# Patient Record
Sex: MALE | Race: WHITE | NOT HISPANIC OR LATINO | Employment: OTHER | URBAN - NONMETROPOLITAN AREA
[De-identification: names, ages, dates, MRNs, and addresses within clinical notes are randomized per-mention and may not be internally consistent; named-entity substitution may affect disease eponyms.]

---

## 2017-01-09 ENCOUNTER — OFFICE VISIT (OUTPATIENT)
Dept: CARDIOLOGY | Facility: CLINIC | Age: 72
End: 2017-01-09

## 2017-01-09 VITALS
RESPIRATION RATE: 16 BRPM | HEIGHT: 71 IN | SYSTOLIC BLOOD PRESSURE: 134 MMHG | HEART RATE: 60 BPM | BODY MASS INDEX: 24.92 KG/M2 | DIASTOLIC BLOOD PRESSURE: 58 MMHG | WEIGHT: 178 LBS

## 2017-01-09 DIAGNOSIS — I25.10 ASCVD (ARTERIOSCLEROTIC CARDIOVASCULAR DISEASE): ICD-10-CM

## 2017-01-09 DIAGNOSIS — IMO0001 NORMAL CARDIAC STRESS TEST: Primary | ICD-10-CM

## 2017-01-09 DIAGNOSIS — I20.0 UNSTABLE ANGINA (HCC): ICD-10-CM

## 2017-01-09 DIAGNOSIS — E78.5 DYSLIPIDEMIA: ICD-10-CM

## 2017-01-09 DIAGNOSIS — Z79.899 DRUG THERAPY: ICD-10-CM

## 2017-01-09 PROCEDURE — 99213 OFFICE O/P EST LOW 20 MIN: CPT | Performed by: INTERNAL MEDICINE

## 2017-01-09 RX ORDER — ATORVASTATIN CALCIUM 80 MG/1
80 TABLET, FILM COATED ORAL NIGHTLY
Qty: 30 TABLET | Refills: 2 | Status: SHIPPED | OUTPATIENT
Start: 2017-01-09 | End: 2017-03-15 | Stop reason: SDUPTHER

## 2017-01-09 RX ORDER — ISOSORBIDE MONONITRATE 60 MG/1
60 TABLET, EXTENDED RELEASE ORAL EVERY MORNING
Qty: 30 TABLET | Refills: 2 | Status: SHIPPED | OUTPATIENT
Start: 2017-01-09 | End: 2017-03-30

## 2017-01-09 NOTE — MR AVS SNAPSHOT
Fidencio Urbanbs   1/9/2017 1:20 PM   Office Visit    Dept Phone:  [phone]   Encounter #:  [id number]    Provider:  Giovanni Buenrostro MD   Department:  [facility] CARDIOLOGY                Your Full Care Plan              Today's Medication Changes          These changes are accurate as of: 1/9/17  2:00 PM.  If you have any questions, ask your nurse or doctor.               New Medication(s)Ordered:     isosorbide mononitrate 60 MG 24 hr tablet   Commonly known as:  IMDUR   Take 1 tablet by mouth Every Morning.         Medication(s)that have changed:     atorvastatin 80 MG tablet   Commonly known as:  LIPITOR   Take 1 tablet by mouth Every Night.   What changed:    - medication strength  - how much to take  - when to take this         Stop taking medication(s)listed here:     Mirabegron ER 50 MG tablet sustained-release 24 hour                Where to Get Your Medications      These medications were sent to [facility] [address] [place] [address] [phone] [place] [phone] [address] [address]     Phone:  [phone]     atorvastatin 80 MG tablet    isosorbide mononitrate 60 MG 24 hr tablet                  Your Updated Medication List          This list is accurate as of: 1/9/17  2:00 PM.  Always use your most recent med list.                amLODIPine 5 MG tablet   Commonly known as:  NORVASC       aspirin 325 MG EC tablet   Take 1 tablet by mouth Daily.       atorvastatin 80 MG tablet   Commonly known as:  LIPITOR   Take 1 tablet by mouth Every Night.       clopidogrel 75 MG tablet   Commonly known as:  PLAVIX       finasteride 5 MG tablet   Commonly known as:  PROSCAR       isosorbide mononitrate 60 MG 24 hr tablet   Commonly known as:  IMDUR   Take 1 tablet by mouth Every Morning.       lisinopril 5 MG tablet   Commonly known as:  PRINIVIL,ZESTRIL       NITROSTAT 0.4 MG SL tablet   Generic drug:  nitroglycerin   PLACE 1 TABLET UNDER  "THE TONGUE EVERY FIVE MINUTES AS NEEDED FOR CHEST PAIN FOR UP TO 3 DOSES               You Were Diagnosed With        Codes Comments    Normal cardiac stress test    -  Primary ICD-10-CM: Z13.6  ICD-9-CM: V81.2     ASCVD (arteriosclerotic cardiovascular disease)     ICD-10-CM: I25.10  ICD-9-CM: 429.2, 440.9     Unstable angina     ICD-10-CM: I20.0  ICD-9-CM: 411.1     Dyslipidemia     ICD-10-CM: E78.5  ICD-9-CM: 272.4     Drug therapy     ICD-10-CM: Z79.899  ICD-9-CM: V58.69       Instructions     None    Patient Instructions History      Upcoming Appointments     Visit Type Date Time Department    FOLLOW UP 1/9/2017  1:20 PM [facility] [place]    FOLLOW UP 3/30/2017 12:20 PM [facility] [place] Signup     Our records indicate that you have declined [facility]. If you would like to sign up for [place], please email [email or web address] or call [phone] to obtain an activation code.             Other Info from Your Visit           Your Appointments     Mar 30, 2017 12:20 PM EDT   Follow Up with Giovanni Buenrostro MD   [facility] (--)    [address]  [place] [address] [phone]           Arrive 15 minutes prior to appointment.              Allergies     No Known Allergies      Reason for Visit     Follow-up hosp stay, stress findings    Chest Pain no change      Vital Signs     Blood Pressure Pulse Respirations Height Weight Body Mass Index    134/58 60 16 71\" (180.3 cm) 178 lb (80.7 kg) 24.83 kg/m2    Smoking Status                   Former Smoker           Problems and Diagnoses Noted     ASCVD (arteriosclerotic cardiovascular disease)    Dyslipidemia    Normal cardiac stress test    Unstable angina    Pharmacologic therapy            "

## 2017-01-09 NOTE — LETTER
January 9, 2017     Gideon Charles MD  [address]  [place] [address]    Patient: Fidencio Luciano   YOB: 1945   Date of Visit: 1/9/2017       Dear Dr. Araceli MD:    Thank you for referring Fidencio Luciano to me for evaluation. Below are the relevant portions of my assessment and plan of care.    If you have questions, please do not hesitate to call me. I look forward to following Fidencio along with you.         Sincerely,        Giovanni Buenrostro MD        CC: No Recipients  Giovnani Buenrostro MD  1/9/2017 11:01 PM  Sign at close encounter  Gideon Charles MD  Fidencio Luciano  1945  12/15/2016    Patient Active Problem List   Diagnosis   • Near syncope   • Symptomatic bradycardia   • Dizziness   • Palpitations   • Hypertension   • Diabetes mellitus   • Benign prostatic hyperplasia   • Angina, class III   • Abnormal stress test   • Abnormal findings on cardiac catheterization   • ASCVD (arteriosclerotic cardiovascular disease), s/p stenting of 80 % stenosis in left circumflex on 10/05/16and 60% stenosis in the LAD that was left alone.   • Unstable angina   • Normal cardiac stress test 12/2016   • Dyslipidemia       Dear Gideon Charles MD:    Subjective     Fidencio Luciano is a 71 y.o. male with the problems as listed above, presents for follow up of chest pain.      History of Present Illness:  Mr. Luciano is a 71-year-old  male with history of known ASCVD, status post stenting of the left circumflex coronary artery in October 2016 with nonobstructive disease noted in the LAD in the right coronary artery.  His had some recent chest pains for which she was admitted to [facility] and had a Lexiscan sestamibi study that revealed no evidence of myocardial ischemia.  He is here for a regular cardiology follow-up.  His had some mild chest pain since discharge from the hospital.  Overall he feels much better.  He says this is not like prior to stenting.    No Known Allergies:      Current  "Outpatient Prescriptions:   •  amLODIPine (NORVASC) 5 MG tablet, Take 5 mg by mouth Daily., Disp: , Rfl:   •  aspirin  MG EC tablet, Take 1 tablet by mouth Daily., Disp: 30 tablet, Rfl: 5  •  atorvastatin (LIPITOR) 80 MG tablet, Take 1 tablet by mouth Every Night., Disp: 30 tablet, Rfl: 2  •  clopidogrel (PLAVIX) 75 MG tablet, Take 75 mg by mouth Daily., Disp: , Rfl:   •  finasteride (PROSCAR) 5 MG tablet, Take 5 mg by mouth Every Evening., Disp: , Rfl:   •  lisinopril (PRINIVIL,ZESTRIL) 5 MG tablet, Take 5 mg by mouth Daily., Disp: , Rfl:   •  NITROSTAT 0.4 MG SL tablet, PLACE 1 TABLET UNDER THE TONGUE EVERY FIVE MINUTES AS NEEDED FOR CHEST PAIN FOR UP TO 3 DOSES, Disp: 25 tablet, Rfl: 1  •  isosorbide mononitrate (IMDUR) 60 MG 24 hr tablet, Take 1 tablet by mouth Every Morning., Disp: 30 tablet, Rfl: 2      The following portions of the patient's history were reviewed and updated as appropriate: allergies, current medications, past family history, past medical history, past social history, past surgical history and problem list.    Social History   Substance Use Topics   • Smoking status: Former Smoker     Packs/day: 3.00     Types: Cigarettes     Quit date: 1982   • Smokeless tobacco: Former User     Quit date: 1/16/1986   • Alcohol use No       Review of Systems   Constitution: Negative for chills and fever.   HENT: Negative for headaches, nosebleeds and sore throat.    Cardiovascular: Positive for chest pain.   Respiratory: Negative for cough, hemoptysis and wheezing.    Gastrointestinal: Negative for abdominal pain, hematemesis, hematochezia, melena, nausea and vomiting.   Genitourinary: Negative for dysuria and hematuria.       Objective   Vitals:    01/09/17 1335   BP: 134/58   Pulse: 60   Resp: 16   Weight: 178 lb (80.7 kg)   Height: 71\" (180.3 cm)     Body mass index is 24.83 kg/(m^2).        Physical Exam   Constitutional: He is oriented to person, place, and time. He appears well-developed and " well-nourished.   HENT:   Head: Normocephalic.   Eyes: Conjunctivae and EOM are normal.   Neck: Normal range of motion. Neck supple. No JVD present. No tracheal deviation present. No thyromegaly present.   Cardiovascular: Normal rate and regular rhythm.  Exam reveals no gallop and no friction rub.    No murmur heard.  Pulmonary/Chest: Breath sounds normal. No respiratory distress. He has no wheezes. He has no rales.   Abdominal: Soft. Bowel sounds are normal. He exhibits no mass. There is no tenderness.   Musculoskeletal: He exhibits no edema.   Neurological: He is alert and oriented to person, place, and time. No cranial nerve deficit.   Skin: Skin is warm and dry.   Psychiatric: He has a normal mood and affect.       Lab Results   Component Value Date     12/16/2016    K 4.1 12/16/2016     12/16/2016    CO2 24.7 12/16/2016    BUN 15 12/16/2016    CREATININE 0.80 12/16/2016    GLUCOSE 115 (H) 12/16/2016    CALCIUM 8.8 12/16/2016    AST 33 12/16/2016    ALT 34 12/16/2016    ALKPHOS 81 12/16/2016    LABIL2 1.4 (L) 12/16/2016     Lab Results   Component Value Date    CKTOTAL 79 10/05/2016     Lab Results   Component Value Date    WBC 6.71 12/16/2016    HGB 13.8 (L) 12/16/2016    HCT 41.4 (L) 12/16/2016     12/16/2016     Lab Results   Component Value Date    INR 1.06 10/03/2016    INR 1.01 07/16/2016     Lab Results   Component Value Date    MG 2.3 07/19/2016     Lab Results   Component Value Date    TSH 1.134 07/17/2016    PSA 1.160 11/04/2016    CHLPL 109 08/12/2015    TRIG 111 12/16/2016    HDL 36 (L) 12/16/2016    LDL 57 08/12/2015      Lab Results   Component Value Date    BNP 55.0 07/16/2016     Echo   Lab Results   Component Value Date    ECHOEFEST 60 07/17/2016     Cardiac cath.PCI by  in oct 2016:  Final impression and plan:  Overall it is my impression that Mr. Luciano is undergone successful percutaneous revascularization of the circumflex artery. He does have moderate disease  involving the LAD and the right coronary arteries however these are both very focal lesions wouldn't easily be treated by percutaneous revascularization if necessary. For now, I would continue medical therapy as is and if he continues to have symptoms and would consider obtaining a stress Cardiolite test and if there are perfusion abnormalities in either the LAD or right coronary artery territories would proceed with percutaneous revascularization of these vessels as well.    Lexiscan sestamibi study on 12/16/16 revealed:  · Findings consistent with a normal ECG stress test.  · Myocardial perfusion imaging indicates a normal myocardial perfusion study with no evidence of ischemia.  · Normal LV cavity size. Normal LV wall motion noted.  · (Calculated EF = 67%).  · Impressions are consistent with a low risk study.  · There is no prior study available for comparison.    Procedures    Assessment/Plan :    1. ASCVD (arteriosclerotic cardiovascular disease), s/p stenting of left circumflex on 10/05/16     2. Unstable angina  Start Imdur 60 mg po daily.    Essential Hypertension    3. Dyslipidemia Increase Lipitor to 80 mg po qhs.  Check FLP and CMP.          Recommendations:     I have reviewed the lexiscan stress test results with the patient.  Increase Lipitor to 80 mg po daily.  Start Imdur 60 mg po daily.  Check FLP and CMP.    Return in about 2 months (around 3/9/2017).    As always, I appreciate very much the opportunity to participate in the cardiovascular care of your patients.      With Best Regards,    Giovanni Buenrostro MD, [facility]    Dragon disclaimer:  Much of this encounter note is an electronic transcription/translation of spoken language to printed text. The electronic translation of spoken language may permit erroneous, or at times, nonsensical words or phrases to be inadvertently transcribed; Although I have reviewed the note for such errors, some may still exist.

## 2017-01-10 ENCOUNTER — TELEPHONE (OUTPATIENT)
Dept: CARDIOLOGY | Facility: CLINIC | Age: 72
End: 2017-01-10

## 2017-01-10 NOTE — TELEPHONE ENCOUNTER
I have reviewed the H & P, progress notes, and discharge summary for the admission on 12/15/16. I do not see any mention of the patient having any issue with the imdur before or during the admission. In the progress notes it was actually instructed to continue it and increase the dose. Review of prior office notes show that he was previously taking imdur 60mg daily in September 2016 and the dose was advanced to 90mg in October 2016. The only mention I can see of it being discontinued is on the 12/15/16 admission summary under discharge medications. However, as listed below, the medication in question remains on the discharge medication list in the discharge summary and again there was no mention of discontinuing it in the notes. I currently do not see any reason the patient shouldn't be able to restart the imdur at 60mg QD as instructed in the office by Dr. Buenrostro yesterday.

## 2017-01-10 NOTE — TELEPHONE ENCOUNTER
----- Message from Valerie Bauer sent at 1/10/2017  9:18 AM EST -----  Question about the medications Dr. Buenrostro called in yesterday.

## 2017-01-10 NOTE — TELEPHONE ENCOUNTER
Called pt at 9:39 am.   Pt wife was the one that had called this morning for Fidencio because he is at work and wanted her to call.   She stated that Fidencio had went to his pharmacy yesterday to  his medication that  had called in for him. She stated that he only picked up the Atorvastatin and not the Imdur b/c when he was in the hospital on 12.15.16 in the AVS it states that he was suppose to D/C the Imdur.   I spoke with Jennifer and she looked through his chart and his hospital and didn't see anywhere that Imdur was suppose to D/C. She stated to advised pt to take the 60 mg QD and to call if they start to have any problems.   I advised pt's wife and she stated that she would try to explain this Fidencio I advised her that if she needs us to explain it to him to have him call us. She expressed understanding.

## 2017-02-24 DIAGNOSIS — N40.0 BENIGN NON-NODULAR PROSTATIC HYPERPLASIA, PRESENCE OF LOWER URINARY TRACT SYMPTOMS UNSPECIFIED: Primary | ICD-10-CM

## 2017-02-28 RX ORDER — FINASTERIDE 5 MG/1
TABLET, FILM COATED ORAL
Qty: 90 TABLET | Refills: 3 | Status: SHIPPED | OUTPATIENT
Start: 2017-02-28 | End: 2018-05-01 | Stop reason: SDUPTHER

## 2017-03-14 ENCOUNTER — DOCUMENTATION (OUTPATIENT)
Dept: GASTROENTEROLOGY | Facility: CLINIC | Age: 72
End: 2017-03-14

## 2017-03-15 RX ORDER — ATORVASTATIN CALCIUM 80 MG/1
80 TABLET, FILM COATED ORAL NIGHTLY
Qty: 30 TABLET | Refills: 2 | Status: SHIPPED | OUTPATIENT
Start: 2017-03-15 | End: 2017-08-03 | Stop reason: SDUPTHER

## 2017-03-24 RX ORDER — CLOPIDOGREL BISULFATE 75 MG/1
75 TABLET ORAL DAILY
Qty: 30 TABLET | Refills: 5 | Status: SHIPPED | OUTPATIENT
Start: 2017-03-24 | End: 2017-08-03 | Stop reason: SDUPTHER

## 2017-03-24 RX ORDER — LISINOPRIL 5 MG/1
5 TABLET ORAL DAILY
Qty: 30 TABLET | Refills: 5 | Status: SHIPPED | OUTPATIENT
Start: 2017-03-24 | End: 2017-08-03 | Stop reason: SDUPTHER

## 2017-03-30 ENCOUNTER — OFFICE VISIT (OUTPATIENT)
Dept: CARDIOLOGY | Facility: CLINIC | Age: 72
End: 2017-03-30

## 2017-03-30 VITALS
RESPIRATION RATE: 16 BRPM | DIASTOLIC BLOOD PRESSURE: 53 MMHG | WEIGHT: 176.4 LBS | HEIGHT: 71 IN | BODY MASS INDEX: 24.69 KG/M2 | SYSTOLIC BLOOD PRESSURE: 124 MMHG | HEART RATE: 62 BPM

## 2017-03-30 DIAGNOSIS — I25.10 ASCVD (ARTERIOSCLEROTIC CARDIOVASCULAR DISEASE): Primary | ICD-10-CM

## 2017-03-30 DIAGNOSIS — E78.5 DYSLIPIDEMIA: ICD-10-CM

## 2017-03-30 DIAGNOSIS — R07.2 PRECORDIAL PAIN: ICD-10-CM

## 2017-03-30 PROCEDURE — 93000 ELECTROCARDIOGRAM COMPLETE: CPT | Performed by: INTERNAL MEDICINE

## 2017-03-30 PROCEDURE — 99214 OFFICE O/P EST MOD 30 MIN: CPT | Performed by: INTERNAL MEDICINE

## 2017-03-30 RX ORDER — ISOSORBIDE MONONITRATE 60 MG/1
90 TABLET, EXTENDED RELEASE ORAL EVERY MORNING
Qty: 45 TABLET | Refills: 2 | Status: SHIPPED | OUTPATIENT
Start: 2017-03-30 | End: 2017-08-03 | Stop reason: SDUPTHER

## 2017-03-30 RX ORDER — RANOLAZINE 500 MG/1
500 TABLET, EXTENDED RELEASE ORAL 2 TIMES DAILY
Qty: 60 TABLET | Refills: 3 | Status: SHIPPED | OUTPATIENT
Start: 2017-03-30 | End: 2018-02-01 | Stop reason: SDUPTHER

## 2017-04-05 ENCOUNTER — TELEPHONE (OUTPATIENT)
Dept: CARDIOLOGY | Facility: CLINIC | Age: 72
End: 2017-04-05

## 2017-04-05 NOTE — TELEPHONE ENCOUNTER
I called [employer] to see if we could do a tier reduction or any discount available for his RX Ranexa.  Per Kat at  [facility] [employer] no discounts available .   There is none available per his insurance.

## 2017-04-07 ENCOUNTER — TELEPHONE (OUTPATIENT)
Dept: CARDIOLOGY | Facility: CLINIC | Age: 72
End: 2017-04-07

## 2017-04-07 NOTE — TELEPHONE ENCOUNTER
Will hold off on the Ranexa 2/2 expense for now. At last visit, his Imdur was also increased to 90mg QD. Will see how his does with this and if still having chest pains, will increase to 120mg. Continue 90mg QD for now.

## 2017-04-07 NOTE — TELEPHONE ENCOUNTER
Called and spoke with wife.  She stated she is about the same and still having CP's.  I advised her to have Fidencio call us on Monday and let us know if he wants to increase the Imdur again.

## 2017-04-12 ENCOUNTER — TELEPHONE (OUTPATIENT)
Dept: CARDIOLOGY | Facility: CLINIC | Age: 72
End: 2017-04-12

## 2017-04-12 NOTE — TELEPHONE ENCOUNTER
Patients wife called wanting to know when his follow up appt was.  I told her it was May 5th. I then  asked her how Fidencio was doing and that  I was waiting to hear back from him since last week after increasing the Imdur to 90mg.    She states he didn't want to increase the imdur to 120mg  at this time but states hes been feeling bad on & off.  Stated he had mowed the grass and she had gotten on to him over this.  I advised her to call us if he wanted to increase his meds or to seek medical attention if CP's persisted.  She voiced understanding.

## 2017-04-18 ENCOUNTER — TELEPHONE (OUTPATIENT)
Dept: CARDIOLOGY | Facility: CLINIC | Age: 72
End: 2017-04-18

## 2017-04-18 NOTE — TELEPHONE ENCOUNTER
----- Message from IGOR Guaman sent at 4/18/2017 11:36 AM EDT -----  I offered to increase his imdur again if he was still having chest pains and he wanted to wait. So keep his regular follow up.     ----- Message -----     From: Kaya Leonard MA     Sent: 4/17/2017   1:06 PM       To: IGOR Guaman Dr. wanted us to check the cost and get PA on Renexa for him. Patient has no prescription coverage.

## 2017-04-21 ENCOUNTER — OFFICE VISIT (OUTPATIENT)
Dept: GASTROENTEROLOGY | Facility: CLINIC | Age: 72
End: 2017-04-21

## 2017-04-21 VITALS
HEIGHT: 71 IN | HEART RATE: 49 BPM | OXYGEN SATURATION: 96 % | BODY MASS INDEX: 25.09 KG/M2 | SYSTOLIC BLOOD PRESSURE: 122 MMHG | WEIGHT: 179.2 LBS | DIASTOLIC BLOOD PRESSURE: 54 MMHG

## 2017-04-21 DIAGNOSIS — K62.5 RECTAL BLEEDING: ICD-10-CM

## 2017-04-21 DIAGNOSIS — R10.30 LOWER ABDOMINAL PAIN: ICD-10-CM

## 2017-04-21 DIAGNOSIS — K64.9 HEMORRHOIDS, UNSPECIFIED HEMORRHOID TYPE: ICD-10-CM

## 2017-04-21 DIAGNOSIS — K58.1 IRRITABLE BOWEL SYNDROME WITH CONSTIPATION: Primary | ICD-10-CM

## 2017-04-21 PROCEDURE — 99214 OFFICE O/P EST MOD 30 MIN: CPT | Performed by: PHYSICIAN ASSISTANT

## 2017-04-21 NOTE — PATIENT INSTRUCTIONS
Fiber Foods  It is recommended that you consume 25-45 grams daily.    Fresh & Dried Fruit  Serving Size Fiber (g)    Apples with skin  1 medium 5.0    Apricot  3 medium 1.0    Apricots, dried  4 pieces 2.9    Banana  1 medium 3.9    Blueberries  1 cup 4.2    Cantaloupe, cubes  1 cup 1.3    Figs, dried  2 medium 3.7    Grapefruit  1/2 medium 3.1    Orange, navel  1 medium 3.4    Peach  1 medium 2.0    Peaches, dried  3 pieces 3.2    Pear  1 medium 5.1    Plum  1 medium 1.1    Raisins  1.5 oz box 1.6    Raspberries  1 cup 6.4    Strawberries  1 cup 4.4      Grains, Beans, Nuts & Seeds  Serving Size Fiber (g)    Almonds  1 oz 4.2    Black beans, cooked  1 cup 13.9    Bran cereal  1 cup 19.9    Bread, whole wheat  1 slice 2.0    Brown rice, dry  1 cup 7.9    Cashews  1 oz 1.0    Flax seeds  3 Tbsp. 6.9    Garbanzo beans, cooked  1 cup 5.8    Kidney beans, cooked  1 cup 11.6    Lentils, red cooked  1 cup 13.6    Lima beans, cooked  1 cup 8.6    Oats, rolled dry  1 cup 12.0    Quinoa (seeds) dry  1/4 cup 6.2    Quinoa, cooked  1 cup 8.4    Pasta, whole wheat  1 cup 6.3    Peanuts  1 oz 2.3    Pistachio nuts  1 oz 3.1    Pumpkin seeds  1/4 cup 4.1    Soybeans, cooked  1 cup 8.6    Sunflower seeds  1/4 cup 3.0    Walnuts  1 oz 3.1            Vegetables  Serving Size Fiber (g)    Avocado (fruit)  1 medium 11.8    Beets, cooked  1 cup 2.8    Beet greens  1 cup 4.2    Bok yash, cooked  1 cup 2.8    Broccoli, cooked  1 cup 4.5    [place]field sprouts, cooked  1 cup 3.6    Cabbage, cooked  1 cup 4.2    Carrot  1 medium 2.6    Carrot, cooked  1 cup 5.2    Cauliflower, cooked  1 cup 3.4    Baron slaw  1 cup 4.0    Quintin greens, cooked  1 cup 2.6    Corn, sweet  1 cup 4.6    Green beans  1 cup 4.0    Celery  1 stalk 1.1    Kale, cooked  1 cup 7.2    Onions, raw  1 cup 2.9    Peas, cooked  1 cup 8.8    Peppers, sweet  1 cup 2.6    Pop corn, air-popped  3 cups 3.6    Potato, baked w/ skin  1 medium 4.8    Spinach, cooked  1 cup 4.3     Summer squash, cooked  1 cup 2.5    Sweet potato, cooked  1 medium 4.9    Swiss chard, cooked  1 cup 3.7    Tomato  1 medium 1.0    Winter squash, cooked  1 cup 6.2    Zucchini, cooked  1 cup 2.6

## 2017-04-21 NOTE — PROGRESS NOTES
"Chief Complaint   Patient presents with   • Diverticulitis   • Rectal Bleeding       Fidencio Luciano is a 71 y.o. male who presents to the office today for evaluation of Diverticulitis and Rectal Bleeding      HPI  His bowel movements usually only occur 1 time per week and are sometimes hard. He takes a fiber pill every day and this has seemed to soften his stools some. He has seen bright red rectal bleeding intermittently. Blood is on the stool, in the toilet and on the tissue. He describes it as in small amounts. He has had rectal pain during bowel movements. Abdominal pain will occur in the lower abdominal quadrants and is described as a \"nagging ache\" and can be very severe at 10/10. He does not take any pain medications. There have been past diagnoses of diverticulitis per his report.     Last colonoscopy was 2015 by Dr. Martinez and diverticulosis of the sigmoid colon and internal hemorrhoids were noted.    He has been complaining of chest pressure and sees Dr. Buenrostro. He has history of coronary artery stent and was told he would likely need 2 more. He was told to stop walking the dog due to chest pressure on exertion. He takes Plavix and aspirin daily.       Review of Systems   Constitutional: Positive for chills, fatigue and fever.   HENT: Positive for congestion, ear pain, sore throat, trouble swallowing and voice change.    Eyes: Positive for visual disturbance. Negative for pain.   Respiratory: Positive for cough, shortness of breath and wheezing.    Cardiovascular: Positive for chest pain and palpitations. Negative for leg swelling.   Gastrointestinal: Positive for abdominal distention, abdominal pain, anal bleeding, blood in stool, constipation, diarrhea and rectal pain. Negative for nausea and vomiting.   Endocrine: Negative.    Genitourinary: Positive for difficulty urinating and frequency.   Musculoskeletal: Positive for arthralgias, back pain and myalgias.   Skin: Positive for rash.   Allergic/Immunologic: " Negative.    Neurological: Positive for dizziness, tremors, weakness and headaches.   Hematological: Bruises/bleeds easily.   Psychiatric/Behavioral: Positive for sleep disturbance. The patient is nervous/anxious.          Past Medical History:   Diagnosis Date   • BPH (benign prostatic hyperplasia)    • Bradycardia     Positive tilt table testing 07/2016   • Diabetes mellitus    • Diverticulosis    • Gastric ulcer with hemorrhage    • Hypertension        Past Surgical History:   Procedure Laterality Date   • BACK SURGERY     • CARDIAC CATHETERIZATION N/A 9/20/2016    Procedure: Left Heart Cath;  Surgeon: Giovanni Buenrostro MD;  Location:  [facility];  Service:    • CARDIAC CATHETERIZATION N/A 10/4/2016    Procedure: Left Heart Cath;  Surgeon: Bharathi Andrew MD;  Location:  [facility];  Service:    • CHOLECYSTECTOMY     • COLONOSCOPY  11/13/2015    Dr. Martinez- internal hemorrhoids, sigmoid diverticulosis   • CORONARY ANGIOPLASTY WITH STENT PLACEMENT     • STOMACH SURGERY      FUSION OF BLEEDING ULCER   • UPPER GASTROINTESTINAL ENDOSCOPY  11/13/2015    Dr. Martinez- mild gastritis       Family History   Problem Relation Age of Onset   • Sick sinus syndrome Mother    • Heart failure Mother        Social History   Substance Use Topics   • Smoking status: Former Smoker     Packs/day: 3.00     Types: Cigarettes     Quit date: 1982   • Smokeless tobacco: Former User     Quit date: 1/16/1986   • Alcohol use No       CURRENT MEDICATION:  •  aspirin  MG EC tablet, Take 1 tablet by mouth Daily., Disp: 30 tablet, Rfl: 5  •  atorvastatin (LIPITOR) 80 MG tablet, Take 1 tablet by mouth Every Night., Disp: 30 tablet, Rfl: 2  •  clopidogrel (PLAVIX) 75 MG tablet, Take 1 tablet by mouth Daily., Disp: 30 tablet, Rfl: 5  •  finasteride (PROSCAR) 5 MG tablet, TAKE ONE TABLET BY MOUTH ONCE DAILY FOR PROSTATE, Disp: 90 tablet, Rfl: 3  •  isosorbide mononitrate (IMDUR) 60 MG 24 hr tablet, Take 1.5  "tablets by mouth Every Morning., Disp: 45 tablet, Rfl: 2  •  lisinopril (PRINIVIL,ZESTRIL) 5 MG tablet, Take 1 tablet by mouth Daily., Disp: 30 tablet, Rfl: 5  •  NITROSTAT 0.4 MG SL tablet, PLACE 1 TABLET UNDER THE TONGUE EVERY FIVE MINUTES AS NEEDED FOR CHEST PAIN FOR UP TO 3 DOSES, Disp: 25 tablet, Rfl: 1  •  ranolazine (RANEXA) 500 MG 12 hr tablet, Take 1 tablet by mouth 2 (Two) Times a Day., Disp: 60 tablet, Rfl: 3  •  amLODIPine (NORVASC) 5 MG tablet, Take 5 mg by mouth Daily., Disp: , Rfl:     ALLERGIES:  Review of patient's allergies indicates no known allergies.    VISIT VITALS:  /54  Pulse (!) 49  Ht 71\" (180.3 cm)  Wt 179 lb 3.2 oz (81.3 kg)  SpO2 96%  BMI 24.99 kg/m2     Physical Exam   Constitutional: He is oriented to person, place, and time. He appears well-developed and well-nourished. No distress.   HENT:   Head: Normocephalic and atraumatic.   Right Ear: External ear normal.   Left Ear: External ear normal.   Nose: Nose normal.   Mouth/Throat: Oropharynx is clear and moist.   Eyes: Conjunctivae and EOM are normal. Right eye exhibits no discharge. Left eye exhibits no discharge. No scleral icterus.   Neck: Normal range of motion. Neck supple.   Cardiovascular: Normal rate, regular rhythm and normal heart sounds.  Exam reveals no gallop and no friction rub.    No murmur heard.  Pulmonary/Chest: Effort normal and breath sounds normal. No respiratory distress. He has no wheezes. He has no rales. He exhibits no tenderness.   Abdominal: Soft. Normal appearance and bowel sounds are normal. He exhibits no distension, no ascites and no mass. There is no tenderness. There is no rigidity and no guarding. No hernia.   Musculoskeletal: Normal range of motion. He exhibits edema (1+ pitting edema LLEs >left). He exhibits no deformity.   Neurological: He is alert and oriented to person, place, and time. He exhibits normal muscle tone. Coordination normal.   Skin: Skin is warm and dry. No rash noted. No " erythema. No pallor.   Psychiatric: He has a normal mood and affect. His behavior is normal. Judgment and thought content normal.   Nursing note and vitals reviewed.      Assessment/Plan      Diagnosis Plan   1. Irritable bowel syndrome with constipation  linaclotide (LINZESS) 290 MCG capsule capsule   2. Lower abdominal pain     3. Hemorrhoids, unspecified hemorrhoid type  hydrocortisone (ANUSOL-HC) 2.5 % rectal cream   4. Rectal bleeding       Start taking Linzess 290 mcg for relief of constipation. This should be taken as directed; 1 tab PO once daily, 30 minutes before meals on an empty stomach. Samples were given at today's visit. He was instructed to increase dietary fiber intake to 25-45g daily. A list of fiber foods was given.    Anusol HC was sent to his pharmacy for relief of rectal discomfort. He will monitor for resolution of rectal bleeding. If no resolution with this treatment, he will be considered for colonoscopy for further evaluation and will likely need cardiac clearance and instructions on holding Plavix.       Return in about 1 month (around 5/21/2017) for recheck constipation and rectal bleeding.       Hiral Fierro PA-C       Electronically signed 4/26/2017 at 4:03 PM.

## 2017-05-05 ENCOUNTER — OFFICE VISIT (OUTPATIENT)
Dept: CARDIOLOGY | Facility: CLINIC | Age: 72
End: 2017-05-05

## 2017-05-05 VITALS
WEIGHT: 180.6 LBS | HEART RATE: 62 BPM | SYSTOLIC BLOOD PRESSURE: 149 MMHG | BODY MASS INDEX: 25.28 KG/M2 | DIASTOLIC BLOOD PRESSURE: 60 MMHG | HEIGHT: 71 IN

## 2017-05-05 DIAGNOSIS — IMO0001 NORMAL CARDIAC STRESS TEST: ICD-10-CM

## 2017-05-05 DIAGNOSIS — R00.2 PALPITATIONS: ICD-10-CM

## 2017-05-05 DIAGNOSIS — R07.9 CHEST PAIN, UNSPECIFIED TYPE: Primary | ICD-10-CM

## 2017-05-05 DIAGNOSIS — R55 NEAR SYNCOPE: ICD-10-CM

## 2017-05-05 DIAGNOSIS — I20.9 ANGINA, CLASS III (HCC): ICD-10-CM

## 2017-05-05 DIAGNOSIS — E11.9 TYPE 2 DIABETES MELLITUS WITHOUT COMPLICATION, WITHOUT LONG-TERM CURRENT USE OF INSULIN (HCC): ICD-10-CM

## 2017-05-05 DIAGNOSIS — E78.5 DYSLIPIDEMIA: ICD-10-CM

## 2017-05-05 DIAGNOSIS — I25.10 ASCVD (ARTERIOSCLEROTIC CARDIOVASCULAR DISEASE): ICD-10-CM

## 2017-05-05 DIAGNOSIS — R94.39 ABNORMAL STRESS TEST: ICD-10-CM

## 2017-05-05 DIAGNOSIS — R07.2 PRECORDIAL PAIN: ICD-10-CM

## 2017-05-05 DIAGNOSIS — R00.1 SYMPTOMATIC BRADYCARDIA: ICD-10-CM

## 2017-05-05 DIAGNOSIS — R42 DIZZINESS: ICD-10-CM

## 2017-05-05 DIAGNOSIS — I20.0 UNSTABLE ANGINA (HCC): ICD-10-CM

## 2017-05-05 DIAGNOSIS — I10 ESSENTIAL HYPERTENSION: ICD-10-CM

## 2017-05-05 PROCEDURE — 99214 OFFICE O/P EST MOD 30 MIN: CPT | Performed by: INTERNAL MEDICINE

## 2017-05-05 RX ORDER — DIAZEPAM 5 MG/1
5 TABLET ORAL ONCE
Status: CANCELLED | OUTPATIENT
Start: 2017-05-09

## 2017-05-08 ENCOUNTER — LAB (OUTPATIENT)
Dept: LAB | Facility: HOSPITAL | Age: 72
End: 2017-05-08
Attending: INTERNAL MEDICINE

## 2017-05-08 ENCOUNTER — HOSPITAL ENCOUNTER (OUTPATIENT)
Dept: RESPIRATORY THERAPY | Facility: HOSPITAL | Age: 72
Discharge: HOME | End: 2017-05-08
Attending: INTERNAL MEDICINE | Admitting: INTERNAL MEDICINE

## 2017-05-08 DIAGNOSIS — I20.9 ANGINA, CLASS III (HCC): ICD-10-CM

## 2017-05-08 LAB
ALBUMIN SERPL-MCNC: 4 G/DL (ref 3.4–4.8)
ALBUMIN/GLOB SERPL: 1.3 G/DL (ref 1.5–2.5)
ALP SERPL-CCNC: 85 U/L (ref 40–129)
ALT SERPL W P-5'-P-CCNC: 34 U/L (ref 10–44)
ANION GAP SERPL CALCULATED.3IONS-SCNC: 9 MMOL/L (ref 3.6–11.2)
AST SERPL-CCNC: 29 U/L (ref 10–34)
BASOPHILS # BLD AUTO: 0.07 10*3/MM3 (ref 0–0.3)
BASOPHILS NFR BLD AUTO: 0.9 % (ref 0–2)
BILIRUB SERPL-MCNC: 0.5 MG/DL (ref 0.2–1.8)
BUN BLD-MCNC: 15 MG/DL (ref 7–21)
BUN/CREAT SERPL: 18.3 (ref 7–25)
CALCIUM SPEC-SCNC: 9.3 MG/DL (ref 7.7–10)
CHLORIDE SERPL-SCNC: 109 MMOL/L (ref 99–112)
CO2 SERPL-SCNC: 23 MMOL/L (ref 24.3–31.9)
CREAT BLD-MCNC: 0.82 MG/DL (ref 0.43–1.29)
DEPRECATED RDW RBC AUTO: 46.5 FL (ref 37–54)
EOSINOPHIL # BLD AUTO: 0.62 10*3/MM3 (ref 0–0.7)
EOSINOPHIL NFR BLD AUTO: 8.4 % (ref 0–7)
ERYTHROCYTE [DISTWIDTH] IN BLOOD BY AUTOMATED COUNT: 14.1 % (ref 11.5–14.5)
GFR SERPL CREATININE-BSD FRML MDRD: 93 ML/MIN/1.73
GLOBULIN UR ELPH-MCNC: 3.2 GM/DL
GLUCOSE BLD-MCNC: 121 MG/DL (ref 70–110)
HCT VFR BLD AUTO: 37.8 % (ref 42–52)
HGB BLD-MCNC: 12.1 G/DL (ref 14–18)
IMM GRANULOCYTES # BLD: 0.01 10*3/MM3 (ref 0–0.03)
IMM GRANULOCYTES NFR BLD: 0.1 % (ref 0–0.5)
LYMPHOCYTES # BLD AUTO: 1.94 10*3/MM3 (ref 1–3)
LYMPHOCYTES NFR BLD AUTO: 26.2 % (ref 16–46)
MCH RBC QN AUTO: 30.3 PG (ref 27–33)
MCHC RBC AUTO-ENTMCNC: 32 G/DL (ref 33–37)
MCV RBC AUTO: 94.5 FL (ref 80–94)
MONOCYTES # BLD AUTO: 0.88 10*3/MM3 (ref 0.1–0.9)
MONOCYTES NFR BLD AUTO: 11.9 % (ref 0–12)
NEUTROPHILS # BLD AUTO: 3.89 10*3/MM3 (ref 1.4–6.5)
NEUTROPHILS NFR BLD AUTO: 52.5 % (ref 40–75)
OSMOLALITY SERPL CALC.SUM OF ELEC: 283.3 MOSM/KG (ref 273–305)
PLATELET # BLD AUTO: 211 10*3/MM3 (ref 130–400)
PMV BLD AUTO: 10.7 FL (ref 6–10)
POTASSIUM BLD-SCNC: 4 MMOL/L (ref 3.5–5.3)
PROT SERPL-MCNC: 7.2 G/DL (ref 6–8)
RBC # BLD AUTO: 4 10*6/MM3 (ref 4.7–6.1)
SODIUM BLD-SCNC: 141 MMOL/L (ref 135–153)
WBC NRBC COR # BLD: 7.41 10*3/MM3 (ref 4.5–12.5)

## 2017-05-08 PROCEDURE — 36415 COLL VENOUS BLD VENIPUNCTURE: CPT

## 2017-05-08 PROCEDURE — 85025 COMPLETE CBC W/AUTO DIFF WBC: CPT | Performed by: INTERNAL MEDICINE

## 2017-05-08 PROCEDURE — 93005 ELECTROCARDIOGRAM TRACING: CPT | Performed by: INTERNAL MEDICINE

## 2017-05-08 PROCEDURE — 93010 ELECTROCARDIOGRAM REPORT: CPT | Performed by: INTERNAL MEDICINE

## 2017-05-08 PROCEDURE — 80053 COMPREHEN METABOLIC PANEL: CPT | Performed by: INTERNAL MEDICINE

## 2017-05-09 ENCOUNTER — HOSPITAL ENCOUNTER (OUTPATIENT)
Facility: HOSPITAL | Age: 72
Setting detail: OBSERVATION
Discharge: HOME | End: 2017-05-10
Attending: INTERNAL MEDICINE | Admitting: INTERNAL MEDICINE

## 2017-05-09 DIAGNOSIS — I20.9 ANGINA, CLASS III (HCC): ICD-10-CM

## 2017-05-09 LAB
GLUCOSE BLDC GLUCOMTR-MCNC: 125 MG/DL (ref 70–130)
GLUCOSE BLDC GLUCOMTR-MCNC: 77 MG/DL (ref 70–130)

## 2017-05-09 PROCEDURE — C1894 INTRO/SHEATH, NON-LASER: HCPCS | Performed by: INTERNAL MEDICINE

## 2017-05-09 PROCEDURE — 93005 ELECTROCARDIOGRAM TRACING: CPT | Performed by: INTERNAL MEDICINE

## 2017-05-09 PROCEDURE — 0 IOPAMIDOL PER 1 ML: Performed by: INTERNAL MEDICINE

## 2017-05-09 PROCEDURE — C1769 GUIDE WIRE: HCPCS | Performed by: INTERNAL MEDICINE

## 2017-05-09 PROCEDURE — 94799 UNLISTED PULMONARY SVC/PX: CPT

## 2017-05-09 PROCEDURE — 25010000002 BIVALIRUDIN PER 1 MG: Performed by: INTERNAL MEDICINE

## 2017-05-09 PROCEDURE — 93010 ELECTROCARDIOGRAM REPORT: CPT | Performed by: INTERNAL MEDICINE

## 2017-05-09 PROCEDURE — C9600 PERC DRUG-EL COR STENT SING: HCPCS | Performed by: INTERNAL MEDICINE

## 2017-05-09 PROCEDURE — C1887 CATHETER, GUIDING: HCPCS | Performed by: INTERNAL MEDICINE

## 2017-05-09 PROCEDURE — G0378 HOSPITAL OBSERVATION PER HR: HCPCS

## 2017-05-09 PROCEDURE — 93458 L HRT ARTERY/VENTRICLE ANGIO: CPT | Performed by: INTERNAL MEDICINE

## 2017-05-09 PROCEDURE — 25010000002 DIPHENHYDRAMINE PER 50 MG: Performed by: INTERNAL MEDICINE

## 2017-05-09 PROCEDURE — C1874 STENT, COATED/COV W/DEL SYS: HCPCS | Performed by: INTERNAL MEDICINE

## 2017-05-09 PROCEDURE — C1725 CATH, TRANSLUMIN NON-LASER: HCPCS | Performed by: INTERNAL MEDICINE

## 2017-05-09 PROCEDURE — 82962 GLUCOSE BLOOD TEST: CPT

## 2017-05-09 PROCEDURE — 92928 PRQ TCAT PLMT NTRAC ST 1 LES: CPT | Performed by: INTERNAL MEDICINE

## 2017-05-09 PROCEDURE — C1760 CLOSURE DEV, VASC: HCPCS | Performed by: INTERNAL MEDICINE

## 2017-05-09 PROCEDURE — 25010000002 HEPARIN (PORCINE) PER 1000 UNITS: Performed by: INTERNAL MEDICINE

## 2017-05-09 DEVICE — XIENCE ALPINE EVEROLIMUS ELUTING CORONARY STENT SYSTEM 3.00 MM X 18 MM / RAPID-EXCHANGE
Type: IMPLANTABLE DEVICE | Status: FUNCTIONAL
Brand: XIENCE ALPINE

## 2017-05-09 RX ORDER — HYDROCODONE BITARTRATE AND ACETAMINOPHEN 5; 325 MG/1; MG/1
1 TABLET ORAL EVERY 4 HOURS PRN
Status: DISCONTINUED | OUTPATIENT
Start: 2017-05-09 | End: 2017-05-10 | Stop reason: HOSPADM

## 2017-05-09 RX ORDER — CLOPIDOGREL BISULFATE 75 MG/1
75 TABLET ORAL DAILY
Status: DISCONTINUED | OUTPATIENT
Start: 2017-05-09 | End: 2017-05-10 | Stop reason: HOSPADM

## 2017-05-09 RX ORDER — LIDOCAINE HYDROCHLORIDE 20 MG/ML
INJECTION, SOLUTION INFILTRATION; PERINEURAL AS NEEDED
Status: DISCONTINUED | OUTPATIENT
Start: 2017-05-09 | End: 2017-05-09 | Stop reason: HOSPADM

## 2017-05-09 RX ORDER — FINASTERIDE 5 MG/1
5 TABLET, FILM COATED ORAL DAILY
Status: DISCONTINUED | OUTPATIENT
Start: 2017-05-09 | End: 2017-05-10 | Stop reason: HOSPADM

## 2017-05-09 RX ORDER — LISINOPRIL 2.5 MG/1
5 TABLET ORAL DAILY
Status: DISCONTINUED | OUTPATIENT
Start: 2017-05-09 | End: 2017-05-10 | Stop reason: HOSPADM

## 2017-05-09 RX ORDER — DIAZEPAM 5 MG/1
5 TABLET ORAL ONCE
Status: COMPLETED | OUTPATIENT
Start: 2017-05-09 | End: 2017-05-09

## 2017-05-09 RX ORDER — NITROGLYCERIN 0.4 MG/1
0.4 TABLET SUBLINGUAL
Status: DISCONTINUED | OUTPATIENT
Start: 2017-05-09 | End: 2017-05-10 | Stop reason: HOSPADM

## 2017-05-09 RX ORDER — SODIUM CHLORIDE 9 MG/ML
50 INJECTION, SOLUTION INTRAVENOUS CONTINUOUS
Status: ACTIVE | OUTPATIENT
Start: 2017-05-09 | End: 2017-05-09

## 2017-05-09 RX ORDER — CALCIUM CARBONATE 200(500)MG
2 TABLET,CHEWABLE ORAL 2 TIMES DAILY PRN
Status: DISCONTINUED | OUTPATIENT
Start: 2017-05-09 | End: 2017-05-10 | Stop reason: HOSPADM

## 2017-05-09 RX ORDER — RANOLAZINE 500 MG/1
500 TABLET, EXTENDED RELEASE ORAL 2 TIMES DAILY
Status: DISCONTINUED | OUTPATIENT
Start: 2017-05-09 | End: 2017-05-10 | Stop reason: HOSPADM

## 2017-05-09 RX ORDER — CLOPIDOGREL 300 MG/1
TABLET, FILM COATED ORAL AS NEEDED
Status: DISCONTINUED | OUTPATIENT
Start: 2017-05-09 | End: 2017-05-09 | Stop reason: HOSPADM

## 2017-05-09 RX ORDER — ATORVASTATIN CALCIUM 40 MG/1
80 TABLET, FILM COATED ORAL NIGHTLY
Status: DISCONTINUED | OUTPATIENT
Start: 2017-05-09 | End: 2017-05-10 | Stop reason: HOSPADM

## 2017-05-09 RX ORDER — ASPIRIN 325 MG
325 TABLET, DELAYED RELEASE (ENTERIC COATED) ORAL DAILY
Status: DISCONTINUED | OUTPATIENT
Start: 2017-05-09 | End: 2017-05-10 | Stop reason: HOSPADM

## 2017-05-09 RX ORDER — SODIUM CHLORIDE 9 MG/ML
INJECTION, SOLUTION INTRAVENOUS CONTINUOUS PRN
Status: DISCONTINUED | OUTPATIENT
Start: 2017-05-09 | End: 2017-05-09 | Stop reason: HOSPADM

## 2017-05-09 RX ORDER — DIPHENHYDRAMINE HYDROCHLORIDE 50 MG/ML
INJECTION INTRAMUSCULAR; INTRAVENOUS AS NEEDED
Status: DISCONTINUED | OUTPATIENT
Start: 2017-05-09 | End: 2017-05-09 | Stop reason: HOSPADM

## 2017-05-09 RX ORDER — HEPARIN SODIUM 1000 [USP'U]/ML
INJECTION, SOLUTION INTRAVENOUS; SUBCUTANEOUS AS NEEDED
Status: DISCONTINUED | OUTPATIENT
Start: 2017-05-09 | End: 2017-05-09 | Stop reason: HOSPADM

## 2017-05-09 RX ADMIN — DIAZEPAM 5 MG: 5 TABLET ORAL at 08:21

## 2017-05-09 RX ADMIN — ISOSORBIDE MONONITRATE 90 MG: 30 TABLET, EXTENDED RELEASE ORAL at 14:03

## 2017-05-09 RX ADMIN — RANOLAZINE 500 MG: 500 TABLET, FILM COATED, EXTENDED RELEASE ORAL at 14:04

## 2017-05-09 RX ADMIN — RANOLAZINE 500 MG: 500 TABLET, FILM COATED, EXTENDED RELEASE ORAL at 18:19

## 2017-05-09 RX ADMIN — ATORVASTATIN CALCIUM 80 MG: 40 TABLET, FILM COATED ORAL at 20:42

## 2017-05-09 RX ADMIN — LISINOPRIL 5 MG: 2.5 TABLET ORAL at 14:03

## 2017-05-09 RX ADMIN — FINASTERIDE 5 MG: 5 TABLET, FILM COATED ORAL at 14:03

## 2017-05-09 RX ADMIN — ASPIRIN 325 MG: 325 TABLET, COATED ORAL at 14:02

## 2017-05-10 ENCOUNTER — TELEPHONE (OUTPATIENT)
Dept: CARDIOLOGY | Facility: CLINIC | Age: 72
End: 2017-05-10

## 2017-05-10 VITALS
HEIGHT: 71 IN | WEIGHT: 170.4 LBS | RESPIRATION RATE: 12 BRPM | DIASTOLIC BLOOD PRESSURE: 55 MMHG | TEMPERATURE: 97.9 F | BODY MASS INDEX: 23.85 KG/M2 | SYSTOLIC BLOOD PRESSURE: 110 MMHG | HEART RATE: 53 BPM | OXYGEN SATURATION: 100 %

## 2017-05-10 LAB
ANION GAP SERPL CALCULATED.3IONS-SCNC: 3.7 MMOL/L (ref 3.6–11.2)
BUN BLD-MCNC: 16 MG/DL (ref 7–21)
BUN/CREAT SERPL: 18.6 (ref 7–25)
CALCIUM SPEC-SCNC: 9 MG/DL (ref 7.7–10)
CHLORIDE SERPL-SCNC: 111 MMOL/L (ref 99–112)
CHOLEST SERPL-MCNC: 83 MG/DL (ref 0–200)
CO2 SERPL-SCNC: 27.3 MMOL/L (ref 24.3–31.9)
CREAT BLD-MCNC: 0.86 MG/DL (ref 0.43–1.29)
DEPRECATED RDW RBC AUTO: 48.6 FL (ref 37–54)
ERYTHROCYTE [DISTWIDTH] IN BLOOD BY AUTOMATED COUNT: 14 % (ref 11.5–14.5)
GFR SERPL CREATININE-BSD FRML MDRD: 88 ML/MIN/1.73
GLUCOSE BLD-MCNC: 102 MG/DL (ref 70–110)
HBA1C MFR BLD: 6.7 % (ref 4.5–5.7)
HCT VFR BLD AUTO: 37.3 % (ref 42–52)
HDLC SERPL-MCNC: 26 MG/DL (ref 60–100)
HGB BLD-MCNC: 12.2 G/DL (ref 14–18)
LDLC SERPL CALC-MCNC: 40 MG/DL (ref 0–100)
LDLC/HDLC SERPL: 1.52 {RATIO}
MCH RBC QN AUTO: 30.7 PG (ref 27–33)
MCHC RBC AUTO-ENTMCNC: 32.7 G/DL (ref 33–37)
MCV RBC AUTO: 94 FL (ref 80–94)
OSMOLALITY SERPL CALC.SUM OF ELEC: 284.5 MOSM/KG (ref 273–305)
PLATELET # BLD AUTO: 183 10*3/MM3 (ref 130–400)
PMV BLD AUTO: 10.8 FL (ref 6–10)
POTASSIUM BLD-SCNC: 3.9 MMOL/L (ref 3.5–5.3)
RBC # BLD AUTO: 3.97 10*6/MM3 (ref 4.7–6.1)
SODIUM BLD-SCNC: 142 MMOL/L (ref 135–153)
TRIGL SERPL-MCNC: 87 MG/DL (ref 0–150)
TROPONIN I SERPL-MCNC: 0.03 NG/ML
VLDLC SERPL-MCNC: 17.4 MG/DL
WBC NRBC COR # BLD: 9.06 10*3/MM3 (ref 4.5–12.5)

## 2017-05-10 PROCEDURE — 94799 UNLISTED PULMONARY SVC/PX: CPT

## 2017-05-10 PROCEDURE — 93005 ELECTROCARDIOGRAM TRACING: CPT | Performed by: INTERNAL MEDICINE

## 2017-05-10 PROCEDURE — 83036 HEMOGLOBIN GLYCOSYLATED A1C: CPT | Performed by: INTERNAL MEDICINE

## 2017-05-10 PROCEDURE — 80061 LIPID PANEL: CPT | Performed by: INTERNAL MEDICINE

## 2017-05-10 PROCEDURE — 84484 ASSAY OF TROPONIN QUANT: CPT | Performed by: INTERNAL MEDICINE

## 2017-05-10 PROCEDURE — 80048 BASIC METABOLIC PNL TOTAL CA: CPT | Performed by: INTERNAL MEDICINE

## 2017-05-10 PROCEDURE — 85027 COMPLETE CBC AUTOMATED: CPT | Performed by: INTERNAL MEDICINE

## 2017-05-10 PROCEDURE — G0378 HOSPITAL OBSERVATION PER HR: HCPCS

## 2017-05-10 PROCEDURE — 99217 PR OBSERVATION CARE DISCHARGE MANAGEMENT: CPT | Performed by: INTERNAL MEDICINE

## 2017-05-10 RX ADMIN — CLOPIDOGREL BISULFATE 75 MG: 75 TABLET, FILM COATED ORAL at 08:25

## 2017-05-10 RX ADMIN — RANOLAZINE 500 MG: 500 TABLET, FILM COATED, EXTENDED RELEASE ORAL at 08:25

## 2017-05-10 RX ADMIN — FINASTERIDE 5 MG: 5 TABLET, FILM COATED ORAL at 08:25

## 2017-05-10 RX ADMIN — LISINOPRIL 5 MG: 2.5 TABLET ORAL at 08:25

## 2017-05-10 RX ADMIN — ASPIRIN 325 MG: 325 TABLET, COATED ORAL at 08:25

## 2017-05-10 RX ADMIN — ISOSORBIDE MONONITRATE 90 MG: 30 TABLET, EXTENDED RELEASE ORAL at 06:26

## 2017-05-18 ENCOUNTER — OFFICE VISIT (OUTPATIENT)
Dept: CARDIOLOGY | Facility: CLINIC | Age: 72
End: 2017-05-18

## 2017-05-18 VITALS
BODY MASS INDEX: 24.5 KG/M2 | SYSTOLIC BLOOD PRESSURE: 113 MMHG | HEART RATE: 65 BPM | DIASTOLIC BLOOD PRESSURE: 53 MMHG | WEIGHT: 175 LBS | HEIGHT: 71 IN

## 2017-05-18 DIAGNOSIS — R07.2 PRECORDIAL PAIN: ICD-10-CM

## 2017-05-18 DIAGNOSIS — R53.82 CHRONIC FATIGUE: ICD-10-CM

## 2017-05-18 DIAGNOSIS — R53.1 WEAKNESS GENERALIZED: ICD-10-CM

## 2017-05-18 DIAGNOSIS — I25.10 ASCVD (ARTERIOSCLEROTIC CARDIOVASCULAR DISEASE): Primary | ICD-10-CM

## 2017-05-18 PROCEDURE — 99214 OFFICE O/P EST MOD 30 MIN: CPT | Performed by: INTERNAL MEDICINE

## 2017-06-02 ENCOUNTER — OFFICE VISIT (OUTPATIENT)
Dept: UROLOGY | Facility: CLINIC | Age: 72
End: 2017-06-02

## 2017-06-02 VITALS — HEART RATE: 67 BPM | SYSTOLIC BLOOD PRESSURE: 139 MMHG | DIASTOLIC BLOOD PRESSURE: 65 MMHG

## 2017-06-02 DIAGNOSIS — R39.89 HARD, FIRM PROSTATE: ICD-10-CM

## 2017-06-02 DIAGNOSIS — R32 INCONTINENCE: Primary | ICD-10-CM

## 2017-06-02 DIAGNOSIS — N40.0 BENIGN NON-NODULAR PROSTATIC HYPERPLASIA, PRESENCE OF LOWER URINARY TRACT SYMPTOMS UNSPECIFIED: ICD-10-CM

## 2017-06-02 LAB
BILIRUB BLD-MCNC: NEGATIVE MG/DL
CLARITY, POC: CLEAR
COLOR UR: YELLOW
GLUCOSE UR STRIP-MCNC: NEGATIVE MG/DL
KETONES UR QL: NEGATIVE
LEUKOCYTE EST, POC: NEGATIVE
NITRITE UR-MCNC: NEGATIVE MG/ML
PH UR: 5 [PH] (ref 5–8)
PROT UR STRIP-MCNC: NEGATIVE MG/DL
RBC # UR STRIP: NEGATIVE /UL
SP GR UR: 1.03 (ref 1–1.03)
UROBILINOGEN UR QL: NORMAL

## 2017-06-02 PROCEDURE — 99213 OFFICE O/P EST LOW 20 MIN: CPT | Performed by: NURSE PRACTITIONER

## 2017-06-02 PROCEDURE — 81003 URINALYSIS AUTO W/O SCOPE: CPT | Performed by: NURSE PRACTITIONER

## 2017-06-02 NOTE — PROGRESS NOTES
Chief Complaint:          Chief Complaint   Patient presents with   • Urinary Incontinence       HPI:   71 y.o. male Inc. seen in the office today for BPH symptoms. He is currently on Proscar and has been on the medication for several years. He denies any difficulty with urination. His last PSA drawn on 1.16 on November 2016.    HPI        Past Medical History:        Past Medical History:   Diagnosis Date   • BPH (benign prostatic hyperplasia)    • Bradycardia     Positive tilt table testing 07/2016   • Coronary artery disease    • Diabetes mellitus    • Diverticulosis    • Gastric ulcer with hemorrhage    • Hyperlipidemia    • Hypertension          Current Meds:     Current Outpatient Prescriptions   Medication Sig Dispense Refill   • aspirin  MG EC tablet Take 1 tablet by mouth Daily. 30 tablet 5   • atorvastatin (LIPITOR) 80 MG tablet Take 1 tablet by mouth Every Night. 30 tablet 2   • clopidogrel (PLAVIX) 75 MG tablet Take 1 tablet by mouth Daily. 30 tablet 5   • finasteride (PROSCAR) 5 MG tablet TAKE ONE TABLET BY MOUTH ONCE DAILY FOR PROSTATE 90 tablet 3   • isosorbide mononitrate (IMDUR) 60 MG 24 hr tablet Take 1.5 tablets by mouth Every Morning. 45 tablet 2   • linaclotide (LINZESS) 290 MCG capsule capsule Take 290 mcg by mouth Daily As Needed (constipation).     • lisinopril (PRINIVIL,ZESTRIL) 5 MG tablet Take 1 tablet by mouth Daily. 30 tablet 5   • metFORMIN (GLUCOPHAGE) 500 MG tablet TAKE 1/2 TABLET BY MOUTH EVERY DAY  0   • NITROSTAT 0.4 MG SL tablet PLACE 1 TABLET UNDER THE TONGUE EVERY FIVE MINUTES AS NEEDED FOR CHEST PAIN FOR UP TO 3 DOSES 25 tablet 1   • ranolazine (RANEXA) 500 MG 12 hr tablet Take 1 tablet by mouth 2 (Two) Times a Day. 60 tablet 3     No current facility-administered medications for this visit.         Allergies:      No Known Allergies     Past Surgical History:     Past Surgical History:   Procedure Laterality Date   • BACK SURGERY     • CARDIAC CATHETERIZATION N/A  9/20/2016    Procedure: Left Heart Cath;  Surgeon: Giovanni Buenrostro MD;  Location:  [facility];  Service:    • CARDIAC CATHETERIZATION N/A 10/4/2016    Procedure: Left Heart Cath;  Surgeon: Bharathi Andrew MD;  Location:  [facility];  Service:    • CARDIAC CATHETERIZATION N/A 5/9/2017    Procedure: Left Heart Cath;  Surgeon: Giovanni Buenrostro MD;  Location:  [facility];  Service:    • CARDIAC CATHETERIZATION N/A 5/9/2017    Procedure: ERR;  Surgeon: Giovanni Buenrostro MD;  Location:  [facility];  Service:    • CHOLECYSTECTOMY     • COLONOSCOPY  11/13/2015    Dr. Martinez- internal hemorrhoids, sigmoid diverticulosis   • CORONARY ANGIOPLASTY WITH STENT PLACEMENT     • VT RT/LT HEART CATHETERS N/A 5/9/2017    Procedure: Percutaneous Coronary Intervention;  Surgeon: Lincoln De Los Santos MD;  Location:  [facility];  Service: Cardiology   • STOMACH SURGERY      FUSION OF BLEEDING ULCER   • UPPER GASTROINTESTINAL ENDOSCOPY  11/13/2015    Dr. Martinez- mild gastritis         Social History:     Social History     Social History   • Marital status:      Spouse name: N/A   • Number of children: N/A   • Years of education: N/A     Occupational History   • Not on file.     Social History Main Topics   • Smoking status: Former Smoker     Packs/day: 3.00     Types: Cigarettes     Quit date: 1982   • Smokeless tobacco: Former User     Quit date: 1/16/1986   • Alcohol use No   • Drug use: No   • Sexual activity: Defer     Other Topics Concern   • Not on file     Social History Narrative       Family History:     Family History   Problem Relation Age of Onset   • Sick sinus syndrome Mother    • Heart failure Mother        Review of Systems:     Review of Systems   Constitutional: Positive for fatigue. Negative for chills and fever.   Respiratory: Negative for cough, shortness of breath and wheezing.    Cardiovascular: Negative for leg swelling.    Gastrointestinal: Positive for abdominal pain. Negative for nausea and vomiting.   Musculoskeletal: Negative for back pain and joint swelling.   Neurological: Positive for headaches. Negative for dizziness.   Psychiatric/Behavioral: Negative for confusion.       Physical Exam:     Physical Exam   Constitutional: He is oriented to person, place, and time. He appears well-developed and well-nourished. No distress.   Abdominal: Soft. Bowel sounds are normal. He exhibits no distension and no mass. There is no tenderness. There is no rebound and no guarding. No hernia.   Genitourinary: Rectum normal and penis normal.   Genitourinary Comments: Prostate is hard to palpation   Musculoskeletal: Normal range of motion.   Neurological: He is alert and oriented to person, place, and time.   Skin: Skin is warm and dry. No rash noted. He is not diaphoretic. No pallor.   Psychiatric: He has a normal mood and affect. His behavior is normal. Judgment and thought content normal.   Nursing note and vitals reviewed.      Procedure:     No notes on file      Assessment:     Encounter Diagnoses   Name Primary?   • Incontinence Yes   • Benign non-nodular prostatic hyperplasia, presence of lower urinary tract symptoms unspecified    • Hard, firm prostate      Orders Placed This Encounter   Procedures   • POC Urinalysis Dipstick, Automated       Plan:   After the prostate exam was completed and since it does feel hard and firm in the patient has been on Proscar for several years I will recommend that he have a TRUS prostate biopsy to rule out possible prostate cancer. Discussed the procedure in detail with the patient and his agreeable. He will be scheduled for the prostate biopsy If patient is able to come off of the Plavix for history of heart stents.    Counseling was given to patient for the following topics diagnostic results, patient and family education, impressions and risks and benefits of treatment options. and the interim  medical history and current results were reviewed.  A treatment plan with follow-up was made. Total time of the encounter was 20 minutes and 20 minutes were spent discussing Incontinence [R32] hard firm prostate face-to-face.       This document has been electronically signed by ANDREW Rodriguez June 2, 2017 5:12 PM

## 2017-06-06 ENCOUNTER — TELEPHONE (OUTPATIENT)
Dept: CARDIOLOGY | Facility: CLINIC | Age: 72
End: 2017-06-06

## 2017-06-06 ENCOUNTER — DOCUMENTATION (OUTPATIENT)
Dept: UROLOGY | Facility: CLINIC | Age: 72
End: 2017-06-06

## 2017-06-06 NOTE — PROGRESS NOTES
Notified Dr. Buenrostro of the patient's need for prostate biopsy secondary to a hard firm prostate on ROLANDO with a PSA of 1.16 on 11/04/2016. Patient is on finasteride and has been for several years. Dr. Buenrostro does not want to stop the patient's Plavix due to recent cardiac stents and states the patient needs to be on the medication for approximately 6 months prior to stopping the medication. We will continue to monitor his PSA and prostate until he is cleared to have the prostate exam.

## 2017-06-06 NOTE — TELEPHONE ENCOUNTER
----- Message from Roxanne Barraza sent at 6/2/2017  9:58 AM EDT -----  Contact: [phone]  Dr. Bryant is going to be performing a prostate surgery on this pt and his PA called and asked if we could take rachid off of the plavix and if so for how long to complete this?    Can someone get with dr. mott about this and let me know.     Thank you.       Called and spoke with Gary to let him know Mr Luciano is not cleared for surgery at least up to 6 months s/p stent. Cannot hold plavix or aspirin.   See clearance letter scanned in chart under media.

## 2017-06-06 NOTE — PROGRESS NOTES
Spoke with Dr. Buenrostro in regards to the recent request for cardiac clearance for the patient to have a TRUS biopsy performed. The patient is a recent cardiac stent placement patient and requires being on anticoagulants for at least 6  Months before holding for the biopsy. Dr. Bryant is aware of this and he wants to see the patient in office to assess the severity and urgency of this issue.

## 2017-06-14 ENCOUNTER — OFFICE VISIT (OUTPATIENT)
Dept: CARDIAC SURGERY | Facility: CLINIC | Age: 72
End: 2017-06-14

## 2017-06-14 VITALS
BODY MASS INDEX: 23.24 KG/M2 | SYSTOLIC BLOOD PRESSURE: 145 MMHG | HEART RATE: 54 BPM | DIASTOLIC BLOOD PRESSURE: 64 MMHG | WEIGHT: 166 LBS | HEIGHT: 71 IN

## 2017-06-14 DIAGNOSIS — I20.0 UNSTABLE ANGINA (HCC): Primary | ICD-10-CM

## 2017-06-14 DIAGNOSIS — I25.41 CORONARY ARTERY FISTULA: ICD-10-CM

## 2017-06-14 PROCEDURE — 99203 OFFICE O/P NEW LOW 30 MIN: CPT | Performed by: THORACIC SURGERY (CARDIOTHORACIC VASCULAR SURGERY)

## 2017-06-19 ENCOUNTER — HOSPITAL ENCOUNTER (OUTPATIENT)
Dept: NUCLEAR MEDICINE | Facility: HOSPITAL | Age: 72
Discharge: HOME | End: 2017-06-19

## 2017-06-19 ENCOUNTER — HOSPITAL ENCOUNTER (OUTPATIENT)
Dept: CARDIOLOGY | Facility: HOSPITAL | Age: 72
Discharge: HOME | End: 2017-06-19

## 2017-06-19 PROCEDURE — A9500 TC99M SESTAMIBI: HCPCS | Performed by: THORACIC SURGERY (CARDIOTHORACIC VASCULAR SURGERY)

## 2017-06-19 PROCEDURE — 78452 HT MUSCLE IMAGE SPECT MULT: CPT | Performed by: INTERNAL MEDICINE

## 2017-06-19 PROCEDURE — 93018 CV STRESS TEST I&R ONLY: CPT | Performed by: INTERNAL MEDICINE

## 2017-06-19 PROCEDURE — 25010000002 REGADENOSON 0.4 MG/5ML SOLUTION: Performed by: THORACIC SURGERY (CARDIOTHORACIC VASCULAR SURGERY)

## 2017-06-19 PROCEDURE — 0 TECHNETIUM SESTAMIBI: Performed by: THORACIC SURGERY (CARDIOTHORACIC VASCULAR SURGERY)

## 2017-06-19 PROCEDURE — 93017 CV STRESS TEST TRACING ONLY: CPT

## 2017-06-19 PROCEDURE — 78451 HT MUSCLE IMAGE SPECT SING: CPT

## 2017-06-19 RX ADMIN — Medication 1 DOSE: at 11:32

## 2017-06-19 RX ADMIN — REGADENOSON 0.4 MG: 0.08 INJECTION, SOLUTION INTRAVENOUS at 11:32

## 2017-06-19 RX ADMIN — Medication 1 DOSE: at 09:10

## 2017-06-20 LAB
BH CV NUCLEAR PRIOR STUDY: 3
BH CV STRESS BP STAGE 1: NORMAL
BH CV STRESS BP STAGE 2: NORMAL
BH CV STRESS COMMENTS STAGE 1: NORMAL
BH CV STRESS COMMENTS STAGE 2: NORMAL
BH CV STRESS DOSE REGADENOSON STAGE 1: 0.4
BH CV STRESS DURATION MIN STAGE 1: 0
BH CV STRESS DURATION MIN STAGE 2: 4
BH CV STRESS DURATION SEC STAGE 1: 15
BH CV STRESS DURATION SEC STAGE 2: 0
BH CV STRESS HR STAGE 1: 60
BH CV STRESS HR STAGE 2: 60
BH CV STRESS PROTOCOL 1: NORMAL
BH CV STRESS RECOVERY BP: NORMAL MMHG
BH CV STRESS RECOVERY HR: 55 BPM
BH CV STRESS STAGE 1: 1
BH CV STRESS STAGE 2: 2
LV EF NUC BP: 69 %
MAXIMAL PREDICTED HEART RATE: 150 BPM
PERCENT MAX PREDICTED HR: 42 %
STRESS BASELINE BP: NORMAL MMHG
STRESS BASELINE HR: 41 BPM
STRESS PERCENT HR: 49 %
STRESS POST PEAK BP: NORMAL MMHG
STRESS POST PEAK HR: 63 BPM
STRESS TARGET HR: 128 BPM

## 2017-07-07 ENCOUNTER — TELEPHONE (OUTPATIENT)
Dept: CARDIAC SURGERY | Facility: CLINIC | Age: 72
End: 2017-07-07

## 2017-07-07 NOTE — TELEPHONE ENCOUNTER
Patients wife Angela Luciano called back today at 9:46am today, after Tiffany s/w Angela at 9:19am on 7/7/17 and explained Dr. Ambriz respone to Angela.   The patients wife was mad because her  wasn't going to have surgery. I explained to Mrs. Luciano that at a surgical standpoint there is nothing Dr. Ambriz would be able to do. As explained per nurses note 7/7/17 per Dr. Ambriz response, Fidencio's stress test is normal and will not have to be referred to Dr. Baldwin for Fistula Surgery. That Fidencio just needs to follow up with Dr. Kauffman for his care.

## 2017-07-30 PROBLEM — I25.41 CORONARY ARTERY FISTULA: Status: ACTIVE | Noted: 2017-07-30

## 2017-08-03 ENCOUNTER — OFFICE VISIT (OUTPATIENT)
Dept: CARDIOLOGY | Facility: CLINIC | Age: 72
End: 2017-08-03

## 2017-08-03 VITALS
RESPIRATION RATE: 16 BRPM | BODY MASS INDEX: 24.08 KG/M2 | WEIGHT: 172 LBS | SYSTOLIC BLOOD PRESSURE: 114 MMHG | HEART RATE: 82 BPM | HEIGHT: 71 IN | DIASTOLIC BLOOD PRESSURE: 58 MMHG

## 2017-08-03 DIAGNOSIS — I10 ESSENTIAL HYPERTENSION: ICD-10-CM

## 2017-08-03 DIAGNOSIS — R07.2 PRECORDIAL PAIN: ICD-10-CM

## 2017-08-03 DIAGNOSIS — I25.41 CORONARY ARTERY FISTULA: ICD-10-CM

## 2017-08-03 DIAGNOSIS — I25.10 ASCVD (ARTERIOSCLEROTIC CARDIOVASCULAR DISEASE): Primary | ICD-10-CM

## 2017-08-03 DIAGNOSIS — E11.9 TYPE 2 DIABETES MELLITUS WITHOUT COMPLICATION, WITHOUT LONG-TERM CURRENT USE OF INSULIN (HCC): ICD-10-CM

## 2017-08-03 PROCEDURE — 99213 OFFICE O/P EST LOW 20 MIN: CPT | Performed by: INTERNAL MEDICINE

## 2017-08-03 RX ORDER — CLOPIDOGREL BISULFATE 75 MG/1
75 TABLET ORAL DAILY
Qty: 30 TABLET | Refills: 5 | Status: SHIPPED | OUTPATIENT
Start: 2017-08-03 | End: 2018-02-01 | Stop reason: SDUPTHER

## 2017-08-03 RX ORDER — ISOSORBIDE MONONITRATE 60 MG/1
90 TABLET, EXTENDED RELEASE ORAL EVERY MORNING
Qty: 45 TABLET | Refills: 5 | Status: SHIPPED | OUTPATIENT
Start: 2017-08-03 | End: 2018-02-01 | Stop reason: SINTOL

## 2017-08-03 RX ORDER — ATORVASTATIN CALCIUM 80 MG/1
80 TABLET, FILM COATED ORAL NIGHTLY
Qty: 30 TABLET | Refills: 5 | Status: SHIPPED | OUTPATIENT
Start: 2017-08-03 | End: 2017-08-15 | Stop reason: SDUPTHER

## 2017-08-03 RX ORDER — LISINOPRIL 5 MG/1
5 TABLET ORAL DAILY
Qty: 30 TABLET | Refills: 5 | Status: SHIPPED | OUTPATIENT
Start: 2017-08-03 | End: 2017-08-15 | Stop reason: SDUPTHER

## 2017-08-03 NOTE — PROGRESS NOTES
Gideon Charles MD  Fidencio Luciano  1945  08/03/2017    Patient Active Problem List   Diagnosis   • Near syncope   • Symptomatic bradycardia   • Dizziness   • Palpitations   • Hypertension   • Diabetes mellitus   • Benign prostatic hyperplasia   • Precordial pain   • Angina, class III   • Abnormal stress test   • Abnormal findings on cardiac catheterization   • ASCVD (arteriosclerotic cardiovascular disease), s/p stenting of 80 % stenosis in left circumflex on 10/05/16and 60% stenosis in the LAD that was left alone.   • Unstable angina   • Normal cardiac stress test 12/2016   • Dyslipidemia   • Weakness generalized   • Chronic fatigue   • Coronary artery fistula       Dear Gideon Charles MD:    Subjective     Fidencio Luciano is a 71 y.o. male with the problems as listed above, presents      History of Present Illness:Ms. 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  He was sent to Dr. Ambriz at [facility] to address this.  He was seen by Dr. Garrido on 6/14/2017 and after reviewing his coronary angiograms, Dr. Ambriz thought that this would be better left alone on medical therapy only.  He did recommend a nuclear stress test to look for any evidence of myocardial ischemia.  However Mr.. Luciano did have a nuclear stress test in June 2017 which did not reveal any evidence of myocardial ischemia.  He has been doing better on Ranexa with no significant chest pains or shortness of breath in the last few months.        No Known Allergies:      Current Outpatient Prescriptions:   •  aspirin  MG EC tablet, Take 1 tablet by mouth Daily., Disp: 30 tablet, Rfl: 5  •  atorvastatin (LIPITOR) 80 MG tablet, Take 1 tablet by mouth Every Night., Disp: 30 tablet, Rfl: 5  •  clopidogrel (PLAVIX) 75 MG tablet,  "Take 1 tablet by mouth Daily., Disp: 30 tablet, Rfl: 5  •  finasteride (PROSCAR) 5 MG tablet, TAKE ONE TABLET BY MOUTH ONCE DAILY FOR PROSTATE, Disp: 90 tablet, Rfl: 3  •  isosorbide mononitrate (IMDUR) 60 MG 24 hr tablet, Take 1.5 tablets by mouth Every Morning., Disp: 45 tablet, Rfl: 5  •  linaclotide (LINZESS) 290 MCG capsule capsule, Take 290 mcg by mouth Daily As Needed (constipation)., Disp: , Rfl:   •  lisinopril (PRINIVIL,ZESTRIL) 5 MG tablet, Take 1 tablet by mouth Daily., Disp: 30 tablet, Rfl: 5  •  metFORMIN (GLUCOPHAGE) 500 MG tablet, TAKE 1/2 TABLET BY MOUTH EVERY DAY, Disp: , Rfl: 0  •  NITROSTAT 0.4 MG SL tablet, PLACE 1 TABLET UNDER THE TONGUE EVERY FIVE MINUTES AS NEEDED FOR CHEST PAIN FOR UP TO 3 DOSES, Disp: 25 tablet, Rfl: 1  •  ranolazine (RANEXA) 500 MG 12 hr tablet, Take 1 tablet by mouth 2 (Two) Times a Day., Disp: 60 tablet, Rfl: 3      The following portions of the patient's history were reviewed and updated as appropriate: allergies, current medications, past family history, past medical history, past social history, past surgical history and problem list.    Social History   Substance Use Topics   • Smoking status: Former Smoker     Packs/day: 3.00     Years: 40.00     Types: Cigarettes     Quit date: 1982   • Smokeless tobacco: Former User     Quit date: 1/16/1986   • Alcohol use No       Review of Systems   Constitution: Negative for chills and fever.   HENT: Negative for headaches, nosebleeds and sore throat.    Cardiovascular: Positive for chest pain and palpitations.   Respiratory: Negative for cough, hemoptysis and wheezing.    Gastrointestinal: Negative for abdominal pain, hematemesis, hematochezia, melena, nausea and vomiting.   Genitourinary: Negative for dysuria and hematuria.       Objective   Vitals:    08/03/17 1439   BP: 114/58   Pulse: 82   Resp: 16   Weight: 172 lb (78 kg)   Height: 71\" (180.3 cm)     Body mass index is 23.99 kg/(m^2).        Physical Exam "   Constitutional: He is oriented to person, place, and time. He appears well-developed and well-nourished.   HENT:   Head: Normocephalic.   Eyes: Conjunctivae and EOM are normal.   Neck: Normal range of motion. Neck supple. No JVD present. No tracheal deviation present. No thyromegaly present.   Cardiovascular: Normal rate and regular rhythm.  Exam reveals no gallop and no friction rub.    No murmur heard.  Pulmonary/Chest: Breath sounds normal. No respiratory distress. He has no wheezes. He has no rales.   Abdominal: Soft. Bowel sounds are normal. He exhibits no mass. There is no tenderness.   Musculoskeletal: He exhibits no edema.   Neurological: He is alert and oriented to person, place, and time. No cranial nerve deficit.   Skin: Skin is warm and dry.   Psychiatric: He has a normal mood and affect.       Lab Results   Component Value Date     05/10/2017    K 3.9 05/10/2017     05/10/2017    CO2 27.3 05/10/2017    BUN 16 05/10/2017    CREATININE 0.86 05/10/2017    GLUCOSE 102 05/10/2017    CALCIUM 9.0 05/10/2017    AST 29 05/08/2017    ALT 34 05/08/2017    ALKPHOS 85 05/08/2017    LABIL2 1.3 (L) 05/08/2017     Lab Results   Component Value Date    CKTOTAL 79 10/05/2016     Lab Results   Component Value Date    WBC 9.06 05/10/2017    HGB 12.2 (L) 05/10/2017    HCT 37.3 (L) 05/10/2017     05/10/2017     Lab Results   Component Value Date    INR 1.06 10/03/2016    INR 1.01 07/16/2016     Lab Results   Component Value Date    MG 2.3 07/19/2016     Lab Results   Component Value Date    TSH 1.134 07/17/2016    PSA 1.160 11/04/2016    CHLPL 109 08/12/2015    TRIG 87 05/10/2017    HDL 26 (L) 05/10/2017    LDL 57 08/12/2015      Lab Results   Component Value Date    BNP 55.0 07/16/2016     Echo   Lab Results   Component Value Date    ECHOEFEST 60 07/17/2016     Procedures    Assessment/Plan    Diagnosis Plan   1. 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      3. Essential hypertension , Controlled     4. Type 2 diabetes mellitus without complication, without long-term current use of insulin          Recommendations:    Patient was recently evaluated by Dr. Ambriz (CT surgeon at [facility]) and was recommended to continue with medical therapy for now.  Since he is doing better with the medical therapy and with no evidence of myocardial ischemia on the recent nuclear stress test, would continue with current medical therapy including Ranexa and see him back in about 5-6 months or sooner if needed.     Return in about 6 months (around 2/3/2018).    As always, I appreciate very much the opportunity to participate in the cardiovascular care of your patients.      With Best Regards,    Giovanni Buenrostro MD, [facility]    Dragon disclaimer:  Much of this encounter note is an electronic transcription/translation of spoken language to printed text. The electronic translation of spoken language may permit erroneous, or at times, nonsensical words or phrases to be inadvertently transcribed; Although I have reviewed the note for such errors, some may still exist.

## 2017-08-11 ENCOUNTER — OFFICE VISIT (OUTPATIENT)
Dept: UROLOGY | Facility: CLINIC | Age: 72
End: 2017-08-11

## 2017-08-11 ENCOUNTER — TELEPHONE (OUTPATIENT)
Dept: CARDIOLOGY | Facility: CLINIC | Age: 72
End: 2017-08-11

## 2017-08-11 DIAGNOSIS — R32 INCONTINENCE: Primary | ICD-10-CM

## 2017-08-11 DIAGNOSIS — N40.0 BENIGN NON-NODULAR PROSTATIC HYPERPLASIA, PRESENCE OF LOWER URINARY TRACT SYMPTOMS UNSPECIFIED: ICD-10-CM

## 2017-08-11 DIAGNOSIS — R39.89 HARD, FIRM PROSTATE: ICD-10-CM

## 2017-08-11 LAB
BILIRUB BLD-MCNC: NEGATIVE MG/DL
CLARITY, POC: CLEAR
COLOR UR: YELLOW
GLUCOSE UR STRIP-MCNC: NEGATIVE MG/DL
KETONES UR QL: NEGATIVE
LEUKOCYTE EST, POC: NEGATIVE
NITRITE UR-MCNC: NEGATIVE MG/ML
PH UR: 5 [PH] (ref 5–8)
PROT UR STRIP-MCNC: NEGATIVE MG/DL
RBC # UR STRIP: NEGATIVE /UL
SP GR UR: 1.02 (ref 1–1.03)
UROBILINOGEN UR QL: NORMAL

## 2017-08-11 PROCEDURE — 81003 URINALYSIS AUTO W/O SCOPE: CPT | Performed by: NURSE PRACTITIONER

## 2017-08-11 PROCEDURE — 99213 OFFICE O/P EST LOW 20 MIN: CPT | Performed by: NURSE PRACTITIONER

## 2017-08-11 PROCEDURE — 51798 US URINE CAPACITY MEASURE: CPT | Performed by: NURSE PRACTITIONER

## 2017-08-11 NOTE — PROGRESS NOTES
Chief Complaint:          Chief Complaint   Patient presents with   • Difficulty Urinating       HPI:   71 y.o. male quique seen for difficulty urinating.  Patient does have a firm prostate on exam and has been instructed in a TRUS prostate biopsy which cannot be completed at this time due to Plavix use due to recent cardiac stent placement.  Patient states he does have difficulty with his bowels and is currently on Linzess.  UA today reveals negative results.  Bladder scan reveals negative results.    HPI        Past Medical History:        Past Medical History:   Diagnosis Date   • BPH (benign prostatic hyperplasia)    • Bradycardia     Positive tilt table testing 07/2016   • Coronary artery disease    • Diabetes mellitus    • Diverticulosis    • Gastric ulcer with hemorrhage    • Hyperlipidemia    • Hypertension    • Psoriasis          Current Meds:     Current Outpatient Prescriptions   Medication Sig Dispense Refill   • aspirin  MG EC tablet Take 1 tablet by mouth Daily. 30 tablet 5   • atorvastatin (LIPITOR) 80 MG tablet Take 1 tablet by mouth Every Night. 30 tablet 5   • clopidogrel (PLAVIX) 75 MG tablet Take 1 tablet by mouth Daily. 30 tablet 5   • finasteride (PROSCAR) 5 MG tablet TAKE ONE TABLET BY MOUTH ONCE DAILY FOR PROSTATE 90 tablet 3   • isosorbide mononitrate (IMDUR) 60 MG 24 hr tablet Take 1.5 tablets by mouth Every Morning. 45 tablet 5   • linaclotide (LINZESS) 290 MCG capsule capsule Take 290 mcg by mouth Daily As Needed (constipation).     • lisinopril (PRINIVIL,ZESTRIL) 5 MG tablet Take 1 tablet by mouth Daily. 30 tablet 5   • metFORMIN (GLUCOPHAGE) 500 MG tablet TAKE 1/2 TABLET BY MOUTH EVERY DAY  0   • NITROSTAT 0.4 MG SL tablet PLACE 1 TABLET UNDER THE TONGUE EVERY FIVE MINUTES AS NEEDED FOR CHEST PAIN FOR UP TO 3 DOSES 25 tablet 1   • ranolazine (RANEXA) 500 MG 12 hr tablet Take 1 tablet by mouth 2 (Two) Times a Day. 60 tablet 3     No current facility-administered medications for this  visit.         Allergies:      No Known Allergies     Past Surgical History:     Past Surgical History:   Procedure Laterality Date   • BACK SURGERY     • CARDIAC CATHETERIZATION N/A 9/20/2016    Procedure: Left Heart Cath;  Surgeon: Giovanni Buenrostro MD;  Location:  [facility] CATH INVASIVE LOC[facility];  Service:    • CARDIAC CATHETERIZATION N/A 10/4/2016    Procedure: Left Heart Cath;  Surgeon: Bharathi Andrew MD;  Location:  [facility]ATH INV[facility];  Service:    • CARDIAC CATHETERIZATION N/A 5/9/2017    Procedure: Left Heart Cath;  Surgeon: Giovanni Buenrostro MD;  Location:  [facility] IN[facility];  Service:    • CARDIAC CATHETERIZATION N/A 5/9/2017    Procedure: ERR;  Surgeon: Giovanni Buenrostro MD;  Location:  [facility]ATH INV[facility];  Service:    • CHOLECYSTECTOMY     • COLONOSCOPY  11/13/2015    Dr. Martinez- internal hemorrhoids, sigmoid diverticulosis   • CORONARY ANGIOPLASTY WITH STENT PLACEMENT     • [place] RT/LT HEART CATHETERS N/A 5/9/2017    Procedure: Percutaneous Coronary Intervention;  Surgeon: Lincoln De Los Santos MD;  Location: [facility] CATH INVASIVE LOCATION;  Service: Cardiology   • STOMACH SURGERY      FUSION OF BLEEDING ULCER   • UPPER GASTROINTESTINAL ENDOSCOPY  11/13/2015    Dr. Martinez- mild gastritis         Social History:     Social History     Social History   • Marital status:      Spouse name: N/A   • Number of children: N/A   • Years of education: N/A     Occupational History   • Not on file.     Social History Main Topics   • Smoking status: Former Smoker     Packs/day: 3.00     Years: 40.00     Types: Cigarettes     Quit date: 1982   • Smokeless tobacco: Former User     Quit date: 1/16/1986   • Alcohol use No   • Drug use: No   • Sexual activity: Defer     Other Topics Concern   • Not on file     Social History Narrative       Family History:     Family History   Problem Relation Age of Onset   • Sick sinus syndrome Mother    • Heart failure Mother    • Diabetes Mother    • Liver cancer  Father        Review of Systems:     Review of Systems   Constitutional: Negative for chills, fatigue and fever.   Respiratory: Negative for cough, shortness of breath and wheezing.    Cardiovascular: Negative for leg swelling.   Gastrointestinal: Positive for abdominal pain. Negative for nausea and vomiting.   Musculoskeletal: Negative for back pain and joint swelling.   Neurological: Negative for dizziness and headaches.   Psychiatric/Behavioral: Negative for confusion.       Physical Exam:     Physical Exam   Constitutional: He is oriented to person, place, and time. He appears well-developed and well-nourished. No distress.   Abdominal: Soft. Bowel sounds are normal. He exhibits no distension and no mass. There is no tenderness. There is no rebound and no guarding. No hernia.   Musculoskeletal: Normal range of motion. He exhibits no edema, tenderness or deformity.   Neurological: He is alert and oriented to person, place, and time.   Skin: Skin is warm and dry. No rash noted. He is not diaphoretic. No erythema. No pallor.   Psychiatric: He has a normal mood and affect. His behavior is normal. Judgment and thought content normal.   Nursing note and vitals reviewed.      Procedure:     No notes on file      Assessment:     Encounter Diagnoses   Name Primary?   • Incontinence Yes   • Benign non-nodular prostatic hyperplasia, presence of lower urinary tract symptoms unspecified    • Hard, firm prostate      Orders Placed This Encounter   Procedures   • Bladder Scan   • POC Urinalysis Dipstick, Automated       Plan:   Recommend the patient continue the finasteride as currently prescribed. Reassurance given that his bladder was emptying. We will attempt to schedule the patient to his truss biopsy in January at which time we will be 6+ months from the time he started the Plavix if Dr. Buenrostro is will agreeable. Patient is to return to the office in 6 months.    Counseling was given to patient for the following topics  diagnostic results, patient and family education, impressions and risks and benefits of treatment options. and the interim medical history and current results were reviewed.  A treatment plan with follow-up was made. Total time of the encounter was 20 minutes and 20 minutes were spent discussing Incontinence [R32] face-to-face.       This document has been electronically signed by ANDREW Rodriguez August 11, 2017 12:32 PM

## 2017-08-11 NOTE — TELEPHONE ENCOUNTER
Patient called requesting that medications, Lisinopril 5mg daily and Lipitor 80mg daily be written for a #90 day supply and submitted to [facility] pharmacy

## 2017-08-15 RX ORDER — LISINOPRIL 5 MG/1
5 TABLET ORAL DAILY
Qty: 90 TABLET | Refills: 1 | Status: SHIPPED | OUTPATIENT
Start: 2017-08-15 | End: 2018-02-01 | Stop reason: SDUPTHER

## 2017-08-15 RX ORDER — ATORVASTATIN CALCIUM 80 MG/1
80 TABLET, FILM COATED ORAL NIGHTLY
Qty: 90 TABLET | Refills: 1 | Status: SHIPPED | OUTPATIENT
Start: 2017-08-15 | End: 2018-02-01 | Stop reason: SDUPTHER

## 2017-08-18 ENCOUNTER — OFFICE VISIT (OUTPATIENT)
Dept: GASTROENTEROLOGY | Facility: CLINIC | Age: 72
End: 2017-08-18

## 2017-08-18 VITALS
BODY MASS INDEX: 24.47 KG/M2 | HEIGHT: 71 IN | HEART RATE: 73 BPM | WEIGHT: 174.8 LBS | OXYGEN SATURATION: 98 % | SYSTOLIC BLOOD PRESSURE: 144 MMHG | DIASTOLIC BLOOD PRESSURE: 55 MMHG

## 2017-08-18 DIAGNOSIS — R13.10 DYSPHAGIA, UNSPECIFIED TYPE: ICD-10-CM

## 2017-08-18 DIAGNOSIS — K59.04 CHRONIC IDIOPATHIC CONSTIPATION: Primary | ICD-10-CM

## 2017-08-18 PROCEDURE — 99214 OFFICE O/P EST MOD 30 MIN: CPT | Performed by: PHYSICIAN ASSISTANT

## 2017-08-18 RX ORDER — LUBIPROSTONE 24 UG/1
24 CAPSULE ORAL 2 TIMES DAILY WITH MEALS
Qty: 60 CAPSULE | Refills: 5 | Status: SHIPPED | OUTPATIENT
Start: 2017-08-18 | End: 2018-03-15 | Stop reason: SDUPTHER

## 2017-08-18 NOTE — PROGRESS NOTES
"Chief Complaint   Patient presents with   • Abdominal Pain   • Irritable Bowel Syndrome   • Constipation   • Difficulty Swallowing   • Weight Loss       Fidencio Luciano is a 71 y.o. male who presents to the office today for follow up appointment for Abdominal Pain; Irritable Bowel Syndrome; Constipation; Difficulty Swallowing; and Weight Loss      HPI  He had chest pain which was severe previously for several months but states that he prayed for healing and has not had this pain again for the past 1 month. Bowel movements are still only occurring one time per week. He only takes Linzess 290 mcg only \"as needed.\" Lower abdominal cramping is intermittent and mild in nature.  He state that he has been told recently that he needs a prostate biopsy but is waiting to hold plavix because he had 2 stents placed within the past 1 year.     Dysphagia has been present and severe recently. He has trouble with solids and liquids. Family member reports \"choking\" with all PO intake.     Last colonoscopy was 2015 by Dr. Martinez and diverticulosis of the sigmoid colon and internal hemorrhoids were noted. EGD in 2012 showed esophagitis.     Review of Systems   Constitutional: Positive for chills, fatigue, fever and unexpected weight change.   HENT: Positive for congestion, sore throat, trouble swallowing and voice change.    Eyes: Positive for visual disturbance.   Respiratory: Positive for cough.    Cardiovascular: Positive for palpitations.   Gastrointestinal: Positive for abdominal distention, abdominal pain, constipation, diarrhea and rectal pain. Negative for anal bleeding, blood in stool, nausea and vomiting.   Endocrine: Positive for heat intolerance.   Genitourinary: Positive for difficulty urinating and hematuria.   Musculoskeletal: Positive for arthralgias, back pain and myalgias.   Skin: Negative.    Allergic/Immunologic: Positive for environmental allergies.   Neurological: Positive for dizziness, light-headedness and headaches. "   Hematological: Bruises/bleeds easily.   Psychiatric/Behavioral: Positive for sleep disturbance. The patient is nervous/anxious.        Past Medical History:   Diagnosis Date   • BPH (benign prostatic hyperplasia)    • Bradycardia     Positive tilt table testing 07/2016   • Coronary artery disease    • Diabetes mellitus    • Diverticulosis    • Gastric ulcer with hemorrhage    • Hyperlipidemia    • Hypertension    • Psoriasis        Past Surgical History:   Procedure Laterality Date   • BACK SURGERY     • CARDIAC CATHETERIZATION N/A 9/20/2016    Procedure: Left Heart Cath;  Surgeon: Giovanni Buenrostro MD;  Location: [facility] CATH INVASIVE LOCATION;  Service:    • CARDIAC CATHETERIZATION N/A 10/4/2016    Procedure: Left Heart Cath;  Surgeon: Bharathi Andrew MD;  Location:  [facility]ATH [facility];  Service:    • CARDIAC CATHETERIZATION N/A 5/9/2017    Procedure: Left Heart Cath;  Surgeon: Giovanni Buenrostro MD;  Location:  [facility]ATH INV[facility];  Service:    • CARDIAC CATHETERIZATION N/A 5/9/2017    Procedure: ERR;  Surgeon: Giovanni Buenrostro MD;  Location: [facility] CATH INVASIVE LOCATION;  Service:    • CHOLECYSTECTOMY     • COLONOSCOPY  11/13/2015    Dr. Martinez- internal hemorrhoids, sigmoid diverticulosis   • CORONARY ANGIOPLASTY WITH STENT PLACEMENT     • [place] RT/LT HEART CATHETERS N/A 5/9/2017    Procedure: Percutaneous Coronary Intervention;  Surgeon: Lincoln De Los Santos MD;  Location: [facility] CATH INVASIVE LOCATION;  Service: Cardiology   • STOMACH SURGERY      FUSION OF BLEEDING ULCER   • UPPER GASTROINTESTINAL ENDOSCOPY  11/13/2015    Dr. Martinez- mild gastritis       Family History   Problem Relation Age of Onset   • Sick sinus syndrome Mother    • Heart failure Mother    • Diabetes Mother    • Liver cancer Father        Social History   Substance Use Topics   • Smoking status: Former Smoker     Packs/day: 3.00     Years: 40.00     Types: Cigarettes     Quit date: 1982   • Smokeless tobacco: Former User     Quit  "date: 1/16/1986   • Alcohol use No       CURRENT MEDICATION:  •  aspirin  MG EC tablet, Take 1 tablet by mouth Daily., Disp: 30 tablet, Rfl: 5  •  atorvastatin (LIPITOR) 80 MG tablet, Take 1 tablet by mouth Every Night., Disp: 90 tablet, Rfl: 1  •  clopidogrel (PLAVIX) 75 MG tablet, Take 1 tablet by mouth Daily., Disp: 30 tablet, Rfl: 5  •  finasteride (PROSCAR) 5 MG tablet, TAKE ONE TABLET BY MOUTH ONCE DAILY FOR PROSTATE, Disp: 90 tablet, Rfl: 3  •  isosorbide mononitrate (IMDUR) 60 MG 24 hr tablet, Take 1.5 tablets by mouth Every Morning., Disp: 45 tablet, Rfl: 5  •  linaclotide (LINZESS) 290 MCG capsule capsule, Take 290 mcg by mouth Daily As Needed (constipation)., Disp: , Rfl:   •  lisinopril (PRINIVIL,ZESTRIL) 5 MG tablet, Take 1 tablet by mouth Daily., Disp: 90 tablet, Rfl: 1  •  metFORMIN (GLUCOPHAGE) 500 MG tablet, TAKE 1/2 TABLET BY MOUTH EVERY DAY, Disp: , Rfl: 0  •  NITROSTAT 0.4 MG SL tablet, PLACE 1 TABLET UNDER THE TONGUE EVERY FIVE MINUTES AS NEEDED FOR CHEST PAIN FOR UP TO 3 DOSES, Disp: 25 tablet, Rfl: 1  •  ranolazine (RANEXA) 500 MG 12 hr tablet, Take 1 tablet by mouth 2 (Two) Times a Day., Disp: 60 tablet, Rfl: 3    ALLERGIES:  Review of patient's allergies indicates no known allergies.    VISIT VITALS:  /55  Pulse 73  Ht 71\" (180.3 cm)  Wt 174 lb 12.8 oz (79.3 kg)  SpO2 98%  BMI 24.38 kg/m2    Physical Exam   Constitutional: He is oriented to person, place, and time. He appears well-developed and well-nourished. No distress.   HENT:   Head: Normocephalic and atraumatic.   Right Ear: External ear normal.   Left Ear: External ear normal.   Nose: Nose normal.   Mouth/Throat: Oropharynx is clear and moist.   Eyes: Conjunctivae and EOM are normal. Right eye exhibits no discharge. Left eye exhibits no discharge. No scleral icterus.   Neck: Normal range of motion. Neck supple.   Cardiovascular: Normal rate, regular rhythm and normal heart sounds.  Exam reveals no gallop and no " friction rub.    No murmur heard.  Pulmonary/Chest: Effort normal and breath sounds normal. No respiratory distress. He has no wheezes. He has no rales. He exhibits no tenderness.   Abdominal: Soft. Normal appearance and bowel sounds are normal. He exhibits no distension, no ascites and no mass. There is no tenderness. There is no rigidity and no guarding. No hernia.   Musculoskeletal: Normal range of motion. He exhibits no edema or deformity.   Neurological: He is alert and oriented to person, place, and time. He exhibits normal muscle tone. Coordination normal.   Skin: Skin is warm and dry. No rash noted. No erythema. No pallor.   Psychiatric: He has a normal mood and affect. His behavior is normal. Judgment and thought content normal.   Nursing note and vitals reviewed.      Assessment/Plan      Diagnosis Plan   1. Chronic idiopathic constipation  lubiprostone (AMITIZA) 24 MCG capsule   2. Dysphagia, unspecified type  FL Esophagram Complete     Discontinue Linzess. Start taking Amitiza 24 mcg for relief of chronic idiopathic constipation. This should be taken as directed; 1 tab PO twice daily with meals. Samples were given at today's visit.    FL esophagram will be performed due to dysphagia. He is poor candidate for EGD with dilatation now because he is not able to stop Plavix.         Return in about 1 month (around 9/18/2017) for recheck constipation and discuss results.       Hiral Fierro PA-C       Electronically signed 8/22/2017 at 11:36 AM.

## 2017-08-25 ENCOUNTER — TELEPHONE (OUTPATIENT)
Dept: GASTROENTEROLOGY | Facility: CLINIC | Age: 72
End: 2017-08-25

## 2017-08-25 ENCOUNTER — HOSPITAL ENCOUNTER (OUTPATIENT)
Dept: GENERAL RADIOLOGY | Facility: HOSPITAL | Age: 72
Discharge: HOME | End: 2017-08-25
Admitting: PHYSICIAN ASSISTANT

## 2017-08-25 DIAGNOSIS — R13.10 DYSPHAGIA, UNSPECIFIED TYPE: ICD-10-CM

## 2017-08-25 PROCEDURE — 74220 X-RAY XM ESOPHAGUS 1CNTRST: CPT

## 2017-08-25 PROCEDURE — 74220 X-RAY XM ESOPHAGUS 1CNTRST: CPT | Performed by: RADIOLOGY

## 2017-08-25 NOTE — TELEPHONE ENCOUNTER
I spoke with patients wife, Angela and gave her all information. She said they would discuss with Dr. Buenrostro about getting cardiac clearance and call us back to let us know when patient would be cleared, from a cardiac standpoint, to have EGD.

## 2017-08-25 NOTE — TELEPHONE ENCOUNTER
Patient had esophageal dysmotility, GERD, hiatal hernia and possible narrowing of esophagus on esophogram. He will need to schedule EGD for further evaluation with possible dilatation for more information but may have to wait a few more months. He is on blood thinner and this will need to be approved by cardiologist to hold so he will have to check with them. (recent cardiac events)

## 2017-09-06 ENCOUNTER — TELEPHONE (OUTPATIENT)
Dept: GASTROENTEROLOGY | Facility: CLINIC | Age: 72
End: 2017-09-06

## 2017-09-06 DIAGNOSIS — R13.10 DYSPHAGIA, UNSPECIFIED TYPE: Primary | ICD-10-CM

## 2017-09-06 NOTE — TELEPHONE ENCOUNTER
Patient wife came by and states he is having abd pain after eating, sounds like he's congested after he eats.?   She wants to know if there is any med you can call him in for this?

## 2017-09-08 RX ORDER — PANTOPRAZOLE SODIUM 20 MG/1
20 TABLET, DELAYED RELEASE ORAL
Qty: 60 TABLET | Refills: 5 | Status: SHIPPED | OUTPATIENT
Start: 2017-09-08 | End: 2018-03-14 | Stop reason: SDUPTHER

## 2017-10-17 ENCOUNTER — TRANSCRIBE ORDERS (OUTPATIENT)
Dept: ADMINISTRATIVE | Facility: HOSPITAL | Age: 72
End: 2017-10-17

## 2017-10-17 DIAGNOSIS — R41.3 SHORT-TERM MEMORY LOSS: Primary | ICD-10-CM

## 2017-10-19 ENCOUNTER — TRANSCRIBE ORDERS (OUTPATIENT)
Dept: INFUSION THERAPY | Facility: HOSPITAL | Age: 72
End: 2017-10-19

## 2017-10-19 DIAGNOSIS — R56.9 SEIZURES (HCC): Primary | ICD-10-CM

## 2017-10-28 ENCOUNTER — HOSPITAL ENCOUNTER (OUTPATIENT)
Dept: MRI IMAGING | Facility: HOSPITAL | Age: 72
Discharge: HOME | End: 2017-10-28
Attending: PSYCHIATRY & NEUROLOGY | Admitting: PSYCHIATRY & NEUROLOGY

## 2017-10-28 DIAGNOSIS — R41.3 SHORT-TERM MEMORY LOSS: ICD-10-CM

## 2017-10-28 PROCEDURE — 70551 MRI BRAIN STEM W/O DYE: CPT

## 2017-10-28 PROCEDURE — 70551 MRI BRAIN STEM W/O DYE: CPT | Performed by: RADIOLOGY

## 2017-10-31 ENCOUNTER — HOSPITAL ENCOUNTER (OUTPATIENT)
Dept: INFUSION THERAPY | Facility: HOSPITAL | Age: 72
Discharge: HOME | End: 2017-10-31
Attending: PSYCHIATRY & NEUROLOGY | Admitting: PSYCHIATRY & NEUROLOGY

## 2017-10-31 DIAGNOSIS — R56.9 SEIZURES (HCC): ICD-10-CM

## 2017-10-31 PROCEDURE — 95816 EEG AWAKE AND DROWSY: CPT

## 2017-11-21 ENCOUNTER — TELEPHONE (OUTPATIENT)
Dept: CARDIOLOGY | Facility: CLINIC | Age: 72
End: 2017-11-21

## 2017-11-21 NOTE — TELEPHONE ENCOUNTER
Pt's wife called requesting samples of Ranexa 500 mg tabs.  I gave 2 boxes (two weeks worth) to pt.

## 2018-01-22 ENCOUNTER — OFFICE VISIT (OUTPATIENT)
Dept: UROLOGY | Facility: CLINIC | Age: 73
End: 2018-01-22

## 2018-01-22 DIAGNOSIS — R31.0 GROSS HEMATURIA: ICD-10-CM

## 2018-01-22 DIAGNOSIS — R39.89 HARD, FIRM PROSTATE: ICD-10-CM

## 2018-01-22 DIAGNOSIS — N40.1 BENIGN PROSTATIC HYPERPLASIA WITH LOWER URINARY TRACT SYMPTOMS, SYMPTOM DETAILS UNSPECIFIED: Primary | ICD-10-CM

## 2018-01-22 LAB — PSA SERPL-MCNC: 1.11 NG/ML (ref 0–4)

## 2018-01-22 PROCEDURE — 99213 OFFICE O/P EST LOW 20 MIN: CPT | Performed by: NURSE PRACTITIONER

## 2018-01-22 PROCEDURE — 84153 ASSAY OF PSA TOTAL: CPT | Performed by: NURSE PRACTITIONER

## 2018-01-22 PROCEDURE — 36415 COLL VENOUS BLD VENIPUNCTURE: CPT | Performed by: NURSE PRACTITIONER

## 2018-01-22 NOTE — PROGRESS NOTES
Chief Complaint:          Chief Complaint   Patient presents with   • Prostatitis       HPI:   72 y.o. male for prostate issues.  He is currently on Proscar and doing well with the medication.  He does state he has occasional episodes of bleeding especially after having a prostate exam with his last episode being 1 month ago. Patient denies any difficulty with urination. He is a former smoker of up to 3 packs per day ×5 years and quit several years ago. Patient's last PSA in November 2016 was 1.6 and is not had a PSA drawn since that date.  HPI        Past Medical History:        Past Medical History:   Diagnosis Date   • BPH (benign prostatic hyperplasia)    • Bradycardia     Positive tilt table testing 07/2016   • Coronary artery disease    • Diabetes mellitus    • Diverticulosis    • Gastric ulcer with hemorrhage    • Hyperlipidemia    • Hypertension    • Psoriasis          Current Meds:     Current Outpatient Prescriptions   Medication Sig Dispense Refill   • aspirin  MG EC tablet Take 1 tablet by mouth Daily. 30 tablet 5   • atorvastatin (LIPITOR) 80 MG tablet Take 1 tablet by mouth Every Night. 90 tablet 1   • clopidogrel (PLAVIX) 75 MG tablet Take 1 tablet by mouth Daily. 30 tablet 5   • finasteride (PROSCAR) 5 MG tablet TAKE ONE TABLET BY MOUTH ONCE DAILY FOR PROSTATE 90 tablet 3   • isosorbide mononitrate (IMDUR) 60 MG 24 hr tablet Take 1.5 tablets by mouth Every Morning. 45 tablet 5   • lisinopril (PRINIVIL,ZESTRIL) 5 MG tablet Take 1 tablet by mouth Daily. 90 tablet 1   • lubiprostone (AMITIZA) 24 MCG capsule Take 1 capsule by mouth 2 (Two) Times a Day With Meals. 60 capsule 5   • metFORMIN (GLUCOPHAGE) 500 MG tablet TAKE 1/2 TABLET BY MOUTH EVERY DAY  0   • NITROSTAT 0.4 MG SL tablet PLACE 1 TABLET UNDER THE TONGUE EVERY FIVE MINUTES AS NEEDED FOR CHEST PAIN FOR UP TO 3 DOSES 25 tablet 1   • pantoprazole (PROTONIX) 20 MG EC tablet Take 1 tablet by mouth 2 (Two) Times a Day Before Meals. 60 tablet 5    • ranolazine (RANEXA) 500 MG 12 hr tablet Take 1 tablet by mouth 2 (Two) Times a Day. 60 tablet 3     No current facility-administered medications for this visit.         Allergies:      No Known Allergies     Past Surgical History:     Past Surgical History:   Procedure Laterality Date   • BACK SURGERY     • CARDIAC CATHETERIZATION N/A 9/20/2016    Procedure: Left Heart Cath;  Surgeon: Giovanni Buenrostro MD;  Location:  [facility] CATH INVASIVE LOCATION;  Service:    • CARDIAC CATHETERIZATION N/A 10/4/2016    Procedure: Left Heart Cath;  Surgeon: Bharathi Andrew MD;  Location:  [facility] CATH INVASIVE LOCATION;  Service:    • CARDIAC CATHETERIZATION N/A 5/9/2017    Procedure: Left Heart Cath;  Surgeon: Giovanni Buenrostro MD;  Location:  [facility] CATH INVASIVE LOCATION;  Service:    • CARDIAC CATHETERIZATION N/A 5/9/2017    Procedure: ERR;  Surgeon: Giovanni Buenrostro MD;  Location:  [facility] CATH INVASIVE LOCATION;  Service:    • CHOLECYSTECTOMY     • COLONOSCOPY  11/13/2015    Dr. Martinez- internal hemorrhoids, sigmoid diverticulosis   • CORONARY ANGIOPLASTY WITH STENT PLACEMENT     • [place] RT/LT HEART CATHETERS N/A 5/9/2017    Procedure: Percutaneous Coronary Intervention;  Surgeon: Lincoln De Los Santos MD;  Location:  [facility] CATH INVASIVE LOCATION;  Service: Cardiology   • STOMACH SURGERY      FUSION OF BLEEDING ULCER   • UPPER GASTROINTESTINAL ENDOSCOPY  11/13/2015    Dr. Martinez- mild gastritis         Social History:     Social History     Social History   • Marital status:      Spouse name: N/A   • Number of children: N/A   • Years of education: N/A     Occupational History   • Not on file.     Social History Main Topics   • Smoking status: Former Smoker     Packs/day: 3.00     Years: 40.00     Types: Cigarettes     Quit date: 1982   • Smokeless tobacco: Former User     Quit date: 1/16/1986   • Alcohol use No   • Drug use: No   • Sexual activity: Defer     Other Topics Concern   • Not on file     Social History Narrative       Family  History:     Family History   Problem Relation Age of Onset   • Sick sinus syndrome Mother    • Heart failure Mother    • Diabetes Mother    • Liver cancer Father        Review of Systems:     Review of Systems   Constitutional: Negative for chills, fatigue and fever.   Respiratory: Negative for cough, shortness of breath and wheezing.    Cardiovascular: Negative for leg swelling.   Gastrointestinal: Negative for abdominal pain, nausea and vomiting.   Musculoskeletal: Negative for back pain and joint swelling.   Neurological: Negative for dizziness and headaches.   Psychiatric/Behavioral: Negative for confusion.       I have reviewed the following portions of the patient's history: allergies, current medications, past family history, past medical history, past social history, past surgical history, problem list and ROS and confirm it's accurate.    Physical Exam:     Physical Exam   Constitutional: He is oriented to person, place, and time. He appears well-developed and well-nourished. No distress.   Abdominal: Soft. Bowel sounds are normal. He exhibits no distension and no mass. There is no tenderness. There is no rebound and no guarding. No hernia.   Genitourinary: Rectum normal and penis normal.   Genitourinary Comments: Prostate mildly enlarged, smooth, docusate leave firm without any nodules palpated   Musculoskeletal: Normal range of motion.   Neurological: He is alert and oriented to person, place, and time.   Skin: Skin is warm and dry. No rash noted. He is not diaphoretic. No pallor.   Psychiatric: He has a normal mood and affect. His behavior is normal. Judgment and thought content normal.   Nursing note and vitals reviewed.      Procedure:     No notes on file      Assessment:     Encounter Diagnoses   Name Primary?   • Benign prostatic hyperplasia with lower urinary tract symptoms, symptom details unspecified Yes   • Gross hematuria    • Hard, firm prostate      Orders Placed This Encounter   Procedures    • PSA DIAGNOSTIC       Plan:   PSA will be drawn today and patient may call tomorrow for results. With the patient having episodes of gross hematuria we will schedule him for a CT scan of the abdomen and pelvis with and without contrast and a cystoscopy by Dr. Bryant was Dr. Tate for further evaluation as to the cause of the episodes of gross hematuria. Patient may require a prostate biopsy secondary to the firm hard prostate that is palpated 10 ROLANDO. He is to continue the Proscar as currently prescribed for BPH symptoms. He will be given a return appointment tomorrow after the PSA results of being called to the patient.    Counseling was given to patient for the following topics impressions as follows: Enlarged firm prostate that may require a prostate biopsy along with the need to have a CT scan of the abdomen and pelvis and a cystoscopy for further evaluation as to the cause of the episodic gross hematuria. The interim medical history and current results were reviewed.  A treatment plan with follow-up was made for Benign prostatic hyperplasia with lower urinary tract symptoms, symptom details unspecified [N40.1]. I spent 20 minutes face to face with Fidencio Luciano and 15 was spent in counseling.     This document has been electronically signed by ANDREW Rodriguez January 22, 2018 4:49 PM

## 2018-01-24 ENCOUNTER — TELEPHONE (OUTPATIENT)
Dept: GASTROENTEROLOGY | Facility: CLINIC | Age: 73
End: 2018-01-24

## 2018-01-26 ENCOUNTER — TELEPHONE (OUTPATIENT)
Dept: UROLOGY | Facility: CLINIC | Age: 73
End: 2018-01-26

## 2018-01-26 NOTE — TELEPHONE ENCOUNTER
I received a voicemail from the patients wife requesting lab results. I attempted to call patient back and got an answering machine. I left a message introducing myself and where I was calling from and that the patient could call back anytime with any questions regarding labs or treatment plan.

## 2018-02-01 ENCOUNTER — OFFICE VISIT (OUTPATIENT)
Dept: CARDIOLOGY | Facility: CLINIC | Age: 73
End: 2018-02-01

## 2018-02-01 VITALS
HEIGHT: 71 IN | HEART RATE: 55 BPM | SYSTOLIC BLOOD PRESSURE: 137 MMHG | WEIGHT: 167 LBS | DIASTOLIC BLOOD PRESSURE: 55 MMHG | RESPIRATION RATE: 16 BRPM | BODY MASS INDEX: 23.38 KG/M2

## 2018-02-01 DIAGNOSIS — I25.10 ASCVD (ARTERIOSCLEROTIC CARDIOVASCULAR DISEASE): Primary | ICD-10-CM

## 2018-02-01 DIAGNOSIS — I10 ESSENTIAL HYPERTENSION: ICD-10-CM

## 2018-02-01 DIAGNOSIS — I25.41 CORONARY ARTERY FISTULA: ICD-10-CM

## 2018-02-01 DIAGNOSIS — R07.2 PRECORDIAL PAIN: ICD-10-CM

## 2018-02-01 DIAGNOSIS — R63.4 WEIGHT LOSS, UNINTENTIONAL: ICD-10-CM

## 2018-02-01 PROCEDURE — 93000 ELECTROCARDIOGRAM COMPLETE: CPT | Performed by: INTERNAL MEDICINE

## 2018-02-01 PROCEDURE — 99214 OFFICE O/P EST MOD 30 MIN: CPT | Performed by: INTERNAL MEDICINE

## 2018-02-01 RX ORDER — RANOLAZINE 1000 MG/1
1000 TABLET, EXTENDED RELEASE ORAL EVERY 12 HOURS SCHEDULED
Qty: 60 TABLET | Refills: 5 | Status: SHIPPED | OUTPATIENT
Start: 2018-02-01 | End: 2019-01-03 | Stop reason: SDUPTHER

## 2018-02-01 RX ORDER — CLOPIDOGREL BISULFATE 75 MG/1
75 TABLET ORAL DAILY
Qty: 30 TABLET | Refills: 5 | Status: SHIPPED | OUTPATIENT
Start: 2018-02-01 | End: 2018-03-29 | Stop reason: SDUPTHER

## 2018-02-01 RX ORDER — LISINOPRIL 5 MG/1
5 TABLET ORAL DAILY
Qty: 90 TABLET | Refills: 5 | Status: SHIPPED | OUTPATIENT
Start: 2018-02-01 | End: 2018-08-23 | Stop reason: SDUPTHER

## 2018-02-01 RX ORDER — DONEPEZIL HYDROCHLORIDE 10 MG/1
10 TABLET, FILM COATED ORAL NIGHTLY
COMMUNITY
End: 2018-11-05 | Stop reason: ALTCHOICE

## 2018-02-01 RX ORDER — ATORVASTATIN CALCIUM 80 MG/1
80 TABLET, FILM COATED ORAL NIGHTLY
Qty: 90 TABLET | Refills: 5 | Status: SHIPPED | OUTPATIENT
Start: 2018-02-01 | End: 2019-03-05 | Stop reason: SDUPTHER

## 2018-02-01 NOTE — PROGRESS NOTES
Gideon Charles MD  Fidencio Luciano  1945  02/01/2018    Patient Active Problem List   Diagnosis   • Near syncope   • Symptomatic bradycardia   • Dizziness   • Palpitations   • Hypertension   • Diabetes mellitus   • Benign prostatic hyperplasia   • Precordial pain   • Angina, class III   • Abnormal stress test   • Abnormal findings on cardiac catheterization   • ASCVD (arteriosclerotic cardiovascular disease), s/p stenting of 80 % stenosis in left circumflex on 10/05/16and 60% stenosis in the LAD that was left alone.   • Unstable angina   • Normal cardiac stress test 12/2016   • Dyslipidemia   • Weakness generalized   • Chronic fatigue   • Coronary artery fistula   • Weight loss, unintentional       Dear Gideon Charles MD:    Subjective     Fidencio Luciano is a 72 y.o. male with the problems as listed above, presents      History of Present Illness:Mr. 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 Ambriz at [facility] who thought that patient would be better served with clinical observation and medical management and not subjected to any surgical intervention at this time as long as his symptoms are controlled with medications.  Patient was placed on Ranexa which seemed to have improved this anginal symptoms.  His recent nuclear stress test in June 2017 revealed no evidence of myocardial ischemia.  He has been having a lot of headaches and apparently with oral nitrates.      No Known Allergies:      Current Outpatient Prescriptions:   •  aspirin  MG EC tablet, Take 1 tablet by mouth Daily., Disp: 30 tablet, Rfl: 5  •  atorvastatin (LIPITOR) 80 MG tablet, Take 1 tablet by mouth Every Night., Disp: 90 tablet, Rfl: 5  •  cholecalciferol (VITAMIN D3)  25794 units capsule, Take 50,000 Units by mouth 1 (One) Time Per Week., Disp: , Rfl:   •  clopidogrel (PLAVIX) 75 MG tablet, Take 1 tablet by mouth Daily., Disp: 30 tablet, Rfl: 5  •  donepezil (ARICEPT) 10 MG tablet, Take 10 mg by mouth Every Night., Disp: , Rfl:   •  finasteride (PROSCAR) 5 MG tablet, TAKE ONE TABLET BY MOUTH ONCE DAILY FOR PROSTATE, Disp: 90 tablet, Rfl: 3  •  lisinopril (PRINIVIL,ZESTRIL) 5 MG tablet, Take 1 tablet by mouth Daily., Disp: 90 tablet, Rfl: 5  •  metFORMIN (GLUCOPHAGE) 500 MG tablet, takes 1 pill daily, Disp: , Rfl: 0  •  NITROSTAT 0.4 MG SL tablet, PLACE 1 TABLET UNDER THE TONGUE EVERY FIVE MINUTES AS NEEDED FOR CHEST PAIN FOR UP TO 3 DOSES, Disp: 25 tablet, Rfl: 1  •  pantoprazole (PROTONIX) 20 MG EC tablet, Take 1 tablet by mouth 2 (Two) Times a Day Before Meals., Disp: 60 tablet, Rfl: 5  •  ranolazine (RANEXA) 1000 MG 12 hr tablet, Take 1 tablet by mouth Every 12 (Twelve) Hours., Disp: 60 tablet, Rfl: 5  •  lubiprostone (AMITIZA) 24 MCG capsule, Take 1 capsule by mouth 2 (Two) Times a Day With Meals., Disp: 60 capsule, Rfl: 5      The following portions of the patient's history were reviewed and updated as appropriate: allergies, current medications, past family history, past medical history, past social history, past surgical history and problem list.    Social History   Substance Use Topics   • Smoking status: Former Smoker     Packs/day: 3.00     Years: 40.00     Types: Cigarettes     Quit date: 1982   • Smokeless tobacco: Former User     Quit date: 1/16/1986   • Alcohol use No       Review of Systems   Constitution: Negative for chills and fever.   HENT: Negative for nosebleeds and sore throat.    Cardiovascular: Positive for palpitations.   Respiratory: Positive for shortness of breath. Negative for cough, hemoptysis and wheezing.    Gastrointestinal: Negative for abdominal pain, hematemesis, hematochezia, melena, nausea and vomiting.   Genitourinary: Negative for dysuria  "and hematuria.   Neurological: Positive for headaches.       Objective   Vitals:    02/01/18 1501   BP: 137/55   Pulse: 55   Resp: 16   Weight: 75.8 kg (167 lb)   Height: 180.3 cm (71\")     Body mass index is 23.29 kg/(m^2).        Physical Exam   Constitutional: He is oriented to person, place, and time. He appears well-developed and well-nourished.   HENT:   Head: Normocephalic.   Eyes: Conjunctivae and EOM are normal.   Neck: Normal range of motion. Neck supple. No JVD present. No tracheal deviation present. No thyromegaly present.   Cardiovascular: Normal rate, regular rhythm, S1 normal and S2 normal.  Exam reveals no gallop, no S3, no S4 and no friction rub.    No murmur heard.  Pulmonary/Chest: Breath sounds normal. No respiratory distress. He has no wheezes. He has no rales.   Abdominal: Soft. Bowel sounds are normal. He exhibits no mass. There is no tenderness.   Musculoskeletal: He exhibits no edema.   Neurological: He is alert and oriented to person, place, and time. No cranial nerve deficit.   Skin: Skin is warm and dry.   Psychiatric: He has a normal mood and affect.       Lab Results   Component Value Date     05/10/2017    K 3.9 05/10/2017     05/10/2017    CO2 27.3 05/10/2017    BUN 16 05/10/2017    CREATININE 0.86 05/10/2017    GLUCOSE 102 05/10/2017    CALCIUM 9.0 05/10/2017    AST 29 05/08/2017    ALT 34 05/08/2017    ALKPHOS 85 05/08/2017    LABIL2 1.3 (L) 05/08/2017     Lab Results   Component Value Date    CKTOTAL 79 10/05/2016     Lab Results   Component Value Date    WBC 9.06 05/10/2017    HGB 12.2 (L) 05/10/2017    HCT 37.3 (L) 05/10/2017     05/10/2017     Lab Results   Component Value Date    INR 1.06 10/03/2016    INR 1.01 07/16/2016     Lab Results   Component Value Date    MG 2.3 07/19/2016     Lab Results   Component Value Date    TSH 1.134 07/17/2016    PSA 1.110 01/22/2018    CHLPL 109 08/12/2015    TRIG 87 05/10/2017    HDL 26 (L) 05/10/2017    LDL 57 08/12/2015    "   Lab Results   Component Value Date    BNP 55.0 07/16/2016     Echo   Lab Results   Component Value Date    ECHOEFEST 60 07/17/2016       ECG 12 Lead  Date/Time: 2/1/2018 3:03 PM  Performed by: GIOVANNI GUIDO  Authorized by: GIOVANNI GUIDO   Rhythm: sinus rhythm  Comments: Occasional PVCs.  Nonspecific ST-T wave changes noted.            Assessment/Plan :     Diagnosis Plan   1. ASCVD (arteriosclerotic cardiovascular disease), s/p stenting of 80 % stenosis in left circumflex on 10/05/16and 60% stenosis in the LAD that was left alone, Clinically stable.     2. Coronary artery fistula, Clinically stable     3. Essential hypertension, Controlled.          Recommendations:    1.  We'll discontinue the isosorbide due to headache and increase the Ranexa to 1000 mg by mouth twice a day.  2. Continue with aspirin, atorvastatin and Plavix.  3. Obtain a TSH level to rule out an occult hyperthyroidism and CRP to rule out occult infectious process as patient has been losing weight although he apparently has been eating well.  4. Follow-up in 3-4 months.    Return in about 4 months (around 6/1/2018) for or sooner if needed.    As always, I appreciate very much the opportunity to participate in the cardiovascular care of your patients.      With Best Regards,    Giovanni Guido MD, [facility]    Dragon disclaimer:  Much of this encounter note is an electronic transcription/translation of spoken language to printed text. The electronic translation of spoken language may permit erroneous, or at times, nonsensical words or phrases to be inadvertently transcribed; Although I have reviewed the note for such errors, some may still exist.

## 2018-03-14 ENCOUNTER — HOSPITAL ENCOUNTER (OUTPATIENT)
Dept: GENERAL RADIOLOGY | Facility: HOSPITAL | Age: 73
Discharge: HOME | End: 2018-03-14
Admitting: PHYSICIAN ASSISTANT

## 2018-03-14 ENCOUNTER — LAB (OUTPATIENT)
Dept: LAB | Facility: HOSPITAL | Age: 73
End: 2018-03-14

## 2018-03-14 ENCOUNTER — OFFICE VISIT (OUTPATIENT)
Dept: GASTROENTEROLOGY | Facility: CLINIC | Age: 73
End: 2018-03-14

## 2018-03-14 VITALS
HEIGHT: 71 IN | HEART RATE: 53 BPM | DIASTOLIC BLOOD PRESSURE: 51 MMHG | SYSTOLIC BLOOD PRESSURE: 157 MMHG | WEIGHT: 164.4 LBS | BODY MASS INDEX: 23.02 KG/M2

## 2018-03-14 DIAGNOSIS — R63.0 ANOREXIA: ICD-10-CM

## 2018-03-14 DIAGNOSIS — Z87.11 HISTORY OF GASTRIC ULCER: ICD-10-CM

## 2018-03-14 DIAGNOSIS — R63.4 WEIGHT LOSS, ABNORMAL: ICD-10-CM

## 2018-03-14 DIAGNOSIS — R13.10 DYSPHAGIA, UNSPECIFIED TYPE: ICD-10-CM

## 2018-03-14 DIAGNOSIS — K59.04 CHRONIC IDIOPATHIC CONSTIPATION: ICD-10-CM

## 2018-03-14 DIAGNOSIS — R23.1 PALLOR: ICD-10-CM

## 2018-03-14 DIAGNOSIS — K29.60 OTHER GASTRITIS WITHOUT BLEEDING: ICD-10-CM

## 2018-03-14 DIAGNOSIS — K21.9 GASTROESOPHAGEAL REFLUX DISEASE, ESOPHAGITIS PRESENCE NOT SPECIFIED: Primary | ICD-10-CM

## 2018-03-14 LAB
ALBUMIN SERPL-MCNC: 3.9 G/DL (ref 3.4–4.8)
ALBUMIN/GLOB SERPL: 1.3 G/DL (ref 1.5–2.5)
ALP SERPL-CCNC: 97 U/L (ref 40–129)
ALT SERPL W P-5'-P-CCNC: 35 U/L (ref 10–44)
ANION GAP SERPL CALCULATED.3IONS-SCNC: 6.3 MMOL/L (ref 3.6–11.2)
AST SERPL-CCNC: 29 U/L (ref 10–34)
BASOPHILS # BLD AUTO: 0.05 10*3/MM3 (ref 0–0.3)
BASOPHILS NFR BLD AUTO: 0.7 % (ref 0–2)
BILIRUB SERPL-MCNC: 0.4 MG/DL (ref 0.2–1.8)
BUN BLD-MCNC: 20 MG/DL (ref 7–21)
BUN/CREAT SERPL: 26 (ref 7–25)
CALCIUM SPEC-SCNC: 8.9 MG/DL (ref 7.7–10)
CHLORIDE SERPL-SCNC: 109 MMOL/L (ref 99–112)
CO2 SERPL-SCNC: 21.7 MMOL/L (ref 24.3–31.9)
CREAT BLD-MCNC: 0.77 MG/DL (ref 0.43–1.29)
DEPRECATED RDW RBC AUTO: 44.8 FL (ref 37–54)
EOSINOPHIL # BLD AUTO: 0.5 10*3/MM3 (ref 0–0.7)
EOSINOPHIL NFR BLD AUTO: 6.9 % (ref 0–7)
ERYTHROCYTE [DISTWIDTH] IN BLOOD BY AUTOMATED COUNT: 14 % (ref 11.5–14.5)
GFR SERPL CREATININE-BSD FRML MDRD: 100 ML/MIN/1.73
GLOBULIN UR ELPH-MCNC: 3 GM/DL
GLUCOSE BLD-MCNC: 194 MG/DL (ref 70–110)
HCT VFR BLD AUTO: 35.9 % (ref 42–52)
HGB BLD-MCNC: 12.1 G/DL (ref 14–18)
IMM GRANULOCYTES # BLD: 0.01 10*3/MM3 (ref 0–0.03)
IMM GRANULOCYTES NFR BLD: 0.1 % (ref 0–0.5)
IRON 24H UR-MRATE: 74 MCG/DL (ref 53–167)
IRON SATN MFR SERPL: 19 % (ref 20–50)
LYMPHOCYTES # BLD AUTO: 1.47 10*3/MM3 (ref 1–3)
LYMPHOCYTES NFR BLD AUTO: 20.4 % (ref 16–46)
MCH RBC QN AUTO: 30.1 PG (ref 27–33)
MCHC RBC AUTO-ENTMCNC: 33.7 G/DL (ref 33–37)
MCV RBC AUTO: 89.3 FL (ref 80–94)
MONOCYTES # BLD AUTO: 0.68 10*3/MM3 (ref 0.1–0.9)
MONOCYTES NFR BLD AUTO: 9.4 % (ref 0–12)
NEUTROPHILS # BLD AUTO: 4.5 10*3/MM3 (ref 1.4–6.5)
NEUTROPHILS NFR BLD AUTO: 62.5 % (ref 40–75)
OSMOLALITY SERPL CALC.SUM OF ELEC: 281.7 MOSM/KG (ref 273–305)
PLATELET # BLD AUTO: 263 10*3/MM3 (ref 130–400)
PMV BLD AUTO: 10.6 FL (ref 6–10)
POTASSIUM BLD-SCNC: 3.9 MMOL/L (ref 3.5–5.3)
PROT SERPL-MCNC: 6.9 G/DL (ref 6–8)
RBC # BLD AUTO: 4.02 10*6/MM3 (ref 4.7–6.1)
SODIUM BLD-SCNC: 137 MMOL/L (ref 135–153)
TIBC SERPL-MCNC: 388 MCG/DL (ref 241–421)
WBC NRBC COR # BLD: 7.21 10*3/MM3 (ref 4.5–12.5)

## 2018-03-14 PROCEDURE — 99214 OFFICE O/P EST MOD 30 MIN: CPT | Performed by: PHYSICIAN ASSISTANT

## 2018-03-14 PROCEDURE — 83550 IRON BINDING TEST: CPT

## 2018-03-14 PROCEDURE — 80053 COMPREHEN METABOLIC PANEL: CPT

## 2018-03-14 PROCEDURE — 74019 RADEX ABDOMEN 2 VIEWS: CPT | Performed by: RADIOLOGY

## 2018-03-14 PROCEDURE — 85025 COMPLETE CBC W/AUTO DIFF WBC: CPT

## 2018-03-14 PROCEDURE — 83540 ASSAY OF IRON: CPT

## 2018-03-14 PROCEDURE — 74019 RADEX ABDOMEN 2 VIEWS: CPT

## 2018-03-14 PROCEDURE — 36415 COLL VENOUS BLD VENIPUNCTURE: CPT

## 2018-03-14 RX ORDER — PANTOPRAZOLE SODIUM 40 MG/1
40 TABLET, DELAYED RELEASE ORAL
Qty: 60 TABLET | Refills: 3 | Status: SHIPPED | OUTPATIENT
Start: 2018-03-14 | End: 2018-08-27 | Stop reason: SDUPTHER

## 2018-03-14 NOTE — PROGRESS NOTES
": 1945    Chief Complaint   Patient presents with   • Anorexia   • Abdominal Pain   • Cerebrovascular Accident       Fidencio Luciano is a 72 y.o. male who presents to the office today as a follow up appointment regarding weight loss, poor appetite and abdominal pain.    History of Present Illness:  Since 2017, he has lost 10 lbs. He admits that he has a very poor appetite. Also has nausea after eating most things but worse if he eats at a restaurant. He coughs at times while he is eating and feels that he is getting \"strangled.\" Taking Protonix 20 mg BID. He has fluctuating stools between constipation and diarrhea. He eats very little amounts of stools most days. He is not taking Amitiza and is not sure if he has in the past (he states he forgot about it). At times, he has slurred speech and mumbles. This is a change from his normal. He has extensive cardiac history and his last stent was placed approx 6 months ago. He needs prostate biopsy and was told he could not stop his Plavix and ASA for 1 whole year after his last stent, so he has not been able to have EGD with dilatation or prostate biopsy for this reason.     Abdominal pain is present per his report and radiates from the epigastric straight down to below the umbilicus. This is intermittent. He has been very \"cold\" recently and has been noticing his pallor. History of bleeding ulcer in . Currently denies rectal bleeding or melena.    Last colonoscopy was  by Dr. Martinez and diverticulosis of the sigmoid colon and internal hemorrhoids were noted. EGD in  showed esophagitis.     Review of Systems   Constitutional: Positive for appetite change, chills, fatigue and unexpected weight change. Negative for fever.   HENT: Positive for trouble swallowing.    Eyes: Positive for visual disturbance.   Respiratory: Positive for shortness of breath.    Cardiovascular: Positive for chest pain.   Gastrointestinal: Positive for abdominal distention, " abdominal pain, constipation, diarrhea and nausea. Negative for anal bleeding, blood in stool, rectal pain and vomiting.   Genitourinary: Positive for difficulty urinating.   Musculoskeletal: Negative for arthralgias, back pain and myalgias.   Neurological: Positive for dizziness, weakness, light-headedness and headaches.   Hematological: Bruises/bleeds easily.   Psychiatric/Behavioral: Positive for sleep disturbance. The patient is not nervous/anxious.        Past Medical History:   Diagnosis Date   • BPH (benign prostatic hyperplasia)    • Bradycardia     Positive tilt table testing 07/2016   • Coronary artery disease    • Diabetes mellitus    • Diverticulosis    • Gastric ulcer with hemorrhage    • Hyperlipidemia    • Hypertension    • Psoriasis        Past Surgical History:   Procedure Laterality Date   • BACK SURGERY     • CARDIAC CATHETERIZATION N/A 9/20/2016    Procedure: Left Heart Cath;  Surgeon: Giovanni Buenrostro MD;  Location: [facility] CATH INVASIVE LOCATION;  Service:    • CARDIAC CATHETERIZATION N/A 10/4/2016    Procedure: Left Heart Cath;  Surgeon: Bharathi Andrew MD;  Location: [facility] CATH INVASIVE LOCATION;  Service:    • CARDIAC CATHETERIZATION N/A 5/9/2017    Procedure: Left Heart Cath;  Surgeon: Giovanni Buenrostro MD;  Location: [facility] CATH INVASIVE LOCATION;  Service:    • CARDIAC CATHETERIZATION N/A 5/9/2017    Procedure: ERR;  Surgeon: Giovanni Buenrostro MD;  Location: [facility] CATH INVASIVE LOCATION;  Service:    • CHOLECYSTECTOMY     • COLONOSCOPY  11/13/2015    Dr. Martinez- internal hemorrhoids, sigmoid diverticulosis   • CORONARY ANGIOPLASTY WITH STENT PLACEMENT     • MA RT/LT HEART CATHETERS N/A 5/9/2017    Procedure: Percutaneous Coronary Intervention;  Surgeon: Lincoln De Los Santos MD;  Location: [facility] CATH INVASIVE LOCATION;  Service: Cardiology   • STOMACH SURGERY      FUSION OF BLEEDING ULCER   • UPPER GASTROINTESTINAL ENDOSCOPY  11/13/2015    Dr. Martinez- mild gastritis       Family History   Problem  Relation Age of Onset   • Sick sinus syndrome Mother    • Heart failure Mother    • Diabetes Mother    • Liver cancer Father        Social History     Social History   • Marital status:      Social History Main Topics   • Smoking status: Former Smoker     Packs/day: 3.00     Years: 40.00     Types: Cigarettes     Quit date: 1982   • Smokeless tobacco: Former User     Quit date: 1/16/1986   • Alcohol use No   • Drug use: No   • Sexual activity: Defer     Other Topics Concern   • Not on file       Current Outpatient Prescriptions:   •  aspirin  MG EC tablet, Take 1 tablet by mouth Daily., Disp: 30 tablet, Rfl: 5  •  atorvastatin (LIPITOR) 80 MG tablet, Take 1 tablet by mouth Every Night., Disp: 90 tablet, Rfl: 5  •  cholecalciferol (VITAMIN D3) 06151 units capsule, Take 50,000 Units by mouth 1 (One) Time Per Week., Disp: , Rfl:   •  clopidogrel (PLAVIX) 75 MG tablet, Take 1 tablet by mouth Daily., Disp: 30 tablet, Rfl: 5  •  donepezil (ARICEPT) 10 MG tablet, Take 10 mg by mouth Every Night., Disp: , Rfl:   •  finasteride (PROSCAR) 5 MG tablet, TAKE ONE TABLET BY MOUTH ONCE DAILY FOR PROSTATE, Disp: 90 tablet, Rfl: 3  •  lisinopril (PRINIVIL,ZESTRIL) 5 MG tablet, Take 1 tablet by mouth Daily., Disp: 90 tablet, Rfl: 5  •  lubiprostone (AMITIZA) 24 MCG capsule, Take 1 capsule by mouth 2 (Two) Times a Day With Meals., Disp: 60 capsule, Rfl: 5  •  metFORMIN (GLUCOPHAGE) 500 MG tablet, takes 1 pill daily, Disp: , Rfl: 0  •  NITROSTAT 0.4 MG SL tablet, PLACE 1 TABLET UNDER THE TONGUE EVERY FIVE MINUTES AS NEEDED FOR CHEST PAIN FOR UP TO 3 DOSES, Disp: 25 tablet, Rfl: 1  •  pantoprazole (PROTONIX) 20 MG EC tablet, Take 1 tablet by mouth 2 (Two) Times a Day Before Meals., Disp: 60 tablet, Rfl: 5  •  ranolazine (RANEXA) 1000 MG 12 hr tablet, Take 1 tablet by mouth Every 12 (Twelve) Hours., Disp: 60 tablet, Rfl: 5    Allergies:   Review of patient's allergies indicates no known allergies.    Vitals:  /51    "Pulse 53   Ht 180.3 cm (71\")   Wt 74.6 kg (164 lb 6.4 oz)   BMI 22.93 kg/m²     Physical Exam   Constitutional: He is oriented to person, place, and time. He appears well-developed and well-nourished. No distress.   HENT:   Head: Normocephalic and atraumatic.   Nose: Nose normal.   Mouth/Throat: Oropharynx is clear and moist.   Eyes: Conjunctivae are normal. Right eye exhibits no discharge. Left eye exhibits no discharge. No scleral icterus.   Neck: Normal range of motion. No JVD present.   Cardiovascular: Normal rate, regular rhythm and normal heart sounds.  Exam reveals no gallop and no friction rub.    No murmur heard.  Pulmonary/Chest: Effort normal and breath sounds normal. No respiratory distress. He has no wheezes. He has no rales. He exhibits no tenderness.   Abdominal: Soft. Bowel sounds are normal. He exhibits no mass. There is tenderness (epigastric and periumbilical).   Musculoskeletal: Normal range of motion. He exhibits no edema or deformity.   Neurological: He is alert and oriented to person, place, and time. Coordination normal.   Skin: Skin is warm and dry. No rash noted. He is not diaphoretic. No erythema.   Psychiatric: He has a normal mood and affect. His behavior is normal. Judgment and thought content normal.   Vitals reviewed.      Assessment/Plan:  1. Gastroesophageal reflux disease, esophagitis presence not specified    2. Dysphagia, unspecified type    3. Chronic idiopathic constipation    4. Anorexia    5. Pallor    6. Weight loss, abnormal    7. History of gastric ulcer    8. Other gastritis without bleeding       Orders Placed This Encounter   Procedures   • XR Abdomen Flat & Upright   • Comprehensive Metabolic Panel   • Iron Profile   • CBC & Differential     New Medications Ordered This Visit   Medications   • pantoprazole (PROTONIX) 40 MG EC tablet     Sig: Take 1 tablet by mouth 2 (Two) Times a Day Before Meals.     Dispense:  60 tablet     Refill:  3     It is likely that he has " constipation due to his complaints and history. I have ordered abdominal film to check and will call him with those results. Also, we will check labs, he may be anemic with pallor and cold extremities. He does not want EGD unless he can have dilatation but currently, he is not cleared to stop Plavix by cardiology in order to have the dilatation and/or biopsies completed. I have increased Protonix from 20 mg BID to 40 BID and asked him to take 30 minutes before a meal.         Return in about 3 weeks (around 4/4/2018) for discussion of results.      Electronically signed 3/15/2018 11:16 AM  Hiral Fierro PA-C, [facility]

## 2018-03-15 ENCOUNTER — TELEPHONE (OUTPATIENT)
Dept: GASTROENTEROLOGY | Facility: CLINIC | Age: 73
End: 2018-03-15

## 2018-03-15 DIAGNOSIS — K59.04 CHRONIC IDIOPATHIC CONSTIPATION: ICD-10-CM

## 2018-03-15 RX ORDER — LUBIPROSTONE 24 UG/1
24 CAPSULE ORAL 2 TIMES DAILY WITH MEALS
Qty: 60 CAPSULE | Refills: 5 | Status: SHIPPED | OUTPATIENT
Start: 2018-03-15 | End: 2018-07-26

## 2018-03-15 NOTE — TELEPHONE ENCOUNTER
I'm not sure why this was routed back to me? Below message states results and recommendations for patient

## 2018-03-15 NOTE — TELEPHONE ENCOUNTER
Please let patient know that labs were ok. His abdominal film showed constipation. I would like him to drink 1 bottle magnesium citrate then start taking daily medication for constipation until his f/u appt. Can you check with pharmacy to see if the Amitiza that I sent is covered?

## 2018-03-29 RX ORDER — CLOPIDOGREL BISULFATE 75 MG/1
75 TABLET ORAL DAILY
Qty: 30 TABLET | Refills: 4 | Status: SHIPPED | OUTPATIENT
Start: 2018-03-29 | End: 2018-11-05 | Stop reason: SDUPTHER

## 2018-03-29 RX ORDER — LISINOPRIL 5 MG/1
TABLET ORAL
Qty: 30 TABLET | Refills: 4 | Status: SHIPPED | OUTPATIENT
Start: 2018-03-29 | End: 2018-07-26 | Stop reason: SDUPTHER

## 2018-04-30 ENCOUNTER — TELEPHONE (OUTPATIENT)
Dept: UROLOGY | Facility: CLINIC | Age: 73
End: 2018-04-30

## 2018-05-01 DIAGNOSIS — N40.0 BPH WITHOUT URINARY OBSTRUCTION: Primary | ICD-10-CM

## 2018-05-01 RX ORDER — FINASTERIDE 5 MG/1
5 TABLET, FILM COATED ORAL DAILY
Qty: 90 TABLET | Refills: 3 | Status: SHIPPED | OUTPATIENT
Start: 2018-05-01 | End: 2018-05-04 | Stop reason: SDUPTHER

## 2018-05-01 NOTE — TELEPHONE ENCOUNTER
Pt called needing a prescription called in for Proscar 5mg I called in the prescription Proscar 5mg 1 tablet daily

## 2018-05-04 ENCOUNTER — OFFICE VISIT (OUTPATIENT)
Dept: UROLOGY | Facility: CLINIC | Age: 73
End: 2018-05-04

## 2018-05-04 VITALS — HEIGHT: 71 IN | BODY MASS INDEX: 22.96 KG/M2 | WEIGHT: 164 LBS

## 2018-05-04 DIAGNOSIS — N40.0 BPH WITHOUT URINARY OBSTRUCTION: ICD-10-CM

## 2018-05-04 DIAGNOSIS — N40.1 BENIGN PROSTATIC HYPERPLASIA WITH WEAK URINARY STREAM: Primary | ICD-10-CM

## 2018-05-04 DIAGNOSIS — R39.12 BENIGN PROSTATIC HYPERPLASIA WITH WEAK URINARY STREAM: Primary | ICD-10-CM

## 2018-05-04 PROCEDURE — 99214 OFFICE O/P EST MOD 30 MIN: CPT | Performed by: UROLOGY

## 2018-05-04 RX ORDER — FINASTERIDE 5 MG/1
5 TABLET, FILM COATED ORAL DAILY
Qty: 90 TABLET | Refills: 3 | Status: SHIPPED | OUTPATIENT
Start: 2018-05-04 | End: 2019-05-10 | Stop reason: SDUPTHER

## 2018-05-04 NOTE — PROGRESS NOTES
Chief Complaint:          BPH    HPI:   72 y.o. male.  Pt is voiding ok.  He has occasional urgency.  His psa is 1.110.  Pt has some suprapubic discomfort..  HPI      Past Medical History:        Past Medical History:   Diagnosis Date   • BPH (benign prostatic hyperplasia)    • Bradycardia     Positive tilt table testing 07/2016   • Coronary artery disease    • Diabetes mellitus    • Diverticulosis    • Gastric ulcer with hemorrhage    • Hyperlipidemia    • Hypertension    • Psoriasis          Current Meds:     Current Outpatient Prescriptions   Medication Sig Dispense Refill   • aspirin  MG EC tablet Take 1 tablet by mouth Daily. 30 tablet 5   • atorvastatin (LIPITOR) 80 MG tablet Take 1 tablet by mouth Every Night. 90 tablet 5   • cholecalciferol (VITAMIN D3) 34266 units capsule Take 50,000 Units by mouth 1 (One) Time Per Week.     • clopidogrel (PLAVIX) 75 MG tablet Take 1 tablet by mouth Daily. 30 tablet 4   • donepezil (ARICEPT) 10 MG tablet Take 10 mg by mouth Every Night.     • finasteride (PROSCAR) 5 MG tablet Take 1 tablet by mouth Daily. 90 tablet 3   • lisinopril (PRINIVIL,ZESTRIL) 5 MG tablet Take 1 tablet by mouth Daily. 90 tablet 5   • NITROSTAT 0.4 MG SL tablet PLACE 1 TABLET UNDER THE TONGUE EVERY FIVE MINUTES AS NEEDED FOR CHEST PAIN FOR UP TO 3 DOSES 25 tablet 1   • pantoprazole (PROTONIX) 40 MG EC tablet Take 1 tablet by mouth 2 (Two) Times a Day Before Meals. 60 tablet 3   • ranolazine (RANEXA) 1000 MG 12 hr tablet Take 1 tablet by mouth Every 12 (Twelve) Hours. 60 tablet 5   • lisinopril (PRINIVIL,ZESTRIL) 5 MG tablet TAKE ONE TABLET BY MOUTH EVERY DAY FOR BLOOD PRESSURE 30 tablet 4   • lubiprostone (AMITIZA) 24 MCG capsule Take 1 capsule by mouth 2 (Two) Times a Day With Meals. 60 capsule 5   • metFORMIN (GLUCOPHAGE) 500 MG tablet takes 1 pill daily  0     No current facility-administered medications for this visit.         Allergies:      No Known Allergies     Past Surgical History:      Past Surgical History:   Procedure Laterality Date   • BACK SURGERY     • CARDIAC CATHETERIZATION N/A 9/20/2016    Procedure: Left Heart Cath;  Surgeon: Giovanni Buenrostro MD;  Location:  [facility];  Service:    • CARDIAC CATHETERIZATION N/A 10/4/2016    Procedure: Left Heart Cath;  Surgeon: Bharathi Andrew MD;  Location:  [facility];  Service:    • CARDIAC CATHETERIZATION N/A 5/9/2017    Procedure: Left Heart Cath;  Surgeon: Giovanni Buenrostro MD;  Location:  [facility];  Service:    • CARDIAC CATHETERIZATION N/A 5/9/2017    Procedure: ERR;  Surgeon: Giovanni Buenrostro MD;  Location:  [facility];  Service:    • CHOLECYSTECTOMY     • COLONOSCOPY  11/13/2015    Dr. Martinez- internal hemorrhoids, sigmoid diverticulosis   • CORONARY ANGIOPLASTY WITH STENT PLACEMENT     • VT RT/LT HEART CATHETERS N/A 5/9/2017    Procedure: Percutaneous Coronary Intervention;  Surgeon: Lincoln De Los Santos MD;  Location:  [facility];  Service: Cardiology   • STOMACH SURGERY      FUSION OF BLEEDING ULCER   • UPPER GASTROINTESTINAL ENDOSCOPY  11/13/2015    Dr. Martinez- mild gastritis         Social History:     Social History     Social History   • Marital status:      Spouse name: N/A   • Number of children: N/A   • Years of education: N/A     Occupational History   • Not on file.     Social History Main Topics   • Smoking status: Former Smoker     Packs/day: 3.00     Years: 40.00     Types: Cigarettes     Quit date: 1982   • Smokeless tobacco: Former User     Quit date: 1/16/1986   • Alcohol use No   • Drug use: No   • Sexual activity: Defer     Other Topics Concern   • Not on file     Social History Narrative   • No narrative on file       Family History:     Family History   Problem Relation Age of Onset   • Sick sinus syndrome Mother    • Heart failure Mother    • Diabetes Mother    • Liver cancer Father        Review of Systems:     Review of  Systems   Constitutional: Positive for fatigue. Negative for chills and fever.   HENT: Positive for congestion, ear pain and sinus pressure.    Respiratory: Positive for chest tightness. Negative for shortness of breath.    Cardiovascular: Negative for chest pain.   Gastrointestinal: Positive for abdominal pain, constipation, diarrhea and nausea. Negative for vomiting.   Genitourinary: Positive for frequency and urgency.   Musculoskeletal: Positive for back pain and neck pain.   Neurological: Positive for light-headedness and headaches. Negative for dizziness.   Hematological: Bruises/bleeds easily.   Psychiatric/Behavioral: The patient is nervous/anxious.        IPSS Questionnaire (AUA-7):  Over the past month…    1)  How often have you had a sensation of not emptying your bladder completely after you finish urinating?  2 - Less than half the time   2)  How often have you had to urinate again less than two hours after you finished urinating? 0 - Not at all   3)  How often have you found you stopped and started again several times when you urinated?  4 - More than half the time   4) How difficult have you found it to postpone urination?  4 - More than half the time   5) How often have you had a weak urinary stream?  4 - More than half the time   6) How often have you had to push or strain to begin urination?  5 - Almost always   7) How many times did you most typically get up to urinate from the time you went to bed until the time you got up in the morning?  2 - 2 times   Total Score:  23     I have reviewed the follow portions of the patient's history and confirmed they are accurate today:  allergies, current medications, past family history, past medical history, past social history, past surgical history, problem list and ROS  Physical Exam:     Physical Exam   Constitutional: He is oriented to person, place, and time.   HENT:   Head: Normocephalic and atraumatic.   Right Ear: External ear normal.   Left Ear:  "External ear normal.   Nose: Nose normal.   Mouth/Throat: Oropharynx is clear and moist.   Eyes: Conjunctivae and EOM are normal. Pupils are equal, round, and reactive to light.   Neck: Normal range of motion. Neck supple. No thyromegaly present.   Cardiovascular: Normal rate, regular rhythm, normal heart sounds and intact distal pulses.    No murmur heard.  Pulmonary/Chest: Effort normal and breath sounds normal. No respiratory distress. He has no wheezes. He has no rales. He exhibits no tenderness.   Abdominal: Soft. Bowel sounds are normal. He exhibits no distension and no mass. There is no tenderness. No hernia.   Genitourinary: Rectum normal, prostate normal and penis normal.   Genitourinary Comments: Prostate enlarged but without nodules   Musculoskeletal: Normal range of motion. He exhibits no edema or tenderness.   Lymphadenopathy:     He has no cervical adenopathy.   Neurological: He is alert and oriented to person, place, and time. No cranial nerve deficit. He exhibits normal muscle tone. Coordination normal.   Skin: Skin is warm. No rash noted.   Psychiatric: He has a normal mood and affect. His behavior is normal. Judgment and thought content normal.   Nursing note and vitals reviewed.      Ht 180.3 cm (70.98\")   Wt 74.4 kg (164 lb)   BMI 22.88 kg/m²    Procedure:         Assessment:     Encounter Diagnoses   Name Primary?   • BPH without urinary obstruction    • Benign prostatic hyperplasia with weak urinary stream Yes     No orders of the defined types were placed in this encounter.      Plan:   Will refill his finasteride and see him in a year.    Patient's Body mass index is 22.88 kg/m². BMI is within normal parameters. No follow-up required.      Counseling was given to patient for the following topics diagnostic results including: psa. The interim medical history and current results were reviewed.  A treatment plan with follow-up was made for Benign prostatic hyperplasia with weak urinary stream " [N40.1, R39.12]. I spent 26 minutes face to face with Fidencio Luciano and 80 percentage was spent in counseling.    This document has been electronically signed by Chris Bryant MD May 4, 2018 3:26 PM

## 2018-05-29 ENCOUNTER — APPOINTMENT (OUTPATIENT)
Dept: CT IMAGING | Facility: HOSPITAL | Age: 73
End: 2018-05-29

## 2018-05-29 ENCOUNTER — HOSPITAL ENCOUNTER (EMERGENCY)
Facility: HOSPITAL | Age: 73
Discharge: HOME | End: 2018-05-29
Attending: FAMILY MEDICINE | Admitting: EMERGENCY MEDICINE

## 2018-05-29 ENCOUNTER — TELEPHONE (OUTPATIENT)
Dept: CARDIOLOGY | Facility: CLINIC | Age: 73
End: 2018-05-29

## 2018-05-29 VITALS
HEART RATE: 66 BPM | WEIGHT: 162 LBS | TEMPERATURE: 98.5 F | BODY MASS INDEX: 22.68 KG/M2 | SYSTOLIC BLOOD PRESSURE: 142 MMHG | RESPIRATION RATE: 18 BRPM | DIASTOLIC BLOOD PRESSURE: 64 MMHG | HEIGHT: 71 IN | OXYGEN SATURATION: 98 %
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
## 2018-05-29 DIAGNOSIS — S39.012A STRAIN OF LUMBAR REGION, INITIAL ENCOUNTER: Primary | ICD-10-CM

## 2018-05-29 LAB
BACTERIA UR QL AUTO: ABNORMAL /HPF
BILIRUB UR QL STRIP: NEGATIVE
CLARITY UR: CLEAR
COLOR UR: ABNORMAL
GLUCOSE UR STRIP-MCNC: NEGATIVE MG/DL
HGB UR QL STRIP.AUTO: NEGATIVE
HYALINE CASTS UR QL AUTO: ABNORMAL /LPF
KETONES UR QL STRIP: ABNORMAL
LEUKOCYTE ESTERASE UR QL STRIP.AUTO: ABNORMAL
NITRITE UR QL STRIP: NEGATIVE
PH UR STRIP.AUTO: <=5 [PH] (ref 5–8)
PROT UR QL STRIP: ABNORMAL
RBC # UR: ABNORMAL /HPF
REF LAB TEST METHOD: ABNORMAL
SP GR UR STRIP: >=1.03 (ref 1–1.03)
SQUAMOUS #/AREA URNS HPF: ABNORMAL /HPF
UROBILINOGEN UR QL STRIP: ABNORMAL
WBC UR QL AUTO: ABNORMAL /HPF

## 2018-05-29 PROCEDURE — 72131 CT LUMBAR SPINE W/O DYE: CPT | Performed by: RADIOLOGY

## 2018-05-29 PROCEDURE — 81001 URINALYSIS AUTO W/SCOPE: CPT | Performed by: EMERGENCY MEDICINE

## 2018-05-29 PROCEDURE — 99284 EMERGENCY DEPT VISIT MOD MDM: CPT

## 2018-05-29 PROCEDURE — 72131 CT LUMBAR SPINE W/O DYE: CPT

## 2018-05-29 RX ORDER — IBUPROFEN 800 MG/1
800 TABLET ORAL EVERY 8 HOURS PRN
Qty: 30 TABLET | Refills: 0 | Status: SHIPPED | OUTPATIENT
Start: 2018-05-29 | End: 2018-07-26

## 2018-05-29 RX ORDER — CYCLOBENZAPRINE HCL 10 MG
5 TABLET ORAL ONCE
Status: COMPLETED | OUTPATIENT
Start: 2018-05-29 | End: 2018-05-29

## 2018-05-29 RX ORDER — IBUPROFEN 400 MG/1
800 TABLET ORAL ONCE
Status: COMPLETED | OUTPATIENT
Start: 2018-05-29 | End: 2018-05-29

## 2018-05-29 RX ORDER — CYCLOBENZAPRINE HCL 5 MG
5 TABLET ORAL 3 TIMES DAILY PRN
Qty: 30 TABLET | Refills: 0 | Status: ON HOLD | OUTPATIENT
Start: 2018-05-29 | End: 2019-10-30

## 2018-05-29 RX ADMIN — IBUPROFEN 800 MG: 400 TABLET, FILM COATED ORAL at 20:39

## 2018-05-29 RX ADMIN — CYCLOBENZAPRINE HYDROCHLORIDE 5 MG: 10 TABLET, FILM COATED ORAL at 20:39

## 2018-05-29 NOTE — TELEPHONE ENCOUNTER
Per reviewing pt's medications and orders, lisinopril 5 mg daily, #90, 5 RF was sent to [facility] by Dr Buenrostro on 2/1/18.  I advised pt's wife Angela of this and she v/u.

## 2018-06-21 ENCOUNTER — OFFICE VISIT (OUTPATIENT)
Dept: CARDIOLOGY | Facility: CLINIC | Age: 73
End: 2018-06-21

## 2018-06-21 VITALS
BODY MASS INDEX: 23.38 KG/M2 | HEART RATE: 45 BPM | SYSTOLIC BLOOD PRESSURE: 138 MMHG | WEIGHT: 167 LBS | RESPIRATION RATE: 16 BRPM | DIASTOLIC BLOOD PRESSURE: 54 MMHG | HEIGHT: 71 IN

## 2018-06-21 DIAGNOSIS — I20.0 UNSTABLE ANGINA (HCC): ICD-10-CM

## 2018-06-21 DIAGNOSIS — R00.2 PALPITATIONS: ICD-10-CM

## 2018-06-21 DIAGNOSIS — R00.1 SYMPTOMATIC BRADYCARDIA: ICD-10-CM

## 2018-06-21 DIAGNOSIS — I10 ESSENTIAL HYPERTENSION: ICD-10-CM

## 2018-06-21 DIAGNOSIS — I20.9 ANGINA, CLASS III (HCC): ICD-10-CM

## 2018-06-21 DIAGNOSIS — I25.10 ASCVD (ARTERIOSCLEROTIC CARDIOVASCULAR DISEASE): Primary | ICD-10-CM

## 2018-06-21 DIAGNOSIS — I25.41 CORONARY ARTERY FISTULA: ICD-10-CM

## 2018-06-21 DIAGNOSIS — I25.10 ATHEROSCLEROTIC HEART DISEASE OF NATIVE CORONARY ARTERY WITHOUT ANGINA PECTORIS: ICD-10-CM

## 2018-06-21 DIAGNOSIS — R93.1 ABNORMAL FINDINGS ON CARDIAC CATHETERIZATION: ICD-10-CM

## 2018-06-21 DIAGNOSIS — R61 NIGHT SWEATS: ICD-10-CM

## 2018-06-21 PROCEDURE — 99213 OFFICE O/P EST LOW 20 MIN: CPT | Performed by: INTERNAL MEDICINE

## 2018-06-21 RX ORDER — ISOSORBIDE MONONITRATE 60 MG/1
60 TABLET, EXTENDED RELEASE ORAL EVERY MORNING
Qty: 30 TABLET | Refills: 3 | Status: SHIPPED | OUTPATIENT
Start: 2018-06-21 | End: 2018-11-02 | Stop reason: SDUPTHER

## 2018-06-21 NOTE — PROGRESS NOTES
Gideon Charles MD  Fidencio Luciano  1945  06/21/2018    Patient Active Problem List   Diagnosis   • Near syncope   • Symptomatic bradycardia   • Dizziness   • Palpitations   • Hypertension   • Diabetes mellitus   • Benign prostatic hyperplasia   • Precordial pain   • Angina, class III   • Abnormal stress test   • Abnormal findings on cardiac catheterization   • ASCVD (arteriosclerotic cardiovascular disease), s/p stenting of 80 % stenosis in left circumflex on 10/05/16and 60% stenosis in the LAD that was left alone.   • Unstable angina   • Normal cardiac stress test 12/2016   • Dyslipidemia   • Weakness generalized   • Chronic fatigue   • Coronary artery fistula   • Weight loss, unintentional       Dear Gideon Charles MD:    Jackie Luciano is a 72 y.o. male with the problems as listed above, presents for cardiology follow up. Patient states that he has been very weak fatigued sometimes going to bed by 18:00. Occasionally he states that he has chest tightness that last for about 3-4 minutes. Its been about a week since the last time that he had an attack. Patient states that these attacks seem to be random with no correlation to exertion.  However whenever these attacks to come on he does get short of breath and diaphoretic.  He has not taken any nitroglycerin during his attacks.  These chest pain symptoms have improved since the last visit in February 2018.  Patient had a stress test in June 2017 that was normal. Outside of the chest pains he is not short of breath or diaphoretic. Patient also reports night sweats 3-4 times a week.  History of Present Illness:        No Known Allergies:      Current Outpatient Prescriptions:   •  aspirin  MG EC tablet, Take 1 tablet by mouth Daily., Disp: 30 tablet, Rfl: 5  •  atorvastatin (LIPITOR) 80 MG tablet, Take 1 tablet by mouth Every Night., Disp: 90 tablet, Rfl: 5  •  cholecalciferol (VITAMIN D3) 20684 units capsule, Take 50,000 Units by mouth 1 (One)  Time Per Week., Disp: , Rfl:   •  clopidogrel (PLAVIX) 75 MG tablet, Take 1 tablet by mouth Daily., Disp: 30 tablet, Rfl: 4  •  cyclobenzaprine (FLEXERIL) 5 MG tablet, Take 1 tablet by mouth 3 (Three) Times a Day As Needed for Muscle Spasms., Disp: 30 tablet, Rfl: 0  •  donepezil (ARICEPT) 10 MG tablet, Take 10 mg by mouth Every Night., Disp: , Rfl:   •  finasteride (PROSCAR) 5 MG tablet, Take 1 tablet by mouth Daily., Disp: 90 tablet, Rfl: 3  •  ibuprofen (ADVIL,MOTRIN) 800 MG tablet, Take 1 tablet by mouth Every 8 (Eight) Hours As Needed for Moderate Pain ., Disp: 30 tablet, Rfl: 0  •  lisinopril (PRINIVIL,ZESTRIL) 5 MG tablet, TAKE ONE TABLET BY MOUTH EVERY DAY FOR BLOOD PRESSURE, Disp: 30 tablet, Rfl: 4  •  NITROSTAT 0.4 MG SL tablet, PLACE 1 TABLET UNDER THE TONGUE EVERY FIVE MINUTES AS NEEDED FOR CHEST PAIN FOR UP TO 3 DOSES, Disp: 25 tablet, Rfl: 1  •  pantoprazole (PROTONIX) 40 MG EC tablet, Take 1 tablet by mouth 2 (Two) Times a Day Before Meals., Disp: 60 tablet, Rfl: 3  •  ranolazine (RANEXA) 1000 MG 12 hr tablet, Take 1 tablet by mouth Every 12 (Twelve) Hours., Disp: 60 tablet, Rfl: 5  •  isosorbide mononitrate (IMDUR) 60 MG 24 hr tablet, Take 1 tablet by mouth Every Morning., Disp: 30 tablet, Rfl: 3  •  lisinopril (PRINIVIL,ZESTRIL) 5 MG tablet, Take 1 tablet by mouth Daily., Disp: 90 tablet, Rfl: 5  •  lubiprostone (AMITIZA) 24 MCG capsule, Take 1 capsule by mouth 2 (Two) Times a Day With Meals., Disp: 60 capsule, Rfl: 5  •  metFORMIN (GLUCOPHAGE) 500 MG tablet, takes 1 pill daily, Disp: , Rfl: 0      The following portions of the patient's history were reviewed and updated as appropriate: allergies, current medications, past family history, past medical history, past social history, past surgical history and problem list.    Social History   Substance Use Topics   • Smoking status: Former Smoker     Packs/day: 3.00     Years: 40.00     Types: Cigarettes     Quit date: 1982   • Smokeless tobacco:  "Former User     Quit date: 1/16/1986   • Alcohol use No       Review of Systems   Cardiovascular: Positive for chest pain and palpitations. Negative for leg swelling.   Respiratory: Negative for shortness of breath.        Objective   Vitals:    06/21/18 1534   BP: 138/54   Pulse: (!) 45   Resp: 16   Weight: 75.8 kg (167 lb)   Height: 180.3 cm (71\")     Body mass index is 23.29 kg/m².        Physical Exam   Constitutional: He appears well-developed and well-nourished. No distress.   Cardiovascular: Normal rate and normal heart sounds.    No murmur heard.  Pulmonary/Chest: Effort normal. No respiratory distress. He has no wheezes. He has no rales.   decreased breath sounds bilaterally   Musculoskeletal: He exhibits edema (1+ edema in both legs).   Skin: He is not diaphoretic.       Lab Results   Component Value Date     03/14/2018    K 3.9 03/14/2018     03/14/2018    CO2 21.7 (L) 03/14/2018    BUN 20 03/14/2018    CREATININE 0.77 03/14/2018    GLUCOSE 194 (H) 03/14/2018    CALCIUM 8.9 03/14/2018    AST 29 03/14/2018    ALT 35 03/14/2018    ALKPHOS 97 03/14/2018    LABIL2 1.3 (L) 03/14/2018     Lab Results   Component Value Date    CKTOTAL 79 10/05/2016     Lab Results   Component Value Date    WBC 7.21 03/14/2018    HGB 12.1 (L) 03/14/2018    HCT 35.9 (L) 03/14/2018     03/14/2018     Lab Results   Component Value Date    INR 1.06 10/03/2016    INR 1.01 07/16/2016     Lab Results   Component Value Date    MG 2.3 07/19/2016     Lab Results   Component Value Date    TSH 1.134 07/17/2016    PSA 1.110 01/22/2018    CHLPL 109 08/12/2015    TRIG 87 05/10/2017    HDL 26 (L) 05/10/2017    LDL 40 05/10/2017      Lab Results   Component Value Date    BNP 55.0 07/16/2016       Stress test June 2018  Interpretation Summary   · Myocardial perfusion imaging indicates a normal myocardial perfusion study with no evidence of ischemia.  · Normal LV cavity size. Normal LV wall motion noted.  · Left ventricular " ejection fraction is normal (Calculated EF = 69%).  · Impressions are consistent with a low risk study.  · There is no prior study available for comparison       During this visit the following were done:  Labs Reviewed [x]    Labs Ordered []    Radiology Reports Reviewed [x]    ER Records Reviewed [x]    Hospital Records Reviewed [x]    Radiology Images Reviewed [x]    Other Reviewed [x]      Procedures      Assessment/Plan    Diagnosis Plan   1. ASCVD (arteriosclerotic cardiovascular disease), s/p stenting of 80 % stenosis in left circumflex on 10/05/16and 60% stenosis in the LAD that was left alone.     2. Abnormal findings on cardiac catheterization     3. Angina, class III     4. Essential hypertension     5. Palpitations  TSH   6. Symptomatic bradycardia     7. Unstable angina     8. Coronary artery fistula     9. Night sweats  High Sensitivity CRP   10. Atherosclerotic heart disease of native coronary artery without angina pectoris   High Sensitivity CRP                Recommendations:  1.  Since the patient has chronic chest pains but have improved, we will prescribe Imdur 60 mg  2. We will check a CRP and TSH for the patient's night sweats and fluctuations in weight    Return in about 3 months (around 9/21/2018).    As always, I appreciate very much the opportunity to participate in the cardiovascular care of your patients.      With Best Regards,    Giovanni Buenrostro MD, [facility]    Dragon disclaimer:  Much of this encounter note is an electronic transcription/translation of spoken language to printed text. The electronic translation of spoken language may permit erroneous, or at times, nonsensical words or phrases to be inadvertently transcribed; Although I have reviewed the note for such errors, some may still exist.

## 2018-07-17 ENCOUNTER — CONSULT (OUTPATIENT)
Dept: ONCOLOGY | Facility: CLINIC | Age: 73
End: 2018-07-17

## 2018-07-17 ENCOUNTER — TELEPHONE (OUTPATIENT)
Dept: CARDIOLOGY | Facility: CLINIC | Age: 73
End: 2018-07-17

## 2018-07-17 VITALS
DIASTOLIC BLOOD PRESSURE: 57 MMHG | OXYGEN SATURATION: 99 % | HEART RATE: 52 BPM | WEIGHT: 162 LBS | RESPIRATION RATE: 18 BRPM | SYSTOLIC BLOOD PRESSURE: 125 MMHG | TEMPERATURE: 97.6 F | BODY MASS INDEX: 23.19 KG/M2 | HEIGHT: 70 IN

## 2018-07-17 DIAGNOSIS — D50.9 IRON DEFICIENCY ANEMIA, UNSPECIFIED IRON DEFICIENCY ANEMIA TYPE: ICD-10-CM

## 2018-07-17 DIAGNOSIS — D64.9 ANEMIA, UNSPECIFIED TYPE: Primary | ICD-10-CM

## 2018-07-17 LAB
ALBUMIN SERPL-MCNC: 4.1 G/DL (ref 3.4–4.8)
ALBUMIN/GLOB SERPL: 1.5 G/DL (ref 1.5–2.5)
ALP SERPL-CCNC: 93 U/L (ref 40–129)
ALT SERPL W P-5'-P-CCNC: 27 U/L (ref 10–44)
ANION GAP SERPL CALCULATED.3IONS-SCNC: 6.4 MMOL/L (ref 3.6–11.2)
AST SERPL-CCNC: 25 U/L (ref 10–34)
BASOPHILS # BLD AUTO: 0.06 10*3/MM3 (ref 0–0.3)
BASOPHILS NFR BLD AUTO: 0.8 % (ref 0–2)
BILIRUB SERPL-MCNC: 0.7 MG/DL (ref 0.2–1.8)
BUN BLD-MCNC: 16 MG/DL (ref 7–21)
BUN/CREAT SERPL: 19.8 (ref 7–25)
CALCIUM SPEC-SCNC: 8.7 MG/DL (ref 7.7–10)
CHLORIDE SERPL-SCNC: 107 MMOL/L (ref 99–112)
CO2 SERPL-SCNC: 24.6 MMOL/L (ref 24.3–31.9)
CREAT BLD-MCNC: 0.81 MG/DL (ref 0.43–1.29)
CRP SERPL-MCNC: <0.5 MG/DL (ref 0–0.99)
DEPRECATED RDW RBC AUTO: 43.4 FL (ref 37–54)
EOSINOPHIL # BLD AUTO: 0.72 10*3/MM3 (ref 0–0.7)
EOSINOPHIL NFR BLD AUTO: 9.4 % (ref 0–7)
ERYTHROCYTE [DISTWIDTH] IN BLOOD BY AUTOMATED COUNT: 13.8 % (ref 11.5–14.5)
ERYTHROCYTE [SEDIMENTATION RATE] IN BLOOD: 10 MM/HR (ref 0–20)
FERRITIN SERPL-MCNC: 11 NG/ML (ref 21.9–321.7)
FOLATE SERPL-MCNC: 18.87 NG/ML (ref 5.4–20)
GFR SERPL CREATININE-BSD FRML MDRD: 94 ML/MIN/1.73
GLOBULIN UR ELPH-MCNC: 2.8 GM/DL
GLUCOSE BLD-MCNC: 98 MG/DL (ref 70–110)
HCT VFR BLD AUTO: 31.5 % (ref 42–52)
HGB BLD-MCNC: 10.2 G/DL (ref 14–18)
IMM GRANULOCYTES # BLD: 0.01 10*3/MM3 (ref 0–0.03)
IMM GRANULOCYTES NFR BLD: 0.1 % (ref 0–0.5)
IRON 24H UR-MRATE: 41 MCG/DL (ref 53–167)
IRON SATN MFR SERPL: 9 % (ref 20–50)
LDH SERPL-CCNC: 163 U/L (ref 135–225)
LYMPHOCYTES # BLD AUTO: 1.47 10*3/MM3 (ref 1–3)
LYMPHOCYTES NFR BLD AUTO: 19.1 % (ref 16–46)
MCH RBC QN AUTO: 27.8 PG (ref 27–33)
MCHC RBC AUTO-ENTMCNC: 32.4 G/DL (ref 33–37)
MCV RBC AUTO: 85.8 FL (ref 80–94)
MONOCYTES # BLD AUTO: 0.85 10*3/MM3 (ref 0.1–0.9)
MONOCYTES NFR BLD AUTO: 11.1 % (ref 0–12)
NEUTROPHILS # BLD AUTO: 4.57 10*3/MM3 (ref 1.4–6.5)
NEUTROPHILS NFR BLD AUTO: 59.5 % (ref 40–75)
OSMOLALITY SERPL CALC.SUM OF ELEC: 276.8 MOSM/KG (ref 273–305)
PLATELET # BLD AUTO: 215 10*3/MM3 (ref 130–400)
PMV BLD AUTO: 10.2 FL (ref 6–10)
POTASSIUM BLD-SCNC: 3.7 MMOL/L (ref 3.5–5.3)
PROT SERPL-MCNC: 6.9 G/DL (ref 6–8)
RBC # BLD AUTO: 3.67 10*6/MM3 (ref 4.7–6.1)
RETICS #: 0.02 10*6/MM3 (ref 0.02–0.13)
RETICS/RBC NFR AUTO: 0.66 % (ref 0.5–2)
SODIUM BLD-SCNC: 138 MMOL/L (ref 135–153)
TIBC SERPL-MCNC: 445 MCG/DL (ref 241–421)
VIT B12 BLD-MCNC: 932 PG/ML (ref 211–911)
WBC NRBC COR # BLD: 7.68 10*3/MM3 (ref 4.5–12.5)

## 2018-07-17 PROCEDURE — 36415 COLL VENOUS BLD VENIPUNCTURE: CPT | Performed by: NURSE PRACTITIONER

## 2018-07-17 PROCEDURE — 85060 BLOOD SMEAR INTERPRETATION: CPT | Performed by: NURSE PRACTITIONER

## 2018-07-17 PROCEDURE — 83550 IRON BINDING TEST: CPT | Performed by: NURSE PRACTITIONER

## 2018-07-17 PROCEDURE — 85652 RBC SED RATE AUTOMATED: CPT | Performed by: NURSE PRACTITIONER

## 2018-07-17 PROCEDURE — 82746 ASSAY OF FOLIC ACID SERUM: CPT | Performed by: NURSE PRACTITIONER

## 2018-07-17 PROCEDURE — 85045 AUTOMATED RETICULOCYTE COUNT: CPT | Performed by: NURSE PRACTITIONER

## 2018-07-17 PROCEDURE — 85025 COMPLETE CBC W/AUTO DIFF WBC: CPT | Performed by: NURSE PRACTITIONER

## 2018-07-17 PROCEDURE — 80053 COMPREHEN METABOLIC PANEL: CPT | Performed by: NURSE PRACTITIONER

## 2018-07-17 PROCEDURE — 82728 ASSAY OF FERRITIN: CPT | Performed by: NURSE PRACTITIONER

## 2018-07-17 PROCEDURE — 83540 ASSAY OF IRON: CPT | Performed by: NURSE PRACTITIONER

## 2018-07-17 PROCEDURE — 99214 OFFICE O/P EST MOD 30 MIN: CPT | Performed by: NURSE PRACTITIONER

## 2018-07-17 PROCEDURE — 83010 ASSAY OF HAPTOGLOBIN QUANT: CPT | Performed by: NURSE PRACTITIONER

## 2018-07-17 PROCEDURE — 82607 VITAMIN B-12: CPT | Performed by: NURSE PRACTITIONER

## 2018-07-17 PROCEDURE — 83615 LACTATE (LD) (LDH) ENZYME: CPT | Performed by: NURSE PRACTITIONER

## 2018-07-17 PROCEDURE — 86140 C-REACTIVE PROTEIN: CPT | Performed by: NURSE PRACTITIONER

## 2018-07-17 RX ORDER — FERROUS SULFATE 325(65) MG
325 TABLET ORAL
Qty: 90 TABLET | Refills: 5 | Status: SHIPPED | OUTPATIENT
Start: 2018-07-17 | End: 2018-12-07 | Stop reason: SDUPTHER

## 2018-07-17 NOTE — PROGRESS NOTES
DATE OF CONSULTATION:  7/17/2018    REASON FOR REFERRAL: Anemia    REFERRING PHYSICIAN:  Gideon Charles MD    CHIEF COMPLAINT:  Fatigue, weight loss, dark stool, rectal bleeding    HISTORY OF PRESENT ILLNESS:   Fidencio Luciano is a very pleasant 72 y.o. male who is being seen today at the request of Gideon Charles MD for evaluation and treatment of Anemia. Mr. Luciano reports following with his PCP routinely with repeat labs every 4-6 months. He says he has had chronic anemia which was recently worsening in June. Previous available CBCs were reviewed and patient has had normocytic anemia with Hg ranging 11-13 g/dL since July 2015. CBC from June 2018 showed drop in Hg to 10.0. Patient's WBC and platelets have been in the normal range. Patient reports intermittent dark stool and bleeding per rectum for the past few months. He reports having upper/lower endoscopy approximately 3 years ago with Dr. Martinez which was negative for active bleeding. He says his PCP has referred him to Dr. Unger and he will see him next week. Patient denies any recent blood transfusions. His main complaint today is ongoing fatigue which has been recently worsening. He also reports decreased appetite and weight loss but he is unsure how much weight he has lost. Otherwise, he denies any other specific complaints.     PAST MEDICAL HISTORY:  Past Medical History:   Diagnosis Date   • BPH (benign prostatic hyperplasia)    • Bradycardia     Positive tilt table testing 07/2016   • Coronary artery disease    • Diabetes mellitus (CMS/HCC)    • Diverticulosis    • Gastric ulcer with hemorrhage    • Hyperlipidemia    • Hypertension    • Psoriasis        PAST SURGICAL HISTORY:  Past Surgical History:   Procedure Laterality Date   • BACK SURGERY     • CARDIAC CATHETERIZATION N/A 9/20/2016    Procedure: Left Heart Cath;  Surgeon: Giovanni Buenrostro MD;  Location: [facility] CATH INVASIVE LOCATION;  Service:    • CARDIAC CATHETERIZATION N/A 10/4/2016    Procedure: Left  Heart Cath;  Surgeon: Bharathi Andrew MD;  Location:  [facility] CATH INVASIVE LOCATION;  Service:    • CARDIAC CATHETERIZATION N/A 5/9/2017    Procedure: Left Heart Cath;  Surgeon: Giovanni Buenrostro MD;  Location:  [facility] CATH INVASIVE LOCATION;  Service:    • CARDIAC CATHETERIZATION N/A 5/9/2017    Procedure: ERR;  Surgeon: Giovanni Buenrostro MD;  Location:  [facility] CATH INVASIVE LOCATION;  Service:    • CHOLECYSTECTOMY     • COLONOSCOPY  11/13/2015    Dr. Martinez- internal hemorrhoids, sigmoid diverticulosis   • CORONARY ANGIOPLASTY WITH STENT PLACEMENT     • MN RT/LT HEART CATHETERS N/A 5/9/2017    Procedure: Percutaneous Coronary Intervention;  Surgeon: Lincoln De Los Santos MD;  Location:  [facility] CATH INVASIVE LOCATION;  Service: Cardiology   • STOMACH SURGERY      FUSION OF BLEEDING ULCER   • UPPER GASTROINTESTINAL ENDOSCOPY  11/13/2015    Dr. Martinez- mild gastritis       FAMILY HISTORY:  Family History   Problem Relation Age of Onset   • Sick sinus syndrome Mother    • Heart failure Mother    • Diabetes Mother    • Liver cancer Father        SOCIAL HISTORY:  Social History     Social History   • Marital status:      Spouse name: N/A   • Number of children: N/A   • Years of education: N/A     Occupational History   • Not on file.     Social History Main Topics   • Smoking status: Former Smoker     Packs/day: 3.00     Years: 40.00     Types: Cigarettes     Quit date: 1982   • Smokeless tobacco: Former User     Quit date: 1/16/1986   • Alcohol use No   • Drug use: No   • Sexual activity: Defer     Other Topics Concern   • Not on file     Social History Narrative   • No narrative on file       MEDICATIONS:  The current medication list was reviewed in the EMR    Current Outpatient Prescriptions:   •  aspirin  MG EC tablet, Take 1 tablet by mouth Daily., Disp: 30 tablet, Rfl: 5  •  atorvastatin (LIPITOR) 80 MG tablet, Take 1 tablet by mouth Every Night., Disp: 90 tablet, Rfl: 5  •  cholecalciferol (VITAMIN D3) 69512 units  "capsule, Take 50,000 Units by mouth 1 (One) Time Per Week., Disp: , Rfl:   •  clopidogrel (PLAVIX) 75 MG tablet, Take 1 tablet by mouth Daily., Disp: 30 tablet, Rfl: 4  •  cyclobenzaprine (FLEXERIL) 5 MG tablet, Take 1 tablet by mouth 3 (Three) Times a Day As Needed for Muscle Spasms., Disp: 30 tablet, Rfl: 0  •  donepezil (ARICEPT) 10 MG tablet, Take 10 mg by mouth Every Night., Disp: , Rfl:   •  finasteride (PROSCAR) 5 MG tablet, Take 1 tablet by mouth Daily., Disp: 90 tablet, Rfl: 3  •  ibuprofen (ADVIL,MOTRIN) 800 MG tablet, Take 1 tablet by mouth Every 8 (Eight) Hours As Needed for Moderate Pain ., Disp: 30 tablet, Rfl: 0  •  isosorbide mononitrate (IMDUR) 60 MG 24 hr tablet, Take 1 tablet by mouth Every Morning., Disp: 30 tablet, Rfl: 3  •  lisinopril (PRINIVIL,ZESTRIL) 5 MG tablet, Take 1 tablet by mouth Daily., Disp: 90 tablet, Rfl: 5  •  lisinopril (PRINIVIL,ZESTRIL) 5 MG tablet, TAKE ONE TABLET BY MOUTH EVERY DAY FOR BLOOD PRESSURE, Disp: 30 tablet, Rfl: 4  •  lubiprostone (AMITIZA) 24 MCG capsule, Take 1 capsule by mouth 2 (Two) Times a Day With Meals., Disp: 60 capsule, Rfl: 5  •  metFORMIN (GLUCOPHAGE) 500 MG tablet, takes 1 pill daily, Disp: , Rfl: 0  •  NITROSTAT 0.4 MG SL tablet, PLACE 1 TABLET UNDER THE TONGUE EVERY FIVE MINUTES AS NEEDED FOR CHEST PAIN FOR UP TO 3 DOSES, Disp: 25 tablet, Rfl: 1  •  pantoprazole (PROTONIX) 40 MG EC tablet, Take 1 tablet by mouth 2 (Two) Times a Day Before Meals., Disp: 60 tablet, Rfl: 3  •  ranolazine (RANEXA) 1000 MG 12 hr tablet, Take 1 tablet by mouth Every 12 (Twelve) Hours., Disp: 60 tablet, Rfl: 5    ALLERGIES:  No Known Allergies      REVIEW OF SYSTEMS:    A comprehensive 14 point review of systems was performed.  Significant findings as mentioned above.  All other systems reviewed and are negative.      Physical Exam   Vital Signs: /57   Pulse 52   Temp 97.6 °F (36.4 °C) (Oral)   Resp 18   Ht 177.8 cm (70\")   Wt 73.5 kg (162 lb)   SpO2 99%   BMI " 23.24 kg/m²  Patient's Body mass index is 23.24 kg/m².   General: Well developed, well nourished, alert and oriented x 3, in no acute distress. Appears fatigued.   Head: ATNC   Eyes: PERRL, No evidence of conjunctivitis.   Nose: No nasal discharge.   Mouth: Oral mucosal membranes moist. No oral ulceration or hemorrhages.   Neck: Neck supple. No thyromegaly. No JVD.   Lungs: Clear in all fields to A&P without rales, rhonchi or wheezing.   Heart: S1, S2. No murmurs, rubs, or gallops.   Abdomen: Soft. Bowel sounds are normoactive. Nontender with palpation. No Hepatosplenomegaly can be appreciated.   Extremities: No cyanosis or edema. Peripheral pulses palpable and equal bilaterally.   Lymph Nodes: No palpable cervical, submandibular, supraclavicular, axillary lymphadenopathy noted.  Neurologic: Grossly non-focal exam.    RECENT LABS:  Lab Results   Component Value Date    WBC 7.68 07/17/2018    HGB 10.2 (L) 07/17/2018    HCT 31.5 (L) 07/17/2018    MCV 85.8 07/17/2018    RDW 13.8 07/17/2018     07/17/2018    NEUTRORELPCT 59.5 07/17/2018    LYMPHORELPCT 19.1 07/17/2018    MONORELPCT 11.1 07/17/2018    EOSRELPCT 9.4 (H) 07/17/2018    BASORELPCT 0.8 07/17/2018    NEUTROABS 4.57 07/17/2018    LYMPHSABS 1.47 07/17/2018       Lab Results   Component Value Date     07/17/2018    K 3.7 07/17/2018    CO2 24.6 07/17/2018     07/17/2018    BUN 16 07/17/2018    CREATININE 0.81 07/17/2018    EGFRIFNONA 94 07/17/2018    EGFRIFAFRI  09/19/2016      Comment:      <15 Indicative of kidney failure.    GLUCOSE 98 07/17/2018    CALCIUM 8.7 07/17/2018    ALKPHOS 93 07/17/2018    AST 25 07/17/2018    ALT 27 07/17/2018    BILITOT 0.7 07/17/2018    ALBUMIN 4.10 07/17/2018    PROTEINTOT 6.9 07/17/2018    MG 2.3 07/19/2016       Lab Results   Component Value Date/Time     07/17/2018 03:03 PM     Lab Results   Component Value Date    FERRITIN 11.00 (L) 07/17/2018    IRON 41 (L) 07/17/2018    TIBC 445 (H) 07/17/2018     LABIRON 9 (L) 07/17/2018    MYIPQYMZ43 932 (H) 07/17/2018    FOLATE 18.87 07/17/2018    RETICCTPCT 0.66 07/17/2018    RETIC 0.0242 07/17/2018       ASSESSMENT & PLAN:  Fidencio Luciano is a very pleasant 72 y.o. male with    1.  Normocytic anemia:  -Noted on routine blood testing with PCP since July 2015 with Hg ranging 11-13 g/dL, recently worsening in June (Hg 10).  WBC and platelets have been in the normal range.  -Reports intermittent dark stool and bleeding per rectum for the past few months. He reports having upper/lower endoscopy approximately 3 years ago with Dr. Martinez which was negative for active bleeding. He says his PCP has referred him to Dr. Unger and he will see him next week. Patient denies any recent blood transfusions.  -Obtained additional workup today including repeat CBC which showed stable H/H (Hg 10.2), WBC and platelets were again in the normal range. CMP unremarkable as above. PBS sent and pending. B12 and Folate are replete. CRP and ESR are normal. Retic and LDH were normal, not suggestive of hemolysis. Haptoglobin pending. Iron studies were consistent with Iron deficiency.   -Therefore, will start patient on oral iron therapy. Called and discussed results with patient and potential side effects of oral iron therapy. He will start with ferrous sulfate once daily and titrate up to three times daily if able to tolerate. Also advised him to take stool softeners to help prevent constipation.   -Patient is scheduled to be seen by GI next week as above. Will have patient return to clinic in 6 weeks with repeat labs.   -He will call us the in the meantime if he were to develop any new or worsening symptoms and we will be happy to see him sooner.     ACO Quality measures  - Fidencio Luciano does not use tobacco products.  - Patient's Body mass index is 23.24 kg/m². BMI is within normal parameters. No follow-up required.  - Current outpatient and discharge medications have been reconciled for the  patient.  Reviewed by: ANDREW Dawson    The patient was in agreement with the plan and all questions were answered to his satisfaction.     Thank you so much for allowing us to participate in the care of Fidencio Luciano . Please do not hesitate to contact us with any questions or concerns.     I spent 45 minutes with Fidencio Luciano today. More than 50% of the time was spent in counseling / coordination of care for the above problems.      Electronically Signed by: ANDREW Dawson , July 17, 2018 3:41 PM       CC:   MD Gideon Hill MD

## 2018-07-18 LAB — HAPTOGLOB SERPL-MCNC: 112 MG/DL (ref 34–200)

## 2018-07-18 NOTE — TELEPHONE ENCOUNTER
Called and spoke with his wife.  She stated his PCP has referred him to GI and Hemotolgy for this.

## 2018-07-20 LAB
CYTOLOGIST CVX/VAG CYTO: NORMAL
PATH INTERP BLD-IMP: NORMAL

## 2018-07-25 ENCOUNTER — APPOINTMENT (OUTPATIENT)
Dept: CT IMAGING | Facility: HOSPITAL | Age: 73
End: 2018-07-25

## 2018-07-25 ENCOUNTER — APPOINTMENT (OUTPATIENT)
Dept: GENERAL RADIOLOGY | Facility: HOSPITAL | Age: 73
End: 2018-07-25

## 2018-07-25 ENCOUNTER — HOSPITAL ENCOUNTER (EMERGENCY)
Facility: HOSPITAL | Age: 73
Discharge: HOME | End: 2018-07-25
Attending: EMERGENCY MEDICINE | Admitting: EMERGENCY MEDICINE

## 2018-07-25 VITALS
TEMPERATURE: 98 F | SYSTOLIC BLOOD PRESSURE: 118 MMHG | HEART RATE: 50 BPM | OXYGEN SATURATION: 100 % | HEIGHT: 71 IN | WEIGHT: 162 LBS | DIASTOLIC BLOOD PRESSURE: 65 MMHG | RESPIRATION RATE: 18 BRPM | BODY MASS INDEX: 22.68 KG/M2

## 2018-07-25 DIAGNOSIS — R42 DIZZINESS: Primary | ICD-10-CM

## 2018-07-25 DIAGNOSIS — R00.1 SINUS BRADYCARDIA: ICD-10-CM

## 2018-07-25 LAB
ALBUMIN SERPL-MCNC: 4.1 G/DL (ref 3.4–4.8)
ALBUMIN/GLOB SERPL: 1.5 G/DL (ref 1.5–2.5)
ALP SERPL-CCNC: 88 U/L (ref 40–129)
ALT SERPL W P-5'-P-CCNC: 25 U/L (ref 10–44)
ANION GAP SERPL CALCULATED.3IONS-SCNC: 7.1 MMOL/L (ref 3.6–11.2)
AST SERPL-CCNC: 27 U/L (ref 10–34)
BASOPHILS # BLD AUTO: 0.06 10*3/MM3 (ref 0–0.3)
BASOPHILS NFR BLD AUTO: 0.7 % (ref 0–2)
BILIRUB SERPL-MCNC: 0.5 MG/DL (ref 0.2–1.8)
BUN BLD-MCNC: 23 MG/DL (ref 7–21)
BUN/CREAT SERPL: 30.3 (ref 7–25)
CALCIUM SPEC-SCNC: 8.6 MG/DL (ref 7.7–10)
CHLORIDE SERPL-SCNC: 109 MMOL/L (ref 99–112)
CO2 SERPL-SCNC: 24.9 MMOL/L (ref 24.3–31.9)
CREAT BLD-MCNC: 0.76 MG/DL (ref 0.43–1.29)
DEPRECATED RDW RBC AUTO: 44.8 FL (ref 37–54)
EOSINOPHIL # BLD AUTO: 0.58 10*3/MM3 (ref 0–0.7)
EOSINOPHIL NFR BLD AUTO: 6.6 % (ref 0–7)
ERYTHROCYTE [DISTWIDTH] IN BLOOD BY AUTOMATED COUNT: 14.5 % (ref 11.5–14.5)
GFR SERPL CREATININE-BSD FRML MDRD: 101 ML/MIN/1.73
GLOBULIN UR ELPH-MCNC: 2.7 GM/DL
GLUCOSE BLD-MCNC: 90 MG/DL (ref 70–110)
HCT VFR BLD AUTO: 33.4 % (ref 42–52)
HGB BLD-MCNC: 10.8 G/DL (ref 14–18)
HOLD SPECIMEN: NORMAL
HOLD SPECIMEN: NORMAL
IMM GRANULOCYTES # BLD: 0.01 10*3/MM3 (ref 0–0.03)
IMM GRANULOCYTES NFR BLD: 0.1 % (ref 0–0.5)
LIPASE SERPL-CCNC: 25 U/L (ref 13–60)
LYMPHOCYTES # BLD AUTO: 1.58 10*3/MM3 (ref 1–3)
LYMPHOCYTES NFR BLD AUTO: 17.9 % (ref 16–46)
MAGNESIUM SERPL-MCNC: 2.3 MG/DL (ref 1.7–2.6)
MCH RBC QN AUTO: 27.8 PG (ref 27–33)
MCHC RBC AUTO-ENTMCNC: 32.3 G/DL (ref 33–37)
MCV RBC AUTO: 85.9 FL (ref 80–94)
MONOCYTES # BLD AUTO: 0.78 10*3/MM3 (ref 0.1–0.9)
MONOCYTES NFR BLD AUTO: 8.8 % (ref 0–12)
NEUTROPHILS # BLD AUTO: 5.81 10*3/MM3 (ref 1.4–6.5)
NEUTROPHILS NFR BLD AUTO: 65.9 % (ref 40–75)
OSMOLALITY SERPL CALC.SUM OF ELEC: 284.5 MOSM/KG (ref 273–305)
PLATELET # BLD AUTO: 221 10*3/MM3 (ref 130–400)
PMV BLD AUTO: 10.6 FL (ref 6–10)
POTASSIUM BLD-SCNC: 3.7 MMOL/L (ref 3.5–5.3)
PROT SERPL-MCNC: 6.8 G/DL (ref 6–8)
RBC # BLD AUTO: 3.89 10*6/MM3 (ref 4.7–6.1)
SODIUM BLD-SCNC: 141 MMOL/L (ref 135–153)
T4 FREE SERPL-MCNC: 1.03 NG/DL (ref 0.89–1.76)
TROPONIN I SERPL-MCNC: <0.006 NG/ML
TROPONIN I SERPL-MCNC: <0.006 NG/ML
TSH SERPL DL<=0.05 MIU/L-ACNC: 2.7 MIU/ML (ref 0.55–4.78)
WBC NRBC COR # BLD: 8.82 10*3/MM3 (ref 4.5–12.5)
WHOLE BLOOD HOLD SPECIMEN: NORMAL
WHOLE BLOOD HOLD SPECIMEN: NORMAL

## 2018-07-25 PROCEDURE — 25010000002 ONDANSETRON PER 1 MG: Performed by: EMERGENCY MEDICINE

## 2018-07-25 PROCEDURE — 93005 ELECTROCARDIOGRAM TRACING: CPT | Performed by: EMERGENCY MEDICINE

## 2018-07-25 PROCEDURE — 85025 COMPLETE CBC W/AUTO DIFF WBC: CPT | Performed by: EMERGENCY MEDICINE

## 2018-07-25 PROCEDURE — 70450 CT HEAD/BRAIN W/O DYE: CPT | Performed by: RADIOLOGY

## 2018-07-25 PROCEDURE — 84439 ASSAY OF FREE THYROXINE: CPT | Performed by: EMERGENCY MEDICINE

## 2018-07-25 PROCEDURE — 83735 ASSAY OF MAGNESIUM: CPT | Performed by: EMERGENCY MEDICINE

## 2018-07-25 PROCEDURE — 84484 ASSAY OF TROPONIN QUANT: CPT | Performed by: EMERGENCY MEDICINE

## 2018-07-25 PROCEDURE — 93010 ELECTROCARDIOGRAM REPORT: CPT | Performed by: INTERNAL MEDICINE

## 2018-07-25 PROCEDURE — 99285 EMERGENCY DEPT VISIT HI MDM: CPT

## 2018-07-25 PROCEDURE — 96361 HYDRATE IV INFUSION ADD-ON: CPT

## 2018-07-25 PROCEDURE — 71045 X-RAY EXAM CHEST 1 VIEW: CPT

## 2018-07-25 PROCEDURE — 84443 ASSAY THYROID STIM HORMONE: CPT | Performed by: EMERGENCY MEDICINE

## 2018-07-25 PROCEDURE — 71045 X-RAY EXAM CHEST 1 VIEW: CPT | Performed by: RADIOLOGY

## 2018-07-25 PROCEDURE — 36415 COLL VENOUS BLD VENIPUNCTURE: CPT

## 2018-07-25 PROCEDURE — 83690 ASSAY OF LIPASE: CPT | Performed by: EMERGENCY MEDICINE

## 2018-07-25 PROCEDURE — 70450 CT HEAD/BRAIN W/O DYE: CPT

## 2018-07-25 PROCEDURE — 80053 COMPREHEN METABOLIC PANEL: CPT | Performed by: EMERGENCY MEDICINE

## 2018-07-25 PROCEDURE — 96374 THER/PROPH/DIAG INJ IV PUSH: CPT

## 2018-07-25 RX ORDER — ONDANSETRON 2 MG/ML
4 INJECTION INTRAMUSCULAR; INTRAVENOUS ONCE
Status: COMPLETED | OUTPATIENT
Start: 2018-07-25 | End: 2018-07-25

## 2018-07-25 RX ORDER — SODIUM CHLORIDE 9 MG/ML
125 INJECTION, SOLUTION INTRAVENOUS CONTINUOUS
Status: DISCONTINUED | OUTPATIENT
Start: 2018-07-25 | End: 2018-07-26 | Stop reason: HOSPADM

## 2018-07-25 RX ORDER — SODIUM CHLORIDE 0.9 % (FLUSH) 0.9 %
10 SYRINGE (ML) INJECTION AS NEEDED
Status: DISCONTINUED | OUTPATIENT
Start: 2018-07-25 | End: 2018-07-26 | Stop reason: HOSPADM

## 2018-07-25 RX ADMIN — SODIUM CHLORIDE 125 ML/HR: 9 INJECTION, SOLUTION INTRAVENOUS at 21:38

## 2018-07-25 RX ADMIN — SODIUM CHLORIDE 500 ML: 9 INJECTION, SOLUTION INTRAVENOUS at 21:28

## 2018-07-25 RX ADMIN — ONDANSETRON 4 MG: 2 INJECTION, SOLUTION INTRAMUSCULAR; INTRAVENOUS at 20:51

## 2018-07-26 ENCOUNTER — OFFICE VISIT (OUTPATIENT)
Dept: GASTROENTEROLOGY | Facility: CLINIC | Age: 73
End: 2018-07-26

## 2018-07-26 ENCOUNTER — EPISODE CHANGES (OUTPATIENT)
Dept: CASE MANAGEMENT | Facility: OTHER | Age: 73
End: 2018-07-26

## 2018-07-26 VITALS
DIASTOLIC BLOOD PRESSURE: 53 MMHG | OXYGEN SATURATION: 96 % | SYSTOLIC BLOOD PRESSURE: 123 MMHG | HEIGHT: 71 IN | WEIGHT: 163.6 LBS | HEART RATE: 61 BPM | BODY MASS INDEX: 22.9 KG/M2

## 2018-07-26 DIAGNOSIS — D64.9 NORMOCYTIC ANEMIA: ICD-10-CM

## 2018-07-26 DIAGNOSIS — R11.2 INTRACTABLE VOMITING WITH NAUSEA, UNSPECIFIED VOMITING TYPE: ICD-10-CM

## 2018-07-26 DIAGNOSIS — R63.4 WEIGHT LOSS, ABNORMAL: ICD-10-CM

## 2018-07-26 DIAGNOSIS — Z87.11 HISTORY OF GASTRIC ULCER: ICD-10-CM

## 2018-07-26 DIAGNOSIS — E61.1 IRON DEFICIENCY: Primary | ICD-10-CM

## 2018-07-26 PROCEDURE — 99214 OFFICE O/P EST MOD 30 MIN: CPT | Performed by: PHYSICIAN ASSISTANT

## 2018-07-31 ENCOUNTER — TELEPHONE (OUTPATIENT)
Dept: GASTROENTEROLOGY | Facility: CLINIC | Age: 73
End: 2018-07-31

## 2018-07-31 NOTE — TELEPHONE ENCOUNTER
Ok patient is awaiting clearance for EGD in order for it to be scheduled. We need clearance from cardio before proceeding but ok to wait until Dr. Buenrostro is back.

## 2018-07-31 NOTE — TELEPHONE ENCOUNTER
Emely from Dr Buenrostro's office called. She said patients wife called them to find out if patient is ok with holding plavix and asa for procedure, but they told her that Dr Buenrostro wont be back in the office until Monday, aug 6th. Is it ok to wait til he gets back to tell us or do we need a different plan? I'm asking because Emely said patients wife is worried.

## 2018-07-31 NOTE — TELEPHONE ENCOUNTER
I have also called pt's wife to make her aware that our offices are working together and one of us will be contacting her or Fidencio if/when anything changes.  She thanked us for keeping her informed and seemed to be a little more at ease.

## 2018-08-07 ENCOUNTER — DOCUMENTATION (OUTPATIENT)
Dept: GASTROENTEROLOGY | Facility: CLINIC | Age: 73
End: 2018-08-07

## 2018-08-07 NOTE — PROGRESS NOTES
Pts. Wife Angela has called office 3 times and wanted to know the status of Fidencio's Cardiac clearance from Dr. Buenrostro's office that was sent a couple of weeks ago. I told her I had sent through system a note to his MA and had not heard or seen the cardiac clearance in his chart. However his MA send me a message stating that she had laid the cardiac clearance form on his desk but has not gotten it back from him. I sent another message to her today just following up on it as his wife called on 8-6-18. She did reply that Dr. Buenrostro is doing rounds at hospital and will be back in office on 8-8-18

## 2018-08-08 ENCOUNTER — TELEPHONE (OUTPATIENT)
Dept: CARDIOLOGY | Facility: CLINIC | Age: 73
End: 2018-08-08

## 2018-08-09 PROBLEM — R63.4 WEIGHT LOSS, ABNORMAL: Status: ACTIVE | Noted: 2018-08-09

## 2018-08-09 PROBLEM — D64.9 NORMOCYTIC ANEMIA: Status: ACTIVE | Noted: 2018-08-09

## 2018-08-09 PROBLEM — Z87.11 HISTORY OF GASTRIC ULCER: Status: ACTIVE | Noted: 2018-08-09

## 2018-08-09 PROBLEM — E61.1 IRON DEFICIENCY: Status: ACTIVE | Noted: 2018-08-09

## 2018-08-09 PROBLEM — R11.2 INTRACTABLE VOMITING WITH NAUSEA: Status: ACTIVE | Noted: 2018-08-09

## 2018-08-17 ENCOUNTER — ANESTHESIA EVENT (OUTPATIENT)
Dept: PERIOP | Facility: HOSPITAL | Age: 73
End: 2018-08-17

## 2018-08-17 ENCOUNTER — ANESTHESIA (OUTPATIENT)
Dept: PERIOP | Facility: HOSPITAL | Age: 73
End: 2018-08-17

## 2018-08-17 ENCOUNTER — HOSPITAL ENCOUNTER (OUTPATIENT)
Facility: HOSPITAL | Age: 73
Setting detail: HOSPITAL OUTPATIENT SURGERY
Discharge: HOME | End: 2018-08-17
Attending: SURGERY | Admitting: SURGERY

## 2018-08-17 ENCOUNTER — EPISODE CHANGES (OUTPATIENT)
Dept: CASE MANAGEMENT | Facility: OTHER | Age: 73
End: 2018-08-17

## 2018-08-17 VITALS
HEART RATE: 49 BPM | RESPIRATION RATE: 20 BRPM | OXYGEN SATURATION: 99 % | TEMPERATURE: 97.5 F | SYSTOLIC BLOOD PRESSURE: 175 MMHG | DIASTOLIC BLOOD PRESSURE: 71 MMHG

## 2018-08-17 LAB
GLUCOSE BLDC GLUCOMTR-MCNC: 128 MG/DL (ref 70–130)
GLUCOSE BLDC GLUCOMTR-MCNC: 63 MG/DL (ref 70–130)

## 2018-08-17 PROCEDURE — 45378 DIAGNOSTIC COLONOSCOPY: CPT | Performed by: SURGERY

## 2018-08-17 PROCEDURE — 82962 GLUCOSE BLOOD TEST: CPT

## 2018-08-17 PROCEDURE — 43239 EGD BIOPSY SINGLE/MULTIPLE: CPT | Performed by: SURGERY

## 2018-08-17 PROCEDURE — 25010000002 PROPOFOL 10 MG/ML EMULSION: Performed by: NURSE ANESTHETIST, CERTIFIED REGISTERED

## 2018-08-17 PROCEDURE — 25010000002 MIDAZOLAM PER 1 MG: Performed by: NURSE ANESTHETIST, CERTIFIED REGISTERED

## 2018-08-17 RX ORDER — ONDANSETRON 2 MG/ML
4 INJECTION INTRAMUSCULAR; INTRAVENOUS ONCE AS NEEDED
Status: DISCONTINUED | OUTPATIENT
Start: 2018-08-17 | End: 2018-08-17 | Stop reason: HOSPADM

## 2018-08-17 RX ORDER — LIDOCAINE HYDROCHLORIDE 20 MG/ML
INJECTION, SOLUTION EPIDURAL; INFILTRATION; INTRACAUDAL; PERINEURAL AS NEEDED
Status: DISCONTINUED | OUTPATIENT
Start: 2018-08-17 | End: 2018-08-17 | Stop reason: SURG

## 2018-08-17 RX ORDER — SODIUM CHLORIDE 0.9 % (FLUSH) 0.9 %
1-10 SYRINGE (ML) INJECTION AS NEEDED
Status: DISCONTINUED | OUTPATIENT
Start: 2018-08-17 | End: 2018-08-17 | Stop reason: HOSPADM

## 2018-08-17 RX ORDER — DEXTROSE, SODIUM CHLORIDE, SODIUM LACTATE, POTASSIUM CHLORIDE, AND CALCIUM CHLORIDE 5; .6; .31; .03; .02 G/100ML; G/100ML; G/100ML; G/100ML; G/100ML
20 INJECTION, SOLUTION INTRAVENOUS CONTINUOUS
Status: DISCONTINUED | OUTPATIENT
Start: 2018-08-17 | End: 2018-08-17 | Stop reason: HOSPADM

## 2018-08-17 RX ORDER — IPRATROPIUM BROMIDE AND ALBUTEROL SULFATE 2.5; .5 MG/3ML; MG/3ML
3 SOLUTION RESPIRATORY (INHALATION) ONCE AS NEEDED
Status: DISCONTINUED | OUTPATIENT
Start: 2018-08-17 | End: 2018-08-17 | Stop reason: HOSPADM

## 2018-08-17 RX ORDER — OXYCODONE HYDROCHLORIDE AND ACETAMINOPHEN 5; 325 MG/1; MG/1
1 TABLET ORAL ONCE AS NEEDED
Status: DISCONTINUED | OUTPATIENT
Start: 2018-08-17 | End: 2018-08-17 | Stop reason: HOSPADM

## 2018-08-17 RX ORDER — MIDAZOLAM HYDROCHLORIDE 1 MG/ML
INJECTION INTRAMUSCULAR; INTRAVENOUS AS NEEDED
Status: DISCONTINUED | OUTPATIENT
Start: 2018-08-17 | End: 2018-08-17 | Stop reason: SURG

## 2018-08-17 RX ORDER — MEPERIDINE HYDROCHLORIDE 50 MG/ML
12.5 INJECTION INTRAMUSCULAR; INTRAVENOUS; SUBCUTANEOUS
Status: DISCONTINUED | OUTPATIENT
Start: 2018-08-17 | End: 2018-08-17 | Stop reason: HOSPADM

## 2018-08-17 RX ORDER — FENTANYL CITRATE 50 UG/ML
50 INJECTION, SOLUTION INTRAMUSCULAR; INTRAVENOUS
Status: DISCONTINUED | OUTPATIENT
Start: 2018-08-17 | End: 2018-08-17 | Stop reason: HOSPADM

## 2018-08-17 RX ORDER — SODIUM CHLORIDE, SODIUM LACTATE, POTASSIUM CHLORIDE, CALCIUM CHLORIDE 600; 310; 30; 20 MG/100ML; MG/100ML; MG/100ML; MG/100ML
125 INJECTION, SOLUTION INTRAVENOUS CONTINUOUS
Status: DISCONTINUED | OUTPATIENT
Start: 2018-08-17 | End: 2018-08-17 | Stop reason: HOSPADM

## 2018-08-17 RX ORDER — PROPOFOL 10 MG/ML
VIAL (ML) INTRAVENOUS AS NEEDED
Status: DISCONTINUED | OUTPATIENT
Start: 2018-08-17 | End: 2018-08-17 | Stop reason: SURG

## 2018-08-17 RX ADMIN — PROPOFOL 20 MG: 10 INJECTION, EMULSION INTRAVENOUS at 10:30

## 2018-08-17 RX ADMIN — LIDOCAINE HYDROCHLORIDE 40 MG: 20 INJECTION, SOLUTION EPIDURAL; INFILTRATION; INTRACAUDAL; PERINEURAL at 10:26

## 2018-08-17 RX ADMIN — PROPOFOL 50 MG: 10 INJECTION, EMULSION INTRAVENOUS at 10:26

## 2018-08-17 RX ADMIN — MIDAZOLAM HYDROCHLORIDE 2 MG: 1 INJECTION, SOLUTION INTRAMUSCULAR; INTRAVENOUS at 10:22

## 2018-08-17 RX ADMIN — PROPOFOL 20 MG: 10 INJECTION, EMULSION INTRAVENOUS at 10:35

## 2018-08-17 RX ADMIN — SODIUM CHLORIDE, POTASSIUM CHLORIDE, SODIUM LACTATE AND CALCIUM CHLORIDE: 600; 310; 30; 20 INJECTION, SOLUTION INTRAVENOUS at 10:22

## 2018-08-17 RX ADMIN — SODIUM CHLORIDE, SODIUM LACTATE, POTASSIUM CHLORIDE, CALCIUM CHLORIDE AND DEXTROSE MONOHYDRATE 20 ML/HR: 5; 600; 310; 30; 20 INJECTION, SOLUTION INTRAVENOUS at 09:11

## 2018-08-17 NOTE — H&P (VIEW-ONLY)
Chief Complaint   Patient presents with   • Abdominal Pain   • Nausea   • Vomiting   • Weight Loss       Fidencio Luciano is a 72 y.o. male who presents to the office today for follow up appointment for Abdominal Pain; Nausea; Vomiting; and Weight Loss    HPI  He states that he is worried about his continued weight loss. One year ago reports weight was 185 lbs, lost 10 lbs since Aug 2017. He has poor appetite, has bloating and nausea after eating. Started vomiting last night. He was previously seeing hematologist which has recommended EGD and colonoscopy for evaluation of anemia. He was taking Protonix 40 mg BID previously but has been out of this medication for the past 2 days.     7/25/2018 labs:  Hemoglobin 14.0 - 18.0 g/dL 10.8     Hematocrit 42.0 - 52.0 % 33.4     MCV 80.0 - 94.0 fL 85.9      7/17/2018:  Iron 53 - 167 mcg/dL 41     TIBC 241 - 421 mcg/dL 445     Iron Saturation 20 - 50 % 9       3/14/2018 Abdominal film  IMPRESSION:  1. Moderate stool in the colon.  2. Lung bases are clear.  3. No evidence of bowel obstruction.  4. Arthritic change in the spine.     Last colonoscopy was 2015 by Dr. Martinez and diverticulosis of the sigmoid colon and internal hemorrhoids were noted. EGD in 2012 showed esophagitis.     Review of Systems   Constitutional: Positive for fatigue.   HENT: Positive for trouble swallowing.    Eyes: Positive for visual disturbance.   Respiratory: Negative.    Cardiovascular: Negative for chest pain.   Gastrointestinal: Positive for abdominal distention, abdominal pain, anal bleeding, blood in stool, constipation, diarrhea, nausea, rectal pain and vomiting.   Endocrine: Positive for cold intolerance and heat intolerance.   Genitourinary: Positive for difficulty urinating.   Musculoskeletal: Positive for arthralgias, back pain and myalgias.   Skin: Positive for wound.   Allergic/Immunologic: Negative for food allergies.   Neurological: Positive for dizziness and light-headedness.   Hematological:  Bruises/bleeds easily.   Psychiatric/Behavioral: Positive for sleep disturbance. The patient is nervous/anxious.      Past Medical History:   Diagnosis Date   • BPH (benign prostatic hyperplasia)    • Bradycardia     Positive tilt table testing 07/2016   • Coronary artery disease    • Diabetes mellitus (CMS/HCC)    • Diverticulosis    • Gastric ulcer with hemorrhage    • Hyperlipidemia    • Hypertension    • Psoriasis      Past Surgical History:   Procedure Laterality Date   • BACK SURGERY     • CARDIAC CATHETERIZATION N/A 9/20/2016    Procedure: Left Heart Cath;  Surgeon: Giovanni Buenrostro MD;  Location:  [facility] CATH INVASIVE LOCATION;  Service:    • CARDIAC CATHETERIZATION N/A 10/4/2016    Procedure: Left Heart Cath;  Surgeon: Bharathi Andrew MD;  Location:  [facility] CATH INVASIVE LOCATION;  Service:    • CARDIAC CATHETERIZATION N/A 5/9/2017    Procedure: Left Heart Cath;  Surgeon: Giovanni Buenrostro MD;  Location:  [facility] CATH INVASIVE LOCATION;  Service:    • CARDIAC CATHETERIZATION N/A 5/9/2017    Procedure: ERR;  Surgeon: Giovanni Buenrostro MD;  Location: [facility] CATH INVASIVE LOCATION;  Service:    • CHOLECYSTECTOMY     • COLONOSCOPY  11/13/2015    Dr. Martinez- internal hemorrhoids, sigmoid diverticulosis   • CORONARY ANGIOPLASTY WITH STENT PLACEMENT     • MD RT/LT HEART CATHETERS N/A 5/9/2017    Procedure: Percutaneous Coronary Intervention;  Surgeon: Lincoln De Los Santos MD;  Location:  [facility] CATH INVASIVE LOCATION;  Service: Cardiology   • STOMACH SURGERY      FUSION OF BLEEDING ULCER   • UPPER GASTROINTESTINAL ENDOSCOPY  11/13/2015    Dr. Martinez- mild gastritis     Family History   Problem Relation Age of Onset   • Sick sinus syndrome Mother    • Heart failure Mother    • Diabetes Mother    • Liver cancer Father      Social History   Substance Use Topics   • Smoking status: Former Smoker     Packs/day: 3.00     Years: 40.00     Types: Cigarettes     Quit date: 1982   • Smokeless tobacco: Former User     Quit date: 1/16/1986   "  • Alcohol use No       CURRENT MEDICATION:  •  aspirin  MG EC tablet, Take 1 tablet by mouth Daily. (Patient taking differently: Take 81 mg by mouth Daily.), Disp: 30 tablet, Rfl: 5  •  atorvastatin (LIPITOR) 80 MG tablet, Take 1 tablet by mouth Every Night., Disp: 90 tablet, Rfl: 5  •  clopidogrel (PLAVIX) 75 MG tablet, Take 1 tablet by mouth Daily., Disp: 30 tablet, Rfl: 4  •  cyclobenzaprine (FLEXERIL) 5 MG tablet, Take 1 tablet by mouth 3 (Three) Times a Day As Needed for Muscle Spasms., Disp: 30 tablet, Rfl: 0  •  donepezil (ARICEPT) 10 MG tablet, Take 10 mg by mouth Every Night., Disp: , Rfl:   •  ferrous sulfate 325 (65 FE) MG tablet, Take 1 tablet by mouth 3 (Three) Times a Day With Meals., Disp: 90 tablet, Rfl: 5  •  finasteride (PROSCAR) 5 MG tablet, Take 1 tablet by mouth Daily., Disp: 90 tablet, Rfl: 3  •  isosorbide mononitrate (IMDUR) 60 MG 24 hr tablet, Take 1 tablet by mouth Every Morning., Disp: 30 tablet, Rfl: 3  •  NITROSTAT 0.4 MG SL tablet, PLACE 1 TABLET UNDER THE TONGUE EVERY FIVE MINUTES AS NEEDED FOR CHEST PAIN FOR UP TO 3 DOSES, Disp: 25 tablet, Rfl: 1  •  pantoprazole (PROTONIX) 40 MG EC tablet, Take 1 tablet by mouth 2 (Two) Times a Day Before Meals., Disp: 60 tablet, Rfl: 3  •  ranolazine (RANEXA) 1000 MG 12 hr tablet, Take 1 tablet by mouth Every 12 (Twelve) Hours., Disp: 60 tablet, Rfl: 5  •  lisinopril (PRINIVIL,ZESTRIL) 5 MG tablet, Take 1 tablet by mouth Daily., Disp: 90 tablet, Rfl: 5  No current facility-administered medications for this visit.     ALLERGIES:  Patient has no known allergies.    VISIT VITALS:  /53   Pulse 61   Ht 180.3 cm (71\")   Wt 74.2 kg (163 lb 9.6 oz)   SpO2 96%   BMI 22.82 kg/m²     Physical Exam   Constitutional: He is oriented to person, place, and time. He appears well-developed and well-nourished. No distress.   HENT:   Head: Normocephalic and atraumatic.   Nose: Nose normal.   Mouth/Throat: Oropharynx is clear and moist.   Eyes: " Conjunctivae are normal. Right eye exhibits no discharge. Left eye exhibits no discharge. No scleral icterus.   Neck: Normal range of motion. No JVD present.   Cardiovascular: Normal rate, regular rhythm and normal heart sounds.  Exam reveals no gallop and no friction rub.    No murmur heard.  Pulmonary/Chest: Effort normal and breath sounds normal. No respiratory distress. He has no wheezes. He has no rales. He exhibits no tenderness.   Abdominal: Soft. Bowel sounds are normal. He exhibits no mass. There is tenderness (epigastric and periumbilical).   Musculoskeletal: Normal range of motion. He exhibits no edema or deformity.   Neurological: He is alert and oriented to person, place, and time. Coordination normal.   Skin: Skin is warm and dry. No rash noted. He is not diaphoretic. No erythema.   Psychiatric: He has a normal mood and affect. His behavior is normal. Judgment and thought content normal.   Vitals reviewed.      Assessment/Plan     1. Iron deficiency    2. Normocytic anemia    3. History of gastric ulcer    4. Intractable vomiting with nausea, unspecified vomiting type    5. Weight loss, abnormal      Orders Placed This Encounter   Procedures   • Follow Anesthesia Guidelines / Standing Orders     ESOPHAGOGASTRODUODENOSCOPY WITH BIOPSY CPT CODE: 72855 (N/A), COLONOSCOPY CPT CODE: 57256 (N/A)  He will need an esophagogastroduodenoscopy and colonoscopy performed with IV general sedation. All of the risks, benefits and alternatives of these procedures have been discussed with him, all of his questions have been answered and he has elected to proceed. He should follow up in the office after these procedures to discuss the results and further recommendations can be made at that time.    New Medications Ordered This Visit   Medications   • polyethylene glycol (GoLYTELY) 236 g solution     Sig: Starting at 6pm the day before procedure, drink 8 ounces every 30 minutes until all gone or stools are clear. May add  flavor packet.     Dispense:  4000 mL     Refill:  0     He will need cardiac clearance prior to scheduling these procedures due to extensive cardiac history.         Return for follow up after procedure to discuss results.      Electronically signed 8/13/2018 11:15 AM  Hiral Fierro PA-C, [facility]

## 2018-08-17 NOTE — ANESTHESIA POSTPROCEDURE EVALUATION
Patient: Fidencio Luciano    Procedure Summary     Date:  08/17/18 Room / Location:  [facility] OR [address] [place] [address] COR OR    Anesthesia Start:  1022 Anesthesia Stop:  1056    Procedures:       ESOPHAGOGASTRODUODENOSCOPY WITH BIOPSY CPT CODE: 34004 (N/A )      COLONOSCOPY CPT CODE: 89855 (N/A ) Diagnosis:       Iron deficiency      Normocytic anemia      Weight loss, abnormal      History of gastric ulcer      Intractable vomiting with nausea, unspecified vomiting type      (Iron deficiency [E61.1])      (Normocytic anemia [D64.9])      (Weight loss, abnormal [R63.4])      (History of gastric ulcer [Z87.19])      (Intractable vomiting with nausea, unspecified vomiting type [R11.2])    Surgeon:  Gigi Geronimo MD Provider:  Chris Connelly DO    Anesthesia Type:  general ASA Status:  3          Anesthesia Type: general  Last vitals  BP   164/58 (08/17/18 0854)   Temp   97.8 °F (36.6 °C) (08/17/18 0854)   Pulse   (!) 48 (08/17/18 0854)   Resp   20 (08/17/18 0854)     SpO2   96 % (08/17/18 0854)     Post Anesthesia Care and Evaluation    Patient location during evaluation: PHASE II  Patient participation: complete - patient participated  Level of consciousness: awake and alert  Pain score: 1  Pain management: adequate  Airway patency: patent  Anesthetic complications: No anesthetic complications  PONV Status: controlled  Cardiovascular status: acceptable  Respiratory status: acceptable and room air  Hydration status: euvolemic  No anesthesia care post op

## 2018-08-17 NOTE — OP NOTE
ESOPHAGOGASTRODUODENOSCOPY WITH BIOPSY, COLONOSCOPY  Procedure Note    Fidencio Luciano  8/17/2018    Pre-op Diagnosis:   Iron deficiency [E61.1]  Normocytic anemia [D64.9]  Weight loss, abnormal [R63.4]  History of gastric ulcer [Z87.19]  Intractable vomiting with nausea, unspecified vomiting type [R11.2]    Post-op Diagnosis:   See above, sliding hiatal hernia, a meticulous is without diverticulitis    Indications: See above    Procedure(s):  ESOPHAGOGASTRODUODENOSCOPY  COLONOSCOPY     Surgeon(s):  Gigi Geronimo MD    Anesthesia: General    Staff:   Circulator: Tenisha Ye RN  Endo Technician: Shilo Parr    Findings: Sliding hiatal hernia, diverticulosis without diverticulitis    Operative Procedure: The patient was taken operating suite and placed in left lateral decubitus position.  Bilateral sequential compression devices were in place and IV anesthesia was administered.  Timeout procedure was performed.  The endoscope was inserted into the oropharynx and the esophagus was intubated.  The scope was advanced to the third portion of the duodenum.  The scope was slowly withdrawn evaluating all mucosal areas.  The duodenum and stomach were within normal limits.  Retroflexion showed a small hiatal hernia.  The GE junction was within normal limits.  The remainder of the esophageal mucosa was within normal limits.  Digital rectal examination was then performed.  The patient had an enlarged prostate consistent with his diagnosis of BPH.  The colonoscope was then inserted and advanced to the cecum as evidenced by the ileocecal valve and the appendiceal orifice.  The patient had minimal liquid stool but quite a bit of gas that required simethicone addition to the irrigation.  The scope was slowly withdrawn and there were no abnormalities noted apart from diverticulosis without diverticulitis of the sigmoid colon.  The scope was removed and the patient was awakened from anesthesia and taken recovery.  No  definitive source of the patient's anemia and weight loss was identified.    Estimated Blood Loss: minimal    Specimens:   None                 Drains: none    Grafts or Implants: none    Complications: none    Recommendations: Screening colonoscopy in 10 years    Gigi Geronimo MD     Date: 8/17/2018  Time: 11:03 AM

## 2018-08-17 NOTE — ANESTHESIA PREPROCEDURE EVALUATION
Anesthesia Evaluation     Patient summary reviewed and Nursing notes reviewed   no history of anesthetic complications:  NPO Solid Status: > 8 hours  NPO Liquid Status: > 8 hours           Airway   Mallampati: II  TM distance: >3 FB  Neck ROM: full  No difficulty expected  Dental - normal exam     Pulmonary - negative pulmonary ROS and normal exam    breath sounds clear to auscultation  Cardiovascular - normal exam    ECG reviewed  PT is on anticoagulation therapy  Rhythm: regular  Rate: normal    (+) hypertension, CAD, angina, hyperlipidemia,       Neuro/Psych  (+) dizziness/light headedness, psychiatric history,     GI/Hepatic/Renal/Endo    (+)  GI bleeding, diabetes mellitus,     Musculoskeletal (-) negative ROS    Abdominal  - normal exam    Bowel sounds: normal.   Substance History - negative use     OB/GYN negative ob/gyn ROS         Other   (+) blood dyscrasia (anemia)                     Anesthesia Plan    ASA 3     general   total IV anesthesia  intravenous induction   Anesthetic plan and risks discussed with patient.    Plan discussed with CRNA.

## 2018-08-23 RX ORDER — LISINOPRIL 5 MG/1
5 TABLET ORAL DAILY
Qty: 90 TABLET | Refills: 5 | Status: SHIPPED | OUTPATIENT
Start: 2018-08-23 | End: 2018-10-03 | Stop reason: SDUPTHER

## 2018-08-24 DIAGNOSIS — K21.9 GASTROESOPHAGEAL REFLUX DISEASE, ESOPHAGITIS PRESENCE NOT SPECIFIED: ICD-10-CM

## 2018-08-24 DIAGNOSIS — R13.10 DYSPHAGIA, UNSPECIFIED TYPE: ICD-10-CM

## 2018-08-24 RX ORDER — PANTOPRAZOLE SODIUM 40 MG/1
TABLET, DELAYED RELEASE ORAL
Qty: 60 TABLET | Refills: 3 | OUTPATIENT
Start: 2018-08-24

## 2018-08-27 RX ORDER — PANTOPRAZOLE SODIUM 40 MG/1
40 TABLET, DELAYED RELEASE ORAL
Qty: 60 TABLET | Refills: 3 | Status: SHIPPED | OUTPATIENT
Start: 2018-08-27 | End: 2018-10-25 | Stop reason: SDUPTHER

## 2018-08-28 ENCOUNTER — OFFICE VISIT (OUTPATIENT)
Dept: ONCOLOGY | Facility: CLINIC | Age: 73
End: 2018-08-28

## 2018-08-28 ENCOUNTER — LAB (OUTPATIENT)
Dept: ONCOLOGY | Facility: CLINIC | Age: 73
End: 2018-08-28

## 2018-08-28 VITALS
OXYGEN SATURATION: 98 % | WEIGHT: 163.5 LBS | HEART RATE: 54 BPM | SYSTOLIC BLOOD PRESSURE: 132 MMHG | DIASTOLIC BLOOD PRESSURE: 51 MMHG | TEMPERATURE: 96.8 F | RESPIRATION RATE: 20 BRPM | BODY MASS INDEX: 22.8 KG/M2

## 2018-08-28 DIAGNOSIS — D50.9 IRON DEFICIENCY ANEMIA, UNSPECIFIED IRON DEFICIENCY ANEMIA TYPE: ICD-10-CM

## 2018-08-28 DIAGNOSIS — D50.9 IRON DEFICIENCY ANEMIA, UNSPECIFIED IRON DEFICIENCY ANEMIA TYPE: Primary | ICD-10-CM

## 2018-08-28 PROCEDURE — 80053 COMPREHEN METABOLIC PANEL: CPT | Performed by: NURSE PRACTITIONER

## 2018-08-28 PROCEDURE — 83540 ASSAY OF IRON: CPT | Performed by: NURSE PRACTITIONER

## 2018-08-28 PROCEDURE — 99214 OFFICE O/P EST MOD 30 MIN: CPT | Performed by: NURSE PRACTITIONER

## 2018-08-28 PROCEDURE — 82728 ASSAY OF FERRITIN: CPT | Performed by: NURSE PRACTITIONER

## 2018-08-28 PROCEDURE — 85025 COMPLETE CBC W/AUTO DIFF WBC: CPT | Performed by: NURSE PRACTITIONER

## 2018-08-28 PROCEDURE — 83550 IRON BINDING TEST: CPT | Performed by: NURSE PRACTITIONER

## 2018-08-28 NOTE — PROGRESS NOTES
DATE:  8/28/2018    REASON FOR FOLLOW UP: ERROL    REFERRING PHYSICIAN:  Gideon Charles MD    CHIEF COMPLAINT:  Follow up of ERROL  chronic fatigue    HISTORY OF PRESENT ILLNESS:   Fidencio Luciano is a very pleasant 72 y.o. male who is being seen today at the request of No ref. provider found for evaluation and treatment of Anemia. Mr. Luciano reports following with his PCP routinely with repeat labs every 4-6 months. He says he has had chronic anemia which was recently worsening in June. Previous available CBCs were reviewed and patient has had normocytic anemia with Hg ranging 11-13 g/dL since July 2015. CBC from June 2018 showed drop in Hg to 10.0. Patient's WBC and platelets have been in the normal range. Patient reports intermittent dark stool and bleeding per rectum for the past few months. He reports having upper/lower endoscopy approximately 3 years ago with Dr. Martinez which was negative for active bleeding. He says his PCP has referred him to Dr. Unger and he will see him next week. Patient denies any recent blood transfusions. His main complaint today is ongoing fatigue which has been recently worsening. He also reports decreased appetite and weight loss but he is unsure how much weight he has lost. Otherwise, he denies any other specific complaints.     Interval History:  Mr. Luciano presents today for follow up of ERROL. Since his last visit, he reports he has been doing well. He has been taking oral iron therapy, ferrous sulfate TID which he has been tolerating well without any side effects. He followed up with GI and he underwent upper/lower endoscopy on 8/17/18 which showed no evidence of bleeding. He complains of ongoing fatigue but denies any worsening. He has not had any obvious blood loss from any source. He reports he has been eating and drinking better lately. He says he was worried about having a repeat endoscopy and was happy to know everything turned out good. He denies any other complaints today.     PAST  MEDICAL HISTORY:  Past Medical History:   Diagnosis Date   • BPH (benign prostatic hyperplasia)    • Bradycardia     Positive tilt table testing 07/2016   • Coronary artery disease    • Diabetes mellitus (CMS/HCC)    • Diverticulosis    • Gastric ulcer with hemorrhage    • History of transfusion    • Hyperlipidemia    • Hypertension    • Psoriasis        PAST SURGICAL HISTORY:  Past Surgical History:   Procedure Laterality Date   • BACK SURGERY     • CARDIAC CATHETERIZATION N/A 9/20/2016    Procedure: Left Heart Cath;  Surgeon: Giovanni Buenrostro MD;  Location:  [facility];  Service:    • CARDIAC CATHETERIZATION N/A 10/4/2016    Procedure: Left Heart Cath;  Surgeon: Bharathi Andrew MD;  Location:  [facility];  Service:    • CARDIAC CATHETERIZATION N/A 5/9/2017    Procedure: Left Heart Cath;  Surgeon: Giovanni Buenrostro MD;  Location:  [facility];  Service:    • CARDIAC CATHETERIZATION N/A 5/9/2017    Procedure: ERR;  Surgeon: Giovanni Buenrostro MD;  Location:  [facility];  Service:    • CHOLECYSTECTOMY     • COLONOSCOPY  11/13/2015    Dr. Martinez- internal hemorrhoids, sigmoid diverticulosis   • COLONOSCOPY N/A 8/17/2018    Procedure: COLONOSCOPY CPT CODE: 97305;  Surgeon: Gigi Geronimo MD;  Location: [facility] OR;  Service: Gastroenterology   • CORONARY ANGIOPLASTY WITH STENT PLACEMENT     • ENDOSCOPY N/A 8/17/2018    Procedure: ESOPHAGOGASTRODUODENOSCOPY WITH BIOPSY CPT CODE: 14270;  Surgeon: Gigi Geronimo MD;  Location: [facility] OR;  Service: Gastroenterology   • NJ RT/LT HEART CATHETERS N/A 5/9/2017    Procedure: Percutaneous Coronary Intervention;  Surgeon: Lincoln De Los Santos MD;  Location: [facility] CATH INVASIVE LOCATION;  Service: Cardiology   • STOMACH SURGERY      FUSION OF BLEEDING ULCER   • UPPER GASTROINTESTINAL ENDOSCOPY  11/13/2015    Dr. Martinez- mild gastritis       FAMILY HISTORY:  Family History   Problem Relation Age of Onset   •  Sick sinus syndrome Mother    • Heart failure Mother    • Diabetes Mother    • Liver cancer Father        SOCIAL HISTORY:  Social History     Social History   • Marital status:      Spouse name: N/A   • Number of children: N/A   • Years of education: N/A     Occupational History   • Not on file.     Social History Main Topics   • Smoking status: Former Smoker     Packs/day: 3.00     Years: 40.00     Types: Cigarettes     Quit date: 1982   • Smokeless tobacco: Former User     Quit date: 1/16/1986   • Alcohol use No   • Drug use: No   • Sexual activity: Defer     Other Topics Concern   • Not on file     Social History Narrative   • No narrative on file       MEDICATIONS:  The current medication list was reviewed in the EMR    Current Outpatient Prescriptions:   •  aspirin 81 MG tablet, Take 81 mg by mouth Daily., Disp: , Rfl:   •  atorvastatin (LIPITOR) 80 MG tablet, Take 1 tablet by mouth Every Night., Disp: 90 tablet, Rfl: 5  •  clopidogrel (PLAVIX) 75 MG tablet, Take 1 tablet by mouth Daily., Disp: 30 tablet, Rfl: 4  •  cyclobenzaprine (FLEXERIL) 5 MG tablet, Take 1 tablet by mouth 3 (Three) Times a Day As Needed for Muscle Spasms., Disp: 30 tablet, Rfl: 0  •  donepezil (ARICEPT) 10 MG tablet, Take 10 mg by mouth Every Night., Disp: , Rfl:   •  ferrous sulfate 325 (65 FE) MG tablet, Take 1 tablet by mouth 3 (Three) Times a Day With Meals., Disp: 90 tablet, Rfl: 5  •  finasteride (PROSCAR) 5 MG tablet, Take 1 tablet by mouth Daily., Disp: 90 tablet, Rfl: 3  •  isosorbide mononitrate (IMDUR) 60 MG 24 hr tablet, Take 1 tablet by mouth Every Morning., Disp: 30 tablet, Rfl: 3  •  lisinopril (PRINIVIL,ZESTRIL) 5 MG tablet, Take 1 tablet by mouth Daily., Disp: 90 tablet, Rfl: 5  •  NITROSTAT 0.4 MG SL tablet, PLACE 1 TABLET UNDER THE TONGUE EVERY FIVE MINUTES AS NEEDED FOR CHEST PAIN FOR UP TO 3 DOSES, Disp: 25 tablet, Rfl: 1  •  pantoprazole (PROTONIX) 40 MG EC tablet, Take 1 tablet by mouth 2 (Two) Times a Day  Before Meals., Disp: 60 tablet, Rfl: 3  •  polyethylene glycol (GoLYTELY) 236 g solution, Starting at 6pm the day before procedure, drink 8 ounces every 30 minutes until all gone or stools are clear. May add flavor packet., Disp: 4000 mL, Rfl: 0  •  ranolazine (RANEXA) 1000 MG 12 hr tablet, Take 1 tablet by mouth Every 12 (Twelve) Hours., Disp: 60 tablet, Rfl: 5    ALLERGIES:  No Known Allergies    REVIEW OF SYSTEMS:    A comprehensive 14 point review of systems was performed.  Significant findings as mentioned above.  All other systems reviewed and are negative.      Physical Exam   Vital Signs: /51   Pulse 54   Temp 96.8 °F (36 °C) (Oral)   Resp 20   Wt 74.2 kg (163 lb 8 oz)   SpO2 98%   BMI 22.80 kg/m²    General: Well developed, well nourished, alert and oriented x 3, in no acute distress.   Head: ATNC   Eyes: PERRL, No evidence of conjunctivitis.   Nose: No nasal discharge.   Mouth: Oral mucosal membranes moist. No oral ulceration or hemorrhages.   Neck: Neck supple. No thyromegaly. No JVD.   Lungs: Clear in all fields to A&P without rales, rhonchi or wheezing.   Heart: S1, S2. No murmurs, rubs, or gallops.   Abdomen: Soft. Bowel sounds are normoactive. Nontender with palpation. No Hepatosplenomegaly can be appreciated.   Extremities: No cyanosis or edema. Peripheral pulses palpable and equal bilaterally.   Lymph Nodes: No palpable cervical, submandibular, supraclavicular, axillary lymphadenopathy noted.  Neurologic: Grossly non-focal exam.    ENDOSCOPY:  EGD/Colonoscopy 08/17/18  Findings: Sliding hiatal hernia, diverticulosis without diverticulitis  Recommendations: Screening colonoscopy in 10 years    RECENT LABS:  Lab Results   Component Value Date    WBC 6.57 08/28/2018    HGB 10.4 (L) 08/28/2018    HCT 31.4 (L) 08/28/2018    MCV 86.0 08/28/2018    RDW 17.3 (H) 08/28/2018     08/28/2018    NEUTRORELPCT 62.4 08/28/2018    LYMPHORELPCT 20.4 08/28/2018    MONORELPCT 9.6 08/28/2018     EOSRELPCT 6.8 08/28/2018    BASORELPCT 0.8 08/28/2018    NEUTROABS 4.10 08/28/2018    LYMPHSABS 1.34 08/28/2018       Lab Results   Component Value Date     08/28/2018    K 3.5 08/28/2018    CO2 24.5 08/28/2018     08/28/2018    BUN 16 08/28/2018    CREATININE 0.78 08/28/2018    EGFRIFNONA 98 08/28/2018    EGFRIFAFRI  09/19/2016      Comment:      <15 Indicative of kidney failure.    GLUCOSE 149 (H) 08/28/2018    CALCIUM 8.5 08/28/2018    ALKPHOS 82 08/28/2018    AST 25 08/28/2018    ALT 22 08/28/2018    BILITOT 0.5 08/28/2018    ALBUMIN 3.80 08/28/2018    PROTEINTOT 6.3 08/28/2018    MG 2.3 07/25/2018       Lab Results   Component Value Date/Time     07/17/2018 03:03 PM     Lab Results   Component Value Date    FERRITIN 23.00 08/28/2018    IRON 115 08/28/2018    TIBC 357 08/28/2018    LABIRON 32 08/28/2018    ASYUBIJX45 932 (H) 07/17/2018    FOLATE 18.87 07/17/2018    HAPTOGLOBIN 112 07/17/2018    RETICCTPCT 0.66 07/17/2018    RETIC 0.0242 07/17/2018     PBS 07/17/18  Hypochromic normocytic anemia with eosinophilia    Labs 07/17/17  C-Reactive Protein 0.00 - 0.99 mg/dL <0.50      Sed Rate 0 - 20 mm/hr 10       - 225 U/L 163      Iron 53 - 167 mcg/dL 41     TIBC 241 - 421 mcg/dL 445     Iron Saturation 20 - 50 % 9       Ferritin 21.90 - 321.70 ng/mL 11.00       Vitamin B-12 211 - 911 pg/mL 932       Folate 5.40 - 20.00 ng/mL 18.87      ASSESSMENT & PLAN:  Fidencio Luciano is a very pleasant 72 y.o. male with    1.  Normocytic anemia  2. ERROL    -Noted on routine blood testing with PCP since July 2015 with Hg ranging 11-13 g/dL, recently worsening in June (Hg 10).  WBC and platelets have been in the normal range.  -Reported intermittent dark stool and bleeding per rectum for the past few months. Reported upper/lower endoscopy approximately 3 years ago with Dr. Martinez which was negative for active bleeding.    -Obtained additional workup including repeat CBC which showed stable H/H (Hg 10.2), WBC and  platelets were again in the normal range. CMP unremarkable. PBS showed hypochromic normocytic anemia with eosinophilia as above. B12 and Folate are replete. CRP and ESR were normal. Retic, LDH and Haptoglobin were normal, not suggestive of hemolysis. Iron studies were consistent with Iron deficiency. Therefore, started patient on oral iron therapy, ferrous sulfate TID which he has been tolerating well.   -PCP referred him back to GI and underwent upper/lower endoscopy on 08/17/18 which showed no evidence of bleeding.   -Repeat CBC from today again showed normocytic anemia with stable Hg (10.4). Eosinophila previously noted on PBS above is now resolved on repeat CBC today. Iron studies and ferritin improved and now replete. Advised patient to continue oral iron as he is on. We will follow up again in 3 months with repeat labs.     ACO Quality measures  - Fidencio Luciano does not use tobacco products.  - Patient's Body mass index is 22.8 kg/m². BMI is within normal parameters. No follow-up required.  - Current outpatient and discharge medications have been reconciled for the patient.  Reviewed by: ANDREW Dawson    The patient was in agreement with the plan and all questions were answered to his satisfaction.     Thank you so much for allowing us to participate in the care of Fidencio Luciano . Please do not hesitate to contact us with any questions or concerns.     I spent 25 minutes with Fidencio Luciano today. More than 50% of the time was spent in counseling / coordination of care for the above problems.      Electronically Signed by: ANDREW Dawson , August 28, 2018 3:12 PM       CC:   No ref. provider found  Gideon Charles MD

## 2018-09-04 ENCOUNTER — OFFICE VISIT (OUTPATIENT)
Dept: SURGERY | Facility: CLINIC | Age: 73
End: 2018-09-04

## 2018-09-04 VITALS — HEIGHT: 71 IN | BODY MASS INDEX: 22.82 KG/M2 | WEIGHT: 163 LBS

## 2018-09-04 DIAGNOSIS — D50.9 IRON DEFICIENCY ANEMIA, UNSPECIFIED IRON DEFICIENCY ANEMIA TYPE: ICD-10-CM

## 2018-09-04 DIAGNOSIS — Z87.11 HISTORY OF GASTRIC ULCER: Primary | ICD-10-CM

## 2018-09-04 PROCEDURE — 99212 OFFICE O/P EST SF 10 MIN: CPT | Performed by: SURGERY

## 2018-09-04 NOTE — PROGRESS NOTES
Subjective   Fidencio Luciano is a 72 y.o. male  is here today for follow-up.         Fidencio Luciano is a 72 y.o. male here for follow up after EGD and colonoscopy.  Findings normal.  His bloating persists.  No further weight loss.  Hiatal hernia present but no signs or symptoms of reflux related symptoms.  Patient without complaints at this time apart from bloating.  On examination his abdomen is soft and nontender.      Fidencio was seen today for s/p colonoscopy & egd.    Diagnoses and all orders for this visit:    History of gastric ulcer    Iron deficiency anemia, unspecified iron deficiency anemia type        Assessment     Fidencio Luciano is a 72 y.o. male s/p normal colonoscopy and EGD with asymptomatic hiatal hernia.  No source for weight loss or anemia identified.  Follow up PRN.

## 2018-09-27 ENCOUNTER — OFFICE VISIT (OUTPATIENT)
Dept: CARDIOLOGY | Facility: CLINIC | Age: 73
End: 2018-09-27

## 2018-09-27 VITALS
BODY MASS INDEX: 22.26 KG/M2 | RESPIRATION RATE: 16 BRPM | HEIGHT: 71 IN | WEIGHT: 159 LBS | SYSTOLIC BLOOD PRESSURE: 137 MMHG | DIASTOLIC BLOOD PRESSURE: 55 MMHG | HEART RATE: 55 BPM

## 2018-09-27 DIAGNOSIS — I25.41 CORONARY ARTERY FISTULA: ICD-10-CM

## 2018-09-27 DIAGNOSIS — I10 ESSENTIAL HYPERTENSION: ICD-10-CM

## 2018-09-27 DIAGNOSIS — I25.10 ASCVD (ARTERIOSCLEROTIC CARDIOVASCULAR DISEASE): Primary | ICD-10-CM

## 2018-09-27 DIAGNOSIS — E78.5 DYSLIPIDEMIA: ICD-10-CM

## 2018-09-27 DIAGNOSIS — I20.9 ANGINA, CLASS III (HCC): ICD-10-CM

## 2018-09-27 PROCEDURE — 99214 OFFICE O/P EST MOD 30 MIN: CPT | Performed by: PHYSICIAN ASSISTANT

## 2018-09-27 NOTE — PROGRESS NOTES
"Gideon Charles MD  Fidencio Luciano  1945  09/27/2018    Patient Active Problem List   Diagnosis   • Near syncope   • Symptomatic bradycardia   • Dizziness   • Palpitations   • Hypertension   • Diabetes mellitus (CMS/HCC)   • Benign prostatic hyperplasia   • Precordial pain   • Angina, class III (CMS/HCC)   • Abnormal stress test   • Abnormal findings on cardiac catheterization   • ASCVD (arteriosclerotic cardiovascular disease), s/p stenting of 80 % stenosis in left circumflex on 10/05/16and 60% stenosis in the LAD that was left alone.   • Unstable angina (CMS/HCC)   • Normal cardiac stress test 12/2016   • Dyslipidemia   • Weakness generalized   • Chronic fatigue   • Coronary artery fistula   • Weight loss, unintentional   • Iron deficiency anemia   • Iron deficiency   • Normocytic anemia   • Weight loss, abnormal   • History of gastric ulcer   • Intractable vomiting with nausea       Dear Gideon Charles MD:    Subjective       History of Present Illness:    Chief Complaint   Patient presents with   • Follow-up     3 mos   • Chest Pain     \"some\"   • Palpitations     improvement   • Med Management     list provided       Fidencio Luciano is a pleasant 72 y.o. male with a past medical history significant for ASCVD status post stenting on 5/9/2017 of the RCA, coronary artery fistula, dyslipidemia, diabetes mellitus type 2, essential hypertension, chronic precordial pain.  Patient comes in today for cardiology follow-up.    Patient comes in today saying that he's been doing well.  However he has been complaining of some chest pains although he has had chronic chest pains and these are not necessarily worsened recently.  He states when they come to have him every other day but then he will go sometimes a month and a half before getting another chest pain.  He states that the pains are like a pressure in the center of his chest that did not radiating work.  He rates them as 6 out of 10 on the pain scale.  He does not " believe they're related to exertion and states that typically only come on at night when he lays down for bed.  Patient reportedly was on 60 mg of Imdur however he was unable to tolerate due to headache and has been cutting it half to 30 mg he's been doing the same for his Ranexa during his thousand milligrams dose down to 500 mg due to cost.  He denies associated symptoms of shortness of breath or diaphoresis.    No Known Allergies:      Current Outpatient Prescriptions:   •  aspirin 81 MG tablet, Take 81 mg by mouth Daily., Disp: , Rfl:   •  atorvastatin (LIPITOR) 80 MG tablet, Take 1 tablet by mouth Every Night., Disp: 90 tablet, Rfl: 5  •  clopidogrel (PLAVIX) 75 MG tablet, Take 1 tablet by mouth Daily., Disp: 30 tablet, Rfl: 4  •  cyclobenzaprine (FLEXERIL) 5 MG tablet, Take 1 tablet by mouth 3 (Three) Times a Day As Needed for Muscle Spasms., Disp: 30 tablet, Rfl: 0  •  donepezil (ARICEPT) 10 MG tablet, Take 10 mg by mouth Every Night., Disp: , Rfl:   •  ferrous sulfate 325 (65 FE) MG tablet, Take 1 tablet by mouth 3 (Three) Times a Day With Meals., Disp: 90 tablet, Rfl: 5  •  finasteride (PROSCAR) 5 MG tablet, Take 1 tablet by mouth Daily., Disp: 90 tablet, Rfl: 3  •  isosorbide mononitrate (IMDUR) 60 MG 24 hr tablet, Take 1 tablet by mouth Every Morning., Disp: 30 tablet, Rfl: 3  •  lisinopril (PRINIVIL,ZESTRIL) 5 MG tablet, Take 1 tablet by mouth Daily., Disp: 90 tablet, Rfl: 5  •  NITROSTAT 0.4 MG SL tablet, PLACE 1 TABLET UNDER THE TONGUE EVERY FIVE MINUTES AS NEEDED FOR CHEST PAIN FOR UP TO 3 DOSES, Disp: 25 tablet, Rfl: 1  •  pantoprazole (PROTONIX) 40 MG EC tablet, Take 1 tablet by mouth 2 (Two) Times a Day Before Meals., Disp: 60 tablet, Rfl: 3  •  ranolazine (RANEXA) 1000 MG 12 hr tablet, Take 1 tablet by mouth Every 12 (Twelve) Hours., Disp: 60 tablet, Rfl: 5  •  polyethylene glycol (GoLYTELY) 236 g solution, Starting at 6pm the day before procedure, drink 8 ounces every 30 minutes until all gone or  "stools are clear. May add flavor packet., Disp: 4000 mL, Rfl: 0      The following portions of the patient's history were reviewed and updated as appropriate: allergies, current medications, past family history, past medical history, past social history, past surgical history and problem list.    Social History   Substance Use Topics   • Smoking status: Former Smoker     Packs/day: 3.00     Years: 40.00     Types: Cigarettes     Quit date: 1982   • Smokeless tobacco: Former User     Quit date: 1/16/1986   • Alcohol use No       Review of Systems   Constitution: Negative for weakness and malaise/fatigue.   Cardiovascular: Positive for chest pain. Negative for dyspnea on exertion and irregular heartbeat.   Respiratory: Negative for cough and shortness of breath.    Hematologic/Lymphatic: Negative for bleeding problem. Does not bruise/bleed easily.   Musculoskeletal: Negative for arthritis and back pain.       Objective   Vitals:    09/27/18 1404   BP: 137/55   Pulse: 55   Resp: 16   Weight: 72.1 kg (159 lb)   Height: 180.3 cm (71\")     Body mass index is 22.18 kg/m².  Heart catheterization on 5/9/2017  PROCEDURE PERFORMED:   1.  Percutaneous coronary intervention of mid RCA with drug-eluting stent  2.  Limited right femoral angiography  3.  Mynx closure of right femoral arteriotomy.  PROCEDURE COMMENTS:  Fidencio Luciano was brought to the cath lab and placed on the cardiac catherization table. Patient already had a 5 Tajik sheath placed in the right femoral artery by Dr. Buenrostro.  For details of coronary angiography procedure please see Dr. Buenrostro's note.       5 Tajik sheath was up sized to 6 Tajik sheath.  Angiomax was used for anticoagulation.  Right coronary artery was engaged using 6 Tajik JR4 guide catheter.  Angiography demonstrated 70% mid RCA lesion with NIKOLAY 3 flow at baseline.  A BMW guidewire was advanced and was used to cross the lesion without any difficulty.  The lesion was predilated with 2.5×15 mm trek " balloon. Subsequently a 3.0×18 Xience Alpine stent was deployed in the mid RCA.  Excellent angiographic result was obtained with 0% residual stenosis.  There was NIKOLAY 3 flow at the end of the procedure.  The guidewire was removed and final angiographic images were obtained which were satisfactory.  The guide catheter was also removed.     After completion of the procedure the femoral sheath was removed in the cath lab and hemostasis was achieved by placing a Mynx closure device. Patient tolerated the procedure with no complications.     Please see below for contrast volume, radiation dose and fluoroscopy time.     Final impression:  1.  Critical mid RCA stenosis status post successful PCI with drug-eluting stent.      RECOMMENDATIONS:  The patient had a good result from RCA intervention.  Recommend aspirin 81 mg daily indefinitely and clopidogrel 75 mg daily at least for 1 year.  The patient also has an LAD fistula.  If the symptoms persist despite of RCA intervention, consider closure of the fistula as that may be another cause of patient's symptoms.  The patient will be admitted overnight for observation to progressive care unit.    Physical Exam   Constitutional: He is oriented to person, place, and time. He appears well-developed and well-nourished. No distress.   Cardiovascular: Normal rate, regular rhythm and normal heart sounds.    No murmur heard.  Pulmonary/Chest: Effort normal and breath sounds normal.   Decreased breath sounds bilaterally   Musculoskeletal: He exhibits edema (1+ pedal edema bilaterally).   Neurological: He is alert and oriented to person, place, and time.   Skin: He is not diaphoretic.       Lab Results   Component Value Date     08/28/2018    K 3.5 08/28/2018     08/28/2018    CO2 24.5 08/28/2018    BUN 16 08/28/2018    CREATININE 0.78 08/28/2018    GLUCOSE 149 (H) 08/28/2018    CALCIUM 8.5 08/28/2018    AST 25 08/28/2018    ALT 22 08/28/2018    ALKPHOS 82 08/28/2018     LABIL2 1.4 (L) 01/08/2016     Lab Results   Component Value Date    CKTOTAL 79 10/05/2016     Lab Results   Component Value Date    WBC 6.57 08/28/2018    HGB 10.4 (L) 08/28/2018    HCT 31.4 (L) 08/28/2018     08/28/2018     Lab Results   Component Value Date    INR 1.06 10/03/2016    INR 1.01 07/16/2016     Lab Results   Component Value Date    MG 2.3 07/25/2018     Lab Results   Component Value Date    TSH 2.698 07/25/2018    PSA 1.110 01/22/2018    CHLPL 109 08/12/2015    TRIG 87 05/10/2017    HDL 26 (L) 05/10/2017    LDL 40 05/10/2017      Lab Results   Component Value Date    BNP 55.0 07/16/2016       During this visit the following were done:  Labs Reviewed [x]    Labs Ordered []    Radiology Reports Reviewed [x]    Radiology Ordered []    PCP Records Reviewed []    Referring Provider Records Reviewed []    ER Records Reviewed []    Hospital Records Reviewed []    History Obtained From Family []    Radiology Images Reviewed []    Other Reviewed []    Records Requested []      Procedures      Assessment/Plan    Diagnosis Plan   1. ASCVD (arteriosclerotic cardiovascular disease), s/p stenting of 80 % stenosis in left circumflex on 10/05/16and 60% stenosis in the LAD that was left alone.  Stress Test With Myocardial Perfusion   2. Essential hypertension     3. Angina, class III (CMS/HCC)     4. Coronary artery fistula     5. Dyslipidemia                  Recommendations:  1. Since patient has developed chest pains that are occurring almost daily at times with a known coronary artery fistula in ASCVD Dylan has been evaluated with a stress test.  2. Continue with aspirin, Lipitor, Plavix, Imdur, lisinopril, and Ranexa.  patient on a beta blocker due to baseline low heart rate  3. Follow-up in 4 weeks    Return in about 4 weeks (around 10/25/2018).    As always, I appreciate very much the opportunity to participate in the cardiovascular care of your patients.      With Best Regards,    Delbert Gupta,  PA-C    Dragon disclaimer:  Much of this encounter note is an electronic transcription/translation of spoken language to printed text. The electronic translation of spoken language may permit erroneous, or at times, nonsensical words or phrases to be inadvertently transcribed; Although I have reviewed the note for such errors, some may still exist.

## 2018-10-01 ENCOUNTER — EPISODE CHANGES (OUTPATIENT)
Dept: CASE MANAGEMENT | Facility: OTHER | Age: 73
End: 2018-10-01

## 2018-10-03 RX ORDER — LISINOPRIL 5 MG/1
TABLET ORAL
Qty: 30 TABLET | Refills: 2 | Status: SHIPPED | OUTPATIENT
Start: 2018-10-03 | End: 2019-01-03 | Stop reason: SDUPTHER

## 2018-10-25 ENCOUNTER — TELEPHONE (OUTPATIENT)
Dept: GASTROENTEROLOGY | Facility: CLINIC | Age: 73
End: 2018-10-25

## 2018-10-25 DIAGNOSIS — K21.9 GASTROESOPHAGEAL REFLUX DISEASE, ESOPHAGITIS PRESENCE NOT SPECIFIED: ICD-10-CM

## 2018-10-25 DIAGNOSIS — R13.10 DYSPHAGIA, UNSPECIFIED TYPE: ICD-10-CM

## 2018-10-25 RX ORDER — PANTOPRAZOLE SODIUM 40 MG/1
40 TABLET, DELAYED RELEASE ORAL
Qty: 60 TABLET | Refills: 5 | Status: SHIPPED | OUTPATIENT
Start: 2018-10-25 | End: 2019-09-03 | Stop reason: SDUPTHER

## 2018-10-26 ENCOUNTER — HOSPITAL ENCOUNTER (OUTPATIENT)
Dept: NUCLEAR MEDICINE | Facility: HOSPITAL | Age: 73
Discharge: HOME | End: 2018-10-26

## 2018-10-26 ENCOUNTER — HOSPITAL ENCOUNTER (OUTPATIENT)
Dept: CARDIOLOGY | Facility: HOSPITAL | Age: 73
Discharge: HOME | End: 2018-10-26

## 2018-10-26 DIAGNOSIS — I25.10 ASCVD (ARTERIOSCLEROTIC CARDIOVASCULAR DISEASE): ICD-10-CM

## 2018-10-26 PROCEDURE — 93018 CV STRESS TEST I&R ONLY: CPT | Performed by: INTERNAL MEDICINE

## 2018-10-26 PROCEDURE — 93017 CV STRESS TEST TRACING ONLY: CPT

## 2018-10-26 PROCEDURE — 78452 HT MUSCLE IMAGE SPECT MULT: CPT

## 2018-10-26 PROCEDURE — 0 TECHNETIUM SESTAMIBI: Performed by: INTERNAL MEDICINE

## 2018-10-26 PROCEDURE — 25010000002 REGADENOSON 0.4 MG/5ML SOLUTION: Performed by: PHYSICIAN ASSISTANT

## 2018-10-26 PROCEDURE — A9500 TC99M SESTAMIBI: HCPCS | Performed by: INTERNAL MEDICINE

## 2018-10-26 PROCEDURE — 78452 HT MUSCLE IMAGE SPECT MULT: CPT | Performed by: INTERNAL MEDICINE

## 2018-10-26 RX ADMIN — TECHNETIUM TC 99M SESTAMIBI 1 DOSE: 1 INJECTION INTRAVENOUS at 07:53

## 2018-10-26 RX ADMIN — TECHNETIUM TC 99M SESTAMIBI 1 DOSE: 1 INJECTION INTRAVENOUS at 08:58

## 2018-10-26 RX ADMIN — REGADENOSON 0.4 MG: 0.08 INJECTION, SOLUTION INTRAVENOUS at 08:58

## 2018-10-28 LAB
BH CV NUCLEAR PRIOR STUDY: 3
BH CV STRESS BP STAGE 1: NORMAL
BH CV STRESS BP STAGE 2: NORMAL
BH CV STRESS COMMENTS STAGE 1: NORMAL
BH CV STRESS COMMENTS STAGE 2: NORMAL
BH CV STRESS DOSE REGADENOSON STAGE 1: 0.4
BH CV STRESS DURATION MIN STAGE 1: 0
BH CV STRESS DURATION MIN STAGE 2: 4
BH CV STRESS DURATION SEC STAGE 1: 10
BH CV STRESS DURATION SEC STAGE 2: 0
BH CV STRESS HR STAGE 1: 73
BH CV STRESS HR STAGE 2: 71
BH CV STRESS PROTOCOL 1: NORMAL
BH CV STRESS RECOVERY BP: NORMAL MMHG
BH CV STRESS RECOVERY HR: 54 BPM
BH CV STRESS STAGE 1: 1
BH CV STRESS STAGE 2: 2
MAXIMAL PREDICTED HEART RATE: 148 BPM
PERCENT MAX PREDICTED HR: 49.32 %
STRESS BASELINE BP: NORMAL MMHG
STRESS BASELINE HR: 52 BPM
STRESS PERCENT HR: 58 %
STRESS POST PEAK BP: NORMAL MMHG
STRESS POST PEAK HR: 73 BPM
STRESS TARGET HR: 126 BPM

## 2018-11-05 ENCOUNTER — OFFICE VISIT (OUTPATIENT)
Dept: CARDIOLOGY | Facility: CLINIC | Age: 73
End: 2018-11-05

## 2018-11-05 VITALS
SYSTOLIC BLOOD PRESSURE: 140 MMHG | HEART RATE: 58 BPM | HEIGHT: 71 IN | BODY MASS INDEX: 23.97 KG/M2 | DIASTOLIC BLOOD PRESSURE: 62 MMHG | OXYGEN SATURATION: 100 % | WEIGHT: 171.2 LBS

## 2018-11-05 DIAGNOSIS — I25.10 ASCVD (ARTERIOSCLEROTIC CARDIOVASCULAR DISEASE): Primary | ICD-10-CM

## 2018-11-05 DIAGNOSIS — I25.41 CORONARY ARTERY FISTULA: ICD-10-CM

## 2018-11-05 DIAGNOSIS — I10 ESSENTIAL HYPERTENSION: ICD-10-CM

## 2018-11-05 DIAGNOSIS — E78.5 DYSLIPIDEMIA: ICD-10-CM

## 2018-11-05 PROCEDURE — 99213 OFFICE O/P EST LOW 20 MIN: CPT | Performed by: PHYSICIAN ASSISTANT

## 2018-11-05 RX ORDER — CLOPIDOGREL BISULFATE 75 MG/1
75 TABLET ORAL DAILY
Qty: 30 TABLET | Refills: 5 | Status: SHIPPED | OUTPATIENT
Start: 2018-11-05 | End: 2019-07-05 | Stop reason: SDUPTHER

## 2018-11-05 RX ORDER — ISOSORBIDE MONONITRATE 60 MG/1
TABLET, EXTENDED RELEASE ORAL
Qty: 30 TABLET | Refills: 3 | Status: SHIPPED | OUTPATIENT
Start: 2018-11-05 | End: 2018-11-29 | Stop reason: SDUPTHER

## 2018-11-05 NOTE — PROGRESS NOTES
Gideon Charles MD  Fidencio Luciano  1945  11/05/2018    Patient Active Problem List   Diagnosis   • Near syncope   • Symptomatic bradycardia   • Dizziness   • Palpitations   • Hypertension   • Diabetes mellitus (CMS/HCC)   • Benign prostatic hyperplasia   • Precordial pain   • Angina, class III (CMS/HCC)   • Abnormal stress test   • Abnormal findings on cardiac catheterization   • ASCVD (arteriosclerotic cardiovascular disease), s/p stenting of 80 % stenosis in left circumflex on 10/05/16and 60% stenosis in the LAD that was left alone.   • Unstable angina (CMS/HCC)   • Normal cardiac stress test 12/2016   • Dyslipidemia   • Weakness generalized   • Chronic fatigue   • Coronary artery fistula   • Weight loss, unintentional   • Iron deficiency anemia   • Iron deficiency   • Normocytic anemia   • Weight loss, abnormal   • History of gastric ulcer   • Intractable vomiting with nausea     Dear Gideon Charles MD:    Chief Complaint   Patient presents with   • Follow-up   • Med Management     calling pharmacy.    • Results     stress   • Edema   • Palpitations     Subjective     Fidencio Luciano is a 73 y.o. male with a past medical history significant for ASCVD status post stenting of the LCX on 10/5/16. At that time, he was noted to have a 60% LAD stenosis which was left alone. He was also noted to have a coronary artery fistula. About 1 month ago, he was having some chest pains and underwent further evaluation with a nuclear stress test which was negative for myocardial ischemia. He presents back to the office today and reports that his chest pain is doing better. His breathing is stable. He is taking his medications regularly. He does not monitor his BP regularly at home because it generally does fairly well.       Current Outpatient Prescriptions:   •  aspirin 81 MG tablet, Take 81 mg by mouth Daily., Disp: , Rfl:   •  atorvastatin (LIPITOR) 80 MG tablet, Take 1 tablet by mouth Every Night., Disp: 90 tablet, Rfl: 5  •   clopidogrel (PLAVIX) 75 MG tablet, Take 1 tablet by mouth Daily., Disp: 30 tablet, Rfl: 5  •  ferrous sulfate 325 (65 FE) MG tablet, Take 1 tablet by mouth 3 (Three) Times a Day With Meals., Disp: 90 tablet, Rfl: 5  •  finasteride (PROSCAR) 5 MG tablet, Take 1 tablet by mouth Daily., Disp: 90 tablet, Rfl: 3  •  isosorbide mononitrate (IMDUR) 60 MG 24 hr tablet, TAKE ONE TABLET BY MOUTH EVERY MORNING FOR HEART, Disp: 30 tablet, Rfl: 3  •  lisinopril (PRINIVIL,ZESTRIL) 5 MG tablet, TAKE ONE TABLET BY MOUTH EVERY DAY FOR BLOOD PRESSURE, Disp: 30 tablet, Rfl: 2  •  Memantine HCl-Donepezil HCl (NAMZARIC PO), Take  by mouth., Disp: , Rfl:   •  metFORMIN (GLUCOPHAGE) 500 MG tablet, Take 500 mg by mouth Daily With Breakfast. 1/2 tab QD., Disp: , Rfl:   •  NITROSTAT 0.4 MG SL tablet, PLACE 1 TABLET UNDER THE TONGUE EVERY FIVE MINUTES AS NEEDED FOR CHEST PAIN FOR UP TO 3 DOSES, Disp: 25 tablet, Rfl: 1  •  pantoprazole (PROTONIX) 40 MG EC tablet, Take 1 tablet by mouth 2 (Two) Times a Day Before Meals., Disp: 60 tablet, Rfl: 5  •  polyethylene glycol (GoLYTELY) 236 g solution, Starting at 6pm the day before procedure, drink 8 ounces every 30 minutes until all gone or stools are clear. May add flavor packet., Disp: 4000 mL, Rfl: 0  •  ranolazine (RANEXA) 1000 MG 12 hr tablet, Take 1 tablet by mouth Every 12 (Twelve) Hours., Disp: 60 tablet, Rfl: 5  •  Suvorexant (BELSOMRA PO), Take 20 mg by mouth Every Night., Disp: , Rfl:   •  cyclobenzaprine (FLEXERIL) 5 MG tablet, Take 1 tablet by mouth 3 (Three) Times a Day As Needed for Muscle Spasms., Disp: 30 tablet, Rfl: 0    The following portions of the patient's history were reviewed and updated as appropriate: allergies, current medications, past family history, past medical history, past social history, past surgical history and problem list.    Social History     Social History   • Marital status:      Spouse name: N/A   • Number of children: N/A   • Years of education:  "N/A     Occupational History   • Not on file.     Social History Main Topics   • Smoking status: Former Smoker     Packs/day: 3.00     Years: 40.00     Types: Cigarettes     Quit date: 1982   • Smokeless tobacco: Former User     Quit date: 1/16/1986   • Alcohol use No   • Drug use: No   • Sexual activity: Defer     Other Topics Concern   • Not on file     Social History Narrative   • No narrative on file     Review of Systems   Cardiovascular: Positive for palpitations. Negative for chest pain, near-syncope and syncope.   Respiratory: Positive for shortness of breath.    Neurological: Negative for dizziness.     Objective   Blood pressure 140/62, pulse 58, height 180.3 cm (71\"), weight 77.7 kg (171 lb 3.2 oz), SpO2 100 %.  Body mass index is 23.88 kg/m².    Physical Exam   Constitutional: He is oriented to person, place, and time. He appears well-developed and well-nourished. No distress.   HENT:   Head: Normocephalic and atraumatic.   Eyes: Conjunctivae are normal. Right eye exhibits no discharge. Left eye exhibits no discharge.   Neck: Normal range of motion. Neck supple. Carotid bruit is not present.   Cardiovascular: Normal rate, regular rhythm and normal heart sounds.  Exam reveals no gallop and no friction rub.    No murmur heard.  Pulmonary/Chest: Effort normal and breath sounds normal. No respiratory distress. He has no wheezes. He has no rales. He exhibits no tenderness.   Musculoskeletal: Normal range of motion. He exhibits no edema.   Neurological: He is alert and oriented to person, place, and time.   Skin: Skin is warm and dry. No rash noted. He is not diaphoretic. No erythema. No pallor.   Psychiatric: He has a normal mood and affect. His behavior is normal.   Nursing note and vitals reviewed.    Procedures  Nuclear Stress Test 10/26/18      Left Heart Catheterization 05/09/2017    Assessment:          Diagnosis Plan   1. ASCVD (arteriosclerotic cardiovascular disease), s/p stenting to the LCX, LAD, " and RCA.      2. Coronary artery fistula     3. Essential hypertension     4. Dyslipidemia          Plan:       1. Continue aspirin, plavix, atorvastatin, lisinopril, and ranexa.   2. No beta blocker secondary to baseline bradycardia.   3. I have reviewed the results of his stress test with him. Since his symptoms have improved, will continue to monitor for now.   4. Follow up in 2 months with Dr. Buenrostro.     No Follow-up on file.    I appreciate the opportunity to participate in this patient's cardiovascular care.    Best Regards,    Jennifer Luque PA-C

## 2018-11-06 ENCOUNTER — TELEPHONE (OUTPATIENT)
Dept: CARDIOLOGY | Facility: CLINIC | Age: 73
End: 2018-11-06

## 2018-11-23 ENCOUNTER — LAB (OUTPATIENT)
Dept: LAB | Facility: HOSPITAL | Age: 73
End: 2018-11-23

## 2018-11-23 DIAGNOSIS — D50.9 IRON DEFICIENCY ANEMIA, UNSPECIFIED IRON DEFICIENCY ANEMIA TYPE: ICD-10-CM

## 2018-11-23 LAB
ALBUMIN SERPL-MCNC: 4.6 G/DL (ref 3.4–4.8)
ALBUMIN/GLOB SERPL: 1.8 G/DL (ref 1.5–2.5)
ALP SERPL-CCNC: 94 U/L (ref 40–129)
ALT SERPL W P-5'-P-CCNC: 40 U/L (ref 10–44)
ANION GAP SERPL CALCULATED.3IONS-SCNC: 7.4 MMOL/L (ref 3.6–11.2)
AST SERPL-CCNC: 33 U/L (ref 10–34)
BASOPHILS # BLD AUTO: 0.07 10*3/MM3 (ref 0–0.3)
BASOPHILS NFR BLD AUTO: 0.9 % (ref 0–2)
BILIRUB SERPL-MCNC: 1.1 MG/DL (ref 0.2–1.8)
BUN BLD-MCNC: 20 MG/DL (ref 7–21)
BUN/CREAT SERPL: 27.8 (ref 7–25)
CALCIUM SPEC-SCNC: 9.3 MG/DL (ref 7.7–10)
CHLORIDE SERPL-SCNC: 105 MMOL/L (ref 99–112)
CO2 SERPL-SCNC: 27.6 MMOL/L (ref 24.3–31.9)
CREAT BLD-MCNC: 0.72 MG/DL (ref 0.43–1.29)
DEPRECATED RDW RBC AUTO: 49.8 FL (ref 37–54)
EOSINOPHIL # BLD AUTO: 0.42 10*3/MM3 (ref 0–0.7)
EOSINOPHIL NFR BLD AUTO: 5.4 % (ref 0–7)
ERYTHROCYTE [DISTWIDTH] IN BLOOD BY AUTOMATED COUNT: 14.9 % (ref 11.5–14.5)
FERRITIN SERPL-MCNC: 50 NG/ML (ref 21.9–321.7)
GFR SERPL CREATININE-BSD FRML MDRD: 107 ML/MIN/1.73
GLOBULIN UR ELPH-MCNC: 2.6 GM/DL
GLUCOSE BLD-MCNC: 102 MG/DL (ref 70–110)
HCT VFR BLD AUTO: 40 % (ref 42–52)
HGB BLD-MCNC: 13.3 G/DL (ref 14–18)
IMM GRANULOCYTES # BLD: 0.01 10*3/MM3 (ref 0–0.03)
IMM GRANULOCYTES NFR BLD: 0.1 % (ref 0–0.5)
IRON 24H UR-MRATE: 144 MCG/DL (ref 53–167)
IRON SATN MFR SERPL: 39 % (ref 20–50)
LYMPHOCYTES # BLD AUTO: 1.73 10*3/MM3 (ref 1–3)
LYMPHOCYTES NFR BLD AUTO: 22.1 % (ref 16–46)
MCH RBC QN AUTO: 31.8 PG (ref 27–33)
MCHC RBC AUTO-ENTMCNC: 33.3 G/DL (ref 33–37)
MCV RBC AUTO: 95.7 FL (ref 80–94)
MONOCYTES # BLD AUTO: 0.55 10*3/MM3 (ref 0.1–0.9)
MONOCYTES NFR BLD AUTO: 7 % (ref 0–12)
NEUTROPHILS # BLD AUTO: 5.04 10*3/MM3 (ref 1.4–6.5)
NEUTROPHILS NFR BLD AUTO: 64.5 % (ref 40–75)
OSMOLALITY SERPL CALC.SUM OF ELEC: 282.2 MOSM/KG (ref 273–305)
PLATELET # BLD AUTO: 209 10*3/MM3 (ref 130–400)
PMV BLD AUTO: 10.9 FL (ref 6–10)
POTASSIUM BLD-SCNC: 4 MMOL/L (ref 3.5–5.3)
PROT SERPL-MCNC: 7.2 G/DL (ref 6–8)
RBC # BLD AUTO: 4.18 10*6/MM3 (ref 4.7–6.1)
SODIUM BLD-SCNC: 140 MMOL/L (ref 135–153)
TIBC SERPL-MCNC: 371 MCG/DL (ref 241–421)
WBC NRBC COR # BLD: 7.82 10*3/MM3 (ref 4.5–12.5)

## 2018-11-23 PROCEDURE — 83540 ASSAY OF IRON: CPT

## 2018-11-23 PROCEDURE — 85025 COMPLETE CBC W/AUTO DIFF WBC: CPT

## 2018-11-23 PROCEDURE — 80053 COMPREHEN METABOLIC PANEL: CPT

## 2018-11-23 PROCEDURE — 82728 ASSAY OF FERRITIN: CPT

## 2018-11-23 PROCEDURE — 83550 IRON BINDING TEST: CPT

## 2018-11-29 ENCOUNTER — TELEPHONE (OUTPATIENT)
Dept: CARDIOLOGY | Facility: CLINIC | Age: 73
End: 2018-11-29

## 2018-11-29 RX ORDER — ISOSORBIDE MONONITRATE 60 MG/1
60 TABLET, EXTENDED RELEASE ORAL DAILY
Qty: 30 TABLET | Refills: 3 | Status: SHIPPED | OUTPATIENT
Start: 2018-11-29 | End: 2019-03-14 | Stop reason: SDUPTHER

## 2018-12-07 ENCOUNTER — OFFICE VISIT (OUTPATIENT)
Dept: ONCOLOGY | Facility: CLINIC | Age: 73
End: 2018-12-07

## 2018-12-07 ENCOUNTER — TELEPHONE (OUTPATIENT)
Dept: GASTROENTEROLOGY | Facility: CLINIC | Age: 73
End: 2018-12-07

## 2018-12-07 ENCOUNTER — LAB (OUTPATIENT)
Dept: ONCOLOGY | Facility: CLINIC | Age: 73
End: 2018-12-07

## 2018-12-07 VITALS
HEART RATE: 51 BPM | OXYGEN SATURATION: 98 % | TEMPERATURE: 97.1 F | BODY MASS INDEX: 23.15 KG/M2 | RESPIRATION RATE: 18 BRPM | DIASTOLIC BLOOD PRESSURE: 63 MMHG | SYSTOLIC BLOOD PRESSURE: 145 MMHG | WEIGHT: 166 LBS

## 2018-12-07 DIAGNOSIS — B02.9 HERPES ZOSTER WITHOUT COMPLICATION: ICD-10-CM

## 2018-12-07 DIAGNOSIS — D64.9 ANEMIA, UNSPECIFIED TYPE: ICD-10-CM

## 2018-12-07 DIAGNOSIS — E61.1 IRON DEFICIENCY: Primary | ICD-10-CM

## 2018-12-07 DIAGNOSIS — D50.9 IRON DEFICIENCY ANEMIA, UNSPECIFIED IRON DEFICIENCY ANEMIA TYPE: Primary | ICD-10-CM

## 2018-12-07 LAB
ALBUMIN SERPL-MCNC: 4.3 G/DL (ref 3.4–4.8)
ALBUMIN/GLOB SERPL: 1.6 G/DL (ref 1.5–2.5)
ALP SERPL-CCNC: 103 U/L (ref 40–129)
ALT SERPL W P-5'-P-CCNC: 33 U/L (ref 10–44)
ANION GAP SERPL CALCULATED.3IONS-SCNC: 5.7 MMOL/L (ref 3.6–11.2)
AST SERPL-CCNC: 29 U/L (ref 10–34)
BASOPHILS # BLD AUTO: 0.04 10*3/MM3 (ref 0–0.3)
BASOPHILS NFR BLD AUTO: 0.7 % (ref 0–2)
BILIRUB SERPL-MCNC: 0.7 MG/DL (ref 0.2–1.8)
BUN BLD-MCNC: 19 MG/DL (ref 7–21)
BUN/CREAT SERPL: 24.4 (ref 7–25)
CALCIUM SPEC-SCNC: 9 MG/DL (ref 7.7–10)
CHLORIDE SERPL-SCNC: 108 MMOL/L (ref 99–112)
CO2 SERPL-SCNC: 27.3 MMOL/L (ref 24.3–31.9)
CREAT BLD-MCNC: 0.78 MG/DL (ref 0.43–1.29)
DEPRECATED RDW RBC AUTO: 49.4 FL (ref 37–54)
EOSINOPHIL # BLD AUTO: 0.06 10*3/MM3 (ref 0–0.7)
EOSINOPHIL NFR BLD AUTO: 1 % (ref 0–7)
ERYTHROCYTE [DISTWIDTH] IN BLOOD BY AUTOMATED COUNT: 14.3 % (ref 11.5–14.5)
FERRITIN SERPL-MCNC: 72 NG/ML (ref 21.9–321.7)
GFR SERPL CREATININE-BSD FRML MDRD: 98 ML/MIN/1.73
GLOBULIN UR ELPH-MCNC: 2.7 GM/DL
GLUCOSE BLD-MCNC: 91 MG/DL (ref 70–110)
HCT VFR BLD AUTO: 37.5 % (ref 42–52)
HGB BLD-MCNC: 12.6 G/DL (ref 14–18)
IMM GRANULOCYTES # BLD: 0.01 10*3/MM3 (ref 0–0.03)
IMM GRANULOCYTES NFR BLD: 0.2 % (ref 0–0.5)
IRON 24H UR-MRATE: 104 MCG/DL (ref 53–167)
IRON SATN MFR SERPL: 28 % (ref 20–50)
LYMPHOCYTES # BLD AUTO: 1.51 10*3/MM3 (ref 1–3)
LYMPHOCYTES NFR BLD AUTO: 26.3 % (ref 16–46)
MCH RBC QN AUTO: 31.7 PG (ref 27–33)
MCHC RBC AUTO-ENTMCNC: 33.6 G/DL (ref 33–37)
MCV RBC AUTO: 94.2 FL (ref 80–94)
MONOCYTES # BLD AUTO: 0.76 10*3/MM3 (ref 0.1–0.9)
MONOCYTES NFR BLD AUTO: 13.2 % (ref 0–12)
NEUTROPHILS # BLD AUTO: 3.36 10*3/MM3 (ref 1.4–6.5)
NEUTROPHILS NFR BLD AUTO: 58.6 % (ref 40–75)
OSMOLALITY SERPL CALC.SUM OF ELEC: 283.1 MOSM/KG (ref 273–305)
PLATELET # BLD AUTO: 192 10*3/MM3 (ref 130–400)
PMV BLD AUTO: 10.3 FL (ref 6–10)
POTASSIUM BLD-SCNC: 3.6 MMOL/L (ref 3.5–5.3)
PROT SERPL-MCNC: 7 G/DL (ref 6–8)
RBC # BLD AUTO: 3.98 10*6/MM3 (ref 4.7–6.1)
SODIUM BLD-SCNC: 141 MMOL/L (ref 135–153)
TIBC SERPL-MCNC: 369 MCG/DL (ref 241–421)
WBC NRBC COR # BLD: 5.74 10*3/MM3 (ref 4.5–12.5)

## 2018-12-07 PROCEDURE — 82728 ASSAY OF FERRITIN: CPT | Performed by: NURSE PRACTITIONER

## 2018-12-07 PROCEDURE — 80053 COMPREHEN METABOLIC PANEL: CPT | Performed by: NURSE PRACTITIONER

## 2018-12-07 PROCEDURE — 83540 ASSAY OF IRON: CPT | Performed by: NURSE PRACTITIONER

## 2018-12-07 PROCEDURE — 36415 COLL VENOUS BLD VENIPUNCTURE: CPT

## 2018-12-07 PROCEDURE — 99213 OFFICE O/P EST LOW 20 MIN: CPT | Performed by: NURSE PRACTITIONER

## 2018-12-07 PROCEDURE — 83550 IRON BINDING TEST: CPT | Performed by: NURSE PRACTITIONER

## 2018-12-07 PROCEDURE — 85025 COMPLETE CBC W/AUTO DIFF WBC: CPT | Performed by: NURSE PRACTITIONER

## 2018-12-07 RX ORDER — FERROUS SULFATE 325(65) MG
325 TABLET ORAL
Qty: 90 TABLET | Refills: 5 | Status: SHIPPED | OUTPATIENT
Start: 2018-12-07 | End: 2019-03-08 | Stop reason: SDUPTHER

## 2018-12-07 NOTE — PROGRESS NOTES
DATE:  12/7/2018    REASON FOR FOLLOW UP: ERROL    REFERRING PHYSICIAN:  Gideon Charles MD    CHIEF COMPLAINT:  Follow up of ERROL    HISTORY OF PRESENT ILLNESS:   Fidencio Luciano is a very pleasant 73 y.o. male who is being seen today at the request of Gideon Charles MD   for evaluation and treatment of Anemia. Mr. Luciano reports following with his PCP routinely with repeat labs every 4-6 months. He says he has had chronic anemia which was recently worsening in June. Previous available CBCs were reviewed and patient has had normocytic anemia with Hg ranging 11-13 g/dL since July 2015. CBC from June 2018 showed drop in Hg to 10.0. Patient's WBC and platelets have been in the normal range. Patient reports intermittent dark stool and bleeding per rectum for the past few months. He reports having upper/lower endoscopy approximately 3 years ago with Dr. Martinez which was negative for active bleeding. He says his PCP has referred him to Dr. Unger and he will see him next week. Patient denies any recent blood transfusions. His main complaint today is ongoing fatigue which has been recently worsening. He also reports decreased appetite and weight loss but he is unsure how much weight he has lost. Otherwise, he denies any other specific complaints.     Interval History:  Mr. Luciano presents today for follow up of ERROL. Since his last visit, he reports he has been doing well. He continues to take oral iron therapy, ferrous sulfate TID which he is tolerating well without any side effects. He again denies any blood loss from any source. He continues to have chronic fatigue but denies any worsening. He reports being diagnosed with shingles in his left groin area earlier this week and is taking acyclovir, doxycycline and prednisone per PCP. He denies any other complaints today.     PAST MEDICAL HISTORY:  Past Medical History:   Diagnosis Date   • BPH (benign prostatic hyperplasia)    • Bradycardia     Positive tilt table testing 07/2016    • Coronary artery disease    • Diabetes mellitus (CMS/HCC)    • Diverticulosis    • Gastric ulcer with hemorrhage    • History of transfusion    • Hyperlipidemia    • Hypertension    • Psoriasis        PAST SURGICAL HISTORY:  Past Surgical History:   Procedure Laterality Date   • BACK SURGERY     • CHOLECYSTECTOMY     • COLONOSCOPY  2015    Dr. Martinez- internal hemorrhoids, sigmoid diverticulosis   • CORONARY ANGIOPLASTY WITH STENT PLACEMENT     • STOMACH SURGERY      FUSION OF BLEEDING ULCER   • UPPER GASTROINTESTINAL ENDOSCOPY  2015    Dr. Martinez- mild gastritis       FAMILY HISTORY:  Family History   Problem Relation Age of Onset   • Sick sinus syndrome Mother    • Heart failure Mother    • Diabetes Mother    • Liver cancer Father        SOCIAL HISTORY:  Social History     Socioeconomic History   • Marital status:      Spouse name: Not on file   • Number of children: Not on file   • Years of education: Not on file   • Highest education level: Not on file   Social Needs   • Financial resource strain: Not on file   • Food insecurity - worry: Not on file   • Food insecurity - inability: Not on file   • Transportation needs - medical: Not on file   • Transportation needs - non-medical: Not on file   Occupational History   • Not on file   Tobacco Use   • Smoking status: Former Smoker     Packs/day: 3.00     Years: 40.00     Pack years: 120.00     Types: Cigarettes     Last attempt to quit:      Years since quittin.9   • Smokeless tobacco: Former User     Quit date: 1986   Substance and Sexual Activity   • Alcohol use: No   • Drug use: No   • Sexual activity: Defer   Other Topics Concern   • Not on file   Social History Narrative   • Not on file       MEDICATIONS:  The current medication list was reviewed in the EMR    Current Outpatient Medications:   •  aspirin 81 MG tablet, Take 81 mg by mouth Daily., Disp: , Rfl:   •  atorvastatin (LIPITOR) 80 MG tablet, Take 1 tablet by mouth Every  Night., Disp: 90 tablet, Rfl: 5  •  clopidogrel (PLAVIX) 75 MG tablet, Take 1 tablet by mouth Daily., Disp: 30 tablet, Rfl: 5  •  cyclobenzaprine (FLEXERIL) 5 MG tablet, Take 1 tablet by mouth 3 (Three) Times a Day As Needed for Muscle Spasms., Disp: 30 tablet, Rfl: 0  •  ferrous sulfate 325 (65 FE) MG tablet, Take 1 tablet by mouth 3 (Three) Times a Day With Meals., Disp: 90 tablet, Rfl: 5  •  finasteride (PROSCAR) 5 MG tablet, Take 1 tablet by mouth Daily., Disp: 90 tablet, Rfl: 3  •  isosorbide mononitrate (IMDUR) 60 MG 24 hr tablet, Take 1 tablet by mouth Daily., Disp: 30 tablet, Rfl: 3  •  lisinopril (PRINIVIL,ZESTRIL) 5 MG tablet, TAKE ONE TABLET BY MOUTH EVERY DAY FOR BLOOD PRESSURE, Disp: 30 tablet, Rfl: 2  •  Memantine HCl-Donepezil HCl (NAMZARIC PO), Take  by mouth., Disp: , Rfl:   •  metFORMIN (GLUCOPHAGE) 500 MG tablet, Take 500 mg by mouth Daily With Breakfast. 1/2 tab QD., Disp: , Rfl:   •  NITROSTAT 0.4 MG SL tablet, PLACE 1 TABLET UNDER THE TONGUE EVERY FIVE MINUTES AS NEEDED FOR CHEST PAIN FOR UP TO 3 DOSES, Disp: 25 tablet, Rfl: 1  •  pantoprazole (PROTONIX) 40 MG EC tablet, Take 1 tablet by mouth 2 (Two) Times a Day Before Meals., Disp: 60 tablet, Rfl: 5  •  polyethylene glycol (GoLYTELY) 236 g solution, Starting at 6pm the day before procedure, drink 8 ounces every 30 minutes until all gone or stools are clear. May add flavor packet., Disp: 4000 mL, Rfl: 0  •  ranolazine (RANEXA) 1000 MG 12 hr tablet, Take 1 tablet by mouth Every 12 (Twelve) Hours., Disp: 60 tablet, Rfl: 5  •  Suvorexant (BELSOMRA PO), Take 20 mg by mouth Every Night., Disp: , Rfl:     ALLERGIES:  No Known Allergies    REVIEW OF SYSTEMS:    A comprehensive 14 point review of systems was performed.  Significant findings as mentioned above.  All other systems reviewed and are negative.      Physical Exam   Vital Signs: /63   Pulse 51   Temp 97.1 °F (36.2 °C) (Oral)   Resp 18   Wt 75.3 kg (166 lb)   SpO2 98%   BMI 23.15  kg/m²    General: Well developed, well nourished, alert and oriented x 3, in no acute distress.   Head: ATNC   Eyes: PERRL, No evidence of conjunctivitis.   Nose: No nasal discharge.   Mouth: Oral mucosal membranes moist. No oral ulceration or hemorrhages.   Neck: Neck supple. No thyromegaly. No JVD.   Lungs: Clear in all fields to A&P without rales, rhonchi or wheezing.   Heart: S1, S2. No murmurs, rubs, or gallops.   Abdomen: Soft. Bowel sounds are normoactive. Nontender with palpation. No Hepatosplenomegaly can be appreciated.   Extremities: No cyanosis or edema. Peripheral pulses palpable and equal bilaterally.   Lymph Nodes: No palpable cervical, submandibular, supraclavicular, axillary lymphadenopathy noted.  Neurologic: Grossly non-focal exam.    ENDOSCOPY:  EGD/Colonoscopy 08/17/18  Findings: Sliding hiatal hernia, diverticulosis without diverticulitis  Recommendations: Screening colonoscopy in 10 years    RECENT LABS:  Lab Results   Component Value Date    WBC 5.74 12/07/2018    HGB 12.6 (L) 12/07/2018    HCT 37.5 (L) 12/07/2018    MCV 94.2 (H) 12/07/2018    RDW 14.3 12/07/2018     12/07/2018    NEUTRORELPCT 58.6 12/07/2018    LYMPHORELPCT 26.3 12/07/2018    MONORELPCT 13.2 (H) 12/07/2018    EOSRELPCT 1.0 12/07/2018    BASORELPCT 0.7 12/07/2018    NEUTROABS 3.36 12/07/2018    LYMPHSABS 1.51 12/07/2018       Lab Results   Component Value Date     12/07/2018    K 3.6 12/07/2018    CO2 27.3 12/07/2018     12/07/2018    BUN 19 12/07/2018    CREATININE 0.78 12/07/2018    EGFRIFNONA 98 12/07/2018    EGFRIFAFRI  09/19/2016      Comment:      <15 Indicative of kidney failure.    GLUCOSE 91 12/07/2018    CALCIUM 9.0 12/07/2018    ALKPHOS 103 12/07/2018    AST 29 12/07/2018    ALT 33 12/07/2018    BILITOT 0.7 12/07/2018    ALBUMIN 4.30 12/07/2018    PROTEINTOT 7.0 12/07/2018    MG 2.3 07/25/2018       Lab Results   Component Value Date/Time     07/17/2018 03:03 PM     Lab Results   Component  Value Date    FERRITIN 72.00 12/07/2018    IRON 104 12/07/2018    TIBC 369 12/07/2018    LABIRON 28 12/07/2018    QCFWHHOI06 932 (H) 07/17/2018    FOLATE 18.87 07/17/2018    HAPTOGLOBIN 112 07/17/2018    RETICCTPCT 0.66 07/17/2018    RETIC 0.0242 07/17/2018     PBS 07/17/18  Hypochromic normocytic anemia with eosinophilia    Labs 07/17/17  C-Reactive Protein 0.00 - 0.99 mg/dL <0.50      Sed Rate 0 - 20 mm/hr 10       - 225 U/L 163      Iron 53 - 167 mcg/dL 41     TIBC 241 - 421 mcg/dL 445     Iron Saturation 20 - 50 % 9       Ferritin 21.90 - 321.70 ng/mL 11.00       Vitamin B-12 211 - 911 pg/mL 932       Folate 5.40 - 20.00 ng/mL 18.87      ASSESSMENT & PLAN:  Fidencio Luciano is a very pleasant 73 y.o. male with    1.  Normocytic anemia  2. ERROL    -Noted on routine blood testing with PCP since July 2015 with Hg ranging 11-13 g/dL, recently worsening in June (Hg 10).  WBC and platelets have been in the normal range.  -Reported intermittent dark stool and bleeding per rectum for the past few months. Reported upper/lower endoscopy approximately 3 years ago with Dr. Martinez which was negative for active bleeding.    -Obtained additional workup including repeat CBC which showed stable H/H (Hg 10.2), WBC and platelets were again in the normal range. CMP unremarkable. PBS showed hypochromic normocytic anemia with eosinophilia as above. B12 and Folate replete. CRP and ESR were normal. No evidence of hemolysis with normal Retic, LDH and Haptoglobin. Iron studies were consistent with Iron deficiency. Therefore, started patient on oral iron therapy, ferrous sulfate TID which he has been tolerating well.   -PCP referred him back to GI and underwent upper/lower endoscopy on 08/17/18 which showed no evidence of bleeding.   -Repeat labs from today show improvement in Hg and iron continues to improve/remains replete.   -Advised patient to continue oral iron as he is on for now. Will refill today. Will follow up again in 3 months  with repeat labs.     3. Shingles: Being managed by PCP, currently taking acyclovir, doxycycline and prednisone.     ACO Quality measures  - Fidencio Luciano does not use tobacco products.  - Patient's Body mass index is 23.15 kg/m². BMI is within normal parameters. No follow-up required.  - Current outpatient and discharge medications have been reconciled for the patient.  Reviewed by: ANDREW Dawson    The patient was in agreement with the plan and all questions were answered to his satisfaction.     Thank you so much for allowing us to participate in the care of Fidencio Luciano . Please do not hesitate to contact us with any questions or concerns.     I spent 15 minutes with Fidencio Luciano today. More than 50% of the time was spent in counseling / coordination of care for the above problems.      Electronically Signed by: ANDREW Dawson , December 7, 2018 12:20 PM       CC:   Gideon Charles MD

## 2018-12-07 NOTE — TELEPHONE ENCOUNTER
Patient called and wanted to know if he could have some medication called in for his irratable bowel? He stated he has diarrhea very bad.      Please and Thank you

## 2018-12-18 ENCOUNTER — OFFICE VISIT (OUTPATIENT)
Dept: GASTROENTEROLOGY | Facility: CLINIC | Age: 73
End: 2018-12-18

## 2018-12-18 ENCOUNTER — HOSPITAL ENCOUNTER (OUTPATIENT)
Dept: GENERAL RADIOLOGY | Facility: HOSPITAL | Age: 73
Discharge: HOME | End: 2018-12-18
Admitting: PHYSICIAN ASSISTANT

## 2018-12-18 VITALS
DIASTOLIC BLOOD PRESSURE: 54 MMHG | HEART RATE: 62 BPM | OXYGEN SATURATION: 81 % | WEIGHT: 171.4 LBS | SYSTOLIC BLOOD PRESSURE: 152 MMHG | HEIGHT: 71 IN | BODY MASS INDEX: 24 KG/M2

## 2018-12-18 DIAGNOSIS — R10.32 LLQ ABDOMINAL PAIN: ICD-10-CM

## 2018-12-18 DIAGNOSIS — R19.7 DIARRHEA, UNSPECIFIED TYPE: ICD-10-CM

## 2018-12-18 DIAGNOSIS — R19.7 DIARRHEA, UNSPECIFIED TYPE: Primary | ICD-10-CM

## 2018-12-18 PROCEDURE — 74019 RADEX ABDOMEN 2 VIEWS: CPT | Performed by: RADIOLOGY

## 2018-12-18 PROCEDURE — 74019 RADEX ABDOMEN 2 VIEWS: CPT

## 2018-12-18 PROCEDURE — 99213 OFFICE O/P EST LOW 20 MIN: CPT | Performed by: PHYSICIAN ASSISTANT

## 2018-12-18 RX ORDER — DOXYCYCLINE HYCLATE 100 MG/1
100 CAPSULE ORAL 2 TIMES DAILY
Refills: 0 | COMMUNITY
Start: 2018-12-14 | End: 2019-03-08

## 2018-12-18 RX ORDER — FAMCICLOVIR 500 MG/1
500 TABLET ORAL 2 TIMES DAILY
Refills: 0 | Status: ON HOLD | COMMUNITY
Start: 2018-12-14 | End: 2019-10-30

## 2018-12-18 NOTE — PROGRESS NOTES
": 1945    Chief Complaint   Patient presents with   • Diarrhea       Fidencio Luciano is a 73 y.o. male who presents to the office today as a follow up appointment regarding Diarrhea and Abdominal Pain.    History of Present Illness:  Since his last visit approx 6 months ago, he has gained 8 lbs and had previously complained of unexplained weight loss. He was concerned that he could have a gastric ulcer due to his severe abdominal pain and loss of appetite at that time. He underwent cardiac clearance then completed EGD and colonoscopy by Dr. Geronimo 2018. He would like to discuss those results. Findings were small sliding hiatal hernia and diverticulosis. Operative note states, \"The patient had minimal liquid stool but quite a bit of gas that required simethicone addition to the irrigation.\" Today, he is no longer having poor appetite and abdominal pain has changed. He reports that he felt good for several months but gradually had LLQ abdominal pain which is sharp and sporadic, duration is usually brief but it is always in the LLQ. He is also having several episodes of diarrhea which is usually watery in consistency. Denies any rectal bleeding. He is still taking Protonix 40 mg BID with good control of GERD.     3/14/2018 Abdominal film  1. Moderate stool in the colon.  2. Lung bases are clear.  3. No evidence of bowel obstruction.  4. Arthritic change in the spine.     Previous colonoscopy was 2015 by Dr. Martinez and diverticulosis of the sigmoid colon and internal hemorrhoids were noted. EGD in  showed esophagitis.     Review of Systems   Constitutional: Positive for chills and fatigue. Negative for fever.   HENT: Positive for trouble swallowing.    Eyes: Positive for visual disturbance.   Respiratory: Positive for apnea and shortness of breath.    Cardiovascular: Negative for chest pain.   Gastrointestinal: Positive for abdominal distention, abdominal pain, constipation, diarrhea, nausea and rectal " pain. Negative for anal bleeding, blood in stool and vomiting.   Endocrine: Positive for cold intolerance and heat intolerance.   Genitourinary: Positive for difficulty urinating.   Musculoskeletal: Positive for arthralgias, back pain and myalgias.   Skin: Positive for wound. Negative for color change, pallor and rash.   Allergic/Immunologic: Negative for environmental allergies and food allergies.   Neurological: Positive for dizziness and light-headedness.   Hematological: Bruises/bleeds easily.   Psychiatric/Behavioral: Positive for sleep disturbance. The patient is nervous/anxious.        Past Medical History:   Diagnosis Date   • BPH (benign prostatic hyperplasia)    • Bradycardia     Positive tilt table testing 07/2016   • Coronary artery disease    • Diabetes mellitus (CMS/HCC)    • Diverticulosis    • Gastric ulcer with hemorrhage    • History of transfusion    • Hyperlipidemia    • Hypertension    • Psoriasis        Past Surgical History:   Procedure Laterality Date   • BACK SURGERY     • CARDIAC CATHETERIZATION N/A 9/20/2016    Procedure: Left Heart Cath;  Surgeon: Giovanni Buenrostro MD;  Location: [facility] INVASIVE LOCATION;  Service:    • CARDIAC CATHETERIZATION N/A 10/4/2016    Procedure: Left Heart Cath;  Surgeon: Bharathi Andrew MD;  Location: [facility] INVASIVE LOCATION;  Service:    • CARDIAC CATHETERIZATION N/A 5/9/2017    Procedure: Left Heart Cath;  Surgeon: Giovanni Buenrostro MD;  Location: [facility] INVASIVE LOCATION;  Service:    • CARDIAC CATHETERIZATION N/A 5/9/2017    Procedure: ERR;  Surgeon: Giovanni Buenrostro MD;  Location: [facility] INVASIVE LOCATION;  Service:    • CHOLECYSTECTOMY     • COLONOSCOPY  11/13/2015    Dr. Martinez- internal hemorrhoids, sigmoid diverticulosis   • COLONOSCOPY N/A 8/17/2018    Procedure: COLONOSCOPY CPT CODE: 02400;  Surgeon: Gigi Geronimo MD;  Location: [facility];  Service: Gastroenterology   • CORONARY ANGIOPLASTY WITH STENT PLACEMENT     • ENDOSCOPY  N/A 2018    Procedure: ESOPHAGOGASTRODUODENOSCOPY WITH BIOPSY CPT CODE: 85370;  Surgeon: Gigi Geronimo MD;  Location: [facility] OR;  Service: Gastroenterology   • WI RT/LT HEART CATHETERS N/A 2017    Procedure: Percutaneous Coronary Intervention;  Surgeon: Lincoln De Los Santos MD;  Location: [facility] CATH INVASIVE LOCATION;  Service: Cardiology   • STOMACH SURGERY      FUSION OF BLEEDING ULCER   • UPPER GASTROINTESTINAL ENDOSCOPY  2015    Dr. Martinez- mild gastritis       Family History   Problem Relation Age of Onset   • Sick sinus syndrome Mother    • Heart failure Mother    • Diabetes Mother    • Liver cancer Father        Social History     Socioeconomic History   • Marital status:      Spouse name: Not on file   • Number of children: Not on file   • Years of education: Not on file   • Highest education level: Not on file   Tobacco Use   • Smoking status: Former Smoker     Packs/day: 3.00     Years: 40.00     Pack years: 120.00     Types: Cigarettes     Last attempt to quit:      Years since quittin.0   • Smokeless tobacco: Former User     Quit date: 1986   Substance and Sexual Activity   • Alcohol use: No   • Drug use: No   • Sexual activity: Defer       Current Outpatient Medications:   •  aspirin 81 MG tablet, Take 81 mg by mouth Daily., Disp: , Rfl:   •  atorvastatin (LIPITOR) 80 MG tablet, Take 1 tablet by mouth Every Night., Disp: 90 tablet, Rfl: 5  •  clopidogrel (PLAVIX) 75 MG tablet, Take 1 tablet by mouth Daily., Disp: 30 tablet, Rfl: 5  •  cyclobenzaprine (FLEXERIL) 5 MG tablet, Take 1 tablet by mouth 3 (Three) Times a Day As Needed for Muscle Spasms., Disp: 30 tablet, Rfl: 0  •  doxycycline (VIBRAMYCIN) 100 MG capsule, Take 100 mg by mouth 2 (Two) Times a Day., Disp: , Rfl: 0  •  famciclovir (FAMVIR) 500 MG tablet, Take 500 mg by mouth 2 (Two) Times a Day., Disp: , Rfl: 0  •  ferrous sulfate 325 (65 FE) MG tablet, Take 1 tablet by mouth 3 (Three) Times a Day With  "Meals., Disp: 90 tablet, Rfl: 5  •  finasteride (PROSCAR) 5 MG tablet, Take 1 tablet by mouth Daily., Disp: 90 tablet, Rfl: 3  •  isosorbide mononitrate (IMDUR) 60 MG 24 hr tablet, Take 1 tablet by mouth Daily., Disp: 30 tablet, Rfl: 3  •  lisinopril (PRINIVIL,ZESTRIL) 5 MG tablet, TAKE ONE TABLET BY MOUTH EVERY DAY FOR BLOOD PRESSURE, Disp: 30 tablet, Rfl: 2  •  Memantine HCl-Donepezil HCl (NAMZARIC PO), Take  by mouth., Disp: , Rfl:   •  metFORMIN (GLUCOPHAGE) 500 MG tablet, Take 500 mg by mouth Daily With Breakfast. 1/2 tab QD., Disp: , Rfl:   •  NITROSTAT 0.4 MG SL tablet, PLACE 1 TABLET UNDER THE TONGUE EVERY FIVE MINUTES AS NEEDED FOR CHEST PAIN FOR UP TO 3 DOSES, Disp: 25 tablet, Rfl: 1  •  pantoprazole (PROTONIX) 40 MG EC tablet, Take 1 tablet by mouth 2 (Two) Times a Day Before Meals., Disp: 60 tablet, Rfl: 5  •  polyethylene glycol (GoLYTELY) 236 g solution, Starting at 6pm the day before procedure, drink 8 ounces every 30 minutes until all gone or stools are clear. May add flavor packet., Disp: 4000 mL, Rfl: 0  •  ranolazine (RANEXA) 1000 MG 12 hr tablet, Take 1 tablet by mouth Every 12 (Twelve) Hours., Disp: 60 tablet, Rfl: 5  •  Suvorexant (BELSOMRA PO), Take 20 mg by mouth Every Night., Disp: , Rfl:     Allergies:   Patient has no known allergies.    Vitals:  /54 (BP Location: Left arm, Patient Position: Sitting, Cuff Size: Adult)   Pulse 62   Ht 180.3 cm (71\")   Wt 77.7 kg (171 lb 6.4 oz)   SpO2 (!) 81%   BMI 23.91 kg/m²     Physical Exam   Constitutional: He is oriented to person, place, and time. He appears well-developed and well-nourished. No distress.   HENT:   Head: Normocephalic and atraumatic.   Nose: Nose normal.   Mouth/Throat: Oropharynx is clear and moist.   Eyes: Conjunctivae are normal. Right eye exhibits no discharge. Left eye exhibits no discharge. No scleral icterus.   Neck: Normal range of motion. No JVD present.   Cardiovascular: Normal rate, regular rhythm and normal " heart sounds. Exam reveals no gallop and no friction rub.   No murmur heard.  Pulmonary/Chest: Effort normal and breath sounds normal. No respiratory distress. He has no wheezes. He has no rales. He exhibits no tenderness.   Abdominal: Soft. Bowel sounds are normal. He exhibits no mass. There is tenderness (LUQ and LLQ).   Musculoskeletal: Normal range of motion. He exhibits no edema or deformity.   Neurological: He is alert and oriented to person, place, and time. Coordination normal.   Skin: Skin is warm and dry. No rash noted. He is not diaphoretic. No erythema.   Psychiatric: He has a normal mood and affect. His behavior is normal. Judgment and thought content normal.   Vitals reviewed.      Assessment/Plan:  1. Diarrhea, unspecified type    2. LLQ abdominal pain      Orders Placed This Encounter   Procedures   • XR Abdomen Flat & Upright     I discussed with him that it is likely that constipation is causing his left sided abdominal discomfort and watery diarrhea. He will complete abdominal film today and will be called with those results and more recommendations will be made at that time.         Return if symptoms worsen or fail to improve.      Electronically signed 12/28/2018 9:00 AM  Hiral Fierro PA-C, [facility]

## 2018-12-19 ENCOUNTER — TELEPHONE (OUTPATIENT)
Dept: GASTROENTEROLOGY | Facility: CLINIC | Age: 73
End: 2018-12-19

## 2018-12-19 DIAGNOSIS — K59.04 CHRONIC IDIOPATHIC CONSTIPATION: Primary | ICD-10-CM

## 2019-01-02 NOTE — TELEPHONE ENCOUNTER
We received a statement back on 12-31-18 stating that patient was not found on file with the drug coverage we submitted referral to. I will call patient's pharamacy and ask information again.

## 2019-01-03 ENCOUNTER — OFFICE VISIT (OUTPATIENT)
Dept: CARDIOLOGY | Facility: CLINIC | Age: 74
End: 2019-01-03

## 2019-01-03 VITALS
HEART RATE: 69 BPM | OXYGEN SATURATION: 96 % | BODY MASS INDEX: 24.02 KG/M2 | SYSTOLIC BLOOD PRESSURE: 152 MMHG | WEIGHT: 171.6 LBS | HEIGHT: 71 IN | DIASTOLIC BLOOD PRESSURE: 61 MMHG | RESPIRATION RATE: 18 BRPM

## 2019-01-03 DIAGNOSIS — I25.10 ASCVD (ARTERIOSCLEROTIC CARDIOVASCULAR DISEASE): ICD-10-CM

## 2019-01-03 DIAGNOSIS — E11.9 TYPE 2 DIABETES MELLITUS WITHOUT COMPLICATION, WITHOUT LONG-TERM CURRENT USE OF INSULIN (HCC): ICD-10-CM

## 2019-01-03 DIAGNOSIS — I10 ESSENTIAL HYPERTENSION: ICD-10-CM

## 2019-01-03 DIAGNOSIS — I25.10 ASCVD (ARTERIOSCLEROTIC CARDIOVASCULAR DISEASE): Primary | ICD-10-CM

## 2019-01-03 DIAGNOSIS — I25.41 CORONARY ARTERY FISTULA: ICD-10-CM

## 2019-01-03 DIAGNOSIS — R07.2 PRECORDIAL PAIN: ICD-10-CM

## 2019-01-03 PROBLEM — IMO0001 NORMAL CARDIAC STRESS TEST: Status: RESOLVED | Noted: 2017-01-09 | Resolved: 2019-01-03

## 2019-01-03 PROCEDURE — 99214 OFFICE O/P EST MOD 30 MIN: CPT | Performed by: INTERNAL MEDICINE

## 2019-01-03 RX ORDER — LISINOPRIL 5 MG/1
10 TABLET ORAL DAILY
Qty: 60 TABLET | Refills: 5 | Status: SHIPPED | OUTPATIENT
Start: 2019-01-03 | End: 2019-04-01 | Stop reason: SDUPTHER

## 2019-01-03 RX ORDER — RANOLAZINE 1000 MG/1
1000 TABLET, EXTENDED RELEASE ORAL EVERY 12 HOURS SCHEDULED
Qty: 6028 TABLET | Refills: 0 | COMMUNITY
Start: 2019-01-03 | End: 2019-02-08 | Stop reason: SDUPTHER

## 2019-01-03 NOTE — PROGRESS NOTES
Gideon Charles MD  Fidencio Luciano  1945  01/03/2019    Patient Active Problem List   Diagnosis   • Near syncope   • Dizziness   • Palpitations   • Essential hypertension   • Diabetes mellitus (CMS/HCC)   • Benign prostatic hyperplasia   • Precordial pain   • ASCVD (arteriosclerotic cardiovascular disease), s/p stenting of 80 % stenosis in left circumflex on 10/05/16and 60% stenosis in the LAD that was left alone.   • Dyslipidemia   • Weakness generalized   • Chronic fatigue   • Coronary artery fistula   • Weight loss, unintentional   • Iron deficiency anemia   • Iron deficiency   • Normocytic anemia   • Weight loss, abnormal   • History of gastric ulcer   • Intractable vomiting with nausea       Dear Gideon Charles MD:    Subjective     Fidencio Luciano is a 73 y.o. male with the problems as listed above, presents    Chief complaint: Follow-up of chest pains and history of coronary artery disease.    History of Present Illness: Ms. Mariscal is a pleasant 71-year-old  male with a history of coronary artery disease, status post previous PCI and also a coronary fistula that is being monitored clinically.He iIs here for regular cardiology follow-up.  He denies any significant anginal symptoms since the last visit.  He has had shingles recently from which he is recovering.  He has some intermittent NYHA class III dyspnea with no PND, orthopnea or pedal edema.    No Known Allergies:      Current Outpatient Medications:   •  aspirin 81 MG tablet, Take 81 mg by mouth Daily., Disp: , Rfl:   •  atorvastatin (LIPITOR) 80 MG tablet, Take 1 tablet by mouth Every Night., Disp: 90 tablet, Rfl: 5  •  clopidogrel (PLAVIX) 75 MG tablet, Take 1 tablet by mouth Daily., Disp: 30 tablet, Rfl: 5  •  cyclobenzaprine (FLEXERIL) 5 MG tablet, Take 1 tablet by mouth 3 (Three) Times a Day As Needed for Muscle Spasms., Disp: 30 tablet, Rfl: 0  •  doxycycline (VIBRAMYCIN) 100 MG capsule, Take 100 mg by mouth 2 (Two) Times a Day., Disp:  , Rfl: 0  •  famciclovir (FAMVIR) 500 MG tablet, Take 500 mg by mouth 2 (Two) Times a Day., Disp: , Rfl: 0  •  ferrous sulfate 325 (65 FE) MG tablet, Take 1 tablet by mouth 3 (Three) Times a Day With Meals., Disp: 90 tablet, Rfl: 5  •  finasteride (PROSCAR) 5 MG tablet, Take 1 tablet by mouth Daily., Disp: 90 tablet, Rfl: 3  •  isosorbide mononitrate (IMDUR) 60 MG 24 hr tablet, Take 1 tablet by mouth Daily., Disp: 30 tablet, Rfl: 3  •  lisinopril (PRINIVIL,ZESTRIL) 5 MG tablet, Take 2 tablets by mouth Daily. for blood pressure, Disp: 60 tablet, Rfl: 5  •  Memantine HCl-Donepezil HCl (NAMZARIC PO), Take  by mouth., Disp: , Rfl:   •  metFORMIN (GLUCOPHAGE) 500 MG tablet, Take 500 mg by mouth Daily With Breakfast. 1/2 tab QD., Disp: , Rfl:   •  NITROSTAT 0.4 MG SL tablet, PLACE 1 TABLET UNDER THE TONGUE EVERY FIVE MINUTES AS NEEDED FOR CHEST PAIN FOR UP TO 3 DOSES, Disp: 25 tablet, Rfl: 1  •  pantoprazole (PROTONIX) 40 MG EC tablet, Take 1 tablet by mouth 2 (Two) Times a Day Before Meals., Disp: 60 tablet, Rfl: 5  •  Plecanatide 3 MG tablet, Take 3 mg by mouth Daily., Disp: 30 tablet, Rfl: 5  •  polyethylene glycol (GoLYTELY) 236 g solution, Starting at 6pm the day before procedure, drink 8 ounces every 30 minutes until all gone or stools are clear. May add flavor packet., Disp: 4000 mL, Rfl: 0  •  ranolazine (RANEXA) 1000 MG 12 hr tablet, Take 1 tablet by mouth Every 12 (Twelve) Hours., Disp: 60 tablet, Rfl: 5  •  Suvorexant (BELSOMRA PO), Take 20 mg by mouth Every Night., Disp: , Rfl:       The following portions of the patient's history were reviewed and updated as appropriate: allergies, current medications, past family history, past medical history, past social history, past surgical history and problem list.    Social History     Tobacco Use   • Smoking status: Former Smoker     Packs/day: 3.00     Years: 40.00     Pack years: 120.00     Types: Cigarettes     Last attempt to quit: 1982     Years since quitting:  "37.0   • Smokeless tobacco: Former User     Quit date: 1/16/1986   Substance Use Topics   • Alcohol use: No   • Drug use: No       Review of Systems   Constitution: Positive for weakness and malaise/fatigue.   Cardiovascular: Positive for palpitations. Negative for chest pain, claudication, cyanosis, dyspnea on exertion, irregular heartbeat, leg swelling, near-syncope, orthopnea, paroxysmal nocturnal dyspnea and syncope.   Respiratory: Negative for shortness of breath.    Musculoskeletal: Negative for falls.   Neurological: Positive for focal weakness, headaches and loss of balance. Negative for dizziness and light-headedness.   Psychiatric/Behavioral: The patient has insomnia.        Objective   Vitals:    01/03/19 1543   BP: 152/61   BP Location: Right arm   Patient Position: Sitting   Cuff Size: Adult   Pulse: 69   Resp: 18   SpO2: 96%   Weight: 77.8 kg (171 lb 9.6 oz)   Height: 180.3 cm (71\")     Body mass index is 23.93 kg/m².        Physical Exam   Constitutional: He is oriented to person, place, and time. He appears well-developed and well-nourished.   HENT:   Mouth/Throat: Oropharynx is clear and moist.   Eyes: EOM are normal. Pupils are equal, round, and reactive to light.   Neck: Neck supple. No JVD present. No tracheal deviation present. No thyromegaly present.   Cardiovascular: Normal rate, regular rhythm, S1 normal and S2 normal. Exam reveals no gallop and no friction rub.   No murmur heard.  Pulmonary/Chest: Effort normal and breath sounds normal.   Abdominal: Soft. Bowel sounds are normal. He exhibits no mass. There is no tenderness.   Musculoskeletal: Normal range of motion. He exhibits no edema.   Lymphadenopathy:     He has no cervical adenopathy.   Neurological: He is alert and oriented to person, place, and time.   Skin: Skin is warm and dry. No rash noted.   Psychiatric: He has a normal mood and affect.       Lab Results   Component Value Date     12/07/2018    K 3.6 12/07/2018     " 2018    CO2 27.3 2018    BUN 19 2018    CREATININE 0.78 2018    GLUCOSE 91 2018    CALCIUM 9.0 2018    AST 29 2018    ALT 33 2018    ALKPHOS 103 2018    LABIL2 1.4 (L) 2016     Lab Results   Component Value Date    CKTOTAL 79 10/05/2016     Lab Results   Component Value Date    WBC 5.74 2018    HGB 12.6 (L) 2018    HCT 37.5 (L) 2018     2018     Lab Results   Component Value Date    INR 1.06 10/03/2016    INR 1.01 2016     Lab Results   Component Value Date    MG 2.3 2018     Lab Results   Component Value Date    TSH 2.698 2018    PSA 1.110 2018    CHLPL 109 2015    TRIG 87 05/10/2017    HDL 26 (L) 05/10/2017    LDL 40 05/10/2017      Lab Results   Component Value Date    BNP 55.0 2016       Fidencio Luciano   Regadenoson Stress Test With Myocardial Perfusion SPECT (Multi Study)   Order# [id number]   Reading physician: Giovanni Buenrostro MD Ordering physician: Delbert Gupta PA-C Study date: 10/26/18   Patient Information     Patient Name  Fidencio Luciano MRN  [id number] Sex  Male  (Age)  1945 (73 y.o.)   Interpretation Summary   · Findings consistent with a normal ECG stress test.  · Myocardial perfusion imaging indicates a normal myocardial perfusion study with no evidence of ischemia.  · Normal LV cavity size. Normal LV wall motion noted.  · Left ventricular ejection fraction is hyperdynamic (Calculated EF > 70%).  · Impressions are consistent with a low risk study.          Procedures    Assessment/Plan    Diagnosis Plan   1. ASCVD (arteriosclerotic cardiovascular disease), s/p stenting of 80 % stenosis in left circumflex on 10/05/16and 60% stenosis in the LAD that was left alone.     2. Coronary artery fistula     3. Type 2 diabetes mellitus without complication, without long-term current use of insulin (CMS/[facility])  Comprehensive Metabolic Panel   4. Essential hypertension             Recommendations:  1. Increase lisinopril to 10 mg a day since his systolic blood pressures are running a bit high.  2. Check CMP in 1 week.  3. Continue aspirin, atorvastatin, Plavix, Imdur at current doses  4. Follow up in 3-4 months.    Return in about 3 months (around 4/3/2019).    As always, Gideon Charles MD  I appreciate very much the opportunity to participate in the cardiovascular care of your patients. Please do not hesitate to call me with any questions with regards to Fidencio Luciano's evaluation and management.           With Best Regards,        Giovanni Buenrostro MD, FACC    Dragon disclaimer:  Much of this encounter note is an electronic transcription/translation of spoken language to printed text. The electronic translation of spoken language may permit erroneous, or at times, nonsensical words or phrases to be inadvertently transcribed; Although I have reviewed the note for such errors, some may still exist.

## 2019-01-21 RX ORDER — LISINOPRIL 5 MG/1
TABLET ORAL
Qty: 30 TABLET | Refills: 2 | OUTPATIENT
Start: 2019-01-21

## 2019-02-05 ENCOUNTER — HOSPITAL ENCOUNTER (EMERGENCY)
Facility: HOSPITAL | Age: 74
Discharge: HOME | End: 2019-02-06
Attending: FAMILY MEDICINE | Admitting: FAMILY MEDICINE

## 2019-02-05 DIAGNOSIS — K59.00 CONSTIPATION, UNSPECIFIED CONSTIPATION TYPE: Primary | ICD-10-CM

## 2019-02-05 LAB
BASOPHILS # BLD AUTO: 0.07 10*3/MM3 (ref 0–0.3)
BASOPHILS NFR BLD AUTO: 0.8 % (ref 0–2)
DEPRECATED RDW RBC AUTO: 46.7 FL (ref 37–54)
EOSINOPHIL # BLD AUTO: 1.23 10*3/MM3 (ref 0–0.7)
EOSINOPHIL NFR BLD AUTO: 14.3 % (ref 0–7)
ERYTHROCYTE [DISTWIDTH] IN BLOOD BY AUTOMATED COUNT: 13.4 % (ref 11.5–14.5)
HCT VFR BLD AUTO: 36.9 % (ref 42–52)
HGB BLD-MCNC: 12.6 G/DL (ref 14–18)
IMM GRANULOCYTES # BLD AUTO: 0.04 10*3/MM3 (ref 0–0.03)
IMM GRANULOCYTES NFR BLD AUTO: 0.5 % (ref 0–0.5)
LYMPHOCYTES # BLD AUTO: 2.27 10*3/MM3 (ref 1–3)
LYMPHOCYTES NFR BLD AUTO: 26.3 % (ref 16–46)
MCH RBC QN AUTO: 33.1 PG (ref 27–33)
MCHC RBC AUTO-ENTMCNC: 34.1 G/DL (ref 33–37)
MCV RBC AUTO: 96.9 FL (ref 80–94)
MONOCYTES # BLD AUTO: 0.76 10*3/MM3 (ref 0.1–0.9)
MONOCYTES NFR BLD AUTO: 8.8 % (ref 0–12)
NEUTROPHILS # BLD AUTO: 4.25 10*3/MM3 (ref 1.4–6.5)
NEUTROPHILS NFR BLD AUTO: 49.3 % (ref 40–75)
PLATELET # BLD AUTO: 213 10*3/MM3 (ref 130–400)
PMV BLD AUTO: 10.2 FL (ref 6–10)
RBC # BLD AUTO: 3.81 10*6/MM3 (ref 4.7–6.1)
WBC NRBC COR # BLD: 8.62 10*3/MM3 (ref 4.5–12.5)

## 2019-02-05 PROCEDURE — 36415 COLL VENOUS BLD VENIPUNCTURE: CPT

## 2019-02-05 PROCEDURE — 83690 ASSAY OF LIPASE: CPT | Performed by: PHYSICIAN ASSISTANT

## 2019-02-05 PROCEDURE — 80053 COMPREHEN METABOLIC PANEL: CPT | Performed by: PHYSICIAN ASSISTANT

## 2019-02-05 PROCEDURE — 85025 COMPLETE CBC W/AUTO DIFF WBC: CPT | Performed by: PHYSICIAN ASSISTANT

## 2019-02-05 PROCEDURE — 99284 EMERGENCY DEPT VISIT MOD MDM: CPT

## 2019-02-05 RX ORDER — SODIUM CHLORIDE 0.9 % (FLUSH) 0.9 %
10 SYRINGE (ML) INJECTION AS NEEDED
Status: DISCONTINUED | OUTPATIENT
Start: 2019-02-05 | End: 2019-02-06 | Stop reason: HOSPADM

## 2019-02-06 ENCOUNTER — APPOINTMENT (OUTPATIENT)
Dept: CT IMAGING | Facility: HOSPITAL | Age: 74
End: 2019-02-06

## 2019-02-06 VITALS
DIASTOLIC BLOOD PRESSURE: 78 MMHG | HEIGHT: 71 IN | OXYGEN SATURATION: 100 % | SYSTOLIC BLOOD PRESSURE: 154 MMHG | HEART RATE: 68 BPM | WEIGHT: 165 LBS | BODY MASS INDEX: 23.1 KG/M2 | TEMPERATURE: 98.2 F | RESPIRATION RATE: 18 BRPM

## 2019-02-06 LAB
ALBUMIN SERPL-MCNC: 4 G/DL (ref 3.4–4.8)
ALBUMIN/GLOB SERPL: 1.4 G/DL (ref 1.5–2.5)
ALP SERPL-CCNC: 100 U/L (ref 40–129)
ALT SERPL W P-5'-P-CCNC: 28 U/L (ref 10–44)
ANION GAP SERPL CALCULATED.3IONS-SCNC: 4.9 MMOL/L (ref 3.6–11.2)
AST SERPL-CCNC: 25 U/L (ref 10–34)
BILIRUB SERPL-MCNC: 0.5 MG/DL (ref 0.2–1.8)
BILIRUB UR QL STRIP: NEGATIVE
BUN BLD-MCNC: 16 MG/DL (ref 7–21)
BUN/CREAT SERPL: 19.5 (ref 7–25)
CALCIUM SPEC-SCNC: 8.9 MG/DL (ref 7.7–10)
CHLORIDE SERPL-SCNC: 110 MMOL/L (ref 99–112)
CLARITY UR: CLEAR
CO2 SERPL-SCNC: 27.1 MMOL/L (ref 24.3–31.9)
COLOR UR: YELLOW
CREAT BLD-MCNC: 0.82 MG/DL (ref 0.43–1.29)
GFR SERPL CREATININE-BSD FRML MDRD: 92 ML/MIN/1.73
GLOBULIN UR ELPH-MCNC: 2.8 GM/DL
GLUCOSE BLD-MCNC: 100 MG/DL (ref 70–110)
GLUCOSE UR STRIP-MCNC: NEGATIVE MG/DL
HGB UR QL STRIP.AUTO: NEGATIVE
KETONES UR QL STRIP: NEGATIVE
LEUKOCYTE ESTERASE UR QL STRIP.AUTO: NEGATIVE
LIPASE SERPL-CCNC: 27 U/L (ref 13–60)
NITRITE UR QL STRIP: NEGATIVE
OSMOLALITY SERPL CALC.SUM OF ELEC: 284.4 MOSM/KG (ref 273–305)
PH UR STRIP.AUTO: 7.5 [PH] (ref 5–8)
POTASSIUM BLD-SCNC: 4 MMOL/L (ref 3.5–5.3)
PROT SERPL-MCNC: 6.8 G/DL (ref 6–8)
PROT UR QL STRIP: NEGATIVE
SODIUM BLD-SCNC: 142 MMOL/L (ref 135–153)
SP GR UR STRIP: <=1.005 (ref 1–1.03)
UROBILINOGEN UR QL STRIP: NORMAL

## 2019-02-06 PROCEDURE — 25010000002 IOPAMIDOL 61 % SOLUTION: Performed by: FAMILY MEDICINE

## 2019-02-06 PROCEDURE — 81003 URINALYSIS AUTO W/O SCOPE: CPT | Performed by: PHYSICIAN ASSISTANT

## 2019-02-06 PROCEDURE — 74177 CT ABD & PELVIS W/CONTRAST: CPT

## 2019-02-06 PROCEDURE — 74177 CT ABD & PELVIS W/CONTRAST: CPT | Performed by: RADIOLOGY

## 2019-02-06 RX ORDER — MAGNESIUM CARB/ALUMINUM HYDROX 105-160MG
150 TABLET,CHEWABLE ORAL ONCE
Status: COMPLETED | OUTPATIENT
Start: 2019-02-06 | End: 2019-02-06

## 2019-02-06 RX ADMIN — CITROMA MAGNESIUM CITRATE 150 ML: 1.75 LIQUID ORAL at 02:56

## 2019-02-06 RX ADMIN — IOPAMIDOL 95 ML: 612 INJECTION, SOLUTION INTRAVENOUS at 00:59

## 2019-02-06 NOTE — ED PROVIDER NOTES
Subjective     History provided by:  Patient   used: No    Constipation   Severity:  Mild  Time since last bowel movement:  1 week  Timing:  Constant  Progression:  Worsening  Chronicity:  New  Stool description:  None produced  Relieved by:  Nothing  Worsened by:  Nothing  Ineffective treatments:  Miralax  Associated symptoms: abdominal pain and nausea        Review of Systems   Constitutional: Negative.    HENT: Negative.    Eyes: Negative.    Respiratory: Negative.    Cardiovascular: Negative.    Gastrointestinal: Positive for abdominal distention, abdominal pain, constipation and nausea.   Endocrine: Negative.    Genitourinary: Negative.    Musculoskeletal: Negative.    Skin: Negative.    Allergic/Immunologic: Negative.    Neurological: Negative.    Hematological: Negative.    Psychiatric/Behavioral: Negative.    All other systems reviewed and are negative.      Past Medical History:   Diagnosis Date   • BPH (benign prostatic hyperplasia)    • Bradycardia     Positive tilt table testing 07/2016   • Coronary artery disease    • Diabetes mellitus (CMS/HCC)    • Diverticulosis    • Gastric ulcer with hemorrhage    • History of transfusion    • Hyperlipidemia    • Hypertension    • Psoriasis        No Known Allergies    Past Surgical History:   Procedure Laterality Date   • BACK SURGERY     • CARDIAC CATHETERIZATION N/A 9/20/2016    Procedure: Left Heart Cath;  Surgeon: Giovanni Buenrostro MD;  Location: [facility] INVASIVE LOCATION;  Service:    • CARDIAC CATHETERIZATION N/A 10/4/2016    Procedure: Left Heart Cath;  Surgeon: Bharathi Andrew MD;  Location: [facility] INVASIVE LOCATION;  Service:    • CARDIAC CATHETERIZATION N/A 5/9/2017    Procedure: Left Heart Cath;  Surgeon: Giovanni Buenrostro MD;  Location: [facility] CATH INVASIVE LOCATION;  Service:    • CARDIAC CATHETERIZATION N/A 5/9/2017    Procedure: ERR;  Surgeon: Giovanni Buenrostro MD;  Location: [facility] INVASIVE LOCATION;  Service:    •  CHOLECYSTECTOMY     • COLONOSCOPY  2015    Dr. Martinez- internal hemorrhoids, sigmoid diverticulosis   • COLONOSCOPY N/A 2018    Procedure: COLONOSCOPY CPT CODE: 58104;  Surgeon: Gigi Geronimo MD;  Location: [facility] OR;  Service: Gastroenterology   • CORONARY ANGIOPLASTY WITH STENT PLACEMENT     • ENDOSCOPY N/A 2018    Procedure: ESOPHAGOGASTRODUODENOSCOPY WITH BIOPSY CPT CODE: 14232;  Surgeon: Gigi Geronimo MD;  Location: [facility] OR;  Service: Gastroenterology   • DC RT/LT HEART CATHETERS N/A 2017    Procedure: Percutaneous Coronary Intervention;  Surgeon: Lincoln De Los Santos MD;  Location: [facility] CATH INVASIVE LOCATION;  Service: Cardiology   • STOMACH SURGERY      FUSION OF BLEEDING ULCER   • UPPER GASTROINTESTINAL ENDOSCOPY  2015    Dr. Martinez- mild gastritis       Family History   Problem Relation Age of Onset   • Sick sinus syndrome Mother    • Heart failure Mother    • Diabetes Mother    • Liver cancer Father        Social History     Socioeconomic History   • Marital status:      Spouse name: Not on file   • Number of children: Not on file   • Years of education: Not on file   • Highest education level: Not on file   Tobacco Use   • Smoking status: Former Smoker     Packs/day: 3.00     Years: 40.00     Pack years: 120.00     Types: Cigarettes     Last attempt to quit:      Years since quittin.1   • Smokeless tobacco: Former User     Quit date: 1986   Substance and Sexual Activity   • Alcohol use: No   • Drug use: No   • Sexual activity: Defer           Objective   Physical Exam   Constitutional: He is oriented to person, place, and time. He appears well-developed and well-nourished.   HENT:   Head: Normocephalic and atraumatic.   Right Ear: External ear normal.   Left Ear: External ear normal.   Nose: Nose normal.   Mouth/Throat: Oropharynx is clear and moist.   Eyes: Conjunctivae and EOM are normal. Pupils are equal, round, and reactive to light.   Neck:  Normal range of motion. Neck supple.   Cardiovascular: Normal rate, regular rhythm, normal heart sounds and intact distal pulses.   Pulmonary/Chest: Effort normal and breath sounds normal.   Abdominal: Soft. Bowel sounds are normal. He exhibits no distension and no mass. There is no tenderness. There is no rebound and no guarding. No hernia.   Genitourinary: Rectum normal.   Musculoskeletal: Normal range of motion.   Neurological: He is alert and oriented to person, place, and time.   Skin: Skin is warm and dry.   Nursing note and vitals reviewed.      Procedures           ED Course  ED Course as of Feb 06 0228 Wed Feb 06, 2019 0222 Urinary bladder is moderately distended but otherwise unremarkable. If the patient is unable to void a terrell catheter may be of benefit per [facility]  CT Abdomen Pelvis With Contrast [ML]   0223 Rectal exam normal. No fecal impaction.  DIOR Sheriff at bedside.   [ML]   0225 Pt was able to urinate without any difficulty.    [ML]      ED Course User Index  [ML] Marissa Tello PA                  [facility]      Final diagnoses:   Constipation, unspecified constipation type            Marissa Tello PA  02/06/19 0228

## 2019-02-08 DIAGNOSIS — I25.10 ASCVD (ARTERIOSCLEROTIC CARDIOVASCULAR DISEASE): ICD-10-CM

## 2019-02-08 DIAGNOSIS — R07.2 PRECORDIAL PAIN: ICD-10-CM

## 2019-02-08 RX ORDER — RANOLAZINE 1000 MG/1
1000 TABLET, EXTENDED RELEASE ORAL EVERY 12 HOURS SCHEDULED
Qty: 6028 TABLET | Refills: 0 | Status: ON HOLD | COMMUNITY
Start: 2019-02-08 | End: 2019-10-30

## 2019-02-13 ENCOUNTER — HOSPITAL ENCOUNTER (EMERGENCY)
Facility: HOSPITAL | Age: 74
Discharge: HOME | End: 2019-02-14
Admitting: NURSE PRACTITIONER

## 2019-02-13 DIAGNOSIS — J06.9 UPPER RESPIRATORY TRACT INFECTION, UNSPECIFIED TYPE: Primary | ICD-10-CM

## 2019-02-13 LAB — S PYO AG THROAT QL: NEGATIVE

## 2019-02-13 PROCEDURE — 87804 INFLUENZA ASSAY W/OPTIC: CPT | Performed by: FAMILY MEDICINE

## 2019-02-13 PROCEDURE — 99283 EMERGENCY DEPT VISIT LOW MDM: CPT

## 2019-02-13 PROCEDURE — 87081 CULTURE SCREEN ONLY: CPT | Performed by: FAMILY MEDICINE

## 2019-02-13 PROCEDURE — 87880 STREP A ASSAY W/OPTIC: CPT | Performed by: FAMILY MEDICINE

## 2019-02-14 ENCOUNTER — PATIENT OUTREACH (OUTPATIENT)
Dept: CASE MANAGEMENT | Facility: OTHER | Age: 74
End: 2019-02-14

## 2019-02-14 VITALS
SYSTOLIC BLOOD PRESSURE: 164 MMHG | BODY MASS INDEX: 24.08 KG/M2 | TEMPERATURE: 98 F | OXYGEN SATURATION: 97 % | DIASTOLIC BLOOD PRESSURE: 73 MMHG | HEIGHT: 71 IN | HEART RATE: 60 BPM | RESPIRATION RATE: 20 BRPM | WEIGHT: 172 LBS

## 2019-02-14 LAB
FLUAV AG NPH QL: NEGATIVE
FLUBV AG NPH QL IA: NEGATIVE

## 2019-02-14 PROCEDURE — 63710000001 PREDNISONE PER 1 MG: Performed by: NURSE PRACTITIONER

## 2019-02-14 PROCEDURE — 63710000001 PREDNISONE PER 5 MG: Performed by: NURSE PRACTITIONER

## 2019-02-14 RX ORDER — AMOXICILLIN AND CLAVULANATE POTASSIUM 875; 125 MG/1; MG/1
1 TABLET, FILM COATED ORAL ONCE
Status: COMPLETED | OUTPATIENT
Start: 2019-02-14 | End: 2019-02-14

## 2019-02-14 RX ORDER — PREDNISONE 50 MG/1
50 TABLET ORAL DAILY
Qty: 4 TABLET | Refills: 0 | Status: ON HOLD | OUTPATIENT
Start: 2019-02-14 | End: 2019-10-30

## 2019-02-14 RX ORDER — AMOXICILLIN AND CLAVULANATE POTASSIUM 875; 125 MG/1; MG/1
1 TABLET, FILM COATED ORAL 2 TIMES DAILY
Qty: 20 TABLET | Refills: 0 | Status: SHIPPED | OUTPATIENT
Start: 2019-02-14 | End: 2019-02-24

## 2019-02-14 RX ADMIN — AMOXICILLIN AND CLAVULANATE POTASSIUM 1 TABLET: 875; 125 TABLET, FILM COATED ORAL at 00:55

## 2019-02-14 RX ADMIN — PREDNISONE 50 MG: 10 TABLET ORAL at 00:54

## 2019-02-15 LAB — BACTERIA SPEC AEROBE CULT: NORMAL

## 2019-02-15 NOTE — ED PROVIDER NOTES
Subjective     Flu Symptoms   Presenting symptoms: cough, fever, headache, myalgias and sore throat    Severity:  Moderate  Onset quality:  Sudden  Progression:  Worsening  Chronicity:  New  Relieved by:  None tried  Worsened by:  Nothing  Ineffective treatments:  None tried      Review of Systems   Constitutional: Positive for fever.   HENT: Positive for sore throat.    Respiratory: Positive for cough.    Cardiovascular: Negative.  Negative for chest pain.   Gastrointestinal: Negative.  Negative for abdominal pain.   Endocrine: Negative.    Genitourinary: Negative.  Negative for dysuria.   Musculoskeletal: Positive for myalgias.   Skin: Negative.    Neurological: Positive for headaches.   Psychiatric/Behavioral: Negative.    All other systems reviewed and are negative.      Past Medical History:   Diagnosis Date   • BPH (benign prostatic hyperplasia)    • Bradycardia     Positive tilt table testing 07/2016   • Coronary artery disease    • Diabetes mellitus (CMS/HCC)    • Diverticulosis    • Gastric ulcer with hemorrhage    • History of transfusion    • Hyperlipidemia    • Hypertension    • Psoriasis        No Known Allergies    Past Surgical History:   Procedure Laterality Date   • BACK SURGERY     • CARDIAC CATHETERIZATION N/A 9/20/2016    Procedure: Left Heart Cath;  Surgeon: Giovanni Buenrostro MD;  Location: [facility] INVASIVE LOCATION;  Service:    • CARDIAC CATHETERIZATION N/A 10/4/2016    Procedure: Left Heart Cath;  Surgeon: Bharathi Andrew MD;  Location: [facility] INVASIVE LOCATION;  Service:    • CARDIAC CATHETERIZATION N/A 5/9/2017    Procedure: Left Heart Cath;  Surgeon: Giovanni Buenrostro MD;  Location: [facility] INVASIVE LOCATION;  Service:    • CARDIAC CATHETERIZATION N/A 5/9/2017    Procedure: ERR;  Surgeon: Giovanni Buenrostro MD;  Location: [facility] INVASIVE LOCATION;  Service:    • CHOLECYSTECTOMY     • COLONOSCOPY  11/13/2015    Dr. Martinez- internal hemorrhoids, sigmoid diverticulosis   •  COLONOSCOPY N/A 2018    Procedure: COLONOSCOPY CPT CODE: 59875;  Surgeon: Gigi Geronimo MD;  Location: [facility] OR;  Service: Gastroenterology   • CORONARY ANGIOPLASTY WITH STENT PLACEMENT     • ENDOSCOPY N/A 2018    Procedure: ESOPHAGOGASTRODUODENOSCOPY WITH BIOPSY CPT CODE: 28064;  Surgeon: Gigi Geronimo MD;  Location: [facility] OR;  Service: Gastroenterology   • SC RT/LT HEART CATHETERS N/A 2017    Procedure: Percutaneous Coronary Intervention;  Surgeon: Lincoln De Los Santos MD;  Location: [facility] CATH INVASIVE LOCATION;  Service: Cardiology   • STOMACH SURGERY      FUSION OF BLEEDING ULCER   • UPPER GASTROINTESTINAL ENDOSCOPY  2015    Dr. Martinez- mild gastritis       Family History   Problem Relation Age of Onset   • Sick sinus syndrome Mother    • Heart failure Mother    • Diabetes Mother    • Liver cancer Father        Social History     Socioeconomic History   • Marital status:      Spouse name: Not on file   • Number of children: Not on file   • Years of education: Not on file   • Highest education level: Not on file   Tobacco Use   • Smoking status: Former Smoker     Packs/day: 3.00     Years: 40.00     Pack years: 120.00     Types: Cigarettes     Last attempt to quit:      Years since quittin.1   • Smokeless tobacco: Former User     Quit date: 1986   Substance and Sexual Activity   • Alcohol use: No   • Drug use: No   • Sexual activity: Defer           Objective   Physical Exam   Constitutional: He is oriented to person, place, and time. He appears well-developed and well-nourished. No distress.   HENT:   Head: Normocephalic and atraumatic.   Right Ear: External ear normal.   Left Ear: External ear normal.   Nose: Rhinorrhea present. Right sinus exhibits frontal sinus tenderness. Left sinus exhibits frontal sinus tenderness.   Mouth/Throat: Posterior oropharyngeal erythema present.   Eyes: Conjunctivae and EOM are normal. Pupils are equal, round, and reactive to  light.   Neck: Normal range of motion. Neck supple. No JVD present. No tracheal deviation present.   Cardiovascular: Normal rate, regular rhythm and normal heart sounds.   No murmur heard.  Pulmonary/Chest: Effort normal and breath sounds normal. No respiratory distress. He has no wheezes.   Abdominal: Soft. Bowel sounds are normal. There is no tenderness.   Musculoskeletal: Normal range of motion. He exhibits no edema or deformity.   Neurological: He is alert and oriented to person, place, and time. No cranial nerve deficit.   Skin: Skin is warm and dry. No rash noted. He is not diaphoretic. No erythema. No pallor.   Psychiatric: He has a normal mood and affect. His behavior is normal. Thought content normal.   Nursing note and vitals reviewed.      Procedures           ED Course                  MDM  Number of Diagnoses or Management Options  Upper respiratory tract infection, unspecified type: new and does not require workup     Amount and/or Complexity of Data Reviewed  Clinical lab tests: reviewed          Final diagnoses:   Upper respiratory tract infection, unspecified type            Madalyn Cuevas, APRN  02/15/19 0004

## 2019-03-05 RX ORDER — ATORVASTATIN CALCIUM 80 MG/1
80 TABLET, FILM COATED ORAL NIGHTLY
Qty: 90 TABLET | Refills: 5 | Status: SHIPPED | OUTPATIENT
Start: 2019-03-05 | End: 2020-04-29 | Stop reason: SDUPTHER

## 2019-03-08 ENCOUNTER — OFFICE VISIT (OUTPATIENT)
Dept: ONCOLOGY | Facility: CLINIC | Age: 74
End: 2019-03-08

## 2019-03-08 ENCOUNTER — LAB (OUTPATIENT)
Dept: ONCOLOGY | Facility: CLINIC | Age: 74
End: 2019-03-08

## 2019-03-08 VITALS
OXYGEN SATURATION: 98 % | SYSTOLIC BLOOD PRESSURE: 126 MMHG | DIASTOLIC BLOOD PRESSURE: 64 MMHG | HEART RATE: 78 BPM | WEIGHT: 173.2 LBS | TEMPERATURE: 96.9 F | RESPIRATION RATE: 18 BRPM | BODY MASS INDEX: 24.16 KG/M2

## 2019-03-08 DIAGNOSIS — B02.9 HERPES ZOSTER WITHOUT COMPLICATION: ICD-10-CM

## 2019-03-08 DIAGNOSIS — D50.9 IRON DEFICIENCY ANEMIA, UNSPECIFIED IRON DEFICIENCY ANEMIA TYPE: ICD-10-CM

## 2019-03-08 DIAGNOSIS — J06.9 UPPER RESPIRATORY TRACT INFECTION, UNSPECIFIED TYPE: ICD-10-CM

## 2019-03-08 DIAGNOSIS — D64.9 ANEMIA, UNSPECIFIED TYPE: Primary | ICD-10-CM

## 2019-03-08 LAB
ALBUMIN SERPL-MCNC: 4.2 G/DL (ref 3.4–4.8)
ALBUMIN/GLOB SERPL: 1.6 G/DL (ref 1.5–2.5)
ALP SERPL-CCNC: 111 U/L (ref 40–129)
ALT SERPL W P-5'-P-CCNC: 34 U/L (ref 10–44)
ANION GAP SERPL CALCULATED.3IONS-SCNC: 5.9 MMOL/L (ref 3.6–11.2)
AST SERPL-CCNC: 27 U/L (ref 10–34)
BASOPHILS # BLD AUTO: 0.07 10*3/MM3 (ref 0–0.3)
BASOPHILS NFR BLD AUTO: 0.8 % (ref 0–2)
BILIRUB SERPL-MCNC: 0.6 MG/DL (ref 0.2–1.8)
BUN BLD-MCNC: 21 MG/DL (ref 7–21)
BUN/CREAT SERPL: 23.1 (ref 7–25)
CALCIUM SPEC-SCNC: 9.1 MG/DL (ref 7.7–10)
CHLORIDE SERPL-SCNC: 111 MMOL/L (ref 99–112)
CO2 SERPL-SCNC: 25.1 MMOL/L (ref 24.3–31.9)
CREAT BLD-MCNC: 0.91 MG/DL (ref 0.43–1.29)
DEPRECATED RDW RBC AUTO: 46.6 FL (ref 37–54)
EOSINOPHIL # BLD AUTO: 0.78 10*3/MM3 (ref 0–0.7)
EOSINOPHIL NFR BLD AUTO: 9 % (ref 0–7)
ERYTHROCYTE [DISTWIDTH] IN BLOOD BY AUTOMATED COUNT: 13.6 % (ref 11.5–14.5)
FERRITIN SERPL-MCNC: 79 NG/ML (ref 21.9–321.7)
GFR SERPL CREATININE-BSD FRML MDRD: 82 ML/MIN/1.73
GLOBULIN UR ELPH-MCNC: 2.7 GM/DL
GLUCOSE BLD-MCNC: 130 MG/DL (ref 70–110)
HCT VFR BLD AUTO: 41.6 % (ref 42–52)
HGB BLD-MCNC: 13.7 G/DL (ref 14–18)
IMM GRANULOCYTES # BLD AUTO: 0.02 10*3/MM3 (ref 0–0.03)
IMM GRANULOCYTES NFR BLD AUTO: 0.2 % (ref 0–0.5)
IRON 24H UR-MRATE: 121 MCG/DL (ref 53–167)
IRON SATN MFR SERPL: 35 % (ref 20–50)
LYMPHOCYTES # BLD AUTO: 1.5 10*3/MM3 (ref 1–3)
LYMPHOCYTES NFR BLD AUTO: 17.3 % (ref 16–46)
MCH RBC QN AUTO: 32.5 PG (ref 27–33)
MCHC RBC AUTO-ENTMCNC: 32.9 G/DL (ref 33–37)
MCV RBC AUTO: 98.6 FL (ref 80–94)
MONOCYTES # BLD AUTO: 0.65 10*3/MM3 (ref 0.1–0.9)
MONOCYTES NFR BLD AUTO: 7.5 % (ref 0–12)
NEUTROPHILS # BLD AUTO: 5.64 10*3/MM3 (ref 1.4–6.5)
NEUTROPHILS NFR BLD AUTO: 65.2 % (ref 40–75)
OSMOLALITY SERPL CALC.SUM OF ELEC: 287.8 MOSM/KG (ref 273–305)
PLATELET # BLD AUTO: 208 10*3/MM3 (ref 130–400)
PMV BLD AUTO: 10.7 FL (ref 6–10)
POTASSIUM BLD-SCNC: 4.1 MMOL/L (ref 3.5–5.3)
PROT SERPL-MCNC: 6.9 G/DL (ref 6–8)
RBC # BLD AUTO: 4.22 10*6/MM3 (ref 4.7–6.1)
SODIUM BLD-SCNC: 142 MMOL/L (ref 135–153)
TIBC SERPL-MCNC: 346 MCG/DL (ref 241–421)
WBC NRBC COR # BLD: 8.66 10*3/MM3 (ref 4.5–12.5)

## 2019-03-08 PROCEDURE — 83550 IRON BINDING TEST: CPT | Performed by: NURSE PRACTITIONER

## 2019-03-08 PROCEDURE — 82728 ASSAY OF FERRITIN: CPT | Performed by: NURSE PRACTITIONER

## 2019-03-08 PROCEDURE — 80053 COMPREHEN METABOLIC PANEL: CPT | Performed by: NURSE PRACTITIONER

## 2019-03-08 PROCEDURE — 85025 COMPLETE CBC W/AUTO DIFF WBC: CPT | Performed by: NURSE PRACTITIONER

## 2019-03-08 PROCEDURE — 83540 ASSAY OF IRON: CPT | Performed by: NURSE PRACTITIONER

## 2019-03-08 PROCEDURE — 99213 OFFICE O/P EST LOW 20 MIN: CPT | Performed by: NURSE PRACTITIONER

## 2019-03-08 RX ORDER — FERROUS SULFATE 325(65) MG
325 TABLET ORAL
Qty: 90 TABLET | Refills: 4 | Status: ON HOLD | OUTPATIENT
Start: 2019-03-08 | End: 2021-05-24

## 2019-03-08 NOTE — PROGRESS NOTES
DATE:  3/8/2019    REASON FOR FOLLOW UP: ERROL    REFERRING PHYSICIAN:  Gideon Charles MD    CHIEF COMPLAINT:  Follow up of ERROL    HISTORY OF PRESENT ILLNESS:   Fidencio Luciano is a very pleasant 73 y.o. male who is being seen today at the request of Gideon Charles MD   for evaluation and treatment of Anemia. Mr. Luciano reports following with his PCP routinely with repeat labs every 4-6 months. He says he has had chronic anemia which was recently worsening in June. Previous available CBCs were reviewed and patient has had normocytic anemia with Hg ranging 11-13 g/dL since July 2015. CBC from June 2018 showed drop in Hg to 10.0. Patient's WBC and platelets have been in the normal range. Patient reports intermittent dark stool and bleeding per rectum for the past few months. He reports having upper/lower endoscopy approximately 3 years ago with Dr. Martinez which was negative for active bleeding. He says his PCP has referred him to Dr. Unger and he will see him next week. Patient denies any recent blood transfusions. His main complaint today is ongoing fatigue which has been recently worsening. He also reports decreased appetite and weight loss but he is unsure how much weight he has lost. Otherwise, he denies any other specific complaints.     Interval History:  Mr. Luciano presents today for follow up of ERROL. Since his last visit, overall, he has been doing well. He continues to take oral iron therapy, ferrous sulfate TID which he is tolerating well without any side effects. He again denies any blood loss from any source. He continues to have chronic fatigue but denies any worsening. For the past few weeks, he has been struggling with upper respiratory symptoms including RN, nasal congestion, cough and sore throat. He was seen in the ER a couple of weeks ago and was treated for URI with Augmentin (now completed). Overall, he says his symptoms have improved, although he continues to have RN and nasal congestion. He says he  takes an allergy pill daily. Denies any fevers/chills. He denies any other complaints today.     PAST MEDICAL HISTORY:  Past Medical History:   Diagnosis Date   • BPH (benign prostatic hyperplasia)    • Bradycardia     Positive tilt table testing 07/2016   • Coronary artery disease    • Diabetes mellitus (CMS/HCC)    • Diverticulosis    • Gastric ulcer with hemorrhage    • History of transfusion    • Hyperlipidemia    • Hypertension    • Psoriasis        PAST SURGICAL HISTORY:  Past Surgical History:   Procedure Laterality Date   • BACK SURGERY     • CARDIAC CATHETERIZATION N/A 9/20/2016    Procedure: Left Heart Cath;  Surgeon: Giovanni Buenrostro MD;  Location: [facility] CATH INVASIVE LOCATION;  Service:    • CARDIAC CATHETERIZATION N/A 10/4/2016    Procedure: Left Heart Cath;  Surgeon: Bharathi Andrew MD;  Location: [facility] CATH INVASIVE LOCATION;  Service:    • CARDIAC CATHETERIZATION N/A 5/9/2017    Procedure: Left Heart Cath;  Surgeon: Giovanni Buenrostro MD;  Location:  [facility] CATH INVASIVE LOCATION;  Service:    • CARDIAC CATHETERIZATION N/A 5/9/2017    Procedure: ERR;  Surgeon: Giovanni Buenrostro MD;  Location: [facility] CATH INVASIVE LOCATION;  Service:    • CHOLECYSTECTOMY     • COLONOSCOPY  11/13/2015    Dr. Martinez- internal hemorrhoids, sigmoid diverticulosis   • COLONOSCOPY N/A 8/17/2018    Procedure: COLONOSCOPY CPT CODE: 39838;  Surgeon: Gigi Geronimo MD;  Location: [facility];  Service: Gastroenterology   • CORONARY ANGIOPLASTY WITH STENT PLACEMENT     • ENDOSCOPY N/A 8/17/2018    Procedure: ESOPHAGOGASTRODUODENOSCOPY WITH BIOPSY CPT CODE: 57860;  Surgeon: Gigi Geronimo MD;  Location: [facility];  Service: Gastroenterology   • GA RT/LT HEART CATHETERS N/A 5/9/2017    Procedure: Percutaneous Coronary Intervention;  Surgeon: Lincoln De Los Santos MD;  Location: [facility] CATH INVASIVE LOCATION;  Service: Cardiology   • STOMACH SURGERY      FUSION OF BLEEDING ULCER   • UPPER GASTROINTESTINAL ENDOSCOPY  11/13/2015     Dr. Martinez- mild gastritis       FAMILY HISTORY:  Family History   Problem Relation Age of Onset   • Sick sinus syndrome Mother    • Heart failure Mother    • Diabetes Mother    • Liver cancer Father        SOCIAL HISTORY:  Social History     Socioeconomic History   • Marital status:      Spouse name: Not on file   • Number of children: Not on file   • Years of education: Not on file   • Highest education level: Not on file   Social Needs   • Financial resource strain: Not on file   • Food insecurity - worry: Not on file   • Food insecurity - inability: Not on file   • Transportation needs - medical: Not on file   • Transportation needs - non-medical: Not on file   Occupational History   • Not on file   Tobacco Use   • Smoking status: Former Smoker     Packs/day: 3.00     Years: 40.00     Pack years: 120.00     Types: Cigarettes     Last attempt to quit:      Years since quittin.2   • Smokeless tobacco: Former User     Quit date: 1986   Substance and Sexual Activity   • Alcohol use: No   • Drug use: No   • Sexual activity: Defer   Other Topics Concern   • Not on file   Social History Narrative   • Not on file     MEDICATIONS:  The current medication list was reviewed in the EMR    Current Outpatient Medications:   •  aspirin 81 MG tablet, Take 81 mg by mouth Daily., Disp: , Rfl:   •  atorvastatin (LIPITOR) 80 MG tablet, Take 1 tablet by mouth Every Night., Disp: 90 tablet, Rfl: 5  •  clopidogrel (PLAVIX) 75 MG tablet, Take 1 tablet by mouth Daily., Disp: 30 tablet, Rfl: 5  •  cyclobenzaprine (FLEXERIL) 5 MG tablet, Take 1 tablet by mouth 3 (Three) Times a Day As Needed for Muscle Spasms., Disp: 30 tablet, Rfl: 0  •  doxycycline (VIBRAMYCIN) 100 MG capsule, Take 100 mg by mouth 2 (Two) Times a Day., Disp: , Rfl: 0  •  famciclovir (FAMVIR) 500 MG tablet, Take 500 mg by mouth 2 (Two) Times a Day., Disp: , Rfl: 0  •  ferrous sulfate 325 (65 FE) MG tablet, Take 1 tablet by mouth 3 (Three) Times a Day  With Meals., Disp: 90 tablet, Rfl: 5  •  finasteride (PROSCAR) 5 MG tablet, Take 1 tablet by mouth Daily., Disp: 90 tablet, Rfl: 3  •  isosorbide mononitrate (IMDUR) 60 MG 24 hr tablet, Take 1 tablet by mouth Daily., Disp: 30 tablet, Rfl: 3  •  lisinopril (PRINIVIL,ZESTRIL) 5 MG tablet, Take 2 tablets by mouth Daily. for blood pressure, Disp: 60 tablet, Rfl: 5  •  Memantine HCl-Donepezil HCl (NAMZARIC PO), Take  by mouth., Disp: , Rfl:   •  metFORMIN (GLUCOPHAGE) 500 MG tablet, Take 500 mg by mouth Daily With Breakfast. 1/2 tab QD., Disp: , Rfl:   •  NITROSTAT 0.4 MG SL tablet, PLACE 1 TABLET UNDER THE TONGUE EVERY FIVE MINUTES AS NEEDED FOR CHEST PAIN FOR UP TO 3 DOSES, Disp: 25 tablet, Rfl: 1  •  pantoprazole (PROTONIX) 40 MG EC tablet, Take 1 tablet by mouth 2 (Two) Times a Day Before Meals., Disp: 60 tablet, Rfl: 5  •  Plecanatide 3 MG tablet, Take 3 mg by mouth Daily., Disp: 30 tablet, Rfl: 5  •  polyethylene glycol (GoLYTELY) 236 g solution, Starting at 6pm the day before procedure, drink 8 ounces every 30 minutes until all gone or stools are clear. May add flavor packet., Disp: 4000 mL, Rfl: 0  •  predniSONE (DELTASONE) 50 MG tablet, Take 1 tablet by mouth Daily., Disp: 4 tablet, Rfl: 0  •  ranolazine (RANEXA) 1000 MG 12 hr tablet, Take 1 tablet by mouth Every 12 (Twelve) Hours., Disp: 6028 tablet, Rfl: 0  •  Suvorexant (BELSOMRA PO), Take 20 mg by mouth Every Night., Disp: , Rfl:     ALLERGIES:  No Known Allergies    REVIEW OF SYSTEMS:    A comprehensive 14 point review of systems was performed.  Significant findings as mentioned above.  All other systems reviewed and are negative.      Physical Exam   Vital Signs: /64   Pulse 78   Temp 96.9 °F (36.1 °C) (Oral)   Resp 18   Wt 78.6 kg (173 lb 3.2 oz)   SpO2 98%   BMI 24.16 kg/m²    General: Well developed, well nourished, alert and oriented x 3, in no acute distress.   Head: ATNC   Eyes: PERRL, No evidence of conjunctivitis.   Nose: No nasal  discharge.   Mouth: Oral mucosal membranes moist. No oral ulceration or hemorrhages.   Neck: Neck supple. No thyromegaly. No JVD.   Lungs: Clear in all fields to A&P without rales, rhonchi or wheezing.   Heart: S1, S2. No murmurs, rubs, or gallops.   Abdomen: Soft. Bowel sounds are normoactive. Nontender with palpation. No Hepatosplenomegaly can be appreciated.   Extremities: No cyanosis or edema. Peripheral pulses palpable and equal bilaterally.   Neurologic: Grossly non-focal exam.    ENDOSCOPY:  EGD/Colonoscopy 08/17/18  Findings: Sliding hiatal hernia, diverticulosis without diverticulitis  Recommendations: Screening colonoscopy in 10 years    RECENT LABS:  Lab Results   Component Value Date    WBC 8.66 03/08/2019    HGB 13.7 (L) 03/08/2019    HCT 41.6 (L) 03/08/2019    MCV 98.6 (H) 03/08/2019    RDW 13.6 03/08/2019     03/08/2019    NEUTRORELPCT 65.2 03/08/2019    LYMPHORELPCT 17.3 03/08/2019    MONORELPCT 7.5 03/08/2019    EOSRELPCT 9.0 (H) 03/08/2019    BASORELPCT 0.8 03/08/2019    NEUTROABS 5.64 03/08/2019    LYMPHSABS 1.50 03/08/2019       Lab Results   Component Value Date     03/08/2019    K 4.1 03/08/2019    CO2 25.1 03/08/2019     03/08/2019    BUN 21 03/08/2019    CREATININE 0.91 03/08/2019    EGFRIFNONA 82 03/08/2019    EGFRIFAFRI  09/19/2016      Comment:      <15 Indicative of kidney failure.    GLUCOSE 130 (H) 03/08/2019    CALCIUM 9.1 03/08/2019    ALKPHOS 111 03/08/2019    AST 27 03/08/2019    ALT 34 03/08/2019    BILITOT 0.6 03/08/2019    ALBUMIN 4.20 03/08/2019    PROTEINTOT 6.9 03/08/2019    MG 2.3 07/25/2018       Lab Results   Component Value Date/Time     07/17/2018 03:03 PM     Lab Results   Component Value Date    FERRITIN 79.00 03/08/2019    IRON 121 03/08/2019    TIBC 346 03/08/2019    LABIRON 35 03/08/2019    IHTOFPWK86 932 (H) 07/17/2018    FOLATE 18.87 07/17/2018    HAPTOGLOBIN 112 07/17/2018    RETICCTPCT 0.66 07/17/2018    RETIC 0.0242 07/17/2018     PBS  07/17/18  Hypochromic normocytic anemia with eosinophilia    Labs 07/17/17  C-Reactive Protein 0.00 - 0.99 mg/dL <0.50      Sed Rate 0 - 20 mm/hr 10       - 225 U/L 163      Iron 53 - 167 mcg/dL 41     TIBC 241 - 421 mcg/dL 445     Iron Saturation 20 - 50 % 9       Ferritin 21.90 - 321.70 ng/mL 11.00       Vitamin B-12 211 - 911 pg/mL 932       Folate 5.40 - 20.00 ng/mL 18.87      ASSESSMENT & PLAN:  Fidencio Luciano is a very pleasant 73 y.o. male with    1.  Normocytic anemia (now macrocytic)  2. ERROL    -Noted on routine blood testing with PCP since July 2015 with Hg ranging 11-13 g/dL, recently worsening in June (Hg 10).  WBC and platelets have been in the normal range.  -Initially reported intermittent dark stool and bleeding per rectum (now resolved). Reported upper/lower endoscopy approximately 3 years ago with Dr. Martinez which was negative for active bleeding.    -Obtained additional workup including repeat CBC which showed stable H/H (Hg 10.2), WBC and platelets were again in the normal range. CMP unremarkable. PBS showed hypochromic normocytic anemia with eosinophilia as above. B12 and Folate replete. CRP and ESR were normal. No evidence of hemolysis with normal Retic, LDH and Haptoglobin. Iron studies were consistent with Iron deficiency. Therefore, started patient on oral iron therapy, ferrous sulfate TID which he has been tolerating well.   -He has not had any obvious blood loss from any source.   -PCP referred him back to GI and underwent upper/lower endoscopy on 08/17/18 which showed no evidence of bleeding.   -Repeat labs from today show continued improvement in Hg but remains low and iron continues to improve/remains replete.   -Advised patient to continue oral iron as he is on for now. Will refill today. Will follow up again in 4 months with repeat labs.     3. Shingles: Treated with acyclovir, doxycycline and prednisone. Now resolved.     4. URI: Recently completed course of Augmentin and  symptoms have improved. Afebrile. Continue daily antihistamine. Also encouraged him to try OTC nasal steroid such as flonase. Ongoing management per PCP.      [facility] Quality measures  - Fidencio Luciano does not use tobacco products.  - Patient's Body mass index is 24.16 kg/m². BMI is within normal parameters. No follow-up required.  - Current outpatient and discharge medications have been reconciled for the patient.  Reviewed by: ANDREW Dawson    The patient was in agreement with the plan and all questions were answered to his satisfaction.     Thank you so much for allowing us to participate in the care of Fidencio Luciano . Please do not hesitate to contact us with any questions or concerns.     I spent 15 minutes with Fidencio Luciano today. More than 50% of the time was spent in counseling / coordination of care for the above problems.      Electronically Signed by: ANDREW Dawson , March 8, 2019 11:58 AM       CC:   Gideon Charles MD

## 2019-03-11 ENCOUNTER — TELEPHONE (OUTPATIENT)
Dept: CARDIOLOGY | Facility: CLINIC | Age: 74
End: 2019-03-11

## 2019-03-14 RX ORDER — ISOSORBIDE MONONITRATE 60 MG/1
TABLET, EXTENDED RELEASE ORAL
Qty: 30 TABLET | Refills: 3 | Status: ON HOLD | OUTPATIENT
Start: 2019-03-14 | End: 2019-10-30

## 2019-04-01 RX ORDER — LISINOPRIL 10 MG/1
10 TABLET ORAL DAILY
Qty: 90 TABLET | Refills: 0 | Status: SHIPPED | OUTPATIENT
Start: 2019-04-01 | End: 2019-05-29

## 2019-04-10 ENCOUNTER — PATIENT OUTREACH (OUTPATIENT)
Dept: CASE MANAGEMENT | Facility: OTHER | Age: 74
End: 2019-04-10

## 2019-04-20 ENCOUNTER — OFFICE VISIT (OUTPATIENT)
Dept: RETAIL CLINIC | Facility: CLINIC | Age: 74
End: 2019-04-20

## 2019-04-20 VITALS
DIASTOLIC BLOOD PRESSURE: 71 MMHG | RESPIRATION RATE: 18 BRPM | WEIGHT: 177.6 LBS | TEMPERATURE: 97.6 F | HEART RATE: 54 BPM | OXYGEN SATURATION: 98 % | BODY MASS INDEX: 24.77 KG/M2 | SYSTOLIC BLOOD PRESSURE: 161 MMHG

## 2019-04-20 DIAGNOSIS — J01.00 SUBACUTE MAXILLARY SINUSITIS: Primary | ICD-10-CM

## 2019-04-20 PROCEDURE — 99213 OFFICE O/P EST LOW 20 MIN: CPT | Performed by: NURSE PRACTITIONER

## 2019-04-20 RX ORDER — FLUTICASONE PROPIONATE 50 MCG
2 SPRAY, SUSPENSION (ML) NASAL DAILY
Qty: 1 BOTTLE | Refills: 0 | Status: ON HOLD | OUTPATIENT
Start: 2019-04-20 | End: 2019-10-30

## 2019-04-20 RX ORDER — AMOXICILLIN AND CLAVULANATE POTASSIUM 875; 125 MG/1; MG/1
1 TABLET, FILM COATED ORAL 2 TIMES DAILY
Qty: 20 TABLET | Refills: 0 | Status: SHIPPED | OUTPATIENT
Start: 2019-04-20 | End: 2019-04-30

## 2019-04-20 NOTE — PATIENT INSTRUCTIONS

## 2019-04-20 NOTE — PROGRESS NOTES
YONATAN@  Fidencio Luciano is a 73 y.o. male.   Chief Complaint   Patient presents with   • Sinusitis      Sinusitis   This is a new problem. The current episode started more than 1 month ago. The problem has been rapidly worsening since onset. There has been no fever. Associated symptoms include congestion, coughing, headaches, shortness of breath (mild at baseline) and sinus pressure. Pertinent negatives include no chills, ear pain, sore throat or swollen glands. Treatments tried: OTC cold medications. The treatment provided mild relief.      The following portions of the patient's history were reviewed and updated as appropriate: allergies, current medications, past family history, past medical history, past social history, past surgical history and problem list.    Current Outpatient Medications:   •  aspirin 81 MG tablet, Take 81 mg by mouth Daily., Disp: , Rfl:   •  atorvastatin (LIPITOR) 80 MG tablet, Take 1 tablet by mouth Every Night., Disp: 90 tablet, Rfl: 5  •  clopidogrel (PLAVIX) 75 MG tablet, Take 1 tablet by mouth Daily., Disp: 30 tablet, Rfl: 5  •  ferrous sulfate 325 (65 FE) MG tablet, Take 1 tablet by mouth 3 (Three) Times a Day With Meals., Disp: 90 tablet, Rfl: 4  •  finasteride (PROSCAR) 5 MG tablet, Take 1 tablet by mouth Daily., Disp: 90 tablet, Rfl: 3  •  lisinopril (PRINIVIL,ZESTRIL) 10 MG tablet, Take 1 tablet by mouth Daily. for blood pressure, Disp: 90 tablet, Rfl: 0  •  pantoprazole (PROTONIX) 40 MG EC tablet, Take 1 tablet by mouth 2 (Two) Times a Day Before Meals., Disp: 60 tablet, Rfl: 5  •  amoxicillin-clavulanate (AUGMENTIN) 875-125 MG per tablet, Take 1 tablet by mouth 2 (Two) Times a Day for 10 days., Disp: 20 tablet, Rfl: 0  •  cyclobenzaprine (FLEXERIL) 5 MG tablet, Take 1 tablet by mouth 3 (Three) Times a Day As Needed for Muscle Spasms., Disp: 30 tablet, Rfl: 0  •  famciclovir (FAMVIR) 500 MG tablet, Take 500 mg by mouth 2 (Two) Times a Day., Disp: , Rfl: 0  •  fluticasone  (FLONASE) 50 MCG/ACT nasal spray, 2 sprays into the nostril(s) as directed by provider Daily., Disp: 1 bottle, Rfl: 0  •  isosorbide mononitrate (IMDUR) 60 MG 24 hr tablet, TAKE ONE TABLET BY MOUTH EVERY DAY FOR HEART, Disp: 30 tablet, Rfl: 3  •  Memantine HCl-Donepezil HCl (NAMZARIC PO), Take  by mouth., Disp: , Rfl:   •  metFORMIN (GLUCOPHAGE) 500 MG tablet, Take 500 mg by mouth Daily With Breakfast. 1/2 tab QD., Disp: , Rfl:   •  NITROSTAT 0.4 MG SL tablet, PLACE 1 TABLET UNDER THE TONGUE EVERY FIVE MINUTES AS NEEDED FOR CHEST PAIN FOR UP TO 3 DOSES, Disp: 25 tablet, Rfl: 1  •  Plecanatide 3 MG tablet, Take 3 mg by mouth Daily., Disp: 30 tablet, Rfl: 5  •  polyethylene glycol (GoLYTELY) 236 g solution, Starting at 6pm the day before procedure, drink 8 ounces every 30 minutes until all gone or stools are clear. May add flavor packet., Disp: 4000 mL, Rfl: 0  •  predniSONE (DELTASONE) 50 MG tablet, Take 1 tablet by mouth Daily., Disp: 4 tablet, Rfl: 0  •  ranolazine (RANEXA) 1000 MG 12 hr tablet, Take 1 tablet by mouth Every 12 (Twelve) Hours., Disp: 6028 tablet, Rfl: 0  •  Suvorexant (BELSOMRA PO), Take 20 mg by mouth Every Night., Disp: , Rfl:     No Known Allergies    Review of Systems   Constitutional: Negative for activity change, appetite change and chills.   HENT: Positive for congestion, postnasal drip, rhinorrhea and sinus pressure. Negative for ear discharge, ear pain, facial swelling and sore throat.    Eyes: Negative for itching.   Respiratory: Positive for cough, shortness of breath (mild at baseline) and wheezing.    Gastrointestinal: Negative for diarrhea, nausea and vomiting.   Skin: Negative for color change.   Neurological: Positive for headaches. Negative for dizziness.     Objective     Visit Vitals  /71   Pulse 54   Temp 97.6 °F (36.4 °C)   Resp 18   Wt 80.6 kg (177 lb 9.6 oz)   SpO2 98%   BMI 24.77 kg/m²     Physical Exam   Constitutional: He is oriented to person, place, and time. He  appears well-developed and well-nourished.   HENT:   Head: Normocephalic.   Right Ear: Tympanic membrane is bulging. Tympanic membrane is not injected and not erythematous. Tympanic membrane mobility is normal.   Left Ear: Tympanic membrane is bulging. Tympanic membrane is not injected and not erythematous. Tympanic membrane mobility is normal.   Nose: Mucosal edema and rhinorrhea present. Right sinus exhibits maxillary sinus tenderness. Left sinus exhibits maxillary sinus tenderness.   Mouth/Throat: Uvula is midline. No oropharyngeal exudate or tonsillar abscesses.   Eyes: Conjunctivae are normal. Pupils are equal, round, and reactive to light.   Cardiovascular: Normal rate and regular rhythm.   Pulmonary/Chest: Effort normal. He has decreased breath sounds. He has no wheezes. He has no rhonchi. He has no rales.   Lymphadenopathy:     He has no cervical adenopathy.   Neurological: He is alert and oriented to person, place, and time.   Skin: Skin is warm and dry.     Lab Results (last 24 hours)     ** No results found for the last 24 hours. **        Assessment/Plan   Fidencio was seen today for sinusitis.    Diagnoses and all orders for this visit:    Subacute maxillary sinusitis  -     amoxicillin-clavulanate (AUGMENTIN) 875-125 MG per tablet; Take 1 tablet by mouth 2 (Two) Times a Day for 10 days.  -     fluticasone (FLONASE) 50 MCG/ACT nasal spray; 2 sprays into the nostril(s) as directed by provider Daily.               Discussed impression and treatment. AVS reviewed, understanding verbalized and agrees with treatment plan.  Follow up with primary care provider if no improvement within next 7-10 days. Go to [facility] or ER if condition worsens.

## 2019-05-10 ENCOUNTER — OFFICE VISIT (OUTPATIENT)
Dept: UROLOGY | Facility: CLINIC | Age: 74
End: 2019-05-10

## 2019-05-10 VITALS — WEIGHT: 177 LBS | HEIGHT: 71 IN | BODY MASS INDEX: 24.78 KG/M2

## 2019-05-10 DIAGNOSIS — N40.0 BPH WITHOUT URINARY OBSTRUCTION: ICD-10-CM

## 2019-05-10 LAB — PSA SERPL-MCNC: 1.85 NG/ML (ref 0–4)

## 2019-05-10 PROCEDURE — 36415 COLL VENOUS BLD VENIPUNCTURE: CPT | Performed by: UROLOGY

## 2019-05-10 PROCEDURE — 99212 OFFICE O/P EST SF 10 MIN: CPT | Performed by: UROLOGY

## 2019-05-10 PROCEDURE — 84153 ASSAY OF PSA TOTAL: CPT | Performed by: UROLOGY

## 2019-05-10 RX ORDER — FINASTERIDE 5 MG/1
5 TABLET, FILM COATED ORAL DAILY
Qty: 90 TABLET | Refills: 3 | Status: ON HOLD | OUTPATIENT
Start: 2019-05-10 | End: 2019-10-30

## 2019-05-10 NOTE — PROGRESS NOTES
Chief Complaint:          BPH    HPI:   73 y.o. male.  Pt is voiding well on proscar.    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      Past Medical History:        Past Medical History:   Diagnosis Date   • BPH (benign prostatic hyperplasia)    • Bradycardia     Positive tilt table testing 07/2016   • Coronary artery disease    • Diabetes mellitus (CMS/HCC)    • Diverticulosis    • Gastric ulcer with hemorrhage    • History of transfusion    • Hyperlipidemia    • Hypertension    • Psoriasis          Current Meds:     Current Outpatient Medications   Medication Sig Dispense Refill   • aspirin 81 MG tablet Take 81 mg by mouth Daily.     • atorvastatin (LIPITOR) 80 MG tablet Take 1 tablet by mouth Every Night. 90 tablet 5   • clopidogrel (PLAVIX) 75 MG tablet Take 1 tablet by mouth Daily. 30 tablet 5   • cyclobenzaprine (FLEXERIL) 5 MG tablet Take 1 tablet by mouth 3 (Three) Times a Day As Needed for Muscle Spasms. 30 tablet 0   • famciclovir (FAMVIR) 500 MG tablet Take 500 mg by mouth 2 (Two) Times a Day.  0   • ferrous sulfate 325 (65 FE) MG tablet Take 1 tablet by mouth 3 (Three) Times a Day With Meals. 90 tablet 4   • finasteride (PROSCAR) 5 MG tablet Take 1 tablet by mouth Daily. 90 tablet 3   • fluticasone (FLONASE) 50 MCG/ACT nasal spray 2 sprays into the nostril(s) as directed by provider Daily. 1 bottle 0   • isosorbide mononitrate (IMDUR) 60 MG 24 hr tablet TAKE ONE TABLET BY MOUTH EVERY DAY FOR HEART 30 tablet 3   • lisinopril (PRINIVIL,ZESTRIL) 10 MG tablet Take 1 tablet by mouth Daily. for blood pressure 90 tablet 0   • Memantine HCl-Donepezil HCl (NAMZARIC PO) Take  by mouth.     • metFORMIN (GLUCOPHAGE) 500 MG tablet Take 500 mg by mouth Daily With Breakfast. 1/2 tab QD.     • NITROSTAT 0.4 MG SL tablet PLACE 1 TABLET UNDER THE TONGUE EVERY FIVE MINUTES AS NEEDED FOR CHEST PAIN FOR UP TO 3 DOSES 25 tablet 1   • pantoprazole (PROTONIX) 40 MG EC  tablet Take 1 tablet by mouth 2 (Two) Times a Day Before Meals. 60 tablet 5   • Plecanatide 3 MG tablet Take 3 mg by mouth Daily. 30 tablet 5   • polyethylene glycol (GoLYTELY) 236 g solution Starting at 6pm the day before procedure, drink 8 ounces every 30 minutes until all gone or stools are clear. May add flavor packet. 4000 mL 0   • predniSONE (DELTASONE) 50 MG tablet Take 1 tablet by mouth Daily. 4 tablet 0   • ranolazine (RANEXA) 1000 MG 12 hr tablet Take 1 tablet by mouth Every 12 (Twelve) Hours. 6028 tablet 0   • Suvorexant (BELSOMRA PO) Take 20 mg by mouth Every Night.       No current facility-administered medications for this visit.         Allergies:      No Known Allergies     Past Surgical History:     Past Surgical History:   Procedure Laterality Date   • BACK SURGERY     • CARDIAC CATHETERIZATION N/A 9/20/2016    Procedure: Left Heart Cath;  Surgeon: Giovanni Buenrostro MD;  Location: [facility] CATH INVASIVE LOCATION;  Service:    • CARDIAC CATHETERIZATION N/A 10/4/2016    Procedure: Left Heart Cath;  Surgeon: Bharathi Andrew MD;  Location: [facility] INVASIVE LOCATION;  Service:    • CARDIAC CATHETERIZATION N/A 5/9/2017    Procedure: Left Heart Cath;  Surgeon: Giovanni Buenrostro MD;  Location: [facility] CATH INVASIVE LOCATION;  Service:    • CARDIAC CATHETERIZATION N/A 5/9/2017    Procedure: ERR;  Surgeon: Giovanni Buenrostro MD;  Location: [facility] CATH INVASIVE LOCATION;  Service:    • CHOLECYSTECTOMY     • COLONOSCOPY  11/13/2015    Dr. Martinez- internal hemorrhoids, sigmoid diverticulosis   • COLONOSCOPY N/A 8/17/2018    Procedure: COLONOSCOPY CPT CODE: 42316;  Surgeon: Gigi Geronimo MD;  Location: [facility];  Service: Gastroenterology   • CORONARY ANGIOPLASTY WITH STENT PLACEMENT     • ENDOSCOPY N/A 8/17/2018    Procedure: ESOPHAGOGASTRODUODENOSCOPY WITH BIOPSY CPT CODE: 27216;  Surgeon: Gigi Geronimo MD;  Location: [facility] OR;  Service: Gastroenterology   • WI RT/LT HEART CATHETERS N/A 5/9/2017     Procedure: Percutaneous Coronary Intervention;  Surgeon: Lincoln De Los Santos MD;  Location: [facility] INVASIVE LOCATION;  Service: Cardiology   • STOMACH SURGERY      FUSION OF BLEEDING ULCER   • UPPER GASTROINTESTINAL ENDOSCOPY  2015    Dr. Martinez- mild gastritis         Social History:     Social History     Socioeconomic History   • Marital status:      Spouse name: Not on file   • Number of children: Not on file   • Years of education: Not on file   • Highest education level: Not on file   Tobacco Use   • Smoking status: Former Smoker     Packs/day: 3.00     Years: 40.00     Pack years: 120.00     Types: Cigarettes     Last attempt to quit:      Years since quittin.3   • Smokeless tobacco: Former User     Quit date: 1986   Substance and Sexual Activity   • Alcohol use: No   • Drug use: No   • Sexual activity: Defer       Family History:     Family History   Problem Relation Age of Onset   • Sick sinus syndrome Mother    • Heart failure Mother    • Diabetes Mother    • Liver cancer Father        Review of Systems:     Review of Systems   Constitutional: Negative for activity change and appetite change.   HENT: Negative for sinus pressure, sneezing and sore throat.    Eyes: Negative for redness.   Respiratory: Negative for chest tightness, shortness of breath and wheezing.    Cardiovascular: Negative for chest pain and palpitations.   Gastrointestinal: Negative for abdominal pain, anal bleeding and rectal pain.   Endocrine: Negative for cold intolerance and heat intolerance.   Genitourinary: Negative for difficulty urinating, dysuria, frequency and penile pain.   Musculoskeletal: Negative for back pain and myalgias.   Neurological: Negative for dizziness, light-headedness and headaches.   Psychiatric/Behavioral: Negative for confusion and sleep disturbance.             I have reviewed the follow portions of the patient's history and confirmed they are accurate today:  allergies, current  "medications, past family history, past medical history, past social history, past surgical history, problem list and ROS  Physical Exam:     Physical Exam   Constitutional: He is oriented to person, place, and time.   HENT:   Head: Normocephalic and atraumatic.   Right Ear: External ear normal.   Left Ear: External ear normal.   Nose: Nose normal.   Mouth/Throat: Oropharynx is clear and moist.   Eyes: Conjunctivae and EOM are normal. Pupils are equal, round, and reactive to light.   Neck: Normal range of motion. Neck supple. No thyromegaly present.   Cardiovascular: Normal rate, regular rhythm, normal heart sounds and intact distal pulses.   No murmur heard.  Pulmonary/Chest: Effort normal and breath sounds normal. No respiratory distress. He has no wheezes. He has no rales. He exhibits no tenderness.   Abdominal: Soft. Bowel sounds are normal. He exhibits no distension and no mass. There is no tenderness. No hernia.   Genitourinary: Rectum normal, prostate normal and penis normal.   Genitourinary Comments: Pt has a little firmness on the right side without nodularity   Musculoskeletal: Normal range of motion. He exhibits no edema or tenderness.   Lymphadenopathy:     He has no cervical adenopathy.   Neurological: He is alert and oriented to person, place, and time. No cranial nerve deficit. He exhibits normal muscle tone. Coordination normal.   Skin: Skin is warm. No rash noted.   Psychiatric: He has a normal mood and affect. His behavior is normal. Judgment and thought content normal.   Nursing note and vitals reviewed.      Ht 180.3 cm (71\")   Wt 80.3 kg (177 lb)   BMI 24.69 kg/m²    Procedure:         Assessment:     Encounter Diagnosis   Name Primary?   • BPH without urinary obstruction      No orders of the defined types were placed in this encounter.      Plan:   Will check a psa because of the firmness on the right side.    Patient's Body mass index is 24.69 kg/m². BMI is within normal parameters. No " follow-up required..      Counseling was given to patient for the following topics instructions for management as follows: BPH. The interim medical history and current results were reviewed.  A treatment plan with follow-up was made for No primary diagnosis found.. I spent 27 minutes face to face with Fidencio Luciano and 75 percentage was spent in counseling.    This document has been electronically signed by Chris Bryant MD May 10, 2019 4:09 PM

## 2019-05-29 ENCOUNTER — OFFICE VISIT (OUTPATIENT)
Dept: CARDIOLOGY | Facility: CLINIC | Age: 74
End: 2019-05-29

## 2019-05-29 VITALS
HEIGHT: 71 IN | WEIGHT: 177.4 LBS | SYSTOLIC BLOOD PRESSURE: 148 MMHG | HEART RATE: 66 BPM | OXYGEN SATURATION: 97 % | DIASTOLIC BLOOD PRESSURE: 60 MMHG | BODY MASS INDEX: 24.84 KG/M2

## 2019-05-29 DIAGNOSIS — I10 ESSENTIAL HYPERTENSION: ICD-10-CM

## 2019-05-29 DIAGNOSIS — I25.10 ASCVD (ARTERIOSCLEROTIC CARDIOVASCULAR DISEASE): Primary | ICD-10-CM

## 2019-05-29 DIAGNOSIS — I25.41 CORONARY ARTERY FISTULA: ICD-10-CM

## 2019-05-29 PROCEDURE — 93000 ELECTROCARDIOGRAM COMPLETE: CPT | Performed by: INTERNAL MEDICINE

## 2019-05-29 PROCEDURE — 99213 OFFICE O/P EST LOW 20 MIN: CPT | Performed by: INTERNAL MEDICINE

## 2019-05-29 RX ORDER — LISINOPRIL 10 MG/1
15 TABLET ORAL DAILY
Qty: 90 TABLET | Refills: 2 | Status: SHIPPED | OUTPATIENT
Start: 2019-05-29 | End: 2019-09-03 | Stop reason: SDUPTHER

## 2019-05-29 RX ORDER — MIRTAZAPINE 15 MG/1
15 TABLET, FILM COATED ORAL NIGHTLY
Status: ON HOLD | COMMUNITY
End: 2019-10-30

## 2019-05-29 NOTE — PROGRESS NOTES
Gideon Charles MD  Fidencio Luciano  1945  05/29/2019    Patient Active Problem List   Diagnosis   • Near syncope   • Dizziness   • Palpitations   • Essential hypertension   • Diabetes mellitus (CMS/HCC)   • Benign prostatic hyperplasia   • Precordial pain   • ASCVD (arteriosclerotic cardiovascular disease), s/p stenting of 80 % stenosis in left circumflex on 10/05/16and 60% stenosis in the LAD, clinically stable.    • Dyslipidemia   • Weakness generalized   • Chronic fatigue   • Coronary artery fistula   • Weight loss, unintentional   • Iron deficiency anemia   • Iron deficiency   • Normocytic anemia   • Weight loss, abnormal   • History of gastric ulcer   • Intractable vomiting with nausea       Dear Gideon Charles MD:    Subjective     Fidencio Luciano is a 73 y.o. male with the problems as listed above, presents    Chief Complaint: Follow-up of coronary artery disease and chest pains.     History of Present Illness: Mr. Luciano is a pleasant 71-year-old  male with history of known ASCVD status post stenting of the left circumflex coronary artery in October 2016 with residual 60% stenosis of the LAD that was left alone.  He also was noted to have a coronary artery fistula that was subsequently evaluated by cardiothoracic surgery (Dr. Ambriz) and was decided to be left alone.  He is here for regular cardiology follow-up.  He has some occasional chest pains but overall has been doing better in the last few months.  He says his breathing has been okay.  He says he walks about a mile a day without having any chest pains or shortness of breath.    No Known Allergies:      Current Outpatient Medications:   •  aspirin 81 MG tablet, Take 81 mg by mouth Daily., Disp: , Rfl:   •  atorvastatin (LIPITOR) 80 MG tablet, Take 1 tablet by mouth Every Night., Disp: 90 tablet, Rfl: 5  •  clopidogrel (PLAVIX) 75 MG tablet, Take 1 tablet by mouth Daily., Disp: 30 tablet, Rfl: 5  •  ferrous sulfate 325 (65 FE) MG tablet,  Take 1 tablet by mouth 3 (Three) Times a Day With Meals., Disp: 90 tablet, Rfl: 4  •  finasteride (PROSCAR) 5 MG tablet, Take 1 tablet by mouth Daily., Disp: 90 tablet, Rfl: 3  •  isosorbide mononitrate (IMDUR) 60 MG 24 hr tablet, TAKE ONE TABLET BY MOUTH EVERY DAY FOR HEART, Disp: 30 tablet, Rfl: 3  •  lisinopril (PRINIVIL,ZESTRIL) 10 MG tablet, Take 1.5 tablets by mouth Daily. for blood pressure, Disp: 90 tablet, Rfl: 2  •  metFORMIN (GLUCOPHAGE) 500 MG tablet, Take 500 mg by mouth Daily With Breakfast. 1/2 tab QD., Disp: , Rfl:   •  mirtazapine (REMERON) 15 MG tablet, Take 15 mg by mouth Every Night., Disp: , Rfl:   •  pantoprazole (PROTONIX) 40 MG EC tablet, Take 1 tablet by mouth 2 (Two) Times a Day Before Meals., Disp: 60 tablet, Rfl: 5  •  cyclobenzaprine (FLEXERIL) 5 MG tablet, Take 1 tablet by mouth 3 (Three) Times a Day As Needed for Muscle Spasms., Disp: 30 tablet, Rfl: 0  •  famciclovir (FAMVIR) 500 MG tablet, Take 500 mg by mouth 2 (Two) Times a Day., Disp: , Rfl: 0  •  fluticasone (FLONASE) 50 MCG/ACT nasal spray, 2 sprays into the nostril(s) as directed by provider Daily., Disp: 1 bottle, Rfl: 0  •  Memantine HCl-Donepezil HCl (NAMZARIC PO), Take  by mouth., Disp: , Rfl:   •  NITROSTAT 0.4 MG SL tablet, PLACE 1 TABLET UNDER THE TONGUE EVERY FIVE MINUTES AS NEEDED FOR CHEST PAIN FOR UP TO 3 DOSES, Disp: 25 tablet, Rfl: 1  •  Plecanatide 3 MG tablet, Take 3 mg by mouth Daily., Disp: 30 tablet, Rfl: 5  •  polyethylene glycol (GoLYTELY) 236 g solution, Starting at 6pm the day before procedure, drink 8 ounces every 30 minutes until all gone or stools are clear. May add flavor packet., Disp: 4000 mL, Rfl: 0  •  predniSONE (DELTASONE) 50 MG tablet, Take 1 tablet by mouth Daily., Disp: 4 tablet, Rfl: 0  •  ranolazine (RANEXA) 1000 MG 12 hr tablet, Take 1 tablet by mouth Every 12 (Twelve) Hours., Disp: 6028 tablet, Rfl: 0  •  Suvorexant (BELSOMRA PO), Take 20 mg by mouth Every Night., Disp: , Rfl:       The  "following portions of the patient's history were reviewed and updated as appropriate: allergies, current medications, past family history, past medical history, past social history, past surgical history and problem list.    Social History     Tobacco Use   • Smoking status: Former Smoker     Packs/day: 3.00     Years: 40.00     Pack years: 120.00     Types: Cigarettes     Last attempt to quit: 1982     Years since quittin.4   • Smokeless tobacco: Former User     Quit date: 1986   Substance Use Topics   • Alcohol use: No   • Drug use: No       Review of Systems   Constitution: Negative for chills and fever.   HENT: Negative for nosebleeds and sore throat.    Cardiovascular: Positive for leg swelling and palpitations. Negative for chest pain and syncope.   Respiratory: Negative for cough, hemoptysis, shortness of breath and wheezing.    Gastrointestinal: Negative for abdominal pain, hematemesis, hematochezia, melena, nausea and vomiting.   Genitourinary: Negative for dysuria and hematuria.   Neurological: Negative for dizziness and headaches.       Objective   Vitals:    19 1404   BP: 148/60   BP Location: Left arm   Patient Position: Sitting   Cuff Size: Adult   Pulse: 66   SpO2: 97%   Weight: 80.5 kg (177 lb 6.4 oz)   Height: 180.3 cm (71\")     Body mass index is 24.74 kg/m².        Physical Exam   Constitutional: He is oriented to person, place, and time. He appears well-developed and well-nourished.   HENT:   Head: Normocephalic.   Eyes: Conjunctivae and EOM are normal.   Neck: Normal range of motion. Neck supple. No JVD present. No tracheal deviation present. No thyromegaly present.   Cardiovascular: Normal rate and regular rhythm. Exam reveals no gallop, no S3, no S4 and no friction rub.   No murmur heard.  Pulmonary/Chest: Breath sounds normal. No respiratory distress. He has no wheezes. He has no rales.   Abdominal: Soft. Bowel sounds are normal. He exhibits no mass. There is no tenderness. "   Musculoskeletal: He exhibits no edema.   Neurological: He is alert and oriented to person, place, and time. No cranial nerve deficit.   Skin: Skin is warm and dry.   Psychiatric: He has a normal mood and affect.       Lab Results   Component Value Date     03/08/2019    K 4.1 03/08/2019     03/08/2019    CO2 25.1 03/08/2019    BUN 21 03/08/2019    CREATININE 0.91 03/08/2019    GLUCOSE 130 (H) 03/08/2019    CALCIUM 9.1 03/08/2019    AST 27 03/08/2019    ALT 34 03/08/2019    ALKPHOS 111 03/08/2019    LABIL2 1.4 (L) 01/08/2016     Lab Results   Component Value Date    CKTOTAL 79 10/05/2016     Lab Results   Component Value Date    WBC 8.66 03/08/2019    HGB 13.7 (L) 03/08/2019    HCT 41.6 (L) 03/08/2019     03/08/2019     Lab Results   Component Value Date    INR 1.06 10/03/2016    INR 1.01 07/16/2016     Lab Results   Component Value Date    MG 2.3 07/25/2018     Lab Results   Component Value Date    TSH 2.698 07/25/2018    PSA 1.850 05/10/2019    CHLPL 109 08/12/2015    TRIG 87 05/10/2017    HDL 26 (L) 05/10/2017    LDL 40 05/10/2017      Lab Results   Component Value Date    BNP 55.0 07/16/2016             ECG 12 Lead  Date/Time: 5/29/2019 2:05 PM  Performed by: Giovanni Buenrostro MD  Authorized by: Giovanni Buenrostro MD   Comparison: compared with previous ECG from 7/25/2018  Comparison to previous ECG: Patient was in sinus bradycardia in the 40s on the last EKG.  He is in normal sinus rhythm now.  Rhythm: sinus rhythm  BPM: 61  Conduction: conduction normal  ST Segments: ST segments normal  Other: no other findings    Clinical impression: normal ECG              Assessment/Plan    Diagnosis Plan   1. ASCVD (arteriosclerotic cardiovascular disease), s/p stenting of 80 % stenosis in left circumflex on 10/05/16and 60% stenosis in the LAD clinically stable.     2. Coronary artery fistula     3. Essential hypertension            Recommendations:  1. Continue with aspirin, atorvastatin, Plavix,  Imdur.  2. Increase lisinopril to 15 mg daily.  Check on the blood pressure at home twice a day and call if it is running more than 140 on the top.  3. Check BMP in 1 week.    Return in about 5 months (around 10/29/2019).    As always, Gideon Charles MD  I appreciate very much the opportunity to participate in the cardiovascular care of your patients. Please do not hesitate to call me with any questions with regards to Fidencio Luciano evaluation and management.           With Best Regards,        Giovanni Buenrostro MD, [facility]    Dragon disclaimer:  Much of this encounter note is an electronic transcription/translation of spoken language to printed text. The electronic translation of spoken language may permit erroneous, or at times, nonsensical words or phrases to be inadvertently transcribed; Although I have reviewed the note for such errors, some may still exist.

## 2019-07-05 RX ORDER — CLOPIDOGREL BISULFATE 75 MG/1
TABLET ORAL
Qty: 30 TABLET | Refills: 5 | Status: SHIPPED | OUTPATIENT
Start: 2019-07-05 | End: 2020-01-06

## 2019-08-02 ENCOUNTER — OFFICE VISIT (OUTPATIENT)
Dept: GASTROENTEROLOGY | Facility: CLINIC | Age: 74
End: 2019-08-02

## 2019-08-02 VITALS
WEIGHT: 179.6 LBS | OXYGEN SATURATION: 98 % | HEIGHT: 71 IN | DIASTOLIC BLOOD PRESSURE: 58 MMHG | SYSTOLIC BLOOD PRESSURE: 143 MMHG | HEART RATE: 54 BPM | BODY MASS INDEX: 25.15 KG/M2

## 2019-08-02 DIAGNOSIS — K59.04 CHRONIC IDIOPATHIC CONSTIPATION: Primary | ICD-10-CM

## 2019-08-02 DIAGNOSIS — K57.30 DIVERTICULOSIS OF LARGE INTESTINE WITHOUT HEMORRHAGE: ICD-10-CM

## 2019-08-02 PROCEDURE — 99213 OFFICE O/P EST LOW 20 MIN: CPT | Performed by: PHYSICIAN ASSISTANT

## 2019-08-02 RX ORDER — POLYETHYLENE GLYCOL 3350 17 G/17G
17 POWDER, FOR SOLUTION ORAL DAILY
Qty: 510 G | Refills: 2 | Status: ON HOLD | OUTPATIENT
Start: 2019-08-02 | End: 2019-10-30

## 2019-08-02 NOTE — PROGRESS NOTES
: 1945    Chief Complaint   Patient presents with   • Diarrhea       Fidencio Luciano is a 73 y.o. male with PMH of diverticular disease who presents to the office today as a follow up appointment regarding Diarrhea.    History of Present Illness:  He has been feeling well overall, approx 6-7 days ago, he had an episode of very severe watery diarrhea and lower left sided abdominal pain. He is feeling better now. He had some constipation this week, typically has a BM every 3-4 days and sometimes has straining. He is uncertain if he is taking any medications for constipation. Previously, he had abdominal film that showed constipation and was asked to take Trulance which was approved by his insurance. Otherwise, GERD currently controlled, denies any heartburn, nausea or vomiting. He takes Protonix 40 mg BID for GERD with good control.    Review of Systems   Constitutional: Positive for chills and fatigue. Negative for fever.   HENT: Negative for trouble swallowing.    Eyes: Positive for visual disturbance.   Respiratory: Positive for apnea and shortness of breath.    Cardiovascular: Negative for chest pain.   Gastrointestinal: Positive for abdominal distention, anal bleeding, blood in stool, constipation, diarrhea and rectal pain. Negative for abdominal pain, nausea and vomiting.   Endocrine: Positive for cold intolerance and heat intolerance.   Genitourinary: Positive for difficulty urinating.   Musculoskeletal: Positive for arthralgias, back pain and myalgias.   Skin: Positive for wound. Negative for color change, pallor and rash.   Allergic/Immunologic: Negative for environmental allergies and food allergies.   Neurological: Positive for dizziness and light-headedness.   Hematological: Bruises/bleeds easily.   Psychiatric/Behavioral: Positive for sleep disturbance. The patient is nervous/anxious.        Past Medical History:   Diagnosis Date   • BPH (benign prostatic hyperplasia)    • Bradycardia     Positive  tilt table testing 07/2016   • Coronary artery disease    • Diabetes mellitus (CMS/HCC)    • Diverticulosis    • Gastric ulcer with hemorrhage    • History of transfusion    • Hyperlipidemia    • Hypertension    • Psoriasis        Past Surgical History:   Procedure Laterality Date   • BACK SURGERY     • CARDIAC CATHETERIZATION N/A 9/20/2016    Procedure: Left Heart Cath;  Surgeon: Giovanni Buenrostro MD;  Location:  [facility] CATH INVASIVE LOCATION;  Service:    • CARDIAC CATHETERIZATION N/A 10/4/2016    Procedure: Left Heart Cath;  Surgeon: Bharathi Andrew MD;  Location:  [facility]ATH [facility] LOCATION;  Service:    • CARDIAC CATHETERIZATION N/A 5/9/2017    Procedure: Left Heart Cath;  Surgeon: Giovanni Buenrostro MD;  Location:  [facility] CATH INVASIVE LOCATION;  Service:    • CARDIAC CATHETERIZATION N/A 5/9/2017    Procedure: ERR;  Surgeon: Giovanni Buenrostro MD;  Location: [facility] CATH INVASIVE LOCATION;  Service:    • CHOLECYSTECTOMY     • COLONOSCOPY  11/13/2015    Dr. Martinez- internal hemorrhoids, sigmoid diverticulosis   • COLONOSCOPY N/A 8/17/2018    Procedure: COLONOSCOPY CPT CODE: 41981;  Surgeon: Gigi Geronimo MD;  Location: [facility] OR;  Service: Gastroenterology   • CORONARY ANGIOPLASTY WITH STENT PLACEMENT     • ENDOSCOPY N/A 8/17/2018    Procedure: ESOPHAGOGASTRODUODENOSCOPY WITH BIOPSY CPT CODE: 23018;  Surgeon: Gigi Geronimo MD;  Location: [facility] OR;  Service: Gastroenterology   • [place] RT/LT HEART CATHETERS N/A 5/9/2017    Procedure: Percutaneous Coronary Intervention;  Surgeon: Lincoln De Los Santos MD;  Location: [facility] CATH INVASIVE LOCATION;  Service: Cardiology   • STOMACH SURGERY      FUSION OF BLEEDING ULCER   • UPPER GASTROINTESTINAL ENDOSCOPY  11/13/2015    Dr. Martinez- mild gastritis       Family History   Problem Relation Age of Onset   • Sick sinus syndrome Mother    • Heart failure Mother    • Diabetes Mother    • Liver cancer Father        Social History     Socioeconomic History   • Marital status:       Spouse name: Not on file   • Number of children: Not on file   • Years of education: Not on file   • Highest education level: Not on file   Tobacco Use   • Smoking status: Former Smoker     Packs/day: 3.00     Years: 40.00     Pack years: 120.00     Types: Cigarettes     Last attempt to quit:      Years since quittin.6   • Smokeless tobacco: Former User     Quit date: 1986   Substance and Sexual Activity   • Alcohol use: No   • Drug use: No   • Sexual activity: Defer       Current Outpatient Medications:   •  aspirin 81 MG tablet, Take 81 mg by mouth Daily., Disp: , Rfl:   •  atorvastatin (LIPITOR) 80 MG tablet, Take 1 tablet by mouth Every Night., Disp: 90 tablet, Rfl: 5  •  clopidogrel (PLAVIX) 75 MG tablet, TAKE 1 TABLET BY MOUTH EVERY DAY TO MAINTAIN BLOOD CIRCULATION AND CLOT PREVENTION, Disp: 30 tablet, Rfl: 5  •  cyclobenzaprine (FLEXERIL) 5 MG tablet, Take 1 tablet by mouth 3 (Three) Times a Day As Needed for Muscle Spasms., Disp: 30 tablet, Rfl: 0  •  famciclovir (FAMVIR) 500 MG tablet, Take 500 mg by mouth 2 (Two) Times a Day., Disp: , Rfl: 0  •  ferrous sulfate 325 (65 FE) MG tablet, Take 1 tablet by mouth 3 (Three) Times a Day With Meals., Disp: 90 tablet, Rfl: 4  •  finasteride (PROSCAR) 5 MG tablet, Take 1 tablet by mouth Daily., Disp: 90 tablet, Rfl: 3  •  fluticasone (FLONASE) 50 MCG/ACT nasal spray, 2 sprays into the nostril(s) as directed by provider Daily., Disp: 1 bottle, Rfl: 0  •  isosorbide mononitrate (IMDUR) 60 MG 24 hr tablet, TAKE ONE TABLET BY MOUTH EVERY DAY FOR HEART, Disp: 30 tablet, Rfl: 3  •  lisinopril (PRINIVIL,ZESTRIL) 10 MG tablet, Take 1.5 tablets by mouth Daily. for blood pressure, Disp: 90 tablet, Rfl: 2  •  Memantine HCl-Donepezil HCl (NAMZARIC PO), Take  by mouth., Disp: , Rfl:   •  metFORMIN (GLUCOPHAGE) 500 MG tablet, Take 500 mg by mouth Daily With Breakfast. 1/2 tab QD., Disp: , Rfl:   •  mirtazapine (REMERON) 15 MG tablet, Take 15 mg by mouth  "Every Night., Disp: , Rfl:   •  NITROSTAT 0.4 MG SL tablet, PLACE 1 TABLET UNDER THE TONGUE EVERY FIVE MINUTES AS NEEDED FOR CHEST PAIN FOR UP TO 3 DOSES, Disp: 25 tablet, Rfl: 1  •  pantoprazole (PROTONIX) 40 MG EC tablet, Take 1 tablet by mouth 2 (Two) Times a Day Before Meals., Disp: 60 tablet, Rfl: 5  •  Plecanatide 3 MG tablet, Take 3 mg by mouth Daily., Disp: 30 tablet, Rfl: 5  •  predniSONE (DELTASONE) 50 MG tablet, Take 1 tablet by mouth Daily., Disp: 4 tablet, Rfl: 0  •  ranolazine (RANEXA) 1000 MG 12 hr tablet, Take 1 tablet by mouth Every 12 (Twelve) Hours., Disp: 6028 tablet, Rfl: 0  •  Suvorexant (BELSOMRA PO), Take 20 mg by mouth Every Night., Disp: , Rfl:     Allergies:   Patient has no known allergies.    Vitals:  /58 (BP Location: Left arm, Patient Position: Sitting, Cuff Size: Adult)   Pulse 54   Ht 180.3 cm (71\")   Wt 81.5 kg (179 lb 9.6 oz)   SpO2 98%   BMI 25.05 kg/m²     Physical Exam   Constitutional: He is oriented to person, place, and time. He appears well-developed and well-nourished. No distress.   HENT:   Head: Normocephalic and atraumatic.   Nose: Nose normal.   Mouth/Throat: Oropharynx is clear and moist.   Eyes: Conjunctivae are normal. Right eye exhibits no discharge. Left eye exhibits no discharge. No scleral icterus.   Neck: Normal range of motion. No JVD present.   Cardiovascular: Normal rate, regular rhythm and normal heart sounds. Exam reveals no gallop and no friction rub.   No murmur heard.  Pulmonary/Chest: Effort normal and breath sounds normal. No respiratory distress. He has no wheezes. He has no rales. He exhibits no tenderness.   Abdominal: Soft. Bowel sounds are normal. He exhibits no mass. There is no tenderness.   Musculoskeletal: Normal range of motion. He exhibits no edema or deformity.   Neurological: He is alert and oriented to person, place, and time. Coordination normal.   Skin: Skin is warm and dry. No rash noted. He is not diaphoretic. No erythema. "   Psychiatric: He has a normal mood and affect. His behavior is normal. Judgment and thought content normal.   Vitals reviewed.      Assessment:  1. Chronic idiopathic constipation    2. Diverticulosis of large intestine without hemorrhage      Plan:  I called his pharmacy, he never picked up Trulance for constipation because it had a very high co-pay. I recommend that he start taking Miralax as directed below due to constipation with expected intermittent overflow diarrhea. He was instructed to increase dietary fiber intake to 25-45g daily and a list of fiber foods was given due to history of diverticular disease. He has agreed to try to increase daily water intake and daily exercise as well. If abdominal pain worsens, he will call back or report to ED if severe.    New Medications Ordered This Visit   Medications   • polyethylene glycol (MIRALAX) powder     Sig: Take 17 g by mouth Daily. May increase up to 4 doses daily PRN constipation.     Dispense:  510 g     Refill:  2           Return in about 2 months (around 10/2/2019) for recheck constipation.      Electronically signed 8/2/2019 11:16 AM  Hiral Fierro PA-C, [facility]

## 2019-09-03 DIAGNOSIS — K21.9 GASTROESOPHAGEAL REFLUX DISEASE, ESOPHAGITIS PRESENCE NOT SPECIFIED: ICD-10-CM

## 2019-09-03 DIAGNOSIS — R13.10 DYSPHAGIA, UNSPECIFIED TYPE: ICD-10-CM

## 2019-09-03 RX ORDER — PANTOPRAZOLE SODIUM 40 MG/1
40 TABLET, DELAYED RELEASE ORAL
Qty: 60 TABLET | Refills: 5 | Status: SHIPPED | OUTPATIENT
Start: 2019-09-03 | End: 2020-04-29 | Stop reason: RX

## 2019-09-03 RX ORDER — LISINOPRIL 10 MG/1
15 TABLET ORAL DAILY
Qty: 90 TABLET | Refills: 1 | Status: SHIPPED | OUTPATIENT
Start: 2019-09-03 | End: 2019-11-07 | Stop reason: SDUPTHER

## 2019-09-27 ENCOUNTER — OFFICE VISIT (OUTPATIENT)
Dept: UROLOGY | Facility: CLINIC | Age: 74
End: 2019-09-27

## 2019-09-27 VITALS
HEIGHT: 71 IN | DIASTOLIC BLOOD PRESSURE: 77 MMHG | BODY MASS INDEX: 18.62 KG/M2 | SYSTOLIC BLOOD PRESSURE: 138 MMHG | WEIGHT: 133 LBS

## 2019-09-27 DIAGNOSIS — N40.1 BENIGN PROSTATIC HYPERPLASIA WITH URINARY OBSTRUCTION: Primary | ICD-10-CM

## 2019-09-27 DIAGNOSIS — N13.8 BENIGN PROSTATIC HYPERPLASIA WITH URINARY OBSTRUCTION: Primary | ICD-10-CM

## 2019-09-27 PROCEDURE — 99213 OFFICE O/P EST LOW 20 MIN: CPT | Performed by: UROLOGY

## 2019-09-27 RX ORDER — FINASTERIDE 5 MG/1
5 TABLET, FILM COATED ORAL DAILY
Qty: 30 TABLET | Refills: 5 | Status: SHIPPED | OUTPATIENT
Start: 2019-09-27 | End: 2020-05-05

## 2019-10-12 ENCOUNTER — APPOINTMENT (OUTPATIENT)
Dept: LAB | Facility: HOSPITAL | Age: 74
End: 2019-10-12

## 2019-10-12 ENCOUNTER — TRANSCRIBE ORDERS (OUTPATIENT)
Dept: ADMINISTRATIVE | Facility: HOSPITAL | Age: 74
End: 2019-10-12

## 2019-10-12 DIAGNOSIS — E11.9 DIABETES MELLITUS WITHOUT COMPLICATION (HCC): ICD-10-CM

## 2019-10-12 DIAGNOSIS — E78.9 LIPID DISORDER: ICD-10-CM

## 2019-10-12 DIAGNOSIS — R53.83 TIREDNESS: ICD-10-CM

## 2019-10-12 DIAGNOSIS — I10 HYPERTENSION, UNSPECIFIED TYPE: Primary | ICD-10-CM

## 2019-10-12 LAB
ALBUMIN SERPL-MCNC: 4.2 G/DL (ref 3.5–5.2)
ALBUMIN/GLOB SERPL: 1.4 G/DL
ALP SERPL-CCNC: 93 U/L (ref 39–117)
ALT SERPL W P-5'-P-CCNC: 18 U/L (ref 1–41)
ANION GAP SERPL CALCULATED.3IONS-SCNC: 12.3 MMOL/L (ref 5–15)
AST SERPL-CCNC: 18 U/L (ref 1–40)
BASOPHILS # BLD AUTO: 0.1 10*3/MM3 (ref 0–0.2)
BASOPHILS NFR BLD AUTO: 1.3 % (ref 0–1.5)
BILIRUB SERPL-MCNC: 0.3 MG/DL (ref 0.2–1.2)
BUN BLD-MCNC: 16 MG/DL (ref 8–23)
BUN/CREAT SERPL: 20 (ref 7–25)
CALCIUM SPEC-SCNC: 8.5 MG/DL (ref 8.6–10.5)
CHLORIDE SERPL-SCNC: 109 MMOL/L (ref 98–107)
CHOLEST SERPL-MCNC: 165 MG/DL (ref 0–200)
CO2 SERPL-SCNC: 23.7 MMOL/L (ref 22–29)
CREAT BLD-MCNC: 0.8 MG/DL (ref 0.76–1.27)
DEPRECATED RDW RBC AUTO: 43.7 FL (ref 37–54)
EOSINOPHIL # BLD AUTO: 0.49 10*3/MM3 (ref 0–0.4)
EOSINOPHIL NFR BLD AUTO: 6.3 % (ref 0.3–6.2)
ERYTHROCYTE [DISTWIDTH] IN BLOOD BY AUTOMATED COUNT: 12.5 % (ref 12.3–15.4)
GFR SERPL CREATININE-BSD FRML MDRD: 95 ML/MIN/1.73
GLOBULIN UR ELPH-MCNC: 3 GM/DL
GLUCOSE BLD-MCNC: 103 MG/DL (ref 65–99)
HBA1C MFR BLD: 6.1 % (ref 4.8–5.6)
HCT VFR BLD AUTO: 37.9 % (ref 37.5–51)
HDLC SERPL-MCNC: 34 MG/DL (ref 40–60)
HGB BLD-MCNC: 12.9 G/DL (ref 13–17.7)
IMM GRANULOCYTES # BLD AUTO: 0.04 10*3/MM3 (ref 0–0.05)
IMM GRANULOCYTES NFR BLD AUTO: 0.5 % (ref 0–0.5)
LDLC SERPL CALC-MCNC: 109 MG/DL (ref 0–100)
LDLC/HDLC SERPL: 3.21 {RATIO}
LYMPHOCYTES # BLD AUTO: 1.65 10*3/MM3 (ref 0.7–3.1)
LYMPHOCYTES NFR BLD AUTO: 21.1 % (ref 19.6–45.3)
MCH RBC QN AUTO: 32.4 PG (ref 26.6–33)
MCHC RBC AUTO-ENTMCNC: 34 G/DL (ref 31.5–35.7)
MCV RBC AUTO: 95.2 FL (ref 79–97)
MONOCYTES # BLD AUTO: 0.68 10*3/MM3 (ref 0.1–0.9)
MONOCYTES NFR BLD AUTO: 8.7 % (ref 5–12)
NEUTROPHILS # BLD AUTO: 4.87 10*3/MM3 (ref 1.7–7)
NEUTROPHILS NFR BLD AUTO: 62.1 % (ref 42.7–76)
NRBC BLD AUTO-RTO: 0 /100 WBC (ref 0–0.2)
PLATELET # BLD AUTO: 206 10*3/MM3 (ref 140–450)
PMV BLD AUTO: 11.2 FL (ref 6–12)
POTASSIUM BLD-SCNC: 4.1 MMOL/L (ref 3.5–5.2)
PROT SERPL-MCNC: 7.2 G/DL (ref 6–8.5)
RBC # BLD AUTO: 3.98 10*6/MM3 (ref 4.14–5.8)
SODIUM BLD-SCNC: 145 MMOL/L (ref 136–145)
TRIGL SERPL-MCNC: 109 MG/DL (ref 0–150)
TSH SERPL DL<=0.05 MIU/L-ACNC: 1.63 UIU/ML (ref 0.27–4.2)
VLDLC SERPL-MCNC: 21.8 MG/DL (ref 5–40)
WBC NRBC COR # BLD: 7.83 10*3/MM3 (ref 3.4–10.8)

## 2019-10-12 PROCEDURE — 83036 HEMOGLOBIN GLYCOSYLATED A1C: CPT | Performed by: INTERNAL MEDICINE

## 2019-10-12 PROCEDURE — 85025 COMPLETE CBC W/AUTO DIFF WBC: CPT | Performed by: INTERNAL MEDICINE

## 2019-10-12 PROCEDURE — 80061 LIPID PANEL: CPT | Performed by: INTERNAL MEDICINE

## 2019-10-12 PROCEDURE — 80053 COMPREHEN METABOLIC PANEL: CPT | Performed by: INTERNAL MEDICINE

## 2019-10-12 PROCEDURE — 84443 ASSAY THYROID STIM HORMONE: CPT | Performed by: INTERNAL MEDICINE

## 2019-10-12 PROCEDURE — 36415 COLL VENOUS BLD VENIPUNCTURE: CPT | Performed by: INTERNAL MEDICINE

## 2019-10-29 ENCOUNTER — HOSPITAL ENCOUNTER (OUTPATIENT)
Facility: HOSPITAL | Age: 74
Setting detail: OBSERVATION
Discharge: HOME | End: 2019-10-30
Attending: EMERGENCY MEDICINE | Admitting: INTERNAL MEDICINE

## 2019-10-29 ENCOUNTER — APPOINTMENT (OUTPATIENT)
Dept: GENERAL RADIOLOGY | Facility: HOSPITAL | Age: 74
End: 2019-10-29

## 2019-10-29 DIAGNOSIS — R07.9 CHEST PAIN, UNSPECIFIED TYPE: Primary | ICD-10-CM

## 2019-10-29 LAB
ALBUMIN SERPL-MCNC: 4.4 G/DL (ref 3.5–5.2)
ALBUMIN/GLOB SERPL: 1.3 G/DL
ALP SERPL-CCNC: 116 U/L (ref 39–117)
ALT SERPL W P-5'-P-CCNC: 20 U/L (ref 1–41)
ANION GAP SERPL CALCULATED.3IONS-SCNC: 13.2 MMOL/L (ref 5–15)
AST SERPL-CCNC: 20 U/L (ref 1–40)
BASOPHILS # BLD AUTO: 0.08 10*3/MM3 (ref 0–0.2)
BASOPHILS NFR BLD AUTO: 1.1 % (ref 0–1.5)
BILIRUB SERPL-MCNC: 0.3 MG/DL (ref 0.2–1.2)
BUN BLD-MCNC: 16 MG/DL (ref 8–23)
BUN/CREAT SERPL: 19.8 (ref 7–25)
CALCIUM SPEC-SCNC: 9.6 MG/DL (ref 8.6–10.5)
CHLORIDE SERPL-SCNC: 105 MMOL/L (ref 98–107)
CO2 SERPL-SCNC: 23.8 MMOL/L (ref 22–29)
CREAT BLD-MCNC: 0.81 MG/DL (ref 0.76–1.27)
DEPRECATED RDW RBC AUTO: 45.6 FL (ref 37–54)
EOSINOPHIL # BLD AUTO: 0.63 10*3/MM3 (ref 0–0.4)
EOSINOPHIL NFR BLD AUTO: 8.9 % (ref 0.3–6.2)
ERYTHROCYTE [DISTWIDTH] IN BLOOD BY AUTOMATED COUNT: 13.1 % (ref 12.3–15.4)
GFR SERPL CREATININE-BSD FRML MDRD: 93 ML/MIN/1.73
GLOBULIN UR ELPH-MCNC: 3.4 GM/DL
GLUCOSE BLD-MCNC: 126 MG/DL (ref 65–99)
HCT VFR BLD AUTO: 38.2 % (ref 37.5–51)
HGB BLD-MCNC: 12.9 G/DL (ref 13–17.7)
HOLD SPECIMEN: NORMAL
HOLD SPECIMEN: NORMAL
IMM GRANULOCYTES # BLD AUTO: 0.01 10*3/MM3 (ref 0–0.05)
IMM GRANULOCYTES NFR BLD AUTO: 0.1 % (ref 0–0.5)
LYMPHOCYTES # BLD AUTO: 1.95 10*3/MM3 (ref 0.7–3.1)
LYMPHOCYTES NFR BLD AUTO: 27.5 % (ref 19.6–45.3)
MCH RBC QN AUTO: 31.9 PG (ref 26.6–33)
MCHC RBC AUTO-ENTMCNC: 33.8 G/DL (ref 31.5–35.7)
MCV RBC AUTO: 94.6 FL (ref 79–97)
MONOCYTES # BLD AUTO: 0.69 10*3/MM3 (ref 0.1–0.9)
MONOCYTES NFR BLD AUTO: 9.7 % (ref 5–12)
NEUTROPHILS # BLD AUTO: 3.74 10*3/MM3 (ref 1.7–7)
NEUTROPHILS NFR BLD AUTO: 52.7 % (ref 42.7–76)
NRBC BLD AUTO-RTO: 0 /100 WBC (ref 0–0.2)
PLATELET # BLD AUTO: 220 10*3/MM3 (ref 140–450)
PMV BLD AUTO: 10.6 FL (ref 6–12)
POTASSIUM BLD-SCNC: 3.8 MMOL/L (ref 3.5–5.2)
PROT SERPL-MCNC: 7.8 G/DL (ref 6–8.5)
RBC # BLD AUTO: 4.04 10*6/MM3 (ref 4.14–5.8)
SODIUM BLD-SCNC: 142 MMOL/L (ref 136–145)
TROPONIN T SERPL-MCNC: <0.01 NG/ML (ref 0–0.03)
TROPONIN T SERPL-MCNC: <0.01 NG/ML (ref 0–0.03)
WBC NRBC COR # BLD: 7.1 10*3/MM3 (ref 3.4–10.8)
WHOLE BLOOD HOLD SPECIMEN: NORMAL
WHOLE BLOOD HOLD SPECIMEN: NORMAL

## 2019-10-29 PROCEDURE — 93010 ELECTROCARDIOGRAM REPORT: CPT | Performed by: INTERNAL MEDICINE

## 2019-10-29 PROCEDURE — 83036 HEMOGLOBIN GLYCOSYLATED A1C: CPT | Performed by: HOSPITALIST

## 2019-10-29 PROCEDURE — 99219 PR INITIAL OBSERVATION CARE/DAY 50 MINUTES: CPT | Performed by: NURSE PRACTITIONER

## 2019-10-29 PROCEDURE — 93005 ELECTROCARDIOGRAM TRACING: CPT | Performed by: EMERGENCY MEDICINE

## 2019-10-29 PROCEDURE — 80053 COMPREHEN METABOLIC PANEL: CPT | Performed by: EMERGENCY MEDICINE

## 2019-10-29 PROCEDURE — 85025 COMPLETE CBC W/AUTO DIFF WBC: CPT | Performed by: EMERGENCY MEDICINE

## 2019-10-29 PROCEDURE — 36415 COLL VENOUS BLD VENIPUNCTURE: CPT

## 2019-10-29 PROCEDURE — 71046 X-RAY EXAM CHEST 2 VIEWS: CPT

## 2019-10-29 PROCEDURE — 99284 EMERGENCY DEPT VISIT MOD MDM: CPT

## 2019-10-29 PROCEDURE — 84484 ASSAY OF TROPONIN QUANT: CPT | Performed by: EMERGENCY MEDICINE

## 2019-10-29 RX ORDER — SODIUM CHLORIDE 0.9 % (FLUSH) 0.9 %
10 SYRINGE (ML) INJECTION AS NEEDED
Status: DISCONTINUED | OUTPATIENT
Start: 2019-10-29 | End: 2019-10-30 | Stop reason: HOSPADM

## 2019-10-29 RX ORDER — ASPIRIN 325 MG
325 TABLET ORAL ONCE
Status: DISCONTINUED | OUTPATIENT
Start: 2019-10-29 | End: 2019-10-29

## 2019-10-29 RX ORDER — ASPIRIN 81 MG/1
243 TABLET, CHEWABLE ORAL ONCE
Status: COMPLETED | OUTPATIENT
Start: 2019-10-29 | End: 2019-10-29

## 2019-10-29 RX ADMIN — ASPIRIN 243 MG: 81 TABLET, CHEWABLE ORAL at 21:43

## 2019-10-30 ENCOUNTER — APPOINTMENT (OUTPATIENT)
Dept: NUCLEAR MEDICINE | Facility: HOSPITAL | Age: 74
End: 2019-10-30

## 2019-10-30 ENCOUNTER — APPOINTMENT (OUTPATIENT)
Dept: CARDIOLOGY | Facility: HOSPITAL | Age: 74
End: 2019-10-30

## 2019-10-30 VITALS
HEIGHT: 70 IN | HEART RATE: 54 BPM | DIASTOLIC BLOOD PRESSURE: 54 MMHG | RESPIRATION RATE: 20 BRPM | SYSTOLIC BLOOD PRESSURE: 126 MMHG | WEIGHT: 174.6 LBS | TEMPERATURE: 97.4 F | OXYGEN SATURATION: 99 % | BODY MASS INDEX: 25 KG/M2

## 2019-10-30 LAB
ALBUMIN SERPL-MCNC: 3.74 G/DL (ref 3.5–5.2)
ALBUMIN/GLOB SERPL: 1.2 G/DL
ALP SERPL-CCNC: 92 U/L (ref 39–117)
ALT SERPL W P-5'-P-CCNC: 17 U/L (ref 1–41)
ANION GAP SERPL CALCULATED.3IONS-SCNC: 12.2 MMOL/L (ref 5–15)
AST SERPL-CCNC: 18 U/L (ref 1–40)
BASOPHILS # BLD AUTO: 0.09 10*3/MM3 (ref 0–0.2)
BASOPHILS NFR BLD AUTO: 1.1 % (ref 0–1.5)
BH CV ECHO MEAS - % IVS THICK: 2 %
BH CV ECHO MEAS - % LVPW THICK: -2.4 %
BH CV ECHO MEAS - ACS: 1.8 CM
BH CV ECHO MEAS - AI DEC SLOPE: 104.8 CM/SEC^2
BH CV ECHO MEAS - AO MAX PG: 7.1 MMHG
BH CV ECHO MEAS - AO MEAN PG: 3.6 MMHG
BH CV ECHO MEAS - AO ROOT AREA (BSA CORRECTED): 1.7
BH CV ECHO MEAS - AO ROOT AREA: 8.4 CM^2
BH CV ECHO MEAS - AO ROOT DIAM: 3.3 CM
BH CV ECHO MEAS - AO V2 MAX: 133 CM/SEC
BH CV ECHO MEAS - AO V2 MEAN: 88.9 CM/SEC
BH CV ECHO MEAS - AO V2 VTI: 37.5 CM
BH CV ECHO MEAS - BSA(HAYCOCK): 2 M^2
BH CV ECHO MEAS - BSA: 2 M^2
BH CV ECHO MEAS - BZI_BMI: 25 KILOGRAMS/M^2
BH CV ECHO MEAS - BZI_METRIC_HEIGHT: 177.8 CM
BH CV ECHO MEAS - BZI_METRIC_WEIGHT: 78.9 KG
BH CV ECHO MEAS - EDV(CUBED): 93 ML
BH CV ECHO MEAS - EDV(MOD-SP4): 74 ML
BH CV ECHO MEAS - EDV(TEICH): 93.9 ML
BH CV ECHO MEAS - EF(CUBED): 82.8 %
BH CV ECHO MEAS - EF(MOD-SP4): 67.6 %
BH CV ECHO MEAS - EF(TEICH): 75.8 %
BH CV ECHO MEAS - ESV(CUBED): 16 ML
BH CV ECHO MEAS - ESV(MOD-SP4): 24 ML
BH CV ECHO MEAS - ESV(TEICH): 22.7 ML
BH CV ECHO MEAS - FS: 44.4 %
BH CV ECHO MEAS - IVS/LVPW: 0.96
BH CV ECHO MEAS - IVSD: 1.1 CM
BH CV ECHO MEAS - IVSS: 1.1 CM
BH CV ECHO MEAS - LA DIMENSION: 3.7 CM
BH CV ECHO MEAS - LA/AO: 1.1
BH CV ECHO MEAS - LV DIASTOLIC VOL/BSA (35-75): 37.6 ML/M^2
BH CV ECHO MEAS - LV MASS(C)D: 185.8 GRAMS
BH CV ECHO MEAS - LV MASS(C)DI: 94.5 GRAMS/M^2
BH CV ECHO MEAS - LV MASS(C)S: 78.8 GRAMS
BH CV ECHO MEAS - LV MASS(C)SI: 40.1 GRAMS/M^2
BH CV ECHO MEAS - LV SYSTOLIC VOL/BSA (12-30): 12.2 ML/M^2
BH CV ECHO MEAS - LVIDD: 4.5 CM
BH CV ECHO MEAS - LVIDS: 2.5 CM
BH CV ECHO MEAS - LVLD AP4: 7 CM
BH CV ECHO MEAS - LVLS AP4: 5.7 CM
BH CV ECHO MEAS - LVOT AREA (M): 3.1 CM^2
BH CV ECHO MEAS - LVOT AREA: 3.3 CM^2
BH CV ECHO MEAS - LVOT DIAM: 2 CM
BH CV ECHO MEAS - LVPWD: 1.2 CM
BH CV ECHO MEAS - LVPWS: 1.1 CM
BH CV ECHO MEAS - MV A MAX VEL: 66.1 CM/SEC
BH CV ECHO MEAS - MV E MAX VEL: 82.9 CM/SEC
BH CV ECHO MEAS - MV E/A: 1.3
BH CV ECHO MEAS - PA ACC SLOPE: 1269 CM/SEC^2
BH CV ECHO MEAS - PA ACC TIME: 0.08 SEC
BH CV ECHO MEAS - PA PR(ACCEL): 41 MMHG
BH CV ECHO MEAS - RAP SYSTOLE: 10 MMHG
BH CV ECHO MEAS - RVSP: 28.4 MMHG
BH CV ECHO MEAS - SI(AO): 160.6 ML/M^2
BH CV ECHO MEAS - SI(CUBED): 39.2 ML/M^2
BH CV ECHO MEAS - SI(MOD-SP4): 25.4 ML/M^2
BH CV ECHO MEAS - SI(TEICH): 36.2 ML/M^2
BH CV ECHO MEAS - SV(AO): 316.1 ML
BH CV ECHO MEAS - SV(CUBED): 77 ML
BH CV ECHO MEAS - SV(MOD-SP4): 50 ML
BH CV ECHO MEAS - SV(TEICH): 71.2 ML
BH CV ECHO MEAS - TR MAX VEL: 214.5 CM/SEC
BH CV NUCLEAR PRIOR STUDY: 3
BH CV STRESS BP STAGE 1: NORMAL
BH CV STRESS BP STAGE 2: NORMAL
BH CV STRESS COMMENTS STAGE 1: NORMAL
BH CV STRESS COMMENTS STAGE 2: NORMAL
BH CV STRESS DOSE REGADENOSON STAGE 1: 0.4
BH CV STRESS DURATION MIN STAGE 1: 0
BH CV STRESS DURATION MIN STAGE 2: 4
BH CV STRESS DURATION SEC STAGE 1: 10
BH CV STRESS DURATION SEC STAGE 2: 0
BH CV STRESS HR STAGE 1: 70
BH CV STRESS HR STAGE 2: 56
BH CV STRESS PROTOCOL 1: NORMAL
BH CV STRESS RECOVERY BP: NORMAL MMHG
BH CV STRESS RECOVERY HR: 56 BPM
BH CV STRESS STAGE 1: 1
BH CV STRESS STAGE 2: 2
BILIRUB SERPL-MCNC: 0.5 MG/DL (ref 0.2–1.2)
BUN BLD-MCNC: 13 MG/DL (ref 8–23)
BUN/CREAT SERPL: 17.6 (ref 7–25)
CALCIUM SPEC-SCNC: 9.1 MG/DL (ref 8.6–10.5)
CHLORIDE SERPL-SCNC: 108 MMOL/L (ref 98–107)
CHOLEST SERPL-MCNC: 156 MG/DL (ref 0–200)
CO2 SERPL-SCNC: 22.8 MMOL/L (ref 22–29)
CREAT BLD-MCNC: 0.74 MG/DL (ref 0.76–1.27)
DEPRECATED RDW RBC AUTO: 47.8 FL (ref 37–54)
EOSINOPHIL # BLD AUTO: 0.79 10*3/MM3 (ref 0–0.4)
EOSINOPHIL NFR BLD AUTO: 9.8 % (ref 0.3–6.2)
ERYTHROCYTE [DISTWIDTH] IN BLOOD BY AUTOMATED COUNT: 13.1 % (ref 12.3–15.4)
GFR SERPL CREATININE-BSD FRML MDRD: 104 ML/MIN/1.73
GLOBULIN UR ELPH-MCNC: 3.2 GM/DL
GLUCOSE BLD-MCNC: 99 MG/DL (ref 65–99)
GLUCOSE BLDC GLUCOMTR-MCNC: 101 MG/DL (ref 70–130)
GLUCOSE BLDC GLUCOMTR-MCNC: 101 MG/DL (ref 70–130)
GLUCOSE BLDC GLUCOMTR-MCNC: 105 MG/DL (ref 70–130)
HBA1C MFR BLD: 5.9 % (ref 4.8–5.6)
HCT VFR BLD AUTO: 39.5 % (ref 37.5–51)
HDLC SERPL-MCNC: 32 MG/DL (ref 40–60)
HGB BLD-MCNC: 12.7 G/DL (ref 13–17.7)
IMM GRANULOCYTES # BLD AUTO: 0.03 10*3/MM3 (ref 0–0.05)
IMM GRANULOCYTES NFR BLD AUTO: 0.4 % (ref 0–0.5)
LDLC SERPL CALC-MCNC: 93 MG/DL (ref 0–100)
LDLC/HDLC SERPL: 2.91 {RATIO}
LV EF 2D ECHO EST: 65 %
LV EF NUC BP: 60 %
LYMPHOCYTES # BLD AUTO: 1.75 10*3/MM3 (ref 0.7–3.1)
LYMPHOCYTES NFR BLD AUTO: 21.7 % (ref 19.6–45.3)
MAXIMAL PREDICTED HEART RATE: 147 BPM
MAXIMAL PREDICTED HEART RATE: 147 BPM
MCH RBC QN AUTO: 31.9 PG (ref 26.6–33)
MCHC RBC AUTO-ENTMCNC: 32.2 G/DL (ref 31.5–35.7)
MCV RBC AUTO: 99.2 FL (ref 79–97)
MONOCYTES # BLD AUTO: 0.85 10*3/MM3 (ref 0.1–0.9)
MONOCYTES NFR BLD AUTO: 10.5 % (ref 5–12)
NEUTROPHILS # BLD AUTO: 4.55 10*3/MM3 (ref 1.7–7)
NEUTROPHILS NFR BLD AUTO: 56.5 % (ref 42.7–76)
NRBC BLD AUTO-RTO: 0 /100 WBC (ref 0–0.2)
PERCENT MAX PREDICTED HR: 47.62 %
PLATELET # BLD AUTO: 186 10*3/MM3 (ref 140–450)
PMV BLD AUTO: 11 FL (ref 6–12)
POTASSIUM BLD-SCNC: 3.6 MMOL/L (ref 3.5–5.2)
PROT SERPL-MCNC: 6.9 G/DL (ref 6–8.5)
RBC # BLD AUTO: 3.98 10*6/MM3 (ref 4.14–5.8)
SODIUM BLD-SCNC: 143 MMOL/L (ref 136–145)
STRESS BASELINE BP: NORMAL MMHG
STRESS BASELINE HR: 54 BPM
STRESS PERCENT HR: 56 %
STRESS POST PEAK BP: NORMAL MMHG
STRESS POST PEAK HR: 70 BPM
STRESS TARGET HR: 125 BPM
STRESS TARGET HR: 125 BPM
TRIGL SERPL-MCNC: 155 MG/DL (ref 0–150)
TROPONIN T SERPL-MCNC: <0.01 NG/ML (ref 0–0.03)
VLDLC SERPL-MCNC: 31 MG/DL
WBC NRBC COR # BLD: 8.06 10*3/MM3 (ref 3.4–10.8)

## 2019-10-30 PROCEDURE — G0378 HOSPITAL OBSERVATION PER HR: HCPCS

## 2019-10-30 PROCEDURE — 84484 ASSAY OF TROPONIN QUANT: CPT | Performed by: INTERNAL MEDICINE

## 2019-10-30 PROCEDURE — 99217 PR OBSERVATION CARE DISCHARGE MANAGEMENT: CPT | Performed by: INTERNAL MEDICINE

## 2019-10-30 PROCEDURE — 93018 CV STRESS TEST I&R ONLY: CPT | Performed by: INTERNAL MEDICINE

## 2019-10-30 PROCEDURE — A9500 TC99M SESTAMIBI: HCPCS | Performed by: INTERNAL MEDICINE

## 2019-10-30 PROCEDURE — 0 TECHNETIUM SESTAMIBI: Performed by: INTERNAL MEDICINE

## 2019-10-30 PROCEDURE — 99214 OFFICE O/P EST MOD 30 MIN: CPT | Performed by: NURSE PRACTITIONER

## 2019-10-30 PROCEDURE — 80053 COMPREHEN METABOLIC PANEL: CPT | Performed by: HOSPITALIST

## 2019-10-30 PROCEDURE — 93306 TTE W/DOPPLER COMPLETE: CPT | Performed by: INTERNAL MEDICINE

## 2019-10-30 PROCEDURE — 78452 HT MUSCLE IMAGE SPECT MULT: CPT

## 2019-10-30 PROCEDURE — 82962 GLUCOSE BLOOD TEST: CPT

## 2019-10-30 PROCEDURE — 78452 HT MUSCLE IMAGE SPECT MULT: CPT | Performed by: INTERNAL MEDICINE

## 2019-10-30 PROCEDURE — 96372 THER/PROPH/DIAG INJ SC/IM: CPT

## 2019-10-30 PROCEDURE — 85025 COMPLETE CBC W/AUTO DIFF WBC: CPT | Performed by: HOSPITALIST

## 2019-10-30 PROCEDURE — 93306 TTE W/DOPPLER COMPLETE: CPT

## 2019-10-30 PROCEDURE — 25010000002 REGADENOSON 0.4 MG/5ML SOLUTION: Performed by: INTERNAL MEDICINE

## 2019-10-30 PROCEDURE — 80061 LIPID PANEL: CPT | Performed by: HOSPITALIST

## 2019-10-30 PROCEDURE — 25010000002 HEPARIN (PORCINE) PER 1000 UNITS: Performed by: HOSPITALIST

## 2019-10-30 PROCEDURE — 93017 CV STRESS TEST TRACING ONLY: CPT

## 2019-10-30 RX ORDER — CLOPIDOGREL BISULFATE 75 MG/1
75 TABLET ORAL DAILY
Status: DISCONTINUED | OUTPATIENT
Start: 2019-10-30 | End: 2019-10-30 | Stop reason: HOSPADM

## 2019-10-30 RX ORDER — ATORVASTATIN CALCIUM 40 MG/1
80 TABLET, FILM COATED ORAL NIGHTLY
Status: DISCONTINUED | OUTPATIENT
Start: 2019-10-30 | End: 2019-10-30 | Stop reason: HOSPADM

## 2019-10-30 RX ORDER — NICOTINE POLACRILEX 4 MG
15 LOZENGE BUCCAL
Status: DISCONTINUED | OUTPATIENT
Start: 2019-10-30 | End: 2019-10-30 | Stop reason: HOSPADM

## 2019-10-30 RX ORDER — FERROUS SULFATE 325(65) MG
325 TABLET ORAL
Status: DISCONTINUED | OUTPATIENT
Start: 2019-10-30 | End: 2019-10-30 | Stop reason: HOSPADM

## 2019-10-30 RX ORDER — HEPARIN SODIUM 5000 [USP'U]/ML
5000 INJECTION, SOLUTION INTRAVENOUS; SUBCUTANEOUS EVERY 12 HOURS SCHEDULED
Status: DISCONTINUED | OUTPATIENT
Start: 2019-10-30 | End: 2019-10-30 | Stop reason: HOSPADM

## 2019-10-30 RX ORDER — DEXTROSE MONOHYDRATE 25 G/50ML
25 INJECTION, SOLUTION INTRAVENOUS
Status: DISCONTINUED | OUTPATIENT
Start: 2019-10-30 | End: 2019-10-30 | Stop reason: HOSPADM

## 2019-10-30 RX ORDER — HYDRALAZINE HYDROCHLORIDE 20 MG/ML
10 INJECTION INTRAMUSCULAR; INTRAVENOUS EVERY 6 HOURS PRN
Status: DISCONTINUED | OUTPATIENT
Start: 2019-10-30 | End: 2019-10-30 | Stop reason: HOSPADM

## 2019-10-30 RX ORDER — ISOSORBIDE MONONITRATE 60 MG/1
60 TABLET, EXTENDED RELEASE ORAL
Status: DISCONTINUED | OUTPATIENT
Start: 2019-10-30 | End: 2019-10-30 | Stop reason: HOSPADM

## 2019-10-30 RX ORDER — PANTOPRAZOLE SODIUM 40 MG/1
40 TABLET, DELAYED RELEASE ORAL
Status: DISCONTINUED | OUTPATIENT
Start: 2019-10-30 | End: 2019-10-30 | Stop reason: HOSPADM

## 2019-10-30 RX ORDER — ASPIRIN 81 MG/1
81 TABLET ORAL DAILY
Status: DISCONTINUED | OUTPATIENT
Start: 2019-10-30 | End: 2019-10-30 | Stop reason: HOSPADM

## 2019-10-30 RX ORDER — FINASTERIDE 5 MG/1
5 TABLET, FILM COATED ORAL DAILY
Status: DISCONTINUED | OUTPATIENT
Start: 2019-10-30 | End: 2019-10-30 | Stop reason: HOSPADM

## 2019-10-30 RX ORDER — SODIUM CHLORIDE 0.9 % (FLUSH) 0.9 %
10 SYRINGE (ML) INJECTION AS NEEDED
Status: DISCONTINUED | OUTPATIENT
Start: 2019-10-30 | End: 2019-10-30 | Stop reason: HOSPADM

## 2019-10-30 RX ORDER — SODIUM CHLORIDE 0.9 % (FLUSH) 0.9 %
10 SYRINGE (ML) INJECTION EVERY 12 HOURS SCHEDULED
Status: DISCONTINUED | OUTPATIENT
Start: 2019-10-30 | End: 2019-10-30 | Stop reason: HOSPADM

## 2019-10-30 RX ORDER — ISOSORBIDE MONONITRATE 60 MG/1
60 TABLET, EXTENDED RELEASE ORAL DAILY
Qty: 30 TABLET | Refills: 3 | Status: SHIPPED | OUTPATIENT
Start: 2019-10-30 | End: 2019-11-06 | Stop reason: SDUPTHER

## 2019-10-30 RX ORDER — ASPIRIN 81 MG/1
81 TABLET ORAL DAILY
Status: CANCELLED | OUTPATIENT
Start: 2019-10-30

## 2019-10-30 RX ADMIN — ISOSORBIDE MONONITRATE 60 MG: 60 TABLET, EXTENDED RELEASE ORAL at 12:26

## 2019-10-30 RX ADMIN — CLOPIDOGREL 75 MG: 75 TABLET, FILM COATED ORAL at 12:34

## 2019-10-30 RX ADMIN — REGADENOSON 0.4 MG: 0.08 INJECTION, SOLUTION INTRAVENOUS at 10:20

## 2019-10-30 RX ADMIN — TECHNETIUM TC 99M SESTAMIBI 1 DOSE: 1 INJECTION INTRAVENOUS at 08:35

## 2019-10-30 RX ADMIN — TECHNETIUM TC 99M SESTAMIBI 1 DOSE: 1 INJECTION INTRAVENOUS at 10:20

## 2019-10-30 RX ADMIN — FINASTERIDE 5 MG: 5 TABLET, FILM COATED ORAL at 12:26

## 2019-10-30 RX ADMIN — LISINOPRIL 15 MG: 10 TABLET ORAL at 12:28

## 2019-10-30 RX ADMIN — HEPARIN SODIUM 5000 UNITS: 5000 INJECTION INTRAVENOUS; SUBCUTANEOUS at 12:33

## 2019-10-30 RX ADMIN — PANTOPRAZOLE SODIUM 40 MG: 40 TABLET, DELAYED RELEASE ORAL at 12:28

## 2019-10-30 RX ADMIN — ASPIRIN 81 MG: 81 TABLET, COATED ORAL at 12:26

## 2019-10-30 RX ADMIN — SODIUM CHLORIDE, PRESERVATIVE FREE 10 ML: 5 INJECTION INTRAVENOUS at 02:23

## 2019-10-30 RX ADMIN — ATORVASTATIN CALCIUM 80 MG: 40 TABLET, FILM COATED ORAL at 02:23

## 2019-10-30 RX ADMIN — FERROUS SULFATE TAB 325 MG (65 MG ELEMENTAL FE) 325 MG: 325 (65 FE) TAB at 12:27

## 2019-10-31 ENCOUNTER — TELEPHONE (OUTPATIENT)
Dept: CARDIOLOGY | Facility: CLINIC | Age: 74
End: 2019-10-31

## 2019-10-31 NOTE — TELEPHONE ENCOUNTER
Notified by Dr. Tang that the patient was inadvertently discharged home yesterday before nuclear stress test results were discussed with the patient and cardiology. Called patients home today and spoke with his wife. Patient is currently at work and will call back around 6 pm today.His wife states that he has no being having any chest pain since discharge. He has an appointment on November 4th at 8:20am with Dr. Buenrostro in the clinic. Will discuss results with the patient when he calls back and ensure that he follows up in the outpatient setting.

## 2019-11-04 PROBLEM — R53.82 CHRONIC FATIGUE: Chronic | Status: ACTIVE | Noted: 2017-05-18

## 2019-11-04 PROBLEM — Z87.11 HISTORY OF GASTRIC ULCER: Status: RESOLVED | Noted: 2018-08-09 | Resolved: 2019-11-04

## 2019-11-04 PROBLEM — I25.41 CORONARY ARTERY FISTULA: Chronic | Status: ACTIVE | Noted: 2017-07-30

## 2019-11-04 PROBLEM — R11.2 INTRACTABLE VOMITING WITH NAUSEA: Status: RESOLVED | Noted: 2018-08-09 | Resolved: 2019-11-04

## 2019-11-04 PROBLEM — E78.5 DYSLIPIDEMIA: Chronic | Status: ACTIVE | Noted: 2017-01-09

## 2019-11-06 ENCOUNTER — OFFICE VISIT (OUTPATIENT)
Dept: CARDIOLOGY | Facility: CLINIC | Age: 74
End: 2019-11-06

## 2019-11-06 ENCOUNTER — PREP FOR SURGERY (OUTPATIENT)
Dept: OTHER | Facility: HOSPITAL | Age: 74
End: 2019-11-06

## 2019-11-06 VITALS
HEIGHT: 70 IN | HEART RATE: 56 BPM | DIASTOLIC BLOOD PRESSURE: 54 MMHG | BODY MASS INDEX: 25.65 KG/M2 | WEIGHT: 179.2 LBS | RESPIRATION RATE: 18 BRPM | SYSTOLIC BLOOD PRESSURE: 119 MMHG | OXYGEN SATURATION: 100 %

## 2019-11-06 DIAGNOSIS — I25.41 CORONARY ARTERY FISTULA: ICD-10-CM

## 2019-11-06 DIAGNOSIS — I25.10 ASCVD (ARTERIOSCLEROTIC CARDIOVASCULAR DISEASE): ICD-10-CM

## 2019-11-06 DIAGNOSIS — I20.9 ANGINA, CLASS III (HCC): Primary | ICD-10-CM

## 2019-11-06 DIAGNOSIS — I10 ESSENTIAL HYPERTENSION: ICD-10-CM

## 2019-11-06 DIAGNOSIS — R94.39 ABNORMAL NUCLEAR STRESS TEST: ICD-10-CM

## 2019-11-06 PROCEDURE — 99214 OFFICE O/P EST MOD 30 MIN: CPT | Performed by: INTERNAL MEDICINE

## 2019-11-06 RX ORDER — ISOSORBIDE MONONITRATE 60 MG/1
60 TABLET, EXTENDED RELEASE ORAL EVERY MORNING
Qty: 30 TABLET | Refills: 11 | Status: SHIPPED | OUTPATIENT
Start: 2019-11-06 | End: 2020-12-29 | Stop reason: SDUPTHER

## 2019-11-06 RX ORDER — NITROGLYCERIN 0.4 MG/1
TABLET SUBLINGUAL
Qty: 25 TABLET | Refills: 2 | Status: SHIPPED | OUTPATIENT
Start: 2019-11-06 | End: 2020-12-29 | Stop reason: SDUPTHER

## 2019-11-06 NOTE — PROGRESS NOTES
Gideon Charles MD  Fidencio Luciano  1945  11/06/2019    Patient Active Problem List   Diagnosis   • Essential hypertension   • Diabetes mellitus (CMS/HCC)   • Benign prostatic hyperplasia   • ASCVD (arteriosclerotic cardiovascular disease), s/p stenting of 80 % stenosis in left circumflex on 10/05/16and 60% stenosis in the LAD, clinically stable.    • Dyslipidemia   • Weakness generalized   • Chronic fatigue   • Coronary artery fistula   • Weight loss, unintentional   • Iron deficiency anemia   • Iron deficiency   • Normocytic anemia   • Weight loss, abnormal       Dear Gideon Charles MD:    Subjective     Fidencio Luciano is a 74 y.o. male with the problems as listed above, presents    Chief Complaint   Patient presents with   • [facility] f/u     10/29/2019       History of Present Illness: A pleasant 71-year-old  male with history of known ASCVD, status post stenting of the LAD and left circumflex in October 2016.  Then he recently had stenting of the RCA in May 2017.  He was recently admitted to the hospital for observation for complaints of chest pains and underwent a Lexiscan sestamibi study that revealed small size, mild degree of apical myocardial ischemia.  He was discharged home on medical therapy.  He is here for follow-up.  He has had one episode of chest pain since discharge from the hospital.  This occurred when he was sitting at home and lasted reportedly for about 10 to 15 minutes and resolved spontaneously.  This was apparently fairly intense pain and rated as 8 out of 10.  He is concerned about having recurrent coronary blockages.    No Known Allergies:      Current Outpatient Medications:   •  aspirin 81 MG tablet, Take 81 mg by mouth Daily., Disp: , Rfl:   •  atorvastatin (LIPITOR) 80 MG tablet, Take 1 tablet by mouth Every Night., Disp: 90 tablet, Rfl: 5  •  clopidogrel (PLAVIX) 75 MG tablet, TAKE 1 TABLET BY MOUTH EVERY DAY TO MAINTAIN BLOOD CIRCULATION AND CLOT PREVENTION, Disp:  30 tablet, Rfl: 5  •  ferrous sulfate 325 (65 FE) MG tablet, Take 1 tablet by mouth 3 (Three) Times a Day With Meals., Disp: 90 tablet, Rfl: 4  •  finasteride (PROSCAR) 5 MG tablet, Take 1 tablet by mouth Daily., Disp: 30 tablet, Rfl: 5  •  lisinopril (PRINIVIL,ZESTRIL) 10 MG tablet, Take 1.5 tablets by mouth Daily. for blood pressure, Disp: 90 tablet, Rfl: 1  •  pantoprazole (PROTONIX) 40 MG EC tablet, Take 1 tablet by mouth 2 (Two) Times a Day Before Meals., Disp: 60 tablet, Rfl: 5  •  isosorbide mononitrate (IMDUR) 60 MG 24 hr tablet, Take 1 tablet by mouth Every Morning., Disp: 30 tablet, Rfl: 11  •  nitroglycerin (NITROSTAT) 0.4 MG SL tablet, 1 under the tongue as needed for angina, may repeat q5mins for up three doses, Disp: 25 tablet, Rfl: 2      The following portions of the patient's history were reviewed and updated as appropriate: allergies, current medications, past family history, past medical history, past social history, past surgical history and problem list.    Social History     Tobacco Use   • Smoking status: Former Smoker     Packs/day: 3.00     Years: 40.00     Pack years: 120.00     Types: Cigarettes     Last attempt to quit: 1982     Years since quittin.8   • Smokeless tobacco: Former User     Quit date: 1986   Substance Use Topics   • Alcohol use: No   • Drug use: No       Review of Systems   Constitution: Positive for weakness and malaise/fatigue.   HENT: Negative for congestion.    Eyes: Negative for blurred vision and pain.   Cardiovascular: Positive for chest pain, irregular heartbeat and palpitations. Negative for claudication, cyanosis, dyspnea on exertion, leg swelling, near-syncope, orthopnea, paroxysmal nocturnal dyspnea and syncope.   Respiratory: Negative for cough and shortness of breath.    Endocrine: Negative for cold intolerance and heat intolerance.   Skin: Negative for dry skin.   Musculoskeletal: Positive for neck pain and stiffness.   Gastrointestinal: Positive  "for abdominal pain.   Genitourinary: Negative for flank pain.   Neurological: Negative for aphonia.   Psychiatric/Behavioral: The patient does not have insomnia.        Objective   Vitals:    11/06/19 1548   BP: 119/54   BP Location: Right arm   Patient Position: Sitting   Cuff Size: Adult   Pulse: 56   Resp: 18   SpO2: 100%   Weight: 81.3 kg (179 lb 3.2 oz)   Height: 177.8 cm (70\")     Body mass index is 25.71 kg/m².      Physical Exam   Constitutional: He is oriented to person, place, and time. He appears well-developed and well-nourished.   HENT:   Head: Normocephalic.   Eyes: Conjunctivae and EOM are normal.   Neck: Normal range of motion. Neck supple. No JVD present. No tracheal deviation present. No thyromegaly present.   Cardiovascular: Normal rate and regular rhythm. Exam reveals no gallop and no friction rub.   No murmur heard.  Pulmonary/Chest: Breath sounds normal. No respiratory distress. He has no wheezes. He has no rales.   Abdominal: Soft. Bowel sounds are normal. He exhibits no mass. There is no tenderness.   Musculoskeletal: He exhibits no edema.   Neurological: He is alert and oriented to person, place, and time. No cranial nerve deficit.   Skin: Skin is warm and dry.   Psychiatric: He has a normal mood and affect.       Lab Results   Component Value Date     10/30/2019    K 3.6 10/30/2019     (H) 10/30/2019    CO2 22.8 10/30/2019    BUN 13 10/30/2019    CREATININE 0.74 (L) 10/30/2019    GLUCOSE 99 10/30/2019    CALCIUM 9.1 10/30/2019    AST 18 10/30/2019    ALT 17 10/30/2019    ALKPHOS 92 10/30/2019    LABIL2 1.4 (L) 01/08/2016     Lab Results   Component Value Date    CKTOTAL 79 10/05/2016     Lab Results   Component Value Date    WBC 8.06 10/30/2019    HGB 12.7 (L) 10/30/2019    HCT 39.5 10/30/2019     10/30/2019     Lab Results   Component Value Date    INR 1.06 10/03/2016    INR 1.01 07/16/2016     Lab Results   Component Value Date    MG 2.3 07/25/2018     Lab Results "   Component Value Date    TSH 1.630 10/12/2019    PSA 1.850 05/10/2019    CHLPL 109 08/12/2015    TRIG 155 (H) 10/30/2019    HDL 32 (L) 10/30/2019    LDL 93 10/30/2019      Lab Results   Component Value Date    BNP 55.0 07/16/2016       Conclusion and comments:     Mr. Truong is a 71-year-old  male with recurrent CCS class 3-4 anginal symptoms and a previous history of known ASCVD, status post stenting of the LAD and left circumflex.  Scar catheterization reveals:     1. Normal left main coronary  2. Left anterior descending coronary artery has patent stent in the proximal segment.  Rest of the LAD has mild diffuse atherosclerotic disease.  There is a fistulous connection between the small first diagonal branch and unknown destination.  3. Left circumflex coronary artery has a widely patent stent in the mid segment.  4. Right coronary artery is dominant for posterior circulation with about 50-70% stenosis in the mid segment.     Recommendations: I have had Dr. Miller to review his coronary angiograms who thought that stenosis in the mid RCA is significant and is getting ready to do the intervention.  Will have a CT surgeon to look at his coronary angiograms about that fistulous connection of the diagonal branch and see if that if that would need any intervention, especially if patient continues to have anginal symptoms.      Conclusion     CARDIAC CATHETERIZATION REPORT     DATE OF PROCEDURE: May 9, 2017     INDICATION FOR PROCEDURE:   Mr. Truong is a 71-year-old gentleman who is a patient of Dr. Buenrostro.  He has previous history of coronary artery disease and underwent PCI to left circumflex and 2016.  At that time he was noted to have moderate RCA and LAD disease.  However he recently started having recurrent symptoms.  Dr. Buenrostro proceeded with coronary angiography today which demonstrated that made RCA disease had progressed.  Because the patient was having CCS class III angina despite of antianginal  therapy Dr. Buenrostro requested me to proceed with intervention of the right coronary artery.  For details of coronary angiography findings please see Dr. Buenrostro's dictation today.     PROCEDURE PERFORMED:      1.  Percutaneous coronary intervention of mid RCA with drug-eluting stent  2.  Limited right femoral angiography  3.  Mynx closure of right femoral arteriotomy.        PROCEDURE COMMENTS:     Fidencio Luciano was brought to the cath lab and placed on the cardiac catherization table. Patient already had a 5 [place] sheath placed in the right femoral artery by Dr. Buenrostro.  For details of coronary angiography procedure please see Dr. Buenrostro's note.       5 [place] sheath was up sized to 6 [place] sheath.  Angiomax was used for anticoagulation.  Right coronary artery was engaged using 6 [place] JR4 guide catheter.  Angiography demonstrated 70% mid RCA lesion with NIKOLAY 3 flow at baseline.  A BMW guidewire was advanced and was used to cross the lesion without any difficulty.  The lesion was predilated with 2.5×15 mm trek balloon. Subsequently a 3.0×18 Xience Alpine stent was deployed in the mid RCA.  Excellent angiographic result was obtained with 0% residual stenosis.  There was NIKOLAY 3 flow at the end of the procedure.  The guidewire was removed and final angiographic images were obtained which were satisfactory.  The guide catheter was also removed.     After completion of the procedure the femoral sheath was removed in the cath lab and hemostasis was achieved by placing a Mynx closure device. Patient tolerated the procedure with no complications.     Please see below for contrast volume, radiation dose and fluoroscopy time.        Final impression:     1.  Critical mid RCA stenosis status post successful PCI with drug-eluting stent.            RECOMMENDATIONS:  The patient had a good result from RCA intervention.  Recommend aspirin 81 mg daily indefinitely and clopidogrel 75 mg daily at least for 1 year.  The patient also has an  LAD fistula.  If the symptoms persist despite of RCA intervention, consider closure of the fistula as that may be another cause of patient's symptoms.  The patient will be admitted overnight for observation to progressive care unit.     Fidencio CHELO Mustapha   Regadenoson Stress Test With Myocardial Perfusion SPECT (Multi Study)   Order# [id number]   Reading physician:   Juan Alberto Darnell MD Ordering physician:   Eduardo Luque MD Study date: 10/30/19   Patient Information     Patient Name  Fidencio Luciano MRN  [id number] Sex  Male  (Age)  1945 (74 y.o.)   Interpretation Summary     · Left ventricular ejection fraction is normal (Calculated EF = 60%).  · Myocardial perfusion imaging indicates a small-sized, mild area of ischemia located in the apex.  · Impressions are consistent with an intermediate risk study.  · Findings consistent with a normal ECG stress test.  · Normal wall motion is noted  · Clinical correlation is required        Fidencio Luciano   Echocardiogram   Order# [id number]   Reading physician:   Juan Alberto Darnell MD Ordering physician:   Eduardo Luque MD Study date: 10/30/19   Patient Information     Patient Name  Fidencio Luciano MRN  [id number] Sex  Male  (Age)  1945 (74 y.o.)   Interpretation Summary     · Estimated EF = 65%.  · Left ventricular systolic function is normal.  · Trace Aortic regurgitaion  · No previous study  · No signigicant valvular abnormality         During this visit the following were done:  Labs Reviewed [x]    Hospital Records Reviewed [x]        Procedures      Assessment/Plan    Diagnosis Plan   1. Angina, class III (CMS/HCC)     2. Abnormal nuclear stress test with apical myocardial ischemia.     3. ASCVD (arteriosclerotic cardiovascular disease), status post stenting of the LAD, left circumflex and RCA.     4. Coronary artery fistula     5. Essential hypertension            Recommendations:  1. Continue with aspirin, Plavix, atorvastatin and isosorbide  mononitrate at current doses.  2. Increase the dose of isosorbide to 90 mg daily.  3. Not able to add a beta-blocker due to baseline sinus bradycardia.  4. In view of recurrent class III anginal symptoms and abnormal nuclear stress test and known coronary artery disease with stenting, I have discussed with him about the option of further cardiac evaluation with cardiac catheterization and further coronary intervention as needed.  Patient expressed understanding and is wanting to proceed.  We will try to schedule this for this Friday.    Return in about 2 months (around 1/6/2020).    As always, Gideon Charles MD  I appreciate very much the opportunity to participate in the cardiovascular care of your patients. Please do not hesitate to call me with any questions with regards to Fidencio Luciano evaluation and management.           With Best Regards,        Giovanni Buenrostro MD, [facility]    Please note that portions of this note were completed with a voice recognition program.

## 2019-11-07 ENCOUNTER — LAB (OUTPATIENT)
Dept: LAB | Facility: HOSPITAL | Age: 74
End: 2019-11-07

## 2019-11-07 DIAGNOSIS — I20.9 ANGINA, CLASS III (HCC): ICD-10-CM

## 2019-11-07 PROCEDURE — 80053 COMPREHEN METABOLIC PANEL: CPT

## 2019-11-07 PROCEDURE — 85025 COMPLETE CBC W/AUTO DIFF WBC: CPT

## 2019-11-07 PROCEDURE — 36415 COLL VENOUS BLD VENIPUNCTURE: CPT

## 2019-11-08 ENCOUNTER — HOSPITAL ENCOUNTER (OUTPATIENT)
Facility: HOSPITAL | Age: 74
Discharge: HOME | End: 2019-11-08
Attending: INTERNAL MEDICINE | Admitting: INTERNAL MEDICINE

## 2019-11-08 VITALS
BODY MASS INDEX: 24.36 KG/M2 | TEMPERATURE: 97.6 F | SYSTOLIC BLOOD PRESSURE: 126 MMHG | WEIGHT: 174 LBS | DIASTOLIC BLOOD PRESSURE: 50 MMHG | HEIGHT: 71 IN | HEART RATE: 54 BPM | OXYGEN SATURATION: 99 % | RESPIRATION RATE: 16 BRPM

## 2019-11-08 DIAGNOSIS — I20.9 ANGINA, CLASS III (HCC): ICD-10-CM

## 2019-11-08 LAB
ALBUMIN SERPL-MCNC: 4 G/DL (ref 3.5–5.2)
ALBUMIN/GLOB SERPL: 1.1 G/DL
ALP SERPL-CCNC: 95 U/L (ref 39–117)
ALT SERPL W P-5'-P-CCNC: 15 U/L (ref 1–41)
ANION GAP SERPL CALCULATED.3IONS-SCNC: 5.6 MMOL/L (ref 5–15)
AST SERPL-CCNC: 18 U/L (ref 1–40)
BASOPHILS # BLD AUTO: 0.09 10*3/MM3 (ref 0–0.2)
BASOPHILS NFR BLD AUTO: 1.1 % (ref 0–1.5)
BILIRUB SERPL-MCNC: 0.3 MG/DL (ref 0.2–1.2)
BUN BLD-MCNC: 19 MG/DL (ref 8–23)
BUN/CREAT SERPL: 24.4 (ref 7–25)
CALCIUM SPEC-SCNC: 9.1 MG/DL (ref 8.6–10.5)
CHLORIDE SERPL-SCNC: 107 MMOL/L (ref 98–107)
CO2 SERPL-SCNC: 29.4 MMOL/L (ref 22–29)
CREAT BLD-MCNC: 0.78 MG/DL (ref 0.76–1.27)
DEPRECATED RDW RBC AUTO: 42.5 FL (ref 37–54)
EOSINOPHIL # BLD AUTO: 0.49 10*3/MM3 (ref 0–0.4)
EOSINOPHIL NFR BLD AUTO: 6.2 % (ref 0.3–6.2)
ERYTHROCYTE [DISTWIDTH] IN BLOOD BY AUTOMATED COUNT: 12.3 % (ref 12.3–15.4)
GFR SERPL CREATININE-BSD FRML MDRD: 98 ML/MIN/1.73
GLOBULIN UR ELPH-MCNC: 3.6 GM/DL
GLUCOSE BLD-MCNC: 109 MG/DL (ref 65–99)
HCT VFR BLD AUTO: 35.7 % (ref 37.5–51)
HGB BLD-MCNC: 12 G/DL (ref 13–17.7)
IMM GRANULOCYTES # BLD AUTO: 0.04 10*3/MM3 (ref 0–0.05)
IMM GRANULOCYTES NFR BLD AUTO: 0.5 % (ref 0–0.5)
LYMPHOCYTES # BLD AUTO: 1.88 10*3/MM3 (ref 0.7–3.1)
LYMPHOCYTES NFR BLD AUTO: 23.7 % (ref 19.6–45.3)
MCH RBC QN AUTO: 32.1 PG (ref 26.6–33)
MCHC RBC AUTO-ENTMCNC: 33.6 G/DL (ref 31.5–35.7)
MCV RBC AUTO: 95.5 FL (ref 79–97)
MONOCYTES # BLD AUTO: 0.7 10*3/MM3 (ref 0.1–0.9)
MONOCYTES NFR BLD AUTO: 8.8 % (ref 5–12)
NEUTROPHILS # BLD AUTO: 4.74 10*3/MM3 (ref 1.7–7)
NEUTROPHILS NFR BLD AUTO: 59.7 % (ref 42.7–76)
NRBC BLD AUTO-RTO: 0 /100 WBC (ref 0–0.2)
PLATELET # BLD AUTO: 258 10*3/MM3 (ref 140–450)
PMV BLD AUTO: 11 FL (ref 6–12)
POTASSIUM BLD-SCNC: 4 MMOL/L (ref 3.5–5.2)
PROT SERPL-MCNC: 7.6 G/DL (ref 6–8.5)
RBC # BLD AUTO: 3.74 10*6/MM3 (ref 4.14–5.8)
SODIUM BLD-SCNC: 142 MMOL/L (ref 136–145)
WBC NRBC COR # BLD: 7.94 10*3/MM3 (ref 3.4–10.8)

## 2019-11-08 PROCEDURE — S0260 H&P FOR SURGERY: HCPCS | Performed by: INTERNAL MEDICINE

## 2019-11-08 PROCEDURE — C1769 GUIDE WIRE: HCPCS | Performed by: INTERNAL MEDICINE

## 2019-11-08 PROCEDURE — 99152 MOD SED SAME PHYS/QHP 5/>YRS: CPT | Performed by: INTERNAL MEDICINE

## 2019-11-08 PROCEDURE — 25010000002 MIDAZOLAM PER 1 MG: Performed by: INTERNAL MEDICINE

## 2019-11-08 PROCEDURE — C1760 CLOSURE DEV, VASC: HCPCS | Performed by: INTERNAL MEDICINE

## 2019-11-08 PROCEDURE — C1894 INTRO/SHEATH, NON-LASER: HCPCS | Performed by: INTERNAL MEDICINE

## 2019-11-08 PROCEDURE — 93458 L HRT ARTERY/VENTRICLE ANGIO: CPT | Performed by: INTERNAL MEDICINE

## 2019-11-08 PROCEDURE — 0 IOPAMIDOL PER 1 ML: Performed by: INTERNAL MEDICINE

## 2019-11-08 PROCEDURE — 25010000002 FENTANYL CITRATE (PF) 100 MCG/2ML SOLUTION: Performed by: INTERNAL MEDICINE

## 2019-11-08 RX ORDER — SODIUM CHLORIDE 9 MG/ML
1 INJECTION, SOLUTION INTRAVENOUS CONTINUOUS
Status: DISCONTINUED | OUTPATIENT
Start: 2019-11-08 | End: 2019-11-08 | Stop reason: HOSPADM

## 2019-11-08 RX ORDER — SODIUM CHLORIDE 9 MG/ML
100 INJECTION, SOLUTION INTRAVENOUS CONTINUOUS
Status: DISCONTINUED | OUTPATIENT
Start: 2019-11-08 | End: 2019-11-08 | Stop reason: HOSPADM

## 2019-11-08 RX ORDER — FENTANYL CITRATE 50 UG/ML
INJECTION, SOLUTION INTRAMUSCULAR; INTRAVENOUS AS NEEDED
Status: DISCONTINUED | OUTPATIENT
Start: 2019-11-08 | End: 2019-11-08 | Stop reason: HOSPADM

## 2019-11-08 RX ORDER — LISINOPRIL 10 MG/1
TABLET ORAL
Qty: 90 TABLET | Refills: 1 | Status: SHIPPED | OUTPATIENT
Start: 2019-11-08 | End: 2020-04-29 | Stop reason: SDUPTHER

## 2019-11-08 RX ORDER — SODIUM CHLORIDE 9 MG/ML
INJECTION, SOLUTION INTRAVENOUS CONTINUOUS PRN
Status: COMPLETED | OUTPATIENT
Start: 2019-11-08 | End: 2019-11-08

## 2019-11-08 RX ORDER — LIDOCAINE HYDROCHLORIDE 20 MG/ML
INJECTION, SOLUTION INFILTRATION; PERINEURAL AS NEEDED
Status: DISCONTINUED | OUTPATIENT
Start: 2019-11-08 | End: 2019-11-08 | Stop reason: HOSPADM

## 2019-11-08 RX ORDER — MIDAZOLAM HYDROCHLORIDE 1 MG/ML
INJECTION INTRAMUSCULAR; INTRAVENOUS AS NEEDED
Status: DISCONTINUED | OUTPATIENT
Start: 2019-11-08 | End: 2019-11-08 | Stop reason: HOSPADM

## 2019-11-13 ENCOUNTER — TELEPHONE (OUTPATIENT)
Dept: CARDIOLOGY | Facility: CLINIC | Age: 74
End: 2019-11-13

## 2019-11-13 ENCOUNTER — OFFICE VISIT (OUTPATIENT)
Dept: CARDIOLOGY | Facility: CLINIC | Age: 74
End: 2019-11-13

## 2019-11-13 VITALS
RESPIRATION RATE: 16 BRPM | BODY MASS INDEX: 25.26 KG/M2 | DIASTOLIC BLOOD PRESSURE: 56 MMHG | SYSTOLIC BLOOD PRESSURE: 130 MMHG | HEART RATE: 66 BPM | HEIGHT: 71 IN | WEIGHT: 180.4 LBS

## 2019-11-13 DIAGNOSIS — I25.41 CORONARY ARTERY FISTULA: Chronic | ICD-10-CM

## 2019-11-13 DIAGNOSIS — I25.119 CORONARY ARTERY DISEASE INVOLVING NATIVE CORONARY ARTERY OF NATIVE HEART WITH ANGINA PECTORIS (HCC): Primary | ICD-10-CM

## 2019-11-13 DIAGNOSIS — I25.41 CORONARY ARTERY FISTULA: ICD-10-CM

## 2019-11-13 DIAGNOSIS — I25.10 ASCVD (ARTERIOSCLEROTIC CARDIOVASCULAR DISEASE): Primary | ICD-10-CM

## 2019-11-13 DIAGNOSIS — I10 ESSENTIAL HYPERTENSION: ICD-10-CM

## 2019-11-13 PROCEDURE — 99214 OFFICE O/P EST MOD 30 MIN: CPT | Performed by: INTERNAL MEDICINE

## 2019-11-13 NOTE — PROGRESS NOTES
Gideon Charles MD  Fidencio Luciano  1945  11/13/2019    Patient Active Problem List   Diagnosis   • Essential hypertension   • Type 2 diabetes mellitus (CMS/HCC)   • Benign prostatic hyperplasia   • ASCVD (arteriosclerotic cardiovascular disease), s/p stenting of 80 % stenosis in left circumflex on 10/05/16and 60% stenosis in the LAD, clinically stable.    • Hyperlipidemia LDL goal <70   • Weakness generalized   • Chronic fatigue   • Coronary artery fistula   • Weight loss, unintentional   • Iron deficiency anemia   • Iron deficiency   • Normocytic anemia   • Weight loss, abnormal   • Coronary artery disease involving native coronary artery of native heart with angina pectoris (CMS/HCC)       Dear Gideon Charles MD:    Subjective     Fidencio Luciano is a 74 y.o. male with the problems as listed above, presents    Chief Complaint   Patient presents with   • Coronary Artery Disease     recent cardiac cath   • Chest Pain       History of Present Illness: Mr. Luciano is a pleasant 74-year-old  male with history of known ASCVD, status post previous stenting of the LAD and left circumflex in 2016 and RCA in 2017, has been having recurrent chest pains for which he underwent recent cardiac catheterization few days ago.  He was noted to have patent stents with about 30% stenosis in the LAD and RCA.  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  He is here for regular cardiology follow-up.  He continues to have intermittent episodes of epigastric and lower chest pains that seem to come and go.  He is interested in considering coiling of the fistula.      No Known Allergies:      Current Outpatient Medications:   •  aspirin 81 MG tablet, Take 81 mg by mouth Daily., Disp: , Rfl:   •  atorvastatin (LIPITOR) 80 MG tablet, Take 1 tablet by mouth Every Night., Disp: 90 tablet, Rfl: 5  •  clopidogrel (PLAVIX) 75 MG tablet, TAKE 1 TABLET BY MOUTH EVERY DAY  TO MAINTAIN BLOOD CIRCULATION AND CLOT PREVENTION, Disp: 30 tablet, Rfl: 5  •  ferrous sulfate 325 (65 FE) MG tablet, Take 1 tablet by mouth 3 (Three) Times a Day With Meals., Disp: 90 tablet, Rfl: 4  •  finasteride (PROSCAR) 5 MG tablet, Take 1 tablet by mouth Daily., Disp: 30 tablet, Rfl: 5  •  isosorbide mononitrate (IMDUR) 60 MG 24 hr tablet, Take 1 tablet by mouth Every Morning., Disp: 30 tablet, Rfl: 11  •  lisinopril (PRINIVIL,ZESTRIL) 10 MG tablet, TAKE 1 & 1/2 TABLETS BY MOUTH EVERY DAY FOR BLOOD PRESSURE, Disp: 90 tablet, Rfl: 1  •  nitroglycerin (NITROSTAT) 0.4 MG SL tablet, 1 under the tongue as needed for angina, may repeat q5mins for up three doses, Disp: 25 tablet, Rfl: 2  •  pantoprazole (PROTONIX) 40 MG EC tablet, Take 1 tablet by mouth 2 (Two) Times a Day Before Meals., Disp: 60 tablet, Rfl: 5      The following portions of the patient's history were reviewed and updated as appropriate: allergies, current medications, past family history, past medical history, past social history, past surgical history and problem list.    Social History     Tobacco Use   • Smoking status: Former Smoker     Packs/day: 3.00     Years: 40.00     Pack years: 120.00     Types: Cigarettes     Last attempt to quit:      Years since quittin.8   • Smokeless tobacco: Former User     Quit date: 1986   Substance Use Topics   • Alcohol use: No   • Drug use: No       Review of Systems   Constitution: Negative for chills and fever.   HENT: Negative for nosebleeds and sore throat.    Cardiovascular: Positive for chest pain. Negative for leg swelling and palpitations.   Respiratory: Positive for shortness of breath. Negative for cough, hemoptysis and wheezing.    Gastrointestinal: Negative for abdominal pain, hematemesis, hematochezia, melena, nausea and vomiting.   Genitourinary: Negative for dysuria and hematuria.   Neurological: Negative for headaches.       Objective   Vitals:    19 1337   BP: 130/56  "  Pulse: 66   Resp: 16   Weight: 81.8 kg (180 lb 6.4 oz)   Height: 180.3 cm (71\")     Body mass index is 25.16 kg/m².        Physical Exam   Constitutional: He is oriented to person, place, and time. He appears well-developed and well-nourished.   HENT:   Mouth/Throat: Oropharynx is clear and moist.   Eyes: EOM are normal. Pupils are equal, round, and reactive to light.   Neck: Neck supple. No JVD present. No tracheal deviation present. No thyromegaly present.   Cardiovascular: Normal rate, regular rhythm, S1 normal and S2 normal. Exam reveals no gallop and no friction rub.   No murmur heard.  Pulmonary/Chest: Effort normal and breath sounds normal.   Abdominal: Soft. Bowel sounds are normal. He exhibits no mass. There is no tenderness.   Musculoskeletal: Normal range of motion. He exhibits no edema.   Lymphadenopathy:     He has no cervical adenopathy.   Neurological: He is alert and oriented to person, place, and time.   Skin: Skin is warm and dry. No rash noted.   Psychiatric: He has a normal mood and affect.       Lab Results   Component Value Date     11/07/2019    K 4.0 11/07/2019     11/07/2019    CO2 29.4 (H) 11/07/2019    BUN 19 11/07/2019    CREATININE 0.78 11/07/2019    GLUCOSE 109 (H) 11/07/2019    CALCIUM 9.1 11/07/2019    AST 18 11/07/2019    ALT 15 11/07/2019    ALKPHOS 95 11/07/2019    LABIL2 1.4 (L) 01/08/2016     Lab Results   Component Value Date    CKTOTAL 79 10/05/2016     Lab Results   Component Value Date    WBC 7.94 11/07/2019    HGB 12.0 (L) 11/07/2019    HCT 35.7 (L) 11/07/2019     11/07/2019     Lab Results   Component Value Date    INR 1.06 10/03/2016    INR 1.01 07/16/2016     Lab Results   Component Value Date    MG 2.3 07/25/2018     Lab Results   Component Value Date    TSH 1.630 10/12/2019    PSA 1.850 05/10/2019    CHLPL 109 08/12/2015    TRIG 155 (H) 10/30/2019    HDL 32 (L) 10/30/2019    LDL 93 10/30/2019      Lab Results   Component Value Date    BNP 55.0 " 07/16/2016       Procedures     Assessment/Plan    Diagnosis Plan   1. ASCVD,s/p stenting of 80 % stenosis in left circumflex on 10/05/16 and 60% stenosis in the LAD, with recurrent chest pains.  3 of 3 stents patent on recent coronary angiography.     2. 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     3. Essential hypertension, controlled.         Recommendations:  1. I have discussed with Mr. Luciano and his wife about trying to set up for coiling of the fistula.  I have discussed with Dr. Barney in [place] who is willing to take a look at the images and try to set him up for this procedure in the near future if feasable.  2. In the meantime continue with aspirin, Plavix, atorvastatin, Imdur and lisinopril at current doses.  Use sublingual nitroglycerin as needed.    Return in about 5 weeks (around 12/18/2019).    As always, Gideon Charles MD  I appreciate very much the opportunity to participate in the cardiovascular care of your patients. Please do not hesitate to call me with any questions with regards to Fidencio Luciano evaluation and management.       With Best Regards,        Giovanni Buenrostro MD, [facility]    Please note that portions of this note were completed with a voice recognition program.

## 2019-11-13 NOTE — TELEPHONE ENCOUNTER
Dr. Buenrostro contacted me regarding this patient who has known coronary disease, anginal symptoms.  He underwent cardiac catheterization on 12/7/2019 which showed no obstructive coronary artery disease but a fistulous connection between a diagonal branch and unknown cardiac chamber.  Dr. Buenrostro has asked whether coiling of the fistula would be feasible from a interventional standpoint.    I will have Audie, our PACS administrator, pull the films for review.  I may review this with my neuro interventional partner, Rusty Ariza, who has extensive experience with coils.    KENJI Barney MD [facility], [facility]  Interventional and General Cardiology

## 2019-11-14 RX ORDER — AMLODIPINE BESYLATE 2.5 MG/1
2.5 TABLET ORAL DAILY
Qty: 90 TABLET | Refills: 3 | Status: SHIPPED | OUTPATIENT
Start: 2019-11-14 | End: 2020-01-16

## 2019-11-14 NOTE — TELEPHONE ENCOUNTER
"I have reviewed the films with Rusty carmichael and we believe the fistula is \"coil-able\".  I also have concerns about disease in the LAD and would recommend RFR at the time of coiling.    Sharron, please contact the patient to set him up for procedure after Thanksgiving.  Also, ask him to start amlodipine for anginal control.        KENJI Barney MD [facility], [facility]  Interventional and General Cardiology    "

## 2019-11-28 ENCOUNTER — PREP FOR SURGERY (OUTPATIENT)
Dept: OTHER | Facility: HOSPITAL | Age: 74
End: 2019-11-28

## 2019-11-28 DIAGNOSIS — I25.119 CORONARY ARTERY DISEASE INVOLVING NATIVE HEART WITH ANGINA PECTORIS (HCC): Primary | ICD-10-CM

## 2019-11-28 RX ORDER — SODIUM CHLORIDE 0.9 % (FLUSH) 0.9 %
3 SYRINGE (ML) INJECTION EVERY 12 HOURS SCHEDULED
Status: CANCELLED | OUTPATIENT
Start: 2019-11-28

## 2019-11-28 RX ORDER — ASPIRIN 81 MG/1
81 TABLET ORAL DAILY
Status: CANCELLED | OUTPATIENT
Start: 2019-11-29

## 2019-11-28 RX ORDER — ASPIRIN 81 MG/1
324 TABLET, CHEWABLE ORAL ONCE
Status: CANCELLED | OUTPATIENT
Start: 2019-11-28 | End: 2019-11-28

## 2019-11-28 RX ORDER — NITROGLYCERIN 0.4 MG/1
0.4 TABLET SUBLINGUAL
Status: CANCELLED | OUTPATIENT
Start: 2019-11-28

## 2019-11-28 RX ORDER — ACETAMINOPHEN 325 MG/1
650 TABLET ORAL EVERY 4 HOURS PRN
Status: CANCELLED | OUTPATIENT
Start: 2019-11-28

## 2019-11-28 RX ORDER — SODIUM CHLORIDE 0.9 % (FLUSH) 0.9 %
10 SYRINGE (ML) INJECTION AS NEEDED
Status: CANCELLED | OUTPATIENT
Start: 2019-11-28

## 2019-11-28 RX ORDER — SODIUM CHLORIDE 9 MG/ML
3 INJECTION, SOLUTION INTRAVENOUS CONTINUOUS
Status: CANCELLED | OUTPATIENT
Start: 2019-11-28 | End: 2019-11-28

## 2019-12-18 ENCOUNTER — HOSPITAL ENCOUNTER (OUTPATIENT)
Facility: HOSPITAL | Age: 74
Setting detail: HOSPITAL OUTPATIENT SURGERY
Discharge: HOME | End: 2019-12-18
Attending: INTERNAL MEDICINE | Admitting: INTERNAL MEDICINE

## 2019-12-18 VITALS
HEART RATE: 53 BPM | OXYGEN SATURATION: 100 % | SYSTOLIC BLOOD PRESSURE: 151 MMHG | TEMPERATURE: 97.3 F | BODY MASS INDEX: 24.5 KG/M2 | WEIGHT: 175 LBS | HEIGHT: 71 IN | DIASTOLIC BLOOD PRESSURE: 61 MMHG | RESPIRATION RATE: 16 BRPM

## 2019-12-18 DIAGNOSIS — I25.119 CORONARY ARTERY DISEASE INVOLVING NATIVE HEART WITH ANGINA PECTORIS (HCC): ICD-10-CM

## 2019-12-18 DIAGNOSIS — I25.41 CORONARY ARTERY FISTULA: ICD-10-CM

## 2019-12-18 DIAGNOSIS — I25.119 CORONARY ARTERY DISEASE INVOLVING NATIVE CORONARY ARTERY OF NATIVE HEART WITH ANGINA PECTORIS (HCC): ICD-10-CM

## 2019-12-18 LAB
ACT BLD: 246 SECONDS (ref 82–152)
ACT BLD: 340 SECONDS (ref 82–152)
ALBUMIN SERPL-MCNC: 4 G/DL (ref 3.5–5.2)
ALBUMIN/GLOB SERPL: 1.2 G/DL
ALP SERPL-CCNC: 94 U/L (ref 39–117)
ALT SERPL W P-5'-P-CCNC: 22 U/L (ref 1–41)
ANION GAP SERPL CALCULATED.3IONS-SCNC: 11 MMOL/L (ref 5–15)
AST SERPL-CCNC: 21 U/L (ref 1–40)
BASOPHILS # BLD AUTO: 0.06 10*3/MM3 (ref 0–0.2)
BASOPHILS NFR BLD AUTO: 0.7 % (ref 0–1.5)
BILIRUB SERPL-MCNC: 0.5 MG/DL (ref 0.2–1.2)
BUN BLD-MCNC: 14 MG/DL (ref 8–23)
BUN/CREAT SERPL: 21.2 (ref 7–25)
CALCIUM SPEC-SCNC: 9.1 MG/DL (ref 8.6–10.5)
CHLORIDE SERPL-SCNC: 107 MMOL/L (ref 98–107)
CHOLEST SERPL-MCNC: 103 MG/DL (ref 0–200)
CO2 SERPL-SCNC: 24 MMOL/L (ref 22–29)
CREAT BLD-MCNC: 0.66 MG/DL (ref 0.76–1.27)
DEPRECATED RDW RBC AUTO: 45.1 FL (ref 37–54)
EOSINOPHIL # BLD AUTO: 0.37 10*3/MM3 (ref 0–0.4)
EOSINOPHIL NFR BLD AUTO: 4.5 % (ref 0.3–6.2)
ERYTHROCYTE [DISTWIDTH] IN BLOOD BY AUTOMATED COUNT: 12.7 % (ref 12.3–15.4)
GFR SERPL CREATININE-BSD FRML MDRD: 118 ML/MIN/1.73
GLOBULIN UR ELPH-MCNC: 3.3 GM/DL
GLUCOSE BLD-MCNC: 117 MG/DL (ref 65–99)
HBA1C MFR BLD: 6.1 % (ref 4.8–5.6)
HCT VFR BLD AUTO: 39.4 % (ref 37.5–51)
HDLC SERPL-MCNC: 35 MG/DL (ref 40–60)
HGB BLD-MCNC: 12.8 G/DL (ref 13–17.7)
IMM GRANULOCYTES # BLD AUTO: 0.02 10*3/MM3 (ref 0–0.05)
IMM GRANULOCYTES NFR BLD AUTO: 0.2 % (ref 0–0.5)
LDLC SERPL CALC-MCNC: 51 MG/DL (ref 0–100)
LDLC/HDLC SERPL: 1.46 {RATIO}
LYMPHOCYTES # BLD AUTO: 1.45 10*3/MM3 (ref 0.7–3.1)
LYMPHOCYTES NFR BLD AUTO: 17.6 % (ref 19.6–45.3)
MCH RBC QN AUTO: 31.4 PG (ref 26.6–33)
MCHC RBC AUTO-ENTMCNC: 32.5 G/DL (ref 31.5–35.7)
MCV RBC AUTO: 96.8 FL (ref 79–97)
MONOCYTES # BLD AUTO: 0.56 10*3/MM3 (ref 0.1–0.9)
MONOCYTES NFR BLD AUTO: 6.8 % (ref 5–12)
NEUTROPHILS # BLD AUTO: 5.79 10*3/MM3 (ref 1.7–7)
NEUTROPHILS NFR BLD AUTO: 70.2 % (ref 42.7–76)
NRBC BLD AUTO-RTO: 0 /100 WBC (ref 0–0.2)
PLATELET # BLD AUTO: 229 10*3/MM3 (ref 140–450)
PMV BLD AUTO: 10.6 FL (ref 6–12)
POTASSIUM BLD-SCNC: 4.1 MMOL/L (ref 3.5–5.2)
PROT SERPL-MCNC: 7.3 G/DL (ref 6–8.5)
RBC # BLD AUTO: 4.07 10*6/MM3 (ref 4.14–5.8)
SODIUM BLD-SCNC: 142 MMOL/L (ref 136–145)
TRIGL SERPL-MCNC: 84 MG/DL (ref 0–150)
VLDLC SERPL-MCNC: 16.8 MG/DL
WBC NRBC COR # BLD: 8.25 10*3/MM3 (ref 3.4–10.8)

## 2019-12-18 PROCEDURE — 99152 MOD SED SAME PHYS/QHP 5/>YRS: CPT | Performed by: INTERNAL MEDICINE

## 2019-12-18 PROCEDURE — C1887 CATHETER, GUIDING: HCPCS | Performed by: INTERNAL MEDICINE

## 2019-12-18 PROCEDURE — 85025 COMPLETE CBC W/AUTO DIFF WBC: CPT | Performed by: NURSE PRACTITIONER

## 2019-12-18 PROCEDURE — 83036 HEMOGLOBIN GLYCOSYLATED A1C: CPT | Performed by: NURSE PRACTITIONER

## 2019-12-18 PROCEDURE — 93454 CORONARY ARTERY ANGIO S&I: CPT | Performed by: INTERNAL MEDICINE

## 2019-12-18 PROCEDURE — S0260 H&P FOR SURGERY: HCPCS | Performed by: INTERNAL MEDICINE

## 2019-12-18 PROCEDURE — 80053 COMPREHEN METABOLIC PANEL: CPT | Performed by: NURSE PRACTITIONER

## 2019-12-18 PROCEDURE — 85014 HEMATOCRIT: CPT

## 2019-12-18 PROCEDURE — 25010000002 HEPARIN (PORCINE) PER 1000 UNITS: Performed by: INTERNAL MEDICINE

## 2019-12-18 PROCEDURE — C1894 INTRO/SHEATH, NON-LASER: HCPCS | Performed by: INTERNAL MEDICINE

## 2019-12-18 PROCEDURE — 99153 MOD SED SAME PHYS/QHP EA: CPT | Performed by: INTERNAL MEDICINE

## 2019-12-18 PROCEDURE — 0 IOPAMIDOL PER 1 ML: Performed by: INTERNAL MEDICINE

## 2019-12-18 PROCEDURE — 80061 LIPID PANEL: CPT | Performed by: NURSE PRACTITIONER

## 2019-12-18 PROCEDURE — C1769 GUIDE WIRE: HCPCS | Performed by: INTERNAL MEDICINE

## 2019-12-18 PROCEDURE — 85347 COAGULATION TIME ACTIVATED: CPT

## 2019-12-18 PROCEDURE — 25010000002 FENTANYL CITRATE (PF) 100 MCG/2ML SOLUTION: Performed by: INTERNAL MEDICINE

## 2019-12-18 PROCEDURE — 36415 COLL VENOUS BLD VENIPUNCTURE: CPT

## 2019-12-18 PROCEDURE — 25010000002 MIDAZOLAM PER 1 MG: Performed by: INTERNAL MEDICINE

## 2019-12-18 PROCEDURE — 80047 BASIC METABLC PNL IONIZED CA: CPT

## 2019-12-18 DEVICE — 360 SOFT DETACHABLE COIL
Type: IMPLANTABLE DEVICE | Status: FUNCTIONAL
Brand: TARGET XL

## 2019-12-18 DEVICE — 360 ULTRA DETACHABLE COIL
Type: IMPLANTABLE DEVICE | Status: FUNCTIONAL
Brand: TARGET

## 2019-12-18 RX ORDER — ASPIRIN 81 MG/1
81 TABLET ORAL DAILY
Status: DISCONTINUED | OUTPATIENT
Start: 2019-12-19 | End: 2019-12-18 | Stop reason: HOSPADM

## 2019-12-18 RX ORDER — ASPIRIN 81 MG/1
324 TABLET, CHEWABLE ORAL ONCE
Status: COMPLETED | OUTPATIENT
Start: 2019-12-18 | End: 2019-12-18

## 2019-12-18 RX ORDER — SODIUM CHLORIDE 0.9 % (FLUSH) 0.9 %
10 SYRINGE (ML) INJECTION AS NEEDED
Status: DISCONTINUED | OUTPATIENT
Start: 2019-12-18 | End: 2019-12-18 | Stop reason: HOSPADM

## 2019-12-18 RX ORDER — SODIUM CHLORIDE 0.9 % (FLUSH) 0.9 %
3 SYRINGE (ML) INJECTION EVERY 12 HOURS SCHEDULED
Status: DISCONTINUED | OUTPATIENT
Start: 2019-12-18 | End: 2019-12-18 | Stop reason: HOSPADM

## 2019-12-18 RX ORDER — NITROGLYCERIN 0.4 MG/1
0.4 TABLET SUBLINGUAL
Status: DISCONTINUED | OUTPATIENT
Start: 2019-12-18 | End: 2019-12-18 | Stop reason: HOSPADM

## 2019-12-18 RX ORDER — HEPARIN SODIUM 1000 [USP'U]/ML
INJECTION, SOLUTION INTRAVENOUS; SUBCUTANEOUS AS NEEDED
Status: DISCONTINUED | OUTPATIENT
Start: 2019-12-18 | End: 2019-12-18 | Stop reason: HOSPADM

## 2019-12-18 RX ORDER — SODIUM CHLORIDE 9 MG/ML
3 INJECTION, SOLUTION INTRAVENOUS CONTINUOUS
Status: ACTIVE | OUTPATIENT
Start: 2019-12-18 | End: 2019-12-18

## 2019-12-18 RX ORDER — SODIUM CHLORIDE 9 MG/ML
1.5 INJECTION, SOLUTION INTRAVENOUS CONTINUOUS
Status: ACTIVE | OUTPATIENT
Start: 2019-12-18 | End: 2019-12-18

## 2019-12-18 RX ORDER — FENTANYL CITRATE 50 UG/ML
INJECTION, SOLUTION INTRAMUSCULAR; INTRAVENOUS AS NEEDED
Status: DISCONTINUED | OUTPATIENT
Start: 2019-12-18 | End: 2019-12-18 | Stop reason: HOSPADM

## 2019-12-18 RX ORDER — ACETAMINOPHEN 325 MG/1
650 TABLET ORAL EVERY 4 HOURS PRN
Status: DISCONTINUED | OUTPATIENT
Start: 2019-12-18 | End: 2019-12-18 | Stop reason: HOSPADM

## 2019-12-18 RX ORDER — MIDAZOLAM HYDROCHLORIDE 1 MG/ML
INJECTION INTRAMUSCULAR; INTRAVENOUS AS NEEDED
Status: DISCONTINUED | OUTPATIENT
Start: 2019-12-18 | End: 2019-12-18 | Stop reason: HOSPADM

## 2019-12-18 RX ADMIN — ASPIRIN 81 MG 324 MG: 81 TABLET ORAL at 09:21

## 2019-12-18 RX ADMIN — SODIUM CHLORIDE 3 ML/KG/HR: 9 INJECTION, SOLUTION INTRAVENOUS at 09:21

## 2019-12-18 NOTE — H&P
Pre-Cardiac Catheterization Report  Cardiovascular Laboratory  [facility]      Patient:  Fidencio Luciano  :  1945  PCP:  Gideon Charles MD   Referring MD:  Giovanni Buenrostro MD   PHONE:  [phone]    DATE: 2019    BRIEF HPI:  Fidencio Luciano is a 74 y.o. male who recently underwent cardiac catheterization for abnormal stress test and chest pain symptoms.  He was found to have moderate nonobstructive CAD.  However, he was noted to have a fistulous connection between his mid LAD and pulmonary artery.  Dr. Giovanni Buenrostro felt this may be contributing to his symptomatology and requested ablation of the fistula with coils.      Anginal class in last 2 weeks:  CCS class II    CHF Class in last 2 weeks:  NYHA Class I    Cardiogenic shock:  no    Cardiac arrest <24 hours:  no    Stress test within last 6 months:   yes   Details:  Pharmacologic nuclear stress (10/30/2019): Small ischemia in the apex.  LVEF 60%    Previous cardiac catheterization:  yes  Details:     Previous CABG:  no  Details:      Allergies:     IV contrast allergy:  no  No Known Allergies    MEDICATIONS:  Prior to Admission medications    Medication Sig Start Date End Date Taking? Authorizing Provider   amLODIPine (NORVASC) 2.5 MG tablet Take 1 tablet by mouth Daily. 19   Jay Barney IV, MD   aspirin 81 MG tablet Take 81 mg by mouth Daily.    Provider, MD Bakari   atorvastatin (LIPITOR) 80 MG tablet Take 1 tablet by mouth Every Night. 3/5/19   Giovanni Buenrostro MD   clopidogrel (PLAVIX) 75 MG tablet TAKE 1 TABLET BY MOUTH EVERY DAY TO MAINTAIN BLOOD CIRCULATION AND CLOT PREVENTION 19   Giovanni Buenrostro MD   ferrous sulfate 325 (65 FE) MG tablet Take 1 tablet by mouth 3 (Three) Times a Day With Meals. 3/8/19   Mervat Parks APRN   finasteride (PROSCAR) 5 MG tablet Take 1 tablet by mouth Daily. 19   Nav Tate MD   isosorbide mononitrate (IMDUR) 60 MG 24 hr tablet Take 1 tablet  by mouth Every Morning. 11/6/19   Giovanni Buenrostro MD   lisinopril (PRINIVIL,ZESTRIL) 10 MG tablet TAKE 1 & 1/2 TABLETS BY MOUTH EVERY DAY FOR BLOOD PRESSURE 11/8/19   Giovanni Buenrostro MD   nitroglycerin (NITROSTAT) 0.4 MG SL tablet 1 under the tongue as needed for angina, may repeat q5mins for up three doses 11/6/19   Giovanni Buenrostro MD   pantoprazole (PROTONIX) 40 MG EC tablet Take 1 tablet by mouth 2 (Two) Times a Day Before Meals. 9/3/19   Hiral Fierro PA-C       Past medical & surgical history, social and family history reviewed in the electronic medical record.    ROS:  Negative except chest discomfort at rest    Physical Exam:    Vitals: There were no vitals filed for this visit. There were no vitals filed for this visit.  General Appearance alert, appears stated age and cooperative  Head atraumatic  Eyes lids and lashes normal, conjunctivae and sclerae normal, no icterus, no pallor, corneas clear and PERRLA  Ears ears appear intact with no abnormalities noted  Nose nares normal, septum midline, mucosa normal and no drainage  Neck no adenopathy, supple, trachea midline, no thyromegaly, no carotid bruit and no JVD  Back no kyphosis present, no scoliosis present, no skin lesions, erythema, or scars, no tenderness to percussion or palpation and range of motion normal  Lungs clear to auscultation, respirations regular, respirations even and respirations unlabored  Heart regular rhythm & normal rate, normal S1, S2, no murmur, no gallop, no rub and no click  Chest Wall no abnormalities ovserved  Abdomen normal bowel sounds, no masses, no hepatomegaly, no splenomegaly, soft non-tender, no guarding and no rebound tenderness  Pulses Pulses palpable and equal bilaterally  Skin no bleeding, bruising or rash  Neurologic Mental Status orientated to person, place, time and situation            Lab Results   Component Value Date    CHLPL 109 08/12/2015    TRIG 155 (H) 10/30/2019    HDL 32 (L) 10/30/2019    AST 18  11/07/2019    ALT 15 11/07/2019           Impression      Active Hospital Problems    Diagnosis   • **Coronary artery fistula     · Cardiac cath (12/9/2019): fistulous connection between a diagonal branch and unknown cardiac chamber     • Coronary artery disease involving native coronary artery of native heart with angina pectoris (CMS/HCC)     · Cardiac cath (10/5/2016): PCI to circumflex  · Cardiac cath (5/9/2017):  PCI to mid RCA.   · Echo (10/30/2018): LVEF = 65%.  Trace AI.  No significant valvular abnormality  · Cardiac cath (11/8/2019):  Moderate diffuse disease of the LAD, circumflex.  Patent stents in the RCA, circumflex and LAD.     • Hyperlipidemia LDL goal <70   • Essential hypertension   • Type 2 diabetes mellitus (CMS/[facility])         Plan     · Cardiac catheterization via the right radial approach with planned coiling of coronary to pulmonary artery fistula          Jay Barney IV, MD  12/18/19  8:42 AM

## 2019-12-19 LAB
BUN BLDA-MCNC: 15 MG/DL (ref 8–26)
CA-I BLDA-SCNC: 1.23 MMOL/L (ref 1.2–1.32)
CHLORIDE BLDA-SCNC: 106 MMOL/L (ref 98–109)
CO2 BLDA-SCNC: 25 MMOL/L (ref 24–29)
CREAT BLDA-MCNC: 0.7 MG/DL (ref 0.6–1.3)
GLUCOSE BLDC GLUCOMTR-MCNC: 111 MG/DL (ref 70–130)
HCT VFR BLDA CALC: 38 % (ref 38–51)
HGB BLDA-MCNC: 12.9 G/DL (ref 12–17)
POTASSIUM BLDA-SCNC: 4.1 MMOL/L (ref 3.5–4.9)
SODIUM BLDA-SCNC: 142 MMOL/L (ref 138–146)

## 2019-12-27 ENCOUNTER — OFFICE VISIT (OUTPATIENT)
Dept: UROLOGY | Facility: CLINIC | Age: 74
End: 2019-12-27

## 2019-12-27 VITALS — BODY MASS INDEX: 24.41 KG/M2 | HEIGHT: 71 IN

## 2019-12-27 DIAGNOSIS — R35.1 BPH ASSOCIATED WITH NOCTURIA: Primary | ICD-10-CM

## 2019-12-27 DIAGNOSIS — N39.43 BENIGN PROSTATIC HYPERPLASIA WITH POST-VOID DRIBBLING: Chronic | ICD-10-CM

## 2019-12-27 DIAGNOSIS — N40.1 BENIGN PROSTATIC HYPERPLASIA WITH POST-VOID DRIBBLING: Chronic | ICD-10-CM

## 2019-12-27 DIAGNOSIS — N40.1 BPH ASSOCIATED WITH NOCTURIA: Primary | ICD-10-CM

## 2019-12-27 PROCEDURE — 99213 OFFICE O/P EST LOW 20 MIN: CPT | Performed by: UROLOGY

## 2019-12-27 RX ORDER — TAMSULOSIN HYDROCHLORIDE 0.4 MG/1
1 CAPSULE ORAL NIGHTLY
Qty: 30 CAPSULE | Refills: 5 | Status: SHIPPED | OUTPATIENT
Start: 2019-12-27 | End: 2020-04-13

## 2019-12-27 NOTE — PROGRESS NOTES
Chief Complaint:          Chief Complaint   Patient presents with   • Prostate issues     3 month fu        HPI:   74 y.o. male returns today he has some prostate issues his last PSA was 1.850 in May 20.  He is never had any other problems he has no burning, blood discharge.  He is been on both 5 alpha reductase and Flomax.  He does not think it is working great but organ to give him 6 more months I will see him back in 6 months.      Past Medical History:        Past Medical History:   Diagnosis Date   • BPH (benign prostatic hyperplasia)    • Bradycardia     Positive tilt table testing 07/2016   • Coronary artery disease    • Diabetes mellitus (CMS/HCC)    • Diverticulosis    • Gastric ulcer with hemorrhage    • History of transfusion    • Hyperlipidemia    • Hypertension    • Psoriasis          Current Meds:     Current Outpatient Medications   Medication Sig Dispense Refill   • amLODIPine (NORVASC) 2.5 MG tablet Take 1 tablet by mouth Daily. 90 tablet 3   • aspirin 81 MG tablet Take 81 mg by mouth Daily.     • atorvastatin (LIPITOR) 80 MG tablet Take 1 tablet by mouth Every Night. 90 tablet 5   • clopidogrel (PLAVIX) 75 MG tablet TAKE 1 TABLET BY MOUTH EVERY DAY TO MAINTAIN BLOOD CIRCULATION AND CLOT PREVENTION 30 tablet 5   • ferrous sulfate 325 (65 FE) MG tablet Take 1 tablet by mouth 3 (Three) Times a Day With Meals. 90 tablet 4   • finasteride (PROSCAR) 5 MG tablet Take 1 tablet by mouth Daily. 30 tablet 5   • isosorbide mononitrate (IMDUR) 60 MG 24 hr tablet Take 1 tablet by mouth Every Morning. 30 tablet 11   • lisinopril (PRINIVIL,ZESTRIL) 10 MG tablet TAKE 1 & 1/2 TABLETS BY MOUTH EVERY DAY FOR BLOOD PRESSURE 90 tablet 1   • nitroglycerin (NITROSTAT) 0.4 MG SL tablet 1 under the tongue as needed for angina, may repeat q5mins for up three doses 25 tablet 2   • pantoprazole (PROTONIX) 40 MG EC tablet Take 1 tablet by mouth 2 (Two) Times a Day Before Meals. 60 tablet 5     No current facility-administered  medications for this visit.         Allergies:      No Known Allergies     Past Surgical History:     Past Surgical History:   Procedure Laterality Date   • BACK SURGERY     • CARDIAC CATHETERIZATION N/A 9/20/2016    Procedure: Left Heart Cath;  Surgeon: Giovanni Buenrostro MD;  Location:  [facility];  Service:    • CARDIAC CATHETERIZATION N/A 10/4/2016    Procedure: Left Heart Cath;  Surgeon: Bharathi Andrew MD;  Location:  [facility];  Service:    • CARDIAC CATHETERIZATION N/A 5/9/2017    Procedure: Left Heart Cath;  Surgeon: Giovanni Buenrostro MD;  Location:  [facility];  Service:    • CARDIAC CATHETERIZATION N/A 5/9/2017    Procedure: ERR;  Surgeon: Giovanni Buenrostro MD;  Location:  [facility];  Service:    • CARDIAC CATHETERIZATION N/A 11/8/2019    Procedure: Left Heart Cath;  Surgeon: Abraham Oviedo MD;  Location:  [facility];  Service: Cardiology   • CARDIAC CATHETERIZATION N/A 12/18/2019    Procedure: Coronary angiography;  Surgeon: Jay Barney IV, MD;  Location:  [facility];  Service: Cardiovascular   • CHOLECYSTECTOMY     • COLONOSCOPY  11/13/2015    Dr. Martinez- internal hemorrhoids, sigmoid diverticulosis   • COLONOSCOPY N/A 8/17/2018    Procedure: COLONOSCOPY CPT CODE: 77874;  Surgeon: Gigi Geronimo MD;  Location: [facility] OR;  Service: Gastroenterology   • CORONARY ANGIOPLASTY WITH STENT PLACEMENT     • ENDOSCOPY N/A 8/17/2018    Procedure: ESOPHAGOGASTRODUODENOSCOPY WITH BIOPSY CPT CODE: 84956;  Surgeon: Gigi Geronimo MD;  Location: [facility] OR;  Service: Gastroenterology   • INTERVENTIONAL RADIOLOGY PROCEDURE N/A 12/18/2019    Procedure: Coil Embolization of septal to pulmonary artery fistuala.;  Surgeon: Jay Barney IV, MD;  Location:  [facility];  Service: Cardiovascular   • TN RT/LT HEART CATHETERS N/A 5/9/2017    Procedure: Percutaneous  Coronary Intervention;  Surgeon: Lincoln De Los Santos MD;  Location: [facility] INVASIVE LOCATION;  Service: Cardiology   • STOMACH SURGERY      FUSION OF BLEEDING ULCER   • UPPER GASTROINTESTINAL ENDOSCOPY  2015    Dr. Martinez- mild gastritis         Social History:     Social History     Socioeconomic History   • Marital status:      Spouse name: Not on file   • Number of children: Not on file   • Years of education: Not on file   • Highest education level: Not on file   Tobacco Use   • Smoking status: Former Smoker     Packs/day: 3.00     Years: 40.00     Pack years: 120.00     Types: Cigarettes     Last attempt to quit:      Years since quittin.0   • Smokeless tobacco: Former User     Quit date: 1986   Substance and Sexual Activity   • Alcohol use: No   • Drug use: No   • Sexual activity: Defer       Family History:     Family History   Problem Relation Age of Onset   • Sick sinus syndrome Mother    • Heart failure Mother    • Diabetes Mother    • Liver cancer Father        Review of Systems:     Review of Systems   Constitutional: Negative.    HENT: Negative.    Eyes: Negative.    Respiratory: Negative.    Cardiovascular: Negative.    Gastrointestinal: Negative.    Endocrine: Negative.    Genitourinary: Positive for frequency and urgency.   Musculoskeletal: Negative.    Allergic/Immunologic: Negative.    Neurological: Negative.    Hematological: Negative.    Psychiatric/Behavioral: Negative.        Physical Exam:     Physical Exam   Constitutional: He is oriented to person, place, and time. He appears well-developed and well-nourished.   HENT:   Head: Normocephalic and atraumatic.   Eyes: Pupils are equal, round, and reactive to light. Conjunctivae and EOM are normal.   Neck: Normal range of motion.   Cardiovascular: Normal rate, regular rhythm, normal heart sounds and intact distal pulses.   Pulmonary/Chest: Effort normal and breath sounds normal.   Abdominal: Soft. Bowel sounds are  normal.   Musculoskeletal: Normal range of motion.   Neurological: He is alert and oriented to person, place, and time. He has normal reflexes.   Skin: Skin is warm and dry.   Psychiatric: He has a normal mood and affect. His behavior is normal. Judgment and thought content normal.   Nursing note and vitals reviewed.      I have reviewed the following portions of the patient's history: allergies, current medications, past family history, past medical history, past social history, past surgical history, problem list and ROS and confirm it's accurate.      Procedure:       Assessment/Plan:   BPH: Discussed the pathophysiology of BPH and obstruction.  We discussed the static and dynamic effect effects of BPH as well as using 5 alpha reductase inhibitors versus alpha blockade.  We discussed the indications for transurethral surgery as well.  And/ or other therapeutic options available including all of the newer techniques.  Continue combination therapy  PSA testing-I am recommending a PSA blood test that stands for prostate specific antigen.  I discussed the pathophysiology of PSA testing indicating its use in the diagnosis and management of prostate cancer.  I discussed the normal range being 0-4 but more appropriately being much closer to 0-2 in a normal male.  I discussed the fact that after certain age we don't recommend PSA testing especially in view of numerous comorbidities.  That this will not be a useful test.  I discussed many of the things that can artificially raised PSA including a recent infection, urinary tract infection, recent sexual intercourse.  Where even the type of movement such as manipulation of the prostate  riding a bicycle.  After all this is taken into account when the test is reviewed  the most important use of PSA which is the velocity measurement in other words the change of PSA with time as a very important factor in the use and that we look for greater than 20% rise over a year to help  us make the prediction of prostate cancer.  I also discussed that the use with prostate cancer indicating that after a radical prostatectomy the PSA should be 0 and any rise indicates an early biochemical recurrence            Patient's Body mass index is 24.41 kg/m². BMI is within normal parameters. No follow-up required..              This document has been electronically signed by JOSEFA KELLEY MD December 27, 2019 3:33 PM

## 2020-01-06 RX ORDER — CLOPIDOGREL BISULFATE 75 MG/1
TABLET ORAL
Qty: 30 TABLET | Refills: 1 | Status: SHIPPED | OUTPATIENT
Start: 2020-01-06 | End: 2020-03-03

## 2020-01-16 ENCOUNTER — OFFICE VISIT (OUTPATIENT)
Dept: CARDIOLOGY | Facility: CLINIC | Age: 75
End: 2020-01-16

## 2020-01-16 VITALS
BODY MASS INDEX: 24.92 KG/M2 | HEIGHT: 71 IN | DIASTOLIC BLOOD PRESSURE: 60 MMHG | RESPIRATION RATE: 16 BRPM | WEIGHT: 178 LBS | HEART RATE: 60 BPM | SYSTOLIC BLOOD PRESSURE: 151 MMHG

## 2020-01-16 DIAGNOSIS — I10 ESSENTIAL HYPERTENSION: ICD-10-CM

## 2020-01-16 DIAGNOSIS — I25.10 ASCVD (ARTERIOSCLEROTIC CARDIOVASCULAR DISEASE): Primary | ICD-10-CM

## 2020-01-16 DIAGNOSIS — I25.41 CORONARY ARTERY FISTULA: ICD-10-CM

## 2020-01-16 DIAGNOSIS — I25.119 CORONARY ARTERY DISEASE INVOLVING NATIVE CORONARY ARTERY OF NATIVE HEART WITH ANGINA PECTORIS (HCC): ICD-10-CM

## 2020-01-16 PROCEDURE — 99214 OFFICE O/P EST MOD 30 MIN: CPT | Performed by: INTERNAL MEDICINE

## 2020-01-16 RX ORDER — AMLODIPINE BESYLATE 5 MG/1
5 TABLET ORAL DAILY
Qty: 30 TABLET | Refills: 5 | Status: SHIPPED | OUTPATIENT
Start: 2020-01-16 | End: 2020-04-29 | Stop reason: SDUPTHER

## 2020-01-16 RX ORDER — AMLODIPINE BESYLATE 5 MG/1
5 TABLET ORAL DAILY
Qty: 30 TABLET | Refills: 5 | Status: SHIPPED | OUTPATIENT
Start: 2020-01-16 | End: 2020-01-16 | Stop reason: SDUPTHER

## 2020-01-16 NOTE — PROGRESS NOTES
Gideon Charles MD  Fidencio Luciano  1945  01/16/2020    Patient Active Problem List   Diagnosis   • Essential hypertension   • Type 2 diabetes mellitus (CMS/HCC)   • Benign prostatic hyperplasia   • ASCVD (arteriosclerotic cardiovascular disease), s/p stenting of 80 % stenosis in left circumflex on 10/05/16and 60% stenosis in the LAD, clinically stable.    • Hyperlipidemia LDL goal <70   • Weakness generalized   • Chronic fatigue   • Coronary artery fistula   • Weight loss, unintentional   • Iron deficiency anemia   • Iron deficiency   • Normocytic anemia   • Weight loss, abnormal   • Coronary artery disease involving native coronary artery of native heart with angina pectoris (CMS/HCC)       Dear Gideon Charles MD:    Jackie Luciano is a 74 y.o. male with the problems as listed above, presents    Chief complaint: Follow-up of coronary artery disease and coronary fistula.    History of Present Illness: Mr. Truong is a pleasant 73-year-old  male with history of known ASCVD, status post previous stenting of the left circumflex coronary artery in October 2016 and a nonobstructive disease in the LAD along with a fistula extending from the LAD distribution into the pulmonary artery.  For this he underwent recent embolization of the fistula with partial occlusion of the major portion of the fistula but there were some small branches that were left alone and could not be called due to the size.  He is here of for a regular cardiology follow-up.  On today's visit he denies any complaints of chest pains and has been feeling better since the recent procedure.  He has some intermittent palpitations.    No Known Allergies:      Current Outpatient Medications:   •  amLODIPine (NORVASC) 2.5 MG tablet, Take 1 tablet by mouth Daily., Disp: 90 tablet, Rfl: 3  •  aspirin 81 MG tablet, Take 81 mg by mouth Daily., Disp: , Rfl:   •  atorvastatin (LIPITOR) 80 MG tablet, Take 1 tablet by mouth Every Night.,  Disp: 90 tablet, Rfl: 5  •  clopidogrel (PLAVIX) 75 MG tablet, TAKE 1 TABLET BY MOUTH EVERY DAY TO MAINTAIN BLOOD CIRCULATION AND CLOT PREVENTION, Disp: 30 tablet, Rfl: 1  •  ferrous sulfate 325 (65 FE) MG tablet, Take 1 tablet by mouth 3 (Three) Times a Day With Meals., Disp: 90 tablet, Rfl: 4  •  finasteride (PROSCAR) 5 MG tablet, Take 1 tablet by mouth Daily., Disp: 30 tablet, Rfl: 5  •  isosorbide mononitrate (IMDUR) 60 MG 24 hr tablet, Take 1 tablet by mouth Every Morning., Disp: 30 tablet, Rfl: 11  •  lisinopril (PRINIVIL,ZESTRIL) 10 MG tablet, TAKE 1 & 1/2 TABLETS BY MOUTH EVERY DAY FOR BLOOD PRESSURE, Disp: 90 tablet, Rfl: 1  •  nitroglycerin (NITROSTAT) 0.4 MG SL tablet, 1 under the tongue as needed for angina, may repeat q5mins for up three doses, Disp: 25 tablet, Rfl: 2  •  pantoprazole (PROTONIX) 40 MG EC tablet, Take 1 tablet by mouth 2 (Two) Times a Day Before Meals., Disp: 60 tablet, Rfl: 5  •  tamsulosin (FLOMAX) 0.4 MG capsule 24 hr capsule, Take 1 capsule by mouth Every Night., Disp: 30 capsule, Rfl: 5      The following portions of the patient's history were reviewed and updated as appropriate: allergies, current medications, past family history, past medical history, past social history, past surgical history and problem list.    Social History     Tobacco Use   • Smoking status: Former Smoker     Packs/day: 3.00     Years: 40.00     Pack years: 120.00     Types: Cigarettes     Last attempt to quit:      Years since quittin.0   • Smokeless tobacco: Former User     Quit date: 1986   Substance Use Topics   • Alcohol use: No   • Drug use: No       Review of Systems   Constitution: Negative for chills and fever.   HENT: Negative for nosebleeds and sore throat.    Cardiovascular: Negative for chest pain, leg swelling, palpitations and syncope.   Respiratory: Negative for cough, hemoptysis, shortness of breath and wheezing.    Gastrointestinal: Negative for abdominal pain, hematemesis,  "hematochezia, melena, nausea and vomiting.   Genitourinary: Negative for dysuria and hematuria.   Neurological: Negative for dizziness and headaches.       Objective   Vitals:    01/16/20 1151   BP: 151/60   BP Location: Right arm   Pulse: 60   Resp: 16   Weight: 80.7 kg (178 lb)   Height: 180.3 cm (70.98\")     Body mass index is 24.84 kg/m².        Physical Exam   Constitutional: He is oriented to person, place, and time. He appears well-developed and well-nourished.   HENT:   Mouth/Throat: Oropharynx is clear and moist.   Eyes: Pupils are equal, round, and reactive to light. EOM are normal.   Neck: Neck supple. No JVD present. No tracheal deviation present. No thyromegaly present.   Cardiovascular: Normal rate, regular rhythm, S1 normal and S2 normal. Exam reveals no gallop, no S3, no S4 and no friction rub.   No murmur heard.  Pulmonary/Chest: Effort normal and breath sounds normal.   Abdominal: Soft. Bowel sounds are normal. He exhibits no mass. There is no tenderness.   Musculoskeletal: Normal range of motion. He exhibits no edema.   Lymphadenopathy:     He has no cervical adenopathy.   Neurological: He is alert and oriented to person, place, and time.   Skin: Skin is warm and dry. No rash noted.   Psychiatric: He has a normal mood and affect.       Lab Results   Component Value Date     12/18/2019    K 4.1 12/18/2019     12/18/2019    CO2 24.0 12/18/2019    BUN 14 12/18/2019    CREATININE 0.66 (L) 12/18/2019    GLUCOSE 117 (H) 12/18/2019    CALCIUM 9.1 12/18/2019    AST 21 12/18/2019    ALT 22 12/18/2019    ALKPHOS 94 12/18/2019    LABIL2 1.4 (L) 01/08/2016     Lab Results   Component Value Date    CKTOTAL 79 10/05/2016     Lab Results   Component Value Date    WBC 8.25 12/18/2019    HGB 12.8 (L) 12/18/2019    HCT 39.4 12/18/2019     12/18/2019     Lab Results   Component Value Date    INR 1.06 10/03/2016    INR 1.01 07/16/2016     Lab Results   Component Value Date    MG 2.3 07/25/2018 "     Lab Results   Component Value Date    TSH 1.630 10/12/2019    PSA 1.850 05/10/2019    CHLPL 109 08/12/2015    TRIG 84 12/18/2019    HDL 35 (L) 12/18/2019    LDL 51 12/18/2019      Lab Results   Component Value Date    BNP 55.0 07/16/2016     Fidencio Urbanbs   Cardiac Catheterization/Vascular Study   Order# [id number]   Reading physician:   Jay Barney IV, MD Ordering physician: Jay Barney IV, MD Study date: 12/18/19    Procedures     Coil Embolization of septal to pulmonary artery fistuala.          Conclusion        · Partially successful coil embolization of LAD to pulmonary artery fistulas. Successful embolization of the predominant LAD to PA fistula. Unsuccessful attempts at locating several smaller fistulous connections, however. Reduction of shunt fraction from coiling of main fistula is estimated to be 50-60%  · Possibly hemodynamically significant stenosis of the proximal LAD.     Recommendations:  · Reassess symptoms after coil embolization of the primary LAD to PA fistula.  I do not suspect that repeat attempts at coiling will be of benefit as there are several small fistulas with unclear origins.  · If anginal symptoms present despite OMT, recommend FFR of proximal LAD lesion with possible PCI          Assessment/Plan    Diagnosis Plan   1. ASCVD (arteriosclerotic cardiovascular disease), s/p stenting of 80 % stenosis in left circumflex on 10/05/16and 60% stenosis in the LAD, clinically stable.      2. Coronary artery fistula, status post recent embolization of the fistula in December 2019 with improvement in his anginal symptoms.     3. Essential hypertension, with elevated systolic blood pressure.          Recommendations:  1. Since his systolic blood pressure is elevated, will increase amlodipine to 5 mg daily.  2. Continue with aspirin, atorvastatin, Plavix and lisinopril at current doses.    Return in about 3 months (around 4/16/2020).    As always, I appreciate very much  the opportunity to participate in the cardiovascular care of your patients.      With Best Regards,    Giovanni Buenrostro MD, [facility]    Dragon disclaimer:  Much of this encounter note is an electronic transcription/translation of spoken language to printed text. The electronic translation of spoken language may permit erroneous, or at times, nonsensical words or phrases to be inadvertently transcribed; Although I have reviewed the note for such errors, some may still exist.

## 2020-03-03 RX ORDER — CLOPIDOGREL BISULFATE 75 MG/1
TABLET ORAL
Qty: 30 TABLET | Refills: 1 | Status: SHIPPED | OUTPATIENT
Start: 2020-03-03 | End: 2020-04-29 | Stop reason: SDUPTHER

## 2020-03-27 ENCOUNTER — TRANSCRIBE ORDERS (OUTPATIENT)
Dept: ADMINISTRATIVE | Facility: HOSPITAL | Age: 75
End: 2020-03-27

## 2020-03-27 ENCOUNTER — HOSPITAL ENCOUNTER (OUTPATIENT)
Dept: GENERAL RADIOLOGY | Facility: HOSPITAL | Age: 75
Discharge: HOME | End: 2020-03-27
Admitting: INTERNAL MEDICINE

## 2020-03-27 DIAGNOSIS — M25.561 RIGHT KNEE PAIN, UNSPECIFIED CHRONICITY: Primary | ICD-10-CM

## 2020-03-27 DIAGNOSIS — M25.561 RIGHT KNEE PAIN, UNSPECIFIED CHRONICITY: ICD-10-CM

## 2020-03-27 PROCEDURE — 73564 X-RAY EXAM KNEE 4 OR MORE: CPT

## 2020-03-27 PROCEDURE — 73564 X-RAY EXAM KNEE 4 OR MORE: CPT | Performed by: RADIOLOGY

## 2020-04-13 ENCOUNTER — OFFICE VISIT (OUTPATIENT)
Dept: UROLOGY | Facility: CLINIC | Age: 75
End: 2020-04-13

## 2020-04-13 VITALS — BODY MASS INDEX: 24.91 KG/M2 | WEIGHT: 177.91 LBS | HEIGHT: 71 IN

## 2020-04-13 DIAGNOSIS — N40.0 BENIGN PROSTATIC HYPERPLASIA, UNSPECIFIED WHETHER LOWER URINARY TRACT SYMPTOMS PRESENT: Primary | Chronic | ICD-10-CM

## 2020-04-13 PROCEDURE — 99441 PR PHYS/QHP TELEPHONE EVALUATION 5-10 MIN: CPT | Performed by: UROLOGY

## 2020-04-13 NOTE — PROGRESS NOTES
Chief Complaint:          BPH    HPI:   74 y.o. male he has some prostate issues his last PSA was 1.850 in May 20.  He is never had any other problems he has no burning, blood discharge.  He is been on both 5 alpha reductase and Flomax.  He does  think it is working great  I will see him back in 6 months.  He calls today he is stable he is not complaining he has no burning blood no other significant symptoms I refilled his medication I told him to get a PSA at some point  This visit has been rescheduled as a phone visit to comply with patient safety concerns in accordance with CDC recommendations. Total time of discussion was 7 minutes.      You have chosen to receive care through a telephone visit. Do you consent to use a telephone visit for your medical care today? Yes    Past Medical History:        Past Medical History:   Diagnosis Date   • BPH (benign prostatic hyperplasia)    • Bradycardia     Positive tilt table testing 07/2016   • Coronary artery disease    • Diabetes mellitus (CMS/HCC)    • Diverticulosis    • Gastric ulcer with hemorrhage    • History of transfusion    • Hyperlipidemia    • Hypertension    • Psoriasis          Current Meds:     Current Outpatient Medications   Medication Sig Dispense Refill   • amLODIPine (NORVASC) 5 MG tablet Take 1 tablet by mouth Daily. 30 tablet 5   • aspirin 81 MG tablet Take 81 mg by mouth Daily.     • atorvastatin (LIPITOR) 80 MG tablet Take 1 tablet by mouth Every Night. 90 tablet 5   • clopidogrel (PLAVIX) 75 MG tablet TAKE ONE TABLET BY MOUTH EVERY DAY FOR CIRCULATION 30 tablet 1   • ferrous sulfate 325 (65 FE) MG tablet Take 1 tablet by mouth 3 (Three) Times a Day With Meals. 90 tablet 4   • finasteride (PROSCAR) 5 MG tablet Take 1 tablet by mouth Daily. 30 tablet 5   • isosorbide mononitrate (IMDUR) 60 MG 24 hr tablet Take 1 tablet by mouth Every Morning. 30 tablet 11   • lisinopril (PRINIVIL,ZESTRIL) 10 MG tablet TAKE 1 & 1/2 TABLETS BY MOUTH EVERY DAY FOR  BLOOD PRESSURE 90 tablet 1   • nitroglycerin (NITROSTAT) 0.4 MG SL tablet 1 under the tongue as needed for angina, may repeat q5mins for up three doses 25 tablet 2   • pantoprazole (PROTONIX) 40 MG EC tablet Take 1 tablet by mouth 2 (Two) Times a Day Before Meals. 60 tablet 5   • tamsulosin (FLOMAX) 0.4 MG capsule 24 hr capsule Take 1 capsule by mouth Every Night. 30 capsule 5     No current facility-administered medications for this visit.         Allergies:      No Known Allergies     Past Surgical History:     Past Surgical History:   Procedure Laterality Date   • BACK SURGERY     • CARDIAC CATHETERIZATION N/A 9/20/2016    Procedure: Left Heart Cath;  Surgeon: Giovanni Buenrostro MD;  Location:  [facility]ATH [facility]IVE [facility];  Service:    • CARDIAC CATHETERIZATION N/A 10/4/2016    Procedure: Left Heart Cath;  Surgeon: Bharathi Andrew MD;  Location:  [facility];  Service:    • CARDIAC CATHETERIZATION N/A 5/9/2017    Procedure: Left Heart Cath;  Surgeon: Giovanni Buenrostro MD;  Location: [facility] CATH INVASIVE LOCATION;  Service:    • CARDIAC CATHETERIZATION N/A 5/9/2017    Procedure: ERR;  Surgeon: Giovanni Buenrostro MD;  Location:  [facility]ATH INVASIVE [facility];  Service:    • CARDIAC CATHETERIZATION N/A 11/8/2019    Procedure: Left Heart Cath;  Surgeon: Abraham Oviedo MD;  Location:  [facility]ATH INV[facility];  Service: Cardiology   • CARDIAC CATHETERIZATION N/A 12/18/2019    Procedure: Coronary angiography;  Surgeon: Jay Barney IV, MD;  Location:  [facility] INVASIVE [facility];  Service: Cardiovascular   • CHOLECYSTECTOMY     • COLONOSCOPY  11/13/2015    Dr. Martinez- internal hemorrhoids, sigmoid diverticulosis   • COLONOSCOPY N/A 8/17/2018    Procedure: COLONOSCOPY CPT CODE: 01406;  Surgeon: Gigi Geronimo MD;  Location: [facility];  Service: Gastroenterology   • CORONARY ANGIOPLASTY WITH STENT PLACEMENT     • ENDOSCOPY N/A 8/17/2018    Procedure: ESOPHAGOGASTRODUODENOSCOPY WITH  BIOPSY CPT CODE: 45345;  Surgeon: Gigi Geronimo MD;  Location:  [facility] OR;  Service: Gastroenterology   • INTERVENTIONAL RADIOLOGY PROCEDURE N/A 2019    Procedure: Coil Embolization of septal to pulmonary artery fistuala.;  Surgeon: Jay Barney IV, MD;  Location:  [facility] INVASIVE [facility];  Service: Cardiovascular   • FL RT/LT HEART CATHETERS N/A 2017    Procedure: Percutaneous Coronary Intervention;  Surgeon: Lincoln De Los Santos MD;  Location:  [facility]ATH INV[facility];  Service: Cardiology   • STOMACH SURGERY      FUSION OF BLEEDING ULCER   • UPPER GASTROINTESTINAL ENDOSCOPY  2015    Dr. Martinez- mild gastritis         Social History:     Social History     Socioeconomic History   • Marital status:      Spouse name: Not on file   • Number of children: Not on file   • Years of education: Not on file   • Highest education level: Not on file   Tobacco Use   • Smoking status: Former Smoker     Packs/day: 3.00     Years: 40.00     Pack years: 120.00     Types: Cigarettes     Last attempt to quit:      Years since quittin.3   • Smokeless tobacco: Former User     Quit date: 1986   Substance and Sexual Activity   • Alcohol use: No   • Drug use: No   • Sexual activity: Defer       Family History:     Family History   Problem Relation Age of Onset   • Sick sinus syndrome Mother    • Heart failure Mother    • Diabetes Mother    • Liver cancer Father        Review of Systems:     Review of Systems   Constitutional: Negative.    HENT: Negative.    Eyes: Negative.    Respiratory: Negative.    Cardiovascular: Negative.    Gastrointestinal: Negative.    Endocrine: Negative.    Musculoskeletal: Negative.    Allergic/Immunologic: Negative.    Neurological: Negative.    Hematological: Negative.    Psychiatric/Behavioral: Negative.        Physical Exam:     Physical Exam   Constitutional: He is oriented to person, place, and time.   Pulmonary/Chest: Effort normal.    Neurological: He is alert and oriented to person, place, and time.   Psychiatric: He has a normal mood and affect. His behavior is normal. Judgment and thought content normal.       I have reviewed the following portions of the patient's history: allergies, current medications, past family history, past medical history, past social history, past surgical history, problem list and ROS and confirm it's accurate.      Procedure:       Assessment/Plan:   BPH: Discussed the pathophysiology of BPH and obstruction.  We discussed the static and dynamic effect effects of BPH as well as using 5 alpha reductase inhibitors versus alpha blockade.  We discussed the indications for transurethral surgery as well.  And/ or other therapeutic options available including all of the newer techniques.            Patient's Body mass index is 24.82 kg/m². BMI is within normal parameters. No follow-up required..              This document has been electronically signed by JOSEFA KELLEY MD April 13, 2020 15:16

## 2020-04-24 ENCOUNTER — TELEPHONE (OUTPATIENT)
Dept: CARDIOLOGY | Facility: CLINIC | Age: 75
End: 2020-04-24

## 2020-04-29 ENCOUNTER — OFFICE VISIT (OUTPATIENT)
Dept: CARDIOLOGY | Facility: CLINIC | Age: 75
End: 2020-04-29

## 2020-04-29 VITALS
BODY MASS INDEX: 24.11 KG/M2 | SYSTOLIC BLOOD PRESSURE: 134 MMHG | WEIGHT: 172.2 LBS | HEART RATE: 53 BPM | RESPIRATION RATE: 16 BRPM | HEIGHT: 71 IN | DIASTOLIC BLOOD PRESSURE: 54 MMHG

## 2020-04-29 DIAGNOSIS — I25.10 ASCVD (ARTERIOSCLEROTIC CARDIOVASCULAR DISEASE): Primary | ICD-10-CM

## 2020-04-29 DIAGNOSIS — I25.119 CORONARY ARTERY DISEASE INVOLVING NATIVE CORONARY ARTERY OF NATIVE HEART WITH ANGINA PECTORIS (HCC): ICD-10-CM

## 2020-04-29 DIAGNOSIS — I10 ESSENTIAL HYPERTENSION: ICD-10-CM

## 2020-04-29 PROCEDURE — 99213 OFFICE O/P EST LOW 20 MIN: CPT | Performed by: INTERNAL MEDICINE

## 2020-04-29 RX ORDER — CLOPIDOGREL BISULFATE 75 MG/1
75 TABLET ORAL DAILY
Qty: 90 TABLET | Refills: 2 | Status: SHIPPED | OUTPATIENT
Start: 2020-04-29 | End: 2020-05-06

## 2020-04-29 RX ORDER — LORATADINE 10 MG/1
10 TABLET ORAL DAILY
COMMUNITY
End: 2021-03-29

## 2020-04-29 RX ORDER — ATORVASTATIN CALCIUM 80 MG/1
80 TABLET, FILM COATED ORAL NIGHTLY
Qty: 90 TABLET | Refills: 5 | Status: SHIPPED | OUTPATIENT
Start: 2020-04-29 | End: 2020-12-29 | Stop reason: SDUPTHER

## 2020-04-29 RX ORDER — LISINOPRIL 10 MG/1
10 TABLET ORAL DAILY
Qty: 90 TABLET | Refills: 2 | Status: SHIPPED | OUTPATIENT
Start: 2020-04-29 | End: 2020-12-29 | Stop reason: SDUPTHER

## 2020-04-29 RX ORDER — AMLODIPINE BESYLATE 5 MG/1
5 TABLET ORAL DAILY
Qty: 30 TABLET | Refills: 5 | Status: SHIPPED | OUTPATIENT
Start: 2020-04-29 | End: 2020-05-06

## 2020-04-29 RX ORDER — MELOXICAM 7.5 MG/1
7.5 TABLET ORAL 2 TIMES DAILY
Status: ON HOLD | COMMUNITY
End: 2021-05-24

## 2020-04-29 NOTE — PROGRESS NOTES
Gideon Charles MD  Fidencio Luciano  1945  04/29/2020    Patient Active Problem List   Diagnosis   • Essential hypertension   • Type 2 diabetes mellitus (CMS/HCC)   • Benign prostatic hyperplasia   • ASCVD (arteriosclerotic cardiovascular disease), s/p stenting of 80 % stenosis in left circumflex on 10/05/16and 60% stenosis in the LAD, clinically stable.    • Hyperlipidemia LDL goal <70   • Weakness generalized   • Chronic fatigue   • Coronary artery fistula   • Weight loss, unintentional   • Iron deficiency anemia   • Iron deficiency   • Normocytic anemia   • Weight loss, abnormal   • Coronary artery disease involving native coronary artery of native heart with angina pectoris (CMS/[facility])       Dear Gideon Charles MD:    Subjective     Fidencio Luciano is a 74 y.o. male with the problems as listed above, presents    Chief Complaint: Follow-up of coronary artery disease.     History of Present Illness: Mr. Luciano is a pleasant 71-year-old  male with history of known ASCVD, status post previous stenting of the left circumflex coronary artery in October 2016 and coiling of the fistula extending from the LAD to possibly pulmonary artery in December 2019, is here for regular cardiology follow-up.  On today's visit he denies any complaints of chest pains or shortness of breath and overall has been feeling much better since he had the procedure done.    No Known Allergies:      Current Outpatient Medications:   •  amLODIPine (NORVASC) 5 MG tablet, Take 1 tablet by mouth Daily., Disp: 30 tablet, Rfl: 5  •  aspirin 81 MG tablet, Take 81 mg by mouth Daily., Disp: , Rfl:   •  atorvastatin (LIPITOR) 80 MG tablet, Take 1 tablet by mouth Every Night., Disp: 90 tablet, Rfl: 5  •  clopidogrel (PLAVIX) 75 MG tablet, Take 1 tablet by mouth Daily., Disp: 90 tablet, Rfl: 2  •  ferrous sulfate 325 (65 FE) MG tablet, Take 1 tablet by mouth 3 (Three) Times a Day With Meals., Disp: 90 tablet, Rfl: 4  •  finasteride (PROSCAR) 5 MG  "tablet, Take 1 tablet by mouth Daily., Disp: 30 tablet, Rfl: 5  •  isosorbide mononitrate (IMDUR) 60 MG 24 hr tablet, Take 1 tablet by mouth Every Morning., Disp: 30 tablet, Rfl: 11  •  lisinopril (PRINIVIL,ZESTRIL) 10 MG tablet, Take 1 tablet by mouth Daily., Disp: 90 tablet, Rfl: 2  •  loratadine (CLARITIN) 10 MG tablet, Take 10 mg by mouth Daily., Disp: , Rfl:   •  meloxicam (MOBIC) 7.5 MG tablet, Take 7.5 mg by mouth 2 (Two) Times a Day., Disp: , Rfl:   •  nitroglycerin (NITROSTAT) 0.4 MG SL tablet, 1 under the tongue as needed for angina, may repeat q5mins for up three doses, Disp: 25 tablet, Rfl: 2  •  pantoprazole (PROTONIX) 40 MG EC tablet, Take 1 tablet by mouth 2 (Two) Times a Day Before Meals., Disp: 60 tablet, Rfl: 5      The following portions of the patient's history were reviewed and updated as appropriate: allergies, current medications, past family history, past medical history, past social history, past surgical history and problem list.    Social History     Tobacco Use   • Smoking status: Former Smoker     Packs/day: 3.00     Years: 40.00     Pack years: 120.00     Types: Cigarettes     Last attempt to quit: 1982     Years since quittin.3   • Smokeless tobacco: Former User     Quit date: 1986   Substance Use Topics   • Alcohol use: No   • Drug use: No       Review of Systems   Constitution: Positive for weight loss (6 pounds since 2020.).   Cardiovascular: Negative for chest pain, leg swelling and palpitations.   Respiratory: Negative for shortness of breath.        Objective   Vitals:    20 1241   BP: 134/54   Pulse: 53   Resp: 16   Weight: 78.1 kg (172 lb 3.2 oz)   Height: 180.3 cm (71\")     Body mass index is 24.02 kg/m².        Physical Exam   Constitutional: He is oriented to person, place, and time. He appears well-developed and well-nourished.   HENT:   Mouth/Throat: Oropharynx is clear and moist.   Eyes: Pupils are equal, round, and reactive to light. EOM are " normal.   Neck: Neck supple. No JVD present. No tracheal deviation present. No thyromegaly present.   Cardiovascular: Normal rate, regular rhythm, S1 normal and S2 normal. Exam reveals no gallop and no friction rub.   No murmur heard.  Pulmonary/Chest: Effort normal and breath sounds normal.   Abdominal: Soft. Bowel sounds are normal. He exhibits no mass. There is no tenderness.   Musculoskeletal: Normal range of motion. He exhibits no edema.   Lymphadenopathy:     He has no cervical adenopathy.   Neurological: He is alert and oriented to person, place, and time.   Skin: Skin is warm and dry. No rash noted.   Psychiatric: He has a normal mood and affect.       Lab Results   Component Value Date     12/18/2019    K 4.1 12/18/2019     12/18/2019    CO2 24.0 12/18/2019    BUN 14 12/18/2019    CREATININE 0.66 (L) 12/18/2019    GLUCOSE 117 (H) 12/18/2019    CALCIUM 9.1 12/18/2019    AST 21 12/18/2019    ALT 22 12/18/2019    ALKPHOS 94 12/18/2019    LABIL2 1.4 (L) 01/08/2016     Lab Results   Component Value Date    CKTOTAL 79 10/05/2016     Lab Results   Component Value Date    WBC 8.25 12/18/2019    HGB 12.8 (L) 12/18/2019    HCT 39.4 12/18/2019     12/18/2019     Lab Results   Component Value Date    INR 1.06 10/03/2016    INR 1.01 07/16/2016     Lab Results   Component Value Date    MG 2.3 07/25/2018     Lab Results   Component Value Date    TSH 1.630 10/12/2019    PSA 1.850 05/10/2019    CHLPL 109 08/12/2015    TRIG 84 12/18/2019    HDL 35 (L) 12/18/2019    LDL 51 12/18/2019        Assessment/Plan :  1. ASCVD (arteriosclerotic cardiovascular disease), s/p stenting of 80 % stenosis in left circumflex on 10/05/16and coiling of fistula between LAD and pulmonary artery in December 2019, clinically stable     2. Essential hypertension, controlled.     3. Coronary artery disease involving native coronary artery of native heart with angina pectoris.      Recommendations:  1. Continue with aspirin, Plavix,  amlodipine, Imdur and lisinopril at current doses  2. Follow-up in 6 months.    Return in about 6 months (around 10/29/2020).    As always, Gideon Charles MD  I appreciate very much the opportunity to participate in the cardiovascular care of your patients. Please do not hesitate to call me with any questions with regards to Fidencio Luciano evaluation and management.       With Best Regards,        Giovanni Buenrostro MD, [facility]    Please note that portions of this note were completed with a voice recognition program.

## 2020-05-05 DIAGNOSIS — I25.119 CORONARY ARTERY DISEASE INVOLVING NATIVE CORONARY ARTERY OF NATIVE HEART WITH ANGINA PECTORIS (HCC): ICD-10-CM

## 2020-05-05 DIAGNOSIS — N40.1 BENIGN PROSTATIC HYPERPLASIA WITH URINARY OBSTRUCTION: ICD-10-CM

## 2020-05-05 DIAGNOSIS — N13.8 BENIGN PROSTATIC HYPERPLASIA WITH URINARY OBSTRUCTION: ICD-10-CM

## 2020-05-05 RX ORDER — FINASTERIDE 5 MG/1
TABLET, FILM COATED ORAL
Qty: 90 TABLET | Refills: 1 | Status: SHIPPED | OUTPATIENT
Start: 2020-05-05 | End: 2020-12-29 | Stop reason: SDUPTHER

## 2020-05-06 RX ORDER — CLOPIDOGREL BISULFATE 75 MG/1
TABLET ORAL
Qty: 30 TABLET | Refills: 5 | Status: SHIPPED | OUTPATIENT
Start: 2020-05-06 | End: 2020-09-01

## 2020-05-06 RX ORDER — AMLODIPINE BESYLATE 5 MG/1
TABLET ORAL
Qty: 30 TABLET | Refills: 5 | Status: SHIPPED | OUTPATIENT
Start: 2020-05-06 | End: 2020-12-29 | Stop reason: SDUPTHER

## 2020-05-10 NOTE — H&P
[facility] at [facility]  Cardiovascular history and physical note         Patient is a 74-year-old male with a history of coronary artery disease with past PCI's, diabetes mellitus, hypertension, and hyperlipidemia who presents today to undergo coiling of a fistula connection between a diagonal branch and and possibly left atrium.  The patient underwent cardiac catheterization for an abnormal stress test last month Dr. Buenrostro who contacted us concerning possible coiling of a fistula noted on coronary angiogram.  The films were reviewed and and he presents today to undergo the procedure with Dr. Rusty Metzger and Dr. Pio Bareny.  Patient reports having one episode of chest discomfort approximately 6 to 8 weeks ago that occurred at rest.  He has had no further symptoms.    Cardiac risk factors: Hypertension, hyperlipidemia, advanced age, diabetes mellitus    Past medical and surgical history, social and family history reviewed in EMR.    REVIEW OF SYSTEMS:   H&P ROS reviewed and pertinent CV ROS as noted in HPI.         Vital Sign Min/Max for last 24 hours  No data recorded   No data recorded   No data recorded   No data recorded   No data recorded   No data recorded    No intake or output data in the 24 hours ending 12/18/19 0844        Physical Exam   Constitutional: He is oriented to person, place, and time. He appears well-developed and well-nourished.   HENT:   Head: Normocephalic and atraumatic.   Eyes: Pupils are equal, round, and reactive to light. No scleral icterus.   Neck: No JVD present. Carotid bruit is not present. No thyromegaly present.   Cardiovascular: Normal rate and regular rhythm. Exam reveals no gallop.   No murmur heard.  Pulmonary/Chest: Effort normal and breath sounds normal.   Abdominal: Soft. He exhibits no distension. There is no hepatosplenomegaly.   Musculoskeletal: He exhibits no edema.   Neurological: He is alert and oriented to person, place, and time.    Skin: Skin is warm and dry.   Psychiatric: He has a normal mood and affect. His behavior is normal.       Lab Review:   Labs reviewed in the electronic medical record.  Pertinent findings include:  Lab Results   Component Value Date    GLUCOSE 109 (H) 11/07/2019    BUN 19 11/07/2019    CREATININE 0.78 11/07/2019    EGFRIFNONA 98 11/07/2019    EGFRIFAFRI  09/19/2016      Comment:      <15 Indicative of kidney failure.    BCR 24.4 11/07/2019    K 4.0 11/07/2019    CO2 29.4 (H) 11/07/2019    CALCIUM 9.1 11/07/2019    ALBUMIN 4.00 11/07/2019    LABIL2 1.4 (L) 01/08/2016    AST 18 11/07/2019    ALT 15 11/07/2019     Lab Results   Component Value Date    WBC 7.94 11/07/2019    HGB 12.0 (L) 11/07/2019    HCT 35.7 (L) 11/07/2019    MCV 95.5 11/07/2019     11/07/2019     Lab Results   Component Value Date    CHOL 156 10/30/2019    CHLPL 109 08/12/2015    TRIG 155 (H) 10/30/2019    HDL 32 (L) 10/30/2019    LDL 93 10/30/2019                  Active Hospital Problems    Diagnosis   • **Coronary artery fistula     · Cardiac cath (12/9/2019): fistulous connection between a diagonal branch and unknown cardiac chamber     • Coronary artery disease involving native coronary artery of native heart with angina pectoris (CMS/[facility])     · Cardiac cath (10/5/2016): PCI to circumflex  · Cardiac cath (5/9/2017):  PCI to mid RCA.    · Cardiac cath (11/8/2019):  Moderate diffuse disease of the LAD, circumflex.  Patent stents in the RCA, circumflex and LAD.     • Type 2 diabetes mellitus (CMS/HCC)   • Hyperlipidemia LDL goal <70   • Essential hypertension     The patient presents today to undergo coiling of a coronary artery fistula and IFR of coronary artery disease in the LAD.  The risk and benefits of the procedure were discussed and the patient is agreeable to proceed.       · Lab results pending and if acceptable proceed with coronary angiogram/coiling of coronary artery fistula  · Further recommendations to follow    Stacie  Angus, APRN     73yoF with h/o HTN, HLD, DM, glaucoma, vertigo, presents with dizziness and SOB. Primary complaint is actually dizziness. She has a long h/o dizziness and has had testing with neuro and scheduled for MRI head. Today she had another episode of room spinning and hit the side of her head on a door. On ROS reports some episodes of needing to take a deeper breath over the past 3 days. Denies fever, cough, leg pain or swelling, recent long distance travel, and all other symptoms. On exam, afebrile, hemodynamically stable, saturating well on RA, NAD, well appearing, head NCAT, pupils equal, EOMI, anicteric, MMM, no JVD, RRR, nml S1/S2, no m/r/g, lungs CTAB, no w/r/r, abd soft, NT, ND, nml BS, no rebound or guarding, AAO, CN's 3-12 intact, motor 5/5 and sens symm in all extrems, F-N nml, CUETO spontaneously, no leg cyanosis or edema, skin warm, well perfused, no rashes or hives. No e/o trauma on exam. Character low suspicion for PE and no associated signs of DVT or tachycardia/hypoxia. Character low suspicion for ACS and ECG/trop unremarkable however has not had provocative testing in 10 yrs and high risk for this and will obs for this. No arrhythmia. Character of dizziness of low suspicion for CVA and no focal or localizing findings and had has extensive w/u for this already. Given fluids, Reglan with improvement. Given ASA. Patient well appearing, hemodynamically stable. Sent to obs for CTCA and Echo.

## 2020-05-18 ENCOUNTER — LAB (OUTPATIENT)
Dept: LAB | Facility: HOSPITAL | Age: 75
End: 2020-05-18

## 2020-05-18 ENCOUNTER — TRANSCRIBE ORDERS (OUTPATIENT)
Dept: ADMINISTRATIVE | Facility: HOSPITAL | Age: 75
End: 2020-05-18

## 2020-05-18 DIAGNOSIS — R60.9 BODY FLUID RETENTION: ICD-10-CM

## 2020-05-18 DIAGNOSIS — R60.9 EDEMA, UNSPECIFIED TYPE: ICD-10-CM

## 2020-05-18 DIAGNOSIS — E11.9 DIABETES MELLITUS WITHOUT COMPLICATION (HCC): ICD-10-CM

## 2020-05-18 DIAGNOSIS — I10 ESSENTIAL HYPERTENSION: ICD-10-CM

## 2020-05-18 DIAGNOSIS — I10 ESSENTIAL HYPERTENSION: Primary | ICD-10-CM

## 2020-05-18 LAB
ALBUMIN SERPL-MCNC: 4.2 G/DL (ref 3.5–5.2)
ALBUMIN/GLOB SERPL: 1.4 G/DL
ALP SERPL-CCNC: 88 U/L (ref 39–117)
ALT SERPL W P-5'-P-CCNC: 16 U/L (ref 1–41)
ANION GAP SERPL CALCULATED.3IONS-SCNC: 9.1 MMOL/L (ref 5–15)
AST SERPL-CCNC: 21 U/L (ref 1–40)
BASOPHILS # BLD AUTO: 0.08 10*3/MM3 (ref 0–0.2)
BASOPHILS NFR BLD AUTO: 1.1 % (ref 0–1.5)
BILIRUB SERPL-MCNC: 0.6 MG/DL (ref 0.2–1.2)
BUN BLD-MCNC: 15 MG/DL (ref 8–23)
BUN/CREAT SERPL: 20.8 (ref 7–25)
CALCIUM SPEC-SCNC: 9.3 MG/DL (ref 8.6–10.5)
CHLORIDE SERPL-SCNC: 106 MMOL/L (ref 98–107)
CHOLEST SERPL-MCNC: 99 MG/DL (ref 0–200)
CO2 SERPL-SCNC: 25.9 MMOL/L (ref 22–29)
CREAT BLD-MCNC: 0.72 MG/DL (ref 0.76–1.27)
DEPRECATED RDW RBC AUTO: 45.8 FL (ref 37–54)
EOSINOPHIL # BLD AUTO: 0.65 10*3/MM3 (ref 0–0.4)
EOSINOPHIL NFR BLD AUTO: 9.3 % (ref 0.3–6.2)
ERYTHROCYTE [DISTWIDTH] IN BLOOD BY AUTOMATED COUNT: 13.3 % (ref 12.3–15.4)
GFR SERPL CREATININE-BSD FRML MDRD: 107 ML/MIN/1.73
GLOBULIN UR ELPH-MCNC: 3.1 GM/DL
GLUCOSE BLD-MCNC: 95 MG/DL (ref 65–99)
HBA1C MFR BLD: 5.9 % (ref 4.8–5.6)
HCT VFR BLD AUTO: 37 % (ref 37.5–51)
HDLC SERPL-MCNC: 30 MG/DL (ref 40–60)
HGB BLD-MCNC: 12.9 G/DL (ref 13–17.7)
IMM GRANULOCYTES # BLD AUTO: 0.02 10*3/MM3 (ref 0–0.05)
IMM GRANULOCYTES NFR BLD AUTO: 0.3 % (ref 0–0.5)
LDLC SERPL CALC-MCNC: 54 MG/DL (ref 0–100)
LDLC/HDLC SERPL: 1.79 {RATIO}
LYMPHOCYTES # BLD AUTO: 1.54 10*3/MM3 (ref 0.7–3.1)
LYMPHOCYTES NFR BLD AUTO: 21.9 % (ref 19.6–45.3)
MCH RBC QN AUTO: 32.5 PG (ref 26.6–33)
MCHC RBC AUTO-ENTMCNC: 34.9 G/DL (ref 31.5–35.7)
MCV RBC AUTO: 93.2 FL (ref 79–97)
MONOCYTES # BLD AUTO: 0.53 10*3/MM3 (ref 0.1–0.9)
MONOCYTES NFR BLD AUTO: 7.5 % (ref 5–12)
NEUTROPHILS # BLD AUTO: 4.2 10*3/MM3 (ref 1.7–7)
NEUTROPHILS NFR BLD AUTO: 59.9 % (ref 42.7–76)
NRBC BLD AUTO-RTO: 0 /100 WBC (ref 0–0.2)
PLATELET # BLD AUTO: 209 10*3/MM3 (ref 140–450)
PMV BLD AUTO: 11.4 FL (ref 6–12)
POTASSIUM BLD-SCNC: 4 MMOL/L (ref 3.5–5.2)
PROT SERPL-MCNC: 7.3 G/DL (ref 6–8.5)
RBC # BLD AUTO: 3.97 10*6/MM3 (ref 4.14–5.8)
SODIUM BLD-SCNC: 141 MMOL/L (ref 136–145)
TRIGL SERPL-MCNC: 77 MG/DL (ref 0–150)
VLDLC SERPL-MCNC: 15.4 MG/DL (ref 5–40)
WBC NRBC COR # BLD: 7.02 10*3/MM3 (ref 3.4–10.8)

## 2020-05-18 PROCEDURE — 85025 COMPLETE CBC W/AUTO DIFF WBC: CPT

## 2020-05-18 PROCEDURE — 83036 HEMOGLOBIN GLYCOSYLATED A1C: CPT

## 2020-05-18 PROCEDURE — 80061 LIPID PANEL: CPT

## 2020-05-18 PROCEDURE — 80053 COMPREHEN METABOLIC PANEL: CPT

## 2020-05-18 PROCEDURE — 36415 COLL VENOUS BLD VENIPUNCTURE: CPT

## 2020-07-17 ENCOUNTER — OFFICE VISIT (OUTPATIENT)
Dept: UROLOGY | Facility: CLINIC | Age: 75
End: 2020-07-17

## 2020-07-17 VITALS — HEIGHT: 71 IN | WEIGHT: 172 LBS | TEMPERATURE: 98.3 F | BODY MASS INDEX: 24.08 KG/M2

## 2020-07-17 DIAGNOSIS — N40.1 BENIGN PROSTATIC HYPERPLASIA WITH URINARY OBSTRUCTION: Primary | ICD-10-CM

## 2020-07-17 DIAGNOSIS — N13.8 BENIGN PROSTATIC HYPERPLASIA WITH URINARY OBSTRUCTION: Primary | ICD-10-CM

## 2020-07-17 PROCEDURE — 99213 OFFICE O/P EST LOW 20 MIN: CPT | Performed by: UROLOGY

## 2020-07-17 RX ORDER — FINASTERIDE 5 MG/1
5 TABLET, FILM COATED ORAL DAILY
Qty: 9 TABLET | Refills: 3 | Status: SHIPPED | OUTPATIENT
Start: 2020-07-17 | End: 2020-12-14 | Stop reason: SDUPTHER

## 2020-07-17 NOTE — PROGRESS NOTES
Chief Complaint:          Chief Complaint   Patient presents with   • Benign Prostatic Hypertrophy     3 month fu        HPI:   74 y.o. male returns today he is doing great he is on finasteride he is no burning blood he gets up once at night.  I would refill his medication.  We will just continue his current medication regimen and see him back based on this    Past Medical History:        Past Medical History:   Diagnosis Date   • BPH (benign prostatic hyperplasia)    • Bradycardia     Positive tilt table testing 07/2016   • Coronary artery disease    • Diabetes mellitus (CMS/HCC)    • Diverticulosis    • Gastric ulcer with hemorrhage    • History of transfusion    • Hyperlipidemia    • Hypertension    • Psoriasis          Current Meds:     Current Outpatient Medications   Medication Sig Dispense Refill   • amLODIPine (NORVASC) 5 MG tablet TAKE ONE TABLET BY MOUTH EVERY DAY FOR BLOOD PRESSURE 30 tablet 5   • aspirin 81 MG tablet Take 81 mg by mouth Daily.     • atorvastatin (LIPITOR) 80 MG tablet Take 1 tablet by mouth Every Night. 90 tablet 5   • clopidogrel (PLAVIX) 75 MG tablet TAKE 1 TABLET BY MOUTH EVERY DAY TO MAINTAIN BLOOD CIRCULATION AND CLOT PREVENTION 30 tablet 5   • ferrous sulfate 325 (65 FE) MG tablet Take 1 tablet by mouth 3 (Three) Times a Day With Meals. 90 tablet 4   • finasteride (PROSCAR) 5 MG tablet TAKE ONE TABLET BY MOUTH EVERY DAY FOR PROSTATE 90 tablet 1   • isosorbide mononitrate (IMDUR) 60 MG 24 hr tablet Take 1 tablet by mouth Every Morning. 30 tablet 11   • lisinopril (PRINIVIL,ZESTRIL) 10 MG tablet Take 1 tablet by mouth Daily. 90 tablet 2   • loratadine (CLARITIN) 10 MG tablet Take 10 mg by mouth Daily.     • meloxicam (MOBIC) 7.5 MG tablet Take 7.5 mg by mouth 2 (Two) Times a Day.     • nitroglycerin (NITROSTAT) 0.4 MG SL tablet 1 under the tongue as needed for angina, may repeat q5mins for up three doses 25 tablet 2     No current facility-administered medications for this visit.          Allergies:      No Known Allergies     Past Surgical History:     Past Surgical History:   Procedure Laterality Date   • BACK SURGERY     • CARDIAC CATHETERIZATION N/A 9/20/2016    Procedure: Left Heart Cath;  Surgeon: Giovanni Buenrostro MD;  [facility]  [facility];  Service:    • CARDIAC CATHETERIZATION N/A 10/4/2016    Procedure: Left Heart Cath;  Surgeon: Bharathi Andrew MD;  [facility]:  [facility];  Service:    • CARDIAC CATHETERIZATION N/A 5/9/2017    Procedure: Left Heart Cath;  Surgeon: Giovanni Buenrostro MD;  [facility]:  [facility];  Service:    • CARDIAC CATHETERIZATION N/A 5/9/2017    Procedure: ERR;  Surgeon: Giovanni Buenrostro MD;  [facility]:  [facility];  Service:    • CARDIAC CATHETERIZATION N/A 11/8/2019    Procedure: Left Heart Cath;  Surgeon: Abraham Oviedo MD;  Location:  [facility];  Service: Cardiology   • CARDIAC CATHETERIZATION N/A 12/18/2019    Procedure: Coronary angiography;  Surgeon: Jay Barney IV, MD;  Location:  [facility];  Service: Cardiovascular   • CHOLECYSTECTOMY     • COLONOSCOPY  11/13/2015    Dr. Martinez- internal hemorrhoids, sigmoid diverticulosis   • COLONOSCOPY N/A 8/17/2018    Procedure: COLONOSCOPY CPT CODE: 71958;  Surgeon: Gigi Geronimo MD;  Location: [facility] OR;  Service: Gastroenterology   • CORONARY ANGIOPLASTY WITH STENT PLACEMENT     • ENDOSCOPY N/A 8/17/2018    Procedure: ESOPHAGOGASTRODUODENOSCOPY WITH BIOPSY CPT CODE: 77707;  Surgeon: Gigi Geronimo MD;  Location: [facility] OR;  Service: Gastroenterology   • INTERVENTIONAL RADIOLOGY PROCEDURE N/A 12/18/2019    Procedure: Coil Embolization of septal to pulmonary artery fistuala.;  Surgeon: Jay Barney IV, MD;  Location:  [facility];  Service: Cardiovascular   • [place] RT/LT HEART CATHETERS N/A 5/9/2017    Procedure: Percutaneous Coronary Intervention;  Surgeon:  Lincoln De Los Santos MD;  Location: [facility] INVASIVE LOC[facility];  Service: Cardiology   • STOMACH SURGERY      FUSION OF BLEEDING ULCER   • UPPER GASTROINTESTINAL ENDOSCOPY  2015    Dr. Martinez- mild gastritis         Social History:     Social History     Socioeconomic History   • Marital status:      Spouse name: Not on file   • Number of children: Not on file   • Years of education: Not on file   • Highest education level: Not on file   Tobacco Use   • Smoking status: Former Smoker     Packs/day: 3.00     Years: 40.00     Pack years: 120.00     Types: Cigarettes     Last attempt to quit:      Years since quittin.5   • Smokeless tobacco: Former User     Quit date: 1986   Substance and Sexual Activity   • Alcohol use: No   • Drug use: No   • Sexual activity: Defer       Family History:     Family History   Problem Relation Age of Onset   • Sick sinus syndrome Mother    • Heart failure Mother    • Diabetes Mother    • Liver cancer Father        Review of Systems:     Review of Systems   Constitutional: Negative.    HENT: Negative.    Eyes: Negative.    Respiratory: Negative.    Cardiovascular: Negative.    Gastrointestinal: Negative.    Endocrine: Negative.    Musculoskeletal: Negative.    Allergic/Immunologic: Negative.    Neurological: Negative.    Hematological: Negative.    Psychiatric/Behavioral: Negative.        Physical Exam:     Physical Exam   Constitutional: He is oriented to person, place, and time. He appears well-developed and well-nourished.   HENT:   Head: Normocephalic and atraumatic.   Eyes: Pupils are equal, round, and reactive to light. Conjunctivae and EOM are normal.   Neck: Normal range of motion.   Cardiovascular: Normal rate, regular rhythm, normal heart sounds and intact distal pulses.   Pulmonary/Chest: Effort normal and breath sounds normal.   Abdominal: Soft. Bowel sounds are normal.   Genitourinary:   Genitourinary Comments: Soft nontender abdomen with no  organomegaly, rigidity, or tenderness.  He has normal external genitalia and uncircumcised phallus with a freely movable foreskin bilaterally descended testes without masses there is no inguinal hernias adenopathy or abnormalities he had good rectal tone and a large smooth firm prostate.  There is no nodularity or any suspicious rectal abnormalities     Musculoskeletal: Normal range of motion.   Neurological: He is alert and oriented to person, place, and time. He has normal reflexes.   Skin: Skin is warm and dry.   Psychiatric: He has a normal mood and affect. His behavior is normal. Judgment and thought content normal.   Nursing note and vitals reviewed.      I have reviewed the following portions of the patient's history: allergies, current medications, past family history, past medical history, past social history, past surgical history, problem list and ROS and confirm it's accurate.      Procedure:       Assessment/Plan:   BPH: Discussed the pathophysiology of BPH and obstruction.  We discussed the static and dynamic effect effects of BPH as well as using 5 alpha reductase inhibitors versus alpha blockade.  We discussed the indications for transurethral surgery as well.  And/ or other therapeutic options available including all of the newer techniques.  Continue finasteride            Patient's Body mass index is 24.02 kg/m². BMI is within normal parameters. No follow-up required..              This document has been electronically signed by JOSEFA KELLEY MD July 17, 2020 15:37

## 2020-07-18 LAB — PSA SERPL-MCNC: 1.44 NG/ML (ref 0–4)

## 2020-09-01 RX ORDER — CLOPIDOGREL BISULFATE 75 MG/1
TABLET ORAL
Qty: 30 TABLET | Refills: 5 | Status: SHIPPED | OUTPATIENT
Start: 2020-09-01 | End: 2020-12-29 | Stop reason: SDUPTHER

## 2020-12-14 DIAGNOSIS — N13.8 BENIGN PROSTATIC HYPERPLASIA WITH URINARY OBSTRUCTION: ICD-10-CM

## 2020-12-14 DIAGNOSIS — N40.1 BENIGN PROSTATIC HYPERPLASIA WITH URINARY OBSTRUCTION: ICD-10-CM

## 2020-12-15 RX ORDER — FINASTERIDE 5 MG/1
5 TABLET, FILM COATED ORAL DAILY
Qty: 9 TABLET | Refills: 3 | Status: SHIPPED | OUTPATIENT
Start: 2020-12-15 | End: 2021-09-13 | Stop reason: SDUPTHER

## 2020-12-29 ENCOUNTER — OFFICE VISIT (OUTPATIENT)
Dept: CARDIOLOGY | Facility: CLINIC | Age: 75
End: 2020-12-29

## 2020-12-29 VITALS
DIASTOLIC BLOOD PRESSURE: 56 MMHG | WEIGHT: 165.2 LBS | HEART RATE: 56 BPM | SYSTOLIC BLOOD PRESSURE: 127 MMHG | BODY MASS INDEX: 23.13 KG/M2 | HEIGHT: 71 IN | TEMPERATURE: 97.5 F

## 2020-12-29 DIAGNOSIS — I25.119 CORONARY ARTERY DISEASE INVOLVING NATIVE CORONARY ARTERY OF NATIVE HEART WITH ANGINA PECTORIS (HCC): ICD-10-CM

## 2020-12-29 DIAGNOSIS — I10 ESSENTIAL HYPERTENSION: ICD-10-CM

## 2020-12-29 DIAGNOSIS — I25.10 ASCVD (ARTERIOSCLEROTIC CARDIOVASCULAR DISEASE): Primary | ICD-10-CM

## 2020-12-29 DIAGNOSIS — E11.9 TYPE 2 DIABETES MELLITUS WITHOUT COMPLICATION, WITHOUT LONG-TERM CURRENT USE OF INSULIN (HCC): ICD-10-CM

## 2020-12-29 PROCEDURE — 93000 ELECTROCARDIOGRAM COMPLETE: CPT | Performed by: INTERNAL MEDICINE

## 2020-12-29 PROCEDURE — 99213 OFFICE O/P EST LOW 20 MIN: CPT | Performed by: INTERNAL MEDICINE

## 2020-12-29 RX ORDER — AMLODIPINE BESYLATE 5 MG/1
5 TABLET ORAL DAILY
Qty: 30 TABLET | Refills: 5 | Status: SHIPPED | OUTPATIENT
Start: 2020-12-29 | End: 2021-01-28

## 2020-12-29 RX ORDER — ATORVASTATIN CALCIUM 80 MG/1
80 TABLET, FILM COATED ORAL NIGHTLY
Qty: 90 TABLET | Refills: 5 | Status: SHIPPED | OUTPATIENT
Start: 2020-12-29

## 2020-12-29 RX ORDER — CLOPIDOGREL BISULFATE 75 MG/1
75 TABLET ORAL DAILY
Qty: 30 TABLET | Refills: 5 | Status: SHIPPED | OUTPATIENT
Start: 2020-12-29

## 2020-12-29 RX ORDER — ISOSORBIDE MONONITRATE 60 MG/1
60 TABLET, EXTENDED RELEASE ORAL EVERY MORNING
Qty: 30 TABLET | Refills: 11 | Status: SHIPPED | OUTPATIENT
Start: 2020-12-29 | End: 2021-07-22 | Stop reason: SDUPTHER

## 2020-12-29 RX ORDER — LISINOPRIL 20 MG/1
20 TABLET ORAL DAILY
Qty: 30 TABLET | Refills: 5 | Status: SHIPPED | OUTPATIENT
Start: 2020-12-29 | End: 2021-06-16

## 2020-12-29 RX ORDER — PANTOPRAZOLE SODIUM 40 MG/1
40 TABLET, DELAYED RELEASE ORAL DAILY
COMMUNITY

## 2020-12-29 RX ORDER — NITROGLYCERIN 0.4 MG/1
TABLET SUBLINGUAL
Qty: 25 TABLET | Refills: 2 | Status: SHIPPED | OUTPATIENT
Start: 2020-12-29

## 2021-01-08 ENCOUNTER — OFFICE VISIT (OUTPATIENT)
Dept: GASTROENTEROLOGY | Facility: CLINIC | Age: 76
End: 2021-01-08

## 2021-01-08 VITALS
HEIGHT: 71 IN | HEART RATE: 60 BPM | DIASTOLIC BLOOD PRESSURE: 57 MMHG | WEIGHT: 164.6 LBS | TEMPERATURE: 97.8 F | BODY MASS INDEX: 23.04 KG/M2 | SYSTOLIC BLOOD PRESSURE: 132 MMHG | OXYGEN SATURATION: 95 %

## 2021-01-08 DIAGNOSIS — K59.04 CHRONIC IDIOPATHIC CONSTIPATION: ICD-10-CM

## 2021-01-08 DIAGNOSIS — R68.81 EARLY SATIETY: ICD-10-CM

## 2021-01-08 DIAGNOSIS — R13.19 ESOPHAGEAL DYSPHAGIA: ICD-10-CM

## 2021-01-08 DIAGNOSIS — R63.4 WEIGHT LOSS, ABNORMAL: ICD-10-CM

## 2021-01-08 DIAGNOSIS — K21.9 GASTROESOPHAGEAL REFLUX DISEASE, UNSPECIFIED WHETHER ESOPHAGITIS PRESENT: ICD-10-CM

## 2021-01-08 DIAGNOSIS — R15.2 FECAL URGENCY: ICD-10-CM

## 2021-01-08 DIAGNOSIS — R14.0 BLOATING: ICD-10-CM

## 2021-01-08 DIAGNOSIS — R19.4 CHANGE IN BOWEL HABITS: Primary | ICD-10-CM

## 2021-01-08 DIAGNOSIS — Z01.818 PRE-OPERATIVE CLEARANCE: ICD-10-CM

## 2021-01-08 PROCEDURE — 99214 OFFICE O/P EST MOD 30 MIN: CPT | Performed by: PHYSICIAN ASSISTANT

## 2021-01-08 RX ORDER — POLYETHYLENE GLYCOL 3350 17 G/17G
17 POWDER, FOR SOLUTION ORAL DAILY
Qty: 510 G | Refills: 5 | Status: SHIPPED | OUTPATIENT
Start: 2021-01-08 | End: 2021-03-29

## 2021-01-08 NOTE — PROGRESS NOTES
": 1945    Chief Complaint   Patient presents with   • Diarrhea   • Difficulty Swallowing       Fidencio Luciano is a 75 y.o. male who presents to the office today as a follow up appointment regarding Diarrhea and Difficulty Swallowing.    History of Present Illness:  Dysphagia has been occurring mostly with liquids. He is able to eat all foods without problem currently. He states the dysphagia feels as if he is \"strangling\" when drinking. He has had GERD most of his life. He is taking protonix 40 mg once daily with sometimes breakthrough heartburn and reflux when lying supine. He previous took Protonix twice daily approx 1 year ago. He had been having severe diarrhea with more than 4 watery stools per day last week but took 1-2 imodium and now he has not had a BM in the past 4 days. Typically has fecal urgency and intermittent fecal incontinence. Bloating is present and sometimes very uncomfortable, has early satiety and tends to not finish his meals. He continues to lose weight, has lost from 179 last year to 164 lbs today.     Review of Systems   Constitutional: Positive for chills and fatigue. Negative for fever.   HENT: Negative for trouble swallowing.    Eyes: Positive for visual disturbance.   Respiratory: Positive for apnea and shortness of breath.    Cardiovascular: Negative for chest pain.   Gastrointestinal: Positive for abdominal distention, abdominal pain, constipation and diarrhea. Negative for anal bleeding, blood in stool, nausea, rectal pain and vomiting.   Endocrine: Positive for cold intolerance and heat intolerance.   Genitourinary: Positive for difficulty urinating.   Musculoskeletal: Positive for arthralgias, back pain and myalgias.   Skin: Positive for wound. Negative for color change, pallor and rash.   Allergic/Immunologic: Negative for environmental allergies and food allergies.   Neurological: Positive for dizziness and light-headedness.   Hematological: Bruises/bleeds easily. "   Psychiatric/Behavioral: Positive for sleep disturbance. The patient is nervous/anxious.      I have reviewed and confirmed the accuracy of the ROS as documented by the MA/LPN/RN Hiral Fierro PA-C    Past Medical History:   Diagnosis Date   • BPH (benign prostatic hyperplasia)    • Bradycardia     Positive tilt table testing 07/2016   • Coronary artery disease    • Diabetes mellitus (CMS/HCC)    • Diverticulosis    • Gastric ulcer with hemorrhage    • History of transfusion    • Hyperlipidemia    • Hypertension    • Psoriasis        Past Surgical History:   Procedure Laterality Date   • BACK SURGERY     • CARDIAC CATHETERIZATION N/A 9/20/2016    Procedure: Left Heart Cath;  Surgeon: Giovanni Buenrostro MD;  Location:  [facility];  Service:    • CARDIAC CATHETERIZATION N/A 10/4/2016    Procedure: Left Heart Cath;  Surgeon: Bharathi Andrew MD;  [facility]:  [facility];  Service:    • CARDIAC CATHETERIZATION N/A 5/9/2017    Procedure: Left Heart Cath;  Surgeon: Giovanni Buenrostro MD;  [facility]:  [facility];  Service:    • CARDIAC CATHETERIZATION N/A 5/9/2017    Procedure: ERR;  Surgeon: Giovanni Buenrostro MD;  Location:  [facility];  Service:    • CARDIAC CATHETERIZATION N/A 11/8/2019    Procedure: Left Heart Cath;  Surgeon: Abraham Oviedo MD;  [facility]:  [facility];  Service: Cardiology   • CARDIAC CATHETERIZATION N/A 12/18/2019    Procedure: Coronary angiography;  Surgeon: Jay Barney IV, MD;  Location:  [facility];  Service: Cardiovascular   • CHOLECYSTECTOMY     • COLONOSCOPY  11/13/2015    Dr. Martinez- internal hemorrhoids, sigmoid diverticulosis   • COLONOSCOPY N/A 8/17/2018    Procedure: COLONOSCOPY CPT CODE: 95271;  Surgeon: Gigi Geronimo MD;  [facility];  Service: Gastroenterology   • CORONARY ANGIOPLASTY WITH STENT PLACEMENT     • ENDOSCOPY N/A 8/17/2018    Procedure:  ESOPHAGOGASTRODUODENOSCOPY WITH BIOPSY CPT CODE: 22160;  Surgeon: Gigi Geronimo MD;  Location:  [facility];  Service: Gastroenterology   • INTERVENTIONAL RADIOLOGY PROCEDURE N/A 2019    Procedure: Coil Embolization of septal to pulmonary artery fistuala.;  Surgeon: Jay Barney IV, MD;  Location:  [facility];  Service: Cardiovascular   • [place] RT/LT HEART CATHETERS N/A 2017    Procedure: Percutaneous Coronary Intervention;  Surgeon: Lincoln De Los Santos MD;  [facility]:  [facility];  Service: Cardiology   • STOMACH SURGERY      FUSION OF BLEEDING ULCER   • UPPER GASTROINTESTINAL ENDOSCOPY  2015    Dr. Martinez- mild gastritis       Family History   Problem Relation Age of Onset   • Sick sinus syndrome Mother    • Heart failure Mother    • Diabetes Mother    • Liver cancer Father        Social History     Socioeconomic History   • Marital status:      Spouse name: Not on file   • Number of children: Not on file   • Years of education: Not on file   • Highest education level: Not on file   Tobacco Use   • Smoking status: Former Smoker     Packs/day: 3.00     Years: 40.00     Pack years: 120.00     Types: Cigarettes     Quit date:      Years since quittin.0   • Smokeless tobacco: Former User     Quit date: 1986   Substance and Sexual Activity   • Alcohol use: No   • Drug use: No   • Sexual activity: Defer       Current Outpatient Medications:   •  amLODIPine (NORVASC) 5 MG tablet, Take 1 tablet by mouth Daily for 30 days. for blood pressure, Disp: 30 tablet, Rfl: 5  •  aspirin 81 MG tablet, Take 81 mg by mouth Daily., Disp: , Rfl:   •  atorvastatin (LIPITOR) 80 MG tablet, Take 1 tablet by mouth Every Night., Disp: 90 tablet, Rfl: 5  •  clopidogrel (PLAVIX) 75 MG tablet, Take 1 tablet by mouth Daily., Disp: 30 tablet, Rfl: 5  •  ferrous sulfate 325 (65 FE) MG tablet, Take 1 tablet by mouth 3 (Three) Times a Day With Meals., Disp: 90 tablet,  "Rfl: 4  •  finasteride (PROSCAR) 5 MG tablet, Take 1 tablet by mouth Daily., Disp: 9 tablet, Rfl: 3  •  isosorbide mononitrate (IMDUR) 60 MG 24 hr tablet, Take 1 tablet by mouth Every Morning., Disp: 30 tablet, Rfl: 11  •  lisinopril (PRINIVIL,ZESTRIL) 20 MG tablet, Take 1 tablet by mouth Daily., Disp: 30 tablet, Rfl: 5  •  loratadine (CLARITIN) 10 MG tablet, Take 10 mg by mouth Daily., Disp: , Rfl:   •  nitroglycerin (NITROSTAT) 0.4 MG SL tablet, 1 under the tongue as needed for angina, may repeat q5mins for up three doses, Disp: 25 tablet, Rfl: 2  •  pantoprazole (PROTONIX) 40 MG EC tablet, Take 40 mg by mouth Daily., Disp: , Rfl:   •  meloxicam (MOBIC) 7.5 MG tablet, Take 7.5 mg by mouth 2 (Two) Times a Day., Disp: , Rfl:     Allergies:   Patient has no known allergies.    Vitals:  /57   Pulse 60   Temp 97.8 °F (36.6 °C)   Ht 180.3 cm (71\")   Wt 74.7 kg (164 lb 9.6 oz)   SpO2 95%   BMI 22.96 kg/m²     Physical Exam  Vitals signs reviewed.   Constitutional:       General: He is not in acute distress.     Appearance: Normal appearance. He is well-developed. He is not ill-appearing or diaphoretic.   HENT:      Head: Normocephalic and atraumatic.      Right Ear: External ear normal.      Left Ear: External ear normal.      Nose: Nose normal.      Mouth/Throat:      Comments: Wearing a mask  Eyes:      General: No scleral icterus.        Right eye: No discharge.         Left eye: No discharge.      Conjunctiva/sclera: Conjunctivae normal.   Neck:      Musculoskeletal: Normal range of motion.      Vascular: No JVD.   Cardiovascular:      Rate and Rhythm: Normal rate and regular rhythm.      Heart sounds: Normal heart sounds. No murmur. No friction rub. No gallop.    Pulmonary:      Effort: Pulmonary effort is normal. No respiratory distress.      Breath sounds: Normal breath sounds. No wheezing or rales.   Chest:      Chest wall: No tenderness.   Abdominal:      General: Bowel sounds are normal. There is no " distension.      Palpations: Abdomen is soft. There is no mass.      Tenderness: There is abdominal tenderness (generalized, mild). There is no guarding.   Musculoskeletal: Normal range of motion.         General: No deformity.   Skin:     General: Skin is warm and dry.      Findings: No erythema or rash.   Neurological:      Mental Status: He is alert and oriented to person, place, and time.      Coordination: Coordination normal.   Psychiatric:         Mood and Affect: Mood normal.         Behavior: Behavior normal.         Thought Content: Thought content normal.         Judgment: Judgment normal.         Assessment:  1. Change in bowel habits    2. Chronic idiopathic constipation    3. Weight loss, abnormal    4. Fecal urgency    5. Bloating    6. Early satiety    7. Esophageal dysphagia    8. Gastroesophageal reflux disease, unspecified whether esophagitis present    9. Pre-operative clearance      Plan:  Orders Placed This Encounter   Procedures   • COVID PRE-OP / PRE-PROCEDURE SCREENING ORDER (NO ISOLATION) - Swab, Nasopharynx   • Follow Anesthesia Guidelines / Standing Orders   • Obtain Informed Consent     ESOPHAGOGASTRODUODENOSCOPY WITH DILATATION CPT CODE: 59387 (N/A)  He will need an esophagogastroduodenoscopy with possible dilatation of the esophagus performed with IV general sedation. All of the risks, benefits and alternatives of this procedure have been discussed with him, all of his questions have been answered and he has elected to proceed. He should follow up in the office after this procedure to discuss the results and further recommendations can be made at that time.    New Medications Ordered This Visit   Medications   • polyethylene glycol (MIRALAX) 17 GM/SCOOP powder     Sig: Take 17 g by mouth Daily. Mix with 8 oz liquid. Take 2 doses first day then 1 dose daily thereafter.     Dispense:  510 g     Refill:  5           Return in about 3 weeks (around 1/29/2021) for recheck  constipation.      Electronically signed 1/19/2021 09:26 EST  Hiral Fierro PA-C  [facility]

## 2021-01-08 NOTE — PATIENT INSTRUCTIONS
Fiber Foods  It is recommended that you consume 25-45 grams daily.    Fresh & Dried Fruit  Serving Size Fiber (g)    Apples with skin  1 medium 5.0    Apricot  3 medium 1.0    Apricots, dried  4 pieces 2.9    Banana  1 medium 3.9    Blueberries  1 cup 4.2    Cantaloupe, cubes  1 cup 1.3    Figs, dried  2 medium 3.7    Grapefruit  1/2 medium 3.1    Orange, navel  1 medium 3.4    Peach  1 medium 2.0    Peaches, dried  3 pieces 3.2    Pear  1 medium 5.1    Plum  1 medium 1.1    Raisins  1.5 oz box 1.6    Raspberries  1 cup 6.4    Strawberries  1 cup 4.4      Grains, Beans, Nuts & Seeds  Serving Size Fiber (g)    Almonds  1 oz 4.2    Black beans, cooked  1 cup 13.9    Bran cereal  1 cup 19.9    Bread, whole wheat  1 slice 2.0    Brown rice, dry  1 cup 7.9    Cashews  1 oz 1.0    Flax seeds  3 Tbsp. 6.9    Garbanzo beans, cooked  1 cup 5.8    Kidney beans, cooked  1 cup 11.6    Lentils, red cooked  1 cup 13.6    Lima beans, cooked  1 cup 8.6    Oats, rolled dry  1 cup 12.0    Quinoa (seeds) dry  1/4 cup 6.2    Quinoa, cooked  1 cup 8.4    Pasta, whole wheat  1 cup 6.3    Peanuts  1 oz 2.3    Pistachio nuts  1 oz 3.1    Pumpkin seeds  1/4 cup 4.1    Soybeans, cooked  1 cup 8.6    Sunflower seeds  1/4 cup 3.0    Walnuts  1 oz 3.1            Vegetables  Serving Size Fiber (g)    Avocado (fruit)  1 medium 11.8    Beets, cooked  1 cup 2.8    Beet greens  1 cup 4.2    Bok yash, cooked  1 cup 2.8    Broccoli, cooked  1 cup 4.5    Kimmell sprouts, cooked  1 cup 3.6    Cabbage, cooked  1 cup 4.2    Carrot  1 medium 2.6    Carrot, cooked  1 cup 5.2    Cauliflower, cooked  1 cup 3.4    Baron slaw  1 cup 4.0    Quintin greens, cooked  1 cup 2.6    Corn, sweet  1 cup 4.6    Green beans  1 cup 4.0    Celery  1 stalk 1.1    Kale, cooked  1 cup 7.2    Onions, raw  1 cup 2.9    Peas, cooked  1 cup 8.8    Peppers, sweet  1 cup 2.6    Pop corn, air-popped  3 cups 3.6    Potato, baked w/ skin  1 medium 4.8    Spinach, cooked  1 cup 4.3     Summer squash, cooked  1 cup 2.5    Sweet potato, cooked  1 medium 4.9    Swiss chard, cooked  1 cup 3.7    Tomato  1 medium 1.0    Winter squash, cooked  1 cup 6.2    Zucchini, cooked  1 cup 2.6

## 2021-01-12 ENCOUNTER — TELEPHONE (OUTPATIENT)
Dept: CARDIOLOGY | Facility: CLINIC | Age: 76
End: 2021-01-12

## 2021-01-12 NOTE — TELEPHONE ENCOUNTER
Called and left message to call the office.  Need to make patient aware that there is a telephone visit for him on 1/*18/2021 at 11:00 for him to talk about the EGD with Dilation.

## 2021-01-12 NOTE — TELEPHONE ENCOUNTER
Patients wife called back and we scheduled a telephone visit for a Friday since he is off. Surgical clearance paper is in the surgical clearance folder.

## 2021-01-26 ENCOUNTER — HOSPITAL ENCOUNTER (OUTPATIENT)
Facility: HOSPITAL | Age: 76
Setting detail: HOSPITAL OUTPATIENT SURGERY
End: 2021-01-26
Attending: INTERNAL MEDICINE | Admitting: INTERNAL MEDICINE

## 2021-01-26 PROBLEM — R13.19 ESOPHAGEAL DYSPHAGIA: Status: ACTIVE | Noted: 2021-01-26

## 2021-01-26 PROBLEM — K21.9 GASTROESOPHAGEAL REFLUX DISEASE: Status: ACTIVE | Noted: 2021-01-26

## 2021-01-26 PROBLEM — R68.81 EARLY SATIETY: Status: ACTIVE | Noted: 2021-01-26

## 2021-01-26 PROBLEM — R14.0 BLOATING: Status: ACTIVE | Noted: 2021-01-26

## 2021-02-12 DIAGNOSIS — Z23 IMMUNIZATION DUE: ICD-10-CM

## 2021-02-27 DIAGNOSIS — I25.119 CORONARY ARTERY DISEASE INVOLVING NATIVE CORONARY ARTERY OF NATIVE HEART WITH ANGINA PECTORIS (HCC): ICD-10-CM

## 2021-03-02 RX ORDER — AMLODIPINE BESYLATE 5 MG/1
TABLET ORAL
Qty: 30 TABLET | Refills: 5 | Status: SHIPPED | OUTPATIENT
Start: 2021-03-02 | End: 2021-06-05 | Stop reason: HOSPADM

## 2021-03-06 ENCOUNTER — HOSPITAL ENCOUNTER (EMERGENCY)
Facility: HOSPITAL | Age: 76
Discharge: HOME | End: 2021-03-06
Attending: STUDENT IN AN ORGANIZED HEALTH CARE EDUCATION/TRAINING PROGRAM | Admitting: STUDENT IN AN ORGANIZED HEALTH CARE EDUCATION/TRAINING PROGRAM

## 2021-03-06 ENCOUNTER — APPOINTMENT (OUTPATIENT)
Dept: CT IMAGING | Facility: HOSPITAL | Age: 76
End: 2021-03-06

## 2021-03-06 VITALS
OXYGEN SATURATION: 99 % | SYSTOLIC BLOOD PRESSURE: 125 MMHG | HEART RATE: 63 BPM | BODY MASS INDEX: 23.38 KG/M2 | HEIGHT: 71 IN | DIASTOLIC BLOOD PRESSURE: 55 MMHG | WEIGHT: 167 LBS | RESPIRATION RATE: 18 BRPM | TEMPERATURE: 97.5 F

## 2021-03-06 DIAGNOSIS — K52.9 GASTROENTERITIS: Primary | ICD-10-CM

## 2021-03-06 LAB
ALBUMIN SERPL-MCNC: 4.29 G/DL (ref 3.5–5.2)
ALBUMIN/GLOB SERPL: 1.3 G/DL
ALP SERPL-CCNC: 109 U/L (ref 39–117)
ALT SERPL W P-5'-P-CCNC: 21 U/L (ref 1–41)
ANION GAP SERPL CALCULATED.3IONS-SCNC: 11.6 MMOL/L (ref 5–15)
AST SERPL-CCNC: 28 U/L (ref 1–40)
BASOPHILS # BLD AUTO: 0.07 10*3/MM3 (ref 0–0.2)
BASOPHILS NFR BLD AUTO: 0.6 % (ref 0–1.5)
BILIRUB SERPL-MCNC: 0.7 MG/DL (ref 0–1.2)
BUN SERPL-MCNC: 20 MG/DL (ref 8–23)
BUN/CREAT SERPL: 27 (ref 7–25)
CALCIUM SPEC-SCNC: 9.6 MG/DL (ref 8.6–10.5)
CHLORIDE SERPL-SCNC: 108 MMOL/L (ref 98–107)
CK SERPL-CCNC: 124 U/L (ref 20–200)
CO2 SERPL-SCNC: 22.4 MMOL/L (ref 22–29)
CREAT SERPL-MCNC: 0.74 MG/DL (ref 0.76–1.27)
CRP SERPL-MCNC: <0.3 MG/DL (ref 0–0.5)
D-LACTATE SERPL-SCNC: 1.6 MMOL/L (ref 0.5–2)
DEPRECATED RDW RBC AUTO: 48.8 FL (ref 37–54)
EOSINOPHIL # BLD AUTO: 0.08 10*3/MM3 (ref 0–0.4)
EOSINOPHIL NFR BLD AUTO: 0.7 % (ref 0.3–6.2)
ERYTHROCYTE [DISTWIDTH] IN BLOOD BY AUTOMATED COUNT: 13.8 % (ref 12.3–15.4)
GFR SERPL CREATININE-BSD FRML MDRD: 103 ML/MIN/1.73
GLOBULIN UR ELPH-MCNC: 3.2 GM/DL
GLUCOSE SERPL-MCNC: 177 MG/DL (ref 65–99)
HCT VFR BLD AUTO: 38.5 % (ref 37.5–51)
HGB BLD-MCNC: 12.7 G/DL (ref 13–17.7)
HOLD SPECIMEN: NORMAL
HOLD SPECIMEN: NORMAL
IMM GRANULOCYTES # BLD AUTO: 0.06 10*3/MM3 (ref 0–0.05)
IMM GRANULOCYTES NFR BLD AUTO: 0.5 % (ref 0–0.5)
LIPASE SERPL-CCNC: 46 U/L (ref 13–60)
LYMPHOCYTES # BLD AUTO: 0.93 10*3/MM3 (ref 0.7–3.1)
LYMPHOCYTES NFR BLD AUTO: 8.4 % (ref 19.6–45.3)
MCH RBC QN AUTO: 31.6 PG (ref 26.6–33)
MCHC RBC AUTO-ENTMCNC: 33 G/DL (ref 31.5–35.7)
MCV RBC AUTO: 95.8 FL (ref 79–97)
MONOCYTES # BLD AUTO: 0.37 10*3/MM3 (ref 0.1–0.9)
MONOCYTES NFR BLD AUTO: 3.3 % (ref 5–12)
NEUTROPHILS NFR BLD AUTO: 86.5 % (ref 42.7–76)
NEUTROPHILS NFR BLD AUTO: 9.61 10*3/MM3 (ref 1.7–7)
NRBC BLD AUTO-RTO: 0 /100 WBC (ref 0–0.2)
NT-PROBNP SERPL-MCNC: 155.8 PG/ML (ref 0–900)
PLATELET # BLD AUTO: 213 10*3/MM3 (ref 140–450)
PMV BLD AUTO: 10.4 FL (ref 6–12)
POTASSIUM SERPL-SCNC: 3.5 MMOL/L (ref 3.5–5.2)
PROT SERPL-MCNC: 7.5 G/DL (ref 6–8.5)
RBC # BLD AUTO: 4.02 10*6/MM3 (ref 4.14–5.8)
SODIUM SERPL-SCNC: 142 MMOL/L (ref 136–145)
TROPONIN T SERPL-MCNC: <0.01 NG/ML (ref 0–0.03)
WBC # BLD AUTO: 11.12 10*3/MM3 (ref 3.4–10.8)
WHOLE BLOOD HOLD SPECIMEN: NORMAL
WHOLE BLOOD HOLD SPECIMEN: NORMAL

## 2021-03-06 PROCEDURE — 85025 COMPLETE CBC W/AUTO DIFF WBC: CPT | Performed by: PHYSICIAN ASSISTANT

## 2021-03-06 PROCEDURE — 83605 ASSAY OF LACTIC ACID: CPT | Performed by: PHYSICIAN ASSISTANT

## 2021-03-06 PROCEDURE — 25010000002 IOPAMIDOL 61 % SOLUTION: Performed by: STUDENT IN AN ORGANIZED HEALTH CARE EDUCATION/TRAINING PROGRAM

## 2021-03-06 PROCEDURE — 96374 THER/PROPH/DIAG INJ IV PUSH: CPT

## 2021-03-06 PROCEDURE — 74177 CT ABD & PELVIS W/CONTRAST: CPT | Performed by: RADIOLOGY

## 2021-03-06 PROCEDURE — 93010 ELECTROCARDIOGRAM REPORT: CPT | Performed by: INTERNAL MEDICINE

## 2021-03-06 PROCEDURE — 83690 ASSAY OF LIPASE: CPT | Performed by: PHYSICIAN ASSISTANT

## 2021-03-06 PROCEDURE — 93005 ELECTROCARDIOGRAM TRACING: CPT | Performed by: STUDENT IN AN ORGANIZED HEALTH CARE EDUCATION/TRAINING PROGRAM

## 2021-03-06 PROCEDURE — 80053 COMPREHEN METABOLIC PANEL: CPT | Performed by: PHYSICIAN ASSISTANT

## 2021-03-06 PROCEDURE — 99283 EMERGENCY DEPT VISIT LOW MDM: CPT

## 2021-03-06 PROCEDURE — 83880 ASSAY OF NATRIURETIC PEPTIDE: CPT | Performed by: PHYSICIAN ASSISTANT

## 2021-03-06 PROCEDURE — 82550 ASSAY OF CK (CPK): CPT | Performed by: PHYSICIAN ASSISTANT

## 2021-03-06 PROCEDURE — 93005 ELECTROCARDIOGRAM TRACING: CPT | Performed by: PHYSICIAN ASSISTANT

## 2021-03-06 PROCEDURE — 87040 BLOOD CULTURE FOR BACTERIA: CPT | Performed by: PHYSICIAN ASSISTANT

## 2021-03-06 PROCEDURE — 84484 ASSAY OF TROPONIN QUANT: CPT | Performed by: PHYSICIAN ASSISTANT

## 2021-03-06 PROCEDURE — 74177 CT ABD & PELVIS W/CONTRAST: CPT

## 2021-03-06 PROCEDURE — 25010000002 ONDANSETRON PER 1 MG: Performed by: PHYSICIAN ASSISTANT

## 2021-03-06 PROCEDURE — 96375 TX/PRO/DX INJ NEW DRUG ADDON: CPT

## 2021-03-06 PROCEDURE — 86140 C-REACTIVE PROTEIN: CPT | Performed by: PHYSICIAN ASSISTANT

## 2021-03-06 RX ORDER — FAMOTIDINE 10 MG/ML
20 INJECTION, SOLUTION INTRAVENOUS ONCE
Status: COMPLETED | OUTPATIENT
Start: 2021-03-06 | End: 2021-03-06

## 2021-03-06 RX ORDER — SODIUM CHLORIDE 0.9 % (FLUSH) 0.9 %
10 SYRINGE (ML) INJECTION AS NEEDED
Status: DISCONTINUED | OUTPATIENT
Start: 2021-03-06 | End: 2021-03-06 | Stop reason: HOSPADM

## 2021-03-06 RX ORDER — ONDANSETRON 4 MG/1
4 TABLET, ORALLY DISINTEGRATING ORAL EVERY 6 HOURS PRN
Qty: 20 TABLET | Refills: 0 | Status: SHIPPED | OUTPATIENT
Start: 2021-03-06 | End: 2021-03-29

## 2021-03-06 RX ORDER — ONDANSETRON 2 MG/ML
4 INJECTION INTRAMUSCULAR; INTRAVENOUS ONCE
Status: COMPLETED | OUTPATIENT
Start: 2021-03-06 | End: 2021-03-06

## 2021-03-06 RX ADMIN — IOPAMIDOL 85 ML: 612 INJECTION, SOLUTION INTRAVENOUS at 13:32

## 2021-03-06 RX ADMIN — SODIUM CHLORIDE 500 ML: 9 INJECTION, SOLUTION INTRAVENOUS at 12:20

## 2021-03-06 RX ADMIN — FAMOTIDINE 20 MG: 10 INJECTION INTRAVENOUS at 12:21

## 2021-03-06 RX ADMIN — ONDANSETRON 4 MG: 2 INJECTION INTRAMUSCULAR; INTRAVENOUS at 12:21

## 2021-03-06 NOTE — ED NOTES
"Pt gagging and spitting into garbage can he is holding while in w[place], pt denies any pain during this, states his stomach has \"just not felt well and the vomiting\" family member states his stomach didn't feel well yesterday and he didn't eat much yesterday, but has denied pain     Germaine Osorio, RN  03/06/21 1705    "

## 2021-03-06 NOTE — ED PROVIDER NOTES
Subjective     History provided by:  Patient  Abdominal Pain  Pain location:  Epigastric  Pain quality: gnawing    Pain radiates to:  Does not radiate  Pain severity:  Mild  Onset quality:  Sudden  Duration:  2 days  Timing:  Constant  Progression:  Worsening  Chronicity:  New  Context: eating (Started after eating [employer] yesterday around noon)    Relieved by:  Nothing  Ineffective treatments:  None tried  Associated symptoms: nausea and vomiting (multiple episodes. )    Associated symptoms: no chest pain, no dysuria and no fever        Review of Systems   Constitutional: Negative.  Negative for fever.   HENT: Negative.    Respiratory: Negative.    Cardiovascular: Negative.  Negative for chest pain.   Gastrointestinal: Positive for abdominal pain, nausea and vomiting (multiple episodes. ).   Endocrine: Negative.    Genitourinary: Negative.  Negative for dysuria.   Skin: Negative.    Neurological: Negative.    Psychiatric/Behavioral: Negative.    All other systems reviewed and are negative.      Past Medical History:   Diagnosis Date   • BPH (benign prostatic hyperplasia)    • Bradycardia     Positive tilt table testing 07/2016   • Coronary artery disease    • Diabetes mellitus (CMS/HCC)    • Diverticulosis    • Gastric ulcer with hemorrhage    • Gastroesophageal reflux disease 1/26/2021   • History of transfusion    • Hyperlipidemia    • Hypertension    • Psoriasis        No Known Allergies    Past Surgical History:   Procedure Laterality Date   • BACK SURGERY     • CARDIAC CATHETERIZATION N/A 9/20/2016    Procedure: Left Heart Cath;  Surgeon: Giovanni Buenrostro MD;  Location: [facility] INVASIVE LOCATION;  Service:    • CARDIAC CATHETERIZATION N/A 10/4/2016    Procedure: Left Heart Cath;  Surgeon: Bharathi Andrew MD;  Location: [facility] INVASIVE LOCATION;  Service:    • CARDIAC CATHETERIZATION N/A 5/9/2017    Procedure: Left Heart Cath;  Surgeon: Giovanni Buenrostro MD;  Location: [facility] INVASIVE LOCATION;   Service:    • CARDIAC CATHETERIZATION N/A 5/9/2017    Procedure: ERR;  Surgeon: Giovanni Buenrostro MD;  Location:  [facility];  Service:    • CARDIAC CATHETERIZATION N/A 11/8/2019    Procedure: Left Heart Cath;  Surgeon: Abraham Oviedo MD;  Location:  [facility];  Service: Cardiology   • CARDIAC CATHETERIZATION N/A 12/18/2019    Procedure: Coronary angiography;  Surgeon: Jay Barney IV, MD;  Location:  [facility];  Service: Cardiovascular   • CHOLECYSTECTOMY     • COLONOSCOPY  11/13/2015    Dr. Martinez- internal hemorrhoids, sigmoid diverticulosis   • COLONOSCOPY N/A 8/17/2018    Procedure: COLONOSCOPY CPT CODE: 74688;  Surgeon: Gigi Geronimo MD;  Location: [facility] OR;  Service: Gastroenterology   • CORONARY ANGIOPLASTY WITH STENT PLACEMENT     • ENDOSCOPY N/A 8/17/2018    Procedure: ESOPHAGOGASTRODUODENOSCOPY WITH BIOPSY CPT CODE: 62682;  Surgeon: Gigi Geronimo MD;  Location: [facility] OR;  Service: Gastroenterology   • INTERVENTIONAL RADIOLOGY PROCEDURE N/A 12/18/2019    Procedure: Coil Embolization of septal to pulmonary artery fistuala.;  Surgeon: Jay Barney IV, MD;  Location:  [facility];  Service: Cardiovascular   • NV RT/LT HEART CATHETERS N/A 5/9/2017    Procedure: Percutaneous Coronary Intervention;  Surgeon: Lincoln De Los Santos MD;  Location:  [facility];  Service: Cardiology   • STOMACH SURGERY      FUSION OF BLEEDING ULCER   • UPPER GASTROINTESTINAL ENDOSCOPY  11/13/2015    Dr. Martinez- mild gastritis       Family History   Problem Relation Age of Onset   • Sick sinus syndrome Mother    • Heart failure Mother    • Diabetes Mother    • Liver cancer Father        Social History     Socioeconomic History   • Marital status:      Spouse name: Not on file   • Number of children: Not on file   • Years of education: Not on file   • Highest education level: Not on file   Tobacco Use   •  Smoking status: Former Smoker     Packs/day: 3.00     Years: 40.00     Pack years: 120.00     Types: Cigarettes     Quit date:      Years since quittin.2   • Smokeless tobacco: Former User     Quit date: 1986   Substance and Sexual Activity   • Alcohol use: No   • Drug use: No   • Sexual activity: Defer           Objective   Physical Exam  Vitals and nursing note reviewed.   Constitutional:       General: He is not in acute distress.     Appearance: He is well-developed. He is not diaphoretic.   HENT:      Head: Normocephalic and atraumatic.      Right Ear: External ear normal.      Left Ear: External ear normal.      Nose: Nose normal.   Eyes:      Conjunctiva/sclera: Conjunctivae normal.   Neck:      Vascular: No JVD.      Trachea: No tracheal deviation.   Cardiovascular:      Rate and Rhythm: Normal rate and regular rhythm.      Heart sounds: Normal heart sounds. No murmur.   Pulmonary:      Effort: Pulmonary effort is normal. No respiratory distress.      Breath sounds: Normal breath sounds. No wheezing.   Abdominal:      Palpations: Abdomen is soft.      Tenderness: There is no abdominal tenderness.      Comments: Bowel sounds on exam were diminished   Musculoskeletal:         General: No deformity. Normal range of motion.      Cervical back: Normal range of motion and neck supple.   Skin:     General: Skin is warm and dry.      Coloration: Skin is not pale.      Findings: No erythema or rash.   Neurological:      Mental Status: He is alert and oriented to person, place, and time.      Cranial Nerves: No cranial nerve deficit.   Psychiatric:         Behavior: Behavior normal.         Thought Content: Thought content normal.         Procedures           ED Course  ED Course as of Mar 06 1402   Sat Mar 06, 2021   1248 EKG interpretation, ventricular rate 62, , QRS 86, QTc 472, sinus rhythm with first-degree AV block.  No acute ST elevation.  EKG appears normal.    [JM]   1358 CT rad  interpreted:  1. No definitive source for the patient's symptoms identified on today's  exam.        2.Arthritic change in spine     3. Nonobstructing left intrarenal stone  4. Other findings as above    [RB]      ED Course User Index  [JM] Armando Arauz DO  [RB] Tal Santos II, PA                                           MDM  Number of Diagnoses or Management Options  Gastroenteritis: new and requires workup     Amount and/or Complexity of Data Reviewed  Clinical lab tests: ordered and reviewed  Tests in the radiology section of CPT®: ordered and reviewed    Risk of Complications, Morbidity, and/or Mortality  Presenting problems: moderate  Diagnostic procedures: moderate  Management options: low    Patient Progress  Patient progress: stable      Final diagnoses:   Gastroenteritis            Tal Santos II, PA  03/06/21 1406

## 2021-03-07 ENCOUNTER — HOSPITAL ENCOUNTER (EMERGENCY)
Facility: HOSPITAL | Age: 76
Discharge: HOME | End: 2021-03-08
Attending: STUDENT IN AN ORGANIZED HEALTH CARE EDUCATION/TRAINING PROGRAM | Admitting: FAMILY MEDICINE

## 2021-03-07 DIAGNOSIS — K52.9 GASTROENTERITIS: Primary | ICD-10-CM

## 2021-03-07 LAB
ALBUMIN SERPL-MCNC: 4.03 G/DL (ref 3.5–5.2)
ALBUMIN/GLOB SERPL: 1.5 G/DL
ALP SERPL-CCNC: 98 U/L (ref 39–117)
ALT SERPL W P-5'-P-CCNC: 21 U/L (ref 1–41)
AMYLASE SERPL-CCNC: 44 U/L (ref 28–100)
ANION GAP SERPL CALCULATED.3IONS-SCNC: 10.6 MMOL/L (ref 5–15)
AST SERPL-CCNC: 26 U/L (ref 1–40)
BACTERIA UR QL AUTO: NORMAL /HPF
BASOPHILS # BLD AUTO: 0.05 10*3/MM3 (ref 0–0.2)
BASOPHILS NFR BLD AUTO: 0.4 % (ref 0–1.5)
BILIRUB SERPL-MCNC: 0.4 MG/DL (ref 0–1.2)
BILIRUB UR QL STRIP: NEGATIVE
BUN SERPL-MCNC: 16 MG/DL (ref 8–23)
BUN/CREAT SERPL: 24.2 (ref 7–25)
CALCIUM SPEC-SCNC: 8.9 MG/DL (ref 8.6–10.5)
CHLORIDE SERPL-SCNC: 110 MMOL/L (ref 98–107)
CLARITY UR: CLEAR
CO2 SERPL-SCNC: 22.4 MMOL/L (ref 22–29)
COLOR UR: YELLOW
CREAT SERPL-MCNC: 0.66 MG/DL (ref 0.76–1.27)
DEPRECATED RDW RBC AUTO: 49.3 FL (ref 37–54)
EOSINOPHIL # BLD AUTO: 0 10*3/MM3 (ref 0–0.4)
EOSINOPHIL NFR BLD AUTO: 0 % (ref 0.3–6.2)
ERYTHROCYTE [DISTWIDTH] IN BLOOD BY AUTOMATED COUNT: 13.8 % (ref 12.3–15.4)
GFR SERPL CREATININE-BSD FRML MDRD: 118 ML/MIN/1.73
GLOBULIN UR ELPH-MCNC: 2.8 GM/DL
GLUCOSE SERPL-MCNC: 146 MG/DL (ref 65–99)
GLUCOSE UR STRIP-MCNC: NEGATIVE MG/DL
HCT VFR BLD AUTO: 38.3 % (ref 37.5–51)
HGB BLD-MCNC: 12.7 G/DL (ref 13–17.7)
HGB UR QL STRIP.AUTO: NEGATIVE
HYALINE CASTS UR QL AUTO: NORMAL /LPF
IMM GRANULOCYTES # BLD AUTO: 0.03 10*3/MM3 (ref 0–0.05)
IMM GRANULOCYTES NFR BLD AUTO: 0.3 % (ref 0–0.5)
KETONES UR QL STRIP: NEGATIVE
LEUKOCYTE ESTERASE UR QL STRIP.AUTO: NEGATIVE
LIPASE SERPL-CCNC: 13 U/L (ref 13–60)
LYMPHOCYTES # BLD AUTO: 0.84 10*3/MM3 (ref 0.7–3.1)
LYMPHOCYTES NFR BLD AUTO: 7.3 % (ref 19.6–45.3)
MCH RBC QN AUTO: 32 PG (ref 26.6–33)
MCHC RBC AUTO-ENTMCNC: 33.2 G/DL (ref 31.5–35.7)
MCV RBC AUTO: 96.5 FL (ref 79–97)
MONOCYTES # BLD AUTO: 0.62 10*3/MM3 (ref 0.1–0.9)
MONOCYTES NFR BLD AUTO: 5.4 % (ref 5–12)
NEUTROPHILS NFR BLD AUTO: 10.04 10*3/MM3 (ref 1.7–7)
NEUTROPHILS NFR BLD AUTO: 86.6 % (ref 42.7–76)
NITRITE UR QL STRIP: NEGATIVE
NRBC BLD AUTO-RTO: 0 /100 WBC (ref 0–0.2)
PH UR STRIP.AUTO: 6 [PH] (ref 5–8)
PLATELET # BLD AUTO: 230 10*3/MM3 (ref 140–450)
PMV BLD AUTO: 9.8 FL (ref 6–12)
POTASSIUM SERPL-SCNC: 3.5 MMOL/L (ref 3.5–5.2)
PROT SERPL-MCNC: 6.8 G/DL (ref 6–8.5)
PROT UR QL STRIP: ABNORMAL
QT INTERVAL: 440 MS
QT INTERVAL: 466 MS
QTC INTERVAL: 467 MS
QTC INTERVAL: 472 MS
RBC # BLD AUTO: 3.97 10*6/MM3 (ref 4.14–5.8)
RBC # UR: NORMAL /HPF
REF LAB TEST METHOD: NORMAL
SODIUM SERPL-SCNC: 143 MMOL/L (ref 136–145)
SP GR UR STRIP: 1.01 (ref 1–1.03)
SQUAMOUS #/AREA URNS HPF: NORMAL /HPF
UROBILINOGEN UR QL STRIP: ABNORMAL
WBC # BLD AUTO: 11.58 10*3/MM3 (ref 3.4–10.8)
WBC UR QL AUTO: NORMAL /HPF

## 2021-03-07 PROCEDURE — 83690 ASSAY OF LIPASE: CPT | Performed by: NURSE PRACTITIONER

## 2021-03-07 PROCEDURE — 96361 HYDRATE IV INFUSION ADD-ON: CPT

## 2021-03-07 PROCEDURE — 82150 ASSAY OF AMYLASE: CPT | Performed by: NURSE PRACTITIONER

## 2021-03-07 PROCEDURE — 85025 COMPLETE CBC W/AUTO DIFF WBC: CPT | Performed by: NURSE PRACTITIONER

## 2021-03-07 PROCEDURE — 36415 COLL VENOUS BLD VENIPUNCTURE: CPT

## 2021-03-07 PROCEDURE — 99283 EMERGENCY DEPT VISIT LOW MDM: CPT

## 2021-03-07 PROCEDURE — 96374 THER/PROPH/DIAG INJ IV PUSH: CPT

## 2021-03-07 PROCEDURE — 80053 COMPREHEN METABOLIC PANEL: CPT | Performed by: NURSE PRACTITIONER

## 2021-03-07 PROCEDURE — 25010000002 ONDANSETRON PER 1 MG: Performed by: FAMILY MEDICINE

## 2021-03-07 PROCEDURE — 81001 URINALYSIS AUTO W/SCOPE: CPT | Performed by: FAMILY MEDICINE

## 2021-03-07 RX ORDER — SODIUM CHLORIDE 0.9 % (FLUSH) 0.9 %
10 SYRINGE (ML) INJECTION AS NEEDED
Status: DISCONTINUED | OUTPATIENT
Start: 2021-03-07 | End: 2021-03-08 | Stop reason: HOSPADM

## 2021-03-07 RX ORDER — ONDANSETRON 2 MG/ML
4 INJECTION INTRAMUSCULAR; INTRAVENOUS ONCE
Status: COMPLETED | OUTPATIENT
Start: 2021-03-07 | End: 2021-03-07

## 2021-03-07 RX ORDER — PROMETHAZINE HYDROCHLORIDE 25 MG/1
25 SUPPOSITORY RECTAL EVERY 6 HOURS PRN
Qty: 4 EACH | Refills: 0 | Status: SHIPPED | OUTPATIENT
Start: 2021-03-07 | End: 2021-03-29

## 2021-03-07 RX ADMIN — SODIUM CHLORIDE 1000 ML: 9 INJECTION, SOLUTION INTRAVENOUS at 19:28

## 2021-03-07 RX ADMIN — ONDANSETRON 4 MG: 2 INJECTION INTRAMUSCULAR; INTRAVENOUS at 22:56

## 2021-03-08 VITALS
BODY MASS INDEX: 22.4 KG/M2 | SYSTOLIC BLOOD PRESSURE: 149 MMHG | DIASTOLIC BLOOD PRESSURE: 63 MMHG | HEIGHT: 71 IN | OXYGEN SATURATION: 100 % | RESPIRATION RATE: 16 BRPM | HEART RATE: 57 BPM | WEIGHT: 160 LBS | TEMPERATURE: 98.5 F

## 2021-03-08 NOTE — ED NOTES
Pt tolerated PO challenge. Denies nausea or any episodes of vomiting.      Raquel Ambriz RN  03/08/21 0516

## 2021-03-08 NOTE — ED PROVIDER NOTES
Subjective   Patient is a 75-year-old white male who presents to the emergency room today with complaints of nausea, vomiting, and diarrhea.  Patient reports he states the symptoms for about 3 days.  Patient was seen in the ER yesterday and had work-up.  Patient reports that he was prescribed Zofran and has been able to keep unable to keep it down.  Patient denies fever.  Patient denies any chest pain or shortness of breath.      Vomiting  The primary symptoms include vomiting. The illness began 3 to 5 days ago. The onset was sudden.   The illness does not include chills, anorexia, odynophagia or constipation. Associated medical issues do not include inflammatory bowel disease, GERD, alcohol abuse, PUD or bowel resection. Risk factors: Elderly.       Review of Systems   Constitutional: Negative.  Negative for chills.   HENT: Negative.    Eyes: Negative.    Respiratory: Negative.    Cardiovascular: Negative.    Gastrointestinal: Positive for vomiting. Negative for anorexia and constipation.   Endocrine: Negative.    Genitourinary: Negative.    Musculoskeletal: Negative.    Skin: Negative.    Allergic/Immunologic: Negative.    Neurological: Negative.    Hematological: Negative.    Psychiatric/Behavioral: Negative.        Past Medical History:   Diagnosis Date   • BPH (benign prostatic hyperplasia)    • Bradycardia     Positive tilt table testing 07/2016   • Coronary artery disease    • Diabetes mellitus (CMS/HCC)    • Diverticulosis    • Gastric ulcer with hemorrhage    • Gastroesophageal reflux disease 1/26/2021   • History of transfusion    • Hyperlipidemia    • Hypertension    • Psoriasis        No Known Allergies    Past Surgical History:   Procedure Laterality Date   • BACK SURGERY     • CARDIAC CATHETERIZATION N/A 9/20/2016    Procedure: Left Heart Cath;  Surgeon: Giovanni Buenrostro MD;  Location: [facility] INVASIVE LOCATION;  Service:    • CARDIAC CATHETERIZATION N/A 10/4/2016    Procedure: Left Heart Cath;   Surgeon: Bharathi Andrew MD;  Location:  [facility];  Service:    • CARDIAC CATHETERIZATION N/A 5/9/2017    Procedure: Left Heart Cath;  Surgeon: Giovanni Buenrostro MD;  Location:  [facility];  Service:    • CARDIAC CATHETERIZATION N/A 5/9/2017    Procedure: ERR;  Surgeon: Giovanni Buenrostro MD;  Location:  [facility];  Service:    • CARDIAC CATHETERIZATION N/A 11/8/2019    Procedure: Left Heart Cath;  Surgeon: Abraham Oviedo MD;  Location:  [facility];  Service: Cardiology   • CARDIAC CATHETERIZATION N/A 12/18/2019    Procedure: Coronary angiography;  Surgeon: Jay Barney IV, MD;  Location:  [facility];  Service: Cardiovascular   • CHOLECYSTECTOMY     • COLONOSCOPY  11/13/2015    Dr. Martinez- internal hemorrhoids, sigmoid diverticulosis   • COLONOSCOPY N/A 8/17/2018    Procedure: COLONOSCOPY CPT CODE: 64688;  Surgeon: Gigi Geronimo MD;  Location: [facility] OR;  Service: Gastroenterology   • CORONARY ANGIOPLASTY WITH STENT PLACEMENT     • ENDOSCOPY N/A 8/17/2018    Procedure: ESOPHAGOGASTRODUODENOSCOPY WITH BIOPSY CPT CODE: 56086;  Surgeon: Gigi Geronimo MD;  Location: [facility] OR;  Service: Gastroenterology   • INTERVENTIONAL RADIOLOGY PROCEDURE N/A 12/18/2019    Procedure: Coil Embolization of septal to pulmonary artery fistuala.;  Surgeon: Jay Barney IV, MD;  Location:  [facility];  Service: Cardiovascular   • [place] RT/LT HEART CATHETERS N/A 5/9/2017    Procedure: Percutaneous Coronary Intervention;  Surgeon: Lincoln De Los Santos MD;  Location:  [facility];  Service: Cardiology   • STOMACH SURGERY      FUSION OF BLEEDING ULCER   • UPPER GASTROINTESTINAL ENDOSCOPY  11/13/2015    Dr. Martinez- mild gastritis       Family History   Problem Relation Age of Onset   • Sick sinus syndrome Mother    • Heart failure Mother    • Diabetes Mother    • Liver cancer Father         Social History     Socioeconomic History   • Marital status:      Spouse name: Not on file   • Number of children: Not on file   • Years of education: Not on file   • Highest education level: Not on file   Tobacco Use   • Smoking status: Former Smoker     Packs/day: 3.00     Years: 40.00     Pack years: 120.00     Types: Cigarettes     Quit date:      Years since quittin.2   • Smokeless tobacco: Former User     Quit date: 1986   Substance and Sexual Activity   • Alcohol use: No   • Drug use: No   • Sexual activity: Defer           Objective   Physical Exam  Vitals and nursing note reviewed.   Constitutional:       Appearance: He is well-developed.   HENT:      Head: Normocephalic.      Right Ear: External ear normal.      Left Ear: External ear normal.   Eyes:      Conjunctiva/sclera: Conjunctivae normal.      Pupils: Pupils are equal, round, and reactive to light.   Cardiovascular:      Rate and Rhythm: Normal rate and regular rhythm.      Heart sounds: Normal heart sounds.   Pulmonary:      Effort: Pulmonary effort is normal.      Breath sounds: Normal breath sounds.   Abdominal:      General: Bowel sounds are normal.      Palpations: Abdomen is soft.   Musculoskeletal:         General: Normal range of motion.      Cervical back: Normal range of motion and neck supple.   Skin:     General: Skin is warm and dry.      Capillary Refill: Capillary refill takes less than 2 seconds.   Neurological:      Mental Status: He is alert and oriented to person, place, and time.   Psychiatric:         Behavior: Behavior normal.         Thought Content: Thought content normal.         Procedures           ED Course  ED Course as of Mar 08 0000   Sun Mar 07, 2021   2030 Endorsed to Dr. Mueller.    [GWEN]   4860 Assumed care at shift change.  Patient remains hemodynamically stable is able to tolerate p.o. intake is agreeable for discharge we will follow up outpatient.    [MH]      ED Course User Index  [GWEN]  Sander Santos, APRN  [] Ailyn Mueller,                                            MDM  Number of Diagnoses or Management Options  Gastroenteritis: new and requires workup     Amount and/or Complexity of Data Reviewed  Clinical lab tests: ordered and reviewed  Tests in the radiology section of CPT®: ordered and reviewed  Tests in the medicine section of CPT®: reviewed and ordered  Independent visualization of images, tracings, or specimens: yes    Risk of Complications, Morbidity, and/or Mortality  Presenting problems: high  Diagnostic procedures: moderate  Management options: moderate    Patient Progress  Patient progress: improved      Final diagnoses:   Gastroenteritis            Ailyn Mueller DO  03/08/21 0000

## 2021-03-08 NOTE — ED NOTES
Pt states he can't have a bowel movement at this time because he hasn't eaten anything since Friday and only throws up or has diarrhea when he ingests food.      Raquel Ambriz RN  03/07/21 2124       Raquel Ambriz RN  03/07/21 2125

## 2021-03-08 NOTE — ED NOTES
Lab called to inform the stool sample sent down was not enough. Pt informed we needed more if he could.      Raquel Ambriz, RN  03/08/21 0524

## 2021-03-09 ENCOUNTER — HOSPITAL ENCOUNTER (EMERGENCY)
Facility: HOSPITAL | Age: 76
Discharge: HOME | End: 2021-03-09
Attending: EMERGENCY MEDICINE | Admitting: EMERGENCY MEDICINE

## 2021-03-09 VITALS
HEART RATE: 56 BPM | BODY MASS INDEX: 21.98 KG/M2 | DIASTOLIC BLOOD PRESSURE: 57 MMHG | SYSTOLIC BLOOD PRESSURE: 146 MMHG | OXYGEN SATURATION: 96 % | TEMPERATURE: 98.5 F | RESPIRATION RATE: 20 BRPM | HEIGHT: 71 IN | WEIGHT: 157 LBS

## 2021-03-09 DIAGNOSIS — U07.1 COVID-19: ICD-10-CM

## 2021-03-09 DIAGNOSIS — K52.9 GASTROENTERITIS: Primary | ICD-10-CM

## 2021-03-09 LAB
ALBUMIN SERPL-MCNC: 3.76 G/DL (ref 3.5–5.2)
ALBUMIN/GLOB SERPL: 1.5 G/DL
ALP SERPL-CCNC: 94 U/L (ref 39–117)
ALT SERPL W P-5'-P-CCNC: 37 U/L (ref 1–41)
AMYLASE SERPL-CCNC: 42 U/L (ref 28–100)
ANION GAP SERPL CALCULATED.3IONS-SCNC: 11.4 MMOL/L (ref 5–15)
APTT PPP: 27.7 SECONDS (ref 25.6–35.3)
AST SERPL-CCNC: 34 U/L (ref 1–40)
BASOPHILS # BLD AUTO: 0.04 10*3/MM3 (ref 0–0.2)
BASOPHILS NFR BLD AUTO: 0.4 % (ref 0–1.5)
BILIRUB SERPL-MCNC: 0.6 MG/DL (ref 0–1.2)
BUN SERPL-MCNC: 16 MG/DL (ref 8–23)
BUN/CREAT SERPL: 26.2 (ref 7–25)
CALCIUM SPEC-SCNC: 8.7 MG/DL (ref 8.6–10.5)
CHLORIDE SERPL-SCNC: 108 MMOL/L (ref 98–107)
CK SERPL-CCNC: 93 U/L (ref 20–200)
CO2 SERPL-SCNC: 24.6 MMOL/L (ref 22–29)
CREAT SERPL-MCNC: 0.61 MG/DL (ref 0.76–1.27)
CRP SERPL-MCNC: <0.3 MG/DL (ref 0–0.5)
DEPRECATED RDW RBC AUTO: 47.1 FL (ref 37–54)
EOSINOPHIL # BLD AUTO: 0 10*3/MM3 (ref 0–0.4)
EOSINOPHIL NFR BLD AUTO: 0 % (ref 0.3–6.2)
ERYTHROCYTE [DISTWIDTH] IN BLOOD BY AUTOMATED COUNT: 13.3 % (ref 12.3–15.4)
ERYTHROCYTE [SEDIMENTATION RATE] IN BLOOD: 10 MM/HR (ref 0–20)
GFR SERPL CREATININE-BSD FRML MDRD: 129 ML/MIN/1.73
GLOBULIN UR ELPH-MCNC: 2.5 GM/DL
GLUCOSE SERPL-MCNC: 139 MG/DL (ref 65–99)
HCT VFR BLD AUTO: 34.9 % (ref 37.5–51)
HGB BLD-MCNC: 11.8 G/DL (ref 13–17.7)
HOLD SPECIMEN: NORMAL
HOLD SPECIMEN: NORMAL
IMM GRANULOCYTES # BLD AUTO: 0.03 10*3/MM3 (ref 0–0.05)
IMM GRANULOCYTES NFR BLD AUTO: 0.3 % (ref 0–0.5)
INR PPP: 1.1 (ref 0.9–1.1)
LIPASE SERPL-CCNC: 16 U/L (ref 13–60)
LYMPHOCYTES # BLD AUTO: 0.83 10*3/MM3 (ref 0.7–3.1)
LYMPHOCYTES NFR BLD AUTO: 7.4 % (ref 19.6–45.3)
MAGNESIUM SERPL-MCNC: 1.9 MG/DL (ref 1.6–2.4)
MCH RBC QN AUTO: 32.4 PG (ref 26.6–33)
MCHC RBC AUTO-ENTMCNC: 33.8 G/DL (ref 31.5–35.7)
MCV RBC AUTO: 95.9 FL (ref 79–97)
MONOCYTES # BLD AUTO: 0.41 10*3/MM3 (ref 0.1–0.9)
MONOCYTES NFR BLD AUTO: 3.7 % (ref 5–12)
NEUTROPHILS NFR BLD AUTO: 88.2 % (ref 42.7–76)
NEUTROPHILS NFR BLD AUTO: 9.86 10*3/MM3 (ref 1.7–7)
NRBC BLD AUTO-RTO: 0 /100 WBC (ref 0–0.2)
PLATELET # BLD AUTO: 208 10*3/MM3 (ref 140–450)
PMV BLD AUTO: 10.2 FL (ref 6–12)
POTASSIUM SERPL-SCNC: 3.6 MMOL/L (ref 3.5–5.2)
PROT SERPL-MCNC: 6.3 G/DL (ref 6–8.5)
PROTHROMBIN TIME: 14 SECONDS (ref 11.9–14.1)
RBC # BLD AUTO: 3.64 10*6/MM3 (ref 4.14–5.8)
SODIUM SERPL-SCNC: 144 MMOL/L (ref 136–145)
T4 FREE SERPL-MCNC: 1.21 NG/DL (ref 0.93–1.7)
TROPONIN T SERPL-MCNC: <0.01 NG/ML (ref 0–0.03)
TSH SERPL DL<=0.05 MIU/L-ACNC: 1.13 UIU/ML (ref 0.27–4.2)
WBC # BLD AUTO: 11.17 10*3/MM3 (ref 3.4–10.8)
WHOLE BLOOD HOLD SPECIMEN: NORMAL
WHOLE BLOOD HOLD SPECIMEN: NORMAL

## 2021-03-09 PROCEDURE — 85730 THROMBOPLASTIN TIME PARTIAL: CPT | Performed by: EMERGENCY MEDICINE

## 2021-03-09 PROCEDURE — 84439 ASSAY OF FREE THYROXINE: CPT | Performed by: EMERGENCY MEDICINE

## 2021-03-09 PROCEDURE — 25010000002 ONDANSETRON PER 1 MG: Performed by: EMERGENCY MEDICINE

## 2021-03-09 PROCEDURE — 85652 RBC SED RATE AUTOMATED: CPT | Performed by: EMERGENCY MEDICINE

## 2021-03-09 PROCEDURE — 85025 COMPLETE CBC W/AUTO DIFF WBC: CPT | Performed by: EMERGENCY MEDICINE

## 2021-03-09 PROCEDURE — 84484 ASSAY OF TROPONIN QUANT: CPT | Performed by: EMERGENCY MEDICINE

## 2021-03-09 PROCEDURE — 82150 ASSAY OF AMYLASE: CPT | Performed by: EMERGENCY MEDICINE

## 2021-03-09 PROCEDURE — 99283 EMERGENCY DEPT VISIT LOW MDM: CPT

## 2021-03-09 PROCEDURE — 96374 THER/PROPH/DIAG INJ IV PUSH: CPT

## 2021-03-09 PROCEDURE — 80053 COMPREHEN METABOLIC PANEL: CPT | Performed by: EMERGENCY MEDICINE

## 2021-03-09 PROCEDURE — 87636 SARSCOV2 & INF A&B AMP PRB: CPT | Performed by: EMERGENCY MEDICINE

## 2021-03-09 PROCEDURE — 85610 PROTHROMBIN TIME: CPT | Performed by: EMERGENCY MEDICINE

## 2021-03-09 PROCEDURE — 82550 ASSAY OF CK (CPK): CPT | Performed by: EMERGENCY MEDICINE

## 2021-03-09 PROCEDURE — 83690 ASSAY OF LIPASE: CPT | Performed by: EMERGENCY MEDICINE

## 2021-03-09 PROCEDURE — 84443 ASSAY THYROID STIM HORMONE: CPT | Performed by: EMERGENCY MEDICINE

## 2021-03-09 PROCEDURE — 86140 C-REACTIVE PROTEIN: CPT | Performed by: EMERGENCY MEDICINE

## 2021-03-09 PROCEDURE — 36415 COLL VENOUS BLD VENIPUNCTURE: CPT

## 2021-03-09 PROCEDURE — 96375 TX/PRO/DX INJ NEW DRUG ADDON: CPT

## 2021-03-09 PROCEDURE — 83735 ASSAY OF MAGNESIUM: CPT | Performed by: EMERGENCY MEDICINE

## 2021-03-09 PROCEDURE — 25010000002 DROPERIDOL PER 5 MG: Performed by: EMERGENCY MEDICINE

## 2021-03-09 RX ORDER — SODIUM CHLORIDE 0.9 % (FLUSH) 0.9 %
10 SYRINGE (ML) INJECTION AS NEEDED
Status: DISCONTINUED | OUTPATIENT
Start: 2021-03-09 | End: 2021-03-10 | Stop reason: HOSPADM

## 2021-03-09 RX ORDER — DROPERIDOL 2.5 MG/ML
1.25 INJECTION, SOLUTION INTRAMUSCULAR; INTRAVENOUS ONCE
Status: COMPLETED | OUTPATIENT
Start: 2021-03-09 | End: 2021-03-09

## 2021-03-09 RX ORDER — ONDANSETRON 2 MG/ML
4 INJECTION INTRAMUSCULAR; INTRAVENOUS ONCE
Status: COMPLETED | OUTPATIENT
Start: 2021-03-09 | End: 2021-03-09

## 2021-03-09 RX ADMIN — ONDANSETRON 4 MG: 2 INJECTION INTRAMUSCULAR; INTRAVENOUS at 23:19

## 2021-03-09 RX ADMIN — DROPERIDOL 1.25 MG: 2.5 INJECTION, SOLUTION INTRAMUSCULAR; INTRAVENOUS at 20:31

## 2021-03-09 RX ADMIN — SODIUM CHLORIDE 1000 ML: 9 INJECTION, SOLUTION INTRAVENOUS at 20:31

## 2021-03-10 ENCOUNTER — EPISODE CHANGES (OUTPATIENT)
Dept: CASE MANAGEMENT | Facility: OTHER | Age: 76
End: 2021-03-10

## 2021-03-10 LAB
FLUAV RNA RESP QL NAA+PROBE: NOT DETECTED
FLUBV RNA RESP QL NAA+PROBE: NOT DETECTED
SARS-COV-2 RNA RESP QL NAA+PROBE: DETECTED

## 2021-03-10 NOTE — ED NOTES
Provider called pt at home to inform pt about his positive Covid test that resulted after pt was discharged at this time.     Marylou Borjas RN  03/10/21 0018

## 2021-03-10 NOTE — ED PROVIDER NOTES
Subjective     History provided by:  Patient   used: No    Nausea  The primary symptoms include nausea and vomiting. Primary symptoms do not include fever, weight loss, fatigue, abdominal pain, diarrhea, melena, hematemesis, jaundice, hematochezia, dysuria, myalgias, arthralgias or rash. The illness began 3 to 5 days ago. The onset was gradual. The problem has been gradually improving.   The illness does not include chills, anorexia, dysphagia, odynophagia, bloating, constipation, tenesmus, back pain or itching. Associated medical issues do not include inflammatory bowel disease, GERD, gallstones, liver disease, alcohol abuse, PUD, gastric bypass, bowel resection, irritable bowel syndrome, hemorrhoids or diverticulitis.       Review of Systems   Constitutional: Negative for activity change, appetite change, chills, diaphoresis, fatigue, fever and weight loss.   HENT: Negative for congestion, ear pain and sore throat.    Eyes: Negative for redness.   Respiratory: Negative for cough, chest tightness, shortness of breath and wheezing.    Cardiovascular: Negative for chest pain, palpitations and leg swelling.   Gastrointestinal: Positive for nausea and vomiting. Negative for abdominal pain, anorexia, bloating, constipation, diarrhea, dysphagia, hematemesis, hematochezia, jaundice and melena.   Genitourinary: Negative for dysuria and urgency.   Musculoskeletal: Negative for arthralgias, back pain, myalgias and neck pain.   Skin: Negative for itching, pallor, rash and wound.   Neurological: Negative for dizziness, speech difficulty, weakness and headaches.   Psychiatric/Behavioral: Negative for agitation, behavioral problems, confusion and decreased concentration.   All other systems reviewed and are negative.      Past Medical History:   Diagnosis Date   • BPH (benign prostatic hyperplasia)    • Bradycardia     Positive tilt table testing 07/2016   • Coronary artery disease    • Diabetes mellitus  (CMS/[facility])    • Diverticulosis    • Gastric ulcer with hemorrhage    • Gastroesophageal reflux disease 1/26/2021   • History of transfusion    • Hyperlipidemia    • Hypertension    • Psoriasis        No Known Allergies    Past Surgical History:   Procedure Laterality Date   • BACK SURGERY     • CARDIAC CATHETERIZATION N/A 9/20/2016    Procedure: Left Heart Cath;  Surgeon: Giovanni Buenrostro MD;  Location:  [facility]ATH [facility];  Service:    • CARDIAC CATHETERIZATION N/A 10/4/2016    Procedure: Left Heart Cath;  Surgeon: Bharathi Andrew MD;  Location:  [facility]ATH [facility];  Service:    • CARDIAC CATHETERIZATION N/A 5/9/2017    Procedure: Left Heart Cath;  Surgeon: Giovanni Buenrostro MD;  Location:  [facility];  Service:    • CARDIAC CATHETERIZATION N/A 5/9/2017    Procedure: ERR;  Surgeon: Giovanni Buenrostro MD;  Location:  [facility];  Service:    • CARDIAC CATHETERIZATION N/A 11/8/2019    Procedure: Left Heart Cath;  Surgeon: Abraham Oviedo MD;  Location:  [facility]ATH INV[facility];  Service: Cardiology   • CARDIAC CATHETERIZATION N/A 12/18/2019    Procedure: Coronary angiography;  Surgeon: Jay Barney IV, MD;  Location:  [facility];  Service: Cardiovascular   • CHOLECYSTECTOMY     • COLONOSCOPY  11/13/2015    Dr. Martinez- internal hemorrhoids, sigmoid diverticulosis   • COLONOSCOPY N/A 8/17/2018    Procedure: COLONOSCOPY CPT CODE: 38602;  Surgeon: Gigi Geronimo MD;  Location: [facility] OR;  Service: Gastroenterology   • CORONARY ANGIOPLASTY WITH STENT PLACEMENT     • ENDOSCOPY N/A 8/17/2018    Procedure: ESOPHAGOGASTRODUODENOSCOPY WITH BIOPSY CPT CODE: 27403;  Surgeon: Gigi Geronimo MD;  Location: [facility] OR;  Service: Gastroenterology   • INTERVENTIONAL RADIOLOGY PROCEDURE N/A 12/18/2019    Procedure: Coil Embolization of septal to pulmonary artery fistuala.;  Surgeon: Jay Barney IV, MD;  Location:  [facility]  CATH INVASIVE LOCATION;  Service: Cardiovascular   • RI RT/LT HEART CATHETERS N/A 2017    Procedure: Percutaneous Coronary Intervention;  Surgeon: Lincoln De Los Santos MD;  Location: [facility] CATH INVASIVE LOCATION;  Service: Cardiology   • STOMACH SURGERY      FUSION OF BLEEDING ULCER   • UPPER GASTROINTESTINAL ENDOSCOPY  2015    Dr. Martinez- mild gastritis       Family History   Problem Relation Age of Onset   • Sick sinus syndrome Mother    • Heart failure Mother    • Diabetes Mother    • Liver cancer Father        Social History     Socioeconomic History   • Marital status:      Spouse name: Not on file   • Number of children: Not on file   • Years of education: Not on file   • Highest education level: Not on file   Tobacco Use   • Smoking status: Former Smoker     Packs/day: 3.00     Years: 40.00     Pack years: 120.00     Types: Cigarettes     Quit date:      Years since quittin.2   • Smokeless tobacco: Former User     Quit date: 1986   Substance and Sexual Activity   • Alcohol use: No   • Drug use: No   • Sexual activity: Defer           Objective   Physical Exam  Vitals and nursing note reviewed.   Constitutional:       General: He is not in acute distress.     Appearance: Normal appearance. He is well-developed. He is not toxic-appearing or diaphoretic.   HENT:      Head: Normocephalic and atraumatic.      Right Ear: External ear normal.      Left Ear: External ear normal.      Nose: Nose normal.      Mouth/Throat:      Pharynx: No oropharyngeal exudate.      Tonsils: No tonsillar exudate.   Eyes:      General: Lids are normal.      Conjunctiva/sclera: Conjunctivae normal.      Pupils: Pupils are equal, round, and reactive to light.   Neck:      Thyroid: No thyromegaly.   Cardiovascular:      Rate and Rhythm: Normal rate and regular rhythm.      Pulses: Normal pulses.      Heart sounds: Normal heart sounds, S1 normal and S2 normal.   Pulmonary:      Effort: Pulmonary effort is normal.  No tachypnea or respiratory distress.      Breath sounds: Normal breath sounds. No decreased breath sounds, wheezing or rales.   Chest:      Chest wall: No tenderness.   Abdominal:      General: Bowel sounds are normal. There is no distension.      Palpations: Abdomen is soft.      Tenderness: There is no abdominal tenderness. There is no guarding or rebound.   Musculoskeletal:         General: No tenderness or deformity. Normal range of motion.      Cervical back: Full passive range of motion without pain, normal range of motion and neck supple.   Lymphadenopathy:      Cervical: No cervical adenopathy.   Skin:     General: Skin is warm and dry.      Coloration: Skin is not pale.      Findings: No erythema or rash.   Neurological:      Mental Status: He is alert and oriented to person, place, and time.      GCS: GCS eye subscore is 4. GCS verbal subscore is 5. GCS motor subscore is 6.      Cranial Nerves: No cranial nerve deficit.      Sensory: No sensory deficit.   Psychiatric:         Speech: Speech normal.         Behavior: Behavior normal.         Thought Content: Thought content normal.         Judgment: Judgment normal.         Procedures           ED Course                                           MDM  Number of Diagnoses or Management Options  COVID-19: new and requires workup  Gastroenteritis: new and requires workup     Amount and/or Complexity of Data Reviewed  Clinical lab tests: reviewed and ordered  Tests in the radiology section of CPT®: ordered and reviewed  Tests in the medicine section of CPT®: ordered and reviewed  Review and summarize past medical records: yes  Independent visualization of images, tracings, or specimens: yes    Risk of Complications, Morbidity, and/or Mortality  Presenting problems: moderate  Diagnostic procedures: moderate  Management options: moderate    Patient Progress  Patient progress: stable      Final diagnoses:   Gastroenteritis   COVID-19            Jaswinder Braun  MD [place]  03/10/21 0340

## 2021-03-11 LAB
BACTERIA SPEC AEROBE CULT: NORMAL
BACTERIA SPEC AEROBE CULT: NORMAL

## 2021-03-13 ENCOUNTER — HOSPITAL ENCOUNTER (EMERGENCY)
Facility: HOSPITAL | Age: 76
Discharge: HOME | End: 2021-03-13
Attending: FAMILY MEDICINE | Admitting: FAMILY MEDICINE

## 2021-03-13 ENCOUNTER — APPOINTMENT (OUTPATIENT)
Dept: GENERAL RADIOLOGY | Facility: HOSPITAL | Age: 76
End: 2021-03-13

## 2021-03-13 VITALS
HEART RATE: 61 BPM | BODY MASS INDEX: 21.67 KG/M2 | OXYGEN SATURATION: 99 % | SYSTOLIC BLOOD PRESSURE: 148 MMHG | DIASTOLIC BLOOD PRESSURE: 64 MMHG | HEIGHT: 72 IN | RESPIRATION RATE: 18 BRPM | TEMPERATURE: 98 F | WEIGHT: 160 LBS

## 2021-03-13 DIAGNOSIS — U07.1 COVID-19 VIRUS INFECTION: Primary | ICD-10-CM

## 2021-03-13 DIAGNOSIS — E86.0 DEHYDRATION: ICD-10-CM

## 2021-03-13 DIAGNOSIS — E87.6 HYPOKALEMIA: ICD-10-CM

## 2021-03-13 LAB
ALBUMIN SERPL-MCNC: 3.86 G/DL (ref 3.5–5.2)
ALBUMIN/GLOB SERPL: 1.2 G/DL
ALP SERPL-CCNC: 115 U/L (ref 39–117)
ALT SERPL W P-5'-P-CCNC: 42 U/L (ref 1–41)
ANION GAP SERPL CALCULATED.3IONS-SCNC: 14.8 MMOL/L (ref 5–15)
APTT PPP: 28.7 SECONDS (ref 25.6–35.3)
AST SERPL-CCNC: 28 U/L (ref 1–40)
BACTERIA UR QL AUTO: NORMAL /HPF
BASOPHILS # BLD AUTO: 0.09 10*3/MM3 (ref 0–0.2)
BASOPHILS NFR BLD AUTO: 0.8 % (ref 0–1.5)
BILIRUB SERPL-MCNC: 1 MG/DL (ref 0–1.2)
BILIRUB UR QL STRIP: NEGATIVE
BUN SERPL-MCNC: 28 MG/DL (ref 8–23)
BUN/CREAT SERPL: 37.3 (ref 7–25)
CALCIUM SPEC-SCNC: 9.1 MG/DL (ref 8.6–10.5)
CHLORIDE SERPL-SCNC: 104 MMOL/L (ref 98–107)
CLARITY UR: CLEAR
CO2 SERPL-SCNC: 23.2 MMOL/L (ref 22–29)
COLOR UR: ABNORMAL
CREAT SERPL-MCNC: 0.75 MG/DL (ref 0.76–1.27)
CRP SERPL-MCNC: <0.3 MG/DL (ref 0–0.5)
D DIMER PPP FEU-MCNC: 0.31 MCGFEU/ML (ref 0–0.5)
D-LACTATE SERPL-SCNC: 1.3 MMOL/L (ref 0.5–2)
DEPRECATED RDW RBC AUTO: 47 FL (ref 37–54)
EOSINOPHIL # BLD AUTO: 0.11 10*3/MM3 (ref 0–0.4)
EOSINOPHIL NFR BLD AUTO: 1 % (ref 0.3–6.2)
ERYTHROCYTE [DISTWIDTH] IN BLOOD BY AUTOMATED COUNT: 13.5 % (ref 12.3–15.4)
FERRITIN SERPL-MCNC: 129.4 NG/ML (ref 30–400)
GFR SERPL CREATININE-BSD FRML MDRD: 102 ML/MIN/1.73
GLOBULIN UR ELPH-MCNC: 3.2 GM/DL
GLUCOSE SERPL-MCNC: 134 MG/DL (ref 65–99)
GLUCOSE UR STRIP-MCNC: NEGATIVE MG/DL
HCT VFR BLD AUTO: 41 % (ref 37.5–51)
HGB BLD-MCNC: 13.7 G/DL (ref 13–17.7)
HGB UR QL STRIP.AUTO: NEGATIVE
HOLD SPECIMEN: NORMAL
HOLD SPECIMEN: NORMAL
HYALINE CASTS UR QL AUTO: NORMAL /LPF
IMM GRANULOCYTES # BLD AUTO: 0.03 10*3/MM3 (ref 0–0.05)
IMM GRANULOCYTES NFR BLD AUTO: 0.3 % (ref 0–0.5)
INR PPP: 1.08 (ref 0.9–1.1)
KETONES UR QL STRIP: ABNORMAL
LDH SERPL-CCNC: 195 U/L (ref 135–225)
LEUKOCYTE ESTERASE UR QL STRIP.AUTO: NEGATIVE
LIPASE SERPL-CCNC: 47 U/L (ref 13–60)
LYMPHOCYTES # BLD AUTO: 1.55 10*3/MM3 (ref 0.7–3.1)
LYMPHOCYTES NFR BLD AUTO: 13.9 % (ref 19.6–45.3)
MAGNESIUM SERPL-MCNC: 2.1 MG/DL (ref 1.6–2.4)
MCH RBC QN AUTO: 31.8 PG (ref 26.6–33)
MCHC RBC AUTO-ENTMCNC: 33.4 G/DL (ref 31.5–35.7)
MCV RBC AUTO: 95.1 FL (ref 79–97)
MONOCYTES # BLD AUTO: 0.86 10*3/MM3 (ref 0.1–0.9)
MONOCYTES NFR BLD AUTO: 7.7 % (ref 5–12)
NEUTROPHILS NFR BLD AUTO: 76.3 % (ref 42.7–76)
NEUTROPHILS NFR BLD AUTO: 8.51 10*3/MM3 (ref 1.7–7)
NITRITE UR QL STRIP: NEGATIVE
NRBC BLD AUTO-RTO: 0 /100 WBC (ref 0–0.2)
NT-PROBNP SERPL-MCNC: 39.3 PG/ML (ref 0–900)
PH UR STRIP.AUTO: 5.5 [PH] (ref 5–8)
PLATELET # BLD AUTO: 249 10*3/MM3 (ref 140–450)
PMV BLD AUTO: 10.4 FL (ref 6–12)
POTASSIUM SERPL-SCNC: 3.2 MMOL/L (ref 3.5–5.2)
PROCALCITONIN SERPL-MCNC: 0.03 NG/ML (ref 0–0.25)
PROT SERPL-MCNC: 7.1 G/DL (ref 6–8.5)
PROT UR QL STRIP: ABNORMAL
PROTHROMBIN TIME: 13.9 SECONDS (ref 11.9–14.1)
QT INTERVAL: 438 MS
QTC INTERVAL: 438 MS
RBC # BLD AUTO: 4.31 10*6/MM3 (ref 4.14–5.8)
RBC # UR: NORMAL /HPF
REF LAB TEST METHOD: NORMAL
SODIUM SERPL-SCNC: 142 MMOL/L (ref 136–145)
SP GR UR STRIP: 1.02 (ref 1–1.03)
SQUAMOUS #/AREA URNS HPF: NORMAL /HPF
TROPONIN T SERPL-MCNC: <0.01 NG/ML (ref 0–0.03)
TROPONIN T SERPL-MCNC: <0.01 NG/ML (ref 0–0.03)
UROBILINOGEN UR QL STRIP: ABNORMAL
WBC # BLD AUTO: 11.15 10*3/MM3 (ref 3.4–10.8)
WBC UR QL AUTO: NORMAL /HPF
WHOLE BLOOD HOLD SPECIMEN: NORMAL
WHOLE BLOOD HOLD SPECIMEN: NORMAL

## 2021-03-13 PROCEDURE — 93005 ELECTROCARDIOGRAM TRACING: CPT | Performed by: PHYSICIAN ASSISTANT

## 2021-03-13 PROCEDURE — 85610 PROTHROMBIN TIME: CPT | Performed by: PHYSICIAN ASSISTANT

## 2021-03-13 PROCEDURE — 80053 COMPREHEN METABOLIC PANEL: CPT | Performed by: PHYSICIAN ASSISTANT

## 2021-03-13 PROCEDURE — M0239 BAMLANIVIMAB-XXXX INFUSION: HCPCS | Performed by: PHYSICIAN ASSISTANT

## 2021-03-13 PROCEDURE — 87040 BLOOD CULTURE FOR BACTERIA: CPT | Performed by: PHYSICIAN ASSISTANT

## 2021-03-13 PROCEDURE — 93010 ELECTROCARDIOGRAM REPORT: CPT | Performed by: INTERNAL MEDICINE

## 2021-03-13 PROCEDURE — 25010000006 BAMLANIVIMAB 700 MG/20ML SOLUTION 20 ML VIAL: Performed by: PHYSICIAN ASSISTANT

## 2021-03-13 PROCEDURE — 85379 FIBRIN DEGRADATION QUANT: CPT | Performed by: PHYSICIAN ASSISTANT

## 2021-03-13 PROCEDURE — 83690 ASSAY OF LIPASE: CPT | Performed by: PHYSICIAN ASSISTANT

## 2021-03-13 PROCEDURE — 84145 PROCALCITONIN (PCT): CPT | Performed by: PHYSICIAN ASSISTANT

## 2021-03-13 PROCEDURE — 83615 LACTATE (LD) (LDH) ENZYME: CPT | Performed by: PHYSICIAN ASSISTANT

## 2021-03-13 PROCEDURE — 85730 THROMBOPLASTIN TIME PARTIAL: CPT | Performed by: PHYSICIAN ASSISTANT

## 2021-03-13 PROCEDURE — 71045 X-RAY EXAM CHEST 1 VIEW: CPT | Performed by: RADIOLOGY

## 2021-03-13 PROCEDURE — 86140 C-REACTIVE PROTEIN: CPT | Performed by: PHYSICIAN ASSISTANT

## 2021-03-13 PROCEDURE — 84484 ASSAY OF TROPONIN QUANT: CPT | Performed by: PHYSICIAN ASSISTANT

## 2021-03-13 PROCEDURE — 83735 ASSAY OF MAGNESIUM: CPT | Performed by: PHYSICIAN ASSISTANT

## 2021-03-13 PROCEDURE — 81001 URINALYSIS AUTO W/SCOPE: CPT | Performed by: PHYSICIAN ASSISTANT

## 2021-03-13 PROCEDURE — 83605 ASSAY OF LACTIC ACID: CPT | Performed by: PHYSICIAN ASSISTANT

## 2021-03-13 PROCEDURE — 83880 ASSAY OF NATRIURETIC PEPTIDE: CPT | Performed by: PHYSICIAN ASSISTANT

## 2021-03-13 PROCEDURE — 82728 ASSAY OF FERRITIN: CPT | Performed by: PHYSICIAN ASSISTANT

## 2021-03-13 PROCEDURE — 99285 EMERGENCY DEPT VISIT HI MDM: CPT

## 2021-03-13 PROCEDURE — 71045 X-RAY EXAM CHEST 1 VIEW: CPT

## 2021-03-13 PROCEDURE — 85025 COMPLETE CBC W/AUTO DIFF WBC: CPT | Performed by: PHYSICIAN ASSISTANT

## 2021-03-13 RX ORDER — SODIUM CHLORIDE 0.9 % (FLUSH) 0.9 %
10 SYRINGE (ML) INJECTION AS NEEDED
Status: DISCONTINUED | OUTPATIENT
Start: 2021-03-13 | End: 2021-03-13 | Stop reason: HOSPADM

## 2021-03-13 RX ORDER — SODIUM CHLORIDE 9 MG/ML
30 INJECTION, SOLUTION INTRAVENOUS ONCE
Status: COMPLETED | OUTPATIENT
Start: 2021-03-13 | End: 2021-03-13

## 2021-03-13 RX ORDER — EPINEPHRINE 1 MG/ML
0.3 INJECTION, SOLUTION INTRAMUSCULAR; SUBCUTANEOUS ONCE AS NEEDED
Status: DISCONTINUED | OUTPATIENT
Start: 2021-03-13 | End: 2021-03-13 | Stop reason: HOSPADM

## 2021-03-13 RX ORDER — METHYLPREDNISOLONE SODIUM SUCCINATE 125 MG/2ML
125 INJECTION, POWDER, LYOPHILIZED, FOR SOLUTION INTRAMUSCULAR; INTRAVENOUS ONCE AS NEEDED
Status: DISCONTINUED | OUTPATIENT
Start: 2021-03-13 | End: 2021-03-13 | Stop reason: HOSPADM

## 2021-03-13 RX ORDER — POTASSIUM CHLORIDE 20 MEQ/1
40 TABLET, EXTENDED RELEASE ORAL ONCE
Status: COMPLETED | OUTPATIENT
Start: 2021-03-13 | End: 2021-03-13

## 2021-03-13 RX ORDER — DIPHENHYDRAMINE HYDROCHLORIDE 50 MG/ML
50 INJECTION INTRAMUSCULAR; INTRAVENOUS ONCE AS NEEDED
Status: DISCONTINUED | OUTPATIENT
Start: 2021-03-13 | End: 2021-03-13 | Stop reason: HOSPADM

## 2021-03-13 RX ADMIN — SODIUM CHLORIDE 700 MG: 9 INJECTION, SOLUTION INTRAVENOUS at 15:27

## 2021-03-13 RX ADMIN — SODIUM CHLORIDE 30 ML: 9 INJECTION, SOLUTION INTRAVENOUS at 15:43

## 2021-03-13 RX ADMIN — SODIUM CHLORIDE 500 ML: 9 INJECTION, SOLUTION INTRAVENOUS at 15:08

## 2021-03-13 RX ADMIN — SODIUM CHLORIDE 500 ML: 9 INJECTION, SOLUTION INTRAVENOUS at 13:21

## 2021-03-13 RX ADMIN — POTASSIUM CHLORIDE 40 MEQ: 20 TABLET, EXTENDED RELEASE ORAL at 15:06

## 2021-03-13 NOTE — DISCHARGE INSTRUCTIONS
Rest at home today.  Make sure you are drinking plenty of fluids.  Follow-up with your family doctor at the next available appointment.  Return to the ED if your symptoms change or worsen.

## 2021-03-13 NOTE — ED PROVIDER NOTES
"Subjective   75-year-old male who presents to the ED today due to symptoms related to COVID-19.  He has had symptoms for about 9 days.  He states he tested positive for Covid on March 9.  He states initially he had a lot of vomiting and diarrhea.  He states he still has some nausea but the vomiting and diarrhea seem to have resolved.  He states he has not eating or drinking very much and feels weak.  His wife states that he did eat a yogurt and some Jell-O this morning.  He denies any fever.  He denies any cough or congestion.  He denies any chest pain or abdominal pain.  His wife states that he seemed short of breath this morning and he sounded \"raspy.\"  She states this is why she brought him to the hospital today      History provided by:  Patient and spouse  Illness  Severity:  Moderate  Onset quality:  Gradual  Duration:  9 days  Timing:  Constant  Progression:  Waxing and waning  Chronicity:  New  Associated symptoms: diarrhea, fatigue, nausea, shortness of breath and vomiting    Associated symptoms: no abdominal pain, no chest pain, no congestion, no cough, no ear pain, no fever, no headaches, no myalgias, no rash, no rhinorrhea, no sore throat and no wheezing        Review of Systems   Constitutional: Positive for appetite change and fatigue. Negative for fever.   HENT: Negative for congestion, ear pain, rhinorrhea and sore throat.    Eyes: Negative.    Respiratory: Positive for shortness of breath. Negative for cough and wheezing.    Cardiovascular: Negative for chest pain.   Gastrointestinal: Positive for diarrhea, nausea and vomiting. Negative for abdominal pain.   Genitourinary: Negative.    Musculoskeletal: Negative for myalgias.   Skin: Negative for rash.   Neurological: Positive for weakness. Negative for headaches.   Psychiatric/Behavioral: Negative.    All other systems reviewed and are negative.      Past Medical History:   Diagnosis Date   • BPH (benign prostatic hyperplasia)    • Bradycardia     " Positive tilt table testing 07/2016   • Coronary artery disease    • Diabetes mellitus (CMS/[facility])    • Diverticulosis    • Gastric ulcer with hemorrhage    • Gastroesophageal reflux disease 1/26/2021   • History of transfusion    • Hyperlipidemia    • Hypertension    • Psoriasis        No Known Allergies    Past Surgical History:   Procedure Laterality Date   • BACK SURGERY     • CARDIAC CATHETERIZATION N/A 9/20/2016    Procedure: Left Heart Cath;  Surgeon: Giovanni Buenrostro MD;  Location:  [facility]ATH [facility];  Service:    • CARDIAC CATHETERIZATION N/A 10/4/2016    Procedure: Left Heart Cath;  Surgeon: Bharathi Andrew MD;  Location:  [facility];  Service:    • CARDIAC CATHETERIZATION N/A 5/9/2017    Procedure: Left Heart Cath;  Surgeon: Giovanni Buenrostro MD;  Location:  [facility];  Service:    • CARDIAC CATHETERIZATION N/A 5/9/2017    Procedure: ERR;  Surgeon: Giovanni Buenrostro MD;  Location:  [facility];  Service:    • CARDIAC CATHETERIZATION N/A 11/8/2019    Procedure: Left Heart Cath;  Surgeon: Abraham Oviedo MD;  Location:  [facility];  Service: Cardiology   • CARDIAC CATHETERIZATION N/A 12/18/2019    Procedure: Coronary angiography;  Surgeon: Jay Barney IV, MD;  Location:  [facility] INVASIVE [facility];  Service: Cardiovascular   • CHOLECYSTECTOMY     • COLONOSCOPY  11/13/2015    Dr. Martinez- internal hemorrhoids, sigmoid diverticulosis   • COLONOSCOPY N/A 8/17/2018    Procedure: COLONOSCOPY CPT CODE: 91562;  Surgeon: Gigi Geronimo MD;  Location: [facility] OR;  Service: Gastroenterology   • CORONARY ANGIOPLASTY WITH STENT PLACEMENT     • ENDOSCOPY N/A 8/17/2018    Procedure: ESOPHAGOGASTRODUODENOSCOPY WITH BIOPSY CPT CODE: 34840;  Surgeon: Gigi Geronimo MD;  Location: [facility] OR;  Service: Gastroenterology   • INTERVENTIONAL RADIOLOGY PROCEDURE N/A 12/18/2019    Procedure: Coil Embolization of septal to  pulmonary artery fistuala.;  Surgeon: Jay Barney IV, MD;  Location:  [facility] INVASIVE [facility];  Service: Cardiovascular   • MD RT/LT HEART CATHETERS N/A 2017    Procedure: Percutaneous Coronary Intervention;  Surgeon: Lincoln De Los Santos MD;  Location:  [facility];  Service: Cardiology   • STOMACH SURGERY      FUSION OF BLEEDING ULCER   • UPPER GASTROINTESTINAL ENDOSCOPY  2015    Dr. Martinez- mild gastritis       Family History   Problem Relation Age of Onset   • Sick sinus syndrome Mother    • Heart failure Mother    • Diabetes Mother    • Liver cancer Father        Social History     Socioeconomic History   • Marital status:      Spouse name: Not on file   • Number of children: Not on file   • Years of education: Not on file   • Highest education level: Not on file   Tobacco Use   • Smoking status: Former Smoker     Packs/day: 3.00     Years: 40.00     Pack years: 120.00     Types: Cigarettes     Quit date:      Years since quittin.2   • Smokeless tobacco: Former User     Quit date: 1986   Substance and Sexual Activity   • Alcohol use: No   • Drug use: No   • Sexual activity: Defer           Objective   Physical Exam  Vitals and nursing note reviewed.   Constitutional:       General: He is not in acute distress.     Appearance: He is well-developed.   HENT:      Head: Normocephalic and atraumatic.   Eyes:      Extraocular Movements: Extraocular movements intact.      Pupils: Pupils are equal, round, and reactive to light.   Neck:      Vascular: No JVD.      Trachea: No tracheal deviation.   Cardiovascular:      Rate and Rhythm: Regular rhythm. Bradycardia present.      Pulses: Normal pulses.      Heart sounds: Normal heart sounds.   Pulmonary:      Effort: Pulmonary effort is normal. No respiratory distress.      Breath sounds: Normal breath sounds.   Chest:      Chest wall: No tenderness.   Abdominal:      General: Bowel sounds are normal.       Palpations: Abdomen is soft.      Tenderness: There is no abdominal tenderness. There is no guarding or rebound.   Musculoskeletal:         General: Normal range of motion.      Cervical back: Normal range of motion and neck supple.      Right lower leg: No edema.      Left lower leg: No edema.   Lymphadenopathy:      Cervical: No cervical adenopathy.   Skin:     General: Skin is warm and dry.      Capillary Refill: Capillary refill takes less than 2 seconds.   Neurological:      General: No focal deficit present.      Mental Status: He is alert and oriented to person, place, and time.   Psychiatric:         Mood and Affect: Mood normal.         Procedures           ED Course  ED Course as of Mar 13 1703   Sat Mar 13, 2021   1306 EKG is normal sinus rhythm with a rate of 60 bpm MD interval 162 ms QRS duration is 92 ms QT/QTc is 438/430 ms    [EG]   1310 D-Dimer, Quant: 0.31 [AH]   1357 FINDINGS:    Lungs:  Unremarkable.  No consolidation.    Pleural space:  Unremarkable.  No pneumothorax.    Heart:  Unremarkable.  No cardiomegaly.    Mediastinum:  Unremarkable.    Bones/joints:  Unremarkable.     IMPRESSION:    Unremarkable exam. No acute cardiopulmonary findings identified.   XR Chest 1 View [AH]   1409 The FDA has issued an Emergency Use Authorization (EUA) to permit emergency use of the unapproved product bamlanivimab for treatment of laboratory confirmed COVID-19 in certain ambulatory patients. There is no prescribing information or package insert, however, the FDA has authorized distribution of Fact Sheets for patients and/or caregivers for additional information on this investigational therapy. I have provided the Fact Sheet to and discussed the following with the patient and he/she agreed to proceed:  FDA has authorized the emergency use (EUA) of bamlanivimab, which is not an FDA approved drug.  The patient has the option to accept or refuse bamlanivimab at any time.  The significant known and potential risks  and benefits of bamlanivimab and the extent to which such risks and benefits are unknown.  Information on available alternative treatments and the risks and benefits of those alternatives have been shared.       [AH]   1410 I have spoken with the patient and his wife about receiving the monoclonal antibody infusion.  They are both agreeable to receive this medication.    [AH]   1702 Patient tolerated his infusion well without any side effects.  He will be able to be discharged home.  He was advised to rest and drink plenty of fluids.  He will follow-up outpatient with his primary care provider.  He will return to the ED if anything changes.    [AH]      ED Course User Index  [AH] Kat García PA  [EG] Waleska Willson, DO                                           MDM  Number of Diagnoses or Management Options     Amount and/or Complexity of Data Reviewed  Clinical lab tests: reviewed  Tests in the radiology section of CPT®: reviewed  Tests in the medicine section of CPT®: reviewed  Decide to obtain previous medical records or to obtain history from someone other than the patient: yes    Patient Progress  Patient progress: stable      Final diagnoses:   COVID-19 virus infection   Dehydration   Hypokalemia            Kat García PA  03/13/21 [id number]

## 2021-03-13 NOTE — ED NOTES
The FDA has issued an Emergency Use Authorization (EUA) to permit emergency use of the unapproved product bamlanivimab for treatment of laboratory confirmed COVID-19 in certain ambulatory patients. There is no prescribing information or package insert, however, the FDA has authorized distribution of Fact Sheets for patients and/or caregivers for additional information on this investigational therapy. I have provided the Fact Sheet to and discussed the following with Fidencio Luciano and he/she agreed to proceed:  • FDA has authorized the emergency use (EUA) of bamlanivimab, which is not an FDA approved drug.  • The pt Fidencio Luciano as the option to accept or refuse bamlanivimab at any time.  • The significant known and potential risks and benefits of bamlanivimab and the extent to which such risks and benefits are unknown.  • Information on available alternative treatments and the risks and benefits of those alternatives have been shared.       Adore Paez, RN  03/13/21 [id number]

## 2021-03-18 LAB
BACTERIA SPEC AEROBE CULT: NORMAL
BACTERIA SPEC AEROBE CULT: NORMAL

## 2021-03-19 ENCOUNTER — HOSPITAL ENCOUNTER (EMERGENCY)
Facility: HOSPITAL | Age: 76
Discharge: LEFT BEFORE DISCHARGE | End: 2021-03-19

## 2021-03-19 VITALS
HEART RATE: 60 BPM | DIASTOLIC BLOOD PRESSURE: 73 MMHG | TEMPERATURE: 98.5 F | RESPIRATION RATE: 18 BRPM | HEIGHT: 71 IN | OXYGEN SATURATION: 99 % | BODY MASS INDEX: 22.4 KG/M2 | WEIGHT: 160 LBS | SYSTOLIC BLOOD PRESSURE: 182 MMHG

## 2021-03-19 PROCEDURE — 99211 OFF/OP EST MAY X REQ PHY/QHP: CPT

## 2021-03-19 NOTE — ED NOTES
Patient called from [facility] at this time, states that he is on his way home. He left due to waiting in [facility] for 2 hours.     Nitish Fall RN  03/19/21 1959

## 2021-03-29 ENCOUNTER — OFFICE VISIT (OUTPATIENT)
Dept: CARDIOLOGY | Facility: CLINIC | Age: 76
End: 2021-03-29

## 2021-03-29 VITALS
HEIGHT: 72 IN | RESPIRATION RATE: 16 BRPM | SYSTOLIC BLOOD PRESSURE: 133 MMHG | OXYGEN SATURATION: 98 % | HEART RATE: 67 BPM | DIASTOLIC BLOOD PRESSURE: 59 MMHG | WEIGHT: 153 LBS | TEMPERATURE: 98 F | BODY MASS INDEX: 20.72 KG/M2

## 2021-03-29 DIAGNOSIS — I25.10 ASCVD (ARTERIOSCLEROTIC CARDIOVASCULAR DISEASE): Primary | Chronic | ICD-10-CM

## 2021-03-29 DIAGNOSIS — I10 ESSENTIAL HYPERTENSION: Chronic | ICD-10-CM

## 2021-03-29 DIAGNOSIS — I25.41 CORONARY ARTERY FISTULA: Chronic | ICD-10-CM

## 2021-03-29 PROCEDURE — 99213 OFFICE O/P EST LOW 20 MIN: CPT | Performed by: PHYSICIAN ASSISTANT

## 2021-03-29 NOTE — PROGRESS NOTES
Camila Ortiz APRN  Fidencio Luciano  1945  03/29/2021    Patient Active Problem List   Diagnosis   • Essential hypertension   • Type 2 diabetes mellitus (CMS/HCC)   • Benign prostatic hyperplasia   • ASCVD (arteriosclerotic cardiovascular disease), s/p stenting of 80 % stenosis in left circumflex on 10/05/16and 60% stenosis in the LAD, clinically stable.    • Hyperlipidemia LDL goal <70   • Weakness generalized   • Chronic fatigue   • Coronary artery fistula   • Weight loss, unintentional   • Iron deficiency anemia   • Iron deficiency   • Normocytic anemia   • Weight loss, abnormal   • Coronary artery disease involving native coronary artery of native heart with angina pectoris (CMS/HCC)   • Bloating   • Early satiety   • Esophageal dysphagia   • Gastroesophageal reflux disease       Dear Camila Ortiz APRN:    Subjective     History of Present Illness:    Chief Complaint   Patient presents with   • Follow-up     patient just doesnt feel right after getting out of [place] on the 21st   • Med Management     verbal       Fidencio Luciano is a pleasant 75 y.o. male with a past medical history significant for coronary artery disease status post stenting of the left circumflex coronary artery in October 2016 with subsequent coiling of the fistulous connection between the LAD and pulmonary artery in December 2019 by Dr. Barney at [facility].  Patient also has history of essential hypertension and dyslipidemia.  He comes in today for routine cardiology follow-up.    Patient was recently seen in the emergency department where he was diagnosed with COVID-19 infection with his symptoms starting on March 9 and him receiving bamlanivimab, monoclonal antibody recently granted emergency use authorization by the FDA on 3/13/2021.  Mr. Luciano came into the office today as he is still short of breath from his COVID-19 infection where symptoms started on March 19 and receiving a monoclonal antibody  on the .  He does report his symptoms have resolved no longer having a fever, chills, or myalgias.  However lingering shortness of breath is still present.  He does deny any chest pains, palpitations, dizziness, or syncope.      No Known Allergies:      Current Outpatient Medications:   •  amLODIPine (NORVASC) 5 MG tablet, TAKE ONE TABLET BY MOUTH EVERY DAY FOR BLOOD PRESSURE, Disp: 30 tablet, Rfl: 5  •  aspirin 81 MG tablet, Take 81 mg by mouth Daily., Disp: , Rfl:   •  atorvastatin (LIPITOR) 80 MG tablet, Take 1 tablet by mouth Every Night., Disp: 90 tablet, Rfl: 5  •  clopidogrel (PLAVIX) 75 MG tablet, Take 1 tablet by mouth Daily., Disp: 30 tablet, Rfl: 5  •  ferrous sulfate 325 (65 FE) MG tablet, Take 1 tablet by mouth 3 (Three) Times a Day With Meals., Disp: 90 tablet, Rfl: 4  •  finasteride (PROSCAR) 5 MG tablet, Take 1 tablet by mouth Daily., Disp: 9 tablet, Rfl: 3  •  isosorbide mononitrate (IMDUR) 60 MG 24 hr tablet, Take 1 tablet by mouth Every Morning., Disp: 30 tablet, Rfl: 11  •  lisinopril (PRINIVIL,ZESTRIL) 20 MG tablet, Take 1 tablet by mouth Daily., Disp: 30 tablet, Rfl: 5  •  nitroglycerin (NITROSTAT) 0.4 MG SL tablet, 1 under the tongue as needed for angina, may repeat q5mins for up three doses, Disp: 25 tablet, Rfl: 2  •  pantoprazole (PROTONIX) 40 MG EC tablet, Take 40 mg by mouth Daily., Disp: , Rfl:   •  meloxicam (MOBIC) 7.5 MG tablet, Take 7.5 mg by mouth 2 (Two) Times a Day., Disp: , Rfl:     The following portions of the patient's history were reviewed and updated as appropriate: allergies, current medications, past family history, past medical history, past social history, past surgical history and problem list.    Social History     Tobacco Use   • Smoking status: Former Smoker     Packs/day: 3.00     Years: 40.00     Pack years: 120.00     Types: Cigarettes     Quit date:      Years since quittin.2   • Smokeless tobacco: Former User     Quit date: 1986   Substance Use  "Topics   • Alcohol use: No   • Drug use: No         Objective   Vitals:    03/29/21 1510   BP: 133/59   BP Location: Left arm   Patient Position: Sitting   Cuff Size: Adult   Pulse: 67   Resp: 16   Temp: 98 °F (36.7 °C)   TempSrc: Temporal   SpO2: 98%   Weight: 69.4 kg (153 lb)   Height: 182.9 cm (72.01\")     Body mass index is 20.75 kg/m².    Constitutional:       General: Not in acute distress.     Appearance: Healthy appearance. Well-developed and not in distress. Not diaphoretic.   Eyes:      Conjunctiva/sclera: Conjunctivae normal.      Pupils: Pupils are equal, round, and reactive to light.   HENT:      Head: Normocephalic and atraumatic.   Neck:      Vascular: No carotid bruit or JVD.   Pulmonary:      Effort: Pulmonary effort is normal. No respiratory distress.      Breath sounds: Normal breath sounds.      Comments: Diminished breath sounds throughout  Cardiovascular:      Normal rate. Regular rhythm.   Edema:     Peripheral edema absent.   Skin:     General: Skin is cool.   Neurological:      Mental Status: Alert, oriented to person, place, and time and oriented to person, place and time.         Lab Results   Component Value Date     03/13/2021    K 3.2 (L) 03/13/2021     03/13/2021    CO2 23.2 03/13/2021    BUN 28 (H) 03/13/2021    CREATININE 0.75 (L) 03/13/2021    GLUCOSE 134 (H) 03/13/2021    CALCIUM 9.1 03/13/2021    AST 28 03/13/2021    ALT 42 (H) 03/13/2021    ALKPHOS 115 03/13/2021    LABIL2 1.4 (L) 01/08/2016     Lab Results   Component Value Date    CKTOTAL 93 03/09/2021     Lab Results   Component Value Date    WBC 11.15 (H) 03/13/2021    HGB 13.7 03/13/2021    HCT 41.0 03/13/2021     03/13/2021     Lab Results   Component Value Date    INR 1.08 03/13/2021    INR 1.10 03/09/2021    INR 1.06 10/03/2016     Lab Results   Component Value Date    MG 2.1 03/13/2021     Lab Results   Component Value Date    TSH 1.130 03/09/2021    PSA 1.440 07/17/2020    CHLPL 109 08/12/2015    TRIG " 77 05/18/2020    HDL 30 (L) 05/18/2020    LDL 54 05/18/2020      Lab Results   Component Value Date    BNP 55.0 07/16/2016       During this visit the following were done:  Labs Reviewed [x]    Labs Ordered []    Radiology Reports Reviewed []    Radiology Ordered []    PCP Records Reviewed []    Referring Provider Records Reviewed []    ER Records Reviewed [x]    Hospital Records Reviewed []    History Obtained From Family []    Radiology Images Reviewed []    Other Reviewed []    Records Requested []       Procedures    Assessment/Plan    Diagnosis Plan   1. ASCVD (arteriosclerotic cardiovascular disease), s/p stenting of 80 % stenosis in left circumflex on 10/05/16and 60% stenosis in the LAD, clinically stable.      2. Essential hypertension     3. Coronary artery fistula              Recommendations:  1. Recent COVID-19 infection  1. Patient still short of breath however it has only been 3 to 4 weeks since onset of symptoms and very likely is related to his lung inflammation.  He does deny any chest pains.  I did ask him to come back in 6 to 8 weeks if patient is still having significant shortness of breath can definitely perform cardiac work-up at that time however at this current moment I do not think he is fully recovered from the infection.  2. Coronary artery disease  1. Clinically stable continue current therapy of Norvasc, Lipitor, Plavix, lisinopril, Imdur.  Has been unable to tolerate beta-blockers in the past due to natural bradycardia.      Return in about 3 months (around 6/29/2021).    As always, I appreciate very much the opportunity to participate in the cardiovascular care of your patients.      With Best Regards,    Delbert Gupta PA-C

## 2021-03-30 ENCOUNTER — TELEPHONE (OUTPATIENT)
Dept: GASTROENTEROLOGY | Facility: CLINIC | Age: 76
End: 2021-03-30

## 2021-03-30 DIAGNOSIS — R68.81 EARLY SATIETY: ICD-10-CM

## 2021-03-30 DIAGNOSIS — R19.4 CHANGE IN BOWEL HABITS: ICD-10-CM

## 2021-03-30 DIAGNOSIS — R63.4 WEIGHT LOSS, ABNORMAL: ICD-10-CM

## 2021-03-30 DIAGNOSIS — R14.0 BLOATING: ICD-10-CM

## 2021-03-30 DIAGNOSIS — R13.19 ESOPHAGEAL DYSPHAGIA: ICD-10-CM

## 2021-03-30 DIAGNOSIS — Z01.818 PREOPERATIVE CLEARANCE: Primary | ICD-10-CM

## 2021-04-01 ENCOUNTER — HOSPITAL ENCOUNTER (EMERGENCY)
Facility: HOSPITAL | Age: 76
Discharge: HOME | End: 2021-04-02
Attending: EMERGENCY MEDICINE | Admitting: EMERGENCY MEDICINE

## 2021-04-01 ENCOUNTER — APPOINTMENT (OUTPATIENT)
Dept: CT IMAGING | Facility: HOSPITAL | Age: 76
End: 2021-04-01

## 2021-04-01 ENCOUNTER — APPOINTMENT (OUTPATIENT)
Dept: GENERAL RADIOLOGY | Facility: HOSPITAL | Age: 76
End: 2021-04-01

## 2021-04-01 VITALS
BODY MASS INDEX: 21.42 KG/M2 | HEART RATE: 68 BPM | HEIGHT: 71 IN | OXYGEN SATURATION: 100 % | SYSTOLIC BLOOD PRESSURE: 147 MMHG | TEMPERATURE: 98.3 F | DIASTOLIC BLOOD PRESSURE: 50 MMHG | WEIGHT: 153 LBS | RESPIRATION RATE: 18 BRPM

## 2021-04-01 DIAGNOSIS — J15.9 COMMUNITY ACQUIRED BACTERIAL PNEUMONIA: Primary | ICD-10-CM

## 2021-04-01 LAB
ALBUMIN SERPL-MCNC: 3.95 G/DL (ref 3.5–5.2)
ALBUMIN/GLOB SERPL: 1.3 G/DL
ALP SERPL-CCNC: 111 U/L (ref 39–117)
ALT SERPL W P-5'-P-CCNC: 31 U/L (ref 1–41)
ANION GAP SERPL CALCULATED.3IONS-SCNC: 11.2 MMOL/L (ref 5–15)
AST SERPL-CCNC: 26 U/L (ref 1–40)
BASOPHILS # BLD AUTO: 0.08 10*3/MM3 (ref 0–0.2)
BASOPHILS NFR BLD AUTO: 0.8 % (ref 0–1.5)
BILIRUB SERPL-MCNC: 0.5 MG/DL (ref 0–1.2)
BUN SERPL-MCNC: 20 MG/DL (ref 8–23)
BUN/CREAT SERPL: 25 (ref 7–25)
CALCIUM SPEC-SCNC: 9.2 MG/DL (ref 8.6–10.5)
CHLORIDE SERPL-SCNC: 106 MMOL/L (ref 98–107)
CO2 SERPL-SCNC: 25.8 MMOL/L (ref 22–29)
CREAT SERPL-MCNC: 0.8 MG/DL (ref 0.76–1.27)
DEPRECATED RDW RBC AUTO: 47.8 FL (ref 37–54)
EOSINOPHIL # BLD AUTO: 0.22 10*3/MM3 (ref 0–0.4)
EOSINOPHIL NFR BLD AUTO: 2.2 % (ref 0.3–6.2)
ERYTHROCYTE [DISTWIDTH] IN BLOOD BY AUTOMATED COUNT: 13.9 % (ref 12.3–15.4)
FLUAV RNA RESP QL NAA+PROBE: NOT DETECTED
FLUBV RNA RESP QL NAA+PROBE: NOT DETECTED
GFR SERPL CREATININE-BSD FRML MDRD: 94 ML/MIN/1.73
GLOBULIN UR ELPH-MCNC: 3.1 GM/DL
GLUCOSE SERPL-MCNC: 100 MG/DL (ref 65–99)
HCT VFR BLD AUTO: 37.2 % (ref 37.5–51)
HGB BLD-MCNC: 12.9 G/DL (ref 13–17.7)
IMM GRANULOCYTES # BLD AUTO: 0.03 10*3/MM3 (ref 0–0.05)
IMM GRANULOCYTES NFR BLD AUTO: 0.3 % (ref 0–0.5)
LYMPHOCYTES # BLD AUTO: 1.74 10*3/MM3 (ref 0.7–3.1)
LYMPHOCYTES NFR BLD AUTO: 17.5 % (ref 19.6–45.3)
MCH RBC QN AUTO: 33.3 PG (ref 26.6–33)
MCHC RBC AUTO-ENTMCNC: 34.7 G/DL (ref 31.5–35.7)
MCV RBC AUTO: 96.1 FL (ref 79–97)
MONOCYTES # BLD AUTO: 0.88 10*3/MM3 (ref 0.1–0.9)
MONOCYTES NFR BLD AUTO: 8.9 % (ref 5–12)
NEUTROPHILS NFR BLD AUTO: 6.98 10*3/MM3 (ref 1.7–7)
NEUTROPHILS NFR BLD AUTO: 70.3 % (ref 42.7–76)
NRBC BLD AUTO-RTO: 0 /100 WBC (ref 0–0.2)
NT-PROBNP SERPL-MCNC: 202.2 PG/ML (ref 0–900)
PLATELET # BLD AUTO: 207 10*3/MM3 (ref 140–450)
PMV BLD AUTO: 10.7 FL (ref 6–12)
POTASSIUM SERPL-SCNC: 3.5 MMOL/L (ref 3.5–5.2)
PROT SERPL-MCNC: 7 G/DL (ref 6–8.5)
RBC # BLD AUTO: 3.87 10*6/MM3 (ref 4.14–5.8)
SARS-COV-2 RNA RESP QL NAA+PROBE: NOT DETECTED
SODIUM SERPL-SCNC: 143 MMOL/L (ref 136–145)
TROPONIN T SERPL-MCNC: <0.01 NG/ML (ref 0–0.03)
WBC # BLD AUTO: 9.93 10*3/MM3 (ref 3.4–10.8)

## 2021-04-01 PROCEDURE — 25010000002 CEFTRIAXONE PER 250 MG: Performed by: EMERGENCY MEDICINE

## 2021-04-01 PROCEDURE — 93005 ELECTROCARDIOGRAM TRACING: CPT | Performed by: EMERGENCY MEDICINE

## 2021-04-01 PROCEDURE — 85025 COMPLETE CBC W/AUTO DIFF WBC: CPT | Performed by: EMERGENCY MEDICINE

## 2021-04-01 PROCEDURE — 94799 UNLISTED PULMONARY SVC/PX: CPT

## 2021-04-01 PROCEDURE — 71045 X-RAY EXAM CHEST 1 VIEW: CPT | Performed by: RADIOLOGY

## 2021-04-01 PROCEDURE — 84484 ASSAY OF TROPONIN QUANT: CPT | Performed by: EMERGENCY MEDICINE

## 2021-04-01 PROCEDURE — 87636 SARSCOV2 & INF A&B AMP PRB: CPT | Performed by: EMERGENCY MEDICINE

## 2021-04-01 PROCEDURE — 96365 THER/PROPH/DIAG IV INF INIT: CPT

## 2021-04-01 PROCEDURE — 80053 COMPREHEN METABOLIC PANEL: CPT | Performed by: EMERGENCY MEDICINE

## 2021-04-01 PROCEDURE — 83880 ASSAY OF NATRIURETIC PEPTIDE: CPT | Performed by: EMERGENCY MEDICINE

## 2021-04-01 PROCEDURE — 71045 X-RAY EXAM CHEST 1 VIEW: CPT

## 2021-04-01 PROCEDURE — 96375 TX/PRO/DX INJ NEW DRUG ADDON: CPT

## 2021-04-01 PROCEDURE — 25010000002 METHYLPREDNISOLONE PER 125 MG: Performed by: EMERGENCY MEDICINE

## 2021-04-01 PROCEDURE — 94640 AIRWAY INHALATION TREATMENT: CPT

## 2021-04-01 PROCEDURE — 0 IOPAMIDOL PER 1 ML: Performed by: EMERGENCY MEDICINE

## 2021-04-01 PROCEDURE — 99284 EMERGENCY DEPT VISIT MOD MDM: CPT

## 2021-04-01 PROCEDURE — 71275 CT ANGIOGRAPHY CHEST: CPT

## 2021-04-01 PROCEDURE — 93010 ELECTROCARDIOGRAM REPORT: CPT | Performed by: INTERNAL MEDICINE

## 2021-04-01 RX ORDER — IPRATROPIUM BROMIDE AND ALBUTEROL SULFATE 2.5; .5 MG/3ML; MG/3ML
3 SOLUTION RESPIRATORY (INHALATION) ONCE
Status: COMPLETED | OUTPATIENT
Start: 2021-04-01 | End: 2021-04-01

## 2021-04-01 RX ORDER — SODIUM CHLORIDE 0.9 % (FLUSH) 0.9 %
10 SYRINGE (ML) INJECTION AS NEEDED
Status: DISCONTINUED | OUTPATIENT
Start: 2021-04-01 | End: 2021-04-02 | Stop reason: HOSPADM

## 2021-04-01 RX ORDER — METHYLPREDNISOLONE SODIUM SUCCINATE 125 MG/2ML
125 INJECTION, POWDER, LYOPHILIZED, FOR SOLUTION INTRAMUSCULAR; INTRAVENOUS ONCE
Status: COMPLETED | OUTPATIENT
Start: 2021-04-01 | End: 2021-04-01

## 2021-04-01 RX ORDER — HYDROCODONE BITARTRATE AND ACETAMINOPHEN 10; 325 MG/1; MG/1
1 TABLET ORAL ONCE
Status: DISCONTINUED | OUTPATIENT
Start: 2021-04-01 | End: 2021-04-01

## 2021-04-01 RX ADMIN — SODIUM CHLORIDE 1 G: 900 INJECTION INTRAVENOUS at 23:43

## 2021-04-01 RX ADMIN — METHYLPREDNISOLONE SODIUM SUCCINATE 125 MG: 125 INJECTION, POWDER, FOR SOLUTION INTRAMUSCULAR; INTRAVENOUS at 20:38

## 2021-04-01 RX ADMIN — IPRATROPIUM BROMIDE AND ALBUTEROL SULFATE 3 ML: .5; 3 SOLUTION RESPIRATORY (INHALATION) at 23:40

## 2021-04-01 RX ADMIN — IPRATROPIUM BROMIDE AND ALBUTEROL SULFATE 3 ML: .5; 3 SOLUTION RESPIRATORY (INHALATION) at 22:29

## 2021-04-01 RX ADMIN — IOPAMIDOL 50 ML: 755 INJECTION, SOLUTION INTRAVENOUS at 22:58

## 2021-04-01 NOTE — ED NOTES
Pt sitting in wheelchair in [facility], nad noted, respirations nonlabored and even     Hodan Sy RN  04/01/21 8861

## 2021-04-02 DIAGNOSIS — R13.10 DYSPHAGIA, UNSPECIFIED TYPE: Primary | ICD-10-CM

## 2021-04-02 LAB
QT INTERVAL: 482 MS
QTC INTERVAL: 452 MS
TROPONIN T SERPL-MCNC: <0.01 NG/ML (ref 0–0.03)

## 2021-04-02 PROCEDURE — 84484 ASSAY OF TROPONIN QUANT: CPT | Performed by: EMERGENCY MEDICINE

## 2021-04-02 RX ORDER — LEVOFLOXACIN 750 MG/1
750 TABLET ORAL DAILY
Qty: 10 TABLET | Refills: 0 | Status: ON HOLD | OUTPATIENT
Start: 2021-04-02 | End: 2021-04-20

## 2021-04-02 RX ORDER — PREDNISONE 20 MG/1
20 TABLET ORAL
Qty: 15 TABLET | Refills: 0 | Status: ON HOLD | OUTPATIENT
Start: 2021-04-02 | End: 2021-04-20

## 2021-04-03 NOTE — ED PROVIDER NOTES
Subjective     History provided by:  Patient   used: No    Shortness of Breath  Severity:  Mild  Onset quality:  Gradual  Timing:  Constant  Progression:  Worsening  Chronicity:  New  Context: activity    Context: not animal exposure, not emotional upset, not fumes, not known allergens, not occupational exposure, not pollens, not smoke exposure, not strong odors, not URI and not weather changes    Relieved by:  Nothing  Worsened by:  Activity, deep breathing, exertion and movement  Ineffective treatments:  None tried  Associated symptoms: no abdominal pain, no chest pain, no claudication, no cough, no diaphoresis, no ear pain, no fever, no headaches, no hemoptysis, no neck pain, no PND, no rash, no sore throat, no sputum production, no syncope, no swollen glands, no vomiting and no wheezing    Risk factors: no recent alcohol use, no family hx of DVT, no hx of cancer, no hx of PE/DVT, no obesity, no oral contraceptive use, no prolonged immobilization, no recent surgery and no tobacco use        Review of Systems   Constitutional: Negative for activity change, appetite change, chills, diaphoresis, fatigue and fever.   HENT: Negative for congestion, ear pain and sore throat.    Eyes: Negative for redness.   Respiratory: Positive for shortness of breath. Negative for cough, hemoptysis, sputum production, chest tightness and wheezing.    Cardiovascular: Negative for chest pain, palpitations, claudication, leg swelling, syncope and PND.   Gastrointestinal: Negative for abdominal pain, diarrhea, nausea and vomiting.   Genitourinary: Negative for dysuria and urgency.   Musculoskeletal: Negative for arthralgias, back pain, myalgias and neck pain.   Skin: Negative for pallor, rash and wound.   Neurological: Negative for dizziness, speech difficulty, weakness and headaches.   Psychiatric/Behavioral: Negative for agitation, behavioral problems, confusion and decreased concentration.   All other systems  reviewed and are negative.      Past Medical History:   Diagnosis Date   • BPH (benign prostatic hyperplasia)    • Bradycardia     Positive tilt table testing 07/2016   • Coronary artery disease    • Diabetes mellitus (CMS/HCC)    • Diverticulosis    • Gastric ulcer with hemorrhage    • Gastroesophageal reflux disease 1/26/2021   • History of transfusion    • Hyperlipidemia    • Hypertension    • Psoriasis        No Known Allergies    Past Surgical History:   Procedure Laterality Date   • BACK SURGERY     • CARDIAC CATHETERIZATION N/A 9/20/2016    Procedure: Left Heart Cath;  Surgeon: Giovanni Buenrostro MD;  Location:  [facility];  Service:    • CARDIAC CATHETERIZATION N/A 10/4/2016    Procedure: Left Heart Cath;  Surgeon: Bharathi Andrew MD;  Location:  [facility];  Service:    • CARDIAC CATHETERIZATION N/A 5/9/2017    Procedure: Left Heart Cath;  Surgeon: Giovanni Buenrostro MD;  Location:  [facility];  Service:    • CARDIAC CATHETERIZATION N/A 5/9/2017    Procedure: ERR;  Surgeon: Giovanni Buenrostro MD;  Location:  [facility];  Service:    • CARDIAC CATHETERIZATION N/A 11/8/2019    Procedure: Left Heart Cath;  Surgeon: Abraham Oviedo MD;  Location:  [facility];  Service: Cardiology   • CARDIAC CATHETERIZATION N/A 12/18/2019    Procedure: Coronary angiography;  Surgeon: Jay Barney IV, MD;  Location:  [facility];  Service: Cardiovascular   • CHOLECYSTECTOMY     • COLONOSCOPY  11/13/2015    Dr. Martinez- internal hemorrhoids, sigmoid diverticulosis   • COLONOSCOPY N/A 8/17/2018    Procedure: COLONOSCOPY CPT CODE: 24965;  Surgeon: Gigi Geronimo MD;  Location: [facility];  Service: Gastroenterology   • CORONARY ANGIOPLASTY WITH STENT PLACEMENT     • ENDOSCOPY N/A 8/17/2018    Procedure: ESOPHAGOGASTRODUODENOSCOPY WITH BIOPSY CPT CODE: 37641;  Surgeon: Gigi Geronimo MD;  Location: [facility];   Service: Gastroenterology   • INTERVENTIONAL RADIOLOGY PROCEDURE N/A 2019    Procedure: Coil Embolization of septal to pulmonary artery fistuala.;  Surgeon: Jay Barney IV, MD;  Location:  [facility];  Service: Cardiovascular   • MT RT/LT HEART CATHETERS N/A 2017    Procedure: Percutaneous Coronary Intervention;  Surgeon: Lincoln De Los Santos MD;  Location:  [facility];  Service: Cardiology   • STOMACH SURGERY      FUSION OF BLEEDING ULCER   • UPPER GASTROINTESTINAL ENDOSCOPY  2015    Dr. Martinez- mild gastritis       Family History   Problem Relation Age of Onset   • Sick sinus syndrome Mother    • Heart failure Mother    • Diabetes Mother    • Liver cancer Father        Social History     Socioeconomic History   • Marital status:      Spouse name: Not on file   • Number of children: Not on file   • Years of education: Not on file   • Highest education level: Not on file   Tobacco Use   • Smoking status: Former Smoker     Packs/day: 3.00     Years: 40.00     Pack years: 120.00     Types: Cigarettes     Quit date:      Years since quittin.2   • Smokeless tobacco: Former User     Quit date: 1986   Substance and Sexual Activity   • Alcohol use: No   • Drug use: No   • Sexual activity: Defer           Objective   Physical Exam  Vitals and nursing note reviewed.   Constitutional:       General: He is not in acute distress.     Appearance: Normal appearance. He is well-developed. He is not toxic-appearing or diaphoretic.   HENT:      Head: Normocephalic and atraumatic.      Right Ear: External ear normal.      Left Ear: External ear normal.      Nose: Nose normal.      Mouth/Throat:      Pharynx: No oropharyngeal exudate.      Tonsils: No tonsillar exudate.   Eyes:      General: Lids are normal.      Conjunctiva/sclera: Conjunctivae normal.      Pupils: Pupils are equal, round, and reactive to light.   Neck:      Thyroid: No thyromegaly.    Cardiovascular:      Rate and Rhythm: Normal rate and regular rhythm.      Pulses: Normal pulses.      Heart sounds: Normal heart sounds, S1 normal and S2 normal.   Pulmonary:      Effort: Pulmonary effort is normal. No tachypnea or respiratory distress.      Breath sounds: Normal breath sounds. No decreased breath sounds, wheezing or rales.   Chest:      Chest wall: No tenderness.   Abdominal:      General: Bowel sounds are normal. There is no distension.      Palpations: Abdomen is soft.      Tenderness: There is no abdominal tenderness. There is no guarding or rebound.   Musculoskeletal:         General: No tenderness or deformity. Normal range of motion.      Cervical back: Full passive range of motion without pain, normal range of motion and neck supple.   Lymphadenopathy:      Cervical: No cervical adenopathy.   Skin:     General: Skin is warm and dry.      Coloration: Skin is not pale.      Findings: No erythema or rash.   Neurological:      Mental Status: He is alert and oriented to person, place, and time.      GCS: GCS eye subscore is 4. GCS verbal subscore is 5. GCS motor subscore is 6.      Cranial Nerves: No cranial nerve deficit.      Sensory: No sensory deficit.   Psychiatric:         Speech: Speech normal.         Behavior: Behavior normal.         Thought Content: Thought content normal.         Judgment: Judgment normal.         Procedures           ED Course  ED Course as of Apr 03 0140   Thu Apr 01, 2021 2331 IMPRESSION:  1. Negative for pulmonary embolus.     2. Findings within the bilateral lung fields may be indicative of developing infection/pneumonia.   CT Chest Pulmonary Embolism [ES]   2331 IMPRESSION:    Unremarkable exam. No acute cardiopulmonary findings identified.   XR Chest 1 View [ES]   Sat Apr 03, 2021   0140 Vent. Rate : 053 BPM     Atrial Rate : 053 BPM     P-R Int : 170 ms          QRS Dur : 090 ms      QT Int : 482 ms       P-R-T Axes : 073 074 074 degrees     QTc Int : 452  ms     Sinus bradycardia  Otherwise normal ECG  When compared with ECG of 13-MAR-2021 12:32,  No significant change was found   ECG 12 Lead [ES]   0140 Patient reports feeling much better with treatment in the emergency department has requested to be discharged home at this time.  Return to the emergency department with any worsening symptoms.    [ES]      ED Course User Index  [ES] Jaswinder Braun MD                                           MDM  Number of Diagnoses or Management Options  Community acquired bacterial pneumonia: new and requires workup     Amount and/or Complexity of Data Reviewed  Clinical lab tests: reviewed and ordered  Tests in the radiology section of CPT®: reviewed and ordered  Tests in the medicine section of CPT®: reviewed and ordered  Review and summarize past medical records: yes  Independent visualization of images, tracings, or specimens: yes    Risk of Complications, Morbidity, and/or Mortality  Presenting problems: moderate  Diagnostic procedures: moderate  Management options: moderate    Patient Progress  Patient progress: stable      Final diagnoses:   Community acquired bacterial pneumonia       ED Disposition  ED Disposition     ED Disposition Condition Comment    Discharge Stable           zarimerle Camila Giles, APRN  [address]  [facility] [address]  [phone]    Schedule an appointment as soon as possible for a visit in 1 day  REEVALUATE         Medication List      New Prescriptions    levoFLOXacin 750 MG tablet  Commonly known as: LEVAQUIN  Take 1 tablet by mouth Daily.     predniSONE 20 MG tablet  Commonly known as: DELTASONE  Take 1 tablet by mouth 3 (Three) Times a Day With Meals.           Where to Get Your Medications      You can get these medications from any pharmacy    Bring a paper prescription for each of these medications  · levoFLOXacin 750 MG tablet  · predniSONE 20 MG tablet          Jaswinder Braun MD  04/03/21 0143

## 2021-04-07 ENCOUNTER — HOSPITAL ENCOUNTER (EMERGENCY)
Facility: HOSPITAL | Age: 76
Discharge: HOME | End: 2021-04-07
Attending: EMERGENCY MEDICINE | Admitting: EMERGENCY MEDICINE

## 2021-04-07 ENCOUNTER — APPOINTMENT (OUTPATIENT)
Dept: GENERAL RADIOLOGY | Facility: HOSPITAL | Age: 76
End: 2021-04-07

## 2021-04-07 ENCOUNTER — APPOINTMENT (OUTPATIENT)
Dept: CT IMAGING | Facility: HOSPITAL | Age: 76
End: 2021-04-07

## 2021-04-07 VITALS
BODY MASS INDEX: 18.2 KG/M2 | HEIGHT: 71 IN | OXYGEN SATURATION: 100 % | TEMPERATURE: 97.1 F | HEART RATE: 60 BPM | RESPIRATION RATE: 20 BRPM | WEIGHT: 130 LBS | SYSTOLIC BLOOD PRESSURE: 125 MMHG | DIASTOLIC BLOOD PRESSURE: 51 MMHG

## 2021-04-07 DIAGNOSIS — J20.9 BRONCHITIS WITH BRONCHOSPASM: Primary | ICD-10-CM

## 2021-04-07 LAB
A-A DO2: ABNORMAL
ALBUMIN SERPL-MCNC: 3.45 G/DL (ref 3.5–5.2)
ALBUMIN/GLOB SERPL: 1.3 G/DL
ALP SERPL-CCNC: 94 U/L (ref 39–117)
ALT SERPL W P-5'-P-CCNC: 76 U/L (ref 1–41)
ANION GAP SERPL CALCULATED.3IONS-SCNC: 13.8 MMOL/L (ref 5–15)
ARTERIAL PATENCY WRIST A: ABNORMAL
AST SERPL-CCNC: 51 U/L (ref 1–40)
ATMOSPHERIC PRESS: 725 MMHG
BASE EXCESS BLDA CALC-SCNC: 1.6 MMOL/L (ref 0–2)
BASOPHILS # BLD AUTO: 0.01 10*3/MM3 (ref 0–0.2)
BASOPHILS NFR BLD AUTO: 0.1 % (ref 0–1.5)
BDY SITE: ABNORMAL
BILIRUB SERPL-MCNC: 0.5 MG/DL (ref 0–1.2)
BODY TEMPERATURE: 0 C
BUN SERPL-MCNC: 22 MG/DL (ref 8–23)
BUN/CREAT SERPL: 27.5 (ref 7–25)
CALCIUM SPEC-SCNC: 8.9 MG/DL (ref 8.6–10.5)
CHLORIDE SERPL-SCNC: 106 MMOL/L (ref 98–107)
CO2 BLDA-SCNC: 25.1 MMOL/L (ref 22–33)
CO2 SERPL-SCNC: 22.2 MMOL/L (ref 22–29)
COHGB MFR BLD: 0.6 % (ref 0–5)
CREAT SERPL-MCNC: 0.8 MG/DL (ref 0.76–1.27)
DEPRECATED RDW RBC AUTO: 45.7 FL (ref 37–54)
EOSINOPHIL # BLD AUTO: 0 10*3/MM3 (ref 0–0.4)
EOSINOPHIL NFR BLD AUTO: 0 % (ref 0.3–6.2)
ERYTHROCYTE [DISTWIDTH] IN BLOOD BY AUTOMATED COUNT: 13.5 % (ref 12.3–15.4)
GFR SERPL CREATININE-BSD FRML MDRD: 94 ML/MIN/1.73
GLOBULIN UR ELPH-MCNC: 2.7 GM/DL
GLUCOSE SERPL-MCNC: 263 MG/DL (ref 65–99)
HCO3 BLDA-SCNC: 24.2 MMOL/L (ref 20–26)
HCT VFR BLD AUTO: 37 % (ref 37.5–51)
HCT VFR BLD CALC: 40.1 % (ref 38–51)
HGB BLD-MCNC: 12.8 G/DL (ref 13–17.7)
HGB BLDA-MCNC: 13.1 G/DL (ref 14–18)
HOLD SPECIMEN: NORMAL
HOLD SPECIMEN: NORMAL
IMM GRANULOCYTES # BLD AUTO: 0.04 10*3/MM3 (ref 0–0.05)
IMM GRANULOCYTES NFR BLD AUTO: 0.5 % (ref 0–0.5)
INHALED O2 CONCENTRATION: 21 %
LYMPHOCYTES # BLD AUTO: 0.78 10*3/MM3 (ref 0.7–3.1)
LYMPHOCYTES NFR BLD AUTO: 8.8 % (ref 19.6–45.3)
Lab: ABNORMAL
MCH RBC QN AUTO: 32 PG (ref 26.6–33)
MCHC RBC AUTO-ENTMCNC: 34.6 G/DL (ref 31.5–35.7)
MCV RBC AUTO: 92.5 FL (ref 79–97)
METHGB BLD QL: 0.3 % (ref 0–3)
MODALITY: ABNORMAL
MONOCYTES # BLD AUTO: 0.26 10*3/MM3 (ref 0.1–0.9)
MONOCYTES NFR BLD AUTO: 2.9 % (ref 5–12)
NEUTROPHILS NFR BLD AUTO: 7.76 10*3/MM3 (ref 1.7–7)
NEUTROPHILS NFR BLD AUTO: 87.7 % (ref 42.7–76)
NOTE: ABNORMAL
NRBC BLD AUTO-RTO: 0 /100 WBC (ref 0–0.2)
NT-PROBNP SERPL-MCNC: 360.8 PG/ML (ref 0–900)
OXYHGB MFR BLDV: 98.2 % (ref 94–99)
PCO2 BLDA: 31.3 MM HG (ref 35–45)
PCO2 TEMP ADJ BLD: ABNORMAL MM[HG]
PH BLDA: 7.5 PH UNITS (ref 7.35–7.45)
PH, TEMP CORRECTED: ABNORMAL
PLATELET # BLD AUTO: 197 10*3/MM3 (ref 140–450)
PMV BLD AUTO: 10.3 FL (ref 6–12)
PO2 BLDA: 122 MM HG (ref 83–108)
PO2 TEMP ADJ BLD: ABNORMAL MM[HG]
POTASSIUM SERPL-SCNC: 3.4 MMOL/L (ref 3.5–5.2)
PROT SERPL-MCNC: 6.1 G/DL (ref 6–8.5)
QT INTERVAL: 424 MS
QTC INTERVAL: 448 MS
RBC # BLD AUTO: 4 10*6/MM3 (ref 4.14–5.8)
SAO2 % BLDCOA: 99.1 % (ref 94–99)
SODIUM SERPL-SCNC: 142 MMOL/L (ref 136–145)
TROPONIN T SERPL-MCNC: <0.01 NG/ML (ref 0–0.03)
VENTILATOR MODE: ABNORMAL
WBC # BLD AUTO: 8.85 10*3/MM3 (ref 3.4–10.8)
WHOLE BLOOD HOLD SPECIMEN: NORMAL
WHOLE BLOOD HOLD SPECIMEN: NORMAL

## 2021-04-07 PROCEDURE — 82375 ASSAY CARBOXYHB QUANT: CPT

## 2021-04-07 PROCEDURE — 71250 CT THORAX DX C-: CPT | Performed by: RADIOLOGY

## 2021-04-07 PROCEDURE — 36600 WITHDRAWAL OF ARTERIAL BLOOD: CPT

## 2021-04-07 PROCEDURE — 84484 ASSAY OF TROPONIN QUANT: CPT | Performed by: PHYSICIAN ASSISTANT

## 2021-04-07 PROCEDURE — 93005 ELECTROCARDIOGRAM TRACING: CPT | Performed by: EMERGENCY MEDICINE

## 2021-04-07 PROCEDURE — 80053 COMPREHEN METABOLIC PANEL: CPT | Performed by: PHYSICIAN ASSISTANT

## 2021-04-07 PROCEDURE — 83880 ASSAY OF NATRIURETIC PEPTIDE: CPT | Performed by: PHYSICIAN ASSISTANT

## 2021-04-07 PROCEDURE — 25010000002 METHYLPREDNISOLONE PER 125 MG: Performed by: PHYSICIAN ASSISTANT

## 2021-04-07 PROCEDURE — 96374 THER/PROPH/DIAG INJ IV PUSH: CPT

## 2021-04-07 PROCEDURE — 83050 HGB METHEMOGLOBIN QUAN: CPT

## 2021-04-07 PROCEDURE — 94640 AIRWAY INHALATION TREATMENT: CPT

## 2021-04-07 PROCEDURE — 71045 X-RAY EXAM CHEST 1 VIEW: CPT | Performed by: RADIOLOGY

## 2021-04-07 PROCEDURE — 99283 EMERGENCY DEPT VISIT LOW MDM: CPT

## 2021-04-07 PROCEDURE — 71045 X-RAY EXAM CHEST 1 VIEW: CPT

## 2021-04-07 PROCEDURE — 94799 UNLISTED PULMONARY SVC/PX: CPT

## 2021-04-07 PROCEDURE — 71250 CT THORAX DX C-: CPT

## 2021-04-07 PROCEDURE — 82805 BLOOD GASES W/O2 SATURATION: CPT

## 2021-04-07 PROCEDURE — 93010 ELECTROCARDIOGRAM REPORT: CPT | Performed by: INTERNAL MEDICINE

## 2021-04-07 PROCEDURE — 93005 ELECTROCARDIOGRAM TRACING: CPT | Performed by: PHYSICIAN ASSISTANT

## 2021-04-07 PROCEDURE — 99284 EMERGENCY DEPT VISIT MOD MDM: CPT

## 2021-04-07 PROCEDURE — 85025 COMPLETE CBC W/AUTO DIFF WBC: CPT | Performed by: PHYSICIAN ASSISTANT

## 2021-04-07 RX ORDER — SODIUM CHLORIDE 0.9 % (FLUSH) 0.9 %
10 SYRINGE (ML) INJECTION AS NEEDED
Status: DISCONTINUED | OUTPATIENT
Start: 2021-04-07 | End: 2021-04-07 | Stop reason: HOSPADM

## 2021-04-07 RX ORDER — IPRATROPIUM BROMIDE AND ALBUTEROL SULFATE 2.5; .5 MG/3ML; MG/3ML
3 SOLUTION RESPIRATORY (INHALATION) EVERY 4 HOURS PRN
Qty: 360 ML | Refills: 0 | Status: ON HOLD | OUTPATIENT
Start: 2021-04-07 | End: 2021-05-24

## 2021-04-07 RX ORDER — METHYLPREDNISOLONE SODIUM SUCCINATE 125 MG/2ML
125 INJECTION, POWDER, LYOPHILIZED, FOR SOLUTION INTRAMUSCULAR; INTRAVENOUS ONCE
Status: COMPLETED | OUTPATIENT
Start: 2021-04-07 | End: 2021-04-07

## 2021-04-07 RX ORDER — IPRATROPIUM BROMIDE AND ALBUTEROL SULFATE 2.5; .5 MG/3ML; MG/3ML
3 SOLUTION RESPIRATORY (INHALATION) ONCE
Status: COMPLETED | OUTPATIENT
Start: 2021-04-07 | End: 2021-04-07

## 2021-04-07 RX ADMIN — METHYLPREDNISOLONE SODIUM SUCCINATE 125 MG: 125 INJECTION, POWDER, FOR SOLUTION INTRAMUSCULAR; INTRAVENOUS at 18:29

## 2021-04-07 RX ADMIN — IPRATROPIUM BROMIDE AND ALBUTEROL SULFATE 3 ML: .5; 3 SOLUTION RESPIRATORY (INHALATION) at 19:53

## 2021-04-07 NOTE — ED NOTES
Abdelrahman said to wait on covid because it will still be positive he is just out of quarantine      Millie Christian, PCT  04/07/21 2033

## 2021-04-07 NOTE — ED PROVIDER NOTES
"Subjective   35-year-old male that presents to the emergency department chief complaint coronary artery disease, diabetes.  Patient states he has had worsening shortness of breath over the last several days.  Patient states this has been a chronic issue since having COVID-19 last month.  Patient states he \"feels like I just cannot catch my breath\".  Patient denies any other associated medical problems.      History provided by:  Patient   used: No    Shortness of Breath  Severity:  Moderate  Onset quality:  Gradual  Duration:  2 weeks  Timing:  Intermittent  Progression:  Worsening  Chronicity:  New  Context: not activity, not animal exposure, not emotional upset, not fumes, not known allergens, not occupational exposure, not smoke exposure, not strong odors and not URI    Relieved by:  Nothing  Worsened by:  Nothing  Ineffective treatments:  None tried  Associated symptoms: cough    Associated symptoms: no headaches, no hemoptysis, no neck pain, no rash, no sputum production, no syncope and no vomiting        Review of Systems   Constitutional: Negative.    Eyes: Negative.  Negative for pain, redness and itching.   Respiratory: Positive for cough and shortness of breath. Negative for hemoptysis and sputum production.    Cardiovascular: Negative for syncope.   Gastrointestinal: Negative for vomiting.   Genitourinary: Negative.  Negative for dysuria, enuresis, flank pain, frequency, genital sores and hematuria.   Musculoskeletal: Negative.  Negative for back pain, gait problem and neck pain.   Skin: Negative.  Negative for pallor and rash.   Neurological: Negative for headaches.   Hematological: Negative.  Negative for adenopathy. Does not bruise/bleed easily.   Psychiatric/Behavioral: Negative for agitation, behavioral problems, confusion, decreased concentration and dysphoric mood.   All other systems reviewed and are negative.      Past Medical History:   Diagnosis Date   • BPH (benign " prostatic hyperplasia)    • Bradycardia     Positive tilt table testing 07/2016   • Coronary artery disease    • Diabetes mellitus (CMS/[facility])    • Diverticulosis    • Gastric ulcer with hemorrhage    • Gastroesophageal reflux disease 1/26/2021   • History of transfusion    • Hyperlipidemia    • Hypertension    • Psoriasis        No Known Allergies    Past Surgical History:   Procedure Laterality Date   • BACK SURGERY     • CARDIAC CATHETERIZATION N/A 9/20/2016    Procedure: Left Heart Cath;  Surgeon: Giovanni Buenrostro MD;  Location:  [facility] CATH INVASIVE [facility];  Service:    • CARDIAC CATHETERIZATION N/A 10/4/2016    Procedure: Left Heart Cath;  Surgeon: Bharathi Andrew MD;  Location:  [facility]ATH INV[facility];  Service:    • CARDIAC CATHETERIZATION N/A 5/9/2017    Procedure: Left Heart Cath;  Surgeon: Giovanni Buenrostro MD;  Location:  [facility]ATH INV[facility];  Service:    • CARDIAC CATHETERIZATION N/A 5/9/2017    Procedure: ERR;  Surgeon: Giovanni Buenrostro MD;  Location:  [facility]ATH INV[facility];  Service:    • CARDIAC CATHETERIZATION N/A 11/8/2019    Procedure: Left Heart Cath;  Surgeon: Abraham Oviedo MD;  Location:  [facility]ATH INV[facility];  Service: Cardiology   • CARDIAC CATHETERIZATION N/A 12/18/2019    Procedure: Coronary angiography;  Surgeon: Jay Barney IV, MD;  Location:  [facility]VASIVE [facility];  Service: Cardiovascular   • CHOLECYSTECTOMY     • COLONOSCOPY  11/13/2015    Dr. Martinez- internal hemorrhoids, sigmoid diverticulosis   • COLONOSCOPY N/A 8/17/2018    Procedure: COLONOSCOPY CPT CODE: 84849;  Surgeon: Gigi Geronimo MD;  Location: [facility] OR;  Service: Gastroenterology   • CORONARY ANGIOPLASTY WITH STENT PLACEMENT     • ENDOSCOPY N/A 8/17/2018    Procedure: ESOPHAGOGASTRODUODENOSCOPY WITH BIOPSY CPT CODE: 92256;  Surgeon: Gigi Geronimo MD;  Location: [facility] OR;  Service: Gastroenterology   • INTERVENTIONAL RADIOLOGY PROCEDURE N/A 12/18/2019     Procedure: Coil Embolization of septal to pulmonary artery fistuala.;  Surgeon: Jay Barney IV, MD;  Location:  [facility] CATH INVASIVE LOCATION;  Service: Cardiovascular   • ND RT/LT HEART CATHETERS N/A 2017    Procedure: Percutaneous Coronary Intervention;  Surgeon: Lincoln De Los Santos MD;  Location:  [facility]ATH INV[facility];  Service: Cardiology   • STOMACH SURGERY      FUSION OF BLEEDING ULCER   • UPPER GASTROINTESTINAL ENDOSCOPY  2015    Dr. Martinez- mild gastritis       Family History   Problem Relation Age of Onset   • Sick sinus syndrome Mother    • Heart failure Mother    • Diabetes Mother    • Liver cancer Father        Social History     Socioeconomic History   • Marital status:      Spouse name: Not on file   • Number of children: Not on file   • Years of education: Not on file   • Highest education level: Not on file   Tobacco Use   • Smoking status: Former Smoker     Packs/day: 3.00     Years: 40.00     Pack years: 120.00     Types: Cigarettes     Quit date:      Years since quittin.2   • Smokeless tobacco: Former User     Quit date: 1986   Substance and Sexual Activity   • Alcohol use: No   • Drug use: No   • Sexual activity: Defer           Objective   Physical Exam  Vitals and nursing note reviewed.   Constitutional:       General: He is not in acute distress.     Appearance: He is well-developed and normal weight. He is not ill-appearing, toxic-appearing or diaphoretic.      Interventions: He is not intubated.  HENT:      Head: Normocephalic and atraumatic.      Mouth/Throat:      Mouth: Mucous membranes are moist.      Pharynx: Oropharynx is clear. No pharyngeal swelling or oropharyngeal exudate.   Neck:      Thyroid: No thyromegaly.      Vascular: No hepatojugular reflux or JVD.      Trachea: No tracheal deviation.   Cardiovascular:      Rate and Rhythm: Normal rate and regular rhythm.  No extrasystoles are present.     Pulses: No decreased pulses.            Carotid pulses are on the right side with bruit and on the left side with bruit.     Heart sounds: No murmur heard.   No friction rub. No gallop.    Pulmonary:      Effort: Pulmonary effort is normal. No tachypnea, bradypnea, accessory muscle usage or respiratory distress. He is not intubated.      Breath sounds: Normal breath sounds. No stridor. No decreased breath sounds, wheezing, rhonchi or rales.   Chest:      Chest wall: No mass, deformity, tenderness, crepitus or edema. There is no dullness to percussion.   Musculoskeletal:         General: Normal range of motion.      Cervical back: Normal range of motion and neck supple.      Right lower leg: No tenderness. No edema.      Left lower leg: No tenderness. No edema.   Lymphadenopathy:      Cervical: No cervical adenopathy.   Skin:     General: Skin is warm and dry.      Capillary Refill: Capillary refill takes less than 2 seconds.      Coloration: Skin is not cyanotic or pale.      Findings: No erythema or rash.      Nails: There is no clubbing.   Neurological:      General: No focal deficit present.      Mental Status: He is alert and oriented to person, place, and time.   Psychiatric:         Mood and Affect: Mood normal. Mood is not anxious.         Behavior: Behavior normal. Behavior is not agitated.         Procedures           ED Course  ED Course as of Apr 08 2226 Wed Apr 07, 2021 1814 IMPRESSION:    Minimal patchy left lower lobe airspace opacity that could represent  pneumonia.    [BH]   1921   No consolidative airspace disease is definitely appreciated on this  study. There is some vague left upper lobe opacity that probably just  represents atelectasis. Additionally there is some peribronchial  vascular nodularity of the right lung and minimally on the left upper  lobe that could represent sequelae of viral infectious etiology.    [BH]      ED Course User Index  [BH] Ritchie Simpson PA-C                                            MDM    Final diagnoses:   Bronchitis with bronchospasm       ED Disposition  ED Disposition     ED Disposition Condition Comment    Discharge Stable           Camila Ortiz, APRN  [address]  formerly [facility] [id number] [phone]    Call in 1 day           Medication List      New Prescriptions    ipratropium-albuterol 0.5-2.5 mg/3 ml nebulizer  Commonly known as: DUO-NEB  Take 3 mL by nebulization Every 4 (Four) Hours As Needed for Wheezing.           Where to Get Your Medications      These medications were sent to [place], [place] - [address] [place] [address] - [phone]  - [phone] [address] [address]    Phone: [phone]   · ipratropium-albuterol 0.5-2.5 mg/3 ml nebulizer          Ritchie Simpson PA-C  04/08/21 9693

## 2021-04-16 ENCOUNTER — LAB (OUTPATIENT)
Dept: LAB | Facility: HOSPITAL | Age: 76
End: 2021-04-16

## 2021-04-16 ENCOUNTER — TRANSCRIBE ORDERS (OUTPATIENT)
Dept: ADMINISTRATIVE | Facility: HOSPITAL | Age: 76
End: 2021-04-16

## 2021-04-16 DIAGNOSIS — G47.00 INSOMNIA, UNSPECIFIED TYPE: ICD-10-CM

## 2021-04-16 DIAGNOSIS — R14.0 BLOATING: ICD-10-CM

## 2021-04-16 DIAGNOSIS — I10 ESSENTIAL HYPERTENSION, MALIGNANT: Primary | ICD-10-CM

## 2021-04-16 DIAGNOSIS — R19.4 CHANGE IN BOWEL HABITS: ICD-10-CM

## 2021-04-16 DIAGNOSIS — E78.5 HYPERLIPIDEMIA, UNSPECIFIED HYPERLIPIDEMIA TYPE: ICD-10-CM

## 2021-04-16 DIAGNOSIS — D64.9 ANEMIA, UNSPECIFIED TYPE: ICD-10-CM

## 2021-04-16 DIAGNOSIS — I10 ESSENTIAL HYPERTENSION, MALIGNANT: ICD-10-CM

## 2021-04-16 DIAGNOSIS — R68.81 EARLY SATIETY: ICD-10-CM

## 2021-04-16 DIAGNOSIS — K21.9 GASTROESOPHAGEAL REFLUX DISEASE WITHOUT ESOPHAGITIS: ICD-10-CM

## 2021-04-16 DIAGNOSIS — J20.9 ACUTE BRONCHITIS, UNSPECIFIED ORGANISM: ICD-10-CM

## 2021-04-16 DIAGNOSIS — E55.9 VITAMIN D DEFICIENCY: ICD-10-CM

## 2021-04-16 DIAGNOSIS — R73.9 HYPERGLYCEMIA: ICD-10-CM

## 2021-04-16 DIAGNOSIS — R13.19 ESOPHAGEAL DYSPHAGIA: ICD-10-CM

## 2021-04-16 DIAGNOSIS — Z01.818 PREOPERATIVE CLEARANCE: ICD-10-CM

## 2021-04-16 DIAGNOSIS — R63.4 WEIGHT LOSS, ABNORMAL: ICD-10-CM

## 2021-04-16 LAB
25(OH)D3 SERPL-MCNC: 21.1 NG/ML
BASOPHILS # BLD AUTO: 0.02 10*3/MM3 (ref 0–0.2)
BASOPHILS NFR BLD AUTO: 0.2 % (ref 0–1.5)
DEPRECATED RDW RBC AUTO: 48.2 FL (ref 37–54)
EOSINOPHIL # BLD AUTO: 0 10*3/MM3 (ref 0–0.4)
EOSINOPHIL NFR BLD AUTO: 0 % (ref 0.3–6.2)
ERYTHROCYTE [DISTWIDTH] IN BLOOD BY AUTOMATED COUNT: 13.9 % (ref 12.3–15.4)
HBA1C MFR BLD: 6 % (ref 4.8–5.6)
HCT VFR BLD AUTO: 38.1 % (ref 37.5–51)
HGB BLD-MCNC: 12.9 G/DL (ref 13–17.7)
IMM GRANULOCYTES # BLD AUTO: 0.05 10*3/MM3 (ref 0–0.05)
IMM GRANULOCYTES NFR BLD AUTO: 0.5 % (ref 0–0.5)
LYMPHOCYTES # BLD AUTO: 0.65 10*3/MM3 (ref 0.7–3.1)
LYMPHOCYTES NFR BLD AUTO: 6.5 % (ref 19.6–45.3)
MCH RBC QN AUTO: 32.3 PG (ref 26.6–33)
MCHC RBC AUTO-ENTMCNC: 33.9 G/DL (ref 31.5–35.7)
MCV RBC AUTO: 95.5 FL (ref 79–97)
MONOCYTES # BLD AUTO: 0.13 10*3/MM3 (ref 0.1–0.9)
MONOCYTES NFR BLD AUTO: 1.3 % (ref 5–12)
NEUTROPHILS NFR BLD AUTO: 9.11 10*3/MM3 (ref 1.7–7)
NEUTROPHILS NFR BLD AUTO: 91.5 % (ref 42.7–76)
NRBC BLD AUTO-RTO: 0 /100 WBC (ref 0–0.2)
PLATELET # BLD AUTO: 213 10*3/MM3 (ref 140–450)
PMV BLD AUTO: 11 FL (ref 6–12)
RBC # BLD AUTO: 3.99 10*6/MM3 (ref 4.14–5.8)
WBC # BLD AUTO: 9.96 10*3/MM3 (ref 3.4–10.8)

## 2021-04-16 PROCEDURE — 36415 COLL VENOUS BLD VENIPUNCTURE: CPT

## 2021-04-16 PROCEDURE — 83036 HEMOGLOBIN GLYCOSYLATED A1C: CPT

## 2021-04-16 PROCEDURE — 84425 ASSAY OF VITAMIN B-1: CPT

## 2021-04-16 PROCEDURE — C9803 HOPD COVID-19 SPEC COLLECT: HCPCS | Performed by: PHYSICIAN ASSISTANT

## 2021-04-16 PROCEDURE — 85025 COMPLETE CBC W/AUTO DIFF WBC: CPT

## 2021-04-16 PROCEDURE — U0004 COV-19 TEST NON-CDC HGH THRU: HCPCS | Performed by: PHYSICIAN ASSISTANT

## 2021-04-16 PROCEDURE — 82306 VITAMIN D 25 HYDROXY: CPT

## 2021-04-17 LAB — SARS-COV-2 RNA NOSE QL NAA+PROBE: NOT DETECTED

## 2021-04-20 ENCOUNTER — ANESTHESIA EVENT (OUTPATIENT)
Dept: PERIOP | Facility: HOSPITAL | Age: 76
End: 2021-04-20

## 2021-04-20 ENCOUNTER — ANESTHESIA (OUTPATIENT)
Dept: PERIOP | Facility: HOSPITAL | Age: 76
End: 2021-04-20

## 2021-04-20 ENCOUNTER — HOSPITAL ENCOUNTER (OUTPATIENT)
Facility: HOSPITAL | Age: 76
Setting detail: HOSPITAL OUTPATIENT SURGERY
Discharge: HOME | End: 2021-04-20
Attending: INTERNAL MEDICINE | Admitting: INTERNAL MEDICINE

## 2021-04-20 VITALS
DIASTOLIC BLOOD PRESSURE: 67 MMHG | TEMPERATURE: 97.6 F | RESPIRATION RATE: 16 BRPM | BODY MASS INDEX: 21.56 KG/M2 | HEIGHT: 71 IN | SYSTOLIC BLOOD PRESSURE: 167 MMHG | OXYGEN SATURATION: 100 % | WEIGHT: 154 LBS | HEART RATE: 42 BPM

## 2021-04-20 DIAGNOSIS — R13.19 ESOPHAGEAL DYSPHAGIA: Primary | ICD-10-CM

## 2021-04-20 DIAGNOSIS — R13.10 DYSPHAGIA, UNSPECIFIED TYPE: ICD-10-CM

## 2021-04-20 LAB — GLUCOSE BLDC GLUCOMTR-MCNC: 68 MG/DL (ref 70–130)

## 2021-04-20 PROCEDURE — 43239 EGD BIOPSY SINGLE/MULTIPLE: CPT | Performed by: INTERNAL MEDICINE

## 2021-04-20 PROCEDURE — 88305 TISSUE EXAM BY PATHOLOGIST: CPT | Performed by: INTERNAL MEDICINE

## 2021-04-20 PROCEDURE — 82962 GLUCOSE BLOOD TEST: CPT

## 2021-04-20 PROCEDURE — 25010000002 PROPOFOL 10 MG/ML EMULSION: Performed by: NURSE ANESTHETIST, CERTIFIED REGISTERED

## 2021-04-20 RX ORDER — OXYCODONE HYDROCHLORIDE AND ACETAMINOPHEN 5; 325 MG/1; MG/1
1 TABLET ORAL ONCE AS NEEDED
Status: DISCONTINUED | OUTPATIENT
Start: 2021-04-20 | End: 2021-04-20 | Stop reason: HOSPADM

## 2021-04-20 RX ORDER — SODIUM CHLORIDE, SODIUM LACTATE, POTASSIUM CHLORIDE, CALCIUM CHLORIDE 600; 310; 30; 20 MG/100ML; MG/100ML; MG/100ML; MG/100ML
INJECTION, SOLUTION INTRAVENOUS CONTINUOUS PRN
Status: DISCONTINUED | OUTPATIENT
Start: 2021-04-20 | End: 2021-04-20 | Stop reason: SURG

## 2021-04-20 RX ORDER — ONDANSETRON 2 MG/ML
4 INJECTION INTRAMUSCULAR; INTRAVENOUS AS NEEDED
Status: DISCONTINUED | OUTPATIENT
Start: 2021-04-20 | End: 2021-04-20 | Stop reason: HOSPADM

## 2021-04-20 RX ORDER — DROPERIDOL 2.5 MG/ML
0.62 INJECTION, SOLUTION INTRAMUSCULAR; INTRAVENOUS ONCE AS NEEDED
Status: DISCONTINUED | OUTPATIENT
Start: 2021-04-20 | End: 2021-04-20 | Stop reason: HOSPADM

## 2021-04-20 RX ORDER — SODIUM CHLORIDE 0.9 % (FLUSH) 0.9 %
10 SYRINGE (ML) INJECTION EVERY 12 HOURS SCHEDULED
Status: DISCONTINUED | OUTPATIENT
Start: 2021-04-20 | End: 2021-04-20 | Stop reason: HOSPADM

## 2021-04-20 RX ORDER — SODIUM CHLORIDE, SODIUM LACTATE, POTASSIUM CHLORIDE, CALCIUM CHLORIDE 600; 310; 30; 20 MG/100ML; MG/100ML; MG/100ML; MG/100ML
125 INJECTION, SOLUTION INTRAVENOUS ONCE
Status: DISCONTINUED | OUTPATIENT
Start: 2021-04-20 | End: 2021-04-20 | Stop reason: HOSPADM

## 2021-04-20 RX ORDER — LIDOCAINE HYDROCHLORIDE 20 MG/ML
INJECTION, SOLUTION INFILTRATION; PERINEURAL AS NEEDED
Status: DISCONTINUED | OUTPATIENT
Start: 2021-04-20 | End: 2021-04-20 | Stop reason: SURG

## 2021-04-20 RX ORDER — GLYCOPYRROLATE 0.2 MG/ML
INJECTION INTRAMUSCULAR; INTRAVENOUS AS NEEDED
Status: DISCONTINUED | OUTPATIENT
Start: 2021-04-20 | End: 2021-04-20 | Stop reason: SURG

## 2021-04-20 RX ORDER — SODIUM CHLORIDE 0.9 % (FLUSH) 0.9 %
10 SYRINGE (ML) INJECTION AS NEEDED
Status: DISCONTINUED | OUTPATIENT
Start: 2021-04-20 | End: 2021-04-20 | Stop reason: HOSPADM

## 2021-04-20 RX ORDER — SODIUM CHLORIDE, SODIUM LACTATE, POTASSIUM CHLORIDE, CALCIUM CHLORIDE 600; 310; 30; 20 MG/100ML; MG/100ML; MG/100ML; MG/100ML
100 INJECTION, SOLUTION INTRAVENOUS ONCE AS NEEDED
Status: DISCONTINUED | OUTPATIENT
Start: 2021-04-20 | End: 2021-04-20 | Stop reason: HOSPADM

## 2021-04-20 RX ORDER — MEPERIDINE HYDROCHLORIDE 25 MG/ML
12.5 INJECTION INTRAMUSCULAR; INTRAVENOUS; SUBCUTANEOUS
Status: DISCONTINUED | OUTPATIENT
Start: 2021-04-20 | End: 2021-04-20 | Stop reason: HOSPADM

## 2021-04-20 RX ORDER — MIDAZOLAM HYDROCHLORIDE 1 MG/ML
1 INJECTION INTRAMUSCULAR; INTRAVENOUS
Status: DISCONTINUED | OUTPATIENT
Start: 2021-04-20 | End: 2021-04-20 | Stop reason: HOSPADM

## 2021-04-20 RX ORDER — MEMANTINE HYDROCHLORIDE AND DONEPEZIL HYDROCHLORIDE 28; 10 MG/1; MG/1
1 CAPSULE ORAL DAILY
COMMUNITY
End: 2021-06-16

## 2021-04-20 RX ORDER — MIDAZOLAM HYDROCHLORIDE 1 MG/ML
0.5 INJECTION INTRAMUSCULAR; INTRAVENOUS
Status: DISCONTINUED | OUTPATIENT
Start: 2021-04-20 | End: 2021-04-20 | Stop reason: HOSPADM

## 2021-04-20 RX ORDER — DONEPEZIL HYDROCHLORIDE 10 MG/1
10 TABLET, FILM COATED ORAL DAILY
COMMUNITY
End: 2021-06-05 | Stop reason: HOSPADM

## 2021-04-20 RX ORDER — PROPOFOL 10 MG/ML
VIAL (ML) INTRAVENOUS AS NEEDED
Status: DISCONTINUED | OUTPATIENT
Start: 2021-04-20 | End: 2021-04-20 | Stop reason: SURG

## 2021-04-20 RX ORDER — IPRATROPIUM BROMIDE AND ALBUTEROL SULFATE 2.5; .5 MG/3ML; MG/3ML
3 SOLUTION RESPIRATORY (INHALATION) ONCE AS NEEDED
Status: DISCONTINUED | OUTPATIENT
Start: 2021-04-20 | End: 2021-04-20 | Stop reason: HOSPADM

## 2021-04-20 RX ORDER — FENTANYL CITRATE 50 UG/ML
50 INJECTION, SOLUTION INTRAMUSCULAR; INTRAVENOUS
Status: DISCONTINUED | OUTPATIENT
Start: 2021-04-20 | End: 2021-04-20 | Stop reason: HOSPADM

## 2021-04-20 RX ADMIN — PROPOFOL 20 MG: 10 INJECTION, EMULSION INTRAVENOUS at 09:15

## 2021-04-20 RX ADMIN — GLYCOPYRROLATE 0.2 MG: 0.2 INJECTION, SOLUTION INTRAMUSCULAR; INTRAVENOUS at 09:07

## 2021-04-20 RX ADMIN — LIDOCAINE HYDROCHLORIDE 60 MG: 20 INJECTION, SOLUTION INFILTRATION; PERINEURAL at 09:09

## 2021-04-20 RX ADMIN — SODIUM CHLORIDE, POTASSIUM CHLORIDE, SODIUM LACTATE AND CALCIUM CHLORIDE: 600; 310; 30; 20 INJECTION, SOLUTION INTRAVENOUS at 09:05

## 2021-04-20 RX ADMIN — PROPOFOL 60 MG: 10 INJECTION, EMULSION INTRAVENOUS at 09:09

## 2021-04-20 NOTE — ANESTHESIA PREPROCEDURE EVALUATION
Anesthesia Evaluation     Patient summary reviewed and Nursing notes reviewed   no history of anesthetic complications:  NPO Solid Status: > 8 hours  NPO Liquid Status: > 8 hours           Airway   Mallampati: II  TM distance: >3 FB  Neck ROM: full  No difficulty expected  Dental - normal exam     Pulmonary - negative pulmonary ROS and normal exam    breath sounds clear to auscultation  Cardiovascular - normal exam    ECG reviewed  PT is on anticoagulation therapy  Rhythm: regular  Rate: normal    (+) hypertension, CAD, angina, hyperlipidemia,       Neuro/Psych  (+) dizziness/light headedness, psychiatric history,     GI/Hepatic/Renal/Endo    (+)  GI bleeding , diabetes mellitus,     Musculoskeletal (-) negative ROS    Abdominal  - normal exam    Bowel sounds: normal.   Substance History - negative use     OB/GYN negative ob/gyn ROS         Other   blood dyscrasia (anemia),                       Anesthesia Plan    ASA 3     general   total IV anesthesia  intravenous induction     Anesthetic plan, all risks, benefits, and alternatives have been provided, discussed and informed consent has been obtained with: patient.    Plan discussed with CRNA.

## 2021-04-20 NOTE — ANESTHESIA POSTPROCEDURE EVALUATION
Patient: Fidencio Luciano    Procedure Summary     Date: 04/20/21 Room / Location: [facility] OR  /  [facility] OR    Anesthesia Start: 0905 Anesthesia Stop: 0919    Procedure: ESOPHAGOGASTRODUODENOSCOPY (N/A Esophagus) Diagnosis:       Dysphagia, unspecified type      (Dysphagia, unspecified type [R13.10])    Surgeons: Geno Vernon MD Provider: Chris Colorado MD    Anesthesia Type: general ASA Status: 3          Anesthesia Type: general    Vitals  No vitals data found for the desired time range.          Post Anesthesia Care and Evaluation    Patient location during evaluation: PHASE II  Patient participation: complete - patient participated  Level of consciousness: awake and alert  Pain score: 1  Pain management: adequate  Airway patency: patent  Anesthetic complications: No anesthetic complications  PONV Status: controlled  Cardiovascular status: acceptable  Respiratory status: acceptable  Hydration status: acceptable

## 2021-04-20 NOTE — H&P
Chief complaint  Dysphagia    Subjective     Patient is a 75 y.o. male who presents today for an EGD.  He continues to have dysphagia symptoms with solids and liquids.  He denies black stools and rectal bleeding.  Family history is negative for GI disease.  He also has acid reflux and takes Protonix 40 mg daily.  Patient has also had a recent weight loss.  Medical, surgical, and social histories were reviewed and are listed below.      Review of Systems  Review of Systems - General ROS: negative for - weight loss  Psychological ROS: negative for - behavioral disorder  Ophthalmic ROS: negative for - dry eyes  ENT ROS: negative for - vertigo or vocal changes  Hematological and Lymphatic ROS: negative for - jaundice or swollen lymph nodes  Respiratory ROS: negative for - sputum changes or stridor  Cardiovascular ROS: negative for - irregular heartbeat or murmur  Gastrointestinal ROS: positive for-dysphagia, heartburn; negative for - blood in stools or change in stools  Genito-Urinary ROS: negative for - hematuria or incontinence  Musculoskeletal ROS: negative for - gait disturbance      History  Past Medical History:   Diagnosis Date   • BPH (benign prostatic hyperplasia)    • Bradycardia     Positive tilt table testing 07/2016   • Coronary artery disease    • Diabetes mellitus (CMS/HCC)    • Diverticulosis    • Elevated cholesterol    • Gastric ulcer with hemorrhage    • Gastroesophageal reflux disease 1/26/2021   • History of transfusion    • Hyperlipidemia    • Hypertension    • Psoriasis      Past Surgical History:   Procedure Laterality Date   • BACK SURGERY     • CARDIAC CATHETERIZATION N/A 9/20/2016    Procedure: Left Heart Cath;  Surgeon: Giovanni Beunrostro MD;  Location: [facility] CATH INVASIVE LOCATION;  Service:    • CARDIAC CATHETERIZATION N/A 10/4/2016    Procedure: Left Heart Cath;  Surgeon: Bharathi Andrew MD;  Location: [facility] CATH INVASIVE LOCATION;  Service:    • CARDIAC CATHETERIZATION N/A 5/9/2017     Procedure: Left Heart Cath;  Surgeon: Giovanni Buenrostro MD;  Location:  [facility]ATH INVASIVE [facility];  Service:    • CARDIAC CATHETERIZATION N/A 5/9/2017    Procedure: ERR;  Surgeon: Giovanni Buenrostro MD;  Location:  [facility]ATH INV[facility]IVE [facility];  Service:    • CARDIAC CATHETERIZATION N/A 11/8/2019    Procedure: Left Heart Cath;  Surgeon: Abraham Oviedo MD;  Location:  [facility]ATH [facility];  Service: Cardiology   • CARDIAC CATHETERIZATION N/A 12/18/2019    Procedure: Coronary angiography;  Surgeon: Jay Barney IV, MD;  Location:  [facility] INVASIVE [facility];  Service: Cardiovascular   • CHOLECYSTECTOMY     • COLONOSCOPY  11/13/2015    Dr. Martinez- internal hemorrhoids, sigmoid diverticulosis   • COLONOSCOPY N/A 8/17/2018    Procedure: COLONOSCOPY CPT CODE: 95636;  Surgeon: Gigi Geronimo MD;  Location:  [facility] OR;  Service: Gastroenterology   • CORONARY ANGIOPLASTY WITH STENT PLACEMENT     • ENDOSCOPY N/A 8/17/2018    Procedure: ESOPHAGOGASTRODUODENOSCOPY WITH BIOPSY CPT CODE: 33311;  Surgeon: Gigi Geronimo MD;  Location:  [facility] OR;  Service: Gastroenterology   • INTERVENTIONAL RADIOLOGY PROCEDURE N/A 12/18/2019    Procedure: Coil Embolization of septal to pulmonary artery fistuala.;  Surgeon: Jay Barney IV, MD;  Location:  [facility] INVAS[facility];  Service: Cardiovascular   • AL RT/LT HEART CATHETERS N/A 5/9/2017    Procedure: Percutaneous Coronary Intervention;  Surgeon: Lincoln De Los Santos MD;  Location:  [facility] CATH INVASIVE [facility];  Service: Cardiology   • STOMACH SURGERY      FUSION OF BLEEDING ULCER   • UPPER GASTROINTESTINAL ENDOSCOPY  11/13/2015    Dr. Martinez- mild gastritis     Family History   Problem Relation Age of Onset   • Sick sinus syndrome Mother    • Heart failure Mother    • Diabetes Mother    • Liver cancer Father      Social History     Tobacco Use   • Smoking status: Former Smoker     Packs/day: 3.00     Years: 40.00     Pack years: 120.00      Types: Cigarettes     Quit date:      Years since quittin.3   • Smokeless tobacco: Former User     Quit date: 1986   Vaping Use   • Vaping Use: Never used   Substance Use Topics   • Alcohol use: No   • Drug use: No     Medications Prior to Admission   Medication Sig Dispense Refill Last Dose   • amLODIPine (NORVASC) 5 MG tablet TAKE ONE TABLET BY MOUTH EVERY DAY FOR BLOOD PRESSURE 30 tablet 5 2021 at Unknown time   • aspirin 81 MG tablet Take 81 mg by mouth Daily.   2021 at Unknown time   • atorvastatin (LIPITOR) 80 MG tablet Take 1 tablet by mouth Every Night. 90 tablet 5 2021 at Unknown time   • donepezil (ARICEPT) 10 MG tablet Take 10 mg by mouth Every Night.   2021 at Unknown time   • ferrous sulfate 325 (65 FE) MG tablet Take 1 tablet by mouth 3 (Three) Times a Day With Meals. 90 tablet 4 2021 at Unknown time   • finasteride (PROSCAR) 5 MG tablet Take 1 tablet by mouth Daily. 9 tablet 3 2021 at Unknown time   • ipratropium-albuterol (DUO-NEB) 0.5-2.5 mg/3 ml nebulizer Take 3 mL by nebulization Every 4 (Four) Hours As Needed for Wheezing. 360 mL 0 Past Week at Unknown time   • isosorbide mononitrate (IMDUR) 60 MG 24 hr tablet Take 1 tablet by mouth Every Morning. 30 tablet 11 2021 at 0645   • lisinopril (PRINIVIL,ZESTRIL) 20 MG tablet Take 1 tablet by mouth Daily. 30 tablet 5 2021 at 0645   • Memantine HCl-Donepezil HCl (Namzaric) 28-10 MG capsule sustained-release 24 hr Take 1 capsule by mouth Daily.   2021 at Unknown time   • pantoprazole (PROTONIX) 40 MG EC tablet Take 40 mg by mouth Daily.   2021 at Unknown time   • clopidogrel (PLAVIX) 75 MG tablet Take 1 tablet by mouth Daily. 30 tablet 5 4/15/2021   • meloxicam (MOBIC) 7.5 MG tablet Take 7.5 mg by mouth 2 (Two) Times a Day.   Unknown at Unknown time   • nitroglycerin (NITROSTAT) 0.4 MG SL tablet 1 under the tongue as needed for angina, may repeat q5mins for up three doses 25 tablet 2 More  than a month at Unknown time     Allergies:  Patient has no known allergies.    Objective     Vital Signs  Temp:  [97.9 °F (36.6 °C)] 97.9 °F (36.6 °C)  Heart Rate:  [39] 39  Resp:  [18] 18  BP: (152)/(58) 152/58    Physical Exam  General:  This is a WD pleasant male in no acute distress; appears weak; thin-appearing  Vital signs stable, afebrile  HEENT exam:  WNL. Sclera are anicteric.  EOMI  Neck:  supple, FROM.  No JVD.  Trachea midline  Lungs:  Respiratory effort normal. Auscultation: Clear, without wheezes, rhonchi, rales  Heart:  Regular rate and rhythm, without murmur, gallop, rub.  No pedal edema  Abdomen:  No tenderness or palpable masses;  Bowel sounds normal  Musculoskeletal:  muscle strength/tone is normal.    Psyc:  alert, oriented x 3.  Mood and affect are appropriate  skin:  Warm with good turgor.  Without rash or lesion  extremities:  Examination of the extremities revealed no cyanosis, clubbing or edema.    Results Review:       Results from last 7 days   Lab Units 04/16/21  1048   WBC 10*3/mm3 9.96   HEMOGLOBIN g/dL 12.9*   HEMATOCRIT % 38.1   PLATELETS 10*3/mm3 213               Invalid input(s): PROTEstimated Creatinine Clearance: 80.1 mL/min (by C-G formula based on SCr of 0.8 mg/dL).  No results found for: AMMONIA      No results found for: BLOODCX  No results found for: URINECX  No results found for: WOUNDCX  No results found for: STOOLCX    Imaging:  Imaging Results (Last 24 Hours)     ** No results found for the last 24 hours. **                Impression:  Patient Active Problem List   Diagnosis Code   • Essential hypertension I10   • Type 2 diabetes mellitus (CMS/HCC) E11.9   • Benign prostatic hyperplasia N40.0   • ASCVD (arteriosclerotic cardiovascular disease), s/p stenting of 80 % stenosis in left circumflex on 10/05/16and 60% stenosis in the LAD, clinically stable.  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  Dysphagia  2.  Heartburn  3.  Weight Loss    Plan:  The patient will undergo an EGD with possible dilatation.  The procedure was explained to the patient who voiced understanding and agreement.    IGOR Zacarias  04/20/21  08:37 EDT

## 2021-04-20 NOTE — OP NOTE
ESOPHAGOGASTRODUODENOSCOPY PROCEDURE REPORT    Fidencio Luciano  4/20/2021    GASTROENTEROLOGIST:  Geno Vernon MD     PRE-PROCEDURE DIAGNOSIS:  Dysphagia, unspecified type [R13.10]    POST-PROCEDURE DIAGNOSIS:  1.  Erosive gastritis    Procedure(s):  ESOPHAGOGASTRODUODENOSCOPY    ANESTHESIA:  Propofol administered by anesthesia.  See anesthesia notes for ASA classification    STAFF:  Circulator: Millie Del Cid RN  Scrub Person: Davis Heller    FINDINGS:  1.  Normal-appearing esophagus.  However, there did appear to be irregular contractions of the esophagus.  2.  Erosive gastritis involving the antrum and body of the stomach.  Biopsies were taken to evaluate for H. pylori.  3.  Normal-appearing duodenal bulb to the second third portion of duodenum.    OPERATIVE PROCEDURE:  After proper informed consent was obtained, patient was transferred to the OR/endoscopy suite.  Patient was then placed in left lateral decubitus position. The Olympus 180 series video gastroscope was inserted orally under direct visualization.  Esophagus, stomach, and duodenum were inspected.  The endoscope was passed to the third portion of the duodenum.  Scope was retroflexed for visualization of the cardia and incisuraThe endoscope was then withdrawn. Patient tolerated the procedure well. There were no immediate complications.    ESTIMATED BLOOD LOSS:  None    COMPLICATIONS;  None    RECOMMENDATIONS/ PLAN:  1.  Continue pantoprazole.  2.  Proceed with upper GI series to evaluate for esophageal dysmotility.  3.  Follow-up in GI clinic.    Geno Vernon MD     04/20/21 09:19 EDT

## 2021-04-21 ENCOUNTER — TRANSCRIBE ORDERS (OUTPATIENT)
Dept: ADMINISTRATIVE | Facility: HOSPITAL | Age: 76
End: 2021-04-21

## 2021-04-21 DIAGNOSIS — Z01.818 PRE-OPERATIVE CLEARANCE: Primary | ICD-10-CM

## 2021-04-22 LAB — LAB AP CASE REPORT: NORMAL

## 2021-04-23 ENCOUNTER — LAB (OUTPATIENT)
Dept: LAB | Facility: HOSPITAL | Age: 76
End: 2021-04-23

## 2021-04-23 DIAGNOSIS — Z01.818 PRE-OPERATIVE CLEARANCE: ICD-10-CM

## 2021-04-23 PROCEDURE — U0004 COV-19 TEST NON-CDC HGH THRU: HCPCS | Performed by: RADIOLOGY

## 2021-04-23 PROCEDURE — C9803 HOPD COVID-19 SPEC COLLECT: HCPCS

## 2021-04-24 LAB
SARS-COV-2 RNA NOSE QL NAA+PROBE: NOT DETECTED
VIT B1 BLD-SCNC: 148.2 NMOL/L (ref 66.5–200)

## 2021-04-26 NOTE — PROGRESS NOTES
The biopsies of your esophagus revealed minimal esophagitis.  Biopsies of the stomach were negative for H. pylori gastritis.  Biopsies of the small bowel were negative for celiac disease.  I will have you undergo upper GI series to evaluate the function of your esophagus.

## 2021-04-27 ENCOUNTER — HOSPITAL ENCOUNTER (OUTPATIENT)
Dept: GENERAL RADIOLOGY | Facility: HOSPITAL | Age: 76
Discharge: HOME | End: 2021-04-27
Admitting: INTERNAL MEDICINE

## 2021-04-27 DIAGNOSIS — R13.19 ESOPHAGEAL DYSPHAGIA: ICD-10-CM

## 2021-04-27 PROCEDURE — 74246 X-RAY XM UPR GI TRC 2CNTRST: CPT | Performed by: RADIOLOGY

## 2021-04-27 PROCEDURE — 74246 X-RAY XM UPR GI TRC 2CNTRST: CPT

## 2021-04-28 NOTE — PROGRESS NOTES
Your upper GI series revealed a small hiatal hernia.  You did have irregular contractions of your esophagus.  These are the muscles that move food through your esophagus.  The muscles do not adequately move the food through and that is why you are experiencing difficulty swallowing.  You also have gastroesophageal reflux.  Continue your pantoprazole.  Please keep your follow-up appointment.

## 2021-05-13 ENCOUNTER — TRANSCRIBE ORDERS (OUTPATIENT)
Dept: ADMINISTRATIVE | Facility: HOSPITAL | Age: 76
End: 2021-05-13

## 2021-05-13 DIAGNOSIS — R42 DIZZINESS AND GIDDINESS: Primary | ICD-10-CM

## 2021-05-13 DIAGNOSIS — G44.59 OTHER COMPLICATED HEADACHE SYNDROME: Primary | ICD-10-CM

## 2021-05-24 ENCOUNTER — APPOINTMENT (OUTPATIENT)
Dept: GENERAL RADIOLOGY | Facility: HOSPITAL | Age: 76
End: 2021-05-24

## 2021-05-24 ENCOUNTER — APPOINTMENT (OUTPATIENT)
Dept: ULTRASOUND IMAGING | Facility: HOSPITAL | Age: 76
End: 2021-05-24

## 2021-05-24 ENCOUNTER — APPOINTMENT (OUTPATIENT)
Dept: CT IMAGING | Facility: HOSPITAL | Age: 76
End: 2021-05-24

## 2021-05-24 ENCOUNTER — HOSPITAL ENCOUNTER (OUTPATIENT)
Facility: HOSPITAL | Age: 76
Setting detail: OBSERVATION
Discharge: HOME HEALTH | End: 2021-06-05
Attending: FAMILY MEDICINE | Admitting: INTERNAL MEDICINE

## 2021-05-24 DIAGNOSIS — R07.9 CHEST PAIN, UNSPECIFIED TYPE: ICD-10-CM

## 2021-05-24 DIAGNOSIS — F03.90 DEMENTIA WITHOUT BEHAVIORAL DISTURBANCE, UNSPECIFIED DEMENTIA TYPE: Primary | ICD-10-CM

## 2021-05-24 DIAGNOSIS — R53.1 WEAKNESS GENERALIZED: ICD-10-CM

## 2021-05-24 DIAGNOSIS — R45.89 SENSE OF IMPENDING DOOM: ICD-10-CM

## 2021-05-24 PROBLEM — I25.119 CORONARY ARTERY DISEASE INVOLVING NATIVE CORONARY ARTERY OF NATIVE HEART WITH ANGINA PECTORIS: Status: RESOLVED | Noted: 2019-11-07 | Resolved: 2021-05-24

## 2021-05-24 PROBLEM — R14.0 BLOATING: Status: RESOLVED | Noted: 2021-01-26 | Resolved: 2021-05-24

## 2021-05-24 LAB
ALBUMIN SERPL-MCNC: 4.01 G/DL (ref 3.5–5.2)
ALBUMIN/GLOB SERPL: 1.4 G/DL
ALP SERPL-CCNC: 128 U/L (ref 39–117)
ALT SERPL W P-5'-P-CCNC: 34 U/L (ref 1–41)
AMMONIA BLD-SCNC: 13 UMOL/L (ref 16–60)
ANION GAP SERPL CALCULATED.3IONS-SCNC: 11.8 MMOL/L (ref 5–15)
APTT PPP: 24.5 SECONDS (ref 25.6–35.3)
AST SERPL-CCNC: 29 U/L (ref 1–40)
BACTERIA UR QL AUTO: ABNORMAL /HPF
BASOPHILS # BLD AUTO: 0.11 10*3/MM3 (ref 0–0.2)
BASOPHILS NFR BLD AUTO: 0.9 % (ref 0–1.5)
BILIRUB SERPL-MCNC: 0.8 MG/DL (ref 0–1.2)
BILIRUB UR QL STRIP: NEGATIVE
BUN SERPL-MCNC: 25 MG/DL (ref 8–23)
BUN/CREAT SERPL: 34.2 (ref 7–25)
CALCIUM SPEC-SCNC: 9.4 MG/DL (ref 8.6–10.5)
CHLORIDE SERPL-SCNC: 106 MMOL/L (ref 98–107)
CLARITY UR: CLEAR
CO2 SERPL-SCNC: 24.2 MMOL/L (ref 22–29)
COD CRY URNS QL: ABNORMAL /HPF
COLOR UR: ABNORMAL
CREAT SERPL-MCNC: 0.73 MG/DL (ref 0.76–1.27)
CRP SERPL-MCNC: <0.3 MG/DL (ref 0–0.5)
D DIMER PPP FEU-MCNC: 4.9 MCGFEU/ML (ref 0–0.5)
D-LACTATE SERPL-SCNC: 1.3 MMOL/L (ref 0.5–2)
DEPRECATED RDW RBC AUTO: 44.8 FL (ref 37–54)
EOSINOPHIL # BLD AUTO: 0.47 10*3/MM3 (ref 0–0.4)
EOSINOPHIL NFR BLD AUTO: 3.9 % (ref 0.3–6.2)
ERYTHROCYTE [DISTWIDTH] IN BLOOD BY AUTOMATED COUNT: 13 % (ref 12.3–15.4)
FLUAV RNA RESP QL NAA+PROBE: NOT DETECTED
FLUBV RNA RESP QL NAA+PROBE: NOT DETECTED
GFR SERPL CREATININE-BSD FRML MDRD: 105 ML/MIN/1.73
GLOBULIN UR ELPH-MCNC: 2.8 GM/DL
GLUCOSE BLDC GLUCOMTR-MCNC: 108 MG/DL (ref 70–130)
GLUCOSE BLDC GLUCOMTR-MCNC: 83 MG/DL (ref 70–130)
GLUCOSE BLDC GLUCOMTR-MCNC: 92 MG/DL (ref 70–130)
GLUCOSE SERPL-MCNC: 129 MG/DL (ref 65–99)
GLUCOSE UR STRIP-MCNC: NEGATIVE MG/DL
HCT VFR BLD AUTO: 43.5 % (ref 37.5–51)
HGB BLD-MCNC: 14.7 G/DL (ref 13–17.7)
HGB UR QL STRIP.AUTO: NEGATIVE
HOLD SPECIMEN: NORMAL
HYALINE CASTS UR QL AUTO: ABNORMAL /LPF
IMM GRANULOCYTES # BLD AUTO: 0.03 10*3/MM3 (ref 0–0.05)
IMM GRANULOCYTES NFR BLD AUTO: 0.3 % (ref 0–0.5)
INR PPP: 1.02 (ref 0.9–1.1)
KETONES UR QL STRIP: ABNORMAL
LEUKOCYTE ESTERASE UR QL STRIP.AUTO: ABNORMAL
LIPASE SERPL-CCNC: 17 U/L (ref 13–60)
LYMPHOCYTES # BLD AUTO: 1.67 10*3/MM3 (ref 0.7–3.1)
LYMPHOCYTES NFR BLD AUTO: 14 % (ref 19.6–45.3)
MAGNESIUM SERPL-MCNC: 2.1 MG/DL (ref 1.6–2.4)
MCH RBC QN AUTO: 31.6 PG (ref 26.6–33)
MCHC RBC AUTO-ENTMCNC: 33.8 G/DL (ref 31.5–35.7)
MCV RBC AUTO: 93.5 FL (ref 79–97)
MONOCYTES # BLD AUTO: 0.95 10*3/MM3 (ref 0.1–0.9)
MONOCYTES NFR BLD AUTO: 8 % (ref 5–12)
MUCOUS THREADS URNS QL MICRO: ABNORMAL /HPF
NEUTROPHILS NFR BLD AUTO: 72.9 % (ref 42.7–76)
NEUTROPHILS NFR BLD AUTO: 8.7 10*3/MM3 (ref 1.7–7)
NITRITE UR QL STRIP: NEGATIVE
NRBC BLD AUTO-RTO: 0 /100 WBC (ref 0–0.2)
NT-PROBNP SERPL-MCNC: 132.1 PG/ML (ref 0–900)
PH UR STRIP.AUTO: <=5 [PH] (ref 5–8)
PLATELET # BLD AUTO: 233 10*3/MM3 (ref 140–450)
PMV BLD AUTO: 11 FL (ref 6–12)
POTASSIUM SERPL-SCNC: 3.4 MMOL/L (ref 3.5–5.2)
PROT SERPL-MCNC: 6.8 G/DL (ref 6–8.5)
PROT UR QL STRIP: ABNORMAL
PROTHROMBIN TIME: 13.2 SECONDS (ref 11.9–14.1)
QT INTERVAL: 456 MS
QT INTERVAL: 462 MS
QTC INTERVAL: 425 MS
QTC INTERVAL: 451 MS
RBC # BLD AUTO: 4.65 10*6/MM3 (ref 4.14–5.8)
RBC # UR: ABNORMAL /HPF
REF LAB TEST METHOD: ABNORMAL
SARS-COV-2 RNA RESP QL NAA+PROBE: NOT DETECTED
SODIUM SERPL-SCNC: 142 MMOL/L (ref 136–145)
SP GR UR STRIP: 1.02 (ref 1–1.03)
SQUAMOUS #/AREA URNS HPF: ABNORMAL /HPF
TROPONIN T SERPL-MCNC: <0.01 NG/ML (ref 0–0.03)
TSH SERPL DL<=0.05 MIU/L-ACNC: 1.51 UIU/ML (ref 0.27–4.2)
UROBILINOGEN UR QL STRIP: ABNORMAL
WBC # BLD AUTO: 11.93 10*3/MM3 (ref 3.4–10.8)
WBC UR QL AUTO: ABNORMAL /HPF

## 2021-05-24 PROCEDURE — G0378 HOSPITAL OBSERVATION PER HR: HCPCS

## 2021-05-24 PROCEDURE — 83690 ASSAY OF LIPASE: CPT | Performed by: FAMILY MEDICINE

## 2021-05-24 PROCEDURE — 36415 COLL VENOUS BLD VENIPUNCTURE: CPT

## 2021-05-24 PROCEDURE — 0 IOVERSOL 74 % SOLUTION: Performed by: FAMILY MEDICINE

## 2021-05-24 PROCEDURE — 87040 BLOOD CULTURE FOR BACTERIA: CPT | Performed by: FAMILY MEDICINE

## 2021-05-24 PROCEDURE — 84484 ASSAY OF TROPONIN QUANT: CPT | Performed by: PHYSICIAN ASSISTANT

## 2021-05-24 PROCEDURE — 93005 ELECTROCARDIOGRAM TRACING: CPT | Performed by: PHYSICIAN ASSISTANT

## 2021-05-24 PROCEDURE — 99284 EMERGENCY DEPT VISIT MOD MDM: CPT

## 2021-05-24 PROCEDURE — 84484 ASSAY OF TROPONIN QUANT: CPT | Performed by: FAMILY MEDICINE

## 2021-05-24 PROCEDURE — 85610 PROTHROMBIN TIME: CPT | Performed by: FAMILY MEDICINE

## 2021-05-24 PROCEDURE — 71045 X-RAY EXAM CHEST 1 VIEW: CPT

## 2021-05-24 PROCEDURE — 71275 CT ANGIOGRAPHY CHEST: CPT

## 2021-05-24 PROCEDURE — 82962 GLUCOSE BLOOD TEST: CPT

## 2021-05-24 PROCEDURE — 70450 CT HEAD/BRAIN W/O DYE: CPT

## 2021-05-24 PROCEDURE — P9612 CATHETERIZE FOR URINE SPEC: HCPCS

## 2021-05-24 PROCEDURE — 96365 THER/PROPH/DIAG IV INF INIT: CPT

## 2021-05-24 PROCEDURE — 25010000002 HEPARIN (PORCINE) PER 1000 UNITS: Performed by: INTERNAL MEDICINE

## 2021-05-24 PROCEDURE — 93005 ELECTROCARDIOGRAM TRACING: CPT | Performed by: FAMILY MEDICINE

## 2021-05-24 PROCEDURE — 83735 ASSAY OF MAGNESIUM: CPT | Performed by: PHYSICIAN ASSISTANT

## 2021-05-24 PROCEDURE — 85025 COMPLETE CBC W/AUTO DIFF WBC: CPT | Performed by: FAMILY MEDICINE

## 2021-05-24 PROCEDURE — 93970 EXTREMITY STUDY: CPT | Performed by: RADIOLOGY

## 2021-05-24 PROCEDURE — 99220 PR INITIAL OBSERVATION CARE/DAY 70 MINUTES: CPT | Performed by: PHYSICIAN ASSISTANT

## 2021-05-24 PROCEDURE — 83880 ASSAY OF NATRIURETIC PEPTIDE: CPT | Performed by: FAMILY MEDICINE

## 2021-05-24 PROCEDURE — 82140 ASSAY OF AMMONIA: CPT | Performed by: FAMILY MEDICINE

## 2021-05-24 PROCEDURE — 86140 C-REACTIVE PROTEIN: CPT | Performed by: FAMILY MEDICINE

## 2021-05-24 PROCEDURE — 83605 ASSAY OF LACTIC ACID: CPT | Performed by: FAMILY MEDICINE

## 2021-05-24 PROCEDURE — 80053 COMPREHEN METABOLIC PANEL: CPT | Performed by: FAMILY MEDICINE

## 2021-05-24 PROCEDURE — 93970 EXTREMITY STUDY: CPT

## 2021-05-24 PROCEDURE — 81001 URINALYSIS AUTO W/SCOPE: CPT | Performed by: FAMILY MEDICINE

## 2021-05-24 PROCEDURE — 87086 URINE CULTURE/COLONY COUNT: CPT | Performed by: FAMILY MEDICINE

## 2021-05-24 PROCEDURE — 96372 THER/PROPH/DIAG INJ SC/IM: CPT

## 2021-05-24 PROCEDURE — 25010000002 CEFTRIAXONE PER 250 MG: Performed by: FAMILY MEDICINE

## 2021-05-24 PROCEDURE — 85379 FIBRIN DEGRADATION QUANT: CPT | Performed by: FAMILY MEDICINE

## 2021-05-24 PROCEDURE — 84443 ASSAY THYROID STIM HORMONE: CPT | Performed by: PHYSICIAN ASSISTANT

## 2021-05-24 PROCEDURE — 71275 CT ANGIOGRAPHY CHEST: CPT | Performed by: RADIOLOGY

## 2021-05-24 PROCEDURE — 87636 SARSCOV2 & INF A&B AMP PRB: CPT | Performed by: FAMILY MEDICINE

## 2021-05-24 PROCEDURE — 85730 THROMBOPLASTIN TIME PARTIAL: CPT | Performed by: FAMILY MEDICINE

## 2021-05-24 RX ORDER — ASPIRIN 81 MG/1
81 TABLET ORAL ONCE
Status: COMPLETED | OUTPATIENT
Start: 2021-05-24 | End: 2021-05-24

## 2021-05-24 RX ORDER — FINASTERIDE 5 MG/1
5 TABLET, FILM COATED ORAL DAILY
Status: DISCONTINUED | OUTPATIENT
Start: 2021-05-24 | End: 2021-06-05 | Stop reason: HOSPADM

## 2021-05-24 RX ORDER — ATORVASTATIN CALCIUM 40 MG/1
80 TABLET, FILM COATED ORAL NIGHTLY
Status: DISCONTINUED | OUTPATIENT
Start: 2021-05-24 | End: 2021-06-05 | Stop reason: HOSPADM

## 2021-05-24 RX ORDER — MEMANTINE HYDROCHLORIDE 10 MG/1
10 TABLET ORAL EVERY 12 HOURS SCHEDULED
Status: DISCONTINUED | OUTPATIENT
Start: 2021-05-24 | End: 2021-06-05 | Stop reason: HOSPADM

## 2021-05-24 RX ORDER — POTASSIUM CHLORIDE 20 MEQ/1
40 TABLET, EXTENDED RELEASE ORAL AS NEEDED
Status: DISCONTINUED | OUTPATIENT
Start: 2021-05-24 | End: 2021-06-05 | Stop reason: HOSPADM

## 2021-05-24 RX ORDER — CETIRIZINE HYDROCHLORIDE 10 MG/1
10 TABLET ORAL DAILY
Status: DISCONTINUED | OUTPATIENT
Start: 2021-05-24 | End: 2021-06-05 | Stop reason: HOSPADM

## 2021-05-24 RX ORDER — CLOPIDOGREL BISULFATE 75 MG/1
75 TABLET ORAL DAILY
Status: DISCONTINUED | OUTPATIENT
Start: 2021-05-24 | End: 2021-06-05 | Stop reason: HOSPADM

## 2021-05-24 RX ORDER — L.ACID,PARA/B.BIFIDUM/S.THERM 8B CELL
1 CAPSULE ORAL DAILY
Status: DISCONTINUED | OUTPATIENT
Start: 2021-05-24 | End: 2021-06-02

## 2021-05-24 RX ORDER — DEXTROSE MONOHYDRATE 25 G/50ML
25 INJECTION, SOLUTION INTRAVENOUS
Status: DISCONTINUED | OUTPATIENT
Start: 2021-05-24 | End: 2021-06-05 | Stop reason: HOSPADM

## 2021-05-24 RX ORDER — PANTOPRAZOLE SODIUM 40 MG/1
40 TABLET, DELAYED RELEASE ORAL DAILY
Status: DISCONTINUED | OUTPATIENT
Start: 2021-05-24 | End: 2021-06-05 | Stop reason: HOSPADM

## 2021-05-24 RX ORDER — ISOSORBIDE MONONITRATE 60 MG/1
60 TABLET, EXTENDED RELEASE ORAL EVERY MORNING
Status: DISCONTINUED | OUTPATIENT
Start: 2021-05-25 | End: 2021-05-25

## 2021-05-24 RX ORDER — DONEPEZIL HYDROCHLORIDE 5 MG/1
10 TABLET, FILM COATED ORAL DAILY
Status: CANCELLED | OUTPATIENT
Start: 2021-05-24

## 2021-05-24 RX ORDER — HYDRALAZINE HYDROCHLORIDE 20 MG/ML
10 INJECTION INTRAMUSCULAR; INTRAVENOUS EVERY 6 HOURS PRN
Status: DISCONTINUED | OUTPATIENT
Start: 2021-05-24 | End: 2021-06-05

## 2021-05-24 RX ORDER — SODIUM CHLORIDE 0.9 % (FLUSH) 0.9 %
10 SYRINGE (ML) INJECTION AS NEEDED
Status: DISCONTINUED | OUTPATIENT
Start: 2021-05-24 | End: 2021-06-05 | Stop reason: HOSPADM

## 2021-05-24 RX ORDER — SODIUM CHLORIDE 0.9 % (FLUSH) 0.9 %
10 SYRINGE (ML) INJECTION EVERY 12 HOURS SCHEDULED
Status: DISCONTINUED | OUTPATIENT
Start: 2021-05-24 | End: 2021-06-05 | Stop reason: HOSPADM

## 2021-05-24 RX ORDER — NICOTINE POLACRILEX 4 MG
15 LOZENGE BUCCAL
Status: DISCONTINUED | OUTPATIENT
Start: 2021-05-24 | End: 2021-06-05 | Stop reason: HOSPADM

## 2021-05-24 RX ORDER — MAGNESIUM SULFATE HEPTAHYDRATE 40 MG/ML
2 INJECTION, SOLUTION INTRAVENOUS AS NEEDED
Status: DISCONTINUED | OUTPATIENT
Start: 2021-05-24 | End: 2021-06-05 | Stop reason: HOSPADM

## 2021-05-24 RX ORDER — MAGNESIUM SULFATE 1 G/100ML
1 INJECTION INTRAVENOUS AS NEEDED
Status: DISCONTINUED | OUTPATIENT
Start: 2021-05-24 | End: 2021-06-05 | Stop reason: HOSPADM

## 2021-05-24 RX ORDER — LORATADINE 10 MG/1
10 TABLET ORAL DAILY
COMMUNITY
End: 2021-07-22 | Stop reason: ALTCHOICE

## 2021-05-24 RX ORDER — DONEPEZIL HYDROCHLORIDE 5 MG/1
10 TABLET, FILM COATED ORAL NIGHTLY
Status: DISCONTINUED | OUTPATIENT
Start: 2021-05-24 | End: 2021-06-05 | Stop reason: HOSPADM

## 2021-05-24 RX ORDER — FERROUS SULFATE 325(65) MG
325 TABLET ORAL 2 TIMES DAILY WITH MEALS
Status: DISCONTINUED | OUTPATIENT
Start: 2021-05-24 | End: 2021-06-05 | Stop reason: HOSPADM

## 2021-05-24 RX ORDER — POTASSIUM CHLORIDE 1.5 G/1.77G
40 POWDER, FOR SOLUTION ORAL AS NEEDED
Status: DISCONTINUED | OUTPATIENT
Start: 2021-05-24 | End: 2021-06-05 | Stop reason: HOSPADM

## 2021-05-24 RX ORDER — NITROGLYCERIN 0.4 MG/1
0.4 TABLET SUBLINGUAL
Status: CANCELLED | OUTPATIENT
Start: 2021-05-24

## 2021-05-24 RX ORDER — HEPARIN SODIUM 5000 [USP'U]/ML
5000 INJECTION, SOLUTION INTRAVENOUS; SUBCUTANEOUS EVERY 8 HOURS SCHEDULED
Status: DISCONTINUED | OUTPATIENT
Start: 2021-05-24 | End: 2021-06-05 | Stop reason: HOSPADM

## 2021-05-24 RX ORDER — LISINOPRIL 10 MG/1
20 TABLET ORAL DAILY
Status: DISCONTINUED | OUTPATIENT
Start: 2021-05-24 | End: 2021-06-05 | Stop reason: HOSPADM

## 2021-05-24 RX ORDER — MAGNESIUM SULFATE HEPTAHYDRATE 40 MG/ML
4 INJECTION, SOLUTION INTRAVENOUS AS NEEDED
Status: DISCONTINUED | OUTPATIENT
Start: 2021-05-24 | End: 2021-06-05 | Stop reason: HOSPADM

## 2021-05-24 RX ORDER — FERROUS SULFATE 325(65) MG
325 TABLET ORAL 2 TIMES DAILY
COMMUNITY
End: 2021-12-21

## 2021-05-24 RX ORDER — POTASSIUM CHLORIDE 750 MG/1
40 CAPSULE, EXTENDED RELEASE ORAL AS NEEDED
Status: DISCONTINUED | OUTPATIENT
Start: 2021-05-24 | End: 2021-05-24 | Stop reason: SDUPTHER

## 2021-05-24 RX ORDER — AMLODIPINE BESYLATE 5 MG/1
5 TABLET ORAL DAILY
Status: DISCONTINUED | OUTPATIENT
Start: 2021-05-24 | End: 2021-05-31

## 2021-05-24 RX ORDER — POTASSIUM CHLORIDE 1.5 G/1.77G
40 POWDER, FOR SOLUTION ORAL AS NEEDED
Status: DISCONTINUED | OUTPATIENT
Start: 2021-05-24 | End: 2021-05-24

## 2021-05-24 RX ADMIN — DONEPEZIL HYDROCHLORIDE 10 MG: 5 TABLET, FILM COATED ORAL at 21:12

## 2021-05-24 RX ADMIN — HEPARIN SODIUM 5000 UNITS: 5000 INJECTION INTRAVENOUS; SUBCUTANEOUS at 17:25

## 2021-05-24 RX ADMIN — FERROUS SULFATE TAB 325 MG (65 MG ELEMENTAL FE) 325 MG: 325 (65 FE) TAB at 18:32

## 2021-05-24 RX ADMIN — FINASTERIDE 5 MG: 5 TABLET, FILM COATED ORAL at 18:32

## 2021-05-24 RX ADMIN — CLOPIDOGREL 75 MG: 75 TABLET, FILM COATED ORAL at 18:35

## 2021-05-24 RX ADMIN — AMLODIPINE BESYLATE 5 MG: 5 TABLET ORAL at 17:24

## 2021-05-24 RX ADMIN — Medication 1 CAPSULE: at 17:25

## 2021-05-24 RX ADMIN — IOVERSOL 100 ML: 741 INJECTION INTRA-ARTERIAL; INTRAVENOUS at 09:17

## 2021-05-24 RX ADMIN — PANTOPRAZOLE SODIUM 40 MG: 40 TABLET, DELAYED RELEASE ORAL at 17:25

## 2021-05-24 RX ADMIN — CEFTRIAXONE 1 G: 1 INJECTION, POWDER, FOR SOLUTION INTRAMUSCULAR; INTRAVENOUS at 09:53

## 2021-05-24 RX ADMIN — LISINOPRIL 20 MG: 10 TABLET ORAL at 18:32

## 2021-05-24 RX ADMIN — ASPIRIN 81 MG: 81 TABLET, COATED ORAL at 17:25

## 2021-05-24 RX ADMIN — MEMANTINE HYDROCHLORIDE 10 MG: 10 TABLET, FILM COATED ORAL at 21:12

## 2021-05-24 RX ADMIN — CETIRIZINE HYDROCHLORIDE 10 MG: 10 TABLET, FILM COATED ORAL at 18:33

## 2021-05-24 RX ADMIN — SODIUM CHLORIDE 1000 ML: 9 INJECTION, SOLUTION INTRAVENOUS at 05:29

## 2021-05-24 RX ADMIN — ATORVASTATIN CALCIUM 80 MG: 40 TABLET, FILM COATED ORAL at 21:12

## 2021-05-25 ENCOUNTER — APPOINTMENT (OUTPATIENT)
Dept: CARDIOLOGY | Facility: HOSPITAL | Age: 76
End: 2021-05-25

## 2021-05-25 PROBLEM — R45.89: Status: ACTIVE | Noted: 2021-05-25

## 2021-05-25 LAB
ALBUMIN SERPL-MCNC: 3.3 G/DL (ref 3.5–5.2)
ALBUMIN/GLOB SERPL: 1.2 G/DL
ALP SERPL-CCNC: 113 U/L (ref 39–117)
ALT SERPL W P-5'-P-CCNC: 25 U/L (ref 1–41)
ANION GAP SERPL CALCULATED.3IONS-SCNC: 10.1 MMOL/L (ref 5–15)
AST SERPL-CCNC: 25 U/L (ref 1–40)
BACTERIA SPEC AEROBE CULT: NO GROWTH
BASOPHILS # BLD AUTO: 0.12 10*3/MM3 (ref 0–0.2)
BASOPHILS NFR BLD AUTO: 1.1 % (ref 0–1.5)
BH CV ECHO MEAS - AO ROOT AREA (BSA CORRECTED): 1.4
BH CV ECHO MEAS - AO ROOT AREA: 5.7 CM^2
BH CV ECHO MEAS - AO ROOT DIAM: 2.7 CM
BH CV ECHO MEAS - BSA(HAYCOCK): 1.9 M^2
BH CV ECHO MEAS - BSA: 1.9 M^2
BH CV ECHO MEAS - BZI_BMI: 21.3 KILOGRAMS/M^2
BH CV ECHO MEAS - BZI_METRIC_HEIGHT: 180.3 CM
BH CV ECHO MEAS - BZI_METRIC_WEIGHT: 69.4 KG
BH CV ECHO MEAS - EDV(MOD-SP4): 39.9 ML
BH CV ECHO MEAS - EF(MOD-SP4): 71.4 %
BH CV ECHO MEAS - ESV(MOD-SP4): 11.4 ML
BH CV ECHO MEAS - LA DIMENSION: 2.5 CM
BH CV ECHO MEAS - LA/AO: 0.93
BH CV ECHO MEAS - LV DIASTOLIC VOL/BSA (35-75): 21.2 ML/M^2
BH CV ECHO MEAS - LV SYSTOLIC VOL/BSA (12-30): 6.1 ML/M^2
BH CV ECHO MEAS - LVLD AP4: 7.3 CM
BH CV ECHO MEAS - LVLS AP4: 5.7 CM
BH CV ECHO MEAS - LVOT AREA (M): 3.1 CM^2
BH CV ECHO MEAS - LVOT AREA: 3.1 CM^2
BH CV ECHO MEAS - LVOT DIAM: 2 CM
BH CV ECHO MEAS - MV A MAX VEL: 63.4 CM/SEC
BH CV ECHO MEAS - MV E MAX VEL: 51.8 CM/SEC
BH CV ECHO MEAS - MV E/A: 0.82
BH CV ECHO MEAS - SI(MOD-SP4): 15.1 ML/M^2
BH CV ECHO MEAS - SV(MOD-SP4): 28.5 ML
BILIRUB SERPL-MCNC: 0.9 MG/DL (ref 0–1.2)
BUN SERPL-MCNC: 16 MG/DL (ref 8–23)
BUN/CREAT SERPL: 23.2 (ref 7–25)
CALCIUM SPEC-SCNC: 8.8 MG/DL (ref 8.6–10.5)
CHLORIDE SERPL-SCNC: 107 MMOL/L (ref 98–107)
CHOLEST SERPL-MCNC: 108 MG/DL (ref 0–200)
CO2 SERPL-SCNC: 23.9 MMOL/L (ref 22–29)
CREAT SERPL-MCNC: 0.69 MG/DL (ref 0.76–1.27)
DEPRECATED RDW RBC AUTO: 46.5 FL (ref 37–54)
EOSINOPHIL # BLD AUTO: 0.39 10*3/MM3 (ref 0–0.4)
EOSINOPHIL NFR BLD AUTO: 3.7 % (ref 0.3–6.2)
ERYTHROCYTE [DISTWIDTH] IN BLOOD BY AUTOMATED COUNT: 12.9 % (ref 12.3–15.4)
GFR SERPL CREATININE-BSD FRML MDRD: 112 ML/MIN/1.73
GLOBULIN UR ELPH-MCNC: 2.7 GM/DL
GLUCOSE BLDC GLUCOMTR-MCNC: 104 MG/DL (ref 70–130)
GLUCOSE BLDC GLUCOMTR-MCNC: 127 MG/DL (ref 70–130)
GLUCOSE BLDC GLUCOMTR-MCNC: 128 MG/DL (ref 70–130)
GLUCOSE BLDC GLUCOMTR-MCNC: 80 MG/DL (ref 70–130)
GLUCOSE SERPL-MCNC: 86 MG/DL (ref 65–99)
HCT VFR BLD AUTO: 39 % (ref 37.5–51)
HDLC SERPL-MCNC: 29 MG/DL (ref 40–60)
HGB BLD-MCNC: 12.7 G/DL (ref 13–17.7)
IMM GRANULOCYTES # BLD AUTO: 0.02 10*3/MM3 (ref 0–0.05)
IMM GRANULOCYTES NFR BLD AUTO: 0.2 % (ref 0–0.5)
LDLC SERPL CALC-MCNC: 58 MG/DL (ref 0–100)
LDLC/HDLC SERPL: 1.93 {RATIO}
LYMPHOCYTES # BLD AUTO: 2.08 10*3/MM3 (ref 0.7–3.1)
LYMPHOCYTES NFR BLD AUTO: 19.7 % (ref 19.6–45.3)
MAXIMAL PREDICTED HEART RATE: 146 BPM
MCH RBC QN AUTO: 31.8 PG (ref 26.6–33)
MCHC RBC AUTO-ENTMCNC: 32.6 G/DL (ref 31.5–35.7)
MCV RBC AUTO: 97.7 FL (ref 79–97)
MONOCYTES # BLD AUTO: 0.83 10*3/MM3 (ref 0.1–0.9)
MONOCYTES NFR BLD AUTO: 7.9 % (ref 5–12)
NEUTROPHILS NFR BLD AUTO: 67.4 % (ref 42.7–76)
NEUTROPHILS NFR BLD AUTO: 7.12 10*3/MM3 (ref 1.7–7)
NRBC BLD AUTO-RTO: 0 /100 WBC (ref 0–0.2)
PLATELET # BLD AUTO: 205 10*3/MM3 (ref 140–450)
PMV BLD AUTO: 11 FL (ref 6–12)
POTASSIUM SERPL-SCNC: 2.9 MMOL/L (ref 3.5–5.2)
POTASSIUM SERPL-SCNC: 4.3 MMOL/L (ref 3.5–5.2)
PROT SERPL-MCNC: 6 G/DL (ref 6–8.5)
RBC # BLD AUTO: 3.99 10*6/MM3 (ref 4.14–5.8)
SODIUM SERPL-SCNC: 141 MMOL/L (ref 136–145)
STRESS TARGET HR: 124 BPM
TRIGL SERPL-MCNC: 115 MG/DL (ref 0–150)
VLDLC SERPL-MCNC: 21 MG/DL (ref 5–40)
WBC # BLD AUTO: 10.56 10*3/MM3 (ref 3.4–10.8)

## 2021-05-25 PROCEDURE — G0378 HOSPITAL OBSERVATION PER HR: HCPCS

## 2021-05-25 PROCEDURE — 25010000002 CEFTRIAXONE PER 250 MG: Performed by: PHYSICIAN ASSISTANT

## 2021-05-25 PROCEDURE — 94799 UNLISTED PULMONARY SVC/PX: CPT

## 2021-05-25 PROCEDURE — 92610 EVALUATE SWALLOWING FUNCTION: CPT

## 2021-05-25 PROCEDURE — 85025 COMPLETE CBC W/AUTO DIFF WBC: CPT | Performed by: PHYSICIAN ASSISTANT

## 2021-05-25 PROCEDURE — 99225 PR SBSQ OBSERVATION CARE/DAY 25 MINUTES: CPT | Performed by: INTERNAL MEDICINE

## 2021-05-25 PROCEDURE — 82962 GLUCOSE BLOOD TEST: CPT

## 2021-05-25 PROCEDURE — 96372 THER/PROPH/DIAG INJ SC/IM: CPT

## 2021-05-25 PROCEDURE — 80061 LIPID PANEL: CPT | Performed by: PHYSICIAN ASSISTANT

## 2021-05-25 PROCEDURE — 99222 1ST HOSP IP/OBS MODERATE 55: CPT | Performed by: INTERNAL MEDICINE

## 2021-05-25 PROCEDURE — 25010000002 HEPARIN (PORCINE) PER 1000 UNITS: Performed by: INTERNAL MEDICINE

## 2021-05-25 PROCEDURE — 93306 TTE W/DOPPLER COMPLETE: CPT | Performed by: INTERNAL MEDICINE

## 2021-05-25 PROCEDURE — 80053 COMPREHEN METABOLIC PANEL: CPT | Performed by: PHYSICIAN ASSISTANT

## 2021-05-25 PROCEDURE — 93306 TTE W/DOPPLER COMPLETE: CPT

## 2021-05-25 PROCEDURE — 84132 ASSAY OF SERUM POTASSIUM: CPT | Performed by: INTERNAL MEDICINE

## 2021-05-25 RX ORDER — POTASSIUM CHLORIDE 20 MEQ/1
40 TABLET, EXTENDED RELEASE ORAL EVERY 4 HOURS
Status: COMPLETED | OUTPATIENT
Start: 2021-05-25 | End: 2021-05-25

## 2021-05-25 RX ADMIN — FERROUS SULFATE TAB 325 MG (65 MG ELEMENTAL FE) 325 MG: 325 (65 FE) TAB at 18:21

## 2021-05-25 RX ADMIN — HEPARIN SODIUM 5000 UNITS: 5000 INJECTION INTRAVENOUS; SUBCUTANEOUS at 05:28

## 2021-05-25 RX ADMIN — POTASSIUM CHLORIDE 40 MEQ: 20 TABLET, EXTENDED RELEASE ORAL at 11:40

## 2021-05-25 RX ADMIN — CEFTRIAXONE 1 G: 1 INJECTION, POWDER, FOR SOLUTION INTRAMUSCULAR; INTRAVENOUS at 11:40

## 2021-05-25 RX ADMIN — PANTOPRAZOLE SODIUM 40 MG: 40 TABLET, DELAYED RELEASE ORAL at 09:10

## 2021-05-25 RX ADMIN — POTASSIUM CHLORIDE 40 MEQ: 20 TABLET, EXTENDED RELEASE ORAL at 09:10

## 2021-05-25 RX ADMIN — MEMANTINE HYDROCHLORIDE 10 MG: 10 TABLET, FILM COATED ORAL at 21:10

## 2021-05-25 RX ADMIN — FINASTERIDE 5 MG: 5 TABLET, FILM COATED ORAL at 09:10

## 2021-05-25 RX ADMIN — AMLODIPINE BESYLATE 5 MG: 5 TABLET ORAL at 09:10

## 2021-05-25 RX ADMIN — CETIRIZINE HYDROCHLORIDE 10 MG: 10 TABLET, FILM COATED ORAL at 09:10

## 2021-05-25 RX ADMIN — MEMANTINE HYDROCHLORIDE 10 MG: 10 TABLET, FILM COATED ORAL at 09:10

## 2021-05-25 RX ADMIN — HEPARIN SODIUM 5000 UNITS: 5000 INJECTION INTRAVENOUS; SUBCUTANEOUS at 11:41

## 2021-05-25 RX ADMIN — CLOPIDOGREL 75 MG: 75 TABLET, FILM COATED ORAL at 09:09

## 2021-05-25 RX ADMIN — ATORVASTATIN CALCIUM 80 MG: 40 TABLET, FILM COATED ORAL at 21:10

## 2021-05-25 RX ADMIN — Medication 1 CAPSULE: at 09:09

## 2021-05-25 RX ADMIN — HEPARIN SODIUM 5000 UNITS: 5000 INJECTION INTRAVENOUS; SUBCUTANEOUS at 21:10

## 2021-05-25 RX ADMIN — FERROUS SULFATE TAB 325 MG (65 MG ELEMENTAL FE) 325 MG: 325 (65 FE) TAB at 09:09

## 2021-05-25 RX ADMIN — SODIUM CHLORIDE, PRESERVATIVE FREE 10 ML: 5 INJECTION INTRAVENOUS at 21:11

## 2021-05-25 RX ADMIN — POTASSIUM CHLORIDE 40 MEQ: 20 TABLET, EXTENDED RELEASE ORAL at 03:28

## 2021-05-25 RX ADMIN — ISOSORBIDE MONONITRATE 90 MG: 60 TABLET ORAL at 11:41

## 2021-05-25 RX ADMIN — LISINOPRIL 20 MG: 10 TABLET ORAL at 09:10

## 2021-05-25 RX ADMIN — DONEPEZIL HYDROCHLORIDE 10 MG: 5 TABLET, FILM COATED ORAL at 21:10

## 2021-05-26 LAB
GLUCOSE BLDC GLUCOMTR-MCNC: 101 MG/DL (ref 70–130)
GLUCOSE BLDC GLUCOMTR-MCNC: 111 MG/DL (ref 70–130)
GLUCOSE BLDC GLUCOMTR-MCNC: 134 MG/DL (ref 70–130)
GLUCOSE BLDC GLUCOMTR-MCNC: 142 MG/DL (ref 70–130)

## 2021-05-26 PROCEDURE — G0378 HOSPITAL OBSERVATION PER HR: HCPCS

## 2021-05-26 PROCEDURE — 96372 THER/PROPH/DIAG INJ SC/IM: CPT

## 2021-05-26 PROCEDURE — 99213 OFFICE O/P EST LOW 20 MIN: CPT | Performed by: SURGERY

## 2021-05-26 PROCEDURE — 25010000002 CEFTRIAXONE PER 250 MG: Performed by: PHYSICIAN ASSISTANT

## 2021-05-26 PROCEDURE — 82962 GLUCOSE BLOOD TEST: CPT

## 2021-05-26 PROCEDURE — 25010000002 HEPARIN (PORCINE) PER 1000 UNITS: Performed by: INTERNAL MEDICINE

## 2021-05-26 PROCEDURE — 99232 SBSQ HOSP IP/OBS MODERATE 35: CPT | Performed by: NURSE PRACTITIONER

## 2021-05-26 PROCEDURE — 94799 UNLISTED PULMONARY SVC/PX: CPT

## 2021-05-26 PROCEDURE — 96366 THER/PROPH/DIAG IV INF ADDON: CPT

## 2021-05-26 PROCEDURE — 99225 PR SBSQ OBSERVATION CARE/DAY 25 MINUTES: CPT | Performed by: INTERNAL MEDICINE

## 2021-05-26 RX ADMIN — LISINOPRIL 20 MG: 10 TABLET ORAL at 09:46

## 2021-05-26 RX ADMIN — FINASTERIDE 5 MG: 5 TABLET, FILM COATED ORAL at 09:47

## 2021-05-26 RX ADMIN — DONEPEZIL HYDROCHLORIDE 10 MG: 5 TABLET, FILM COATED ORAL at 20:29

## 2021-05-26 RX ADMIN — ISOSORBIDE MONONITRATE 90 MG: 60 TABLET ORAL at 09:48

## 2021-05-26 RX ADMIN — HEPARIN SODIUM 5000 UNITS: 5000 INJECTION INTRAVENOUS; SUBCUTANEOUS at 20:29

## 2021-05-26 RX ADMIN — CLOPIDOGREL 75 MG: 75 TABLET, FILM COATED ORAL at 09:46

## 2021-05-26 RX ADMIN — ATORVASTATIN CALCIUM 80 MG: 40 TABLET, FILM COATED ORAL at 20:29

## 2021-05-26 RX ADMIN — HEPARIN SODIUM 5000 UNITS: 5000 INJECTION INTRAVENOUS; SUBCUTANEOUS at 05:38

## 2021-05-26 RX ADMIN — FERROUS SULFATE TAB 325 MG (65 MG ELEMENTAL FE) 325 MG: 325 (65 FE) TAB at 09:47

## 2021-05-26 RX ADMIN — FERROUS SULFATE TAB 325 MG (65 MG ELEMENTAL FE) 325 MG: 325 (65 FE) TAB at 18:20

## 2021-05-26 RX ADMIN — CETIRIZINE HYDROCHLORIDE 10 MG: 10 TABLET, FILM COATED ORAL at 09:46

## 2021-05-26 RX ADMIN — MEMANTINE HYDROCHLORIDE 10 MG: 10 TABLET, FILM COATED ORAL at 09:46

## 2021-05-26 RX ADMIN — MEMANTINE HYDROCHLORIDE 10 MG: 10 TABLET, FILM COATED ORAL at 20:29

## 2021-05-26 RX ADMIN — PANTOPRAZOLE SODIUM 40 MG: 40 TABLET, DELAYED RELEASE ORAL at 09:47

## 2021-05-26 RX ADMIN — SODIUM CHLORIDE, PRESERVATIVE FREE 10 ML: 5 INJECTION INTRAVENOUS at 09:48

## 2021-05-26 RX ADMIN — AMLODIPINE BESYLATE 5 MG: 5 TABLET ORAL at 09:47

## 2021-05-26 RX ADMIN — SODIUM CHLORIDE, PRESERVATIVE FREE 10 ML: 5 INJECTION INTRAVENOUS at 20:29

## 2021-05-26 RX ADMIN — HEPARIN SODIUM 5000 UNITS: 5000 INJECTION INTRAVENOUS; SUBCUTANEOUS at 15:14

## 2021-05-26 RX ADMIN — Medication 1 CAPSULE: at 09:47

## 2021-05-26 RX ADMIN — CEFTRIAXONE 1 G: 1 INJECTION, POWDER, FOR SOLUTION INTRAMUSCULAR; INTRAVENOUS at 09:51

## 2021-05-27 LAB
GLUCOSE BLDC GLUCOMTR-MCNC: 107 MG/DL (ref 70–130)
GLUCOSE BLDC GLUCOMTR-MCNC: 126 MG/DL (ref 70–130)
GLUCOSE BLDC GLUCOMTR-MCNC: 127 MG/DL (ref 70–130)
GLUCOSE BLDC GLUCOMTR-MCNC: 155 MG/DL (ref 70–130)

## 2021-05-27 PROCEDURE — 94799 UNLISTED PULMONARY SVC/PX: CPT

## 2021-05-27 PROCEDURE — G0378 HOSPITAL OBSERVATION PER HR: HCPCS

## 2021-05-27 PROCEDURE — 25010000002 CEFTRIAXONE PER 250 MG: Performed by: PHYSICIAN ASSISTANT

## 2021-05-27 PROCEDURE — 25010000002 HEPARIN (PORCINE) PER 1000 UNITS: Performed by: INTERNAL MEDICINE

## 2021-05-27 PROCEDURE — 99224 PR SBSQ OBSERVATION CARE/DAY 15 MINUTES: CPT | Performed by: INTERNAL MEDICINE

## 2021-05-27 PROCEDURE — 82962 GLUCOSE BLOOD TEST: CPT

## 2021-05-27 PROCEDURE — 96372 THER/PROPH/DIAG INJ SC/IM: CPT

## 2021-05-27 RX ADMIN — SODIUM CHLORIDE, PRESERVATIVE FREE 10 ML: 5 INJECTION INTRAVENOUS at 09:57

## 2021-05-27 RX ADMIN — LISINOPRIL 20 MG: 10 TABLET ORAL at 09:56

## 2021-05-27 RX ADMIN — Medication 1 CAPSULE: at 09:56

## 2021-05-27 RX ADMIN — HEPARIN SODIUM 5000 UNITS: 5000 INJECTION INTRAVENOUS; SUBCUTANEOUS at 20:36

## 2021-05-27 RX ADMIN — MEMANTINE HYDROCHLORIDE 10 MG: 10 TABLET, FILM COATED ORAL at 09:56

## 2021-05-27 RX ADMIN — FERROUS SULFATE TAB 325 MG (65 MG ELEMENTAL FE) 325 MG: 325 (65 FE) TAB at 09:55

## 2021-05-27 RX ADMIN — HEPARIN SODIUM 5000 UNITS: 5000 INJECTION INTRAVENOUS; SUBCUTANEOUS at 16:07

## 2021-05-27 RX ADMIN — AMLODIPINE BESYLATE 5 MG: 5 TABLET ORAL at 09:55

## 2021-05-27 RX ADMIN — CETIRIZINE HYDROCHLORIDE 10 MG: 10 TABLET, FILM COATED ORAL at 09:56

## 2021-05-27 RX ADMIN — CLOPIDOGREL 75 MG: 75 TABLET, FILM COATED ORAL at 09:56

## 2021-05-27 RX ADMIN — HEPARIN SODIUM 5000 UNITS: 5000 INJECTION INTRAVENOUS; SUBCUTANEOUS at 06:03

## 2021-05-27 RX ADMIN — PANTOPRAZOLE SODIUM 40 MG: 40 TABLET, DELAYED RELEASE ORAL at 09:56

## 2021-05-27 RX ADMIN — HEPARIN SODIUM 5000 UNITS: 5000 INJECTION INTRAVENOUS; SUBCUTANEOUS at 16:08

## 2021-05-27 RX ADMIN — SODIUM CHLORIDE, PRESERVATIVE FREE 10 ML: 5 INJECTION INTRAVENOUS at 20:37

## 2021-05-27 RX ADMIN — DONEPEZIL HYDROCHLORIDE 10 MG: 5 TABLET, FILM COATED ORAL at 20:37

## 2021-05-27 RX ADMIN — FINASTERIDE 5 MG: 5 TABLET, FILM COATED ORAL at 09:56

## 2021-05-27 RX ADMIN — ATORVASTATIN CALCIUM 80 MG: 40 TABLET, FILM COATED ORAL at 20:37

## 2021-05-27 RX ADMIN — FERROUS SULFATE TAB 325 MG (65 MG ELEMENTAL FE) 325 MG: 325 (65 FE) TAB at 17:14

## 2021-05-27 RX ADMIN — ISOSORBIDE MONONITRATE 90 MG: 60 TABLET ORAL at 09:57

## 2021-05-27 RX ADMIN — MEMANTINE HYDROCHLORIDE 10 MG: 10 TABLET, FILM COATED ORAL at 20:37

## 2021-05-27 RX ADMIN — CEFTRIAXONE 1 G: 1 INJECTION, POWDER, FOR SOLUTION INTRAMUSCULAR; INTRAVENOUS at 11:47

## 2021-05-28 LAB
GLUCOSE BLDC GLUCOMTR-MCNC: 117 MG/DL (ref 70–130)
GLUCOSE BLDC GLUCOMTR-MCNC: 125 MG/DL (ref 70–130)
GLUCOSE BLDC GLUCOMTR-MCNC: 126 MG/DL (ref 70–130)
GLUCOSE BLDC GLUCOMTR-MCNC: 173 MG/DL (ref 70–130)

## 2021-05-28 PROCEDURE — 97167 OT EVAL HIGH COMPLEX 60 MIN: CPT

## 2021-05-28 PROCEDURE — 25010000002 HEPARIN (PORCINE) PER 1000 UNITS: Performed by: INTERNAL MEDICINE

## 2021-05-28 PROCEDURE — 82962 GLUCOSE BLOOD TEST: CPT

## 2021-05-28 PROCEDURE — 96372 THER/PROPH/DIAG INJ SC/IM: CPT

## 2021-05-28 PROCEDURE — G0378 HOSPITAL OBSERVATION PER HR: HCPCS

## 2021-05-28 PROCEDURE — 96366 THER/PROPH/DIAG IV INF ADDON: CPT

## 2021-05-28 PROCEDURE — 97163 PT EVAL HIGH COMPLEX 45 MIN: CPT

## 2021-05-28 PROCEDURE — 99224 PR SBSQ OBSERVATION CARE/DAY 15 MINUTES: CPT | Performed by: INTERNAL MEDICINE

## 2021-05-28 PROCEDURE — 25010000002 CEFTRIAXONE PER 250 MG: Performed by: PHYSICIAN ASSISTANT

## 2021-05-28 RX ORDER — HYDROXYZINE HYDROCHLORIDE 25 MG/1
25 TABLET, FILM COATED ORAL ONCE
Status: COMPLETED | OUTPATIENT
Start: 2021-05-28 | End: 2021-05-28

## 2021-05-28 RX ADMIN — DONEPEZIL HYDROCHLORIDE 10 MG: 5 TABLET, FILM COATED ORAL at 20:07

## 2021-05-28 RX ADMIN — HEPARIN SODIUM 5000 UNITS: 5000 INJECTION INTRAVENOUS; SUBCUTANEOUS at 15:09

## 2021-05-28 RX ADMIN — CEFTRIAXONE 1 G: 1 INJECTION, POWDER, FOR SOLUTION INTRAMUSCULAR; INTRAVENOUS at 10:55

## 2021-05-28 RX ADMIN — Medication 1 CAPSULE: at 09:25

## 2021-05-28 RX ADMIN — FERROUS SULFATE TAB 325 MG (65 MG ELEMENTAL FE) 325 MG: 325 (65 FE) TAB at 09:25

## 2021-05-28 RX ADMIN — SODIUM CHLORIDE, PRESERVATIVE FREE 10 ML: 5 INJECTION INTRAVENOUS at 09:26

## 2021-05-28 RX ADMIN — HEPARIN SODIUM 5000 UNITS: 5000 INJECTION INTRAVENOUS; SUBCUTANEOUS at 05:00

## 2021-05-28 RX ADMIN — HYDROXYZINE HYDROCHLORIDE 25 MG: 25 TABLET, FILM COATED ORAL at 13:51

## 2021-05-28 RX ADMIN — MEMANTINE HYDROCHLORIDE 10 MG: 10 TABLET, FILM COATED ORAL at 20:07

## 2021-05-28 RX ADMIN — HEPARIN SODIUM 5000 UNITS: 5000 INJECTION INTRAVENOUS; SUBCUTANEOUS at 20:07

## 2021-05-28 RX ADMIN — CETIRIZINE HYDROCHLORIDE 10 MG: 10 TABLET, FILM COATED ORAL at 09:25

## 2021-05-28 RX ADMIN — PANTOPRAZOLE SODIUM 40 MG: 40 TABLET, DELAYED RELEASE ORAL at 09:26

## 2021-05-28 RX ADMIN — SODIUM CHLORIDE, PRESERVATIVE FREE 10 ML: 5 INJECTION INTRAVENOUS at 20:07

## 2021-05-28 RX ADMIN — ISOSORBIDE MONONITRATE 90 MG: 60 TABLET ORAL at 09:25

## 2021-05-28 RX ADMIN — AMLODIPINE BESYLATE 5 MG: 5 TABLET ORAL at 09:25

## 2021-05-28 RX ADMIN — ATORVASTATIN CALCIUM 80 MG: 40 TABLET, FILM COATED ORAL at 20:07

## 2021-05-28 RX ADMIN — MEMANTINE HYDROCHLORIDE 10 MG: 10 TABLET, FILM COATED ORAL at 09:25

## 2021-05-28 RX ADMIN — FERROUS SULFATE TAB 325 MG (65 MG ELEMENTAL FE) 325 MG: 325 (65 FE) TAB at 17:28

## 2021-05-28 RX ADMIN — LISINOPRIL 20 MG: 10 TABLET ORAL at 09:25

## 2021-05-28 RX ADMIN — CLOPIDOGREL 75 MG: 75 TABLET, FILM COATED ORAL at 09:25

## 2021-05-28 RX ADMIN — FINASTERIDE 5 MG: 5 TABLET, FILM COATED ORAL at 09:25

## 2021-05-29 LAB
BACTERIA SPEC AEROBE CULT: NORMAL
BACTERIA SPEC AEROBE CULT: NORMAL
GLUCOSE BLDC GLUCOMTR-MCNC: 117 MG/DL (ref 70–130)
GLUCOSE BLDC GLUCOMTR-MCNC: 120 MG/DL (ref 70–130)
GLUCOSE BLDC GLUCOMTR-MCNC: 160 MG/DL (ref 70–130)
GLUCOSE BLDC GLUCOMTR-MCNC: 99 MG/DL (ref 70–130)

## 2021-05-29 PROCEDURE — 82962 GLUCOSE BLOOD TEST: CPT

## 2021-05-29 PROCEDURE — 25010000002 HEPARIN (PORCINE) PER 1000 UNITS: Performed by: INTERNAL MEDICINE

## 2021-05-29 PROCEDURE — 63710000001 INSULIN ASPART PER 5 UNITS: Performed by: PHYSICIAN ASSISTANT

## 2021-05-29 PROCEDURE — 96372 THER/PROPH/DIAG INJ SC/IM: CPT

## 2021-05-29 PROCEDURE — 96366 THER/PROPH/DIAG IV INF ADDON: CPT

## 2021-05-29 PROCEDURE — G0378 HOSPITAL OBSERVATION PER HR: HCPCS

## 2021-05-29 PROCEDURE — 99224 PR SBSQ OBSERVATION CARE/DAY 15 MINUTES: CPT | Performed by: INTERNAL MEDICINE

## 2021-05-29 PROCEDURE — 25010000002 CEFTRIAXONE PER 250 MG: Performed by: PHYSICIAN ASSISTANT

## 2021-05-29 RX ADMIN — MEMANTINE HYDROCHLORIDE 10 MG: 10 TABLET, FILM COATED ORAL at 09:16

## 2021-05-29 RX ADMIN — INSULIN ASPART 2 UNITS: 100 INJECTION, SOLUTION INTRAVENOUS; SUBCUTANEOUS at 11:35

## 2021-05-29 RX ADMIN — CETIRIZINE HYDROCHLORIDE 10 MG: 10 TABLET, FILM COATED ORAL at 09:16

## 2021-05-29 RX ADMIN — FINASTERIDE 5 MG: 5 TABLET, FILM COATED ORAL at 09:16

## 2021-05-29 RX ADMIN — PANTOPRAZOLE SODIUM 40 MG: 40 TABLET, DELAYED RELEASE ORAL at 09:16

## 2021-05-29 RX ADMIN — DONEPEZIL HYDROCHLORIDE 10 MG: 5 TABLET, FILM COATED ORAL at 20:06

## 2021-05-29 RX ADMIN — Medication 1 CAPSULE: at 09:16

## 2021-05-29 RX ADMIN — MEMANTINE HYDROCHLORIDE 10 MG: 10 TABLET, FILM COATED ORAL at 20:06

## 2021-05-29 RX ADMIN — FERROUS SULFATE TAB 325 MG (65 MG ELEMENTAL FE) 325 MG: 325 (65 FE) TAB at 16:59

## 2021-05-29 RX ADMIN — ATORVASTATIN CALCIUM 80 MG: 40 TABLET, FILM COATED ORAL at 20:06

## 2021-05-29 RX ADMIN — FERROUS SULFATE TAB 325 MG (65 MG ELEMENTAL FE) 325 MG: 325 (65 FE) TAB at 09:16

## 2021-05-29 RX ADMIN — LISINOPRIL 20 MG: 10 TABLET ORAL at 09:16

## 2021-05-29 RX ADMIN — SODIUM CHLORIDE, PRESERVATIVE FREE 10 ML: 5 INJECTION INTRAVENOUS at 20:05

## 2021-05-29 RX ADMIN — HEPARIN SODIUM 5000 UNITS: 5000 INJECTION INTRAVENOUS; SUBCUTANEOUS at 15:01

## 2021-05-29 RX ADMIN — HEPARIN SODIUM 5000 UNITS: 5000 INJECTION INTRAVENOUS; SUBCUTANEOUS at 05:41

## 2021-05-29 RX ADMIN — CLOPIDOGREL 75 MG: 75 TABLET, FILM COATED ORAL at 09:16

## 2021-05-29 RX ADMIN — AMLODIPINE BESYLATE 5 MG: 5 TABLET ORAL at 09:16

## 2021-05-29 RX ADMIN — CEFTRIAXONE 1 G: 1 INJECTION, POWDER, FOR SOLUTION INTRAMUSCULAR; INTRAVENOUS at 09:24

## 2021-05-29 RX ADMIN — HEPARIN SODIUM 5000 UNITS: 5000 INJECTION INTRAVENOUS; SUBCUTANEOUS at 20:06

## 2021-05-29 RX ADMIN — SODIUM CHLORIDE, PRESERVATIVE FREE 10 ML: 5 INJECTION INTRAVENOUS at 09:17

## 2021-05-29 RX ADMIN — ISOSORBIDE MONONITRATE 90 MG: 60 TABLET ORAL at 09:16

## 2021-05-30 LAB
GLUCOSE BLDC GLUCOMTR-MCNC: 105 MG/DL (ref 70–130)
GLUCOSE BLDC GLUCOMTR-MCNC: 113 MG/DL (ref 70–130)
GLUCOSE BLDC GLUCOMTR-MCNC: 161 MG/DL (ref 70–130)
GLUCOSE BLDC GLUCOMTR-MCNC: 176 MG/DL (ref 70–130)

## 2021-05-30 PROCEDURE — G0378 HOSPITAL OBSERVATION PER HR: HCPCS

## 2021-05-30 PROCEDURE — 82962 GLUCOSE BLOOD TEST: CPT

## 2021-05-30 PROCEDURE — 94799 UNLISTED PULMONARY SVC/PX: CPT

## 2021-05-30 PROCEDURE — 63710000001 INSULIN ASPART PER 5 UNITS: Performed by: PHYSICIAN ASSISTANT

## 2021-05-30 PROCEDURE — 96372 THER/PROPH/DIAG INJ SC/IM: CPT

## 2021-05-30 PROCEDURE — 99224 PR SBSQ OBSERVATION CARE/DAY 15 MINUTES: CPT | Performed by: INTERNAL MEDICINE

## 2021-05-30 PROCEDURE — 25010000002 HEPARIN (PORCINE) PER 1000 UNITS: Performed by: INTERNAL MEDICINE

## 2021-05-30 RX ADMIN — FERROUS SULFATE TAB 325 MG (65 MG ELEMENTAL FE) 325 MG: 325 (65 FE) TAB at 17:43

## 2021-05-30 RX ADMIN — DONEPEZIL HYDROCHLORIDE 10 MG: 5 TABLET, FILM COATED ORAL at 20:22

## 2021-05-30 RX ADMIN — MEMANTINE HYDROCHLORIDE 10 MG: 10 TABLET, FILM COATED ORAL at 20:22

## 2021-05-30 RX ADMIN — CLOPIDOGREL 75 MG: 75 TABLET, FILM COATED ORAL at 09:03

## 2021-05-30 RX ADMIN — HEPARIN SODIUM 5000 UNITS: 5000 INJECTION INTRAVENOUS; SUBCUTANEOUS at 14:02

## 2021-05-30 RX ADMIN — SODIUM CHLORIDE, PRESERVATIVE FREE 10 ML: 5 INJECTION INTRAVENOUS at 09:04

## 2021-05-30 RX ADMIN — LISINOPRIL 20 MG: 10 TABLET ORAL at 09:04

## 2021-05-30 RX ADMIN — FINASTERIDE 5 MG: 5 TABLET, FILM COATED ORAL at 09:03

## 2021-05-30 RX ADMIN — PANTOPRAZOLE SODIUM 40 MG: 40 TABLET, DELAYED RELEASE ORAL at 09:04

## 2021-05-30 RX ADMIN — ISOSORBIDE MONONITRATE 90 MG: 60 TABLET ORAL at 09:03

## 2021-05-30 RX ADMIN — AMLODIPINE BESYLATE 5 MG: 5 TABLET ORAL at 09:03

## 2021-05-30 RX ADMIN — MEMANTINE HYDROCHLORIDE 10 MG: 10 TABLET, FILM COATED ORAL at 09:03

## 2021-05-30 RX ADMIN — ATORVASTATIN CALCIUM 80 MG: 40 TABLET, FILM COATED ORAL at 20:23

## 2021-05-30 RX ADMIN — Medication 1 CAPSULE: at 09:03

## 2021-05-30 RX ADMIN — SODIUM CHLORIDE, PRESERVATIVE FREE 10 ML: 5 INJECTION INTRAVENOUS at 20:22

## 2021-05-30 RX ADMIN — INSULIN ASPART 2 UNITS: 100 INJECTION, SOLUTION INTRAVENOUS; SUBCUTANEOUS at 12:13

## 2021-05-30 RX ADMIN — HEPARIN SODIUM 5000 UNITS: 5000 INJECTION INTRAVENOUS; SUBCUTANEOUS at 04:56

## 2021-05-30 RX ADMIN — HEPARIN SODIUM 5000 UNITS: 5000 INJECTION INTRAVENOUS; SUBCUTANEOUS at 20:23

## 2021-05-30 RX ADMIN — CETIRIZINE HYDROCHLORIDE 10 MG: 10 TABLET, FILM COATED ORAL at 09:03

## 2021-05-30 RX ADMIN — FERROUS SULFATE TAB 325 MG (65 MG ELEMENTAL FE) 325 MG: 325 (65 FE) TAB at 09:03

## 2021-05-31 LAB
GLUCOSE BLDC GLUCOMTR-MCNC: 121 MG/DL (ref 70–130)
GLUCOSE BLDC GLUCOMTR-MCNC: 140 MG/DL (ref 70–130)
GLUCOSE BLDC GLUCOMTR-MCNC: 94 MG/DL (ref 70–130)
GLUCOSE BLDC GLUCOMTR-MCNC: 98 MG/DL (ref 70–130)

## 2021-05-31 PROCEDURE — 99224 PR SBSQ OBSERVATION CARE/DAY 15 MINUTES: CPT | Performed by: INTERNAL MEDICINE

## 2021-05-31 PROCEDURE — 25010000002 HEPARIN (PORCINE) PER 1000 UNITS: Performed by: INTERNAL MEDICINE

## 2021-05-31 PROCEDURE — G0378 HOSPITAL OBSERVATION PER HR: HCPCS

## 2021-05-31 PROCEDURE — 96361 HYDRATE IV INFUSION ADD-ON: CPT

## 2021-05-31 PROCEDURE — 97116 GAIT TRAINING THERAPY: CPT

## 2021-05-31 PROCEDURE — 96372 THER/PROPH/DIAG INJ SC/IM: CPT

## 2021-05-31 PROCEDURE — 97110 THERAPEUTIC EXERCISES: CPT

## 2021-05-31 PROCEDURE — 82962 GLUCOSE BLOOD TEST: CPT

## 2021-05-31 RX ADMIN — DONEPEZIL HYDROCHLORIDE 10 MG: 5 TABLET, FILM COATED ORAL at 20:18

## 2021-05-31 RX ADMIN — SODIUM CHLORIDE, PRESERVATIVE FREE 10 ML: 5 INJECTION INTRAVENOUS at 09:07

## 2021-05-31 RX ADMIN — CLOPIDOGREL 75 MG: 75 TABLET, FILM COATED ORAL at 09:06

## 2021-05-31 RX ADMIN — CETIRIZINE HYDROCHLORIDE 10 MG: 10 TABLET, FILM COATED ORAL at 09:06

## 2021-05-31 RX ADMIN — FERROUS SULFATE TAB 325 MG (65 MG ELEMENTAL FE) 325 MG: 325 (65 FE) TAB at 16:57

## 2021-05-31 RX ADMIN — ATORVASTATIN CALCIUM 80 MG: 40 TABLET, FILM COATED ORAL at 20:18

## 2021-05-31 RX ADMIN — SODIUM CHLORIDE 1000 ML: 9 INJECTION, SOLUTION INTRAVENOUS at 18:17

## 2021-05-31 RX ADMIN — Medication 1 CAPSULE: at 09:07

## 2021-05-31 RX ADMIN — AMLODIPINE BESYLATE 5 MG: 5 TABLET ORAL at 09:07

## 2021-05-31 RX ADMIN — FINASTERIDE 5 MG: 5 TABLET, FILM COATED ORAL at 09:06

## 2021-05-31 RX ADMIN — MEMANTINE HYDROCHLORIDE 10 MG: 10 TABLET, FILM COATED ORAL at 20:18

## 2021-05-31 RX ADMIN — HEPARIN SODIUM 5000 UNITS: 5000 INJECTION INTRAVENOUS; SUBCUTANEOUS at 20:18

## 2021-05-31 RX ADMIN — HEPARIN SODIUM 5000 UNITS: 5000 INJECTION INTRAVENOUS; SUBCUTANEOUS at 05:14

## 2021-05-31 RX ADMIN — HEPARIN SODIUM 5000 UNITS: 5000 INJECTION INTRAVENOUS; SUBCUTANEOUS at 13:54

## 2021-05-31 RX ADMIN — PANTOPRAZOLE SODIUM 40 MG: 40 TABLET, DELAYED RELEASE ORAL at 09:06

## 2021-05-31 RX ADMIN — FERROUS SULFATE TAB 325 MG (65 MG ELEMENTAL FE) 325 MG: 325 (65 FE) TAB at 09:07

## 2021-05-31 RX ADMIN — ISOSORBIDE MONONITRATE 90 MG: 60 TABLET ORAL at 09:06

## 2021-05-31 RX ADMIN — MEMANTINE HYDROCHLORIDE 10 MG: 10 TABLET, FILM COATED ORAL at 09:06

## 2021-05-31 RX ADMIN — LISINOPRIL 20 MG: 10 TABLET ORAL at 09:06

## 2021-06-01 LAB
ANION GAP SERPL CALCULATED.3IONS-SCNC: 10.9 MMOL/L (ref 5–15)
BUN SERPL-MCNC: 25 MG/DL (ref 8–23)
BUN/CREAT SERPL: 32.1 (ref 7–25)
CALCIUM SPEC-SCNC: 8.6 MG/DL (ref 8.6–10.5)
CHLORIDE SERPL-SCNC: 110 MMOL/L (ref 98–107)
CO2 SERPL-SCNC: 22.1 MMOL/L (ref 22–29)
CREAT SERPL-MCNC: 0.78 MG/DL (ref 0.76–1.27)
GFR SERPL CREATININE-BSD FRML MDRD: 97 ML/MIN/1.73
GLUCOSE BLDC GLUCOMTR-MCNC: 102 MG/DL (ref 70–130)
GLUCOSE BLDC GLUCOMTR-MCNC: 142 MG/DL (ref 70–130)
GLUCOSE BLDC GLUCOMTR-MCNC: 171 MG/DL (ref 70–130)
GLUCOSE BLDC GLUCOMTR-MCNC: 96 MG/DL (ref 70–130)
GLUCOSE SERPL-MCNC: 93 MG/DL (ref 65–99)
MAGNESIUM SERPL-MCNC: 2.1 MG/DL (ref 1.6–2.4)
PHOSPHATE SERPL-MCNC: 3.5 MG/DL (ref 2.5–4.5)
POTASSIUM SERPL-SCNC: 3.8 MMOL/L (ref 3.5–5.2)
SODIUM SERPL-SCNC: 143 MMOL/L (ref 136–145)

## 2021-06-01 PROCEDURE — 82962 GLUCOSE BLOOD TEST: CPT

## 2021-06-01 PROCEDURE — 99224 PR SBSQ OBSERVATION CARE/DAY 15 MINUTES: CPT | Performed by: INTERNAL MEDICINE

## 2021-06-01 PROCEDURE — 63710000001 INSULIN ASPART PER 5 UNITS: Performed by: PHYSICIAN ASSISTANT

## 2021-06-01 PROCEDURE — G0378 HOSPITAL OBSERVATION PER HR: HCPCS

## 2021-06-01 PROCEDURE — 84100 ASSAY OF PHOSPHORUS: CPT | Performed by: INTERNAL MEDICINE

## 2021-06-01 PROCEDURE — 83735 ASSAY OF MAGNESIUM: CPT | Performed by: INTERNAL MEDICINE

## 2021-06-01 PROCEDURE — 80048 BASIC METABOLIC PNL TOTAL CA: CPT | Performed by: INTERNAL MEDICINE

## 2021-06-01 PROCEDURE — 96372 THER/PROPH/DIAG INJ SC/IM: CPT

## 2021-06-01 PROCEDURE — 25010000002 HEPARIN (PORCINE) PER 1000 UNITS: Performed by: INTERNAL MEDICINE

## 2021-06-01 RX ADMIN — DONEPEZIL HYDROCHLORIDE 10 MG: 5 TABLET, FILM COATED ORAL at 20:34

## 2021-06-01 RX ADMIN — PANTOPRAZOLE SODIUM 40 MG: 40 TABLET, DELAYED RELEASE ORAL at 09:17

## 2021-06-01 RX ADMIN — SODIUM CHLORIDE, PRESERVATIVE FREE 10 ML: 5 INJECTION INTRAVENOUS at 20:33

## 2021-06-01 RX ADMIN — MEMANTINE HYDROCHLORIDE 10 MG: 10 TABLET, FILM COATED ORAL at 09:17

## 2021-06-01 RX ADMIN — MEMANTINE HYDROCHLORIDE 10 MG: 10 TABLET, FILM COATED ORAL at 20:33

## 2021-06-01 RX ADMIN — Medication 1 CAPSULE: at 09:17

## 2021-06-01 RX ADMIN — HEPARIN SODIUM 5000 UNITS: 5000 INJECTION INTRAVENOUS; SUBCUTANEOUS at 05:09

## 2021-06-01 RX ADMIN — INSULIN ASPART 2 UNITS: 100 INJECTION, SOLUTION INTRAVENOUS; SUBCUTANEOUS at 11:35

## 2021-06-01 RX ADMIN — FINASTERIDE 5 MG: 5 TABLET, FILM COATED ORAL at 09:17

## 2021-06-01 RX ADMIN — LISINOPRIL 20 MG: 10 TABLET ORAL at 09:17

## 2021-06-01 RX ADMIN — FERROUS SULFATE TAB 325 MG (65 MG ELEMENTAL FE) 325 MG: 325 (65 FE) TAB at 17:18

## 2021-06-01 RX ADMIN — CLOPIDOGREL 75 MG: 75 TABLET, FILM COATED ORAL at 09:17

## 2021-06-01 RX ADMIN — HEPARIN SODIUM 5000 UNITS: 5000 INJECTION INTRAVENOUS; SUBCUTANEOUS at 20:33

## 2021-06-01 RX ADMIN — CETIRIZINE HYDROCHLORIDE 10 MG: 10 TABLET, FILM COATED ORAL at 09:17

## 2021-06-01 RX ADMIN — FERROUS SULFATE TAB 325 MG (65 MG ELEMENTAL FE) 325 MG: 325 (65 FE) TAB at 09:17

## 2021-06-01 RX ADMIN — ATORVASTATIN CALCIUM 80 MG: 40 TABLET, FILM COATED ORAL at 20:33

## 2021-06-01 RX ADMIN — SODIUM CHLORIDE, PRESERVATIVE FREE 10 ML: 5 INJECTION INTRAVENOUS at 09:20

## 2021-06-01 RX ADMIN — HEPARIN SODIUM 5000 UNITS: 5000 INJECTION INTRAVENOUS; SUBCUTANEOUS at 13:45

## 2021-06-01 RX ADMIN — METOPROLOL TARTRATE 12.5 MG: 25 TABLET, FILM COATED ORAL at 20:33

## 2021-06-01 RX ADMIN — ISOSORBIDE MONONITRATE 90 MG: 60 TABLET ORAL at 09:17

## 2021-06-01 NOTE — CASE MANAGEMENT/SOCIAL WORK
Discharge Planning Assessment   [place]     Patient Name: Fidencio Luciano  MRN: [id number]  Today's Date: 6/1/2021    Admit Date: 5/24/2021        Discharge Plan     Row Name 06/01/21 1017       Plan    Plan  SS faxed pt updated information to [facility] for review and notified Michelle.  SS will follow.        Continued Care and Services - Admitted Since 5/24/2021     Destination     Service Provider Request Status Selected Services Address Phone Fax Patient Preferred    [facility]  Pending - Request Sent N/A [address], [place] [address] [phone] --             Bel Andrew, JOSEW

## 2021-06-01 NOTE — PROGRESS NOTES
[facility]  PROGRESS NOTE     Patient Identification:  Name:  Fidencio Luciano  Age:  75 y.o.  Sex:  male  :  1945  MRN:  [id number]  Visit Number:  [id number]  Primary Care Provider:  Camila Ortiz APRN    Length of stay:  1    ----------------------------------------------------------------------------------------------------------------------  Subjective     Chief Complaint:  Follow up for  Falls at home, SOA with exertion.     Subjective:  Today, the patient  Reports feeling well. No c/o. Wife at bedside. Reported pt got very SOA ambulating a few steps with PT. Today, tele showed a few beats to V- tach while ambulating with PT. Pt did not report chest pain during episode.     ----------------------------------------------------------------------------------------------------------------------  Objective     Current Hospital Meds:  atorvastatin, 80 mg, Oral, Nightly  cetirizine, 10 mg, Oral, Daily  clopidogrel, 75 mg, Oral, Daily  donepezil, 10 mg, Oral, Nightly  ferrous sulfate, 325 mg, Oral, BID With Meals  finasteride, 5 mg, Oral, Daily  heparin (porcine), 5,000 Units, Subcutaneous, Q8H  insulin aspart, 0-7 Units, Subcutaneous, TID AC  isosorbide mononitrate, 90 mg, Oral, Daily  lactobacillus acidophilus, 1 capsule, Oral, Daily  lisinopril, 20 mg, Oral, Daily  memantine, 10 mg, Oral, Q12H  pantoprazole, 40 mg, Oral, Daily  sodium chloride, 10 mL, Intravenous, Q12H         ----------------------------------------------------------------------------------------------------------------------  Vital Signs:  Temp:  [97.2 °F (36.2 °C)-98.3 °F (36.8 °C)] 98.3 °F (36.8 °C)  Heart Rate:  [60-92] 69  Resp:  [16-18] 18  BP: ()/(46-60) 119/53  Mean Arterial Pressure (Non-Invasive) for the past 24 hrs (Last 3 readings):   Noninvasive MAP (mmHg)   21 1443 78   21 1008 76   21 0646 89     SpO2 Percentage    21  06/01/21 1008 06/01/21 1443   SpO2: 99% 98% 98%     SpO2:  [98 %-99 %] 98 %  on   ;   Device (Oxygen Therapy): room air    Body mass index is 20.92 kg/m².  Wt Readings from Last 3 Encounters:   06/01/21 68 kg (150 lb)   04/20/21 69.9 kg (154 lb)   04/07/21 59 kg (130 lb)        Intake/Output Summary (Last 24 hours) at 6/1/2021 1731  Last data filed at 6/1/2021 1230  Gross per 24 hour   Intake 600 ml   Output --   Net 600 ml     Diet Regular; Cardiac, Consistent Carbohydrate  NPO Diet  ----------------------------------------------------------------------------------------------------------------------  Physical exam:   GEN: NAD< sitting up in bed.   CV: RRR, no audible murmur  PULM: CTA, good air movement, no wheeze, crackles or rhonchi.     ----------------------------------------------------------------------------------------------------------------------              Results from last 7 days   Lab Units 06/01/21  0233 05/25/21  4690   SODIUM mmol/L 143  --    POTASSIUM mmol/L 3.8 4.3   MAGNESIUM mg/dL 2.1  --    CHLORIDE mmol/L 110*  --    CO2 mmol/L 22.1  --    BUN mg/dL 25*  --    CREATININE mg/dL 0.78  --    EGFR IF NONAFRICN AM mL/min/1.73 97  --    CALCIUM mg/dL 8.6  --    GLUCOSE mg/dL 93  --    Estimated Creatinine Clearance: 77.9 mL/min (by C-G formula based on SCr of 0.78 mg/dL).  No results found for: AMMONIA    Glucose   Date/Time Value Ref Range Status   06/01/2021 1606 96 70 - 130 mg/dL Final   06/01/2021 1048 171 (H) 70 - 130 mg/dL Final   06/01/2021 0647 102 70 - 130 mg/dL Final   05/31/2021 1857 140 (H) 70 - 130 mg/dL Final   05/31/2021 1617 98 70 - 130 mg/dL Final   05/31/2021 1055 121 70 - 130 mg/dL Final   05/31/2021 0628 94 70 - 130 mg/dL Final   05/30/2021 1818 176 (H) 70 - 130 mg/dL Final     Lab Results   Component Value Date    HGBA1C 6.00 (H) 04/16/2021     Lab Results   Component Value Date    TSH 1.510 05/24/2021    FREET4 1.21 03/09/2021          ----------------------------------------------------------------------------------------------------------------------  Assessment/Plan     · Nonsustain VT- on tele as above. Electrolytes in normal range. H/o Angina and mildly positive stress test in 2019.  Dyspnea with exertion may be related to poor endurance vs ischemia. Given VT I am repeating stress test tomorrow to compare to 2019. If has worsening ischemia will discuss with cardiology for possible LHC. Will cont medical management with Imdur for now. HR is 60's-70's. Will add low waddell BB today.   · ERROL-on oral iron.  hemoglobin is stable.   no melena  · History of CAD-continue Plavix, statin  · Hypertension- on lisinopril. Norvasc was held due to orthostasis yesterday.   · GERD w/ h/o PUD-continue PPI  · Type 2 diabetes-A1c well controlled at 6%.  On sliding scale insulin as needed  · Advanced dementia-continue Aricept, memantine  · Psoriasis  · CKD2- stable    --------------------------------------------------  DVT Prophylaxis: hep SQ     Disposition: awaiting rehab placement. Stress test as above.   --------------------------------------------------      Katayoun Behbahani, MD  Hospitalist Service -- [facility]     06/01/21  17:36 EDT

## 2021-06-01 NOTE — DISCHARGE PLACEMENT REQUEST
"Fidencio Luciano (75 y.o. Male)     Date of Birth Social Security Number Address Home Phone MRN    1945  [address] [facility] [address] [phone] [id number]    Faith Marital Status          Non-Sabianist        Admission Date Admission Type Admitting Provider Attending Provider Department, Room/Bed    5/24/21 Emergency Parks, Andrew, MD Behbahani, Katayoun, MD [address], 3309/2S    Discharge Date Discharge Disposition Discharge Destination                       Attending Provider: Behbahani, Katayoun, MD    Allergies: No Known Allergies    Isolation: None   Infection: COVID (History) (04/20/21)   Code Status: CPR    Ht: 180.3 cm (71\")   Wt: 68 kg (150 lb)    Admission Cmt: None   Principal Problem: Chest pain [R07.9]                 Active Insurance as of 5/24/2021     Primary Coverage     Payor Plan Insurance Group Employer/Plan Group    ANTHEM MEDICARE REPLACEMENT ANTHEM MEDICARE ADVANTAGE [id number]     Payor Plan Address Payor Plan Phone Number Payor Plan Fax Number Effective Dates    [address] [phone]  1/1/2021 - None Entered    [facility] [address]       Subscriber Name Subscriber Birth Date Member ID       FIDENCIO LUCIANO 1945 [id number]                 Emergency Contacts      (Rel.) Home Phone Work Phone Mobile Phone    Angela Luciano (Spouse) [phone]    Dylan García (Son) [phone] -- [phone]    AkbarSofie (Daughter) [phone] -- --    Zhane Conner (Daughter) [phone] -- --    Aries Ortega (Son) [phone] -- --            Emergency Contact Information     Name Relation Home Work Mobile    Angela Luciano Spouse [phone]    Dylan García Son [phone]    AkbarSofie Daughter [phone]      Zhane Conner Daughter [phone]      Aries Ortega Son [phone]            Insurance Information                ANTHEM MEDICARE " REPLACEMENT/ANTHEM MEDICARE ADVANTAGE Phone: [phone]    Subscriber: Fidecnio Luciano Subscriber#: [id number]    Group#: [id number] Precert#:           Treatment Team  Chat With All Active Members    Provider Relationship Specialty Contact    Behbahani, Katayoun, MD  Attending --  [phone]    Zhane Trinh, PT  Physical Therapist Physical Therapy     Enid Pope, PCT  Patient Care Technician --     Hipolito Padron MD  Consulting Physician General Surgery  [phone]    Myron Damon MD  Consulting Physician Internal Medicine  [phone]    Diana Hagan, RRT  Respiratory Therapist --  [phone]    Leticia Mcfarland, RN  Registered Nurse --     Makenzie Messina RN  Registered Nurse --           Problem List         Codes Noted - Resolved       Hospital    Sense of impending doom ICD-10-CM: R45.89  ICD-9-CM: 780.99 5/25/2021 - Present    * (Principal) Chest pain ICD-10-CM: R07.9  ICD-9-CM: 786.50 5/24/2021 - Present       Non-Hospital    Dysphagia ICD-10-CM: R13.10  ICD-9-CM: 787.20 4/2/2021 - Present    Early satiety ICD-10-CM: R68.81  ICD-9-CM: 780.94 1/26/2021 - Present    Esophageal dysphagia ICD-10-CM: R13.10  ICD-9-CM: 787.20 1/26/2021 - Present    Gastroesophageal reflux disease ICD-10-CM: K21.9  ICD-9-CM: 530.81 1/26/2021 - Present    Iron deficiency ICD-10-CM: E61.1  ICD-9-CM: 280.9 8/9/2018 - Present    Normocytic anemia ICD-10-CM: D64.9  ICD-9-CM: 285.9 8/9/2018 - Present    Weight loss, abnormal ICD-10-CM: R63.4  ICD-9-CM: 783.21 8/9/2018 - Present    Iron deficiency anemia ICD-10-CM: D50.9  ICD-9-CM: 280.9 7/17/2018 - Present    Weight loss, unintentional ICD-10-CM: R63.4  ICD-9-CM: 783.21 2/1/2018 - Present    Coronary artery fistula (Chronic) ICD-10-CM: I25.41  ICD-9-CM: 414.19 7/30/2017 - Present    Weakness generalized ICD-10-CM: R53.1  ICD-9-CM: 780.79 5/18/2017 - Present    Chronic fatigue (Chronic) ICD-10-CM: R53.82  ICD-9-CM: 780.79 5/18/2017 - Present    Hyperlipidemia LDL goal <70  "ICD-10-CM: E78.5  ICD-9-CM: 272.4 2017 - Present    ASCVD (arteriosclerotic cardiovascular disease), s/p stenting of 80 % stenosis in left circumflex on 10/05/16and 60% stenosis in the LAD, clinically stable.  (Chronic) ICD-10-CM: I25.10  ICD-9-CM: 429.2, 440.9 12/15/2016 - Present    Essential hypertension (Chronic) ICD-10-CM: I10  ICD-9-CM: 401.9 2016 - Present    Type 2 diabetes mellitus (CMS/HCC) ICD-10-CM: E11.9  ICD-9-CM: 250.00 2016 - Present    Benign prostatic hyperplasia (Chronic) ICD-10-CM: N40.0  ICD-9-CM: 600.00 2016 - Present             History & Physical      Jennifer Luque PA at 21 1120     Attestation signed by Myron Damon MD at 21 1611    I have evaluated the patient independently, I have reviewed H&P, all labs and images from today and evaluated the patient at bedside.  I have discussed w/ PA Jennifer Luque and agree.  Patient w/ significant cardiac history, last heart cath in 2019, has hx of coronary artery fistulas requiring coiling, presented w/ chest pain last evening in to this morning, trops negative on admission, EKG w/o significant ST changes, chest pain now reportedly resolved, cards consulted, recs pending, formal echo pending, also noted to have some confusion, when I spoke with him he was appropriate and conversational, he did endorse some dysuria and we discussed his UA looked like he might have a UTI he stated \"I was afraid of that\", he is continued on Ceftriaxone, f/u cultures.  Will follow tomorrow w/ formal note and updated plan.                          [facility] HOSPITALIST HISTORY AND PHYSICAL    Patient Identification:  Name:  Fidencio Luciano  Age:  75 y.o.  Sex:  male  :  1945  MRN:  [id number]   Visit Number:  [id number]  Admit Date: 2021   Primary Care Physician:  Camila Ortiz, ANDREW     2021  4:38 AM    Subjective     Chief complaint:   Chief Complaint   Patient presents with   • Altered " "Mental Status   • Shortness of Breath     History of presenting illness:   Fidencio Luciano is a 75 y.o. male with past medical history significant for CAD status post previous intervention, type 2 diabetes mellitus, hypertension, dyslipidemia, GERD, history of gastric ulcer with hemorrhage, psoriasis, and BPH. He presented to the emergency department of [facility] on 5/24/2021 with complaints of chest pains and shortness of breath. His wife has apparently also reported some altered mental status, however, she is no longer at bedside at the time of my evaluation.     The patient's wife had reported to ER staff that at approximately 5 PM yesterday evening the patient had reported to his wife that he felt like he was dying.  He refused to come to the hospital at that time.  He then sat down in a chair at 8:00 PM and refused to speak to his wife.  He apparently got up to use the restroom and she ended up making him get into the car and come to the ER.  She felt like something was wrong with him.    The patient himself reports complaints of chest pain or shortness of breath which has been occurring intermittently over the last week.  He describes it as a pressure and a tightness which radiates into the right shoulder.  He believes it started when he was out walking his dog about a week ago in the \"cool night air.\"  He feels that it is similar to what he has had in the past prior to coronary intervention.  He did not take anything for the chest discomfort.  No nitroglycerin received.  He states that the chest discomfort started again yesterday while he was sitting in a chair and he did not want to come to the ED.  When it got worse at approximately 4 AM this morning, he did decide to come to the ED.  He is currently chest pain-free.  He reports associated shortness of breath, nausea, and diaphoresis when he does experience the chest pain.  The patient does report that he has some intermittent troubles with memory. "  He also has some chronic headaches, but denies any headache at this time. The patient's account of the recent events is somewhat difficult to follow, but he answers most questions appropriately.     Upon arrival to the ED, vitals were /68, respirations 18, heart rate 65, SPO2 98% on room air, afebrile.  Troponins negative x2 and proBNP is within normal limits.  Potassium 3.4.  Lactic acid and CRP within normal limits.  WBC count shows 11.93.  UA shows possible UTI.  Also 1+ ketones and 2+ protein.  Chest x-ray shows no acute findings.  CT head without contrast shows no clear acute intracranial pathology.  Mild cerebral atrophy and nonspecific periventricular hypodensities which are thought to represent white matter microvascular change per radiology.  CT chest with PE protocol shows no pulmonary embolism.  Admission testing for COVID-19 is negative.  EKG shows sinus bradycardia at 59 bpm with nonspecific T wave flattening in aVL.  QTc 451 ms.  There is some baseline artifact.    In the ED, he received 1 g IV ceftriaxone and a 1 L normal saline fluid bolus. Patient has been admitted to the telemtetry unit of [facility] for further evaluation and therapy.   ---------------------------------------------------------------------------------------------------------------------   Review of Systems   Constitutional: Positive for diaphoresis. Negative for chills, fatigue and fever.   HENT: Negative for congestion, rhinorrhea, sinus pressure and sinus pain.    Respiratory: Positive for shortness of breath. Negative for cough and wheezing.    Cardiovascular: Positive for chest pain. Negative for leg swelling.   Gastrointestinal: Positive for nausea. Negative for abdominal pain, constipation, diarrhea and vomiting.   Genitourinary: Negative for difficulty urinating, dysuria and hematuria.   Musculoskeletal: Negative for arthralgias, back pain and gait problem.   Skin: Negative for color change, pallor, rash  and wound.   Neurological: Positive for numbness (Chronic peripheral neuropathy. ) and headaches (None currently. ). Negative for tremors, syncope, speech difficulty and weakness.   Psychiatric/Behavioral: Positive for confusion. Negative for agitation and behavioral problems.      ---------------------------------------------------------------------------------------------------------------------   Past Medical History:   Diagnosis Date   • BPH (benign prostatic hyperplasia)    • Bradycardia     Positive tilt table testing 07/2016   • Coronary artery disease    • Diabetes mellitus (CMS/[facility])    • Diverticulosis    • Elevated cholesterol    • Gastric ulcer with hemorrhage    • Gastroesophageal reflux disease 1/26/2021   • History of transfusion    • Hyperlipidemia    • Hypertension    • Psoriasis      Past Surgical History:   Procedure Laterality Date   • BACK SURGERY     • CARDIAC CATHETERIZATION N/A 9/20/2016    Procedure: Left Heart Cath;  Surgeon: Giovanni Buenrostro MD;  Location: [facility] CATH INVASIVE LOCATION;  Service:    • CARDIAC CATHETERIZATION N/A 10/4/2016    Procedure: Left Heart Cath;  Surgeon: Bharathi Andrew MD;  Location: [facility] CATH INVASIVE LOCATION;  Service:    • CARDIAC CATHETERIZATION N/A 5/9/2017    Procedure: Left Heart Cath;  Surgeon: Giovanni Buenrostro MD;  Location: [facility] CATH INVASIVE LOCATION;  Service:    • CARDIAC CATHETERIZATION N/A 5/9/2017    Procedure: ERR;  Surgeon: Giovanni Buenrostro MD;  [facility]:  [facility] CATH INVASIVE LOCATION;  Service:    • CARDIAC CATHETERIZATION N/A 11/8/2019    Procedure: Left Heart Cath;  Surgeon: Abraham Oviedo MD;  [facility]  [facility] CATH INVASIVE [facility];  Service: Cardiology   • CARDIAC CATHETERIZATION N/A 12/18/2019    Procedure: Coronary angiography;  Surgeon: Jay Barney IV, MD;  Location:  [facility] CATH INVASIVE LOCATION;  Service: Cardiovascular   • CHOLECYSTECTOMY     • COLONOSCOPY  11/13/2015    Dr. Martinez- internal hemorrhoids, sigmoid  diverticulosis   • COLONOSCOPY N/A 2018    Procedure: COLONOSCOPY CPT CODE: 64379;  Surgeon: Gigi Geronimo MD;  Location:  [facility] OR;  Service: Gastroenterology   • CORONARY ANGIOPLASTY WITH STENT PLACEMENT     • ENDOSCOPY N/A 2018    Procedure: ESOPHAGOGASTRODUODENOSCOPY WITH BIOPSY CPT CODE: 47583;  Surgeon: Gigi Geronimo MD;  Location:  [facility] OR;  Service: Gastroenterology   • ENDOSCOPY N/A 2021    Procedure: ESOPHAGOGASTRODUODENOSCOPY;  Surgeon: Geno Vernon MD;  Location:  [facility];  Service: Gastroenterology;  Laterality: N/A;   • INTERVENTIONAL RADIOLOGY PROCEDURE N/A 2019    Procedure: Coil Embolization of septal to pulmonary artery fistuala.;  Surgeon: Jay Barney IV, MD;  Location:  [facility] CATH INVASIVE LOCATION;  Service: Cardiovascular   • MN RT/LT HEART CATHETERS N/A 2017    Procedure: Percutaneous Coronary Intervention;  Surgeon: Lincoln De Los Santos MD;  Location:  [facility] CATH INVASIVE LOCATION;  Service: Cardiology   • STOMACH SURGERY      FUSION OF BLEEDING ULCER   • UPPER GASTROINTESTINAL ENDOSCOPY  2015    Dr. Martinez- mild gastritis     Family History   Problem Relation Age of Onset   • Sick sinus syndrome Mother    • Heart failure Mother    • Diabetes Mother    • Liver cancer Father      Social History     Socioeconomic History   • Marital status:      Spouse name: Not on file   • Number of children: Not on file   • Years of education: Not on file   • Highest education level: Not on file   Tobacco Use   • Smoking status: Former Smoker     Packs/day: 3.00     Years: 40.00     Pack years: 120.00     Types: Cigarettes     Quit date:      Years since quittin.4   • Smokeless tobacco: Former User     Quit date: 1986   Vaping Use   • Vaping Use: Never used   Substance and Sexual Activity   • Alcohol use: No   • Drug use: No   • Sexual activity: Defer      ---------------------------------------------------------------------------------------------------------------------   Allergies:  Patient has no known allergies.  ---------------------------------------------------------------------------------------------------------------------   Prior to Admission Medications     Prescriptions Last Dose Informant Patient Reported? Taking?    amLODIPine (NORVASC) 5 MG tablet   No No    TAKE ONE TABLET BY MOUTH EVERY DAY FOR BLOOD PRESSURE    aspirin 81 MG tablet   Yes No    Take 81 mg by mouth Daily.    atorvastatin (LIPITOR) 80 MG tablet   No No    Take 1 tablet by mouth Every Night.    clopidogrel (PLAVIX) 75 MG tablet   No No    Take 1 tablet by mouth Daily.    donepezil (ARICEPT) 10 MG tablet   Yes No    Take 10 mg by mouth Every Night.    ferrous sulfate 325 (65 FE) MG tablet   No No    Take 1 tablet by mouth 3 (Three) Times a Day With Meals.    finasteride (PROSCAR) 5 MG tablet   No No    Take 1 tablet by mouth Daily.    ipratropium-albuterol (DUO-NEB) 0.5-2.5 mg/3 ml nebulizer   No No    Take 3 mL by nebulization Every 4 (Four) Hours As Needed for Wheezing.    isosorbide mononitrate (IMDUR) 60 MG 24 hr tablet   No No    Take 1 tablet by mouth Every Morning.    lisinopril (PRINIVIL,ZESTRIL) 20 MG tablet   No No    Take 1 tablet by mouth Daily.    meloxicam (MOBIC) 7.5 MG tablet   Yes No    Take 7.5 mg by mouth 2 (Two) Times a Day.    Memantine HCl-Donepezil HCl (Namzaric) 28-10 MG capsule sustained-release 24 hr   Yes No    Take 1 capsule by mouth Daily.    nitroglycerin (NITROSTAT) 0.4 MG SL tablet   No No    1 under the tongue as needed for angina, may repeat q5mins for up three doses    pantoprazole (PROTONIX) 40 MG EC tablet   Yes No    Take 40 mg by mouth Daily.        ---------------------------------------------------------------------------------------------------------------------  Objective   Hospital Scheduled Meds:         ---------------------------------------------------------------------------------------------------------------------   Vital Signs:  Temp:  [97.6 °F (36.4 °C)] 97.6 °F (36.4 °C)  Heart Rate:  [53-71] 57  Resp:  [18] 18  BP: (161-180)/(66-80) 173/76  Mean Arterial Pressure (Non-Invasive) for the past 24 hrs (Last 3 readings):   Noninvasive MAP (mmHg)   05/24/21 1047 117   05/24/21 1033 100   05/24/21 1018 102     SpO2 Percentage    05/24/21 1018 05/24/21 1033 05/24/21 1047   SpO2: 100% 99% 100%     SpO2:  [92 %-100 %] 100 %  on   ;   Device (Oxygen Therapy): room air    Body mass index is 20.64 kg/m².  Wt Readings from Last 3 Encounters:   05/24/21 67.1 kg (148 lb)   04/20/21 69.9 kg (154 lb)   04/07/21 59 kg (130 lb)     ---------------------------------------------------------------------------------------------------------------------   Physical Exam:  Constitutional:  Well-developed and well-nourished.  No respiratory distress on room air.      HENT:  Head: Normocephalic and atraumatic.  Mouth:  Moist mucous membranes.    Eyes:  Conjunctivae and EOM are normal. No scleral icterus. No erythema or drainage.   Neck:  Neck supple.  No carotid bruits.   Cardiovascular:  Normal rate, regular rhythm and normal heart sounds with no murmur.  Pulmonary/Chest:  No respiratory distress, no wheezes, no crackles, with normal breath sounds and good air movement.  Abdominal:  Soft.  Bowel sounds are present x4 and are somewhat hyperactive.  No distension and no tenderness.   Musculoskeletal:  No edema, no tenderness, and no deformity.  No red or swollen joints anywhere.    Neurological:  Alert and oriented to person, place. Confused to time in that he states it is August of 2022. He knows the current president. Moving all 4 extremities. No cranial nerve deficit.  No tongue deviation.  No facial droop.  No slurred speech.   Skin:  Skin is warm and dry.  No rash noted.  No pallor.   Psychiatric:  Normal mood and affect.   Behavior is normal. Seems to have some memory deficits.   Peripheral vascular:  No edema and 2+ pedal pulses bilaterally.   Genitourinary: No terrell catheter in place.   ---------------------------------------------------------------------------------------------------------------------  EKG:  Pending cardiology interpretation. Per my read, reveals sinus bradycardia at 59 bpm with nonspecific T wave flattening in aVL.  QTc 451 ms.  There is some baseline artifact.    I have personally reviewed the EKG.  ---------------------------------------------------------------------------------------------------------------------   Results from last 7 days   Lab Units 05/24/21  0713 05/24/21  0520   TROPONIN T ng/mL <0.010 <0.010     Results from last 7 days   Lab Units 05/24/21  0520   PROBNP pg/mL 132.1         Results from last 7 days   Lab Units 05/24/21  0520 05/24/21  0501 05/24/21  0500   CRP mg/dL <0.30  --   --    LACTATE mmol/L  --  1.3  --    WBC 10*3/mm3  --   --  11.93*   HEMOGLOBIN g/dL  --   --  14.7   HEMATOCRIT %  --   --  43.5   MCV fL  --   --  93.5   MCHC g/dL  --   --  33.8   PLATELETS 10*3/mm3  --   --  233   INR   --   --  1.02     Results from last 7 days   Lab Units 05/24/21  0520   SODIUM mmol/L 142   POTASSIUM mmol/L 3.4*   CHLORIDE mmol/L 106   CO2 mmol/L 24.2   BUN mg/dL 25*   CREATININE mg/dL 0.73*   EGFR IF NONAFRICN AM mL/min/1.73 105   CALCIUM mg/dL 9.4   GLUCOSE mg/dL 129*   ALBUMIN g/dL 4.01   BILIRUBIN mg/dL 0.8   ALK PHOS U/L 128*   AST (SGOT) U/L 29   ALT (SGPT) U/L 34   Estimated Creatinine Clearance: 76.9 mL/min (A) (by C-G formula based on SCr of 0.73 mg/dL (L)).  Ammonia   Date Value Ref Range Status   05/24/2021 13 (L) 16 - 60 umol/L Final     Comment:     Specimen hemolyzed.  Results may be affected.       Glucose   Date/Time Value Ref Range Status   05/24/2021 0452 108 70 - 130 mg/dL Final     Lab Results   Component Value Date    HGBA1C 6.00 (H) 04/16/2021     Lab Results    Component Value Date    TSH 1.130 03/09/2021    FREET4 1.21 03/09/2021     No results found for: BLOODCX  No results found for: URINECX  No results found for: WOUNDCX  No results found for: STOOLCX  No results found for: RESPCX    Pain Management Panel     Pain Management Panel Latest Ref Rng & Units 7/31/2015    AMPHETAMINES SCREEN, URINE NEGATIVE Negative    BARBITURATES SCREEN NEGATIVE Negative    BENZODIAZEPINE SCREEN, URINE NEGATIVE Negative    COCAINE SCREEN, URINE NEGATIVE Negative    METHADONE SCREEN, URINE NEGATIVE Negative        I have personally reviewed the above laboratory results.   ---------------------------------------------------------------------------------------------------------------------  Imaging Results (Last 7 Days)     Procedure Component Value Units Date/Time    CT Chest Pulmonary Embolism [[id number]] Collected: 05/24/21 0914     Updated: 05/24/21 0918    Narrative:      CT CHEST PULMONARY EMBOLISM-     CLINICAL INDICATION: PE suspected, high prob        COMPARISON: 04/07/2021 CT without contrast      PROCEDURE: Thin cut axial images were acquired through the pulmonary  vessels during the rapid infusion of IV contrast.     Additional 3-D reformatted images obtained via post-processing for  improved diagnostic accuracy and procedural planning.     Radiation dose reduction techniques were utilized per ALARA protocol.  Automated exposure control was initiated through either or Xeebel or  DoseRight software packages by  protocol.           FINDINGS: Today's study demonstrates opacification of the central  pulmonary vessels.   There are no filling defects.   There is no truncation.     No evidence of a pulmonary embolus.     Otherwise there are no parenchymal soft tissue nodules or masses.     There is no mediastinal lymph node enlargement     No pericardial or pleural effusion.          Impression:      No evidence of a pulmonary embolus.     This report was finalized on  5/24/2021 9:16 AM by Dr. Adan Kauffman MD.       CT Head Without Contrast [[id number]] Collected: 05/24/21 0649     Updated: 05/24/21 0651    Narrative:      CT Head WO    HISTORY:   Acute onset of altered mental status    TECHNIQUE:   Axial unenhanced head CT. Radiation dose reduction techniques included automated exposure control or exposure modulation based on body size. Count of known CT and cardiac nuc med studies performed in previous 12 months: 3.     Time of scan: 5:04 AM    COMPARISON:   7/25/2018    FINDINGS:     The ventricles are mildly generous in size. Cortical sulci are correspondingly prominent. No extraaxial fluid collections are seen.    No parenchymal or subarachnoid hemorrhage is present. Mild periventricular hypodensities are demonstrated which are nonspecific but likely represent white matter microvascular change in a patient of this age. No CT evidence of mass or acute infarct.    The mastoid air cells are clear. The visualized paranasal sinuses demonstrate mild inflammatory changes in the ethmoid air cells.  Orbital structures demonstrate no acute findings. Lens replacement surgery on the right side.      Impression:      Impression:  1. No clearly acute intracranial pathology demonstrated.  2. Mild cerebral atrophy and nonspecific periventricular hypodensities which are thought to represent white matter microvascular change.    No significant change from prior study of 7/25/2018.    Signer Name: Audie Carmichael MD   Signed: 5/24/2021 6:49 AM   Workstation Name: [id number]    [facility]    XR Chest 1 View [[id number]] Collected: 05/24/21 0518     Updated: 05/24/21 0521    Narrative:      CR Chest 1 Vw    INDICATION:   Chest pain.     COMPARISON:    4/7/2021    FINDINGS:  Single portable AP view(s) of the chest.  The heart and mediastinal contours are normal. The lungs are clear. No pneumothorax or pleural effusion.      Impression:      No acute cardiopulmonary  findings. Left base infiltrate seen on the previous examination has resolved.    Signer Name: Cortez Griffin MD   Signed: 5/24/2021 5:18 AM   Workstation Name: [id number]    [facility]            ---------------------------------------------------------------------------------------------------------------------  Assessment & Plan      Chest pains  Known CAD s/p stenting of the left circumflex in 2016, known LAD stenosis, and history of coronary artery fistulas s/p coil embolization 12/2019, however, it appears there were remaining fistulas at that time  Dyslipidemia  · Currently chest pain-free.  · EKG x1 without acute findings concerning for ischemia or infarct.  · Serial troponins negative x2, follow-up on a third.  · Follow-up on serial EKGs and troponins.  · Since he is currently chest pain-free and so far EKGs/troponins have not shown any acute findings, we will not loaded with full dose aspirin for now.  Continue low-dose aspirin 81 mg daily.  · Continue home atorvastatin and check a fasting lipid panel in a.m.  · Obtain transthoracic echocardiogram.  · Consult cardiology, appreciate their input.  · Patient has not had anything to eat or drink today, will continue n.p.o. status for now.   · Review of most recent left heart catheterization from December 2019 showed multiple small fistulas with unclear origins.  The primary LAD to PA fistula received coil embolization.  It was recommended at that time that if anginal symptoms persist, that FFR of the proximal 70% percent LAD lesion with possible PCI be completed.    Reported altered mental status   Possible metabolic encephalopathy   · CT head without contrast on admission with no acute findings per radiology.  There is mild cerebral atrophy and nonspecific periventricular hypodensities thought to represent white matter microvascular change.  · May be related to ?UTI.   · Glucose 129.  Ammonia level 13.  O2 saturations high  normal.  · Check TSH.   · Placed on fall precautions. Up with assistance.   · Neurochecks every 4 hours.  · Consult SLP.  · Will await cardiac evaluation and consider PT/OT consultations after cardiac work-up.    Abnormal UA/possible acute UTI  · Patient denies any urinary symptoms, however, has reportedly been confused per his wife and could be metabolic encephalopathy secondary to UTI.  · Given that his UA is mildly abnormal with confusion and minimally elevated WBC count, will continue IV Rocephin and follow-up on urine culture/sensitivity.  · If culture is negative, will consider discontinuing antibiotic therapy.  · Repeat CBC in a.m.    + D-Dimer   · CT chest PE protocol negative for pulmonary embolism.  · Check venous Dopplers bilateral lower extremities.    Acute hypokalemia  · Replace per p.o. protocol  · Check magnesium replace if needed  · Repeat CMP in a.m.    Uncontrolled essential hypertension  · Resume home antihypertensive regimen as appropriate with holding parameters once confirmed by pharmacy.   · Will go ahead and restart home amlodipine 5mg QD.   · PRN IV hydralazine 10mg daily.     Type diabetes mellitus  Will place on low dose SSI with FSBG AC and HS.  Place on hypoglycemia protocol.  · Recent HA1c 6%.    GERD  History of gastric ulcer with hemorrhage   · Continue home PPI.    DVT Prophylaxis  -Subq heparin    Admission COVID-19 testing  -NEGATIVE    The patient is considered to be a high risk patient due to: Chest pains with known CAD and multiple cardiac risk factors.    Code Status: FULL CODE.     I have discussed the patient's assessment and plan with the patient and admitting physician, Dr. Damon.     Disposition: Lives at home with his wife and plans to return home on discharge.     Expected length of stay: INPATIENT status due to the need for care which can only be reasonably provided in an hospital setting, such as aggressive/expedited ancillary services and/or consultation services,  the necessity for IV medications, close physician monitoring and/or the possible need for procedures.  In such, I feel the patient’s risk for adverse outcomes and need for care warrant INPATIENT evaluation and predict the patient’s care encounter to likely last beyond 2 midnights.    IGOR Mcrae  05/24/21  11:20 EDT  ---------------------------------------------------------------------------------------------------------------------       Electronically signed by Myron Damon MD at 05/24/21 1611       Vital Signs (last day)     Date/Time   Temp   Temp src   Pulse   Resp   BP   Patient Position   SpO2    06/01/21 1008   98.1 (36.7)   Oral   70   18   118/53   Lying   98    06/01/21 0917   --   --   64   --   168/58   --   --    06/01/21 0646   98.3 (36.8)   Oral   60   16   145/55   Lying   99    06/01/21 0300   97.4 (36.3)   Oral   60   16   147/60   Lying   99    05/31/21 1855   97.2 (36.2)   Oral   69   18   119/49   Lying   99    05/31/21 1739   --   --   92   --   (!) 86/47   Standing   --    05/31/21 1737   --   --   73   --   98/46   Sitting   --    05/31/21 1734   --   --   73   --   111/48   Lying   --    05/31/21 1502   --   --   --   --   (!) 88/40   Standing   --    05/31/21 1500   98.3 (36.8)   Oral   71   18   92/48   Sitting   96    05/31/21 1034   97.9 (36.6)   Oral   61   18   99/45   Lying   97    05/31/21 0626   98.1 (36.7)   Oral   71   18   136/58   Lying   96    05/31/21 0256   98.4 (36.9)   Oral   64   18   117/49   Lying   94              Lines, Drains & Airways    Active LDAs     Name:   Placement date:   Placement time:   Site:   Days:    Peripheral IV 05/24/21 0529 Left Forearm   05/24/21 0529    Forearm   8    Peripheral IV 05/24/21 0844 Right Antecubital   05/24/21 0844    Antecubital   8                  Current Facility-Administered Medications   Medication Dose Route Frequency Provider Last Rate Last Admin   • atorvastatin (LIPITOR) tablet 80 mg  80 mg Oral Nightly  Jennifer Luque PA   80 mg at 05/31/21 2018   • cetirizine (zyrTEC) tablet 10 mg  10 mg Oral Daily Myron Damon MD   10 mg at 06/01/21 0917   • clopidogrel (PLAVIX) tablet 75 mg  75 mg Oral Daily Myron Damon MD   75 mg at 06/01/21 0917   • dextrose (D50W) 25 g/ 50mL Intravenous Solution 25 g  25 g Intravenous Q15 Min PRN Jennifer Luque PA       • dextrose (GLUTOSE) oral gel 15 g  15 g Oral Q15 Min PRN Jennifer Luque PA       • donepezil (ARICEPT) tablet 10 mg  10 mg Oral Nightly Myron Damon MD   10 mg at 05/31/21 2018   • ferrous sulfate tablet 325 mg  325 mg Oral BID With Meals Myron Damon MD   325 mg at 06/01/21 0917   • finasteride (PROSCAR) tablet 5 mg  5 mg Oral Daily Myron Damon MD   5 mg at 06/01/21 0917   • glucagon (human recombinant) (GLUCAGEN DIAGNOSTIC) injection 1 mg  1 mg Subcutaneous Q15 Min PRN Jennifer Luque PA       • heparin (porcine) 5000 UNIT/ML injection 5,000 Units  5,000 Units Subcutaneous Q8H Myron Damon MD   5,000 Units at 06/01/21 0509   • hydrALAZINE (APRESOLINE) injection 10 mg  10 mg Intravenous Q6H PRN Jennifer Luque PA       • insulin aspart (novoLOG) injection 0-7 Units  0-7 Units Subcutaneous TID AC Jennifer Luque PA   2 Units at 05/30/21 1213   • isosorbide mononitrate (IMDUR) 24 hr tablet 90 mg  90 mg Oral Daily Giovanni Buenrostro MD   90 mg at 06/01/21 0917   • lactobacillus acidophilus (RISAQUAD) capsule 1 capsule  1 capsule Oral Daily Jennifer Luque PA   1 capsule at 06/01/21 0917   • lisinopril (PRINIVIL,ZESTRIL) tablet 20 mg  20 mg Oral Daily Myron Damon MD   20 mg at 06/01/21 0917   • magnesium sulfate 4 gram infusion - Mg less than or equal to 1mg/dL  4 g Intravenous PRN Jennifer Luque PA        Or   • magnesium sulfate 3 gram infusion (1gm x 3) - Mg 1.1 - 1.5 mg/dL  1 g Intravenous PRN Jennifer Luque PA        Or   • Magnesium Sulfate 2 gram infusion- Mg 1.6 - 1.9  mg/dL  2 g Intravenous PRN Jennifer Luque PA       • memantine (NAMENDA) tablet 10 mg  10 mg Oral Q12H Myron Damon MD   10 mg at 06/01/21 0917   • pantoprazole (PROTONIX) EC tablet 40 mg  40 mg Oral Daily Jennifer Luque PA   40 mg at 06/01/21 0917   • potassium chloride (K-DUR,KLOR-CON) CR tablet 40 mEq  40 mEq Oral PRN Jennifer Luque PA       • potassium chloride (KLOR-CON) packet 40 mEq  40 mEq Oral PRN Jennfier Luque PA       • sodium chloride 0.9 % flush 10 mL  10 mL Intravenous PRN Myron Damon MD       • sodium chloride 0.9 % flush 10 mL  10 mL Intravenous Q12H Myron Damon MD   10 mL at 06/01/21 0920   • sodium chloride 0.9 % flush 10 mL  10 mL Intravenous PRN Myron Damon MD           Lab Results (last 24 hours)     Procedure Component Value Units Date/Time    POC Glucose Once [[id number]]  (Normal) Collected: 06/01/21 0647    Specimen: Blood Updated: 06/01/21 0703     Glucose 102 mg/dL     Basic Metabolic Panel [[id number]]  (Abnormal) Collected: 06/01/21 0233    Specimen: Blood Updated: 06/01/21 0337     Glucose 93 mg/dL      BUN 25 mg/dL      Creatinine 0.78 mg/dL      Sodium 143 mmol/L      Potassium 3.8 mmol/L      Chloride 110 mmol/L      CO2 22.1 mmol/L      Calcium 8.6 mg/dL      eGFR Non African Amer 97 mL/min/1.73      BUN/Creatinine Ratio 32.1     Anion Gap 10.9 mmol/L     Narrative:      GFR Normal >60  Chronic Kidney Disease <60  Kidney Failure <15      POC Glucose Once [[id number]]  (Abnormal) Collected: 05/31/21 1857    Specimen: Blood Updated: 05/31/21 1904     Glucose 140 mg/dL     POC Glucose Once [[id number]]  (Normal) Collected: 05/31/21 1617    Specimen: Blood Updated: 05/31/21 1624     Glucose 98 mg/dL         Orders (last 24 hrs)      Start     Ordered    06/01/21 0704  POC Glucose Once  Once      06/01/21 0647    06/01/21 0600  Basic Metabolic Panel  Morning Draw      05/31/21 1853    05/31/21 1905  POC Glucose Once  Once      05/31/21 1857     05/31/21 1845  sodium chloride 0.9 % bolus 1,000 mL  Once      05/31/21 1758    05/31/21 1625  POC Glucose Once  Once      05/31/21 1617    05/31/21 1102  POC Glucose Once  Once      05/31/21 1055    05/25/21 1030  isosorbide mononitrate (IMDUR) 24 hr tablet 90 mg  Daily      05/25/21 0952    05/24/21 2100  sodium chloride 0.9 % flush 10 mL  Every 12 Hours Scheduled      05/24/21 1438    05/24/21 2100  atorvastatin (LIPITOR) tablet 80 mg  Nightly      05/24/21 1438    05/24/21 2100  memantine (NAMENDA) tablet 10 mg  Every 12 Hours Scheduled      05/24/21 1656    05/24/21 2100  donepezil (ARICEPT) tablet 10 mg  Nightly      05/24/21 1656    05/24/21 1900  clopidogrel (PLAVIX) tablet 75 mg  Daily      05/24/21 1649    05/24/21 1900  ferrous sulfate tablet 325 mg  2 Times Daily With Meals      05/24/21 1649    05/24/21 1900  finasteride (PROSCAR) tablet 5 mg  Daily      05/24/21 1649    05/24/21 1900  lisinopril (PRINIVIL,ZESTRIL) tablet 20 mg  Daily      05/24/21 1649    05/24/21 1900  cetirizine (zyrTEC) tablet 10 mg  Daily      05/24/21 1649    05/24/21 1730  insulin aspart (novoLOG) injection 0-7 Units  3 Times Daily Before Meals      05/24/21 1438    05/24/21 1700  POC Glucose 4x Daily AC & at Bedtime  4 Times Daily Before Meals & at Bedtime     Comments: If bedtime blood glucose is greater than 350 mg/dl, call MD.      05/24/21 1438    05/24/21 1600  Vital Signs  Every 4 Hours      05/24/21 1438    05/24/21 1600  Neuro Checks  Every 4 Hours      05/24/21 1438    05/24/21 1545  pantoprazole (PROTONIX) EC tablet 40 mg  Daily      05/24/21 1438    05/24/21 1545  lactobacillus acidophilus (RISAQUAD) capsule 1 capsule  Daily      05/24/21 1438    05/24/21 1545  amLODIPine (NORVASC) tablet 5 mg  Daily,   Status:  Discontinued      05/24/21 1438    05/24/21 1445  heparin (porcine) 5000 UNIT/ML injection 5,000 Units  Every 8 Hours Scheduled      05/24/21 1438    05/24/21 1439  Patient Currently On Electrolyte  Replacement Protocol - Please Refer to MAR for Protocol Details  Misc Nursing Order (Specify)  Daily     Comments: Patient Currently On Electrolyte Replacement Protocol - Please Refer to MAR for Protocol Details    05/24/21 1438    05/24/21 1439  Patient Currently On Electrolyte Replacement Protocol - Please Refer to MAR for Protocol Details  Misc Nursing Order (Specify)  Daily     Comments: Patient Currently On Electrolyte Replacement Protocol - Please Refer to MAR for Protocol Details    05/24/21 1438    05/24/21 1438  Intake & Output  Every Shift      05/24/21 1438    05/24/21 1437  magnesium sulfate 4 gram infusion - Mg less than or equal to 1mg/dL  As Needed      05/24/21 1438    05/24/21 1437  magnesium sulfate 3 gram infusion (1gm x 3) - Mg 1.1 - 1.5 mg/dL  As Needed      05/24/21 1438    05/24/21 1437  Magnesium Sulfate 2 gram infusion- Mg 1.6 - 1.9 mg/dL  As Needed      05/24/21 1438    05/24/21 1437  sodium chloride 0.9 % flush 10 mL  As Needed      05/24/21 1438    05/24/21 1437  potassium chloride (K-DUR,KLOR-CON) CR tablet 40 mEq  As Needed      05/24/21 1438    05/24/21 1437  potassium chloride (KLOR-CON) packet 40 mEq  As Needed      05/24/21 1438    05/24/21 1437  hydrALAZINE (APRESOLINE) injection 10 mg  Every 6 Hours PRN      05/24/21 1438    05/24/21 1437  dextrose (GLUTOSE) oral gel 15 g  Every 15 Minutes PRN      05/24/21 1438    05/24/21 1437  dextrose (D50W) 25 g/ 50mL Intravenous Solution 25 g  Every 15 Minutes PRN      05/24/21 1438    05/24/21 1437  glucagon (human recombinant) (GLUCAGEN DIAGNOSTIC) injection 1 mg  Every 15 Minutes PRN      05/24/21 1438    05/24/21 0453  sodium chloride 0.9 % flush 10 mL  As Needed      05/24/21 0453    Unscheduled  Magnesium  As Needed      05/24/21 1438    Unscheduled  Potassium  As Needed      05/24/21 1438    Unscheduled  Up With Assistance  As Needed      05/24/21 1438    Unscheduled  Magnesium  As Needed      05/24/21 1438    Unscheduled  Potassium   As Needed      21 1438    --  ferrous sulfate 325 (65 FE) MG tablet  2 times daily      21 1616    --  loratadine (CLARITIN) 10 MG tablet  Daily      21 1616    --  SCANNED - TELEMETRY        21 0000    --  SCANNED - TELEMETRY        21 0000    --  SCANNED - TELEMETRY        21 0000    --  SCANNED - TELEMETRY        21 0000    --  SCANNED - TELEMETRY        21 0000    --  SCANNED - TELEMETRY        21 0000                Operative/Procedure Notes (last 24 hours) (Notes from 21 1016 through 21 1016)    No notes of this type exist for this encounter.            Physician Progress Notes (last 24 hours) (Notes from 21 1016 through 21 1016)      Behbahani, Katayoun, MD at 21 1846                                    [facility] Medicine Services  PROGRESS NOTE     Patient Identification:  Name:  Fidencio Luciano  Age:  75 y.o.  Sex:  male  :  1945  MRN:  [id number]  Visit Number:  [id number]  Primary Care Provider:  Camila Ortiz APRN    Length of stay:  1    ----------------------------------------------------------------------------------------------------------------------  Subjective     Chief Complaint:  Follow up for SOA, confusion    Subjective:  Today, the patient reports feeling well today.  He denies having any shortness of breath or pain.  However history is very limited due to dementia.    Per nursing when patient got out of bed with therapy this afternoon noted to be orthostatic with drop in systolic blood pressure.    ----------------------------------------------------------------------------------------------------------------------  Objective     [facility] Meds:  amLODIPine, 5 mg, Oral, Daily  atorvastatin, 80 mg, Oral, Nightly  cetirizine, 10 mg, Oral, Daily  clopidogrel, 75 mg, Oral, Daily  donepezil, 10 mg, Oral, Nightly  ferrous sulfate, 325 mg, Oral, BID With Meals  finasteride,  5 mg, Oral, Daily  heparin (porcine), 5,000 Units, Subcutaneous, Q8H  insulin aspart, 0-7 Units, Subcutaneous, TID AC  isosorbide mononitrate, 90 mg, Oral, Daily  lactobacillus acidophilus, 1 capsule, Oral, Daily  lisinopril, 20 mg, Oral, Daily  memantine, 10 mg, Oral, Q12H  pantoprazole, 40 mg, Oral, Daily  sodium chloride, 1,000 mL, Intravenous, Once  sodium chloride, 10 mL, Intravenous, Q12H         ----------------------------------------------------------------------------------------------------------------------  Vital Signs:  Temp:  [97.9 °F (36.6 °C)-98.4 °F (36.9 °C)] 98.3 °F (36.8 °C)  Heart Rate:  [61-92] 92  Resp:  [18] 18  BP: ()/(40-58) 86/47  Mean Arterial Pressure (Non-Invasive) for the past 24 hrs (Last 3 readings):   Noninvasive MAP (mmHg)   05/31/21 1739 60   05/31/21 1737 63   05/31/21 1734 70     SpO2 Percentage    05/31/21 0626 05/31/21 1034 05/31/21 1500   SpO2: 96% 97% 96%     SpO2:  [94 %-97 %] 96 %  on   ;   Device (Oxygen Therapy): room air    Body mass index is 20.33 kg/m².  Wt Readings from Last 3 Encounters:   05/31/21 66.1 kg (145 lb 12.8 oz)   04/20/21 69.9 kg (154 lb)   04/07/21 59 kg (130 lb)        Intake/Output Summary (Last 24 hours) at 5/31/2021 1846  Last data filed at 5/31/2021 1230  Gross per 24 hour   Intake 1080 ml   Output --   Net 1080 ml     Diet Regular; Cardiac, Consistent Carbohydrate  ----------------------------------------------------------------------------------------------------------------------  Physical exam:   GEN: NAD  CV: RRR, no audible murmur  PULM: Mostly clear to auscultation, no rhonchi or wheeze, voice is hoarse  NEURO: Awake and oriented x3, flat affect, no acute focal deficits  ----------------------------------------------------------------------------------------------------------------------        Results from last 7 days   Lab Units 05/25/21  0110   CHOLESTEROL mg/dL 108   TRIGLYCERIDES mg/dL 115   HDL CHOL mg/dL 29*   LDL CHOL mg/dL 58        Results from last 7 days   Lab Units 05/25/21  0110   WBC 10*3/mm3 10.56   HEMOGLOBIN g/dL 12.7*   HEMATOCRIT % 39.0   MCV fL 97.7*   MCHC g/dL 32.6   PLATELETS 10*3/mm3 205     Results from last 7 days   Lab Units 05/25/21  1858 05/25/21  0110   SODIUM mmol/L  --  141   POTASSIUM mmol/L 4.3 2.9*   CHLORIDE mmol/L  --  107   CO2 mmol/L  --  23.9   BUN mg/dL  --  16   CREATININE mg/dL  --  0.69*   EGFR IF NONAFRICN AM mL/min/1.73  --  112   CALCIUM mg/dL  --  8.8   GLUCOSE mg/dL  --  86   ALBUMIN g/dL  --  3.30*   BILIRUBIN mg/dL  --  0.9   ALK PHOS U/L  --  113   AST (SGOT) U/L  --  25   ALT (SGPT) U/L  --  25   Estimated Creatinine Clearance: 75.7 mL/min (A) (by C-G formula based on SCr of 0.69 mg/dL (L)).  No results found for: AMMONIA    Glucose   Date/Time Value Ref Range Status   05/31/2021 1617 98 70 - 130 mg/dL Final   05/31/2021 1055 121 70 - 130 mg/dL Final   05/31/2021 0628 94 70 - 130 mg/dL Final   05/30/2021 1818 176 (H) 70 - 130 mg/dL Final   05/30/2021 1642 105 70 - 130 mg/dL Final   05/30/2021 1014 161 (H) 70 - 130 mg/dL Final   05/30/2021 0650 113 70 - 130 mg/dL Final   05/29/2021 1911 117 70 - 130 mg/dL Final     Lab Results   Component Value Date    HGBA1C 6.00 (H) 04/16/2021     Lab Results   Component Value Date    TSH 1.510 05/24/2021    FREET4 1.21 03/09/2021       ----------------------------------------------------------------------------------------------------------------------  Assessment/Plan     · Chronic angina-ACS ruled out admission patient was reporting chest pain.  Cardiology consulted and recommending medical management with antianginal meds.  · UTI-ruled out with negative urine culture.  Received several days of antibiotics already  · ERROL-on oral iron.  Stool is reported to be dark per wife however hemoglobin is stable.  This is likely to oral iron and melena.  General surgery was consulted throughout this admission and deferred EGD to outpatient and given no evidence of  acute GI bleed at this time.  Continue home iron supplement  · History of CAD-continue Plavix, statin  · Hypertension- on lisinopril and Norvasc.  Noted to be orthostatic this afternoon with drop in blood pressure by 10 mmHg.  Patient reportedly asymptomatic.  Receiving 1 L IV fluid bolus.  Has poor p.o. intake.  Will hold Norvasc tomorrow and reassess  · GERD w/ h/o PUD-continue PPI  · Type 2 diabetes-A1c well controlled at 6%.  On sliding scale insulin as needed  · Advanced dementia-continue Aricept, memantine  · Psoriasis  · CKD2- stable    --------------------------------------------------  DVT Prophylaxis: hep sq     Disposition: awaiting rehab placement.   --------------------------------------------------      Katayoun Behbahani, MD  Hospitalist Service -- [facility]     21  18:46 EDT      Electronically signed by Behbahani, Katayoun, MD at 21 1853       Consult Notes (last 24 hours) (Notes from 21 1016 through 21 1016)    No notes of this type exist for this encounter.            Physical Therapy Notes (last 24 hours) (Notes from 21 1016 through 21 1016)      Zhane Trinh, PT at 21 1525  Version 1 of 1         Acute Care - Physical Therapy Treatment Note  [facility]     Patient Name: Fidencio Luciano  : 1945  MRN: [id number]  Today's Date: 2021   Onset of Illness/Injury or Date of Surgery: 21       PT Assessment (last 12 hours)      PT Evaluation and Treatment     Row Name 21 1500          Physical Therapy Time and Intention    Subjective Information  no complaints  -BC     Document Type  therapy note (daily note)  -BC     Mode of Treatment  physical therapy  -BC     Patient Effort  good  -BC     Row Name 21 1500          General Information    Patient Profile Reviewed  yes  -BC     Onset of Illness/Injury or Date of Surgery  21  -BC     Referring Physician  Behbahani  -BC     Patient Observations   alert;cooperative;agree to therapy  -BC     Risks Reviewed  LOB;nausea/vomiting;dizziness;increased discomfort;change in vital signs;increased drainage;lines disloged  -BC     Benefits Reviewed  improve function;increase independence;increase strength;increase balance;decrease pain;decrease risk of DVT;improve skin integrity;increase knowledge  -[facility] Name 05/31/21 1500          Living Environment    Current Living Arrangements  home/apartment/condo  -BC     Lives With  spouse  -[facility] Name 05/31/21 1500          Sensory    Hearing Status  WFL  -[facility] Name 05/31/21 1500          Range of Motion (ROM)    Range of Motion  ROM is WFL  -BC     Left Lower Extremity (ROM)  left LE ROM is WFL except  -BC     Right Lower Extremity (ROM)  right LE ROM is WFL  -BC     [facility] Name 05/31/21 1500          Range of Motion Comprehensive    General Range of Motion  no range of motion deficits identified  -[facility] Name 05/31/21 1500          Mobility    Extremity Weight-bearing Status  left lower extremity;right lower extremity  -BC     Left Lower Extremity (Weight-bearing Status)  full weight-bearing (FWB)  -BC     Right Lower Extremity (Weight-bearing Status)  full weight-bearing (FWB)  -[facility] Name 05/31/21 1500          Bed Mobility    Bed Mobility  bed mobility (all) activities  -BC     All Activities, [place] (Bed Mobility)  contact guard assist  -BC     Assistive Device (Bed Mobility)  bed rails  -[facility] Name 05/31/21 1500          Transfers    Transfers  stand-sit transfer;sit-stand transfer  -BC     Maintains Weight-bearing Status (Transfers)  able to maintain  -BC     Sit-Stand [place] (Transfers)  contact guard  -BC     Stand-Sit [place] (Transfers)  contact guard  -[facility] Name 05/31/21 1500          Sit-Stand Transfer    Assistive Device (Sit-Stand Transfers)  walker, front-wheeled  -BC     Row Name 05/31/21 1500          Stand-Sit Transfer    Assistive Device (Stand-Sit  Transfers)  walker, front-wheeled  -BC     Row Name 05/31/21 1500          Gait/Stairs (Locomotion)    Gait/Stairs Locomotion  gait/ambulation independence  -BC     [place] Level (Gait)  contact guard  -BC     Assistive Device (Gait)  walker, front-wheeled  -BC     Distance in Feet (Gait)  30 ft  -BC     Row Name 05/31/21 1500          Coping    Observed Emotional State  calm;cooperative  -BC     Verbalized Emotional State  acceptance  -BC     Trust Relationship/Rapport  care explained  -BC     Family/Support Persons  spouse  -BC     Involvement in Care  not present at bedside  -BC     Family/Support System Care  presence promoted;self-care encouraged  -BC     Diversional Activities  television  -BC     Row Name 05/31/21 1500          Plan of Care Review    Plan of Care Reviewed With  patient  -BC     Progress  improving  -BC     Row Name 05/31/21 1500          Positioning and Restraints    Pre-Treatment Position  in bed  -BC     Post Treatment Position  chair  -BC     In Chair  notified nsg;reclined;sitting;call light within reach;encouraged to call for assist  -BC       User Key  (r) = Recorded By, (t) = Taken By, (c) = Cosigned By    Initials Name Provider Type    BC Zhane Trinh, PT Physical Therapist          PT Recommendation and Plan  Therapy Frequency (PT): 5 times/wk  Progress Summary (PT)  Progress Toward Functional Goals (PT): progress toward functional goals as expected  Daily Progress Summary (PT): good  Plan of Care Reviewed With: patient  Progress: improving       Time Calculation:   PT Charges     Row Name 05/31/21 1524             Time Calculation    PT Received On  05/31/21  -BC         Time Calculation- PT    Total Timed Code Minutes- PT  45 minute(s)  -BC         Timed Charges    48425 - PT Therapeutic Exercise Minutes  15  -BC      36865 - Gait Training Minutes   30  -BC         Total Minutes    Timed Charges Total Minutes  45  -BC       Total Minutes  45  -BC        User Key  (r) =  Recorded By, (t) = Taken By, (c) = Cosigned By    Initials Name Provider Type    BC Zhane Trinh, PT Physical Therapist        Therapy Charges for Today     Code Description Service Date Service Provider Modifiers Qty    38693871778 HC PT THER PROC EA 15 MIN 5/31/2021 Zhane Trinh PT GP 1    67135047308 HC GAIT TRAINING EA 15 MIN 5/31/2021 Zhane Trinh PT GP 2               Zhane Trinh PT  5/31/2021      Electronically signed by Zhane Trinh PT at 05/31/21 1526       Occupational Therapy Notes (last 24 hours) (Notes from 05/31/21 1016 through 06/01/21 1016)    No notes exist for this encounter.         ADL Documentation (last day)     Date/Time Transferring Toileting Bathing Dressing Eating Communication Swallowing    05/31/21 2018  2 - assistive person  2 - assistive person  2 - assistive person  2 - assistive person  2 - assistive person  0 - understands/communicates without difficulty  0 - swallows foods/liquids without difficulty    05/31/21 0830  2 - assistive person  2 - assistive person  2 - assistive person  2 - assistive person  2 - assistive person  0 - understands/communicates without difficulty  0 - swallows foods/liquids without difficulty

## 2021-06-01 NOTE — PLAN OF CARE
Goal Outcome Evaluation:  Plan of Care Reviewed With: patient     Outcome Summary: Pt states no complaints this shift. Denies chest pain. VSS. No s/s of acute distress noted. Will cont to follow POC.

## 2021-06-01 NOTE — CASE MANAGEMENT/SOCIAL WORK
Discharge Planning Assessment   [place]     Patient Name: Fidencio Luciano  MRN: [id number]  Today's Date: 6/1/2021    Admit Date: 5/24/2021        Discharge Plan     Row Name 06/01/21 1559       Plan    Plan  [facility] agreeable to submit pt's information to insurance for possible admit.  Pt's spouse aware and agreeable.  SS will follow.        Continued Care and Services - Admitted Since 5/24/2021     Destination     Service Provider Request Status Selected Services Address Phone Fax Patient Preferred    [facility]  Pending - Request Sent N/A [address], [place] [address] [phone] --                JOSE UgaldeW

## 2021-06-01 NOTE — PLAN OF CARE
Goal Outcome Evaluation:  Patient has no complaints. Patient has been resting this shift. No s/s of acute distress noted. Will continue with plan of care.

## 2021-06-02 ENCOUNTER — APPOINTMENT (OUTPATIENT)
Dept: NUCLEAR MEDICINE | Facility: HOSPITAL | Age: 76
End: 2021-06-02

## 2021-06-02 ENCOUNTER — APPOINTMENT (OUTPATIENT)
Dept: CARDIOLOGY | Facility: HOSPITAL | Age: 76
End: 2021-06-02

## 2021-06-02 LAB
BH CV NUCLEAR PRIOR STUDY: 3
BH CV REST NUCLEAR ISOTOPE DOSE: 10.4 MCI
BH CV STRESS BP STAGE 1: NORMAL
BH CV STRESS BP STAGE 2: NORMAL
BH CV STRESS COMMENTS STAGE 1: NORMAL
BH CV STRESS COMMENTS STAGE 2: NORMAL
BH CV STRESS DOSE REGADENOSON STAGE 1: 0.4
BH CV STRESS DURATION MIN STAGE 1: 0
BH CV STRESS DURATION MIN STAGE 2: 4
BH CV STRESS DURATION SEC STAGE 1: 10
BH CV STRESS DURATION SEC STAGE 2: 0
BH CV STRESS HR STAGE 1: 92
BH CV STRESS HR STAGE 2: 96
BH CV STRESS NUCLEAR ISOTOPE DOSE: 30.9 MCI
BH CV STRESS PROTOCOL 1: NORMAL
BH CV STRESS RECOVERY BP: NORMAL MMHG
BH CV STRESS RECOVERY HR: 96 BPM
BH CV STRESS STAGE 1: 1
BH CV STRESS STAGE 2: 2
GLUCOSE BLDC GLUCOMTR-MCNC: 124 MG/DL (ref 70–130)
GLUCOSE BLDC GLUCOMTR-MCNC: 127 MG/DL (ref 70–130)
GLUCOSE BLDC GLUCOMTR-MCNC: 98 MG/DL (ref 70–130)
MAXIMAL PREDICTED HEART RATE: 146 BPM
PERCENT MAX PREDICTED HR: 63.01 %
STRESS BASELINE BP: NORMAL MMHG
STRESS BASELINE HR: 64 BPM
STRESS PERCENT HR: 74 %
STRESS POST PEAK BP: NORMAL MMHG
STRESS POST PEAK HR: 92 BPM
STRESS TARGET HR: 124 BPM

## 2021-06-02 PROCEDURE — G0378 HOSPITAL OBSERVATION PER HR: HCPCS

## 2021-06-02 PROCEDURE — A9500 TC99M SESTAMIBI: HCPCS | Performed by: INTERNAL MEDICINE

## 2021-06-02 PROCEDURE — 97116 GAIT TRAINING THERAPY: CPT

## 2021-06-02 PROCEDURE — 25010000002 REGADENOSON 0.4 MG/5ML SOLUTION: Performed by: INTERNAL MEDICINE

## 2021-06-02 PROCEDURE — 82962 GLUCOSE BLOOD TEST: CPT

## 2021-06-02 PROCEDURE — 93017 CV STRESS TEST TRACING ONLY: CPT

## 2021-06-02 PROCEDURE — 78452 HT MUSCLE IMAGE SPECT MULT: CPT | Performed by: INTERNAL MEDICINE

## 2021-06-02 PROCEDURE — 0 TECHNETIUM SESTAMIBI: Performed by: INTERNAL MEDICINE

## 2021-06-02 PROCEDURE — 25010000002 HEPARIN (PORCINE) PER 1000 UNITS: Performed by: INTERNAL MEDICINE

## 2021-06-02 PROCEDURE — 78452 HT MUSCLE IMAGE SPECT MULT: CPT

## 2021-06-02 PROCEDURE — 96372 THER/PROPH/DIAG INJ SC/IM: CPT

## 2021-06-02 PROCEDURE — 99225 PR SBSQ OBSERVATION CARE/DAY 25 MINUTES: CPT | Performed by: INTERNAL MEDICINE

## 2021-06-02 PROCEDURE — 97110 THERAPEUTIC EXERCISES: CPT

## 2021-06-02 PROCEDURE — 93018 CV STRESS TEST I&R ONLY: CPT | Performed by: INTERNAL MEDICINE

## 2021-06-02 RX ADMIN — HEPARIN SODIUM 5000 UNITS: 5000 INJECTION INTRAVENOUS; SUBCUTANEOUS at 05:09

## 2021-06-02 RX ADMIN — REGADENOSON 0.4 MG: 0.08 INJECTION, SOLUTION INTRAVENOUS at 10:15

## 2021-06-02 RX ADMIN — FINASTERIDE 5 MG: 5 TABLET, FILM COATED ORAL at 13:39

## 2021-06-02 RX ADMIN — MEMANTINE HYDROCHLORIDE 10 MG: 10 TABLET, FILM COATED ORAL at 10:47

## 2021-06-02 RX ADMIN — HEPARIN SODIUM 5000 UNITS: 5000 INJECTION INTRAVENOUS; SUBCUTANEOUS at 20:38

## 2021-06-02 RX ADMIN — PANTOPRAZOLE SODIUM 40 MG: 40 TABLET, DELAYED RELEASE ORAL at 13:39

## 2021-06-02 RX ADMIN — CETIRIZINE HYDROCHLORIDE 10 MG: 10 TABLET, FILM COATED ORAL at 13:38

## 2021-06-02 RX ADMIN — Medication 1 CAPSULE: at 13:39

## 2021-06-02 RX ADMIN — ATORVASTATIN CALCIUM 80 MG: 40 TABLET, FILM COATED ORAL at 20:39

## 2021-06-02 RX ADMIN — TECHNETIUM TC 99M SESTAMIBI 1 DOSE: 1 INJECTION INTRAVENOUS at 08:30

## 2021-06-02 RX ADMIN — SODIUM CHLORIDE, PRESERVATIVE FREE 10 ML: 5 INJECTION INTRAVENOUS at 07:46

## 2021-06-02 RX ADMIN — MEMANTINE HYDROCHLORIDE 10 MG: 10 TABLET, FILM COATED ORAL at 20:39

## 2021-06-02 RX ADMIN — METOPROLOL TARTRATE 12.5 MG: 25 TABLET, FILM COATED ORAL at 21:16

## 2021-06-02 RX ADMIN — CLOPIDOGREL 75 MG: 75 TABLET, FILM COATED ORAL at 13:39

## 2021-06-02 RX ADMIN — TECHNETIUM TC 99M SESTAMIBI 1 DOSE: 1 INJECTION INTRAVENOUS at 10:15

## 2021-06-02 RX ADMIN — HEPARIN SODIUM 5000 UNITS: 5000 INJECTION INTRAVENOUS; SUBCUTANEOUS at 13:39

## 2021-06-02 RX ADMIN — METOPROLOL TARTRATE 12.5 MG: 25 TABLET, FILM COATED ORAL at 10:48

## 2021-06-02 RX ADMIN — ISOSORBIDE MONONITRATE 90 MG: 60 TABLET ORAL at 10:48

## 2021-06-02 RX ADMIN — FERROUS SULFATE TAB 325 MG (65 MG ELEMENTAL FE) 325 MG: 325 (65 FE) TAB at 17:38

## 2021-06-02 RX ADMIN — SODIUM CHLORIDE, PRESERVATIVE FREE 10 ML: 5 INJECTION INTRAVENOUS at 20:39

## 2021-06-02 RX ADMIN — DONEPEZIL HYDROCHLORIDE 10 MG: 5 TABLET, FILM COATED ORAL at 20:39

## 2021-06-02 RX ADMIN — LISINOPRIL 20 MG: 10 TABLET ORAL at 10:47

## 2021-06-02 NOTE — THERAPY TREATMENT NOTE
Acute Care - Physical Therapy Treatment Note   [place]     Patient Name: Fidencio Luciano  : 1945  MRN: [id number]  Today's Date: 2021   Onset of Illness/Injury or Date of Surgery: 21       PT Assessment (last 12 hours)      PT Evaluation and Treatment     Row Name 21 1518          Physical Therapy Time and Intention    Subjective Information  no complaints  -CW     Document Type  therapy note (daily note)  -CW     Mode of Treatment  physical therapy  -CW     Patient Effort  good  -CW     Symptoms Noted During/After Treatment  none  -CW     Comment  Patient agreeable to physical therapy treatment this date. He reports he is feeling better than when he was admitted.  -CW     Row Name 21 1518          General Information    Patient Profile Reviewed  yes  -CW     Patient Observations  alert;cooperative;agree to therapy  -CW     Row Name 21 1518          Cognition    Affect/Mental Status (Cognitive)  WFL  -     Orientation Status (Cognition)  oriented to;person;place;situation  -CW     Follows Commands (Cognition)  follows one-step commands;physical/tactile prompts required;repetition of directions required;verbal cues/prompting required;visual cue  -CW     Row Name 21 1518          Pain Scale: Word Pre/Post-Treatment    Pre/Posttreatment Pain Comment  Patient reports no pain during physical therapy treatment this date.  -CW     Row Name 21 1518          Bed Mobility    Bed Mobility  supine-sit  -CW     Supine-Sit Grubbs (Bed Mobility)  supervision  -     Assistive Device (Bed Mobility)  bed rails;head of bed elevated  -     Row Name 21 1518          Transfers    Sit-Stand Grubbs (Transfers)  standby assist  -     Stand-Sit Grubbs (Transfers)  contact guard  -     Row Name 21 1518          Sit-Stand Transfer    Assistive Device (Sit-Stand Transfers)  walker, front-wheeled  -     Row Name 21 1518          Stand-Sit Transfer     Assistive Device (Stand-Sit Transfers)  walker, front-wheeled  -CW     Row Name 06/02/21 1518          Gait/Stairs (Locomotion)    Broad Top Level (Gait)  contact guard;verbal cues  -CW     Assistive Device (Gait)  walker, front-wheeled  -CW     Distance in Feet (Gait)  100  -CW     Pattern (Gait)  step-to;step-through  -CW     Deviations/Abnormal Patterns (Gait)  base of support, narrow;marina decreased;gait speed decreased;stride length decreased  -CW     Bilateral Gait Deviations  heel strike decreased  -CW     Row Name 06/02/21 1518          Safety Issues, Functional Mobility    Safety Issues Affecting Function (Mobility)  awareness of need for assistance;insight into deficits/self-awareness;judgment  -     Impairments Affecting Function (Mobility)  balance;endurance/activity tolerance;strength  -CW     Row Name 06/02/21 1518          Balance    Balance Assessment  sitting static balance;standing static balance  -CW     Static Sitting Balance  WFL  -CW     Static Standing Balance  mild impairment  -CW     Row Name 06/02/21 1518          Motor Skills    Motor Skills  therapeutic exercise  -CW     Therapeutic Exercise  other (see comments) Seated/standing LE strengthening interventions  -     Row Name 06/02/21 1518          Coping    Observed Emotional State  calm;cooperative  -     Trust Relationship/Rapport  care explained;choices provided;questions answered;thoughts/feelings acknowledged  -     Row Name 06/02/21 1518          Positioning and Restraints    Pre-Treatment Position  in bed  -CW     Post Treatment Position  chair  -CW     In Chair  notified nsg;sitting;call light within reach;encouraged to call for assist  -     Row Name 06/02/21 1518          Progress Summary (PT)    Progress Toward Functional Goals (PT)  progress toward functional goals is good  -CW     Daily Progress Summary (PT)  Patient demonstrates improved ability and tolerance for ambulation this date. His cognition is WFL this  date. His nurse reports that he has been doing better this afternoon, but that he waxes and wanes.  -CW       User Key  (r) = Recorded By, (t) = Taken By, (c) = Cosigned By    Initials Name Provider Type    Tristian Ramachandran PT Physical Therapist          PT Recommendation and Plan  Anticipated Discharge Disposition (PT): home with assist  Progress Summary (PT)  Progress Toward Functional Goals (PT): progress toward functional goals is good  Daily Progress Summary (PT): Patient demonstrates improved ability and tolerance for ambulation this date. His cognition is WFL this date. His nurse reports that he has been doing better this afternoon, but that he waxes and wanes.       Time Calculation:   PT Charges     Row Name 06/02/21 1602             Time Calculation    PT Received On  06/02/21  -CW      PT Goal Re-Cert Due Date  06/11/21  -CW         Time Calculation- PT    Total Timed Code Minutes- PT  32 minute(s)  -CW        User Key  (r) = Recorded By, (t) = Taken By, (c) = Cosigned By    Initials Name Provider Type    Tristian Ramachandran PT Physical Therapist        Therapy Charges for Today     Code Description Service Date Service Provider Modifiers Qty    77485235811 HC GAIT TRAINING EA 15 MIN 6/2/2021 Tristian Khan, PT GP 1    56567177788 HC PT THER PROC EA 15 MIN 6/2/2021 Tristian Khan PT GP 1               Tristian Khan PT  6/2/2021

## 2021-06-02 NOTE — PROGRESS NOTES
[facility] Medicine Services  PROGRESS NOTE     Patient Identification:  Name:  Fidencio Luciano  Age:  75 y.o.  Sex:  male  :  1945  MRN:  [id number]  Visit Number:  [id number]  Primary Care Provider:  Camila Ortiz APRN    Length of stay:  1    ----------------------------------------------------------------------------------------------------------------------  Subjective     Chief Complaint:  Follow up for falls, SOA     Subjective:  Today, the patient reports feeling well. Unfortunately due to dementia he is not able to provide good history. His voice remains hoarse.  No hernan VT's noted on tele.     ----------------------------------------------------------------------------------------------------------------------  Objective     Current Hospital Meds:  atorvastatin, 80 mg, Oral, Nightly  cetirizine, 10 mg, Oral, Daily  clopidogrel, 75 mg, Oral, Daily  donepezil, 10 mg, Oral, Nightly  ferrous sulfate, 325 mg, Oral, BID With Meals  finasteride, 5 mg, Oral, Daily  heparin (porcine), 5,000 Units, Subcutaneous, Q8H  insulin aspart, 0-7 Units, Subcutaneous, TID AC  isosorbide mononitrate, 90 mg, Oral, Daily  lisinopril, 20 mg, Oral, Daily  memantine, 10 mg, Oral, Q12H  metoprolol tartrate, 12.5 mg, Oral, Q12H  pantoprazole, 40 mg, Oral, Daily  sodium chloride, 10 mL, Intravenous, Q12H         ----------------------------------------------------------------------------------------------------------------------  Vital Signs:  Temp:  [97 °F (36.1 °C)-97.9 °F (36.6 °C)] 97.8 °F (36.6 °C)  Heart Rate:  [60-97] 60  Resp:  [16-18] 18  BP: (121-180)/(52-72) 121/52  Mean Arterial Pressure (Non-Invasive) for the past 24 hrs (Last 3 readings):   Noninvasive MAP (mmHg)   21 0256 87   21 1851 77     SpO2 Percentage    21 0256 21 0600 21 1400   SpO2: 99% 97% 93%     SpO2:  [93 %-99 %] 93 %  on   ;   Device (Oxygen Therapy): room  air    Body mass index is 20.2 kg/m².  Wt Readings from Last 3 Encounters:   06/02/21 65.7 kg (144 lb 12.8 oz)   04/20/21 69.9 kg (154 lb)   04/07/21 59 kg (130 lb)        Intake/Output Summary (Last 24 hours) at 6/2/2021 1546  Last data filed at 6/2/2021 1200  Gross per 24 hour   Intake 720 ml   Output 750 ml   Net -30 ml     Diet Regular; Cardiac, Consistent Carbohydrate  ----------------------------------------------------------------------------------------------------------------------  Physical exam:   GEN: NAD  CV: RRR, no murmur  PULM: CTA, no wheeze, crackle, rhonchi, voice remains hoarse. RR is normal, nonlabored  GI: + bs, soft and NT    ----------------------------------------------------------------------------------------------------------------------              Results from last 7 days   Lab Units 06/01/21  0233   SODIUM mmol/L 143   POTASSIUM mmol/L 3.8   MAGNESIUM mg/dL 2.1   CHLORIDE mmol/L 110*   CO2 mmol/L 22.1   BUN mg/dL 25*   CREATININE mg/dL 0.78   EGFR IF NONAFRICN AM mL/min/1.73 97   CALCIUM mg/dL 8.6   GLUCOSE mg/dL 93   Estimated Creatinine Clearance: 75.3 mL/min (by C-G formula based on SCr of 0.78 mg/dL).  No results found for: AMMONIA    Glucose   Date/Time Value Ref Range Status   06/02/2021 1207 124 70 - 130 mg/dL Final   06/02/2021 0718 98 70 - 130 mg/dL Final   06/01/2021 1853 142 (H) 70 - 130 mg/dL Final   06/01/2021 1606 96 70 - 130 mg/dL Final   06/01/2021 1048 171 (H) 70 - 130 mg/dL Final   06/01/2021 0647 102 70 - 130 mg/dL Final   05/31/2021 1857 140 (H) 70 - 130 mg/dL Final   05/31/2021 1617 98 70 - 130 mg/dL Final       ----------------------------------------------------------------------------------------------------------------------  Assessment/Plan     · Nonsustain VT-  No more episodes noted. Tolerating low dose BB. Stress test completed this am but pending final read.   · Debility and falls- cont PT/OT, awaiting rehab placement. Wife reports SOA with ambulation.  Not observed by myself. Stress test pending final results.   · ERROL-on oral iron.  no melena  · History of CAD-continue Plavix, statin. Stress test last year showed mild reversible ischemia.   · Hypertension- controlled with lisinopril alone. Cont to hold Norvasc    · GERD w/ h/o PUD-continue PPI  · Type 2 diabetes-A1c well controlled at 6%.  BG is normal range and not needing insulin. Diet controlled. Will d/c SSI and FS.   · Advanced dementia-continue Aricept, memantine  · Psoriasis  · CKD2- stable    --------------------------------------------------  DVT Prophylaxis: hep sq     Disposition: awaiting rehab  --------------------------------------------------      Katayoun Behbahani, MD  Hospitalist Service -- [facility]     06/02/21  15:46 EDT

## 2021-06-02 NOTE — PLAN OF CARE
Goal Outcome Evaluation:  Plan of Care Reviewed With: patient  Progress: improving  Outcome Summary: Pt resting in bed. Pt voices no concerns or complaints at this time. No s/s of acute distress. Will continue to monitor and follow care of plan.

## 2021-06-02 NOTE — PLAN OF CARE
Goal Outcome Evaluation:     Progress: improving  Outcome Summary: Patient states no complaints. Denies chest pain and SOA. Vitals are stable. No signs of any respiratory distress noted. Call light in reach. Will continue to monitor and follow the plan of care.

## 2021-06-03 LAB
BASOPHILS # BLD AUTO: 0.09 10*3/MM3 (ref 0–0.2)
BASOPHILS NFR BLD AUTO: 1 % (ref 0–1.5)
DEPRECATED RDW RBC AUTO: 46.8 FL (ref 37–54)
EOSINOPHIL # BLD AUTO: 0.66 10*3/MM3 (ref 0–0.4)
EOSINOPHIL NFR BLD AUTO: 7.1 % (ref 0.3–6.2)
ERYTHROCYTE [DISTWIDTH] IN BLOOD BY AUTOMATED COUNT: 13 % (ref 12.3–15.4)
HCT VFR BLD AUTO: 35.5 % (ref 37.5–51)
HGB BLD-MCNC: 11.8 G/DL (ref 13–17.7)
IMM GRANULOCYTES # BLD AUTO: 0.04 10*3/MM3 (ref 0–0.05)
IMM GRANULOCYTES NFR BLD AUTO: 0.4 % (ref 0–0.5)
LYMPHOCYTES # BLD AUTO: 1.8 10*3/MM3 (ref 0.7–3.1)
LYMPHOCYTES NFR BLD AUTO: 19.5 % (ref 19.6–45.3)
MCH RBC QN AUTO: 32.1 PG (ref 26.6–33)
MCHC RBC AUTO-ENTMCNC: 33.2 G/DL (ref 31.5–35.7)
MCV RBC AUTO: 96.5 FL (ref 79–97)
MONOCYTES # BLD AUTO: 0.77 10*3/MM3 (ref 0.1–0.9)
MONOCYTES NFR BLD AUTO: 8.3 % (ref 5–12)
NEUTROPHILS NFR BLD AUTO: 5.89 10*3/MM3 (ref 1.7–7)
NEUTROPHILS NFR BLD AUTO: 63.7 % (ref 42.7–76)
NRBC BLD AUTO-RTO: 0 /100 WBC (ref 0–0.2)
PLATELET # BLD AUTO: 200 10*3/MM3 (ref 140–450)
PMV BLD AUTO: 11.1 FL (ref 6–12)
RBC # BLD AUTO: 3.68 10*6/MM3 (ref 4.14–5.8)
WBC # BLD AUTO: 9.25 10*3/MM3 (ref 3.4–10.8)

## 2021-06-03 PROCEDURE — G0378 HOSPITAL OBSERVATION PER HR: HCPCS

## 2021-06-03 PROCEDURE — 85025 COMPLETE CBC W/AUTO DIFF WBC: CPT | Performed by: INTERNAL MEDICINE

## 2021-06-03 PROCEDURE — 96372 THER/PROPH/DIAG INJ SC/IM: CPT

## 2021-06-03 PROCEDURE — 99224 PR SBSQ OBSERVATION CARE/DAY 15 MINUTES: CPT | Performed by: INTERNAL MEDICINE

## 2021-06-03 PROCEDURE — 25010000002 HEPARIN (PORCINE) PER 1000 UNITS: Performed by: INTERNAL MEDICINE

## 2021-06-03 PROCEDURE — 94799 UNLISTED PULMONARY SVC/PX: CPT

## 2021-06-03 RX ADMIN — CETIRIZINE HYDROCHLORIDE 10 MG: 10 TABLET, FILM COATED ORAL at 09:57

## 2021-06-03 RX ADMIN — ISOSORBIDE MONONITRATE 90 MG: 60 TABLET ORAL at 09:57

## 2021-06-03 RX ADMIN — MEMANTINE HYDROCHLORIDE 10 MG: 10 TABLET, FILM COATED ORAL at 19:37

## 2021-06-03 RX ADMIN — MEMANTINE HYDROCHLORIDE 10 MG: 10 TABLET, FILM COATED ORAL at 09:57

## 2021-06-03 RX ADMIN — ATORVASTATIN CALCIUM 80 MG: 40 TABLET, FILM COATED ORAL at 19:37

## 2021-06-03 RX ADMIN — FINASTERIDE 5 MG: 5 TABLET, FILM COATED ORAL at 09:57

## 2021-06-03 RX ADMIN — FERROUS SULFATE TAB 325 MG (65 MG ELEMENTAL FE) 325 MG: 325 (65 FE) TAB at 09:57

## 2021-06-03 RX ADMIN — FERROUS SULFATE TAB 325 MG (65 MG ELEMENTAL FE) 325 MG: 325 (65 FE) TAB at 17:12

## 2021-06-03 RX ADMIN — DONEPEZIL HYDROCHLORIDE 10 MG: 5 TABLET, FILM COATED ORAL at 19:37

## 2021-06-03 RX ADMIN — CLOPIDOGREL 75 MG: 75 TABLET, FILM COATED ORAL at 09:57

## 2021-06-03 RX ADMIN — METOPROLOL TARTRATE 12.5 MG: 25 TABLET, FILM COATED ORAL at 09:56

## 2021-06-03 RX ADMIN — HEPARIN SODIUM 5000 UNITS: 5000 INJECTION INTRAVENOUS; SUBCUTANEOUS at 19:38

## 2021-06-03 RX ADMIN — PANTOPRAZOLE SODIUM 40 MG: 40 TABLET, DELAYED RELEASE ORAL at 09:57

## 2021-06-03 RX ADMIN — LISINOPRIL 20 MG: 10 TABLET ORAL at 09:57

## 2021-06-03 RX ADMIN — HEPARIN SODIUM 5000 UNITS: 5000 INJECTION INTRAVENOUS; SUBCUTANEOUS at 04:47

## 2021-06-03 RX ADMIN — SODIUM CHLORIDE, PRESERVATIVE FREE 10 ML: 5 INJECTION INTRAVENOUS at 09:56

## 2021-06-03 NOTE — PROGRESS NOTES
[facility] Medicine Services  PROGRESS NOTE     Patient Identification:  Name:  Fidencio Luciano  Age:  75 y.o.  Sex:  male  :  1945  MRN:  [id number]  Visit Number:  [id number]  Primary Care Provider:  Camila Ortiz APRN    Length of stay:  1    ----------------------------------------------------------------------------------------------------------------------  Subjective     Chief Complaint:  Follow up for falls, debility     Subjective:  Today, the patient is reporting that he is doing well today.  His hoarseness has improved.  He denies any shortness of breath at rest.  Has not been out of bed today.  Denies any chest pain.  No events on telemetry.    ----------------------------------------------------------------------------------------------------------------------  Objective     Current Hospital Meds:  atorvastatin, 80 mg, Oral, Nightly  cetirizine, 10 mg, Oral, Daily  clopidogrel, 75 mg, Oral, Daily  donepezil, 10 mg, Oral, Nightly  ferrous sulfate, 325 mg, Oral, BID With Meals  finasteride, 5 mg, Oral, Daily  heparin (porcine), 5,000 Units, Subcutaneous, Q8H  isosorbide mononitrate, 90 mg, Oral, Daily  lisinopril, 20 mg, Oral, Daily  memantine, 10 mg, Oral, Q12H  metoprolol tartrate, 12.5 mg, Oral, Q12H  pantoprazole, 40 mg, Oral, Daily  sodium chloride, 10 mL, Intravenous, Q12H         ----------------------------------------------------------------------------------------------------------------------  Vital Signs:  Temp:  [97.2 °F (36.2 °C)-98.4 °F (36.9 °C)] 98.4 °F (36.9 °C)  Heart Rate:  [53-64] 59  Resp:  [18] 18  BP: (100-136)/(50-56) 111/53  Mean Arterial Pressure (Non-Invasive) for the past 24 hrs (Last 3 readings):   Noninvasive MAP (mmHg)   21 0248 73   21 1820 69     SpO2 Percentage    21 0600 21 0931 21 1000   SpO2: 98% 97% 96%     SpO2:  [93 %-98 %] 96 %  on   ;   Device (Oxygen Therapy): room  air    Body mass index is 19.54 kg/m².  Wt Readings from Last 3 Encounters:   06/03/21 63.5 kg (140 lb 1.6 oz)   04/20/21 69.9 kg (154 lb)   04/07/21 59 kg (130 lb)        Intake/Output Summary (Last 24 hours) at 6/3/2021 1350  Last data filed at 6/3/2021 0800  Gross per 24 hour   Intake 960 ml   Output --   Net 960 ml     Diet Regular; Cardiac, Consistent Carbohydrate  ----------------------------------------------------------------------------------------------------------------------  Physical exam:   GEN: NAD, hoarseness much improved.   CV: RRR, no murmur  PULM: CTA, goos effort and air movement, no wheeze or crackles,  GI: +bs, soft and NTND    ----------------------------------------------------------------------------------------------------------------------            Results from last 7 days   Lab Units 06/03/21  0039   WBC 10*3/mm3 9.25   HEMOGLOBIN g/dL 11.8*   HEMATOCRIT % 35.5*   MCV fL 96.5   MCHC g/dL 33.2   PLATELETS 10*3/mm3 200     Results from last 7 days   Lab Units 06/01/21  0233   SODIUM mmol/L 143   POTASSIUM mmol/L 3.8   MAGNESIUM mg/dL 2.1   CHLORIDE mmol/L 110*   CO2 mmol/L 22.1   BUN mg/dL 25*   CREATININE mg/dL 0.78   EGFR IF NONAFRICN AM mL/min/1.73 97   CALCIUM mg/dL 8.6   GLUCOSE mg/dL 93   Estimated Creatinine Clearance: 72.8 mL/min (by C-G formula based on SCr of 0.78 mg/dL).  No results found for: AMMONIA    Glucose   Date/Time Value Ref Range Status   06/02/2021 1540 127 70 - 130 mg/dL Final   06/02/2021 1207 124 70 - 130 mg/dL Final   06/02/2021 0718 98 70 - 130 mg/dL Final   06/01/2021 1853 142 (H) 70 - 130 mg/dL Final   06/01/2021 1606 96 70 - 130 mg/dL Final   06/01/2021 1048 171 (H) 70 - 130 mg/dL Final   06/01/2021 0647 102 70 - 130 mg/dL Final   05/31/2021 1857 140 (H) 70 - 130 mg/dL Final     ----------------------------------------------------------------------------------------------------------------------  Assessment/Plan     · Nonsustain VT-  No more episodes  tolerating low dose BB. Stress test done yesterday is negative for ischemia.   · Debility and falls- cont PT/OT, awaiting rehab placement.  SOA is likely due to debility and poor endurance.  · ERROL-on oral iron.     · History of CAD-continue Plavix, statin. Stress test was negative for  Ischemia on 6/2/21.   · Hypertension- controlled with lisinopril alone. Cont to hold Norvasc    · GERD w/ h/o PUD-continue PPI  · Type 2 diabetes-A1c well controlled at 6%.  BG is normal range and not needing insulin. Diet controlled. Will d/c SSI and FS.   · Advanced dementia-continue Aricept, memantine  · Psoriasis  · CKD2- stable    --------------------------------------------------  DVT Prophylaxis: hep sq     Disposition: awaiting rehab  --------------------------------------------------      Katayoun Behbahani, MD  Hospitalist Service -- [facility]     06/03/21  13:50 EDT

## 2021-06-03 NOTE — PLAN OF CARE
Goal Outcome Evaluation:  Plan of Care Reviewed With: patient  Progress: no change  Outcome Summary: Pt currently lying quietly in bed, with no complaints noted at this time. V/S stable with no signs of respiratory distress present. Will continue to monitor pt and follow plan of care.

## 2021-06-03 NOTE — PLAN OF CARE
Goal Outcome Evaluation:  Plan of Care Reviewed With: patient     Outcome Summary: Pt resting in bed with no complaints. VSS. No s/s of acute distress noted. Will cont to follow POC.

## 2021-06-04 PROCEDURE — 97116 GAIT TRAINING THERAPY: CPT

## 2021-06-04 PROCEDURE — 25010000002 HEPARIN (PORCINE) PER 1000 UNITS: Performed by: INTERNAL MEDICINE

## 2021-06-04 PROCEDURE — 99225 PR SBSQ OBSERVATION CARE/DAY 25 MINUTES: CPT | Performed by: INTERNAL MEDICINE

## 2021-06-04 PROCEDURE — G0378 HOSPITAL OBSERVATION PER HR: HCPCS

## 2021-06-04 PROCEDURE — 97110 THERAPEUTIC EXERCISES: CPT

## 2021-06-04 PROCEDURE — 96361 HYDRATE IV INFUSION ADD-ON: CPT

## 2021-06-04 PROCEDURE — 96372 THER/PROPH/DIAG INJ SC/IM: CPT

## 2021-06-04 RX ADMIN — LISINOPRIL 20 MG: 10 TABLET ORAL at 08:26

## 2021-06-04 RX ADMIN — MEMANTINE HYDROCHLORIDE 10 MG: 10 TABLET, FILM COATED ORAL at 20:27

## 2021-06-04 RX ADMIN — MEMANTINE HYDROCHLORIDE 10 MG: 10 TABLET, FILM COATED ORAL at 08:26

## 2021-06-04 RX ADMIN — FERROUS SULFATE TAB 325 MG (65 MG ELEMENTAL FE) 325 MG: 325 (65 FE) TAB at 08:26

## 2021-06-04 RX ADMIN — HEPARIN SODIUM 5000 UNITS: 5000 INJECTION INTRAVENOUS; SUBCUTANEOUS at 20:27

## 2021-06-04 RX ADMIN — ATORVASTATIN CALCIUM 80 MG: 40 TABLET, FILM COATED ORAL at 20:27

## 2021-06-04 RX ADMIN — ISOSORBIDE MONONITRATE 90 MG: 60 TABLET ORAL at 08:26

## 2021-06-04 RX ADMIN — HEPARIN SODIUM 5000 UNITS: 5000 INJECTION INTRAVENOUS; SUBCUTANEOUS at 05:35

## 2021-06-04 RX ADMIN — PANTOPRAZOLE SODIUM 40 MG: 40 TABLET, DELAYED RELEASE ORAL at 08:26

## 2021-06-04 RX ADMIN — FERROUS SULFATE TAB 325 MG (65 MG ELEMENTAL FE) 325 MG: 325 (65 FE) TAB at 17:08

## 2021-06-04 RX ADMIN — HEPARIN SODIUM 5000 UNITS: 5000 INJECTION INTRAVENOUS; SUBCUTANEOUS at 14:28

## 2021-06-04 RX ADMIN — SODIUM CHLORIDE 1000 ML: 9 INJECTION, SOLUTION INTRAVENOUS at 17:12

## 2021-06-04 RX ADMIN — CETIRIZINE HYDROCHLORIDE 10 MG: 10 TABLET, FILM COATED ORAL at 08:26

## 2021-06-04 RX ADMIN — CLOPIDOGREL 75 MG: 75 TABLET, FILM COATED ORAL at 08:26

## 2021-06-04 RX ADMIN — SODIUM CHLORIDE, PRESERVATIVE FREE 10 ML: 5 INJECTION INTRAVENOUS at 08:27

## 2021-06-04 RX ADMIN — DONEPEZIL HYDROCHLORIDE 10 MG: 5 TABLET, FILM COATED ORAL at 20:27

## 2021-06-04 RX ADMIN — FINASTERIDE 5 MG: 5 TABLET, FILM COATED ORAL at 08:26

## 2021-06-04 NOTE — PLAN OF CARE
Pt w/ no complaints of SOA and/or CP throughout the night. Pt has rested comfortably w/ no s/s of distress noted.

## 2021-06-04 NOTE — CASE MANAGEMENT/SOCIAL WORK
Discharge Planning Assessment  [facility]     Patient Name: Fidencio [place]iano  MRN: [id number]  Today's Date: 6/4/2021    Admit Date: 5/24/2021        Discharge Plan     Row Name 06/04/21 1809       Plan    Plan  SS received a call from Chloe at [phone] stating that a claim had been submitted for skilled nursing home care for rehab from [facility] and Physician from insurance has denied claim stating that level of care could be provided in home setting.  Insurance company requested fax number to fax denial letter.  SS notified insurance of [phone] for fax number.  SS notified pt's spouse who is agreeable for pt to return home with home health.  Pt's physical address is [address] [place] [address] and is located in [facility] [place] [facility].  Pt's spouse stated no preference for home health provider.  [facility] is known to be in network with pt's insurance at [phone].  Physician aware.    Row Name 06/04/21 1616       Plan    Plan  SS spoke with pt's spouse regarding discharge plans.  Pt spouse continues to desire snf placement for rehab and stated if pt's insurance esmer admit to [facility] then pt will return home with home health services.  SS will follow.    Row Name 06/04/21 1610       Plan    Plan  SS spoke with Michelle at [facility] this am and this pm regarding preauthorization for admit to [facility].  Per Michelle [facility] has had no response from pt's insurance regarding acceptance or denial.  SS will follow up with pt's spouse.        Continued Care and Services - Admitted Since 5/24/2021     Destination     Service Provider Request Status Selected Services Address Phone Fax Patient Preferred    [facility]  Pending - Request Sent N/A [address], [place] [address] [phone] --                      TAYO Ugalde

## 2021-06-04 NOTE — PLAN OF CARE
Goal Outcome Evaluation:    Patient has been very pleasant today. Compliant with all medications and care as ordered. Remains on room air, saturations maintaining well above 90%. Patient ambulated with PT today, denied CP, SOB, and lightheadedness. He has been setup for each meal today, good intake intake noted. He is currently sitting in a chair at bedside, no complaints or concerns at this time. Will continue to monitor and follow plan of care.

## 2021-06-04 NOTE — CASE MANAGEMENT/SOCIAL WORK
Discharge Planning Assessment  [facility]     Patient Name: Fidencio Luciano  MRN: [id number]  Today's Date: 6/4/2021    Admit Date: 5/24/2021        Discharge Plan     Row Name 06/04/21 1610       Plan    Plan  SS spoke with Michelle at [facility] this am and this pm regarding preauthorization for admit to [facility].  Per Michelle [facility] has had no response from pt's insurance regarding acceptance or denial.  SS will follow up with pt's spouse.        Continued Care and Services - Admitted Since 5/24/2021     Destination     Service Provider Request Status Selected Services Address Phone Fax Patient Preferred    [facility]  Pending - Request Sent N/A [address] [place] [address] [phone] --                        TYAO Ugalde

## 2021-06-04 NOTE — THERAPY TREATMENT NOTE
Acute Care - Physical Therapy Treatment Note  MILLICENT Recio     Patient Name: Fidencio Luciano  : 1945  MRN: [id number]  Today's Date: 2021   Onset of Illness/Injury or Date of Surgery: 21       PT Assessment (last 12 hours)      PT Evaluation and Treatment     Row Name 21 1533          Physical Therapy Time and Intention    Subjective Information  no complaints  -CW     Document Type  therapy note (daily note)  -CW     Mode of Treatment  physical therapy  -CW     Patient Effort  good  -CW     Symptoms Noted During/After Treatment  fatigue  -CW     Comment  Patient agreeable to physical therapy treatment this date. He does not make conversation today.  -CW     Row Name 21 1533          General Information    Patient Profile Reviewed  yes  -CW     Patient Observations  alert;cooperative;agree to therapy  -CW     General Observations of Patient  Less conversant this date, responds to therapist but does not elaborate.  -CW     Row Name 21 1533          Cognition    Affect/Mental Status (Cognitive)  flat/blunted affect;WFL  -CW     Orientation Status (Cognition)  oriented to;person;place;situation  -CW     Follows Commands (Cognition)  follows one-step commands;physical/tactile prompts required;repetition of directions required;verbal cues/prompting required;visual cue  -CW     Row Name 21 1533          Pain Scale: Word Pre/Post-Treatment    Pre/Posttreatment Pain Comment  Patient reports no pain during physical therapy treatment this date.  -CW     Row Name 21 1533          Bed Mobility    Bed Mobility  supine-sit  -CW     Supine-Sit [place] (Bed Mobility)  supervision  -CW     Assistive Device (Bed Mobility)  bed rails;head of bed elevated  -CW     Row Name 21 1533          Transfers    Transfers  bed-chair transfer  -CW     Bed-Chair [place] (Transfers)  standby assist  -CW     Assistive Device (Bed-Chair Transfers)  walker, front-wheeled  -CW     Sit-Stand  [place] (Transfers)  standby assist  -CW     Stand-Sit [place] (Transfers)  contact guard  -CW     Row Name 06/04/21 1533          Sit-Stand Transfer    Assistive Device (Sit-Stand Transfers)  walker, front-wheeled  -CW     Row Name 06/04/21 1533          Stand-Sit Transfer    Assistive Device (Stand-Sit Transfers)  walker, front-wheeled  -CW     Row Name 06/04/21 1533          Gait/Stairs (Locomotion)    [place] Level (Gait)  contact guard;verbal cues  -CW     Assistive Device (Gait)  walker, front-wheeled  -CW     Distance in Feet (Gait)  250  -CW     Pattern (Gait)  step-to;step-through  -CW     Deviations/Abnormal Patterns (Gait)  base of support, narrow;marina decreased;gait speed decreased;stride length decreased  -CW     Bilateral Gait Deviations  heel strike decreased  -CW     Row Name 06/04/21 1533          Safety Issues, Functional Mobility    Safety Issues Affecting Function (Mobility)  insight into deficits/self-awareness;judgment  -     Impairments Affecting Function (Mobility)  balance;endurance/activity tolerance;strength  -     Row Name 06/04/21 1533          Motor Skills    Therapeutic Exercise  other (see comments) Supine/seated LE strengthening interventions  -     Row Name 06/04/21 1533          Coping    Observed Emotional State  calm;cooperative  -CW     Trust Relationship/Rapport  care explained;choices provided;questions answered;thoughts/feelings acknowledged  -     Row Name 06/04/21 1533          Positioning and Restraints    Pre-Treatment Position  in bed  -CW     In Chair  notified nsg;sitting;call light within reach;encouraged to call for assist  -     Row Name 06/04/21 1533          Progress Summary (PT)    Progress Toward Functional Goals (PT)  progress toward functional goals is good  -CW     Daily Progress Summary (PT)  Patient demonstrates continued tolerance and ability for therapeutic interventions despite flat affect this date.  -CW       User Key  (r) =  Recorded By, (t) = Taken By, (c) = Cosigned By    Initials Name Provider Type    Tristian Ramachandran PT Physical Therapist          PT Recommendation and Plan  Anticipated Discharge Disposition (PT): home with assist  Progress Summary (PT)  Progress Toward Functional Goals (PT): progress toward functional goals is good  Daily Progress Summary (PT): Patient demonstrates continued tolerance and ability for therapeutic interventions despite flat affect this date.       Time Calculation:   PT Charges     Row Name 06/04/21 1545             Time Calculation    PT Received On  06/04/21  -CW         Time Calculation- PT    Total Timed Code Minutes- PT  38 minute(s)  -CW        User Key  (r) = Recorded By, (t) = Taken By, (c) = Cosigned By    Initials Name Provider Type    Tristian Ramachandran PT Physical Therapist        Therapy Charges for Today     Code Description Service Date Service Provider Modifiers Qty    37330409811 HC GAIT TRAINING EA 15 MIN 6/4/2021 Tristian Khan PT GP 2    21635895167 HC PT THER PROC EA 15 MIN 6/4/2021 Tristian Khan PT GP 1               Tristian Khan PT  6/4/2021

## 2021-06-04 NOTE — CASE MANAGEMENT/SOCIAL WORK
Discharge Planning Assessment  MILLICENT Recio     Patient Name: Fidencio Luciano  MRN: [id number]  Today's Date: 6/4/2021    Admit Date: 5/24/2021        Discharge Plan     Row Name 06/04/21 1616       Plan    Plan  SS spoke with pt's spouse regarding discharge plans.  Pt spouse continues to desire snf placement for rehab and stated if pt's insurance esmer admit to [facility] then pt will return home with home health services.  SS will follow.    Row Name 06/04/21 1610       Plan    Plan  SS spoke with Michelle at [facility] this am and this pm regarding preauthorization for admit to [facility].  Per Michelle [facility] has had no response from pt's insurance regarding acceptance or denial.  SS will follow up with pt's spouse.        Continued Care and Services - Admitted Since 5/24/2021     Destination     Service Provider Request Status Selected Services Address Phone Fax Patient Preferred    [facility]  Pending - Request Sent N/A [address] [place] [address] [phone] --                       Bel Andrew, JOSEW

## 2021-06-04 NOTE — PROGRESS NOTES
[facility] Medicine Services  PROGRESS NOTE     Patient Identification:  Name:  Fidencio Luciano  Age:  75 y.o.  Sex:  male  :  1945  MRN:  [id number]  Visit Number:  [id number]  Primary Care Provider:  Camila Ortiz APRN    Length of stay:  1    ----------------------------------------------------------------------------------------------------------------------  Subjective     Chief Complaint:  Follow up for falls     Subjective:  Today, the patient is doing well today.  Has no complaint.  Improving.  Eating and drinking well.  Denies any abdominal pain or nausea vomiting.  No chest pain or shortness of breath at rest.    ----------------------------------------------------------------------------------------------------------------------  Objective     Current Hospital Meds:  atorvastatin, 80 mg, Oral, Nightly  cetirizine, 10 mg, Oral, Daily  clopidogrel, 75 mg, Oral, Daily  donepezil, 10 mg, Oral, Nightly  ferrous sulfate, 325 mg, Oral, BID With Meals  finasteride, 5 mg, Oral, Daily  heparin (porcine), 5,000 Units, Subcutaneous, Q8H  isosorbide mononitrate, 90 mg, Oral, Daily  lisinopril, 20 mg, Oral, Daily  memantine, 10 mg, Oral, Q12H  metoprolol tartrate, 12.5 mg, Oral, Q12H  pantoprazole, 40 mg, Oral, Daily  sodium chloride, 10 mL, Intravenous, Q12H         ----------------------------------------------------------------------------------------------------------------------  Vital Signs:  Temp:  [95.7 °F (35.4 °C)-97.9 °F (36.6 °C)] 95.7 °F (35.4 °C)  Heart Rate:  [54-71] 66  Resp:  [18] 18  BP: (110-154)/(37-61) 110/37  Mean Arterial Pressure (Non-Invasive) for the past 24 hrs (Last 3 readings):   Noninvasive MAP (mmHg)   21 1412 76   21 1018 66   21 0625 81     SpO2 Percentage    21 0625 21 1018 21 1412   SpO2: 98% 97% 98%     SpO2:  [96 %-98 %] 98 %  on   ;   Device (Oxygen Therapy): room air    Body mass  index is 19.8 kg/m².  Wt Readings from Last 3 Encounters:   06/04/21 64.4 kg (142 lb)   04/20/21 69.9 kg (154 lb)   04/07/21 59 kg (130 lb)        Intake/Output Summary (Last 24 hours) at 6/4/2021 1702  Last data filed at 6/4/2021 1412  Gross per 24 hour   Intake 1320 ml   Output 500 ml   Net 820 ml     Diet Regular; Cardiac, Consistent Carbohydrate  ----------------------------------------------------------------------------------------------------------------------  Physical exam:   GEN: NAD, sitting up in bed  PULM: respirations are normal and nonlabored.   NEURO: no acute focal deficits, hoarseness has improved. No facial droop  PSYCH: alert and oriented to place and person, confusion is at baseline.   ----------------------------------------------------------------------------------------------------------------------            Results from last 7 days   Lab Units 06/03/21  0039   WBC 10*3/mm3 9.25   HEMOGLOBIN g/dL 11.8*   HEMATOCRIT % 35.5*   MCV fL 96.5   MCHC g/dL 33.2   PLATELETS 10*3/mm3 200     Results from last 7 days   Lab Units 06/01/21  0233   SODIUM mmol/L 143   POTASSIUM mmol/L 3.8   MAGNESIUM mg/dL 2.1   CHLORIDE mmol/L 110*   CO2 mmol/L 22.1   BUN mg/dL 25*   CREATININE mg/dL 0.78   EGFR IF NONAFRICN AM mL/min/1.73 97   CALCIUM mg/dL 8.6   GLUCOSE mg/dL 93   Estimated Creatinine Clearance: 73.8 mL/min (by C-G formula based on SCr of 0.78 mg/dL).  No results found for: AMMONIA    Glucose   Date/Time Value Ref Range Status   06/02/2021 1540 127 70 - 130 mg/dL Final   06/02/2021 1207 124 70 - 130 mg/dL Final   06/02/2021 0718 98 70 - 130 mg/dL Final   06/01/2021 1853 142 (H) 70 - 130 mg/dL Final     Lab Results   Component Value Date    HGBA1C 6.00 (H) 04/16/2021     Lab Results   Component Value Date    TSH 1.510 05/24/2021    FREET4 1.21 03/09/2021       ----------------------------------------------------------------------------------------------------------------------  Assessment/Plan      · Debility and falls- cont PT/OT, awaiting rehab placement.     · Recurrent dehydration- noted to have drop in BP every few days. Resolved with IVF. Appears to be due to poor po fluid intake. When not dehydrated BP is elevated needing antihypertensives. Will administer 1 L IVF now (diastolic BP is in the 30's). ERROL-on oral iron.     · History of CAD-continue Plavix, statin. Stress test was negative for  Ischemia on 6/2/21.   · Hypertension-  controlled for the most part on lisinopril 20 mg and Imdur 90 mg. However, intermittent hypotention noted as mentioned above. Will consider stopping Imdur for now.   · GERD w/ h/o PUD-continue PPI  · Type 2 diabetes-A1c well controlled at 6%.  Diet controlled.    · Advanced dementia- continue Aricept, memantine. May benefit from palliative care consult given has decrease po intake but not enough to meet criteria for hospice yet. Consider outpatient evaluation after rehab.   · Psoriasis  · CKD2- stable    Resolved medical issues:  · Nonsustain VT-  No more episodes tolerating low dose BB. Stress test done yesterday is negative for ischemia. will dc tele    --------------------------------------------------  DVT Prophylaxis: hep sq     Disposition: awaiting rehab  --------------------------------------------------      Katayoun Behbahani, MD  Hospitalist Service -- [facility]     06/04/21  17:02 EDT

## 2021-06-04 NOTE — DISCHARGE PLACEMENT REQUEST
"Fidencio Luciano (75 y.o. Male)     Date of Birth Social Security Number Address Home Phone MRN    1945  [address] [facility] [address] [phone] [id number]    [place] Marital Status          Non-Pentecostal        Admission Date Admission Type Admitting Provider Attending Provider Department, Room/Bed    21 Emergency Parks, Andrew, MD Behbahani, Katayoun, MD [address], 3309/2S    Discharge Date Discharge Disposition Discharge Destination                       Attending Provider: Behbahani, Katayoun, MD    Allergies: No Known Allergies    Isolation: None   Infection: COVID (History) (21)   Code Status: CPR    Ht: 180.3 cm (71\")   Wt: 64.4 kg (142 lb)    Admission Cmt: None   Principal Problem: Chest pain [R07.9]                 Active Insurance as of 2021     Primary Coverage     Payor Plan Insurance Group Employer/Plan Group    ANTHEM MEDICARE REPLACEMENT ANTHEM MEDICARE ADVANTAGE [id number]     Payor Plan Address Payor Plan Phone Number Payor Plan Fax Number Effective Dates    [address] [phone]  2021 - None Entered    [facility] [id number]       Subscriber Name Subscriber Birth Date Member ID       FIDENCIO LUCIANO 1945 [id number]                 Emergency Contacts      (Rel.) Home Phone Work Phone Mobile Phone    Angela Luciano (Spouse) [phone]    Dylan García (Son) [phone] -- [phone]    Sofie Webber (Daughter) [phone] -- --    Zhane Conner (Daughter) [phone] -- --    Aries Ortega (Son) [phone] -- --               Physical Therapy Notes (all)      Zhane Trinh, PT at 21 1036  Version 1 of 1         Acute Care - Physical Therapy Initial Evaluation  [facility]     Patient Name: Fidencio Luciano  : 1945  MRN: [id number]  Today's Date: 2021   Onset of Illness/Injury or Date of Surgery: 21       PT Assessment (last 12 hours)      PT Evaluation " and Treatment     Row Name 05/28/21 1000          Physical Therapy Time and Intention    Subjective Information  no complaints  -BC     Document Type  evaluation  -BC     Mode of Treatment  physical therapy  -BC     Patient Effort  good  -BC     Symptoms Noted During/After Treatment  none  -BC     Row Name 05/28/21 1000          General Information    Patient Profile Reviewed  yes  -BC     Onset of Illness/Injury or Date of Surgery  05/24/21  -BC     Referring Physician  Dr. looney  -BC     Patient Observations  alert;cooperative;agree to therapy  -BC     General Observations of Patient  weak  -BC     Prior Level of Function  min assist:  -BC     Equipment Currently Used at Home  glucometer  -BC     Risks Reviewed  patient:;LOB;nausea/vomiting;dizziness;increased discomfort;change in vital signs;increased drainage;lines disloged  -BC     Benefits Reviewed  patient:;improve function;increase independence;increase strength;decrease pain;increase balance;decrease risk of DVT;improve skin integrity;increase knowledge  -BC     Row Name 05/28/21 1000          Previous Level of Function/Home Environm    BADLs, Premorbid Functional Level  independent  -BC     IADLs, Premorbid Functional Level  independent  -BC     Household Ambulation, Premorbid Functional Level  independent  -BC     Community Ambulation, Premorbid Functional Level  independent  -BC     Row Name 05/28/21 1000          Living Environment    Current Living Arrangements  home/apartment/condo  -BC     Lives With  spouse  -BC     Row Name 05/28/21 1000          Home Use of Assistive/Adaptive Equipment    Equipment Currently Used at Home  glucometer  -BC     Row Name 05/28/21 1000          Cognition    Affect/Mental Status (Cognitive)  anxious;confused  -BC     Orientation Status (Cognition)  oriented x 3  -BC     Follows Commands (Cognition)  WFL  -BC     Cognitive Function (Cognitive)  safety deficit  -BC     Row Name 05/28/21 1000          Range of Motion (ROM)     Range of Motion  left lower extremity ROM;right lower extremity ROM  -BC     Right Lower Extremity (ROM)  right LE ROM is WFL  -BC     Row Name 05/28/21 1000          Range of Motion Comprehensive    General Range of Motion  no range of motion deficits identified  -BC     Row Name 05/28/21 1000          Mobility    Extremity Weight-bearing Status  left lower extremity;right lower extremity  -BC     Left Lower Extremity (Weight-bearing Status)  full weight-bearing (FWB)  -BC     Right Lower Extremity (Weight-bearing Status)  full weight-bearing (FWB)  -BC     Row Name 05/28/21 1000          Bed Mobility    Bed Mobility  bed mobility (all) activities  -BC     All Activities, [place] (Bed Mobility)  contact guard assist  -BC     Bed Mobility, Safety Issues  unable to safely maintain weight bearing restrictions  -BC     Assistive Device (Bed Mobility)  bed rails  -BC     Row Name 05/28/21 1000          Transfers    Transfers  sit-stand transfer;stand-sit transfer  -BC     Maintains Weight-bearing Status (Transfers)  able to maintain  -BC     Sit-Stand [place] (Transfers)  minimum assist (75% patient effort)  -BC     Stand-Sit [place] (Transfers)  minimum assist (75% patient effort)  -BC     Row Name 05/28/21 1000          Sit-Stand Transfer    Assistive Device (Sit-Stand Transfers)  walker, front-wheeled  -BC     Row Name 05/28/21 1000          Stand-Sit Transfer    Assistive Device (Stand-Sit Transfers)  walker, front-wheeled  -BC     Row Name 05/28/21 1000          Gait/Stairs (Locomotion)    [place] Level (Gait)  minimum assist (75% patient effort)  -BC     Assistive Device (Gait)  walker, front-wheeled  -BC     Distance in Feet (Gait)  30 ft  -BC     Row Name 05/28/21 1000          Coping    Observed Emotional State  calm;cooperative;pleasant  -BC     Verbalized Emotional State  acceptance  -BC     Trust Relationship/Rapport  care explained;choices provided  -BC     Family/Support Persons   spouse  -BC     Involvement in Care  at bedside  -BC     Family/Support System Care  self-care encouraged;support provided  -BC     Diversional Activities  television  -BC     Row Name 05/28/21 1000          Plan of Care Review    Plan of Care Reviewed With  patient  -[facility] Name 05/28/21 1000          Physical Therapy Goals    Bed Mobility Goal Selection (PT)  bed mobility, PT goal 1  -BC     Transfer Goal Selection (PT)  transfer, PT goal 1  -BC     Gait Training Goal Selection (PT)  gait training, PT goal 1  -BC     Row Name 05/28/21 1000          Bed Mobility Goal 1 (PT)    Activity/Assistive Device (Bed Mobility Goal 1, PT)  bed mobility activities, all  -BC     [place] Level/Cues Needed (Bed Mobility Goal 1, PT)  supervision required  -BC     Time Frame (Bed Mobility Goal 1, PT)  by discharge  -BC     Row Name 05/28/21 1000          Transfer Goal 1 (PT)    Activity/Assistive Device (Transfer Goal 1, PT)  transfers, all;sit-to-stand/stand-to-sit  -BC     [place] Level/Cues Needed (Transfer Goal 1, PT)  supervision required  -BC     Time Frame (Transfer Goal 1, PT)  by discharge  -[facility] Name 05/28/21 1000          Gait Training Goal 1 (PT)    Activity/Assistive Device (Gait Training Goal 1, PT)  gait (walking locomotion)  -BC     [place] Level (Gait Training Goal 1, PT)  minimum assist (75% or more patient effort)  -BC     Distance (Gait Training Goal 1, PT)  100 ft  -BC     Time Frame (Gait Training Goal 1, PT)  by discharge  -[facility] Name 05/28/21 1000          Positioning and Restraints    Pre-Treatment Position  in bed  -BC     Post Treatment Position  chair  -BC     In Chair  notified nsg;sitting;call light within reach;encouraged to call for assist  -[facility] Name 05/28/21 1000          Therapy Assessment/Plan (PT)    Patient/Family Therapy Goals Statement (PT)  pt to increase walking  -BC     Functional Level at Time of Evaluation (PT)  min  -BC     Rehab Potential (PT)  fair,  will monitor progress closely  -BC     Row Name 21 1000          PT Evaluation Complexity    History, PT Evaluation Complexity  1-2 personal factors and/or comorbidities  -BC     Clinical Presentation (PT Evaluation Complexity)  stable  -BC     Row Name 21 1000          Progress Summary (PT)    Progress Toward Functional Goals (PT)  progress toward functional goals as expected  -BC     Daily Progress Summary (PT)  good  -BC       User Key  (r) = Recorded By, (t) = Taken By, (c) = Cosigned By    Initials Name Provider Type    BC Zhane Trinh PT Physical Therapist          PT Recommendation and Plan  Therapy Frequency (PT): 5 times/wk  Progress Summary (PT)  Progress Toward Functional Goals (PT): progress toward functional goals as expected  Daily Progress Summary (PT): good  Plan of Care Reviewed With: patient       Time Calculation:   PT Charges     Row Name 21 1035             Time Calculation    PT Received On  21  -BC         Time Calculation- PT    Total Timed Code Minutes- PT  60 minute(s)  -BC        User Key  (r) = Recorded By, (t) = Taken By, (c) = Cosigned By    Initials Name Provider Type    BC Zhane Trinh PT Physical Therapist        Therapy Charges for Today     Code Description Service Date Service Provider Modifiers Qty    08794629840 HC PT EVAL HIGH COMPLEXITY 4 2021 Zhane Trinh, PT GP 1               Zhane Trinh PT  2021      Electronically signed by Zhane Trinh PT at 21 1036     Zhane Trinh PT at 21 1525  Version 1 of 1         Acute Care - Physical Therapy Treatment Note  [facility]     Patient Name: Fidencio Luciano  : 1945  MRN: [id number]  Today's Date: 2021   Onset of Illness/Injury or Date of Surgery: 21       PT Assessment (last 12 hours)      PT Evaluation and Treatment     Row Name 21 1500          Physical Therapy Time and Intention    Subjective Information  no complaints  -BC      Document Type  therapy note (daily note)  -BC     Mode of Treatment  physical therapy  -BC     Patient Effort  good  -BC     Row Name 05/31/21 1500          General Information    Patient Profile Reviewed  yes  -BC     Onset of Illness/Injury or Date of Surgery  05/24/21  -BC     Referring Physician  Behbahani  -BC     Patient Observations  alert;cooperative;agree to therapy  -BC     Risks Reviewed  LOB;nausea/vomiting;dizziness;increased discomfort;change in vital signs;increased drainage;lines disloged  -BC     Benefits Reviewed  improve function;increase independence;increase strength;increase balance;decrease pain;decrease risk of DVT;improve skin integrity;increase knowledge  -BC     Row Name 05/31/21 1500          Living Environment    Current Living Arrangements  home/apartment/condo  -BC     Lives With  spouse  -BC     Row Name 05/31/21 1500          Sensory    Hearing Status  WFL  -BC     Row Name 05/31/21 1500          Range of Motion (ROM)    Range of Motion  ROM is WFL  -BC     Left Lower Extremity (ROM)  left LE ROM is WFL except  -BC     Right Lower Extremity (ROM)  right LE ROM is WFL  -BC     Row Name 05/31/21 1500          Range of Motion Comprehensive    General Range of Motion  no range of motion deficits identified  -BC     Row Name 05/31/21 1500          Mobility    Extremity Weight-bearing Status  left lower extremity;right lower extremity  -BC     Left Lower Extremity (Weight-bearing Status)  full weight-bearing (FWB)  -BC     Right Lower Extremity (Weight-bearing Status)  full weight-bearing (FWB)  -BC     Row Name 05/31/21 1500          Bed Mobility    Bed Mobility  bed mobility (all) activities  -BC     All Activities, [place] (Bed Mobility)  contact guard assist  -BC     Assistive Device (Bed Mobility)  bed rails  -BC     Row Name 05/31/21 1500          Transfers    Transfers  stand-sit transfer;sit-stand transfer  -BC     Maintains Weight-bearing Status (Transfers)  able to maintain   -BC     Sit-Stand [place] (Transfers)  contact guard  -BC     Stand-Sit [place] (Transfers)  contact guard  -BC     Row Name 05/31/21 1500          Sit-Stand Transfer    Assistive Device (Sit-Stand Transfers)  walker, front-wheeled  -BC     Row Name 05/31/21 1500          Stand-Sit Transfer    Assistive Device (Stand-Sit Transfers)  walker, front-wheeled  -BC     Row Name 05/31/21 1500          Gait/Stairs (Locomotion)    Gait/Stairs Locomotion  gait/ambulation independence  -BC     [place] Level (Gait)  contact guard  -BC     Assistive Device (Gait)  walker, front-wheeled  -BC     Distance in Feet (Gait)  30 ft  -BC     Row Name 05/31/21 1500          Coping    Observed Emotional State  calm;cooperative  -BC     Verbalized Emotional State  acceptance  -BC     Trust Relationship/Rapport  care explained  -BC     Family/Support Persons  spouse  -BC     Involvement in Care  not present at bedside  -BC     Family/Support System Care  presence promoted;self-care encouraged  -BC     Diversional Activities  television  -BC     Row Name 05/31/21 1500          Plan of Care Review    Plan of Care Reviewed With  patient  -BC     Progress  improving  -BC     Row Name 05/31/21 1500          Positioning and Restraints    Pre-Treatment Position  in bed  -BC     Post Treatment Position  chair  -BC     In Chair  notified nsg;reclined;sitting;call light within reach;encouraged to call for assist  -BC       User Key  (r) = Recorded By, (t) = Taken By, (c) = Cosigned By    Initials Name Provider Type    BC Zhnae Trinh, PT Physical Therapist          PT Recommendation and Plan  Therapy Frequency (PT): 5 times/wk  Progress Summary (PT)  Progress Toward Functional Goals (PT): progress toward functional goals as expected  Daily Progress Summary (PT): good  Plan of Care Reviewed With: patient  Progress: improving       Time Calculation:   PT Charges     Row Name 05/31/21 7823             Time Calculation    PT Received  On  21  -BC         Time Calculation- PT    Total Timed Code Minutes- PT  45 minute(s)  -BC         Timed Charges    72387 - PT Therapeutic Exercise Minutes  15  -BC      36574 - Gait Training Minutes   30  -BC         Total Minutes    Timed Charges Total Minutes  45  -BC       Total Minutes  45  -BC        User Key  (r) = Recorded By, (t) = Taken By, (c) = Cosigned By    Initials Name Provider Type    BC Zhane Trinh, PT Physical Therapist        Therapy Charges for Today     Code Description Service Date Service Provider Modifiers Qty    70419508859 HC PT THER PROC EA 15 MIN 2021 Zhane Trinh, PT GP 1    05190914745 HC GAIT TRAINING EA 15 MIN 2021 Zhane Trinh PT GP 2               Zhane Trinh PT  2021      Electronically signed by Zhane Trinh PT at 21 1526     Tristian Khan PT at 21 1603  Version 1 of 1         Acute Care - Physical Therapy Treatment Note  [facility]     Patient Name: Fidencio Luciano  : 1945  MRN: [id number]  Today's Date: 2021   Onset of Illness/Injury or Date of Surgery: 21       PT Assessment (last 12 hours)      PT Evaluation and Treatment     Row Name 21 1518          Physical Therapy Time and Intention    Subjective Information  no complaints  -CW     Document Type  therapy note (daily note)  -CW     Mode of Treatment  physical therapy  -CW     Patient Effort  good  -CW     Symptoms Noted During/After Treatment  none  -CW     Comment  Patient agreeable to physical therapy treatment this date. He reports he is feeling better than when he was admitted.  -CW     Row Name 21 1518          General Information    Patient Profile Reviewed  yes  -CW     Patient Observations  alert;cooperative;agree to therapy  -CW     Row Name 21 1518          Cognition    Affect/Mental Status (Cognitive)  WFL  -CW     Orientation Status (Cognition)  oriented to;person;place;situation  -CW     Follows Commands  (Cognition)  follows one-step commands;physical/tactile prompts required;repetition of directions required;verbal cues/prompting required;visual cue  -     Row Name 06/02/21 1518          Pain Scale: Word Pre/Post-Treatment    Pre/Posttreatment Pain Comment  Patient reports no pain during physical therapy treatment this date.  -     Row Name 06/02/21 1518          Bed Mobility    Bed Mobility  supine-sit  -CW     Supine-Sit [place] (Bed Mobility)  supervision  -     Assistive Device (Bed Mobility)  bed rails;head of bed elevated  -     Row Name 06/02/21 1518          Transfers    Sit-Stand [place] (Transfers)  standby assist  -     Stand-Sit [place] (Transfers)  contact guard  -     Row Name 06/02/21 1518          Sit-Stand Transfer    Assistive Device (Sit-Stand Transfers)  walker, front-wheeled  -     Row Name 06/02/21 1518          Stand-Sit Transfer    Assistive Device (Stand-Sit Transfers)  walker, front-wheeled  -     Row Name 06/02/21 1518          Gait/Stairs (Locomotion)    [place] Level (Gait)  contact guard;verbal cues  -     Assistive Device (Gait)  walker, front-wheeled  -     Distance in Feet (Gait)  100  -CW     Pattern (Gait)  step-to;step-through  -CW     Deviations/Abnormal Patterns (Gait)  base of support, narrow;marina decreased;gait speed decreased;stride length decreased  -     Bilateral Gait Deviations  heel strike decreased  -     Row Name 06/02/21 1518          Safety Issues, Functional Mobility    Safety Issues Affecting Function (Mobility)  awareness of need for assistance;insight into deficits/self-awareness;judgment  -     Impairments Affecting Function (Mobility)  balance;endurance/activity tolerance;strength  -     Row Name 06/02/21 1518          Balance    Balance Assessment  sitting static balance;standing static balance  -     Static Sitting Balance  WFL  -     Static Standing Balance  mild impairment  -     Row Name 06/02/21  1518          Motor Skills    Motor Skills  therapeutic exercise  -CW     Therapeutic Exercise  other (see comments) Seated/standing LE strengthening interventions  -CW     Row Name 06/02/21 1518          Coping    Observed Emotional State  calm;cooperative  -CW     Trust Relationship/Rapport  care explained;choices provided;questions answered;thoughts/feelings acknowledged  -CW     Row Name 06/02/21 1518          Positioning and Restraints    Pre-Treatment Position  in bed  -CW     Post Treatment Position  chair  -CW     In Chair  notified nsg;sitting;call light within reach;encouraged to call for assist  -CW     Row Name 06/02/21 1518          Progress Summary (PT)    Progress Toward Functional Goals (PT)  progress toward functional goals is good  -CW     Daily Progress Summary (PT)  Patient demonstrates improved ability and tolerance for ambulation this date. His cognition is WFL this date. His nurse reports that he has been doing better this afternoon, but that he waxes and wanes.  -CW       User Key  (r) = Recorded By, (t) = Taken By, (c) = Cosigned By    Initials Name Provider Type    Tristian Ramachandran, PT Physical Therapist          PT Recommendation and Plan  Anticipated Discharge Disposition (PT): home with assist  Progress Summary (PT)  Progress Toward Functional Goals (PT): progress toward functional goals is good  Daily Progress Summary (PT): Patient demonstrates improved ability and tolerance for ambulation this date. His cognition is WFL this date. His nurse reports that he has been doing better this afternoon, but that he waxes and wanes.       Time Calculation:   PT Charges     Row Name 06/02/21 1602             Time Calculation    PT Received On  06/02/21  -      PT Goal Re-Cert Due Date  06/11/21  -         Time Calculation- PT    Total Timed Code Minutes- PT  32 minute(s)  -CW        User Key  (r) = Recorded By, (t) = Taken By, (c) = Cosigned By    Initials Name Provider Type    LAW Khan  Tristian PT Physical Therapist        Therapy Charges for Today     Code Description Service Date Service Provider Modifiers Qty    06116292161 HC GAIT TRAINING EA 15 MIN 2021 Tristian Khan, PT GP 1    [id number]  PT THER PROC EA 15 MIN 2021 Tristian Khan PT GP 1               Tristian Khan PT  2021      Electronically signed by Tristian Khan, PT at 21 1603     Tristian Khan PT at 21 1546  Version 1 of 1         [facility] - Physical Therapy Treatment Note  [facility]     Patient Name: Fidencio Luciano  : 1945  MRN: [id number]  Today's Date: 2021   Onset of Illness/Injury or Date of Surgery: 21       PT Assessment (last 12 hours)      PT Evaluation and Treatment     Row Name 21 1533          Physical Therapy Time and Intention    Subjective Information  no complaints  -CW     Document Type  therapy note (daily note)  -CW     Mode of Treatment  physical therapy  -CW     Patient Effort  good  -CW     Symptoms Noted During/After Treatment  fatigue  -CW     Comment  Patient agreeable to physical therapy treatment this date. He does not make conversation today.  -CW     Row Name 21 1533          General Information    Patient Profile Reviewed  yes  -CW     Patient Observations  alert;cooperative;agree to therapy  -CW     General Observations of Patient  Less conversant this date, responds to therapist but does not elaborate.  -CW     Row Name 21 1533          Cognition    Affect/Mental Status (Cognitive)  flat/blunted affect;WFL  -CW     Orientation Status (Cognition)  oriented to;person;place;situation  -CW     Follows Commands (Cognition)  follows one-step commands;physical/tactile prompts required;repetition of directions required;verbal cues/prompting required;visual cue  -CW     Row Name 21 1533          Pain Scale: Word Pre/Post-Treatment    Pre/Posttreatment Pain Comment  Patient reports no pain during physical therapy treatment this date.  -CW      Row Name 06/04/21 1533          Bed Mobility    Bed Mobility  supine-sit  -CW     Supine-Sit [place] (Bed Mobility)  supervision  -     Assistive Device (Bed Mobility)  bed rails;head of bed elevated  -     Row Name 06/04/21 1533          Transfers    Transfers  bed-chair transfer  -CW     Bed-Chair [place] (Transfers)  standby assist  -CW     Assistive Device (Bed-Chair Transfers)  walker, front-wheeled  -CW     Sit-Stand [place] (Transfers)  standby assist  -CW     Stand-Sit [place] (Transfers)  contact guard  -     Row Name 06/04/21 1533          Sit-Stand Transfer    Assistive Device (Sit-Stand Transfers)  walker, front-wheeled  -CW     Row Name 06/04/21 1533          Stand-Sit Transfer    Assistive Device (Stand-Sit Transfers)  walker, front-wheeled  -CW     Row Name 06/04/21 1533          Gait/Stairs (Locomotion)    [place] Level (Gait)  contact guard;verbal cues  -     Assistive Device (Gait)  walker, front-wheeled  -CW     Distance in Feet (Gait)  250  -CW     Pattern (Gait)  step-to;step-through  -CW     Deviations/Abnormal Patterns (Gait)  base of support, narrow;marina decreased;gait speed decreased;stride length decreased  -     Bilateral Gait Deviations  heel strike decreased  -     Row Name 06/04/21 1533          Safety Issues, Functional Mobility    Safety Issues Affecting Function (Mobility)  insight into deficits/self-awareness;judgment  -     Impairments Affecting Function (Mobility)  balance;endurance/activity tolerance;strength  -     Row Name 06/04/21 1533          Motor Skills    Therapeutic Exercise  other (see comments) Supine/seated LE strengthening interventions  -     Row Name 06/04/21 1533          Coping    Observed Emotional State  calm;cooperative  -     Trust Relationship/Rapport  care explained;choices provided;questions answered;thoughts/feelings acknowledged  -     Row Name 06/04/21 1533          Positioning and Restraints     Pre-Treatment Position  in bed  -CW     In Chair  notified nsg;sitting;call light within reach;encouraged to call for assist  -CW     Row Name 21 1533          Progress Summary (PT)    Progress Toward Functional Goals (PT)  progress toward functional goals is good  -CW     Daily Progress Summary (PT)  Patient demonstrates continued tolerance and ability for therapeutic interventions despite flat affect this date.  -CW       User Key  (r) = Recorded By, (t) = Taken By, (c) = Cosigned By    Initials Name Provider Type    Tristian Ramachandran PT Physical Therapist          PT Recommendation and Plan  Anticipated Discharge Disposition (PT): home with assist  Progress Summary (PT)  Progress Toward Functional Goals (PT): progress toward functional goals is good  Daily Progress Summary (PT): Patient demonstrates continued tolerance and ability for therapeutic interventions despite flat affect this date.       Time Calculation:   PT Charges     Row Name 21 1545             Time Calculation    PT Received On  21  -CW         Time Calculation- PT    Total Timed Code Minutes- PT  38 minute(s)  -CW        User Key  (r) = Recorded By, (t) = Taken By, (c) = Cosigned By    Initials Name Provider Type    Tristian Ramachandran PT Physical Therapist        Therapy Charges for Today     Code Description Service Date Service Provider Modifiers Qty    61665784072 HC GAIT TRAINING EA 15 MIN 2021 Tristian Khan, PT GP 2    31369514091 HC PT THER PROC EA 15 MIN 2021 Tristian Khan PT GP 1               Tristian Khan PT  2021      Electronically signed by Tristian Khan PT at 21 1546          Occupational Therapy Notes (all)      Rakesh Rosenthal, OT at 21 1054          Acute Care - Occupational Therapy Initial Evaluation  [facility]     Patient Name: Fidencio Luciano  : 1945  MRN: [id number]  Today's Date: 2021  Onset of Illness/Injury or Date of Surgery: 21     Referring Physician:   aure    Admit Date: 5/24/2021       ICD-10-CM ICD-9-CM   1. Sense of impending doom  R45.89 780.99   2. Chest pain, unspecified type  R07.9 786.50     Patient Active Problem List   Diagnosis   • Essential hypertension   • Type 2 diabetes mellitus (CMS/HCC)   • Benign prostatic hyperplasia   • ASCVD (arteriosclerotic cardiovascular disease), s/p stenting of 80 % stenosis in left circumflex on 10/05/16and 60% stenosis in the LAD, clinically stable.    • Hyperlipidemia LDL goal <70   • Weakness generalized   • Chronic fatigue   • Coronary artery fistula   • Weight loss, unintentional   • Iron deficiency anemia   • Iron deficiency   • Normocytic anemia   • Weight loss, abnormal   • Early satiety   • Esophageal dysphagia   • Gastroesophageal reflux disease   • Dysphagia   • Chest pain   • Sense of impending doom     Past Medical History:   Diagnosis Date   • BPH (benign prostatic hyperplasia)    • Bradycardia     Positive tilt table testing 07/2016   • Coronary artery disease    • Diabetes mellitus (CMS/[facility])    • Diverticulosis    • Elevated cholesterol    • Gastric ulcer with hemorrhage    • Gastroesophageal reflux disease 1/26/2021   • History of transfusion    • Hyperlipidemia    • Hypertension    • Psoriasis      Past Surgical History:   Procedure Laterality Date   • BACK SURGERY     • CARDIAC CATHETERIZATION N/A 9/20/2016    Procedure: Left Heart Cath;  Surgeon: Giovanni Buenrostro MD;  [facility];  Service:    • CARDIAC CATHETERIZATION N/A 10/4/2016    Procedure: Left Heart Cath;  Surgeon: Bharathi Andrew MD;  [facility]  [facility];  Service:    • CARDIAC CATHETERIZATION N/A 5/9/2017    Procedure: Left Heart Cath;  Surgeon: Giovanni Buenrostro MD;  [facility]  [facility];  Service:    • CARDIAC CATHETERIZATION N/A 5/9/2017    Procedure: ERR;  Surgeon: Giovanni Buenrostro MD;  [facility]  [facility];  Service:    • CARDIAC CATHETERIZATION N/A  11/8/2019    Procedure: Left Heart Cath;  Surgeon: Abraham Oviedo MD;  [facility]:  [facility];  Service: Cardiology   • CARDIAC CATHETERIZATION N/A 12/18/2019    Procedure: Coronary angiography;  Surgeon: Jay Barney IV, MD;  Location:  [facility];  Service: Cardiovascular   • CHOLECYSTECTOMY     • COLONOSCOPY  11/13/2015    Dr. Martinez- internal hemorrhoids, sigmoid diverticulosis   • COLONOSCOPY N/A 8/17/2018    Procedure: COLONOSCOPY CPT CODE: 49367;  Surgeon: Gigi Geronimo MD;  Location:  [facility];  Service: Gastroenterology   • CORONARY ANGIOPLASTY WITH STENT PLACEMENT     • ENDOSCOPY N/A 8/17/2018    Procedure: ESOPHAGOGASTRODUODENOSCOPY WITH BIOPSY CPT CODE: 15896;  Surgeon: Gigi Geronimo MD;  Location:  [facility];  Service: Gastroenterology   • ENDOSCOPY N/A 4/20/2021    Procedure: ESOPHAGOGASTRODUODENOSCOPY;  Surgeon: Geno Vernon MD;  Location:  [facility];  Service: Gastroenterology;  Laterality: N/A;   • INTERVENTIONAL RADIOLOGY PROCEDURE N/A 12/18/2019    Procedure: Coil Embolization of septal to pulmonary artery fistuala.;  Surgeon: Jay Barney IV, MD;  Location:  [facility];  Service: Cardiovascular   • DC RT/LT HEART CATHETERS N/A 5/9/2017    Procedure: Percutaneous Coronary Intervention;  Surgeon: Lincoln De Los Santos MD;  [facility]  [facility];  Service: Cardiology   • STOMACH SURGERY      FUSION OF BLEEDING ULCER   • UPPER GASTROINTESTINAL ENDOSCOPY  11/13/2015    Dr. Martinez- mild gastritis            OT ASSESSMENT FLOWSHEET (last 12 hours)      OT Evaluation and Treatment     Row Name 05/28/21 1038                   OT Time and Intention    Document Type  evaluation  -KR        Mode of Treatment  occupational therapy  -KR        Patient Effort  adequate  -KR           General Information    General Observations of Patient  alert/cooperative, flat affect  -KR        Prior Level of  Function  min assist:  -KR           Cognition    Affect/Mental Status (Cognitive)  flat/blunted affect  -KR        Orientation Status (Cognition)  oriented to;person;place;situation  -KR        Follows Commands (Cognition)  WFL  -KR           Range of Motion Comprehensive    Comment, General Range of Motion  BUE 4/5  -KR           Strength Comprehensive (MMT)    Comment, General Manual Muscle Testing (MMT) Assessment  BUE 3-/5  -KR           Activities of Daily Living    BADL Assessment/Intervention  bathing;upper body dressing;lower body dressing;grooming;feeding;toileting  -KR           Bathing Assessment/Intervention    [place] Level (Bathing)  bathing skills;moderate assist (50% patient effort)  -KR           Upper Body Dressing Assessment/Training    [place] Level (Upper Body Dressing)  upper body dressing skills;moderate assist (50% patient effort)  -KR           Lower Body Dressing Assessment/Training    [place] Level (Lower Body Dressing)  lower body dressing skills;moderate assist (50% patient effort)  -KR           Grooming Assessment/Training    [place] Level (Grooming)  grooming skills;moderate assist (50% patient effort)  -KR           Self-Feeding Assessment/Training    [place] Level (Feeding)  feeding skills;minimum assist (75% patient effort)  -KR           Toileting Assessment/Training    [place] Level (Toileting)  toileting skills;moderate assist (50% patient effort)  -KR           Plan of Care Review    Plan of Care Reviewed With  patient;spouse  -KR           OT Goals    Dressing Goal Selection (OT)  dressing, OT goal 1  -KR        Strength Goal Selection (OT)  strength, OT goal 1  -KR           Dressing Goal 1 (OT)    Activity/Device (Dressing Goal 1, OT)  dressing skills, all  -KR        [place]/Cues Needed (Dressing Goal 1, OT)  minimum assist (75% or more patient effort)  -KR        Time Frame (Dressing Goal 1, OT)  by discharge  -KR           Strength Goal  1 (OT)    Strength Goal 1 (OT)  BUE increase x 1 to enhance fxl performance of self care/mobility activities  -KR        Time Frame (Strength Goal 1, OT)  by discharge  -KR           Therapy Assessment/Plan (OT)    Criteria for Skilled Therapeutic Interventions Met (OT)  yes  -KR        Planned Therapy Interventions (OT)  ROM/therapeutic exercise;strengthening exercise;activity tolerance training;BADL retraining  -KR           Therapy Plan Review/Discharge Plan (OT)    Anticipated Discharge Disposition (OT)  home with assist  -KR          User Key  (r) = Recorded By, (t) = Taken By, (c) = Cosigned By    Initials Name Effective Dates    KR Rakesh Rosenthal OT 18 -                OT Recommendation and Plan  Planned Therapy Interventions (OT): ROM/therapeutic exercise, strengthening exercise, activity tolerance training, BADL retraining  Plan of Care Review  Plan of Care Reviewed With: patient, spouse  Plan of Care Reviewed With: patient, spouse        Time Calculation:     Therapy Charges for Today     Code Description Service Date Service Provider Modifiers Qty    [id number] HC OT EVAL HIGH COMPLEXITY 4 2021 Rakesh Rosenthal OT GO 1               Rakesh Rosenthal OT  2021    Electronically signed by Rakesh Rosenthal OT at 21 1054          Speech Language Pathology Notes (most recent note)      Madalyn Joshi, MS, CFY-SLP at 21 1405          Acute Care - Speech Language Pathology   Swallow Initial Evaluation  Hem[facility]   CLINICAL DYSPHAGIA EVALUATION     Patient Name: Fidencio Luciano  : 1945  MRN: [id number]  Today's Date: 2021             Admit Date: 2021    Fidencio WHITNEY Mustapha  is seen at bedside this am on [id number]-309 to assess safety/efficacy of swallowing fnx, determine safest/least restrictive diet tolerance. Pt was admitted on 21 w/ chest pain.  He has a medical history significant for CAD status post previous intervention, type 2 diabetes mellitus, hypertension, dyslipidemia, GERD,  "history of gastric ulcer with hemorrhage, psoriasis, and BPH.    Pt is noted w/ some confusion across evaluation.  Pt is oriented to name and location but states he is at the hospital due to COVID.  Pt otherwise answers \"yes\" to all questions put to him.      Social History     Socioeconomic History   • Marital status:      Spouse name: Not on file   • Number of children: Not on file   • Years of education: Not on file   • Highest education level: Not on file   Tobacco Use   • Smoking status: Former Smoker     Packs/day: 3.00     Years: 40.00     Pack years: 120.00     Types: Cigarettes     Quit date:      Years since quittin.4   • Smokeless tobacco: Former User     Quit date: 1986   Vaping Use   • Vaping Use: Never used   Substance and Sexual Activity   • Alcohol use: No   • Drug use: No   • Sexual activity: Defer        CR Chest 1 Vw     INDICATION:   Chest pain.      COMPARISON:    2021     FINDINGS:  Single portable AP view(s) of the chest.  The heart and mediastinal contours are normal. The lungs are clear. No pneumothorax or pleural effusion.     IMPRESSION:  No acute cardiopulmonary findings. Left base infiltrate seen on the previous examination has resolved.     Signer Name: Cortez Griffin MD   Signed: 2021 5:18 AM   Workstation Name: [id number]    [facility]    ----------------------------------------------------------------------------------------------------    CT CHEST PULMONARY EMBOLISM-     CLINICAL INDICATION: PE suspected, high prob        COMPARISON: 2021 CT without contrast      PROCEDURE: Thin cut axial images were acquired through the pulmonary  vessels during the rapid infusion of IV contrast.     Additional 3-D reformatted images obtained via post-processing for  improved diagnostic accuracy and procedural planning.     Radiation dose reduction techniques were utilized per ALARA protocol.  Automated exposure control was initiated " through either or [employer] or  [employer]Â® software packages by  protocol.           FINDINGS: Today's study demonstrates opacification of the central  pulmonary vessels.   There are no filling defects.   There is no truncation.     No evidence of a pulmonary embolus.     Otherwise there are no parenchymal soft tissue nodules or masses.     There is no mediastinal lymph node enlargement     No pericardial or pleural effusion.        IMPRESSION:  No evidence of a pulmonary embolus.     This report was finalized on 5/24/2021 9:16 AM by Dr. Adan Kauffman MD.    Diet Orders (active) (From admission, onward)     Start     Ordered    05/24/21 1843  Diet Regular; Cardiac, Consistent Carbohydrate  Diet Effective Now      05/24/21 1842                Pt is observed on room air w/o complications.     Pt is positioned upright and centered in bed to accept multiple po presentations of ice chips solid cracker, puree and thin liquids via spoon, cup and straw.  Pt is able to self feed and does so across this evaluation.     Facial/oral structures are symmetrical upon observation. Lingual protrusion reveals no deviation. Oral mucosa are moist, pink, and clean. Secretions are clear, thin, and well controlled. OROM/SABINE is wfl to imitate oral postures. Gag is not assessed. Volitional cough is intact w/ adequate intensity, clear in quality, non-productive. Voice is adequate in intensity, clear in quality w/ intelligible speech.    Upon po presentations, adequate bolus anticipation and acceptance w/ good labial seal for bolus clearance via spoon bowl, cup rim stability and suction via straw. Bolus formation, manipulation and control are wfl w/ rotary mastication pattern. A-p transit is timely w/o oral residue.     Pharyngeal swallow is timely w/ adequate hyolaryngeal elevation per palpation. No overt s/s aspiration evidenced across this evaluation. No silent aspiration suspected as pt is w/o changes in vocal quality,  respirations or secretions post po presentations. Pt denies odynophagia.      Visit Dx:     ICD-10-CM ICD-9-CM   1. Sense of impending doom  R45.89 780.99   2. Chest pain, unspecified type  R07.9 786.50     Patient Active Problem List   Diagnosis   • Essential hypertension   • Type 2 diabetes mellitus (CMS/HCC)   • Benign prostatic hyperplasia   • ASCVD (arteriosclerotic cardiovascular disease), s/p stenting of 80 % stenosis in left circumflex on 10/05/16and 60% stenosis in the LAD, clinically stable.    • Hyperlipidemia LDL goal <70   • Weakness generalized   • Chronic fatigue   • Coronary artery fistula   • Weight loss, unintentional   • Iron deficiency anemia   • Iron deficiency   • Normocytic anemia   • Weight loss, abnormal   • Early satiety   • Esophageal dysphagia   • Gastroesophageal reflux disease   • Dysphagia   • Chest pain     Past Medical History:   Diagnosis Date   • BPH (benign prostatic hyperplasia)    • Bradycardia     Positive tilt table testing 07/2016   • Coronary artery disease    • Diabetes mellitus (CMS/HCC)    • Diverticulosis    • Elevated cholesterol    • Gastric ulcer with hemorrhage    • Gastroesophageal reflux disease 1/26/2021   • History of transfusion    • Hyperlipidemia    • Hypertension    • Psoriasis      Past Surgical History:   Procedure Laterality Date   • BACK SURGERY     • CARDIAC CATHETERIZATION N/A 9/20/2016    Procedure: Left Heart Cath;  Surgeon: Giovanni Buenrostro MD;  [facility]:  [facility];  Service:    • CARDIAC CATHETERIZATION N/A 10/4/2016    Procedure: Left Heart Cath;  Surgeon: Bharathi Andrew MD;  [facility]  [facility];  Service:    • CARDIAC CATHETERIZATION N/A 5/9/2017    Procedure: Left Heart Cath;  Surgeon: Giovanni Buenrostro MD;  [facility]:  [facility];  Service:    • CARDIAC CATHETERIZATION N/A 5/9/2017    Procedure: ERR;  Surgeon: Giovanni Buenrostro MD;  [facility]:  [facility];  Service:    •  CARDIAC CATHETERIZATION N/A 11/8/2019    Procedure: Left Heart Cath;  Surgeon: Abraham Oviedo MD;  [facility]:  [facility];  Service: Cardiology   • CARDIAC CATHETERIZATION N/A 12/18/2019    Procedure: Coronary angiography;  Surgeon: Jay Barney IV, MD;  Location:  [facility];  Service: Cardiovascular   • CHOLECYSTECTOMY     • COLONOSCOPY  11/13/2015    Dr. Martinez- internal hemorrhoids, sigmoid diverticulosis   • COLONOSCOPY N/A 8/17/2018    Procedure: COLONOSCOPY CPT CODE: 51430;  Surgeon: Gigi Geronimo MD;  Location:  [facility];  Service: Gastroenterology   • CORONARY ANGIOPLASTY WITH STENT PLACEMENT     • ENDOSCOPY N/A 8/17/2018    Procedure: ESOPHAGOGASTRODUODENOSCOPY WITH BIOPSY CPT CODE: 20202;  Surgeon: Gigi Geronimo MD;  Location:  [facility];  Service: Gastroenterology   • ENDOSCOPY N/A 4/20/2021    Procedure: ESOPHAGOGASTRODUODENOSCOPY;  Surgeon: Geno Vernon MD;  Location: [facility];  Service: Gastroenterology;  Laterality: N/A;   • INTERVENTIONAL RADIOLOGY PROCEDURE N/A 12/18/2019    Procedure: Coil Embolization of septal to pulmonary artery fistuala.;  Surgeon: Jay Barney IV, MD;  Location:  [facility];  Service: Cardiovascular   • GA RT/LT HEART CATHETERS N/A 5/9/2017    Procedure: Percutaneous Coronary Intervention;  Surgeon: Lincoln De Los Santos MD;  [facility]  [facility];  Service: Cardiology   • STOMACH SURGERY      FUSION OF BLEEDING ULCER   • UPPER GASTROINTESTINAL ENDOSCOPY  11/13/2015    Dr. Martinez- mild gastritis       EDUCATION  The patient has been educated in the following areas:   Dysphagia (Swallowing Impairment) Oral Care/Hydration.    Impression: Pt presents w/ wfl oropharyngeal swallow w/o s/s aspiration.No s/s indicative of silent aspiration.  No odynophagia reported.  Pt is felt to most benefit from a continued least restrictive po diet of regular solids, thin  liquids, w/ medications whole or crushed per pt preference in puree/thins.       SLP Recommendation and Plan       Recommendations:  1. Regular solids, thin liquids   2. Meds whole in puree/thins.   3. Upright and centered for all po intake  4. FRIEDA precautions.  5. Oral care protocol.  6. Universal aspiration precautions.     No further formal SLP f/u warranted at this time.    D/w pt results and recommendations w/ verbal agreement.    D/w RN results and recommendations w/ verbal agreement.    Thank you for allowing me to participate in the care of your patient-  Madalyn Joshi M.S., CFY-SLP          Patient was not wearing a face mask during this therapy encounter. Therapist used appropriate personal protective equipment including mask, eye protection and gloves.  Mask used was standard procedure mask. Appropriate PPE was worn during the entire therapy session. The patient coughed during this evaluation. Therapist was within 6 feet for 15 minutes or more during the evaluation. Hand hygiene was completed before and after therapy session. Patient is not in enhanced droplet precautions.               Time Calculation:       Therapy Charges for Today     Code Description Service Date Service Provider Modifiers Qty    [id number]  ST EVAL ORAL PHARYNG SWALLOW 4 5/25/2021 Madalyn Joshi MS, CFY-SLP GN 1               Madalyn Joshi MS, CFY-SLP  5/25/2021    Electronically signed by Madalyn Joshi MS, CFY-SLP at 05/25/21 4769

## 2021-06-05 VITALS
SYSTOLIC BLOOD PRESSURE: 144 MMHG | WEIGHT: 137.8 LBS | RESPIRATION RATE: 20 BRPM | BODY MASS INDEX: 19.29 KG/M2 | OXYGEN SATURATION: 95 % | DIASTOLIC BLOOD PRESSURE: 62 MMHG | HEIGHT: 71 IN | HEART RATE: 92 BPM | TEMPERATURE: 98.4 F

## 2021-06-05 LAB
GLUCOSE BLDC GLUCOMTR-MCNC: 108 MG/DL (ref 70–130)
GLUCOSE BLDC GLUCOMTR-MCNC: 110 MG/DL (ref 70–130)

## 2021-06-05 PROCEDURE — 99217 PR OBSERVATION CARE DISCHARGE MANAGEMENT: CPT | Performed by: INTERNAL MEDICINE

## 2021-06-05 PROCEDURE — 25010000002 HEPARIN (PORCINE) PER 1000 UNITS: Performed by: INTERNAL MEDICINE

## 2021-06-05 PROCEDURE — 96372 THER/PROPH/DIAG INJ SC/IM: CPT

## 2021-06-05 PROCEDURE — 82962 GLUCOSE BLOOD TEST: CPT

## 2021-06-05 PROCEDURE — G0378 HOSPITAL OBSERVATION PER HR: HCPCS

## 2021-06-05 RX ADMIN — CETIRIZINE HYDROCHLORIDE 10 MG: 10 TABLET, FILM COATED ORAL at 08:19

## 2021-06-05 RX ADMIN — LISINOPRIL 20 MG: 10 TABLET ORAL at 08:19

## 2021-06-05 RX ADMIN — PANTOPRAZOLE SODIUM 40 MG: 40 TABLET, DELAYED RELEASE ORAL at 08:19

## 2021-06-05 RX ADMIN — SODIUM CHLORIDE, PRESERVATIVE FREE 10 ML: 5 INJECTION INTRAVENOUS at 08:20

## 2021-06-05 RX ADMIN — METOPROLOL TARTRATE 12.5 MG: 25 TABLET, FILM COATED ORAL at 08:19

## 2021-06-05 RX ADMIN — MEMANTINE HYDROCHLORIDE 10 MG: 10 TABLET, FILM COATED ORAL at 09:05

## 2021-06-05 RX ADMIN — FERROUS SULFATE TAB 325 MG (65 MG ELEMENTAL FE) 325 MG: 325 (65 FE) TAB at 08:19

## 2021-06-05 RX ADMIN — FINASTERIDE 5 MG: 5 TABLET, FILM COATED ORAL at 08:19

## 2021-06-05 RX ADMIN — CLOPIDOGREL 75 MG: 75 TABLET, FILM COATED ORAL at 08:19

## 2021-06-05 RX ADMIN — HEPARIN SODIUM 5000 UNITS: 5000 INJECTION INTRAVENOUS; SUBCUTANEOUS at 04:51

## 2021-06-05 NOTE — DISCHARGE SUMMARY
[facility] Medicine Services  DISCHARGE SUMMARY    Patient Identification:  Name:  Fidencio Luciano  Age:  75 y.o.  Sex:  male  :  1945  MRN:  [id number]  Visit Number:  [id number]    Date of Admission: 2021  Date of Discharge:  2021    PCP: Camila Ortiz APRN      Admission/Discharge Diagnoses     Discharge Diagnoses:  · Debility and falls    · Recurrent dehydration due to poor po intake  · advanced dementia without behavioral disturbance associated with weight loss  · Nonsustained VT vs tachycardia    Chronic medical conditions:  · History of CAD   · Hypertension   · GERD    · Type 2 diabetes Diet controlled.    ·  Psoriasis  · CKD2  · BPH         Consults/Procedures     Consults:   Consults     Date and Time Order Name Status Description    2021  9:12 AM Inpatient General Surgery Consult Completed     2021  2:38 PM Inpatient Cardiology Consult Completed           Procedures Performed:  Nuclear Perfusion Findings    Study Impression Myocardial perfusion imaging indicates a normal myocardial perfusion study with no evidence of ischemia. Impressions are consistent with a low risk study.   Rest Perfusion Defect 1 No rest myocardial perfusion defect noted.   Stress Perfusion Defect 1 No stress myocardial perfusion defect noted.   Ventricle Size / Description Left ventricular ejection fraction is hyperdynamic (Calculated EF > 70%). . Normal LV wall motion noted.     Echocardiogram Findings    Left Ventricle Left ventricular ejection fraction appears to be 61 - 65%.   Normal left ventricular cavity size and wall thickness noted. All left ventricular wall segments contract normally. Left ventricular diastolic function is consistent with (grade I) impaired relaxation.   Right Ventricle Normal right ventricular cavity size, wall thickness, systolic function and septal motion noted.   Left Atrium Normal left atrial size and volume noted.   Right Atrium Normal right  atrial cavity size noted.   Aortic Valve The aortic valve is structurally normal with no stenosis present. Mild aortic valve regurgitation is present.   Mitral Valve The mitral valve is structurally normal with no significant stenosis present. Trace mitral valve regurgitation is present.   Tricuspid Valve The tricuspid valve is structurally normal with no significant regurgitation or significant stenosis present.   Pulmonic Valve The pulmonic valve is structurally normal with no regurgitation or significant stenosis present.   Greater Vessels No dilation of the aortic root is present.   Pericardium The pericardium is normal. There is no evidence of pericardial effusion. .          Hospital Course     75M with dementia presented to the emergency department of [facility] on 5/24/2021 with complaints of chest pains, shortness of breath and generalized weakness.  Had a recent stress test about a year ago that showed mild reversible ischemia.  Cardiology had seen him at that time and recommended medical therapy. Cardiology was consulted this admission. Cardiac enzymes were negative and he had remained chest pain free during admission. Therefore, recommended to continue medical therapy.  Noted to have one episode of tachycardia, QRS was borderline. Therefore, unclear if it was Narrow complex or VT. He was asymptomatic, no other recurrences noted.  I repeated stress test, which showed no ischemia. TTE also showed normal LVEF and no new wall motion abnormality. Wife had been reporting shortness of breath when patient ambulates. His chest imaging did not reveal any etiology, he has been on room air and no episodes of hypoxia noted. He has h/o CAD. Therefore, antianginal/ Imdur was continues in addition to statin, aspirin.  I added low dose metoprolol for the tachycardia event. He has been working with physical and occupational therapy for generalized weakness and fall.  Unfortunately insurance rejected rehab  referral. Therefore, after discussion with wife decided to go home with home health.  Of note, wife had reported dark stools. However, he is on po iron which the cause, no melena noted here, hemoglobin has remained stable. Has h/o PUD and can continue home PPI.     She reports patient being very forgetful at home, has very poor appetite. He was noted to have poor po fluid intake here, which caused dehydration. Educated wife to offer fluid throughout the day [id number] cc/day to keep hydration. His blood pressure is elevated in the 160's-170's without medications but also can drop lower if dehydrated. Discussed goals of care with wife and explained functional decline is likely for advanced dementia. Code status was discussed and she informed me that they have had a discussion in the past and he would not want to be on life support, ventilatory of have chest compressions. Therefore, DNR/DNI order was entered. Discussed possible Home hospice evaluation given rapid decline and she is agreeable.  Home health ordered. Hospital bed, walker, bed side commode and wheelchair also ordered.     Discharge Vitals/Physical Examination     Vital Signs:  Temp:  [95.7 °F (35.4 °C)-98.2 °F (36.8 °C)] 98.2 °F (36.8 °C)  Heart Rate:  [66-85] 85  Resp:  [18-20] 18  BP: (110-168)/(37-67) 167/58  Mean Arterial Pressure (Non-Invasive) for the past 24 hrs (Last 3 readings):   Noninvasive MAP (mmHg)   06/05/21 0224 129   06/04/21 1824 71   06/04/21 1412 76     SpO2 Percentage    06/04/21 1824 06/05/21 0224 06/05/21 0700   SpO2: 99% 94% 95%     SpO2:  [94 %-99 %] 95 %  on   ;   Device (Oxygen Therapy): room air    Body mass index is 19.22 kg/m².  Wt Readings from Last 3 Encounters:   06/05/21 62.5 kg (137 lb 12.8 oz)   04/20/21 69.9 kg (154 lb)   04/07/21 59 kg (130 lb)         Physical Exam:  GEN: NAD  CV: RRR, no audible murmur  PULM: CTA, no wheeze or crackles  NEURO: awake, oriented to self and place. confusion seems to be at baseline, no  acute focal deficits noted.     Pertinent Laboratory/Radiology Results     Pertinent Laboratory Results:            Results from last 7 days   Lab Units 06/03/21  0039   WBC 10*3/mm3 9.25   HEMOGLOBIN g/dL 11.8*   HEMATOCRIT % 35.5*   MCV fL 96.5   MCHC g/dL 33.2   PLATELETS 10*3/mm3 200     Results from last 7 days   Lab Units 06/01/21  0233   SODIUM mmol/L 143   POTASSIUM mmol/L 3.8   MAGNESIUM mg/dL 2.1   CHLORIDE mmol/L 110*   CO2 mmol/L 22.1   BUN mg/dL 25*   CREATININE mg/dL 0.78   EGFR IF NONAFRICN AM mL/min/1.73 97   CALCIUM mg/dL 8.6   GLUCOSE mg/dL 93   Estimated Creatinine Clearance: 71.6 mL/min (by C-G formula based on SCr of 0.78 mg/dL).  No results found for: AMMONIA    Glucose   Date/Time Value Ref Range Status   06/05/2021 0402 110 70 - 130 mg/dL Final   06/02/2021 1540 127 70 - 130 mg/dL Final   06/02/2021 1207 124 70 - 130 mg/dL Final     Lab Results   Component Value Date    HGBA1C 6.00 (H) 04/16/2021     Lab Results   Component Value Date    TSH 1.510 05/24/2021    FREET4 1.21 03/09/2021       Pertinent Radiology Results:  Imaging Results (All)     Procedure Component Value Units Date/Time    US Venous Doppler Lower Extremity Bilateral (duplex) [[id number]] Collected: 05/24/21 1944     Updated: 05/1945    Narrative:      US VENOUS DOPPLER LOWER EXTREMITY BILATERAL (DUPLEX)-     CLINICAL INDICATION: + D dimer.; R45.89-Other symptoms and signs  involving emotional state; R07.9-Chest pain, unspecified        COMPARISON: None available      TECHNIQUE: Color Doppler imaging was used with compression and  augmentation to evaluate the lower extremity deep venous system.     FINDINGS:   There is patent spontaneous flow from the common femoral vein through  the posterior tibial veins.  There was no internal clot or area of noncompressibility.  Normal augmentation was elicited where applicable.       Impression:      No DVT in the lower extremities on today's exam.      This report was finalized on  5/24/2021 7:44 PM by Dr. Adan Kauffman MD.       CT Chest Pulmonary Embolism [[id number]] Collected: 05/24/21 0914     Updated: 05/24/21 0918    Narrative:      CT CHEST PULMONARY EMBOLISM-     CLINICAL INDICATION: PE suspected, high prob        COMPARISON: 04/07/2021 CT without contrast      PROCEDURE: Thin cut axial images were acquired through the pulmonary  vessels during the rapid infusion of IV contrast.     Additional 3-D reformatted images obtained via post-processing for  improved diagnostic accuracy and procedural planning.     Radiation dose reduction techniques were utilized per ALARA protocol.  Automated exposure control was initiated through either or [employer] or  iPrint software packages by  protocol.           FINDINGS: Today's study demonstrates opacification of the central  pulmonary vessels.   There are no filling defects.   There is no truncation.     No evidence of a pulmonary embolus.     Otherwise there are no parenchymal soft tissue nodules or masses.     There is no mediastinal lymph node enlargement     No pericardial or pleural effusion.          Impression:      No evidence of a pulmonary embolus.     This report was finalized on 5/24/2021 9:16 AM by Dr. Adan Kauffman MD.       CT Head Without Contrast [[id number]] Collected: 05/24/21 0649     Updated: 05/24/21 0651    Narrative:      CT Head WO    HISTORY:   Acute onset of altered mental status    TECHNIQUE:   Axial unenhanced head CT. Radiation dose reduction techniques included automated exposure control or exposure modulation based on body size. Count of known CT and cardiac nuc med studies performed in previous 12 months: 3.     Time of scan: 5:04 AM    COMPARISON:   7/25/2018    FINDINGS:     The ventricles are mildly generous in size. Cortical sulci are correspondingly prominent. No extraaxial fluid collections are seen.    No parenchymal or subarachnoid hemorrhage is present. Mild periventricular hypodensities are  demonstrated which are nonspecific but likely represent white matter microvascular change in a patient of this age. No CT evidence of mass or acute infarct.    The mastoid air cells are clear. The visualized paranasal sinuses demonstrate mild inflammatory changes in the ethmoid air cells.  Orbital structures demonstrate no acute findings. Lens replacement surgery on the right side.      Impression:      Impression:  1. No clearly acute intracranial pathology demonstrated.  2. Mild cerebral atrophy and nonspecific periventricular hypodensities which are thought to represent white matter microvascular change.    No significant change from prior study of 7/25/2018.    Signer Name: Audie Carmichael MD   Signed: 5/24/2021 6:49 AM   Workstation Name: [id number]    [facility]    XR Chest 1 View [[id number]] Collected: 05/24/21 0518     Updated: 05/24/21 0521    Narrative:      CR Chest 1 Vw    INDICATION:   Chest pain.     COMPARISON:    4/7/2021    FINDINGS:  Single portable AP view(s) of the chest.  The heart and mediastinal contours are normal. The lungs are clear. No pneumothorax or pleural effusion.      Impression:      No acute cardiopulmonary findings. Left base infiltrate seen on the previous examination has resolved.    Signer Name: Cortez Griffin MD   Signed: 5/24/2021 5:18 AM   Workstation Name: [email or web address]    [facility]          Test Results Pending at Discharge:  none    Discharge Disposition/Discharge Medications/Discharge Appointments     [place] [facility] [place]    Condition at Discharge:  Stable     DME Prescribed at Discharge:  Walker, wheelchair, bedside commode, hospital bed    Discharge Diet:  diabetic diet    Discharge Activity:  as tolerated with assistance at all times to prevent falls.     Code Status While Inpatient:  Code Status and Medical Interventions:   Ordered at: 06/05/21 0936     Limited Support to NOT Include:     Intubation     Code Status:    No CPR     Medical Interventions (Level of Support Prior to Arrest):    Limited       Discharge Medications:     Discharge Medications      New Medications      Instructions Start Date   metoprolol tartrate 25 MG tablet  Commonly known as: LOPRESSOR   12.5 mg, Oral, Every 12 Hours Scheduled         Continue These Medications      Instructions Start Date   aspirin 81 MG tablet   81 mg, Oral, Daily      atorvastatin 80 MG tablet  Commonly known as: LIPITOR   80 mg, Oral, Nightly      clopidogrel 75 MG tablet  Commonly known as: PLAVIX   75 mg, Oral, Daily      ferrous sulfate 325 (65 FE) MG tablet   325 mg, Oral, 2 times daily      finasteride 5 MG tablet  Commonly known as: PROSCAR   5 mg, Oral, Daily      isosorbide mononitrate 60 MG 24 hr tablet  Commonly known as: IMDUR   60 mg, Oral, Every Morning      lisinopril 20 MG tablet  Commonly known as: PRINIVIL,ZESTRIL   20 mg, Oral, Daily      loratadine 10 MG tablet  Commonly known as: CLARITIN   10 mg, Oral, Daily      Namzaric 28-10 MG capsule sustained-release 24 hr  Generic drug: Memantine HCl-Donepezil HCl   1 capsule, Oral, Daily      nitroglycerin 0.4 MG SL tablet  Commonly known as: NITROSTAT   1 under the tongue as needed for angina, may repeat q5mins for up three doses      pantoprazole 40 MG EC tablet  Commonly known as: PROTONIX   40 mg, Oral, Daily         Stop These Medications    amLODIPine 5 MG tablet  Commonly known as: NORVASC     donepezil 10 MG tablet  Commonly known as: ARICEPT            Discharge Appointments:  Your Scheduled Appointments    Jun 29, 2021 10:00 AM  Follow Up with Delbert Gupta PA-C  [facility] ([facility])  [place] [address] [phone]   -Bring photo ID, insurance card, and list of medications to appointment  -If testing was completed outside of [facility] then patient must bring images on a disc  -Copay will be collected at time of  appointment  -Established patients should arrive 10 minutes prior to appointment     Jul 16, 2021  1:10 PM  Follow Up with Nav Tate MD  [facility] ([facility]) [place] [address] [phone]   Arrive 15 minutes prior to appointment.            Additional Instructions for the Follow-ups that You Need to Schedule     Ambulatory Referral to Home Health   As directed      Face to Face Visit Date: 6/5/2021    Follow-up provider for Plan of Care?: I treated the patient in an acute care facility and will not continue treatment after discharge.    Follow-up provider: VIVI ORTIZ [[id number]]    Reason/Clinical Findings: falss, functional decline    Describe mobility limitations that make leaving home difficult: falls, gait instability, generalized weakness. needs strengthening, gait training, ADL's, home safety eval, equipements    Nursing/Therapeutic Services Requested: Physical Therapy Occupational Therapy    PT orders: Therapeutic exercise Gait Training Transfer training Strengthening Home safety assessment    Weight Bearing Status: As Tolerated    Occupational orders: Activities of daily living Strengthening Cognition Home safety assessment Fine motor    Frequency: 1 Week 1         Ambulatory referral to Home Hospice   As directed      Please evaluate Fidencio Luciano for admission to Hospice.    Patient/Family aware of referral: yes    Services to provide: assessment of dementia, goals of care discussion and possible transition to home hospice for advanced dementia and functional decline    Physician to follow patient's care (the person listed here will be responsible for signing ongoing orders): Sunita Ortiz    Referring Provider Call-back Phone Number: [phone]    Requested Start of Care Date: 6/7/2021    Special Instructions: none    Order Comments: Please evaluate Fidencio Luciano for admission to Hospice.  Patient/Family aware of  referral: yes  Services to provide: assessment of dementia, goals of care discussion and possible transition to home hospice for advanced dementia and functional decline  Physician to follow patient's care (the person listed here will be responsible for signing ongoing orders): Sunita Ortiz  Referring Provider Call-back Phone Number: [phone]  Requested Start of Care Date: 6/7/2021  Special Instructions: none          Discharge Follow-up with PCP   As directed       Currently Documented PCP:    Camila Ortiz APRN    PCP Phone Number:    [phone]     Follow Up Details: 1 week- dementia               Katayoun Behbahani, MD  Hospitalist Service -- [facility]       06/05/21  10:05 EDT    Discharge Time: < 30 minutes

## 2021-06-05 NOTE — PLAN OF CARE
Problem: Fall Injury Risk  Goal: Absence of Fall and Fall-Related Injury  Outcome: Ongoing, Progressing  Intervention: Identify and Manage Contributors to Fall Injury Risk  Recent Flowsheet Documentation  Taken 6/5/2021 0820 by Gigi Geller RN  Medication Review/Management: medications reviewed  Intervention: Promote Injury-Free Environment  Recent Flowsheet Documentation  Taken 6/5/2021 0820 by Gigi Geller RN  Safety Promotion/Fall Prevention:   safety round/check completed   fall prevention program maintained     Problem: Adult Inpatient Plan of Care  Goal: Plan of Care Review  Outcome: Ongoing, Progressing  Goal: Patient-Specific Goal (Individualized)  Outcome: Ongoing, Progressing  Goal: Absence of Hospital-Acquired Illness or Injury  Outcome: Ongoing, Progressing  Intervention: Identify and Manage Fall Risk  Recent Flowsheet Documentation  Taken 6/5/2021 0820 by Gigi Geller RN  Safety Promotion/Fall Prevention:   safety round/check completed   fall prevention program maintained  Intervention: Prevent Skin Injury  Recent Flowsheet Documentation  Taken 6/5/2021 0820 by Gigi Geller RN  Body Position: position changed independently  Intervention: Prevent and Manage VTE (venous thromboembolism) Risk  Recent Flowsheet Documentation  Taken 6/5/2021 0820 by Gigi Geller RN  VTE Prevention/Management: (see orders ) --  Goal: Optimal Comfort and Wellbeing  Outcome: Ongoing, Progressing  Intervention: Provide Person-Centered Care  Recent Flowsheet Documentation  Taken 6/5/2021 0820 by Gigi Geller RN  Trust Relationship/Rapport:   care explained   thoughts/feelings acknowledged  Goal: Readiness for Transition of Care  Outcome: Ongoing, Progressing     Problem: Chest Pain  Goal: Resolution of Chest Pain Symptoms  Outcome: Ongoing, Progressing  Intervention: Manage Acute Chest Pain  Recent Flowsheet Documentation  Taken 6/5/2021 0820 by Gigi Geller RN  Chest Pain Intervention: cardiac  monitoring continued   Goal Outcome Evaluation:

## 2021-06-05 NOTE — NURSING NOTE
Spoke to Rusyt at the on call center for [employer]  [phone]. Rusty stated someone would call be back shortly.      Spoke to Madalyn with the [facility] [phone] who stated she would have someone call me back.    [facility]a [facility] [phone] who stated she would have someone call back with patient information.    Spoke to Magda with [facility] who stated it would be sometime next week before he could be seen because all the weekend referral spots are taken. Fax # [phone]    [facility] and gave information concerning D[facility]. Jamie will deliver to home address. Fax [phone]      Spoke with Lorin from [employer] . Fax [phone].

## 2021-06-05 NOTE — DISCHARGE INSTR - APPOINTMENTS
[facility]  Office  Closed  On  The  Weekend  Will call on  Monday  And  Get apt and  Call pt  Lead  Nurse grabiel  Will set  Up  Home  Health  Pt  Uses  [facility]

## 2021-06-05 NOTE — NURSING NOTE
Spoke with patient family and they states that it would be probably be after lunch before they will be here.

## 2021-06-05 NOTE — PLAN OF CARE
Problem: Fall Injury Risk  Goal: Absence of Fall and Fall-Related Injury  6/5/2021 0956 by Gigi Geller RN  Outcome: Met  6/5/2021 0858 by Gigi Geller RN  Outcome: Ongoing, Progressing  Intervention: Identify and Manage Contributors to Fall Injury Risk  Recent Flowsheet Documentation  Taken 6/5/2021 0820 by Gigi Geller RN  Medication Review/Management: medications reviewed  Intervention: Promote Injury-Free Environment  Recent Flowsheet Documentation  Taken 6/5/2021 0820 by Gigi Geller RN  Safety Promotion/Fall Prevention:   safety round/check completed   fall prevention program maintained     Problem: Adult Inpatient Plan of Care  Goal: Plan of Care Review  6/5/2021 0956 by Gigi Geller RN  Outcome: Met  6/5/2021 0858 by Gigi Geller RN  Outcome: Ongoing, Progressing  Goal: Patient-Specific Goal (Individualized)  6/5/2021 0956 by Gigi Geller RN  Outcome: Met  6/5/2021 0858 by Gigi Geller RN  Outcome: Ongoing, Progressing  Goal: Absence of Hospital-Acquired Illness or Injury  6/5/2021 0956 by Gigi Geller RN  Outcome: Met  6/5/2021 0858 by Gigi Geller RN  Outcome: Ongoing, Progressing  Intervention: Identify and Manage Fall Risk  Recent Flowsheet Documentation  Taken 6/5/2021 0820 by Gigi Geller RN  Safety Promotion/Fall Prevention:   safety round/check completed   fall prevention program maintained  Intervention: Prevent Skin Injury  Recent Flowsheet Documentation  Taken 6/5/2021 0820 by Gigi Geller RN  Body Position: position changed independently  Intervention: Prevent and Manage VTE (venous thromboembolism) Risk  Recent Flowsheet Documentation  Taken 6/5/2021 0820 by Gigi Gelelr RN  VTE Prevention/Management: (see orders ) --  Goal: Optimal Comfort and Wellbeing  6/5/2021 0956 by Gigi Geller RN  Outcome: Met  6/5/2021 0858 by Gigi Geller RN  Outcome: Ongoing, Progressing  Intervention: Provide Person-Centered Care  Recent Flowsheet  Documentation  Taken 6/5/2021 0820 by Gigi Geller, RN  Trust Relationship/Rapport:   care explained   thoughts/feelings acknowledged  Goal: Readiness for Transition of Care  6/5/2021 0956 by Gigi Geller, RN  Outcome: Met  6/5/2021 0858 by Gigi Geller, DIOR  Outcome: Ongoing, Progressing     Problem: Chest Pain  Goal: Resolution of Chest Pain Symptoms  6/5/2021 0956 by Gigi Geller RN  Outcome: Met  6/5/2021 0858 by Gigi Geller RN  Outcome: Ongoing, Progressing  Intervention: Manage Acute Chest Pain  Recent Flowsheet Documentation  Taken 6/5/2021 0820 by Gigi Geller, RN  Chest Pain Intervention: cardiac monitoring continued

## 2021-06-06 ENCOUNTER — READMISSION MANAGEMENT (OUTPATIENT)
Dept: CALL CENTER | Facility: HOSPITAL | Age: 76
End: 2021-06-06

## 2021-06-06 NOTE — OUTREACH NOTE
Prep Survey      Responses   [facility] facility patient discharged from?  [facility]   Is LACE score < 7 ?  No   Emergency Room discharge w/ pulse ox?  No   Eligibility  Readm Mgmt   Discharge diagnosis  Debility and falls  Recurrent dehydration due to poor po intake   Does the patient have one of the following disease processes/diagnoses(primary or secondary)?  Other   Does the patient have Home health ordered?  Yes   What is the Home health agency?   [facility]   Is there a DME ordered?  No   General alerts for this patient  has dementia   Prep survey completed?  Yes          Keiry Kolb RN

## 2021-06-06 NOTE — PAYOR COMM NOTE
"[facility]   MOHSEN RAMIREZ  PHONE  [phone]  FAX  [phone]  NPI:  [id number]    PATIENT D/C 6/5/2021    Mustapha Fidencio CHELO (75 y.o. Male)     Date of Birth Social Security Number Address Home Phone MRN    1945  [address] [facility] [address] [phone] [id number]    Restoration Marital Status          Non-Baptism        Admission Date Admission Type Admitting Provider Attending Provider Department, Room/Bed    5/24/21 Emergency Myron Damon MD  [facility] 3 SOUTH, 3309/2S    Discharge Date Discharge Disposition Discharge Destination        6/5/2021 Home-Health Care Svc              Attending Provider: (none)   Allergies: No Known Allergies    Isolation: None   Infection: COVID (History) (04/20/21)   Code Status: Prior    Ht: 180.3 cm (71\")   Wt: 62.5 kg (137 lb 12.8 oz)    Admission Cmt: None   Principal Problem: Chest pain [R07.9]                 Active Insurance as of 5/24/2021     Primary Coverage     Payor Plan Insurance Group Employer/Plan Group    ANTHEM MEDICARE REPLACEMENT ANTHEM MEDICARE ADVANTAGE [id number]     Payor Plan Address Payor Plan Phone Number Payor Plan Fax Number Effective Dates    [address] [phone]  1/1/2021 - None Entered    [facility] [address]       Subscriber Name Subscriber Birth Date Member ID       FIDNECIO LUCIANO 1945 [id number]                 Emergency Contacts      (Rel.) Home Phone Work Phone Mobile Phone    Angela Luciano (Spouse) [phone]    RickyDylan (Son) [phone] -- [phone]    Sofie Webber (Daughter) [phone] -- --    Zhane Conner (Daughter) [phone] -- --    Aries Ortega (Son) [phone] -- --              "

## 2021-06-07 ENCOUNTER — TELEPHONE (OUTPATIENT)
Dept: TELEMETRY | Facility: HOSPITAL | Age: 76
End: 2021-06-07

## 2021-06-07 LAB
ALBUMIN SERPL-MCNC: 3.87 G/DL (ref 3.5–5.2)
ALBUMIN/GLOB SERPL: 1.3 G/DL
ALP SERPL-CCNC: 114 U/L (ref 39–117)
ALT SERPL W P-5'-P-CCNC: 49 U/L (ref 1–41)
ANION GAP SERPL CALCULATED.3IONS-SCNC: 8.2 MMOL/L (ref 5–15)
AST SERPL-CCNC: 34 U/L (ref 1–40)
BASOPHILS # BLD AUTO: 0.08 10*3/MM3 (ref 0–0.2)
BASOPHILS NFR BLD AUTO: 1 % (ref 0–1.5)
BILIRUB SERPL-MCNC: 0.3 MG/DL (ref 0–1.2)
BUN SERPL-MCNC: 14 MG/DL (ref 8–23)
BUN/CREAT SERPL: 17.1 (ref 7–25)
CALCIUM SPEC-SCNC: 9 MG/DL (ref 8.6–10.5)
CHLORIDE SERPL-SCNC: 109 MMOL/L (ref 98–107)
CO2 SERPL-SCNC: 25.8 MMOL/L (ref 22–29)
CREAT SERPL-MCNC: 0.82 MG/DL (ref 0.76–1.27)
DEPRECATED RDW RBC AUTO: 46.7 FL (ref 37–54)
EOSINOPHIL # BLD AUTO: 0.37 10*3/MM3 (ref 0–0.4)
EOSINOPHIL NFR BLD AUTO: 4.6 % (ref 0.3–6.2)
ERYTHROCYTE [DISTWIDTH] IN BLOOD BY AUTOMATED COUNT: 13.2 % (ref 12.3–15.4)
GFR SERPL CREATININE-BSD FRML MDRD: 92 ML/MIN/1.73
GLOBULIN UR ELPH-MCNC: 2.9 GM/DL
GLUCOSE SERPL-MCNC: 91 MG/DL (ref 65–99)
HCT VFR BLD AUTO: 38.6 % (ref 37.5–51)
HGB BLD-MCNC: 12.7 G/DL (ref 13–17.7)
IMM GRANULOCYTES # BLD AUTO: 0.02 10*3/MM3 (ref 0–0.05)
IMM GRANULOCYTES NFR BLD AUTO: 0.2 % (ref 0–0.5)
LYMPHOCYTES # BLD AUTO: 1.81 10*3/MM3 (ref 0.7–3.1)
LYMPHOCYTES NFR BLD AUTO: 22.5 % (ref 19.6–45.3)
MCH RBC QN AUTO: 32 PG (ref 26.6–33)
MCHC RBC AUTO-ENTMCNC: 32.9 G/DL (ref 31.5–35.7)
MCV RBC AUTO: 97.2 FL (ref 79–97)
MONOCYTES # BLD AUTO: 0.76 10*3/MM3 (ref 0.1–0.9)
MONOCYTES NFR BLD AUTO: 9.4 % (ref 5–12)
NEUTROPHILS NFR BLD AUTO: 5.02 10*3/MM3 (ref 1.7–7)
NEUTROPHILS NFR BLD AUTO: 62.3 % (ref 42.7–76)
NRBC BLD AUTO-RTO: 0 /100 WBC (ref 0–0.2)
PLATELET # BLD AUTO: 232 10*3/MM3 (ref 140–450)
PMV BLD AUTO: 10.5 FL (ref 6–12)
POTASSIUM SERPL-SCNC: 3.8 MMOL/L (ref 3.5–5.2)
PROT SERPL-MCNC: 6.8 G/DL (ref 6–8.5)
RBC # BLD AUTO: 3.97 10*6/MM3 (ref 4.14–5.8)
SODIUM SERPL-SCNC: 143 MMOL/L (ref 136–145)
TROPONIN T SERPL-MCNC: <0.01 NG/ML (ref 0–0.03)
WBC # BLD AUTO: 8.06 10*3/MM3 (ref 3.4–10.8)

## 2021-06-07 PROCEDURE — 93010 ELECTROCARDIOGRAM REPORT: CPT | Performed by: INTERNAL MEDICINE

## 2021-06-07 PROCEDURE — 80053 COMPREHEN METABOLIC PANEL: CPT | Performed by: EMERGENCY MEDICINE

## 2021-06-07 PROCEDURE — 93005 ELECTROCARDIOGRAM TRACING: CPT | Performed by: EMERGENCY MEDICINE

## 2021-06-07 PROCEDURE — 84484 ASSAY OF TROPONIN QUANT: CPT | Performed by: EMERGENCY MEDICINE

## 2021-06-07 PROCEDURE — 36415 COLL VENOUS BLD VENIPUNCTURE: CPT

## 2021-06-07 PROCEDURE — 85025 COMPLETE CBC W/AUTO DIFF WBC: CPT | Performed by: EMERGENCY MEDICINE

## 2021-06-07 PROCEDURE — 99283 EMERGENCY DEPT VISIT LOW MDM: CPT

## 2021-06-07 NOTE — CASE MANAGEMENT/SOCIAL WORK
Discharge Planning Assessment  [place]     Patient Name: Fidencio Luciano  MRN: [id number]  Today's Date: 6/7/2021    Admit Date: 5/24/2021    Discharge Plan     Row Name 06/07/21 0926       Plan    Final Discharge Disposition Code  06 - home with home health care    Final Note  Pt discharged home over weekend with [facility] referral arranged via RN.        JOSE UgaldeW

## 2021-06-07 NOTE — CASE MANAGEMENT/SOCIAL WORK
Discharge Planning Assessment   [place]     Patient Name: Fidencio Luciano  MRN: [id number]  Today's Date: 6/7/2021    Admit Date: 5/24/2021        Discharge Plan     Row Name 06/07/21 0926       Plan    Final Discharge Disposition Code  06 - home with home health care    Final Note  Pt discharged home over weekend with [facility] referral arranged via RN.        Continued Care and Services - Discharged on 6/5/2021 Admission date: 5/24/2021 - Discharge disposition[place] [facility]    [place]ination     Service Provider Request Status Selected Services Address Phone Fax Patient Preferred    [facility]  Pending - Request Sent N/A [address], [place] [address] [phone] --                  JOSE UgaldeW

## 2021-06-08 ENCOUNTER — HOSPITAL ENCOUNTER (EMERGENCY)
Facility: HOSPITAL | Age: 76
Discharge: HOME | End: 2021-06-08
Attending: EMERGENCY MEDICINE | Admitting: EMERGENCY MEDICINE

## 2021-06-08 ENCOUNTER — READMISSION MANAGEMENT (OUTPATIENT)
Dept: CALL CENTER | Facility: HOSPITAL | Age: 76
End: 2021-06-08

## 2021-06-08 VITALS
OXYGEN SATURATION: 100 % | TEMPERATURE: 97.5 F | SYSTOLIC BLOOD PRESSURE: 142 MMHG | HEIGHT: 71 IN | DIASTOLIC BLOOD PRESSURE: 60 MMHG | WEIGHT: 138 LBS | BODY MASS INDEX: 19.32 KG/M2 | HEART RATE: 50 BPM | RESPIRATION RATE: 18 BRPM

## 2021-06-08 DIAGNOSIS — R07.9 NONSPECIFIC CHEST PAIN: Primary | ICD-10-CM

## 2021-06-08 LAB
HOLD SPECIMEN: NORMAL
HOLD SPECIMEN: NORMAL
QT INTERVAL: 412 MS
QT INTERVAL: 440 MS
QTC INTERVAL: 405 MS
QTC INTERVAL: 407 MS
TROPONIN T SERPL-MCNC: <0.01 NG/ML (ref 0–0.03)
WHOLE BLOOD HOLD SPECIMEN: NORMAL

## 2021-06-08 PROCEDURE — 93005 ELECTROCARDIOGRAM TRACING: CPT | Performed by: EMERGENCY MEDICINE

## 2021-06-08 PROCEDURE — 93010 ELECTROCARDIOGRAM REPORT: CPT | Performed by: INTERNAL MEDICINE

## 2021-06-08 PROCEDURE — 84484 ASSAY OF TROPONIN QUANT: CPT | Performed by: EMERGENCY MEDICINE

## 2021-06-08 RX ORDER — ASPIRIN 81 MG/1
324 TABLET, CHEWABLE ORAL ONCE
Status: DISCONTINUED | OUTPATIENT
Start: 2021-06-08 | End: 2021-06-08 | Stop reason: HOSPADM

## 2021-06-08 NOTE — ED PROVIDER NOTES
Subjective     History provided by:  Patient   used: No    Chest Pain  Pain location:  Epigastric  Pain quality: aching and dull    Pain radiates to:  Does not radiate  Pain severity:  Mild  Onset quality:  Sudden  Timing:  Rare  Progression:  Resolved  Chronicity:  New  Context: at rest    Context: not breathing, not drug use, not eating, not intercourse, not lifting, not movement, not raising an arm, not stress and not trauma    Relieved by:  Nothing  Worsened by:  Nothing  Ineffective treatments:  None tried  Associated symptoms: heartburn    Associated symptoms: no abdominal pain, no AICD problem, no altered mental status, no anorexia, no anxiety, no back pain, no claudication, no cough, no diaphoresis, no dizziness, no dysphagia, no fatigue, no fever, no headache, no lower extremity edema, no nausea, no near-syncope, no numbness, no orthopnea, no palpitations, no PND, no shortness of breath, no syncope, no vomiting and no weakness    Risk factors: coronary artery disease, diabetes mellitus, high cholesterol, hypertension and male sex    Risk factors: no aortic disease, no birth control, no Lilian-Danlos syndrome, no immobilization, no Marfan's syndrome, not obese, no prior DVT/PE, no smoking and no surgery        Review of Systems   Constitutional: Negative for activity change, appetite change, chills, diaphoresis, fatigue and fever.   HENT: Negative for congestion, ear pain, sore throat and trouble swallowing.    Eyes: Negative for redness.   Respiratory: Negative for cough, chest tightness, shortness of breath and wheezing.    Cardiovascular: Positive for chest pain. Negative for palpitations, orthopnea, claudication, leg swelling, syncope, PND and near-syncope.   Gastrointestinal: Positive for heartburn. Negative for abdominal pain, anorexia, diarrhea, nausea and vomiting.   Genitourinary: Negative for dysuria and urgency.   Musculoskeletal: Negative for arthralgias, back pain, myalgias  and neck pain.   Skin: Negative for pallor, rash and wound.   Neurological: Negative for dizziness, speech difficulty, weakness, numbness and headaches.   Psychiatric/Behavioral: Negative for agitation, behavioral problems, confusion and decreased concentration.   All other systems reviewed and are negative.      Past Medical History:   Diagnosis Date   • BPH (benign prostatic hyperplasia)    • Bradycardia     Positive tilt table testing 07/2016   • Coronary artery disease    • Diabetes mellitus (CMS/HCC)    • Diverticulosis    • Elevated cholesterol    • Gastric ulcer with hemorrhage    • Gastroesophageal reflux disease 1/26/2021   • History of transfusion    • Hyperlipidemia    • Hypertension    • Psoriasis        No Known Allergies    Past Surgical History:   Procedure Laterality Date   • BACK SURGERY     • CARDIAC CATHETERIZATION N/A 9/20/2016    Procedure: Left Heart Cath;  Surgeon: Giovanni Buenrostro MD;  Location:  [facility];  Service:    • CARDIAC CATHETERIZATION N/A 10/4/2016    Procedure: Left Heart Cath;  Surgeon: Bharathi Andrew MD;  [facility]  [facility];  Service:    • CARDIAC CATHETERIZATION N/A 5/9/2017    Procedure: Left Heart Cath;  Surgeon: Giovanni Buenrostro MD;  Location:  [facility];  Service:    • CARDIAC CATHETERIZATION N/A 5/9/2017    Procedure: ERR;  Surgeon: Giovanni Buenrostro MD;  Location:  [facility];  Service:    • CARDIAC CATHETERIZATION N/A 11/8/2019    Procedure: Left Heart Cath;  Surgeon: Abraham Oviedo MD;  Location:  [facility];  Service: Cardiology   • CARDIAC CATHETERIZATION N/A 12/18/2019    Procedure: Coronary angiography;  Surgeon: Jay Barney IV, MD;  Location:  [facility];  Service: Cardiovascular   • CHOLECYSTECTOMY     • COLONOSCOPY  11/13/2015    Dr. Martinez- internal hemorrhoids, sigmoid diverticulosis   • COLONOSCOPY N/A 8/17/2018    Procedure:  COLONOSCOPY CPT CODE: 31151;  Surgeon: Gigi Geronimo MD;  Location:  [facility] OR;  Service: Gastroenterology   • CORONARY ANGIOPLASTY WITH STENT PLACEMENT     • ENDOSCOPY N/A 2018    Procedure: ESOPHAGOGASTRODUODENOSCOPY WITH BIOPSY CPT CODE: 89600;  Surgeon: Gigi Geronimo MD;  Location:  [facility] OR;  Service: Gastroenterology   • ENDOSCOPY N/A 2021    Procedure: ESOPHAGOGASTRODUODENOSCOPY;  Surgeon: Geno Vernon MD;  Location:  [facility];  Service: Gastroenterology;  Laterality: N/A;   • INTERVENTIONAL RADIOLOGY PROCEDURE N/A 2019    Procedure: Coil Embolization of septal to pulmonary artery fistuala.;  Surgeon: Jay Barney IV, MD;  Location:  [facility];  Service: Cardiovascular   • CA RT/LT HEART CATHETERS N/A 2017    Procedure: Percutaneous Coronary Intervention;  Surgeon: Lincoln De Los Santos MD;  Location:  [facility];  Service: Cardiology   • STOMACH SURGERY      FUSION OF BLEEDING ULCER   • UPPER GASTROINTESTINAL ENDOSCOPY  2015    Dr. Martinez- mild gastritis       Family History   Problem Relation Age of Onset   • Sick sinus syndrome Mother    • Heart failure Mother    • Diabetes Mother    • Liver cancer Father        Social History     Socioeconomic History   • Marital status:      Spouse name: Not on file   • Number of children: Not on file   • Years of education: Not on file   • Highest education level: Not on file   Tobacco Use   • Smoking status: Former Smoker     Packs/day: 3.00     Years: 40.00     Pack years: 120.00     Types: Cigarettes     Quit date:      Years since quittin.4   • Smokeless tobacco: Former User     Quit date: 1986   Vaping Use   • Vaping Use: Never used   Substance and Sexual Activity   • Alcohol use: No   • Drug use: No   • Sexual activity: Defer           Objective   Physical Exam  Vitals and nursing note reviewed.   Constitutional:       General: He is not in acute  distress.     Appearance: Normal appearance. He is well-developed. He is not toxic-appearing or diaphoretic.   HENT:      Head: Normocephalic and atraumatic.      Right Ear: External ear normal.      Left Ear: External ear normal.      Nose: Nose normal.      Mouth/Throat:      Pharynx: No oropharyngeal exudate.      Tonsils: No tonsillar exudate.   Eyes:      General: Lids are normal.      Conjunctiva/sclera: Conjunctivae normal.      Pupils: Pupils are equal, round, and reactive to light.   Neck:      Thyroid: No thyromegaly.   Cardiovascular:      Rate and Rhythm: Normal rate and regular rhythm.      Pulses: Normal pulses.      Heart sounds: Normal heart sounds, S1 normal and S2 normal.   Pulmonary:      Effort: Pulmonary effort is normal. No tachypnea or respiratory distress.      Breath sounds: Normal breath sounds. No decreased breath sounds, wheezing or rales.   Chest:      Chest wall: No tenderness.   Abdominal:      General: Bowel sounds are normal. There is no distension.      Palpations: Abdomen is soft.      Tenderness: There is no abdominal tenderness. There is no guarding or rebound.   Musculoskeletal:         General: No tenderness or deformity. Normal range of motion.      Cervical back: Full passive range of motion without pain, normal range of motion and neck supple.   Lymphadenopathy:      Cervical: No cervical adenopathy.   Skin:     General: Skin is warm and dry.      Coloration: Skin is not pale.      Findings: No erythema or rash.   Neurological:      Mental Status: He is alert and oriented to person, place, and time.      GCS: GCS eye subscore is 4. GCS verbal subscore is 5. GCS motor subscore is 6.      Cranial Nerves: No cranial nerve deficit.      Sensory: No sensory deficit.   Psychiatric:         Speech: Speech normal.         Behavior: Behavior normal.         Thought Content: Thought content normal.         Judgment: Judgment normal.         Procedures           ED Course  ED Course as  of Jun 08 0827   Tue Jun 08, 2021   0041 Sinus bradycardia  T wave abnormality, consider inferior ischemia  Abnormal ECG  When compared with ECG of 24-MAY-2021 16:31,  T wave inversion now evident in Inferior leads  Vent. rate 59 BPM  WI interval 186 ms  QRS duration 96 ms  QT/QTcB 412/407 ms  P-R-T axes 40 72 -27   ECG 12 Lead [ES]   0826 Sinus bradycardia  Otherwise normal ECG  When compared with ECG of 07-JUN-2021 20:11, (Unconfirmed)  T wave inversion no longer evident in Inferior leads  Vent. rate 51 BPM  WI interval 198 ms  QRS duration 90 ms  QT/QTcB 440/405 ms  P-R-T axes 30 62 70   ECG 12 Lead [ES]   0827 Patient reports that his chest pain resolved prior to coming to the emergency department for further evaluation and treatment.  He has requested to be immediately discharged at this time, and will follow up with his established cardiologist.  Patient instructed to immediately return the emergency department with any worsening symptoms.    [ES]      ED Course User Index  [ES] Jaswinder Braun MD                                           MDM  Number of Diagnoses or Management Options  Nonspecific chest pain: new and requires workup     Amount and/or Complexity of Data Reviewed  Clinical lab tests: reviewed and ordered  Tests in the radiology section of CPT®: ordered and reviewed  Tests in the medicine section of CPT®: reviewed and ordered  Independent visualization of images, tracings, or specimens: yes    Risk of Complications, Morbidity, and/or Mortality  Presenting problems: moderate  Diagnostic procedures: moderate  Management options: moderate    Patient Progress  Patient progress: stable      Final diagnoses:   Nonspecific chest pain       ED Disposition  ED Disposition     ED Disposition Condition Comment    Discharge Stable           Camila Ortiz, APRN  [address]  [facility] [address]  [phone]    Schedule an appointment as soon as possible for a visit in 1  day  REEVALUATE    Giovanni Buenrostro MD  [address] [place] [address]  [phone]    Schedule an appointment as soon as possible for a visit in 1 day  EVALUATE         Medication List      No changes were made to your prescriptions during this visit.          Jaswinder Braun MD  06/08/21 0896

## 2021-06-08 NOTE — ED NOTES
Pt reassessed at this time, pt has no new complaints. Apologized to pt for wait times. Pt has NADN at this time. Will continue to monitor pt.      Tova Osuna RN  06/07/21 7146

## 2021-06-08 NOTE — ED NOTES
EKG performed by me @ 2011 and was shown to Dr. Spring @ 2014.     Dieter Samuels  06/07/21 2018

## 2021-06-08 NOTE — ED NOTES
Pt up for dc, not yet ready for dc per Dr. Braun. He need to speak with pt first.      Lashay Santana, RN  06/08/21 0253

## 2021-06-08 NOTE — OUTREACH NOTE
Medical Week 1 Survey      Responses   [facility] patient discharged from?  [place]   Does the patient have one of the following disease processes/diagnoses(primary or secondary)?  Other   Week 1 attempt successful?  Yes   Call start time  0855   Rescheduled  Rescheduled-pt requested   Call end time  0857   General alerts for this patient  has dementia   Discharge diagnosis  Debility and falls  Recurrent dehydration due to poor po intake   Wrap up additional comments  Wife state pt. is sleeping-rescheduled          Parisa Shaw RN

## 2021-06-09 ENCOUNTER — READMISSION MANAGEMENT (OUTPATIENT)
Dept: CALL CENTER | Facility: HOSPITAL | Age: 76
End: 2021-06-09

## 2021-06-09 NOTE — OUTREACH NOTE
Medical Week 1 Survey      Responses   [facility] patient discharged from?  [place]   Does the patient have one of the following disease processes/diagnoses(primary or secondary)?  Other   Week 1 attempt successful?  Yes   Call start time  1024   Call end time  1028   General alerts for this patient  has dementia   Is patient permission given to speak with other caregiver?  Yes   List who call center can speak with  wife Angela   Person spoke with today (if not patient) and relationship  Angela   Meds reviewed with patient/caregiver?  Yes   Is the patient having any side effects they believe may be caused by any medication additions or changes?  No   Does the patient have all medications ordered at discharge?  Yes   Is the patient taking all medications as directed (includes completed medication regime)?  Yes   Does the patient have a primary care provider?   Yes   Does the patient have an appointment with their PCP within 7 days of discharge?  Yes   Comments regarding PCP  Camila Ortiz Monday 6/14   Has the patient kept scheduled appointments due by today?  N/A   What is the Home health agency?   [facility]   Has home health visited the patient within 72 hours of discharge?  Yes   Has all DME been delivered?  Yes   Psychosocial issues?  No   Psychosocial comments  Has dementia   Comments  Bed, walker, bedside potty, wheel chair all delivered, has dementia HH and PT  comes    Did the patient receive a copy of their discharge instructions?  Yes   Nursing interventions  Reviewed instructions with patient   What is the patient's perception of their health status since discharge?  Improving   Is the patient/caregiver able to teach back signs and symptoms related to disease process for when to call PCP?  Yes   Is the patient/caregiver able to teach back signs and symptoms related to disease process for when to call 911?  Yes   Is the patient/caregiver able to teach back the hierarchy of who to call/visit for  symptoms/problems? PCP, Specialist, Home health nurse, Urgent Care, ED, 911  Yes   If the patient is a current smoker, are they able to teach back resources for cessation?  Not a smoker   Week 1 call completed?  Yes   Wrap up additional comments  Wife states, he is eating ok, he has dementia, he is sleeping ok, HH and PT and OT comes, no new issues.           Elva Colbert RN

## 2021-06-15 ENCOUNTER — READMISSION MANAGEMENT (OUTPATIENT)
Dept: CALL CENTER | Facility: HOSPITAL | Age: 76
End: 2021-06-15

## 2021-06-15 NOTE — OUTREACH NOTE
Medical Week 2 Survey      Responses   [facility] patient discharged from?  [place]   Does the patient have one of the following disease processes/diagnoses(primary or secondary)?  Other   Week 2 attempt successful?  Yes   Call start time  1540   General alerts for this patient  has dementia   Discharge diagnosis  Debility and falls  Recurrent dehydration due to poor po intake   Call end time  1547   Person spoke with today (if not patient) and relationship  Angela   Meds reviewed with patient/caregiver?  Yes   Is the patient taking all medications as directed (includes completed medication regime)?  Yes   Comments regarding appointments  GI appt is on 6/16/21   Comments regarding PCP  6/14/21   Has the patient kept scheduled appointments due by today?  Yes   What is the Home health agency?   [facility]    What is the patient's perception of their health status since discharge?  New symptoms unrelated to diagnosis [BP yesterday at PCP office BP was 106/42. He also has a cough. ]   Week 2 Call Completed?  Yes          Mami Bender RN

## 2021-06-16 ENCOUNTER — OFFICE VISIT (OUTPATIENT)
Dept: GASTROENTEROLOGY | Facility: CLINIC | Age: 76
End: 2021-06-16

## 2021-06-16 VITALS
HEIGHT: 71 IN | HEART RATE: 56 BPM | SYSTOLIC BLOOD PRESSURE: 128 MMHG | BODY MASS INDEX: 19.6 KG/M2 | DIASTOLIC BLOOD PRESSURE: 57 MMHG | WEIGHT: 140 LBS

## 2021-06-16 DIAGNOSIS — R63.4 WEIGHT LOSS, ABNORMAL: Primary | ICD-10-CM

## 2021-06-16 DIAGNOSIS — D64.9 ANEMIA, UNSPECIFIED TYPE: ICD-10-CM

## 2021-06-16 DIAGNOSIS — R19.7 DIARRHEA, UNSPECIFIED TYPE: ICD-10-CM

## 2021-06-16 PROCEDURE — 99214 OFFICE O/P EST MOD 30 MIN: CPT | Performed by: INTERNAL MEDICINE

## 2021-06-16 RX ORDER — MEGESTROL ACETATE 125 MG/ML
625 SUSPENSION ORAL DAILY
Qty: 150 ML | Refills: 2 | Status: SHIPPED | OUTPATIENT
Start: 2021-06-16 | End: 2021-08-04 | Stop reason: SDUPTHER

## 2021-06-16 NOTE — PROGRESS NOTES
"Subjective   Fidencio Luciano is a 75 y.o. male who presents to the office today as a follow up appointment regarding Weight Loss      History of Present Illness:  The patient presents today for weight loss.  He has some lower abdominal pain and diarrhea per his wife.  However, when I asked the patient he did not respond.  She states he does not eat \" a whole lot\".  \"He has dementia and wants to lay around all the time.\"  He has been drinking 1 Ensure a day at home but currently they are out of Ensure.  His wife states his stool is loose but she has never seen his stool.  She does note that after he has a bowel movement that she has to clean the toilet..  He will have a bm after he eats almost every time..  He will occasionally have loose stool randomly.  There is associated urgency.  He will get up after eating and go directly to the toilet.  He has had a lot of weight loss.  She thinks this is because he does not go by himself to the kitchen to eat.  However, she does note that if she offers him food he will eat it.  For the most part, he will not eat unless she offers food to him.  She is concerned about his continued decline particularly mentally.  She states in his earlier years he never would lash out at her and was very gentle.  Now he will frequently lash out at her when she tries to get him out of the bed to move around.  She is worried about him lying in the bed most of the day.  He was recently hospitalized for dehydration because he was not drinking enough fluid throughout the day.  She is trying to offer him more fluid to keep him hydrated.  Looking back at the patient's record I noted that he was iron deficient with anemia.      Review of Systems:  Review of Systems   Constitutional: Positive for unexpected weight change. Negative for fever.   HENT: Positive for trouble swallowing.    Eyes: Negative.    Respiratory: Negative for chest tightness.    Cardiovascular: Negative for chest pain. "   Gastrointestinal: Positive for abdominal distention, diarrhea, nausea and vomiting. Negative for abdominal pain, anal bleeding, blood in stool and constipation.   Endocrine: Negative.    Genitourinary: Negative for difficulty urinating.   Musculoskeletal: Negative.    Skin: Negative.    Allergic/Immunologic: Negative.    Neurological: Positive for headaches.   Hematological: Bruises/bleeds easily.   Psychiatric/Behavioral: Negative.        Past Medical History:  Past Medical History:   Diagnosis Date   • BPH (benign prostatic hyperplasia)    • Bradycardia     Positive tilt table testing 07/2016   • Coronary artery disease    • Diabetes mellitus (CMS/HCC)    • Diverticulosis    • Elevated cholesterol    • Gastric ulcer with hemorrhage    • Gastroesophageal reflux disease 1/26/2021   • History of transfusion    • Hyperlipidemia    • Hypertension    • Psoriasis        Past Surgical History:  Past Surgical History:   Procedure Laterality Date   • BACK SURGERY     • CARDIAC CATHETERIZATION N/A 9/20/2016    Procedure: Left Heart Cath;  Surgeon: Giovanni Buenrostro MD;  Location: [facility] CATH INVASIVE LOCATION;  Service:    • CARDIAC CATHETERIZATION N/A 10/4/2016    Procedure: Left Heart Cath;  Surgeon: Bharathi Andrew MD;  Location: [facility] CATH INVASIVE LOCATION;  Service:    • CARDIAC CATHETERIZATION N/A 5/9/2017    Procedure: Left Heart Cath;  Surgeon: Giovanni Buenrostro MD;  [facility] CATH INVASIVE LOCATION;  Service:    • CARDIAC CATHETERIZATION N/A 5/9/2017    Procedure: ERR;  Surgeon: Giovanni Buenrostro MD;  Location: [facility] CATH INVASIVE LOCATION;  Service:    • CARDIAC CATHETERIZATION N/A 11/8/2019    Procedure: Left Heart Cath;  Surgeon: Abraham Oviedo MD;  Location: [facility] CATH INVASIVE LOCATION;  Service: Cardiology   • CARDIAC CATHETERIZATION N/A 12/18/2019    Procedure: Coronary angiography;  Surgeon: Jay Barney IV, MD;  Location:  [facility] CATH INVASIVE LOCATION;  Service: Cardiovascular   •  CHOLECYSTECTOMY     • COLONOSCOPY  2015    Dr. Martinez- internal hemorrhoids, sigmoid diverticulosis   • COLONOSCOPY N/A 2018    Procedure: COLONOSCOPY CPT CODE: 45492;  Surgeon: Gigi Geronimo MD;  Location: [facility] OR;  Service: Gastroenterology   • CORONARY ANGIOPLASTY WITH STENT PLACEMENT     • ENDOSCOPY N/A 2018    Procedure: ESOPHAGOGASTRODUODENOSCOPY WITH BIOPSY CPT CODE: 61005;  Surgeon: Gigi Geronimo MD;  Location: [facility] OR;  Service: Gastroenterology   • ENDOSCOPY N/A 2021    Procedure: ESOPHAGOGASTRODUODENOSCOPY;  Surgeon: Geno Vernon MD;  Location:  [facility] OR;  Service: Gastroenterology;  Laterality: N/A;   • INTERVENTIONAL RADIOLOGY PROCEDURE N/A 2019    Procedure: Coil Embolization of septal to pulmonary artery fistuala.;  Surgeon: Jay Barney IV, MD;  Location:  [facility] CATH INVASIVE LOCATION;  Service: Cardiovascular   • MI RT/LT HEART CATHETERS N/A 2017    Procedure: Percutaneous Coronary Intervention;  Surgeon: Lincoln De Los Santos MD;  [facility] CATH INVASIVE LOCATION;  Service: Cardiology   • STOMACH SURGERY      FUSION OF BLEEDING ULCER   • UPPER GASTROINTESTINAL ENDOSCOPY  2015    Dr. Martinez- mild gastritis       Family History:  Family History   Problem Relation Age of Onset   • Sick sinus syndrome Mother    • Heart failure Mother    • Diabetes Mother    • Liver cancer Father        Social History:  Social History     Socioeconomic History   • Marital status:      Spouse name: Not on file   • Number of children: Not on file   • Years of education: Not on file   • Highest education level: Not on file   Tobacco Use   • Smoking status: Former Smoker     Packs/day: 3.00     Years: 40.00     Pack years: 120.00     Types: Cigarettes     Quit date:      Years since quittin.4   • Smokeless tobacco: Former User     Quit date: 1986   Vaping Use   • Vaping Use: Never used   Substance and Sexual Activity   •  "Alcohol use: No   • Drug use: No   • Sexual activity: Defer       Current Medication List:    Current Outpatient Medications:   •  aspirin 81 MG tablet, Take 81 mg by mouth Daily., Disp: , Rfl:   •  atorvastatin (LIPITOR) 80 MG tablet, Take 1 tablet by mouth Every Night., Disp: 90 tablet, Rfl: 5  •  clopidogrel (PLAVIX) 75 MG tablet, Take 1 tablet by mouth Daily., Disp: 30 tablet, Rfl: 5  •  ferrous sulfate 325 (65 FE) MG tablet, Take 325 mg by mouth 2 (two) times a day., Disp: , Rfl:   •  finasteride (PROSCAR) 5 MG tablet, Take 1 tablet by mouth Daily., Disp: 9 tablet, Rfl: 3  •  isosorbide mononitrate (IMDUR) 60 MG 24 hr tablet, Take 1 tablet by mouth Every Morning., Disp: 30 tablet, Rfl: 11  •  loratadine (CLARITIN) 10 MG tablet, Take 10 mg by mouth Daily., Disp: , Rfl:   •  metoprolol tartrate (LOPRESSOR) 25 MG tablet, Take 0.5 tablets by mouth Every 12 (Twelve) Hours for 30 days., Disp: 30 tablet, Rfl: 1  •  nitroglycerin (NITROSTAT) 0.4 MG SL tablet, 1 under the tongue as needed for angina, may repeat q5mins for up three doses, Disp: 25 tablet, Rfl: 2  •  pantoprazole (PROTONIX) 40 MG EC tablet, Take 40 mg by mouth Daily., Disp: , Rfl:   •  megestrol (Megace ES) 625 MG/5ML suspension, Take 5 mL by mouth Daily for 30 days., Disp: 150 mL, Rfl: 2    Allergies:   Patient has no known allergies.    Vitals:  /57 (BP Location: Left arm, Patient Position: Sitting, Cuff Size: Adult)   Pulse 56   Ht 180.3 cm (71\")   Wt 63.5 kg (140 lb)   BMI 19.53 kg/m²     Physical Exam:  Physical Exam  Constitutional:       Appearance: He is normal weight.   HENT:      Head: Normocephalic and atraumatic.      Nose: Nose normal. No congestion or rhinorrhea.   Eyes:      General: No scleral icterus.     Extraocular Movements: Extraocular movements intact.      Conjunctiva/sclera: Conjunctivae normal.      Pupils: Pupils are equal, round, and reactive to light.   Cardiovascular:      Rate and Rhythm: Normal rate and regular " rhythm.      Pulses: Normal pulses.      Heart sounds: Normal heart sounds.   Pulmonary:      Effort: Pulmonary effort is normal.      Breath sounds: Normal breath sounds.   Abdominal:      General: Abdomen is flat. Bowel sounds are normal. There is no distension.      Palpations: Abdomen is soft. There is no shifting dullness, fluid wave, hepatomegaly, splenomegaly, mass or pulsatile mass.      Tenderness: There is no abdominal tenderness. There is no guarding or rebound.      Hernia: No hernia is present.   Musculoskeletal:         General: No swelling or tenderness.      Cervical back: Normal range of motion and neck supple.   Skin:     General: Skin is warm and dry.      Coloration: Skin is not jaundiced.   Neurological:      General: No focal deficit present.      Mental Status: He is alert and oriented to person, place, and time.   Psychiatric:         Mood and Affect: Mood normal.         Behavior: Behavior normal.         Results Review:  Lab Results:   Admission on 06/08/2021, Discharged on 06/08/2021   Component Date Value Ref Range Status   • QT Interval 06/07/2021 412  ms Final   • QTC Interval 06/07/2021 407  ms Final   • Glucose 06/07/2021 91  65 - 99 mg/dL Final   • BUN 06/07/2021 14  8 - 23 mg/dL Final   • Creatinine 06/07/2021 0.82  0.76 - 1.27 mg/dL Final   • Sodium 06/07/2021 143  136 - 145 mmol/L Final   • Potassium 06/07/2021 3.8  3.5 - 5.2 mmol/L Final   • Chloride 06/07/2021 109* 98 - 107 mmol/L Final   • CO2 06/07/2021 25.8  22.0 - 29.0 mmol/L Final   • Calcium 06/07/2021 9.0  8.6 - 10.5 mg/dL Final   • Total Protein 06/07/2021 6.8  6.0 - 8.5 g/dL Final   • Albumin 06/07/2021 3.87  3.50 - 5.20 g/dL Final   • ALT (SGPT) 06/07/2021 49* 1 - 41 U/L Final   • AST (SGOT) 06/07/2021 34  1 - 40 U/L Final   • Alkaline Phosphatase 06/07/2021 114  39 - 117 U/L Final   • Total Bilirubin 06/07/2021 0.3  0.0 - 1.2 mg/dL Final   • eGFR Non  Amer 06/07/2021 92  >60 mL/min/1.73 Final   • Globulin  06/07/2021 2.9  gm/dL Final   • A/G Ratio 06/07/2021 1.3  g/dL Final   • BUN/Creatinine Ratio 06/07/2021 17.1  7.0 - 25.0 Final   • Anion Gap 06/07/2021 8.2  5.0 - 15.0 mmol/L Final   • Troponin T 06/07/2021 <0.010  0.000 - 0.030 ng/mL Final   • WBC 06/07/2021 8.06  3.40 - 10.80 10*3/mm3 Final   • RBC 06/07/2021 3.97* 4.14 - 5.80 10*6/mm3 Final   • Hemoglobin 06/07/2021 12.7* 13.0 - 17.7 g/dL Final   • Hematocrit 06/07/2021 38.6  37.5 - 51.0 % Final   • MCV 06/07/2021 97.2* 79.0 - 97.0 fL Final   • MCH 06/07/2021 32.0  26.6 - 33.0 pg Final   • MCHC 06/07/2021 32.9  31.5 - 35.7 g/dL Final   • RDW 06/07/2021 13.2  12.3 - 15.4 % Final   • RDW-SD 06/07/2021 46.7  37.0 - 54.0 fl Final   • MPV 06/07/2021 10.5  6.0 - 12.0 fL Final   • Platelets 06/07/2021 232  140 - 450 10*3/mm3 Final   • Neutrophil % 06/07/2021 62.3  42.7 - 76.0 % Final   • Lymphocyte % 06/07/2021 22.5  19.6 - 45.3 % Final   • Monocyte % 06/07/2021 9.4  5.0 - 12.0 % Final   • Eosinophil % 06/07/2021 4.6  0.3 - 6.2 % Final   • Basophil % 06/07/2021 1.0  0.0 - 1.5 % Final   • Immature Grans % 06/07/2021 0.2  0.0 - 0.5 % Final   • Neutrophils, Absolute 06/07/2021 5.02  1.70 - 7.00 10*3/mm3 Final   • Lymphocytes, Absolute 06/07/2021 1.81  0.70 - 3.10 10*3/mm3 Final   • Monocytes, Absolute 06/07/2021 0.76  0.10 - 0.90 10*3/mm3 Final   • Eosinophils, Absolute 06/07/2021 0.37  0.00 - 0.40 10*3/mm3 Final   • Basophils, Absolute 06/07/2021 0.08  0.00 - 0.20 10*3/mm3 Final   • Immature Grans, Absolute 06/07/2021 0.02  0.00 - 0.05 10*3/mm3 Final   • nRBC 06/07/2021 0.0  0.0 - 0.2 /100 WBC Final   • Extra Tube 06/07/2021 Hold for add-ons.   Final    Auto resulted.   • Extra Tube 06/07/2021 hold for add-on   Final    Auto resulted   • Extra Tube 06/07/2021 Hold for add-ons.   Final    Auto resulted.   • QT Interval 06/08/2021 440  ms Final   • QTC Interval 06/08/2021 405  ms Final   • Troponin T 06/08/2021 <0.010  0.000 - 0.030 ng/mL Final   No results displayed  because visit has over 200 results.          Assessment/Plan     Visit Diagnoses:    ICD-10-CM ICD-9-CM   1. Weight loss, abnormal  R63.4 783.21   2. Anemia, unspecified type  D64.9 285.9   3. Diarrhea, unspecified type  R19.7 787.91       Plan:  The patient has known dementia.  It appears that the dementia is progressing per his wife.  She is concerned that she is not aware of the stages of dementia and has noted a great change in his personality.  I have suggested that her  be evaluated at 's Alzheimer's/dementia unit.  They would likely also be able to provide her with resources to help her cope with her 's progressing dementia.  We are going to try to make a referral for her.  I will place the patient on Megace to see if he would benefit from an appetite stimulant.  I have suggested that she offer him food and drink more frequently throughout the day and at least try to get him to drink 3 Boost a day.  I have suggested that she put peanut butter in the boost or ice cream blended up so that he would get protein on a consistent basis.  I have also instructed her to begin Metamucil 2 tablespoons in 8 ounces of fluid once daily to bulk his stool.  In respect to his anemia, this has improved.  It may be nutritional versus occult blood loss.  However, I do not feel that he is in good enough condition mentally to undergo colonoscopy as he has had multiple falls at home.  I feel the colon prep itself may result in a fall trying to get to the bathroom.  His wife is currently having difficulty caring for him to a certain degree at home due to his increased aggression.  He will follow-up in 4 to 6 weeks.          MEDICATION ISSUES:  Discussed medication options and treatment plan of prescribed medication as well as the risks, benefits, and side effects including potential falls, possible impaired driving and metabolic adversities among others. Patient is agreeable to call the office with any worsening of  symptoms or onset of side effects. Patient is agreeable to call 911 or go to the nearest ER should he/she begin having SI/HI.     MEDS ORDERED DURING VISIT:  New Medications Ordered This Visit   Medications   • megestrol (Megace ES) 625 MG/5ML suspension     Sig: Take 5 mL by mouth Daily for 30 days.     Dispense:  150 mL     Refill:  2       Return in about 6 weeks (around 7/28/2021).             This document has been electronically signed by Geno Vernon MD   June 16, 2021 17:25 EDT      Part of this note may be an electronic transcription/translation of spoken language to printed text using the Dragon Dictation System.

## 2021-06-18 ENCOUNTER — HOSPITAL ENCOUNTER (EMERGENCY)
Facility: HOSPITAL | Age: 76
Discharge: HOME | End: 2021-06-18
Attending: EMERGENCY MEDICINE | Admitting: EMERGENCY MEDICINE

## 2021-06-18 ENCOUNTER — APPOINTMENT (OUTPATIENT)
Dept: GENERAL RADIOLOGY | Facility: HOSPITAL | Age: 76
End: 2021-06-18

## 2021-06-18 VITALS
WEIGHT: 145 LBS | HEART RATE: 41 BPM | HEIGHT: 71 IN | TEMPERATURE: 97.9 F | RESPIRATION RATE: 18 BRPM | DIASTOLIC BLOOD PRESSURE: 53 MMHG | BODY MASS INDEX: 20.3 KG/M2 | SYSTOLIC BLOOD PRESSURE: 118 MMHG | OXYGEN SATURATION: 99 %

## 2021-06-18 DIAGNOSIS — E86.0 DEHYDRATION: Primary | ICD-10-CM

## 2021-06-18 LAB
ALBUMIN SERPL-MCNC: 3.56 G/DL (ref 3.5–5.2)
ALBUMIN/GLOB SERPL: 1.3 G/DL
ALP SERPL-CCNC: 110 U/L (ref 39–117)
ALT SERPL W P-5'-P-CCNC: 57 U/L (ref 1–41)
ANION GAP SERPL CALCULATED.3IONS-SCNC: 9.5 MMOL/L (ref 5–15)
AST SERPL-CCNC: 37 U/L (ref 1–40)
BASOPHILS # BLD AUTO: 0.09 10*3/MM3 (ref 0–0.2)
BASOPHILS NFR BLD AUTO: 0.8 % (ref 0–1.5)
BILIRUB SERPL-MCNC: 0.5 MG/DL (ref 0–1.2)
BILIRUB UR QL STRIP: NEGATIVE
BUN SERPL-MCNC: 25 MG/DL (ref 8–23)
BUN/CREAT SERPL: 27.5 (ref 7–25)
CALCIUM SPEC-SCNC: 9 MG/DL (ref 8.6–10.5)
CHLORIDE SERPL-SCNC: 108 MMOL/L (ref 98–107)
CLARITY UR: CLEAR
CO2 SERPL-SCNC: 24.5 MMOL/L (ref 22–29)
COLOR UR: YELLOW
CREAT SERPL-MCNC: 0.91 MG/DL (ref 0.76–1.27)
DEPRECATED RDW RBC AUTO: 46.5 FL (ref 37–54)
EOSINOPHIL # BLD AUTO: 0.55 10*3/MM3 (ref 0–0.4)
EOSINOPHIL NFR BLD AUTO: 5.1 % (ref 0.3–6.2)
ERYTHROCYTE [DISTWIDTH] IN BLOOD BY AUTOMATED COUNT: 13.2 % (ref 12.3–15.4)
FLUAV RNA RESP QL NAA+PROBE: NOT DETECTED
FLUBV RNA RESP QL NAA+PROBE: NOT DETECTED
GFR SERPL CREATININE-BSD FRML MDRD: 81 ML/MIN/1.73
GLOBULIN UR ELPH-MCNC: 2.8 GM/DL
GLUCOSE SERPL-MCNC: 89 MG/DL (ref 65–99)
GLUCOSE UR STRIP-MCNC: NEGATIVE MG/DL
HCT VFR BLD AUTO: 38.6 % (ref 37.5–51)
HGB BLD-MCNC: 12.8 G/DL (ref 13–17.7)
HGB UR QL STRIP.AUTO: NEGATIVE
IMM GRANULOCYTES # BLD AUTO: 0.02 10*3/MM3 (ref 0–0.05)
IMM GRANULOCYTES NFR BLD AUTO: 0.2 % (ref 0–0.5)
KETONES UR QL STRIP: NEGATIVE
LEUKOCYTE ESTERASE UR QL STRIP.AUTO: NEGATIVE
LYMPHOCYTES # BLD AUTO: 2.15 10*3/MM3 (ref 0.7–3.1)
LYMPHOCYTES NFR BLD AUTO: 19.9 % (ref 19.6–45.3)
MCH RBC QN AUTO: 32 PG (ref 26.6–33)
MCHC RBC AUTO-ENTMCNC: 33.2 G/DL (ref 31.5–35.7)
MCV RBC AUTO: 96.5 FL (ref 79–97)
MONOCYTES # BLD AUTO: 0.94 10*3/MM3 (ref 0.1–0.9)
MONOCYTES NFR BLD AUTO: 8.7 % (ref 5–12)
NEUTROPHILS NFR BLD AUTO: 65.3 % (ref 42.7–76)
NEUTROPHILS NFR BLD AUTO: 7.08 10*3/MM3 (ref 1.7–7)
NITRITE UR QL STRIP: NEGATIVE
NRBC BLD AUTO-RTO: 0 /100 WBC (ref 0–0.2)
PH UR STRIP.AUTO: 6.5 [PH] (ref 5–8)
PLATELET # BLD AUTO: 231 10*3/MM3 (ref 140–450)
PMV BLD AUTO: 11.2 FL (ref 6–12)
POTASSIUM SERPL-SCNC: 4 MMOL/L (ref 3.5–5.2)
PROT SERPL-MCNC: 6.4 G/DL (ref 6–8.5)
PROT UR QL STRIP: ABNORMAL
RBC # BLD AUTO: 4 10*6/MM3 (ref 4.14–5.8)
SARS-COV-2 RNA RESP QL NAA+PROBE: NOT DETECTED
SODIUM SERPL-SCNC: 142 MMOL/L (ref 136–145)
SP GR UR STRIP: 1.02 (ref 1–1.03)
TROPONIN T SERPL-MCNC: <0.01 NG/ML (ref 0–0.03)
UROBILINOGEN UR QL STRIP: ABNORMAL
WBC # BLD AUTO: 10.83 10*3/MM3 (ref 3.4–10.8)

## 2021-06-18 PROCEDURE — 81003 URINALYSIS AUTO W/O SCOPE: CPT | Performed by: EMERGENCY MEDICINE

## 2021-06-18 PROCEDURE — 87636 SARSCOV2 & INF A&B AMP PRB: CPT | Performed by: EMERGENCY MEDICINE

## 2021-06-18 PROCEDURE — 71045 X-RAY EXAM CHEST 1 VIEW: CPT | Performed by: RADIOLOGY

## 2021-06-18 PROCEDURE — 99283 EMERGENCY DEPT VISIT LOW MDM: CPT

## 2021-06-18 PROCEDURE — 99284 EMERGENCY DEPT VISIT MOD MDM: CPT

## 2021-06-18 PROCEDURE — 93005 ELECTROCARDIOGRAM TRACING: CPT | Performed by: EMERGENCY MEDICINE

## 2021-06-18 PROCEDURE — 84484 ASSAY OF TROPONIN QUANT: CPT | Performed by: EMERGENCY MEDICINE

## 2021-06-18 PROCEDURE — 96374 THER/PROPH/DIAG INJ IV PUSH: CPT

## 2021-06-18 PROCEDURE — 80053 COMPREHEN METABOLIC PANEL: CPT | Performed by: EMERGENCY MEDICINE

## 2021-06-18 PROCEDURE — 85025 COMPLETE CBC W/AUTO DIFF WBC: CPT | Performed by: EMERGENCY MEDICINE

## 2021-06-18 PROCEDURE — 71045 X-RAY EXAM CHEST 1 VIEW: CPT

## 2021-06-18 PROCEDURE — 93010 ELECTROCARDIOGRAM REPORT: CPT | Performed by: SPECIALIST

## 2021-06-18 PROCEDURE — 96376 TX/PRO/DX INJ SAME DRUG ADON: CPT

## 2021-06-18 RX ADMIN — SODIUM CHLORIDE 500 ML: 9 INJECTION, SOLUTION INTRAVENOUS at 19:00

## 2021-06-18 RX ADMIN — ATROPINE SULFATE 0.5 MG: 0.1 INJECTION INTRAVENOUS at 17:34

## 2021-06-18 RX ADMIN — SODIUM CHLORIDE 500 ML: 9 INJECTION, SOLUTION INTRAVENOUS at 20:13

## 2021-06-18 RX ADMIN — SODIUM CHLORIDE 1000 ML: 9 INJECTION, SOLUTION INTRAVENOUS at 16:15

## 2021-06-18 RX ADMIN — SODIUM CHLORIDE 500 ML: 9 INJECTION, SOLUTION INTRAVENOUS at 17:34

## 2021-06-18 RX ADMIN — ATROPINE SULFATE 0.5 MG: 0.1 INJECTION INTRAVENOUS at 17:00

## 2021-06-18 RX ADMIN — SODIUM CHLORIDE 500 ML: 9 INJECTION, SOLUTION INTRAVENOUS at 20:14

## 2021-06-18 NOTE — ED PROVIDER NOTES
Subjective   Patient presents to ER with weakness and fatigue. He was recently in hospital with UTI, dehydration. His blood pressure medications have since been reduced. Worsened by decreased intake, improved by nothing      Weakness - Generalized  Severity:  Moderate  Onset quality:  Gradual  Timing:  Constant  Progression:  Worsening  Chronicity:  Recurrent  Context: dehydration and urinary tract infection    Relieved by:  Nothing  Worsened by:  Nothing  Ineffective treatments:  None tried  Associated symptoms: anorexia and nausea        Review of Systems   Constitutional: Positive for activity change and fatigue.   HENT: Negative.    Eyes: Negative.    Respiratory: Negative.    Cardiovascular: Positive for palpitations.   Gastrointestinal: Positive for anorexia and nausea.   Endocrine: Negative.    Genitourinary: Positive for decreased urine volume.   Musculoskeletal: Negative.    Skin: Negative.    Allergic/Immunologic: Negative.    Hematological: Negative.    Psychiatric/Behavioral: Negative.        Past Medical History:   Diagnosis Date   • BPH (benign prostatic hyperplasia)    • Bradycardia     Positive tilt table testing 07/2016   • Coronary artery disease    • Diabetes mellitus (CMS/HCC)    • Diverticulosis    • Elevated cholesterol    • Gastric ulcer with hemorrhage    • Gastroesophageal reflux disease 1/26/2021   • History of transfusion    • Hyperlipidemia    • Hypertension    • Psoriasis        No Known Allergies    Past Surgical History:   Procedure Laterality Date   • BACK SURGERY     • CARDIAC CATHETERIZATION N/A 9/20/2016    Procedure: Left Heart Cath;  Surgeon: Giovanni Buenrostro MD;  Location: [facility] INVASIVE LOCATION;  Service:    • CARDIAC CATHETERIZATION N/A 10/4/2016    Procedure: Left Heart Cath;  Surgeon: Bharathi Andrew MD;  Location: [facility] INVASIVE LOCATION;  Service:    • CARDIAC CATHETERIZATION N/A 5/9/2017    Procedure: Left Heart Cath;  Surgeon: Giovanni Buenrostro MD;  Location:   [facility] CATH INVASIVE LOCATION;  Service:    • CARDIAC CATHETERIZATION N/A 5/9/2017    Procedure: ERR;  Surgeon: Giovanni Buenrostro MD;  Location:  [facility] CATH INVASIVE LOCATION;  Service:    • CARDIAC CATHETERIZATION N/A 11/8/2019    Procedure: Left Heart Cath;  Surgeon: Abraham Oviedo MD;  Location:  [facility] CATH INVASIVE LOCATION;  Service: Cardiology   • CARDIAC CATHETERIZATION N/A 12/18/2019    Procedure: Coronary angiography;  Surgeon: Jay Barney IV, MD;  Location:  [facility] CATH INVASIVE LOCATION;  Service: Cardiovascular   • CHOLECYSTECTOMY     • COLONOSCOPY  11/13/2015    Dr. Martinez- internal hemorrhoids, sigmoid diverticulosis   • COLONOSCOPY N/A 8/17/2018    Procedure: COLONOSCOPY CPT CODE: 64067;  Surgeon: Gigi Geronimo MD;  Location:  [facility] OR;  Service: Gastroenterology   • CORONARY ANGIOPLASTY WITH STENT PLACEMENT     • ENDOSCOPY N/A 8/17/2018    Procedure: ESOPHAGOGASTRODUODENOSCOPY WITH BIOPSY CPT CODE: 52948;  Surgeon: Gigi Geronimo MD;  Location:  [facility] OR;  Service: Gastroenterology   • ENDOSCOPY N/A 4/20/2021    Procedure: ESOPHAGOGASTRODUODENOSCOPY;  Surgeon: Geno Vernon MD;  Location:  [facility] OR;  Service: Gastroenterology;  Laterality: N/A;   • INTERVENTIONAL RADIOLOGY PROCEDURE N/A 12/18/2019    Procedure: Coil Embolization of septal to pulmonary artery fistuala.;  Surgeon: Jay Barney IV, MD;  Location:  [facility] CATH INVASIVE LOCATION;  Service: Cardiovascular   • DC RT/LT HEART CATHETERS N/A 5/9/2017    Procedure: Percutaneous Coronary Intervention;  Surgeon: Lincoln De Los Santos MD;  Location:  [facility] CATH INVASIVE LOCATION;  Service: Cardiology   • STOMACH SURGERY      FUSION OF BLEEDING ULCER   • UPPER GASTROINTESTINAL ENDOSCOPY  11/13/2015    Dr. Martinez- mild gastritis       Family History   Problem Relation Age of Onset   • Sick sinus syndrome Mother    • Heart failure Mother    • Diabetes Mother    • Liver cancer Father        Social History      Socioeconomic History   • Marital status:      Spouse name: Not on file   • Number of children: Not on file   • Years of education: Not on file   • Highest education level: Not on file   Tobacco Use   • Smoking status: Former Smoker     Packs/day: 3.00     Years: 40.00     Pack years: 120.00     Types: Cigarettes     Quit date:      Years since quittin.4   • Smokeless tobacco: Former User     Quit date: 1986   Vaping Use   • Vaping Use: Never used   Substance and Sexual Activity   • Alcohol use: No   • Drug use: No   • Sexual activity: Defer           Objective   Physical Exam  Vitals and nursing note reviewed.   HENT:      Head: Normocephalic and atraumatic.      Nose: Nose normal.      Mouth/Throat:      Mouth: Mucous membranes are moist.   Eyes:      Pupils: Pupils are equal, round, and reactive to light.   Cardiovascular:      Rate and Rhythm: Normal rate.      Pulses: Normal pulses.   Pulmonary:      Effort: Pulmonary effort is normal.   Abdominal:      General: Abdomen is flat.   Musculoskeletal:         General: Normal range of motion.      Cervical back: Normal range of motion.   Skin:     General: Skin is warm.      Capillary Refill: Capillary refill takes less than 2 seconds.   Neurological:      General: No focal deficit present.      Mental Status: He is alert.      Motor: Weakness present.   Psychiatric:         Mood and Affect: Mood normal.         Procedures           ED Course  ED Course as of    1542 EKG sinus bradycardia rate 47 parable 168 QRS 90  no ST elevation    [BB]   1709 CXR : negative    [REBECA]      ED Course User Index  [BB] To Sierra MD  [REBECA] Riki Lo MD                                           [facility]    Final diagnoses:   Dehydration       ED Disposition  ED Disposition     ED Disposition Condition Comment    Discharge Stable           Camila Ortiz, APRN  [address]  [place]  [address]  [phone]    Schedule an appointment as soon as possible for a visit   If symptoms worsen         Medication List      Stop    metoprolol tartrate 25 MG tablet  Commonly known as: LOPRESSOR             Riki Lo MD  06/18/21 2149       Riki Lo MD  06/18/21 [id number]

## 2021-06-19 LAB
QT INTERVAL: 476 MS
QTC INTERVAL: 421 MS

## 2021-06-22 ENCOUNTER — READMISSION MANAGEMENT (OUTPATIENT)
Dept: CALL CENTER | Facility: HOSPITAL | Age: 76
End: 2021-06-22

## 2021-06-22 NOTE — OUTREACH NOTE
Medical Week 3 Survey      Responses   [facility] patient discharged from?  [facility]   Does the patient have one of the following disease processes/diagnoses(primary or secondary)?  Other   Week 3 attempt successful?  No   Unsuccessful attempts  Attempt 1          Mami Bender RN

## 2021-06-23 ENCOUNTER — READMISSION MANAGEMENT (OUTPATIENT)
Dept: CALL CENTER | Facility: HOSPITAL | Age: 76
End: 2021-06-23

## 2021-06-23 NOTE — OUTREACH NOTE
Medical Week 3 Survey      Responses   [facility] patient discharged from?  [facility]   Does the patient have one of the following disease processes/diagnoses(primary or secondary)?  Other   Week 3 attempt successful?  Yes   Call start time  0843   Call end time  0847   Person spoke with today (if not patient) and relationship  Angela Perales reviewed with patient/caregiver?  Yes   Is the patient taking all medications as directed (includes completed medication regime)?  Yes   Medication comments  Lisinopril discontinued for the cough, Lopressor cut in half, because HR was low   Has the patient kept scheduled appointments due by today?  Yes   Comments  Had to be seen in ER last week, low HR 30's and was dehydrated, was sent home, decreased lopressor and gave fluids   What is the patient's perception of their health status since discharge?  Same   Additional teach back comments  OT, nd PT and aide comes to see him   Week 3 Call Completed?  Yes   Wrap up additional comments  Wife says he is doing same,nothing, sincewa in Er last week with dehydration, dementia same, no questions today          Elva Colbert, RN

## 2021-06-29 ENCOUNTER — OFFICE VISIT (OUTPATIENT)
Dept: CARDIOLOGY | Facility: CLINIC | Age: 76
End: 2021-06-29

## 2021-06-29 VITALS
OXYGEN SATURATION: 98 % | WEIGHT: 143.6 LBS | HEART RATE: 54 BPM | SYSTOLIC BLOOD PRESSURE: 114 MMHG | HEIGHT: 71 IN | BODY MASS INDEX: 20.1 KG/M2 | DIASTOLIC BLOOD PRESSURE: 51 MMHG | TEMPERATURE: 97.1 F

## 2021-06-29 DIAGNOSIS — I10 ESSENTIAL HYPERTENSION: Chronic | ICD-10-CM

## 2021-06-29 DIAGNOSIS — I25.10 ASCVD (ARTERIOSCLEROTIC CARDIOVASCULAR DISEASE): Primary | Chronic | ICD-10-CM

## 2021-06-29 PROCEDURE — 99214 OFFICE O/P EST MOD 30 MIN: CPT | Performed by: PHYSICIAN ASSISTANT

## 2021-06-29 NOTE — PROGRESS NOTES
Camila Ortiz APRN  Fidencio Luciano  1945  06/29/2021    Patient Active Problem List   Diagnosis   • Essential hypertension   • Type 2 diabetes mellitus (CMS/HCC)   • Benign prostatic hyperplasia   • ASCVD (arteriosclerotic cardiovascular disease), s/p stenting of 80 % stenosis in left circumflex on 10/05/16and 60% stenosis in the LAD, clinically stable.    • Hyperlipidemia LDL goal <70   • Weakness generalized   • Chronic fatigue   • Coronary artery fistula   • Weight loss, unintentional   • Iron deficiency anemia   • Iron deficiency   • Normocytic anemia   • Weight loss, abnormal   • Early satiety   • Esophageal dysphagia   • Gastroesophageal reflux disease   • Dysphagia   • Chest pain   • Sense of impending doom       Dear Camila Ortiz APRN:    Subjective     History of Present Illness:    Chief Complaint   Patient presents with   • Chest Pain   • Med Management     med list       Fidencio Luciano is a pleasant 75 y.o. male with a past medical history significant for coronary artery disease status post stenting of the left circumflex coronary artery in October 2016 with subsequent coiling of the fistulous connection between the LAD and pulmonary artery in December 2019 by Dr. Barney at [facility].  Patient also has history of essential hypertension and dyslipidemia.  He comes in today for routine cardiology follow-up.    Mr. Luciano is somewhat confused today he is aware person and place reporting that he is in Dr. Buenrostro's office today however he was unable to tell me the date stating it was July 2023.  He is here with his wife he was recently hospitalized where he was admitted for chest pains and shortness of breath.  He did have stress test and echocardiogram stress test did not show any signs concerning for ischemia echocardiogram showed normal LVEF with grade 1 diastolic dysfunction.  His antianginal medications were adjusted patient was discharged chest pain-free.  Speaking to  Mr. Luciano today he still denies any chest pains or shortness of breath although unsure of how reliable of a historian he is due to his progressing dementia.    No Known Allergies:      Current Outpatient Medications:   •  aspirin 81 MG tablet, Take 81 mg by mouth Daily., Disp: , Rfl:   •  atorvastatin (LIPITOR) 80 MG tablet, Take 1 tablet by mouth Every Night., Disp: 90 tablet, Rfl: 5  •  clopidogrel (PLAVIX) 75 MG tablet, Take 1 tablet by mouth Daily., Disp: 30 tablet, Rfl: 5  •  ferrous sulfate 325 (65 FE) MG tablet, Take 325 mg by mouth 2 (two) times a day., Disp: , Rfl:   •  finasteride (PROSCAR) 5 MG tablet, Take 1 tablet by mouth Daily., Disp: 9 tablet, Rfl: 3  •  isosorbide mononitrate (IMDUR) 60 MG 24 hr tablet, Take 1 tablet by mouth Every Morning., Disp: 30 tablet, Rfl: 11  •  loratadine (CLARITIN) 10 MG tablet, Take 10 mg by mouth Daily., Disp: , Rfl:   •  megestrol (Megace ES) 625 MG/5ML suspension, Take 5 mL by mouth Daily for 30 days., Disp: 150 mL, Rfl: 2  •  nitroglycerin (NITROSTAT) 0.4 MG SL tablet, 1 under the tongue as needed for angina, may repeat q5mins for up three doses, Disp: 25 tablet, Rfl: 2  •  pantoprazole (PROTONIX) 40 MG EC tablet, Take 40 mg by mouth Daily., Disp: , Rfl:     The following portions of the patient's history were reviewed and updated as appropriate: allergies, current medications, past family history, past medical history, past social history, past surgical history and problem list.    Social History     Tobacco Use   • Smoking status: Former Smoker     Packs/day: 3.00     Years: 40.00     Pack years: 120.00     Types: Cigarettes     Quit date:      Years since quittin.5   • Smokeless tobacco: Former User     Quit date: 1986   Vaping Use   • Vaping Use: Never used   Substance Use Topics   • Alcohol use: No   • Drug use: No         Objective   Vitals:    21 1009   BP: 114/51   BP Location: Left arm   Patient Position: Sitting   Cuff Size: Adult  "  Pulse: 54   Temp: 97.1 °F (36.2 °C)   TempSrc: Infrared   SpO2: 98%   Weight: 65.1 kg (143 lb 9.6 oz)   Height: 180.3 cm (71\")     Body mass index is 20.03 kg/m².    Constitutional:       General: Not in acute distress.     Appearance: Healthy appearance. Well-developed and not in distress. Not diaphoretic.   Eyes:      Conjunctiva/sclera: Conjunctivae normal.      Pupils: Pupils are equal, round, and reactive to light.   HENT:      Head: Normocephalic and atraumatic.   Neck:      Vascular: No carotid bruit or JVD.   Pulmonary:      Effort: Pulmonary effort is normal. No respiratory distress.      Breath sounds: Normal breath sounds.   Cardiovascular:      Normal rate. Regular rhythm.   Skin:     General: Skin is cool.   Neurological:      Mental Status: Alert, oriented to person, place, and time and oriented to person, place and time.         Lab Results   Component Value Date     06/18/2021    K 4.0 06/18/2021     (H) 06/18/2021    CO2 24.5 06/18/2021    BUN 25 (H) 06/18/2021    CREATININE 0.91 06/18/2021    GLUCOSE 89 06/18/2021    CALCIUM 9.0 06/18/2021    AST 37 06/18/2021    ALT 57 (H) 06/18/2021    ALKPHOS 110 06/18/2021    LABIL2 1.4 (L) 01/08/2016     Lab Results   Component Value Date    CKTOTAL 93 03/09/2021     Lab Results   Component Value Date    WBC 10.83 (H) 06/18/2021    HGB 12.8 (L) 06/18/2021    HCT 38.6 06/18/2021     06/18/2021     Lab Results   Component Value Date    INR 1.02 05/24/2021    INR 1.08 03/13/2021    INR 1.10 03/09/2021     Lab Results   Component Value Date    MG 2.1 06/01/2021     Lab Results   Component Value Date    TSH 1.510 05/24/2021    PSA 1.440 07/17/2020    CHLPL 109 08/12/2015    TRIG 115 05/25/2021    HDL 29 (L) 05/25/2021    LDL 58 05/25/2021      Lab Results   Component Value Date    BNP 55.0 07/16/2016       During this visit the following were done:  Labs Reviewed [x]    Labs Ordered []    Radiology Reports Reviewed []    Radiology Ordered []  "   PCP Records Reviewed []    Referring Provider Records Reviewed []    ER Records Reviewed []    Hospital Records Reviewed []    History Obtained From Family []    Radiology Images Reviewed []    Other Reviewed []    Records Requested []       Procedures    Assessment/Plan    Diagnosis Plan   1. ASCVD (arteriosclerotic cardiovascular disease), s/p stenting of 80 % stenosis in left circumflex on 10/05/16and 60% stenosis in the LAD, clinically stable.      2. Essential hypertension              Recommendations:  1. ASCVD  1. Unfortunately patient was unable to tolerate beta-blocker due to bradycardia heart rate today is 54 after several days without taking metoprolol.  Was also unable to tolerate lisinopril due to hypotension.  2. I will continue aspirin, Lipitor, Plavix, and Imdur.  Patient is currently chest pain-free so we will continue to monitor for now.  3. I did review recent hospitalization records.  2. Essential hypertension  1. Currently controlled continue current regimen encourage her to continue monitoring his blood pressure daily.      Return in about 3 months (around 9/29/2021).    As always, I appreciate very much the opportunity to participate in the cardiovascular care of your patients.      With Best Regards,    Delbert Gupta PA-C

## 2021-06-30 ENCOUNTER — READMISSION MANAGEMENT (OUTPATIENT)
Dept: CALL CENTER | Facility: HOSPITAL | Age: 76
End: 2021-06-30

## 2021-06-30 NOTE — OUTREACH NOTE
Medical Week 4 Survey      Responses   [facility] patient discharged from?  [facility]   Does the patient have one of the following disease processes/diagnoses(primary or secondary)?  Other   Week 4 attempt successful?  Yes   Call start time  0859   Call end time  0905   Is the patient still receiving Home Health Services?  Yes   Home health comments  Pt is receiving PT and OT.   Psychosocial issues?  No   Week 4 Call Completed?  Yes   Would the patient like one additional call?  No   Graduated  Yes   Is the patient interested in additional calls from an ambulatory ?  NOTE:  applies to high risk patients requiring additional follow-up.  No   Did the patient feel the follow up calls were helpful during their recovery period?  Yes   Was the number of calls appropriate?  Yes   Wrap up additional comments  Wife reports pt is doing the same but is maintaining weight. PT and OT continue to visit. Pt is at baseline.          Annmarie Brown RN

## 2021-07-04 ENCOUNTER — HOSPITAL ENCOUNTER (EMERGENCY)
Facility: HOSPITAL | Age: 76
Discharge: HOME | End: 2021-07-04
Attending: EMERGENCY MEDICINE | Admitting: EMERGENCY MEDICINE

## 2021-07-04 ENCOUNTER — APPOINTMENT (OUTPATIENT)
Dept: GENERAL RADIOLOGY | Facility: HOSPITAL | Age: 76
End: 2021-07-04

## 2021-07-04 VITALS
HEIGHT: 71 IN | DIASTOLIC BLOOD PRESSURE: 59 MMHG | RESPIRATION RATE: 20 BRPM | HEART RATE: 42 BPM | TEMPERATURE: 97.6 F | WEIGHT: 146 LBS | OXYGEN SATURATION: 99 % | SYSTOLIC BLOOD PRESSURE: 121 MMHG | BODY MASS INDEX: 20.44 KG/M2

## 2021-07-04 DIAGNOSIS — R11.0 NAUSEA: Primary | ICD-10-CM

## 2021-07-04 LAB
ALBUMIN SERPL-MCNC: 3.73 G/DL (ref 3.5–5.2)
ALBUMIN/GLOB SERPL: 1.3 G/DL
ALP SERPL-CCNC: 113 U/L (ref 39–117)
ALT SERPL W P-5'-P-CCNC: 56 U/L (ref 1–41)
ANION GAP SERPL CALCULATED.3IONS-SCNC: 10.1 MMOL/L (ref 5–15)
AST SERPL-CCNC: 40 U/L (ref 1–40)
BASOPHILS # BLD AUTO: 0.13 10*3/MM3 (ref 0–0.2)
BASOPHILS NFR BLD AUTO: 1.3 % (ref 0–1.5)
BILIRUB SERPL-MCNC: 0.5 MG/DL (ref 0–1.2)
BILIRUB UR QL STRIP: NEGATIVE
BUN SERPL-MCNC: 13 MG/DL (ref 8–23)
BUN/CREAT SERPL: 16.7 (ref 7–25)
CALCIUM SPEC-SCNC: 9.1 MG/DL (ref 8.6–10.5)
CHLORIDE SERPL-SCNC: 109 MMOL/L (ref 98–107)
CLARITY UR: CLEAR
CO2 SERPL-SCNC: 22.9 MMOL/L (ref 22–29)
COLOR UR: YELLOW
CREAT SERPL-MCNC: 0.78 MG/DL (ref 0.76–1.27)
DEPRECATED RDW RBC AUTO: 49.1 FL (ref 37–54)
EOSINOPHIL # BLD AUTO: 0.49 10*3/MM3 (ref 0–0.4)
EOSINOPHIL NFR BLD AUTO: 4.8 % (ref 0.3–6.2)
ERYTHROCYTE [DISTWIDTH] IN BLOOD BY AUTOMATED COUNT: 13.5 % (ref 12.3–15.4)
FLUAV RNA RESP QL NAA+PROBE: NOT DETECTED
FLUBV RNA RESP QL NAA+PROBE: NOT DETECTED
GFR SERPL CREATININE-BSD FRML MDRD: 97 ML/MIN/1.73
GLOBULIN UR ELPH-MCNC: 2.8 GM/DL
GLUCOSE SERPL-MCNC: 114 MG/DL (ref 65–99)
GLUCOSE UR STRIP-MCNC: NEGATIVE MG/DL
HCT VFR BLD AUTO: 42.1 % (ref 37.5–51)
HGB BLD-MCNC: 13.8 G/DL (ref 13–17.7)
HGB UR QL STRIP.AUTO: NEGATIVE
IMM GRANULOCYTES # BLD AUTO: 0.03 10*3/MM3 (ref 0–0.05)
IMM GRANULOCYTES NFR BLD AUTO: 0.3 % (ref 0–0.5)
KETONES UR QL STRIP: NEGATIVE
LEUKOCYTE ESTERASE UR QL STRIP.AUTO: NEGATIVE
LIPASE SERPL-CCNC: 16 U/L (ref 13–60)
LYMPHOCYTES # BLD AUTO: 1.44 10*3/MM3 (ref 0.7–3.1)
LYMPHOCYTES NFR BLD AUTO: 14 % (ref 19.6–45.3)
MAGNESIUM SERPL-MCNC: 2.1 MG/DL (ref 1.6–2.4)
MCH RBC QN AUTO: 32.4 PG (ref 26.6–33)
MCHC RBC AUTO-ENTMCNC: 32.8 G/DL (ref 31.5–35.7)
MCV RBC AUTO: 98.8 FL (ref 79–97)
MONOCYTES # BLD AUTO: 0.58 10*3/MM3 (ref 0.1–0.9)
MONOCYTES NFR BLD AUTO: 5.6 % (ref 5–12)
NEUTROPHILS NFR BLD AUTO: 7.64 10*3/MM3 (ref 1.7–7)
NEUTROPHILS NFR BLD AUTO: 74 % (ref 42.7–76)
NITRITE UR QL STRIP: NEGATIVE
NRBC BLD AUTO-RTO: 0 /100 WBC (ref 0–0.2)
NT-PROBNP SERPL-MCNC: 222.8 PG/ML (ref 0–900)
PH UR STRIP.AUTO: <=5 [PH] (ref 5–8)
PLATELET # BLD AUTO: 181 10*3/MM3 (ref 140–450)
PMV BLD AUTO: 10.9 FL (ref 6–12)
POTASSIUM SERPL-SCNC: 4 MMOL/L (ref 3.5–5.2)
PROT SERPL-MCNC: 6.5 G/DL (ref 6–8.5)
PROT UR QL STRIP: ABNORMAL
RBC # BLD AUTO: 4.26 10*6/MM3 (ref 4.14–5.8)
SARS-COV-2 RNA RESP QL NAA+PROBE: NOT DETECTED
SODIUM SERPL-SCNC: 142 MMOL/L (ref 136–145)
SP GR UR STRIP: 1.02 (ref 1–1.03)
TROPONIN T SERPL-MCNC: <0.01 NG/ML (ref 0–0.03)
UROBILINOGEN UR QL STRIP: ABNORMAL
WBC # BLD AUTO: 10.31 10*3/MM3 (ref 3.4–10.8)

## 2021-07-04 PROCEDURE — 93010 ELECTROCARDIOGRAM REPORT: CPT | Performed by: INTERNAL MEDICINE

## 2021-07-04 PROCEDURE — 83735 ASSAY OF MAGNESIUM: CPT | Performed by: EMERGENCY MEDICINE

## 2021-07-04 PROCEDURE — 93005 ELECTROCARDIOGRAM TRACING: CPT | Performed by: EMERGENCY MEDICINE

## 2021-07-04 PROCEDURE — 96374 THER/PROPH/DIAG INJ IV PUSH: CPT

## 2021-07-04 PROCEDURE — 83690 ASSAY OF LIPASE: CPT | Performed by: EMERGENCY MEDICINE

## 2021-07-04 PROCEDURE — 85025 COMPLETE CBC W/AUTO DIFF WBC: CPT | Performed by: EMERGENCY MEDICINE

## 2021-07-04 PROCEDURE — 84484 ASSAY OF TROPONIN QUANT: CPT | Performed by: EMERGENCY MEDICINE

## 2021-07-04 PROCEDURE — 25010000002 ONDANSETRON PER 1 MG: Performed by: EMERGENCY MEDICINE

## 2021-07-04 PROCEDURE — 83880 ASSAY OF NATRIURETIC PEPTIDE: CPT | Performed by: EMERGENCY MEDICINE

## 2021-07-04 PROCEDURE — 71045 X-RAY EXAM CHEST 1 VIEW: CPT

## 2021-07-04 PROCEDURE — 80053 COMPREHEN METABOLIC PANEL: CPT | Performed by: EMERGENCY MEDICINE

## 2021-07-04 PROCEDURE — 99283 EMERGENCY DEPT VISIT LOW MDM: CPT

## 2021-07-04 PROCEDURE — 87636 SARSCOV2 & INF A&B AMP PRB: CPT | Performed by: EMERGENCY MEDICINE

## 2021-07-04 PROCEDURE — 81003 URINALYSIS AUTO W/O SCOPE: CPT | Performed by: EMERGENCY MEDICINE

## 2021-07-04 RX ORDER — PROMETHAZINE HYDROCHLORIDE 12.5 MG/1
12.5 TABLET ORAL EVERY 8 HOURS PRN
Qty: 12 TABLET | Refills: 0 | Status: ON HOLD | OUTPATIENT
Start: 2021-07-04 | End: 2021-10-27

## 2021-07-04 RX ORDER — ONDANSETRON 2 MG/ML
4 INJECTION INTRAMUSCULAR; INTRAVENOUS ONCE
Status: COMPLETED | OUTPATIENT
Start: 2021-07-04 | End: 2021-07-04

## 2021-07-04 RX ORDER — ONDANSETRON 4 MG/1
4 TABLET, ORALLY DISINTEGRATING ORAL EVERY 8 HOURS PRN
Qty: 14 TABLET | Refills: 0 | Status: ON HOLD | OUTPATIENT
Start: 2021-07-04 | End: 2021-10-27

## 2021-07-04 RX ORDER — LISINOPRIL 20 MG/1
20 TABLET ORAL DAILY
COMMUNITY
End: 2021-08-26 | Stop reason: ALTCHOICE

## 2021-07-04 RX ADMIN — SODIUM CHLORIDE 1000 ML: 9 INJECTION, SOLUTION INTRAVENOUS at 10:01

## 2021-07-04 RX ADMIN — ONDANSETRON 4 MG: 2 INJECTION INTRAMUSCULAR; INTRAVENOUS at 11:49

## 2021-07-05 LAB
QT INTERVAL: 424 MS
QTC INTERVAL: 409 MS

## 2021-07-08 ENCOUNTER — OFFICE VISIT (OUTPATIENT)
Dept: UROLOGY | Facility: CLINIC | Age: 76
End: 2021-07-08

## 2021-07-08 VITALS — WEIGHT: 142 LBS | BODY MASS INDEX: 19.88 KG/M2 | HEIGHT: 71 IN

## 2021-07-08 DIAGNOSIS — N40.0 BENIGN PROSTATIC HYPERPLASIA WITHOUT LOWER URINARY TRACT SYMPTOMS: Primary | Chronic | ICD-10-CM

## 2021-07-08 PROCEDURE — 99213 OFFICE O/P EST LOW 20 MIN: CPT | Performed by: UROLOGY

## 2021-07-08 NOTE — PROGRESS NOTES
Chief Complaint:          Chief Complaint   Patient presents with   • Benign Prostatic Hypertrophy     Yearly fu        HPI:   75 y.o. male turns today PSA was 1.4401 July 17, 2020.  She saw previously Mable who recommended a biopsy has dementia hospitalized for 2 weeks vital think he needs a further work-up from severe dementia there is nothing else to really offer him at this time      Past Medical History:        Past Medical History:   Diagnosis Date   • BPH (benign prostatic hyperplasia)    • Bradycardia     Positive tilt table testing 07/2016   • Coronary artery disease    • Diabetes mellitus (CMS/HCC)    • Diverticulosis    • Elevated cholesterol    • Gastric ulcer with hemorrhage    • Gastroesophageal reflux disease 1/26/2021   • History of transfusion    • Hyperlipidemia    • Hypertension    • Psoriasis          Current Meds:     Current Outpatient Medications   Medication Sig Dispense Refill   • aspirin 81 MG tablet Take 81 mg by mouth Daily.     • atorvastatin (LIPITOR) 80 MG tablet Take 1 tablet by mouth Every Night. 90 tablet 5   • clopidogrel (PLAVIX) 75 MG tablet Take 1 tablet by mouth Daily. 30 tablet 5   • ferrous sulfate 325 (65 FE) MG tablet Take 325 mg by mouth 2 (two) times a day.     • finasteride (PROSCAR) 5 MG tablet Take 1 tablet by mouth Daily. 9 tablet 3   • isosorbide mononitrate (IMDUR) 60 MG 24 hr tablet Take 1 tablet by mouth Every Morning. 30 tablet 11   • lisinopril (PRINIVIL,ZESTRIL) 20 MG tablet Take 20 mg by mouth Daily.     • loratadine (CLARITIN) 10 MG tablet Take 10 mg by mouth Daily.     • megestrol (Megace ES) 625 MG/5ML suspension Take 5 mL by mouth Daily for 30 days. 150 mL 2   • Memantine HCl-Donepezil HCl 28-10 MG capsule sustained-release 24 hr Take  by mouth Daily.     • metoprolol tartrate (LOPRESSOR) 25 MG tablet Take 25 mg by mouth Daily.     • nitroglycerin (NITROSTAT) 0.4 MG SL tablet 1 under the tongue as needed for angina, may repeat q5mins for up three doses  25 tablet 2   • ondansetron ODT (ZOFRAN-ODT) 4 MG disintegrating tablet Place 1 tablet on the tongue Every 8 (Eight) Hours As Needed for Nausea or Vomiting. 14 tablet 0   • pantoprazole (PROTONIX) 40 MG EC tablet Take 40 mg by mouth Daily.     • promethazine (PHENERGAN) 12.5 MG tablet Take 1 tablet by mouth Every 8 (Eight) Hours As Needed for Nausea or Vomiting. 12 tablet 0     No current facility-administered medications for this visit.        Allergies:      No Known Allergies     Past Surgical History:     Past Surgical History:   Procedure Laterality Date   • BACK SURGERY     • CARDIAC CATHETERIZATION N/A 9/20/2016    Procedure: Left Heart Cath;  Surgeon: Giovanni Buenrostro MD;  [facility];  Service:    • CARDIAC CATHETERIZATION N/A 10/4/2016    Procedure: Left Heart Cath;  Surgeon: Bharathi Andrew MD;  [facility]  [facility];  Service:    • CARDIAC CATHETERIZATION N/A 5/9/2017    Procedure: Left Heart Cath;  Surgeon: Giovanni Buenrostro MD;  Location: [facility] CATH INVASIVE LOC[facility];  Service:    • CARDIAC CATHETERIZATION N/A 5/9/2017    Procedure: ERR;  Surgeon: Giovanni Buenrostro MD;  [facility]:  [facility]ATH INV[facility]IVE [facility];  Service:    • CARDIAC CATHETERIZATION N/A 11/8/2019    Procedure: Left Heart Cath;  Surgeon: Abraham Oviedo MD;  [facility]:  [facility]ATH [facility];  Service: Cardiology   • CARDIAC CATHETERIZATION N/A 12/18/2019    Procedure: Coronary angiography;  Surgeon: Jay Barney IV, MD;  Location:  [facility];  Service: Cardiovascular   • CHOLECYSTECTOMY     • COLONOSCOPY  11/13/2015    Dr. Martinez- internal hemorrhoids, sigmoid diverticulosis   • COLONOSCOPY N/A 8/17/2018    Procedure: COLONOSCOPY CPT CODE: 43300;  Surgeon: Gigi Geronimo MD;  Location: [facility];  Service: Gastroenterology   • CORONARY ANGIOPLASTY WITH STENT PLACEMENT     • ENDOSCOPY N/A 8/17/2018    Procedure: ESOPHAGOGASTRODUODENOSCOPY WITH BIOPSY  CPT CODE: 79099;  Surgeon: Gigi Geronimo MD;  Location:  [facility] OR;  Service: Gastroenterology   • ENDOSCOPY N/A 2021    Procedure: ESOPHAGOGASTRODUODENOSCOPY;  Surgeon: Geno Vernon MD;  Location:  [facility];  Service: Gastroenterology;  Laterality: N/A;   • INTERVENTIONAL RADIOLOGY PROCEDURE N/A 2019    Procedure: Coil Embolization of septal to pulmonary artery fistuala.;  Surgeon: Jay Barney IV, MD;  Location:  [facility] INVASIVE [facility];  Service: Cardiovascular   • UT RT/LT HEART CATHETERS N/A 2017    Procedure: Percutaneous Coronary Intervention;  Surgeon: Lincoln De Los Santos MD;  [facility]:  [facility]ATH INV[facility];  Service: Cardiology   • STOMACH SURGERY      FUSION OF BLEEDING ULCER   • UPPER GASTROINTESTINAL ENDOSCOPY  2015    Dr. Martinez- mild gastritis         Social History:     Social History     Socioeconomic History   • Marital status:      Spouse name: Not on file   • Number of children: Not on file   • Years of education: Not on file   • Highest education level: Not on file   Tobacco Use   • Smoking status: Former Smoker     Packs/day: 3.00     Years: 40.00     Pack years: 120.00     Types: Cigarettes     Quit date:      Years since quittin.5   • Smokeless tobacco: Former User     Quit date: 1986   Vaping Use   • Vaping Use: Never used   Substance and Sexual Activity   • Alcohol use: No   • Drug use: No   • Sexual activity: Defer       Family History:     Family History   Problem Relation Age of Onset   • Sick sinus syndrome Mother    • Heart failure Mother    • Diabetes Mother    • Liver cancer Father        Review of Systems:     Review of Systems   Constitutional: Negative.    HENT: Negative.    Eyes: Negative.    Respiratory: Negative.    Cardiovascular: Negative.    Gastrointestinal: Negative.    Endocrine: Negative.    Musculoskeletal: Negative.    Allergic/Immunologic: Negative.    Neurological: Negative.     Hematological: Negative.    Psychiatric/Behavioral: Negative.        Physical Exam:     Physical Exam  Vitals and nursing note reviewed.   Constitutional:       Appearance: He is well-developed.   HENT:      Head: Normocephalic and atraumatic.   Eyes:      Conjunctiva/sclera: Conjunctivae normal.      Pupils: Pupils are equal, round, and reactive to light.   Cardiovascular:      Rate and Rhythm: Normal rate and regular rhythm.      Heart sounds: Normal heart sounds.   Pulmonary:      Effort: Pulmonary effort is normal.      Breath sounds: Normal breath sounds.   Abdominal:      General: Bowel sounds are normal.      Palpations: Abdomen is soft.   Musculoskeletal:         General: Normal range of motion.      Cervical back: Normal range of motion.   Skin:     General: Skin is warm and dry.   Neurological:      Mental Status: He is alert and oriented to person, place, and time.      Deep Tendon Reflexes: Reflexes are normal and symmetric.   Psychiatric:         Behavior: Behavior normal.         Thought Content: Thought content normal.         Judgment: Judgment normal.         I have reviewed the following portions of the patient's history: allergies, current medications, past family history, past medical history, past social history, past surgical history, problem list and ROS and confirm it's accurate.      Procedure:       Assessment/Plan:   BPH: Discussed the pathophysiology of BPH and obstruction.  We discussed the static and dynamic effect effects of BPH as well as using 5 alpha reductase inhibitors versus alpha blockade.  We discussed the indications for transurethral surgery as well.  And/ or other therapeutic options available including all of the newer techniques.  He has significant dementia I do not think further interventions indicated                  This document has been electronically signed by JOSEFA KELLEY MD July 8, 2021 08:55 EDT

## 2021-07-12 NOTE — ED PROVIDER NOTES
Subjective   Patient presents to ER with nausea, and weakness      Nausea  The primary symptoms include nausea. The illness began yesterday. The onset was gradual.   The illness is also significant for anorexia.       Review of Systems   Constitutional: Positive for activity change and appetite change.   HENT: Negative.    Eyes: Negative.    Respiratory: Negative.    Cardiovascular: Positive for palpitations.   Gastrointestinal: Positive for anorexia and nausea.   Endocrine: Negative.    Genitourinary: Negative.    Musculoskeletal: Negative.    Allergic/Immunologic: Negative.    Hematological: Negative.    Psychiatric/Behavioral: Negative.        Past Medical History:   Diagnosis Date   • BPH (benign prostatic hyperplasia)    • Bradycardia     Positive tilt table testing 07/2016   • Coronary artery disease    • Diabetes mellitus (CMS/HCC)    • Diverticulosis    • Elevated cholesterol    • Gastric ulcer with hemorrhage    • Gastroesophageal reflux disease 1/26/2021   • History of transfusion    • Hyperlipidemia    • Hypertension    • Psoriasis        No Known Allergies    Past Surgical History:   Procedure Laterality Date   • BACK SURGERY     • CARDIAC CATHETERIZATION N/A 9/20/2016    Procedure: Left Heart Cath;  Surgeon: Giovanni Buenrostro MD;  Location: [facility] CATH INVASIVE LOCATION;  Service:    • CARDIAC CATHETERIZATION N/A 10/4/2016    Procedure: Left Heart Cath;  Surgeon: Bharathi Andrew MD;  Location: [facility] CATH INVASIVE LOCATION;  Service:    • CARDIAC CATHETERIZATION N/A 5/9/2017    Procedure: Left Heart Cath;  Surgeon: Giovanni Buenrostro MD;  Location: [facility] CATH INVASIVE LOCATION;  Service:    • CARDIAC CATHETERIZATION N/A 5/9/2017    Procedure: ERR;  Surgeon: Giovanni Buenrostro MD;  Location: [facility] CATH INVASIVE LOCATION;  Service:    • CARDIAC CATHETERIZATION N/A 11/8/2019    Procedure: Left Heart Cath;  Surgeon: Abraham Oviedo MD;  Location: [facility] CATH INVASIVE LOCATION;  Service: Cardiology   • CARDIAC  CATHETERIZATION N/A 12/18/2019    Procedure: Coronary angiography;  Surgeon: Jay Barney IV, MD;  Location:  [facility] CATH INVASIVE LOC[facility];  Service: Cardiovascular   • CHOLECYSTECTOMY     • COLONOSCOPY  11/13/2015    Dr. Martinez- internal hemorrhoids, sigmoid diverticulosis   • COLONOSCOPY N/A 8/17/2018    Procedure: COLONOSCOPY CPT CODE: 28395;  Surgeon: Gigi Geronimo MD;  Location:  [facility];  Service: Gastroenterology   • CORONARY ANGIOPLASTY WITH STENT PLACEMENT     • ENDOSCOPY N/A 8/17/2018    Procedure: ESOPHAGOGASTRODUODENOSCOPY WITH BIOPSY CPT CODE: 63990;  Surgeon: Gigi Geronimo MD;  Location:  [facility];  Service: Gastroenterology   • ENDOSCOPY N/A 4/20/2021    Procedure: ESOPHAGOGASTRODUODENOSCOPY;  Surgeon: Geno Vernon MD;  Location:  [facility];  Service: Gastroenterology;  Laterality: N/A;   • INTERVENTIONAL RADIOLOGY PROCEDURE N/A 12/18/2019    Procedure: Coil Embolization of septal to pulmonary artery fistuala.;  Surgeon: Jay Barney IV, MD;  Location:  [facility] INVAS[facility];  Service: Cardiovascular   • CA RT/LT HEART CATHETERS N/A 5/9/2017    Procedure: Percutaneous Coronary Intervention;  Surgeon: Lincoln De Los Santos MD;  [facility]:  [facility];  Service: Cardiology   • STOMACH SURGERY      FUSION OF BLEEDING ULCER   • UPPER GASTROINTESTINAL ENDOSCOPY  11/13/2015    Dr. Martinez- mild gastritis       Family History   Problem Relation Age of Onset   • Sick sinus syndrome Mother    • Heart failure Mother    • Diabetes Mother    • Liver cancer Father        Social History     Socioeconomic History   • Marital status:      Spouse name: Not on file   • Number of children: Not on file   • Years of education: Not on file   • Highest education level: Not on file   Tobacco Use   • Smoking status: Former Smoker     Packs/day: 3.00     Years: 40.00     Pack years: 120.00     Types: Cigarettes     Quit date: 1982     Years since  quittin.5   • Smokeless tobacco: Former User     Quit date: 1986   Vaping Use   • Vaping Use: Never used   Substance and Sexual Activity   • Alcohol use: No   • Drug use: No   • Sexual activity: Defer           Objective   Physical Exam  Vitals and nursing note reviewed.   Constitutional:       Appearance: Normal appearance.   HENT:      Head: Normocephalic.      Nose: Nose normal.      Mouth/Throat:      Mouth: Mucous membranes are moist.   Eyes:      Pupils: Pupils are equal, round, and reactive to light.   Cardiovascular:      Rate and Rhythm: Bradycardia present.      Pulses: Normal pulses.   Abdominal:      General: Abdomen is flat.   Musculoskeletal:         General: Normal range of motion.      Cervical back: Normal range of motion.   Skin:     General: Skin is warm.      Capillary Refill: Capillary refill takes less than 2 seconds.   Neurological:      General: No focal deficit present.      Mental Status: He is alert.         Procedures           ED Course  ED Course as of 1626   Sun 2021   0954 ECG 9:44 Sinus bradycardia, rate 56. QT/QTc 424/409    [REBECA]   1110 CXR : negative    [REBECA]      ED Course User Index  [REBECA] Riki Lo MD                                           [facility]    Final diagnoses:   Nausea       ED Disposition  ED Disposition     ED Disposition Condition Comment    Discharge Stable           Camila Ortiz, APRN  [address]  [facility] [address] [phone]    Schedule an appointment as soon as possible for a visit   If symptoms worsen         Medication List      New Prescriptions    ondansetron ODT 4 MG disintegrating tablet  Commonly known as: ZOFRAN-ODT  Place 1 tablet on the tongue Every 8 (Eight) Hours As Needed for Nausea or Vomiting.     promethazine 12.5 MG tablet  Commonly known as: PHENERGAN  Take 1 tablet by mouth Every 8 (Eight) Hours As Needed for Nausea or Vomiting.           Where to Get Your Medications      These medications were sent to  [facility] [place], [place] - [address] [place] [address] - [phone]  - [phone]   [address] [facility] [address]    Phone: [phone]   · ondansetron ODT 4 MG disintegrating tablet  · promethazine 12.5 MG tablet          Riki Lo MD  07/12/21 3529

## 2021-07-20 ENCOUNTER — APPOINTMENT (OUTPATIENT)
Dept: GENERAL RADIOLOGY | Facility: HOSPITAL | Age: 76
End: 2021-07-20

## 2021-07-20 ENCOUNTER — HOSPITAL ENCOUNTER (EMERGENCY)
Facility: HOSPITAL | Age: 76
Discharge: HOME | End: 2021-07-21
Attending: EMERGENCY MEDICINE | Admitting: EMERGENCY MEDICINE

## 2021-07-20 DIAGNOSIS — R07.9 CHEST PAIN, UNSPECIFIED TYPE: Primary | ICD-10-CM

## 2021-07-20 DIAGNOSIS — R00.1 SINUS BRADYCARDIA: ICD-10-CM

## 2021-07-20 LAB
ALBUMIN SERPL-MCNC: 3.66 G/DL (ref 3.5–5.2)
ALBUMIN/GLOB SERPL: 1.3 G/DL
ALP SERPL-CCNC: 98 U/L (ref 39–117)
ALT SERPL W P-5'-P-CCNC: 42 U/L (ref 1–41)
ANION GAP SERPL CALCULATED.3IONS-SCNC: 11 MMOL/L (ref 5–15)
AST SERPL-CCNC: 37 U/L (ref 1–40)
BASOPHILS # BLD AUTO: 0.09 10*3/MM3 (ref 0–0.2)
BASOPHILS NFR BLD AUTO: 1.1 % (ref 0–1.5)
BILIRUB SERPL-MCNC: 0.5 MG/DL (ref 0–1.2)
BUN SERPL-MCNC: 15 MG/DL (ref 8–23)
BUN/CREAT SERPL: 19.2 (ref 7–25)
CALCIUM SPEC-SCNC: 8.7 MG/DL (ref 8.6–10.5)
CHLORIDE SERPL-SCNC: 105 MMOL/L (ref 98–107)
CO2 SERPL-SCNC: 24 MMOL/L (ref 22–29)
CREAT SERPL-MCNC: 0.78 MG/DL (ref 0.76–1.27)
CRP SERPL-MCNC: <0.3 MG/DL (ref 0–0.5)
DEPRECATED RDW RBC AUTO: 48 FL (ref 37–54)
EOSINOPHIL # BLD AUTO: 0.45 10*3/MM3 (ref 0–0.4)
EOSINOPHIL NFR BLD AUTO: 5.5 % (ref 0.3–6.2)
ERYTHROCYTE [DISTWIDTH] IN BLOOD BY AUTOMATED COUNT: 13.5 % (ref 12.3–15.4)
FLUAV RNA RESP QL NAA+PROBE: NOT DETECTED
FLUBV RNA RESP QL NAA+PROBE: NOT DETECTED
GFR SERPL CREATININE-BSD FRML MDRD: 97 ML/MIN/1.73
GLOBULIN UR ELPH-MCNC: 2.7 GM/DL
GLUCOSE SERPL-MCNC: 134 MG/DL (ref 65–99)
HCT VFR BLD AUTO: 38.5 % (ref 37.5–51)
HGB BLD-MCNC: 13 G/DL (ref 13–17.7)
HOLD SPECIMEN: NORMAL
HOLD SPECIMEN: NORMAL
IMM GRANULOCYTES # BLD AUTO: 0.03 10*3/MM3 (ref 0–0.05)
IMM GRANULOCYTES NFR BLD AUTO: 0.4 % (ref 0–0.5)
LYMPHOCYTES # BLD AUTO: 1.62 10*3/MM3 (ref 0.7–3.1)
LYMPHOCYTES NFR BLD AUTO: 19.7 % (ref 19.6–45.3)
MAGNESIUM SERPL-MCNC: 2 MG/DL (ref 1.6–2.4)
MCH RBC QN AUTO: 32.3 PG (ref 26.6–33)
MCHC RBC AUTO-ENTMCNC: 33.8 G/DL (ref 31.5–35.7)
MCV RBC AUTO: 95.8 FL (ref 79–97)
MONOCYTES # BLD AUTO: 0.55 10*3/MM3 (ref 0.1–0.9)
MONOCYTES NFR BLD AUTO: 6.7 % (ref 5–12)
NEUTROPHILS NFR BLD AUTO: 5.49 10*3/MM3 (ref 1.7–7)
NEUTROPHILS NFR BLD AUTO: 66.6 % (ref 42.7–76)
NRBC BLD AUTO-RTO: 0 /100 WBC (ref 0–0.2)
NT-PROBNP SERPL-MCNC: 248.4 PG/ML (ref 0–900)
PLATELET # BLD AUTO: 186 10*3/MM3 (ref 140–450)
PMV BLD AUTO: 11 FL (ref 6–12)
POTASSIUM SERPL-SCNC: 3.8 MMOL/L (ref 3.5–5.2)
PROT SERPL-MCNC: 6.4 G/DL (ref 6–8.5)
RBC # BLD AUTO: 4.02 10*6/MM3 (ref 4.14–5.8)
SARS-COV-2 RNA RESP QL NAA+PROBE: NOT DETECTED
SODIUM SERPL-SCNC: 140 MMOL/L (ref 136–145)
T4 FREE SERPL-MCNC: 1.08 NG/DL (ref 0.93–1.7)
TROPONIN T SERPL-MCNC: <0.01 NG/ML (ref 0–0.03)
TROPONIN T SERPL-MCNC: <0.01 NG/ML (ref 0–0.03)
TSH SERPL DL<=0.05 MIU/L-ACNC: 1.47 UIU/ML (ref 0.27–4.2)
WBC # BLD AUTO: 8.23 10*3/MM3 (ref 3.4–10.8)
WHOLE BLOOD HOLD SPECIMEN: NORMAL

## 2021-07-20 PROCEDURE — 71045 X-RAY EXAM CHEST 1 VIEW: CPT

## 2021-07-20 PROCEDURE — 83880 ASSAY OF NATRIURETIC PEPTIDE: CPT | Performed by: EMERGENCY MEDICINE

## 2021-07-20 PROCEDURE — 99284 EMERGENCY DEPT VISIT MOD MDM: CPT

## 2021-07-20 PROCEDURE — 86140 C-REACTIVE PROTEIN: CPT | Performed by: EMERGENCY MEDICINE

## 2021-07-20 PROCEDURE — 84484 ASSAY OF TROPONIN QUANT: CPT | Performed by: PHYSICIAN ASSISTANT

## 2021-07-20 PROCEDURE — 93005 ELECTROCARDIOGRAM TRACING: CPT | Performed by: PHYSICIAN ASSISTANT

## 2021-07-20 PROCEDURE — 84443 ASSAY THYROID STIM HORMONE: CPT | Performed by: EMERGENCY MEDICINE

## 2021-07-20 PROCEDURE — 84439 ASSAY OF FREE THYROXINE: CPT | Performed by: EMERGENCY MEDICINE

## 2021-07-20 PROCEDURE — 93005 ELECTROCARDIOGRAM TRACING: CPT | Performed by: EMERGENCY MEDICINE

## 2021-07-20 PROCEDURE — 87636 SARSCOV2 & INF A&B AMP PRB: CPT | Performed by: EMERGENCY MEDICINE

## 2021-07-20 PROCEDURE — 84484 ASSAY OF TROPONIN QUANT: CPT | Performed by: EMERGENCY MEDICINE

## 2021-07-20 PROCEDURE — 71045 X-RAY EXAM CHEST 1 VIEW: CPT | Performed by: RADIOLOGY

## 2021-07-20 PROCEDURE — 80053 COMPREHEN METABOLIC PANEL: CPT | Performed by: PHYSICIAN ASSISTANT

## 2021-07-20 PROCEDURE — 85025 COMPLETE CBC W/AUTO DIFF WBC: CPT | Performed by: PHYSICIAN ASSISTANT

## 2021-07-20 PROCEDURE — 83735 ASSAY OF MAGNESIUM: CPT | Performed by: EMERGENCY MEDICINE

## 2021-07-20 PROCEDURE — 93010 ELECTROCARDIOGRAM REPORT: CPT | Performed by: INTERNAL MEDICINE

## 2021-07-20 RX ORDER — SODIUM CHLORIDE 0.9 % (FLUSH) 0.9 %
10 SYRINGE (ML) INJECTION AS NEEDED
Status: DISCONTINUED | OUTPATIENT
Start: 2021-07-20 | End: 2021-07-21 | Stop reason: HOSPADM

## 2021-07-20 RX ORDER — ASPIRIN 81 MG/1
324 TABLET, CHEWABLE ORAL ONCE
Status: COMPLETED | OUTPATIENT
Start: 2021-07-20 | End: 2021-07-20

## 2021-07-20 RX ADMIN — ASPIRIN 324 MG: 81 TABLET, CHEWABLE ORAL at 19:47

## 2021-07-20 NOTE — ED NOTES
FALL BRACELET PLACED ON PT, EDUC. GIVEN, PT VERB UNDERSTANDING     Germaine Osorio, DIOR  07/20/21 7132

## 2021-07-20 NOTE — ED NOTES
MEDICAL SCREENING     Patient initially seen in triage.  The patient was advised further evaluation and diagnostic testing will be needed, some of the treatment and testing will be initiated in the lobby in order to begin the process.  The patient will be returned to the waiting area for the time being and possibly be re-assessed by a subsequent ED provider.  The patient will be brought back to the treatment area in as timely manner as possible.    D/w Clifton Fall who is going to make a room for the patient.     José Bustamante PA  07/20/21 1942

## 2021-07-21 VITALS
RESPIRATION RATE: 18 BRPM | BODY MASS INDEX: 20.3 KG/M2 | HEART RATE: 42 BPM | OXYGEN SATURATION: 99 % | HEIGHT: 71 IN | SYSTOLIC BLOOD PRESSURE: 162 MMHG | TEMPERATURE: 97.2 F | DIASTOLIC BLOOD PRESSURE: 62 MMHG | WEIGHT: 145 LBS

## 2021-07-21 NOTE — DISCHARGE INSTRUCTIONS
Home in care of family.  Rest.  Take your medications as prescribed.  See Dr. Buenrostro in the office in 1 to 2 days.  Return to the emergency department right away if symptoms worsen/any problems.

## 2021-07-21 NOTE — ED PROVIDER NOTES
Subjective   Patient is a 75-year-old male who presents tonight in company of his wife with a chief complaint of chest pain.  This has been coming and going today, sharp pains in the center of his chest.  This does not radiate.  No associated shortness of breath, nausea, vomiting, diaphoresis, syncope or near syncope.  His wife states that his heart rate has been low today, 40s and 50s, 39 at one point.  Wife reports that he has had generalized weakness today as well.  Wife goes on to report that he has had a low heart rate in the 40s and 50s in the past, and that his doctors have known about it.  Patient states he is having no chest pain or discomfort, no symptoms of any kind at this moment.  He has not otherwise been ill within the past few days.  Patient does have a known history of coronary artery disease.          Review of Systems   Constitutional: Negative for chills, diaphoresis and fever.   HENT: Negative for ear pain, sore throat and trouble swallowing.    Eyes: Negative for photophobia and pain.   Respiratory: Negative for shortness of breath, wheezing and stridor.    Cardiovascular: Positive for chest pain. Negative for palpitations.   Gastrointestinal: Negative for abdominal distention, abdominal pain, blood in stool, diarrhea, nausea and vomiting.   Endocrine: Negative for polydipsia and polyphagia.   Genitourinary: Negative for difficulty urinating and flank pain.   Musculoskeletal: Negative for back pain, neck pain and neck stiffness.   Skin: Negative for color change and pallor.   Neurological: Negative for seizures, syncope and speech difficulty.   Psychiatric/Behavioral: Negative for confusion.   All other systems reviewed and are negative.      Past Medical History:   Diagnosis Date   • BPH (benign prostatic hyperplasia)    • Bradycardia     Positive tilt table testing 07/2016   • Coronary artery disease    • Diabetes mellitus (CMS/HCC)    • Diverticulosis    • Elevated cholesterol    • Gastric  ulcer with hemorrhage    • Gastroesophageal reflux disease 1/26/2021   • History of transfusion    • Hyperlipidemia    • Hypertension    • Psoriasis        No Known Allergies    Past Surgical History:   Procedure Laterality Date   • BACK SURGERY     • CARDIAC CATHETERIZATION N/A 9/20/2016    Procedure: Left Heart Cath;  Surgeon: Giovanni Buenrostro MD;  Location:  [facility];  Service:    • CARDIAC CATHETERIZATION N/A 10/4/2016    Procedure: Left Heart Cath;  Surgeon: Bharathi Andrew MD;  Location:  [facility];  Service:    • CARDIAC CATHETERIZATION N/A 5/9/2017    Procedure: Left Heart Cath;  Surgeon: Giovanni Buenrostro MD;  Location:  [facility];  Service:    • CARDIAC CATHETERIZATION N/A 5/9/2017    Procedure: ERR;  Surgeon: Giovanni Buenrostro MD;  Location:  [facility];  Service:    • CARDIAC CATHETERIZATION N/A 11/8/2019    Procedure: Left Heart Cath;  Surgeon: Abraham Oviedo MD;  Location:  [facility];  Service: Cardiology   • CARDIAC CATHETERIZATION N/A 12/18/2019    Procedure: Coronary angiography;  Surgeon: Jay Barney IV, MD;  Location:  [facility];  Service: Cardiovascular   • CHOLECYSTECTOMY     • COLONOSCOPY  11/13/2015    Dr. Martinez- internal hemorrhoids, sigmoid diverticulosis   • COLONOSCOPY N/A 8/17/2018    Procedure: COLONOSCOPY CPT CODE: 51761;  Surgeon: Gigi Geronimo MD;  Location: [facility] OR;  Service: Gastroenterology   • CORONARY ANGIOPLASTY WITH STENT PLACEMENT     • ENDOSCOPY N/A 8/17/2018    Procedure: ESOPHAGOGASTRODUODENOSCOPY WITH BIOPSY CPT CODE: 77513;  Surgeon: Gigi Geronimo MD;  Location: [facility] OR;  Service: Gastroenterology   • ENDOSCOPY N/A 4/20/2021    Procedure: ESOPHAGOGASTRODUODENOSCOPY;  Surgeon: Geno Vernon MD;  Location: [facility] OR;  Service: Gastroenterology;  Laterality: N/A;   • INTERVENTIONAL RADIOLOGY PROCEDURE N/A 12/18/2019     Procedure: Coil Embolization of septal to pulmonary artery fistuala.;  Surgeon: Jay Barney IV, MD;  Location:  [facility];  Service: Cardiovascular   • CA RT/LT HEART CATHETERS N/A 2017    Procedure: Percutaneous Coronary Intervention;  Surgeon: Lincoln De Los Santos MD;  Location:  [facility];  Service: Cardiology   • STOMACH SURGERY      FUSION OF BLEEDING ULCER   • UPPER GASTROINTESTINAL ENDOSCOPY  2015    Dr. Martinez- mild gastritis       Family History   Problem Relation Age of Onset   • Sick sinus syndrome Mother    • Heart failure Mother    • Diabetes Mother    • Liver cancer Father        Social History     Socioeconomic History   • Marital status:      Spouse name: Not on file   • Number of children: Not on file   • Years of education: Not on file   • Highest education level: Not on file   Tobacco Use   • Smoking status: Former Smoker     Packs/day: 3.00     Years: 40.00     Pack years: 120.00     Types: Cigarettes     Quit date:      Years since quittin.5   • Smokeless tobacco: Former User     Quit date: 1986   Vaping Use   • Vaping Use: Never used   Substance and Sexual Activity   • Alcohol use: No   • Drug use: No   • Sexual activity: Defer           Objective   Physical Exam  Vitals and nursing note reviewed.   Constitutional:       General: He is not in acute distress.     Appearance: Normal appearance. He is well-developed. He is not ill-appearing, toxic-appearing or diaphoretic.   HENT:      Head: Normocephalic and atraumatic.   Eyes:      General: No scleral icterus.     Pupils: Pupils are equal, round, and reactive to light.   Neck:      Trachea: No tracheal deviation.   Cardiovascular:      Rate and Rhythm: Regular rhythm. Bradycardia present.   Pulmonary:      Effort: Pulmonary effort is normal. No respiratory distress.      Breath sounds: Normal breath sounds.   Chest:      Chest wall: No tenderness.   Abdominal:       General: Bowel sounds are normal.      Palpations: Abdomen is soft.      Tenderness: There is no abdominal tenderness. There is no guarding or rebound.   Musculoskeletal:         General: No tenderness. Normal range of motion.      Cervical back: Normal range of motion and neck supple. No rigidity or tenderness.      Right lower leg: No edema.      Left lower leg: No edema.   Skin:     General: Skin is warm and dry.      Capillary Refill: Capillary refill takes less than 2 seconds.      Coloration: Skin is not pale.   Neurological:      General: No focal deficit present.      Mental Status: He is alert.      GCS: GCS eye subscore is 4. GCS verbal subscore is 5. GCS motor subscore is 6.      Motor: No abnormal muscle tone.      Comments: Patient is oriented to person, place, situation, not quite to time.  This is not unusual for him from reviewing previous records.   Psychiatric:         Mood and Affect: Mood normal.         Behavior: Behavior normal.         Procedures  XR Chest 1 View   Final Result     Unremarkable exam. No acute cardiopulmonary findings identified.       This report was finalized on 7/20/2021 8:35 PM by Dr. Benigno Powers MD.            Results for orders placed or performed during the hospital encounter of 07/20/21   COVID-19 and FLU A/B PCR - Swab, Nasopharynx    Specimen: Nasopharynx; Swab   Result Value Ref Range    COVID19 Not Detected Not Detected - Ref. Range    Influenza A PCR Not Detected Not Detected    Influenza B PCR Not Detected Not Detected   Comprehensive Metabolic Panel    Specimen: Blood   Result Value Ref Range    Glucose 134 (H) 65 - 99 mg/dL    BUN 15 8 - 23 mg/dL    Creatinine 0.78 0.76 - 1.27 mg/dL    Sodium 140 136 - 145 mmol/L    Potassium 3.8 3.5 - 5.2 mmol/L    Chloride 105 98 - 107 mmol/L    CO2 24.0 22.0 - 29.0 mmol/L    Calcium 8.7 8.6 - 10.5 mg/dL    Total Protein 6.4 6.0 - 8.5 g/dL    Albumin 3.66 3.50 - 5.20 g/dL    ALT (SGPT) 42 (H) 1 - 41 U/L    AST (SGOT) 37 1 -  40 U/L    Alkaline Phosphatase 98 39 - 117 U/L    Total Bilirubin 0.5 0.0 - 1.2 mg/dL    eGFR Non African Amer 97 >60 mL/min/1.73    Globulin 2.7 gm/dL    A/G Ratio 1.3 g/dL    BUN/Creatinine Ratio 19.2 7.0 - 25.0    Anion Gap 11.0 5.0 - 15.0 mmol/L   Troponin    Specimen: Blood   Result Value Ref Range    Troponin T <0.010 0.000 - 0.030 ng/mL   CBC Auto Differential    Specimen: Blood   Result Value Ref Range    WBC 8.23 3.40 - 10.80 10*3/mm3    RBC 4.02 (L) 4.14 - 5.80 10*6/mm3    Hemoglobin 13.0 13.0 - 17.7 g/dL    Hematocrit 38.5 37.5 - 51.0 %    MCV 95.8 79.0 - 97.0 fL    MCH 32.3 26.6 - 33.0 pg    MCHC 33.8 31.5 - 35.7 g/dL    RDW 13.5 12.3 - 15.4 %    RDW-SD 48.0 37.0 - 54.0 fl    MPV 11.0 6.0 - 12.0 fL    Platelets 186 140 - 450 10*3/mm3    Neutrophil % 66.6 42.7 - 76.0 %    Lymphocyte % 19.7 19.6 - 45.3 %    Monocyte % 6.7 5.0 - 12.0 %    Eosinophil % 5.5 0.3 - 6.2 %    Basophil % 1.1 0.0 - 1.5 %    Immature Grans % 0.4 0.0 - 0.5 %    Neutrophils, Absolute 5.49 1.70 - 7.00 10*3/mm3    Lymphocytes, Absolute 1.62 0.70 - 3.10 10*3/mm3    Monocytes, Absolute 0.55 0.10 - 0.90 10*3/mm3    Eosinophils, Absolute 0.45 (H) 0.00 - 0.40 10*3/mm3    Basophils, Absolute 0.09 0.00 - 0.20 10*3/mm3    Immature Grans, Absolute 0.03 0.00 - 0.05 10*3/mm3    nRBC 0.0 0.0 - 0.2 /100 WBC   BNP    Specimen: Blood   Result Value Ref Range    proBNP 248.4 0.0 - 900.0 pg/mL   C-reactive Protein    Specimen: Blood   Result Value Ref Range    C-Reactive Protein <0.30 0.00 - 0.50 mg/dL   T4, Free    Specimen: Blood   Result Value Ref Range    Free T4 1.08 0.93 - 1.70 ng/dL   TSH    Specimen: Blood   Result Value Ref Range    TSH 1.470 0.270 - 4.200 uIU/mL   Magnesium    Specimen: Blood   Result Value Ref Range    Magnesium 2.0 1.6 - 2.4 mg/dL   Troponin    Specimen: Arm, Right; Blood   Result Value Ref Range    Troponin T <0.010 0.000 - 0.030 ng/mL   Green Top (Gel)   Result Value Ref Range    Extra Tube Hold for add-ons.    Lavender  Top   Result Value Ref Range    Extra Tube hold for add-on    Gold Top - SST   Result Value Ref Range    Extra Tube Hold for add-ons.                 ED Course  ED Course as of Jul 21 0043   Tue Jul 20, 2021 1949 EKG performed at 1852 shows sinus bradycardia, rate 52.  MA interval 178, QRS duration 90, QTc 418 ms.  There is some baseline artifact.  No apparent acute ischemia.  No evidence for STEMI.    [CM]   Wed Jul 21, 2021 0034 Patient's emergency department stay has been uneventful.  He has shown no signs of distress, has appeared to be resting comfortably throughout his stay.  He voices that he feels well, normal, has been asymptomatic throughout his time here, and that he would like to go home.  He will follow closely with Dr. Buenrostro.  He will return to the emergency department immediately if his symptoms worsen or if he has any problems.    [CM]      ED Course User Index  [CM] Saman Spring MD                                         HEART Score (for prediction of 6-week risk of major adverse cardiac event) reviewed and/or performed as part of the patient evaluation and treatment planning process.  The result associated with this review/performance is: 3       MDM    Final diagnoses:   Chest pain, unspecified type       ED Disposition  ED Disposition     ED Disposition Condition Comment    Discharge Stable             Please note that portions of this note were completed with a voice recognition program.          Saman Spring MD  07/21/21 0043

## 2021-07-22 ENCOUNTER — OFFICE VISIT (OUTPATIENT)
Dept: CARDIOLOGY | Facility: CLINIC | Age: 76
End: 2021-07-22

## 2021-07-22 VITALS
WEIGHT: 142.4 LBS | BODY MASS INDEX: 19.94 KG/M2 | HEART RATE: 42 BPM | OXYGEN SATURATION: 98 % | HEIGHT: 71 IN | SYSTOLIC BLOOD PRESSURE: 128 MMHG | DIASTOLIC BLOOD PRESSURE: 46 MMHG | TEMPERATURE: 97.7 F

## 2021-07-22 DIAGNOSIS — R42 DIZZINESS: ICD-10-CM

## 2021-07-22 DIAGNOSIS — I25.10 ASCVD (ARTERIOSCLEROTIC CARDIOVASCULAR DISEASE): Primary | Chronic | ICD-10-CM

## 2021-07-22 DIAGNOSIS — R00.1 BRADYCARDIA, DRUG INDUCED: ICD-10-CM

## 2021-07-22 DIAGNOSIS — I10 ESSENTIAL HYPERTENSION: Chronic | ICD-10-CM

## 2021-07-22 DIAGNOSIS — T50.905A BRADYCARDIA, DRUG INDUCED: ICD-10-CM

## 2021-07-22 LAB
QT INTERVAL: 450 MS
QTC INTERVAL: 418 MS

## 2021-07-22 PROCEDURE — 99214 OFFICE O/P EST MOD 30 MIN: CPT | Performed by: PHYSICIAN ASSISTANT

## 2021-07-22 RX ORDER — ISOSORBIDE MONONITRATE 120 MG/1
120 TABLET, EXTENDED RELEASE ORAL EVERY MORNING
Qty: 90 TABLET | Refills: 2 | Status: SHIPPED | OUTPATIENT
Start: 2021-07-22

## 2021-07-22 RX ORDER — CETIRIZINE HYDROCHLORIDE 10 MG/1
10 TABLET ORAL DAILY
COMMUNITY

## 2021-07-22 NOTE — PROGRESS NOTES
Camila Ortiz APRN  Fidencio Luciano  1945  07/22/2021    Patient Active Problem List   Diagnosis   • Essential hypertension   • Type 2 diabetes mellitus (CMS/HCC)   • Benign prostatic hyperplasia   • ASCVD (arteriosclerotic cardiovascular disease), s/p stenting of 80 % stenosis in left circumflex on 10/05/16and 60% stenosis in the LAD, clinically stable.    • Hyperlipidemia LDL goal <70   • Weakness generalized   • Chronic fatigue   • Coronary artery fistula   • Weight loss, unintentional   • Iron deficiency anemia   • Iron deficiency   • Normocytic anemia   • Weight loss, abnormal   • Early satiety   • Esophageal dysphagia   • Gastroesophageal reflux disease   • Dysphagia   • Chest pain   • Sense of impending doom       Dear Camila Ortiz APRN:    Subjective     History of Present Illness:    Chief Complaint   Patient presents with   • Follow-up     ER FOLLOW UP   • Hypotension   • Slow Heart Rate       Fidencio Luciano is a pleasant 75 y.o. male with a past medical history significant for  coronary artery disease status post stenting of the left circumflex coronary artery in October 2016 with subsequent coiling of the fistulous connection between the LAD and pulmonary artery in December 2019 by Dr. Barney at [facility].  Patient also has history of essential hypertension and dyslipidemia.  He comes in today for routine cardiology follow-up.    Mr. Luciano reports that he has been having chest pains recently at least a few times weekly.  He describes these pains as a sharp sensation in the center of his chest that he rates a 10 out of 10 on the pain scale.  He was recently seen in the emergency department on 7/20/2021 where he did rule out for an acute myocardial infarction his symptoms improved so he was discharged in stable condition.  Today in the office he is aware of person place and was able to tell me what year and month it was but did have the day of the month incorrect.  His  heart rate is still bradycardic today however he is still getting metoprolol.  Of note he does have constant weakness and fatigue reportedly staying in the bed most of the day and when he does get up he does get dizzy and has to support himself on the walls or with a cane/walker.    No Known Allergies:      Current Outpatient Medications:   •  aspirin 81 MG tablet, Take 81 mg by mouth Daily., Disp: , Rfl:   •  atorvastatin (LIPITOR) 80 MG tablet, Take 1 tablet by mouth Every Night., Disp: 90 tablet, Rfl: 5  •  cetirizine (zyrTEC) 10 MG tablet, Take 10 mg by mouth Daily., Disp: , Rfl:   •  clopidogrel (PLAVIX) 75 MG tablet, Take 1 tablet by mouth Daily., Disp: 30 tablet, Rfl: 5  •  ferrous sulfate 325 (65 FE) MG tablet, Take 325 mg by mouth 2 (two) times a day., Disp: , Rfl:   •  finasteride (PROSCAR) 5 MG tablet, Take 1 tablet by mouth Daily., Disp: 9 tablet, Rfl: 3  •  isosorbide mononitrate (IMDUR) 120 MG 24 hr tablet, Take 1 tablet by mouth Every Morning., Disp: 90 tablet, Rfl: 2  •  lisinopril (PRINIVIL,ZESTRIL) 20 MG tablet, Take 20 mg by mouth Daily., Disp: , Rfl:   •  Memantine HCl-Donepezil HCl 28-10 MG capsule sustained-release 24 hr, Take  by mouth Daily., Disp: , Rfl:   •  nitroglycerin (NITROSTAT) 0.4 MG SL tablet, 1 under the tongue as needed for angina, may repeat q5mins for up three doses, Disp: 25 tablet, Rfl: 2  •  pantoprazole (PROTONIX) 40 MG EC tablet, Take 40 mg by mouth Daily., Disp: , Rfl:   •  megestrol (Megace ES) 625 MG/5ML suspension, Take 5 mL by mouth Daily for 30 days., Disp: 150 mL, Rfl: 2  •  ondansetron ODT (ZOFRAN-ODT) 4 MG disintegrating tablet, Place 1 tablet on the tongue Every 8 (Eight) Hours As Needed for Nausea or Vomiting., Disp: 14 tablet, Rfl: 0  •  promethazine (PHENERGAN) 12.5 MG tablet, Take 1 tablet by mouth Every 8 (Eight) Hours As Needed for Nausea or Vomiting., Disp: 12 tablet, Rfl: 0    The following portions of the patient's history were reviewed and updated as  "appropriate: allergies, current medications, past family history, past medical history, past social history, past surgical history and problem list.    Social History     Tobacco Use   • Smoking status: Former Smoker     Packs/day: 3.00     Years: 40.00     Pack years: 120.00     Types: Cigarettes     Quit date:      Years since quittin.5   • Smokeless tobacco: Former User     Quit date: 1986   Vaping Use   • Vaping Use: Never used   Substance Use Topics   • Alcohol use: No   • Drug use: No         Objective   Vitals:    21 1043   BP: 128/46   Pulse: (!) 42   Temp: 97.7 °F (36.5 °C)   SpO2: 98%   Weight: 64.6 kg (142 lb 6.4 oz)   Height: 180.3 cm (71\")     Body mass index is 19.86 kg/m².    Constitutional:       General: Not in acute distress.     Appearance: Healthy appearance. Well-developed and not in distress. Not diaphoretic.   Eyes:      Conjunctiva/sclera: Conjunctivae normal.      Pupils: Pupils are equal, round, and reactive to light.   HENT:      Head: Normocephalic and atraumatic.   Neck:      Vascular: No carotid bruit or JVD.   Pulmonary:      Effort: Pulmonary effort is normal. No respiratory distress.      Breath sounds: Normal breath sounds.   Cardiovascular:      Bradycardia present. Regular rhythm.   Skin:     General: Skin is cool.   Neurological:      Mental Status: Alert, oriented to person, place, and time and oriented to person, place and time.         Lab Results   Component Value Date     2021    K 3.8 2021     2021    CO2 24.0 2021    BUN 15 2021    CREATININE 0.78 2021    GLUCOSE 134 (H) 2021    CALCIUM 8.7 2021    AST 37 2021    ALT 42 (H) 2021    ALKPHOS 98 2021    LABIL2 1.4 (L) 2016     Lab Results   Component Value Date    CKTOTAL 93 2021     Lab Results   Component Value Date    WBC 8.23 2021    HGB 13.0 2021    HCT 38.5 2021     2021     Lab " Results   Component Value Date    INR 1.02 05/24/2021    INR 1.08 03/13/2021    INR 1.10 03/09/2021     Lab Results   Component Value Date    MG 2.0 07/20/2021     Lab Results   Component Value Date    TSH 1.470 07/20/2021    PSA 1.440 07/17/2020    CHLPL 109 08/12/2015    TRIG 115 05/25/2021    HDL 29 (L) 05/25/2021    LDL 58 05/25/2021      Lab Results   Component Value Date    BNP 55.0 07/16/2016       During this visit the following were done:  Labs Reviewed []    Labs Ordered []    Radiology Reports Reviewed []    Radiology Ordered []    PCP Records Reviewed []    Referring Provider Records Reviewed []    ER Records Reviewed []    Hospital Records Reviewed []    History Obtained From Family []    Radiology Images Reviewed []    Other Reviewed []    Records Requested []       Procedures    Assessment/Plan    Diagnosis Plan   1. ASCVD (arteriosclerotic cardiovascular disease), s/p stenting of 80 % stenosis in left circumflex on 10/05/16and 60% stenosis in the LAD, clinically stable.      2. Essential hypertension     3. Dizziness  Holter Monitor - 72 Hour Up To 15 Days   4. Bradycardia, drug induced  Holter Monitor - 72 Hour Up To 15 Days            Recommendations:  1. Dizziness/bradycardia  1. Patient still metoprolol even though I discontinue this prior to my last visit.  I will go ahead and discontinue this again in strongly emphasized to not take this medication.  2. I will have him come in this Monday, 7/26/2021 in roughly 3 to 4 days to wear 7-day monitor if he still has persistent bradycardia will consider pacemaker at next visit.  2. ASCVD  1. For his chest pain to increase Imdur to 120 mg daily  2. Continue Lipitor, Plavix, aspirin, and lisinopril.      No follow-ups on file.    As always, I appreciate very much the opportunity to participate in the cardiovascular care of your patients.      With Best Regards,    Delbert Gupta PA-C

## 2021-07-26 ENCOUNTER — TRANSCRIBE ORDERS (OUTPATIENT)
Dept: ADMINISTRATIVE | Facility: HOSPITAL | Age: 76
End: 2021-07-26

## 2021-07-26 DIAGNOSIS — F02.80 ALZHEIMER'S DISEASE (HCC): Primary | ICD-10-CM

## 2021-07-26 DIAGNOSIS — G30.9 ALZHEIMER'S DISEASE (HCC): Primary | ICD-10-CM

## 2021-07-28 ENCOUNTER — HOSPITAL ENCOUNTER (OUTPATIENT)
Dept: MRI IMAGING | Facility: HOSPITAL | Age: 76
Discharge: HOME | End: 2021-07-28
Admitting: PSYCHIATRY & NEUROLOGY

## 2021-07-28 DIAGNOSIS — G30.9 ALZHEIMER'S DISEASE (HCC): ICD-10-CM

## 2021-07-28 DIAGNOSIS — F02.80 ALZHEIMER'S DISEASE (HCC): ICD-10-CM

## 2021-07-28 PROCEDURE — 70551 MRI BRAIN STEM W/O DYE: CPT

## 2021-07-28 PROCEDURE — 70551 MRI BRAIN STEM W/O DYE: CPT | Performed by: RADIOLOGY

## 2021-07-30 ENCOUNTER — CLINICAL SUPPORT (OUTPATIENT)
Dept: CARDIOLOGY | Facility: CLINIC | Age: 76
End: 2021-07-30

## 2021-07-30 PROCEDURE — 93242 EXT ECG>48HR<7D RECORDING: CPT | Performed by: INTERNAL MEDICINE

## 2021-08-04 ENCOUNTER — OFFICE VISIT (OUTPATIENT)
Dept: GASTROENTEROLOGY | Facility: CLINIC | Age: 76
End: 2021-08-04

## 2021-08-04 VITALS
DIASTOLIC BLOOD PRESSURE: 56 MMHG | BODY MASS INDEX: 19.77 KG/M2 | HEART RATE: 70 BPM | WEIGHT: 141.2 LBS | SYSTOLIC BLOOD PRESSURE: 112 MMHG | HEIGHT: 71 IN

## 2021-08-04 DIAGNOSIS — R63.4 WEIGHT LOSS, ABNORMAL: Primary | ICD-10-CM

## 2021-08-04 DIAGNOSIS — R63.0 ANOREXIA: ICD-10-CM

## 2021-08-04 PROCEDURE — 99213 OFFICE O/P EST LOW 20 MIN: CPT | Performed by: INTERNAL MEDICINE

## 2021-08-04 RX ORDER — MEGESTROL ACETATE 125 MG/ML
625 SUSPENSION ORAL DAILY
Qty: 150 ML | Refills: 2 | Status: SHIPPED | OUTPATIENT
Start: 2021-08-04 | End: 2021-10-11 | Stop reason: SDUPTHER

## 2021-08-04 NOTE — PROGRESS NOTES
Subjective   Fidencio Luciano is a 75 y.o. male who presents to the office today as a follow up appointment regarding Weight Loss      History of Present Illness:  The patient presents for follow up weight loss.  He was supposed to get started on Megace but he never did start it.  His wife states the pharmacy never did fill the prescription..  He has dementia and can not relate the history very well.  His wife speaks for him.  EGD in April showed esophageal dysmotility.  His wife is giving him protein shakes 2 a day.  She states that he does not eat much when she prepares a meal for him.  She tries to encourage him to eat.  She is with him most of the day but does not always offer food or protein shakes to him.  She snacks during the day and forgets to offer meals to him.  She basically relies on him going to the kitchen to prepare something for himself but realizes that he likely will not do this due to his dementia.  She states he gets at least 2 protein shakes down a day.  He has lost about a pound since his last visit.      Review of Systems:  Review of Systems   Constitutional: Positive for unexpected weight change. Negative for fever.   HENT: Positive for trouble swallowing.    Eyes: Negative.    Respiratory: Negative for chest tightness.    Cardiovascular: Negative for chest pain.   Gastrointestinal: Positive for abdominal distention and diarrhea. Negative for abdominal pain, anal bleeding, blood in stool, constipation, nausea and vomiting.   Endocrine: Negative.    Genitourinary: Negative for difficulty urinating.   Musculoskeletal: Negative.    Skin: Negative.    Allergic/Immunologic: Negative.    Neurological: Positive for headaches.   Hematological: Bruises/bleeds easily.   Psychiatric/Behavioral: Negative.        Past Medical History:  Past Medical History:   Diagnosis Date   • BPH (benign prostatic hyperplasia)    • Bradycardia     Positive tilt table testing 07/2016   • Coronary artery disease    •  Diabetes mellitus (CMS/HCC)    • Diverticulosis    • Elevated cholesterol    • Gastric ulcer with hemorrhage    • Gastroesophageal reflux disease 1/26/2021   • History of transfusion    • Hyperlipidemia    • Hypertension    • Psoriasis        Past Surgical History:  Past Surgical History:   Procedure Laterality Date   • BACK SURGERY     • CARDIAC CATHETERIZATION N/A 9/20/2016    Procedure: Left Heart Cath;  Surgeon: Giovanni Buenrostro MD;  Location:  [facility];  Service:    • CARDIAC CATHETERIZATION N/A 10/4/2016    Procedure: Left Heart Cath;  Surgeon: Bharathi Andrew MD;  Location:  [facility];  Service:    • CARDIAC CATHETERIZATION N/A 5/9/2017    Procedure: Left Heart Cath;  Surgeon: Giovanni Buenrostro MD;  Location:  [facility];  Service:    • CARDIAC CATHETERIZATION N/A 5/9/2017    Procedure: ERR;  Surgeon: Giovanni Buenrostro MD;  Location:  [facility];  Service:    • CARDIAC CATHETERIZATION N/A 11/8/2019    Procedure: Left Heart Cath;  Surgeon: Abraham Oviedo MD;  Location:  [facility];  Service: Cardiology   • CARDIAC CATHETERIZATION N/A 12/18/2019    Procedure: Coronary angiography;  Surgeon: Jay Barney IV, MD;  Location:  [facility];  Service: Cardiovascular   • CHOLECYSTECTOMY     • COLONOSCOPY  11/13/2015    Dr. Martinez- internal hemorrhoids, sigmoid diverticulosis   • COLONOSCOPY N/A 8/17/2018    Procedure: COLONOSCOPY CPT CODE: 38734;  Surgeon: Gigi Geronimo MD;  Location: [facility] OR;  Service: Gastroenterology   • CORONARY ANGIOPLASTY WITH STENT PLACEMENT     • ENDOSCOPY N/A 8/17/2018    Procedure: ESOPHAGOGASTRODUODENOSCOPY WITH BIOPSY CPT CODE: 14330;  Surgeon: Gigi Geronimo MD;  Location: [facility] OR;  Service: Gastroenterology   • ENDOSCOPY N/A 4/20/2021    Procedure: ESOPHAGOGASTRODUODENOSCOPY;  Surgeon: Geno Vernon MD;  Location: [facility] OR;  Service:  Gastroenterology;  Laterality: N/A;   • INTERVENTIONAL RADIOLOGY PROCEDURE N/A 2019    Procedure: Coil Embolization of septal to pulmonary artery fistuala.;  Surgeon: Jay Barney IV, MD;  Location:  [facility];  Service: Cardiovascular   • DE RT/LT HEART CATHETERS N/A 2017    Procedure: Percutaneous Coronary Intervention;  Surgeon: Lincoln De Los Santos MD;  Location:  [facility];  Service: Cardiology   • STOMACH SURGERY      FUSION OF BLEEDING ULCER   • UPPER GASTROINTESTINAL ENDOSCOPY  2015    Dr. Martinez- mild gastritis       Family History:  Family History   Problem Relation Age of Onset   • Sick sinus syndrome Mother    • Heart failure Mother    • Diabetes Mother    • Liver cancer Father        Social History:  Social History     Socioeconomic History   • Marital status:      Spouse name: Not on file   • Number of children: Not on file   • Years of education: Not on file   • Highest education level: Not on file   Tobacco Use   • Smoking status: Former Smoker     Packs/day: 3.00     Years: 40.00     Pack years: 120.00     Types: Cigarettes     Quit date:      Years since quittin.6   • Smokeless tobacco: Former User     Quit date: 1986   Vaping Use   • Vaping Use: Never used   Substance and Sexual Activity   • Alcohol use: No   • Drug use: No   • Sexual activity: Defer       Current Medication List:    Current Outpatient Medications:   •  aspirin 81 MG tablet, Take 81 mg by mouth Daily., Disp: , Rfl:   •  atorvastatin (LIPITOR) 80 MG tablet, Take 1 tablet by mouth Every Night., Disp: 90 tablet, Rfl: 5  •  cetirizine (zyrTEC) 10 MG tablet, Take 10 mg by mouth Daily., Disp: , Rfl:   •  clopidogrel (PLAVIX) 75 MG tablet, Take 1 tablet by mouth Daily., Disp: 30 tablet, Rfl: 5  •  ferrous sulfate 325 (65 FE) MG tablet, Take 325 mg by mouth 2 (two) times a day., Disp: , Rfl:   •  finasteride (PROSCAR) 5 MG tablet, Take 1 tablet by mouth  "Daily., Disp: 9 tablet, Rfl: 3  •  isosorbide mononitrate (IMDUR) 120 MG 24 hr tablet, Take 1 tablet by mouth Every Morning., Disp: 90 tablet, Rfl: 2  •  lisinopril (PRINIVIL,ZESTRIL) 20 MG tablet, Take 20 mg by mouth Daily., Disp: , Rfl:   •  Memantine HCl-Donepezil HCl 28-10 MG capsule sustained-release 24 hr, Take  by mouth Daily., Disp: , Rfl:   •  nitroglycerin (NITROSTAT) 0.4 MG SL tablet, 1 under the tongue as needed for angina, may repeat q5mins for up three doses, Disp: 25 tablet, Rfl: 2  •  ondansetron ODT (ZOFRAN-ODT) 4 MG disintegrating tablet, Place 1 tablet on the tongue Every 8 (Eight) Hours As Needed for Nausea or Vomiting., Disp: 14 tablet, Rfl: 0  •  pantoprazole (PROTONIX) 40 MG EC tablet, Take 40 mg by mouth Daily., Disp: , Rfl:   •  promethazine (PHENERGAN) 12.5 MG tablet, Take 1 tablet by mouth Every 8 (Eight) Hours As Needed for Nausea or Vomiting., Disp: 12 tablet, Rfl: 0  •  megestrol (Megace ES) 625 MG/5ML suspension, Take 5 mL by mouth Daily for 30 days., Disp: 150 mL, Rfl: 2    Allergies:   Patient has no known allergies.    Vitals:  /56 (BP Location: Left arm, Patient Position: Sitting, Cuff Size: Adult)   Pulse 70   Ht 180.3 cm (71\")   Wt 64 kg (141 lb 3.2 oz)   BMI 19.69 kg/m²     Physical Exam:  Physical Exam  Constitutional:       Appearance: He is normal weight.      Comments: Thin   HENT:      Head: Normocephalic and atraumatic.      Nose: Nose normal. No congestion or rhinorrhea.   Eyes:      General: No scleral icterus.     Extraocular Movements: Extraocular movements intact.      Conjunctiva/sclera: Conjunctivae normal.      Pupils: Pupils are equal, round, and reactive to light.   Cardiovascular:      Rate and Rhythm: Normal rate and regular rhythm.      Pulses: Normal pulses.      Heart sounds: Normal heart sounds.   Pulmonary:      Effort: Pulmonary effort is normal.      Breath sounds: Normal breath sounds.   Abdominal:      General: Abdomen is flat. Bowel sounds " are normal. There is no distension.      Palpations: Abdomen is soft. There is no shifting dullness, fluid wave, hepatomegaly, splenomegaly, mass or pulsatile mass.      Tenderness: There is no abdominal tenderness. There is no guarding or rebound.      Hernia: No hernia is present.   Musculoskeletal:         General: No swelling or tenderness.      Cervical back: Normal range of motion and neck supple.   Skin:     General: Skin is warm and dry.      Coloration: Skin is not jaundiced.   Neurological:      General: No focal deficit present.      Mental Status: He is alert and oriented to person, place, and time.   Psychiatric:         Mood and Affect: Mood normal.         Behavior: Behavior normal.         Results Review:  Lab Results:   Admission on 07/20/2021, Discharged on 07/21/2021   Component Date Value Ref Range Status   • QT Interval 07/20/2021 450  ms Final   • QTC Interval 07/20/2021 418  ms Final   • Glucose 07/20/2021 134* 65 - 99 mg/dL Final   • BUN 07/20/2021 15  8 - 23 mg/dL Final   • Creatinine 07/20/2021 0.78  0.76 - 1.27 mg/dL Final   • Sodium 07/20/2021 140  136 - 145 mmol/L Final   • Potassium 07/20/2021 3.8  3.5 - 5.2 mmol/L Final   • Chloride 07/20/2021 105  98 - 107 mmol/L Final   • CO2 07/20/2021 24.0  22.0 - 29.0 mmol/L Final   • Calcium 07/20/2021 8.7  8.6 - 10.5 mg/dL Final   • Total Protein 07/20/2021 6.4  6.0 - 8.5 g/dL Final   • Albumin 07/20/2021 3.66  3.50 - 5.20 g/dL Final   • ALT (SGPT) 07/20/2021 42* 1 - 41 U/L Final   • AST (SGOT) 07/20/2021 37  1 - 40 U/L Final   • Alkaline Phosphatase 07/20/2021 98  39 - 117 U/L Final   • Total Bilirubin 07/20/2021 0.5  0.0 - 1.2 mg/dL Final   • eGFR Non  Amer 07/20/2021 97  >60 mL/min/1.73 Final   • Globulin 07/20/2021 2.7  gm/dL Final   • A/G Ratio 07/20/2021 1.3  g/dL Final   • BUN/Creatinine Ratio 07/20/2021 19.2  7.0 - 25.0 Final   • Anion Gap 07/20/2021 11.0  5.0 - 15.0 mmol/L Final   • Troponin T 07/20/2021 <0.010  0.000 - 0.030  ng/mL Final   • Extra Tube 07/20/2021 Hold for add-ons.   Final    Auto resulted.   • Extra Tube 07/20/2021 hold for add-on   Final    Auto resulted   • Extra Tube 07/20/2021 Hold for add-ons.   Final    Auto resulted.   • WBC 07/20/2021 8.23  3.40 - 10.80 10*3/mm3 Final   • RBC 07/20/2021 4.02* 4.14 - 5.80 10*6/mm3 Final   • Hemoglobin 07/20/2021 13.0  13.0 - 17.7 g/dL Final   • Hematocrit 07/20/2021 38.5  37.5 - 51.0 % Final   • MCV 07/20/2021 95.8  79.0 - 97.0 fL Final   • MCH 07/20/2021 32.3  26.6 - 33.0 pg Final   • MCHC 07/20/2021 33.8  31.5 - 35.7 g/dL Final   • RDW 07/20/2021 13.5  12.3 - 15.4 % Final   • RDW-SD 07/20/2021 48.0  37.0 - 54.0 fl Final   • MPV 07/20/2021 11.0  6.0 - 12.0 fL Final   • Platelets 07/20/2021 186  140 - 450 10*3/mm3 Final   • Neutrophil % 07/20/2021 66.6  42.7 - 76.0 % Final   • Lymphocyte % 07/20/2021 19.7  19.6 - 45.3 % Final   • Monocyte % 07/20/2021 6.7  5.0 - 12.0 % Final   • Eosinophil % 07/20/2021 5.5  0.3 - 6.2 % Final   • Basophil % 07/20/2021 1.1  0.0 - 1.5 % Final   • Immature Grans % 07/20/2021 0.4  0.0 - 0.5 % Final   • Neutrophils, Absolute 07/20/2021 5.49  1.70 - 7.00 10*3/mm3 Final   • Lymphocytes, Absolute 07/20/2021 1.62  0.70 - 3.10 10*3/mm3 Final   • Monocytes, Absolute 07/20/2021 0.55  0.10 - 0.90 10*3/mm3 Final   • Eosinophils, Absolute 07/20/2021 0.45* 0.00 - 0.40 10*3/mm3 Final   • Basophils, Absolute 07/20/2021 0.09  0.00 - 0.20 10*3/mm3 Final   • Immature Grans, Absolute 07/20/2021 0.03  0.00 - 0.05 10*3/mm3 Final   • nRBC 07/20/2021 0.0  0.0 - 0.2 /100 WBC Final   • proBNP 07/20/2021 248.4  0.0 - 900.0 pg/mL Final   • C-Reactive Protein 07/20/2021 <0.30  0.00 - 0.50 mg/dL Final   • Free T4 07/20/2021 1.08  0.93 - 1.70 ng/dL Final   • TSH 07/20/2021 1.470  0.270 - 4.200 uIU/mL Final   • Magnesium 07/20/2021 2.0  1.6 - 2.4 mg/dL Final   • COVID19 07/20/2021 Not Detected  Not Detected - Ref. Range Final   • Influenza A PCR 07/20/2021 Not Detected  Not  Detected Final   • Influenza B PCR 07/20/2021 Not Detected  Not Detected Final   • Troponin T 07/20/2021 <0.010  0.000 - 0.030 ng/mL Final       Assessment/Plan     Visit Diagnoses:    ICD-10-CM ICD-9-CM   1. Weight loss, abnormal  R63.4 783.21   2. Anorexia  R63.0 783.0       Plan:  I think the patient's wife is having some difficulty realizing that she will likely have to take the lead in getting him to eat more during the day.  He has dementia and does not ask for food or spontaneously go to the kitchen to fix something for himself to eat.  I have encouraged her to offer food to him throughout the day.  I have written a prescription for Megace.  It apparently was not filled at the time of the last visit.  She states that the pharmacy did not fill the medication and had asked if I had actually sent a medication in.  Our records show that it did go to the pharmacy but for some reason it did not get filled.  I have rewritten the prescription for Megace to be taken once a day.  He will follow-up in 4 weeks.  I did explain to the patient and his wife that he would likely need a PEG tube for nutrition if he is unable to maintain nutrition p.o.    * Surgery not found *      MEDICATION ISSUES:  Discussed medication options and treatment plan of prescribed medication as well as the risks, benefits, and side effects including potential falls, possible impaired driving and metabolic adversities among others. Patient is agreeable to call the office with any worsening of symptoms or onset of side effects. Patient is agreeable to call 911 or go to the nearest ER should he/she begin having SI/HI.     MEDS ORDERED DURING VISIT:  New Medications Ordered This Visit   Medications   • megestrol (Megace ES) 625 MG/5ML suspension     Sig: Take 5 mL by mouth Daily for 30 days.     Dispense:  150 mL     Refill:  2       Return in about 4 weeks (around 9/1/2021).             This document has been electronically signed by Geno DE LA ROSA  MD Saulo   August 4, 2021 16:10 EDT      Part of this note may be an electronic transcription/translation of spoken language to printed text using the Dragon Dictation System.

## 2021-08-06 ENCOUNTER — TELEPHONE (OUTPATIENT)
Dept: GASTROENTEROLOGY | Facility: CLINIC | Age: 76
End: 2021-08-06

## 2021-08-12 ENCOUNTER — TREATMENT (OUTPATIENT)
Dept: CARDIOLOGY | Facility: CLINIC | Age: 76
End: 2021-08-12

## 2021-08-12 DIAGNOSIS — R00.1 BRADYCARDIA, DRUG INDUCED: ICD-10-CM

## 2021-08-12 DIAGNOSIS — T50.905A BRADYCARDIA, DRUG INDUCED: ICD-10-CM

## 2021-08-12 DIAGNOSIS — R42 DIZZINESS: ICD-10-CM

## 2021-08-12 PROCEDURE — 93244 EXT ECG>48HR<7D REV&INTERPJ: CPT | Performed by: INTERNAL MEDICINE

## 2021-08-24 ENCOUNTER — TRANSCRIBE ORDERS (OUTPATIENT)
Dept: ADMINISTRATIVE | Facility: HOSPITAL | Age: 76
End: 2021-08-24

## 2021-08-24 ENCOUNTER — LAB (OUTPATIENT)
Dept: LAB | Facility: HOSPITAL | Age: 76
End: 2021-08-24

## 2021-08-24 DIAGNOSIS — G47.00 PERSISTENT DISORDER OF INITIATING OR MAINTAINING SLEEP: ICD-10-CM

## 2021-08-24 DIAGNOSIS — Z01.84 IMMUNITY STATUS TESTING: Primary | ICD-10-CM

## 2021-08-24 DIAGNOSIS — G30.9 ALZHEIMER'S DISEASE (HCC): ICD-10-CM

## 2021-08-24 DIAGNOSIS — I10 ESSENTIAL HYPERTENSION, MALIGNANT: ICD-10-CM

## 2021-08-24 DIAGNOSIS — Z86.39 PERSONAL HISTORY OF NUTRITIONAL DEFICIENCY: ICD-10-CM

## 2021-08-24 DIAGNOSIS — R63.4 LOSS OF WEIGHT: ICD-10-CM

## 2021-08-24 DIAGNOSIS — E11.9 DIABETES MELLITUS WITHOUT COMPLICATION (HCC): ICD-10-CM

## 2021-08-24 DIAGNOSIS — E78.2 MIXED HYPERLIPIDEMIA: ICD-10-CM

## 2021-08-24 DIAGNOSIS — F02.80 ALZHEIMER'S DISEASE (HCC): ICD-10-CM

## 2021-08-24 DIAGNOSIS — Z01.84 IMMUNITY STATUS TESTING: ICD-10-CM

## 2021-08-24 LAB
ALBUMIN SERPL-MCNC: 4 G/DL (ref 3.5–5.2)
ALBUMIN/GLOB SERPL: 1.3 G/DL
ALP SERPL-CCNC: 93 U/L (ref 39–117)
ALT SERPL W P-5'-P-CCNC: 33 U/L (ref 1–41)
ANION GAP SERPL CALCULATED.3IONS-SCNC: 10.2 MMOL/L (ref 5–15)
AST SERPL-CCNC: 28 U/L (ref 1–40)
BILIRUB SERPL-MCNC: 0.5 MG/DL (ref 0–1.2)
BUN SERPL-MCNC: 31 MG/DL (ref 8–23)
BUN/CREAT SERPL: 33.3 (ref 7–25)
CALCIUM SPEC-SCNC: 9.2 MG/DL (ref 8.6–10.5)
CHLORIDE SERPL-SCNC: 110 MMOL/L (ref 98–107)
CO2 SERPL-SCNC: 21.8 MMOL/L (ref 22–29)
CREAT SERPL-MCNC: 0.93 MG/DL (ref 0.76–1.27)
DEPRECATED RDW RBC AUTO: 45.8 FL (ref 37–54)
ERYTHROCYTE [DISTWIDTH] IN BLOOD BY AUTOMATED COUNT: 13.1 % (ref 12.3–15.4)
GFR SERPL CREATININE-BSD FRML MDRD: 79 ML/MIN/1.73
GLOBULIN UR ELPH-MCNC: 3.2 GM/DL
GLUCOSE SERPL-MCNC: 89 MG/DL (ref 65–99)
HCT VFR BLD AUTO: 41.6 % (ref 37.5–51)
HGB BLD-MCNC: 14.2 G/DL (ref 13–17.7)
MCH RBC QN AUTO: 32.3 PG (ref 26.6–33)
MCHC RBC AUTO-ENTMCNC: 34.1 G/DL (ref 31.5–35.7)
MCV RBC AUTO: 94.8 FL (ref 79–97)
PLATELET # BLD AUTO: 259 10*3/MM3 (ref 140–450)
PMV BLD AUTO: 10.6 FL (ref 6–12)
POTASSIUM SERPL-SCNC: 4.5 MMOL/L (ref 3.5–5.2)
PROT SERPL-MCNC: 7.2 G/DL (ref 6–8.5)
RBC # BLD AUTO: 4.39 10*6/MM3 (ref 4.14–5.8)
SODIUM SERPL-SCNC: 142 MMOL/L (ref 136–145)
TSH SERPL DL<=0.05 MIU/L-ACNC: 1.01 UIU/ML (ref 0.27–4.2)
VIT B12 BLD-MCNC: 372 PG/ML (ref 211–946)
WBC # BLD AUTO: 10.58 10*3/MM3 (ref 3.4–10.8)

## 2021-08-24 PROCEDURE — 36415 COLL VENOUS BLD VENIPUNCTURE: CPT

## 2021-08-24 PROCEDURE — 85027 COMPLETE CBC AUTOMATED: CPT

## 2021-08-24 PROCEDURE — 80053 COMPREHEN METABOLIC PANEL: CPT

## 2021-08-24 PROCEDURE — 82607 VITAMIN B-12: CPT

## 2021-08-24 PROCEDURE — 84443 ASSAY THYROID STIM HORMONE: CPT

## 2021-08-24 PROCEDURE — 86769 SARS-COV-2 COVID-19 ANTIBODY: CPT

## 2021-08-25 LAB — SARS-COV-2 IGG SERPL QL IA: POSITIVE

## 2021-08-26 ENCOUNTER — OFFICE VISIT (OUTPATIENT)
Dept: CARDIOLOGY | Facility: CLINIC | Age: 76
End: 2021-08-26

## 2021-08-26 ENCOUNTER — LAB (OUTPATIENT)
Dept: LAB | Facility: HOSPITAL | Age: 76
End: 2021-08-26

## 2021-08-26 VITALS
WEIGHT: 142 LBS | HEIGHT: 71 IN | HEART RATE: 90 BPM | SYSTOLIC BLOOD PRESSURE: 137 MMHG | BODY MASS INDEX: 19.88 KG/M2 | DIASTOLIC BLOOD PRESSURE: 61 MMHG | TEMPERATURE: 97 F | OXYGEN SATURATION: 97 %

## 2021-08-26 DIAGNOSIS — I10 ESSENTIAL HYPERTENSION: Chronic | ICD-10-CM

## 2021-08-26 DIAGNOSIS — R06.09 DYSPNEA ON EXERTION: ICD-10-CM

## 2021-08-26 DIAGNOSIS — E78.5 HYPERLIPIDEMIA LDL GOAL <70: ICD-10-CM

## 2021-08-26 DIAGNOSIS — I25.10 ASCVD (ARTERIOSCLEROTIC CARDIOVASCULAR DISEASE): Primary | ICD-10-CM

## 2021-08-26 DIAGNOSIS — I25.41 CORONARY ARTERY FISTULA: ICD-10-CM

## 2021-08-26 PROBLEM — R45.89: Status: RESOLVED | Noted: 2021-05-25 | Resolved: 2021-08-26

## 2021-08-26 LAB — NT-PROBNP SERPL-MCNC: 162 PG/ML (ref 0–900)

## 2021-08-26 PROCEDURE — 83880 ASSAY OF NATRIURETIC PEPTIDE: CPT

## 2021-08-26 PROCEDURE — 36415 COLL VENOUS BLD VENIPUNCTURE: CPT

## 2021-08-26 PROCEDURE — 99214 OFFICE O/P EST MOD 30 MIN: CPT | Performed by: INTERNAL MEDICINE

## 2021-08-26 RX ORDER — MIRTAZAPINE 45 MG/1
45 TABLET, ORALLY DISINTEGRATING ORAL NIGHTLY
COMMUNITY
End: 2022-03-29 | Stop reason: SDUPTHER

## 2021-08-26 NOTE — PROGRESS NOTES
Camila Ortiz, ANDREW  Fidencio Luciano  1945  08/26/2021    Patient Active Problem List   Diagnosis   • Essential hypertension   • Type 2 diabetes mellitus (CMS/HCC)   • Benign prostatic hyperplasia   • ASCVD (arteriosclerotic cardiovascular disease), s/p stenting of 80 % stenosis in left circumflex on 10/05/16and 60% stenosis in the LAD, clinically stable.    • Hyperlipidemia LDL goal <70   • Weakness generalized   • Chronic fatigue   • Coronary artery fistula   • Weight loss, unintentional   • Iron deficiency anemia   • Iron deficiency   • Normocytic anemia   • Weight loss, abnormal   • Early satiety   • Esophageal dysphagia   • Gastroesophageal reflux disease   • Dysphagia   • Chest pain       Dear Camila Ortiz, ANDREW:    Subjective     Fidencio Luciaon is a 75 y.o. male with the problems as listed above, presents    Chief complaint: Follow-up of coronary artery disease, status post PCI    History of Present Illness: Mr. Truong is a pleasant 75-year-old  male with history of known coronary artery disease, status post previous stenting of the left circumflex coronary artery in October 2016 and then subsequent coiling of the fistulous connection between the LAD and the pulmonary artery in December 2019 by Dr. Barney at [facility].  He is here for regular cardiology follow-up.  He reportedly had Covid 19 infection in March 2021 with subsequent pneumonia for which he had to be hospitalized for couple of weeks.    On today's visit patient denies any complaints of chest pains.  His wife indicates that he is been short of breath in the last few months since he had the Covid infection..  She denies any cough.  No leg edema.  His wife is concerned that he is not gaining any weight.  His weight has been almost constant since July 2021.  He says he eats pretty good and also drinks protein shakes.  No recent fever or chills.  He has some intermittent diarrhea.    Fidencio Luciano  Phi  Stress Test With Myocardial Perfusion SPECT (Multi Study)  Order# [id number]  Reading physician: Quincy Saunders MD Ordering physician: Behbahani, Katayoun, MD Study date: 21   Patient Information    Patient Name   Fidencio Luciano MRN   [id number] Legal Sex   Male  (Age)   1945 (75 y.o.)   Interpretation Summary    · Myocardial perfusion imaging indicates a normal myocardial perfusion study with no evidence of ischemia.  · Diaphragmatic attenuation artifact is present.  · Left ventricular ejection fraction is hyperdynamic (Calculated EF > 70%). .  · Impressions are consistent with a low risk study.  · Findings consistent with a normal ECG stress test.        Fidencio Luciano  Echo Complete w/Doppler and Color Flow  Order# [id number]  Reading physician: Giovanni Buenrostro MD Ordering physician: Myron Damon MD Study date: 21   Patient Information    Patient Name   Fidencio Luciano MRN   [id number] Legal Sex   Male  (Age)   1945 (75 y.o.)   Interpretation Summary    · Normal left ventricular cavity size and wall thickness noted. All left ventricular wall segments contract normally  · Left ventricular ejection fraction appears to be 61 - 65%.  · Left ventricular diastolic function is consistent with (grade I) impaired relaxation.  · The aortic valve is structurally normal with no stenosis present. Mild aortic valve regurgitation is present  · The mitral valve is structurally normal with no significant stenosis present. Trace mitral valve regurgitation is present.  · . There is no evidence of pericardial effusion.     No Known Allergies:      Current Outpatient Medications:   •  aspirin 81 MG tablet, Take 81 mg by mouth Daily., Disp: , Rfl:   •  atorvastatin (LIPITOR) 80 MG tablet, Take 1 tablet by mouth Every Night., Disp: 90 tablet, Rfl: 5  •  cetirizine (zyrTEC) 10 MG tablet, Take 10 mg by mouth Daily., Disp: , Rfl:   •  clopidogrel (PLAVIX) 75 MG tablet, Take 1 tablet by mouth Daily.,  Disp: 30 tablet, Rfl: 5  •  ferrous sulfate 325 (65 FE) MG tablet, Take 325 mg by mouth 2 (two) times a day., Disp: , Rfl:   •  finasteride (PROSCAR) 5 MG tablet, Take 1 tablet by mouth Daily., Disp: 9 tablet, Rfl: 3  •  isosorbide mononitrate (IMDUR) 120 MG 24 hr tablet, Take 1 tablet by mouth Every Morning., Disp: 90 tablet, Rfl: 2  •  megestrol (Megace ES) 625 MG/5ML suspension, Take 5 mL by mouth Daily for 30 days., Disp: 150 mL, Rfl: 2  •  Memantine HCl-Donepezil HCl 28-10 MG capsule sustained-release 24 hr, Take  by mouth Daily., Disp: , Rfl:   •  mirtazapine (REMERON SOL-TAB) 45 MG disintegrating tablet, Place 45 mg on the tongue Every Night., Disp: , Rfl:   •  nitroglycerin (NITROSTAT) 0.4 MG SL tablet, 1 under the tongue as needed for angina, may repeat q5mins for up three doses, Disp: 25 tablet, Rfl: 2  •  ondansetron ODT (ZOFRAN-ODT) 4 MG disintegrating tablet, Place 1 tablet on the tongue Every 8 (Eight) Hours As Needed for Nausea or Vomiting., Disp: 14 tablet, Rfl: 0  •  pantoprazole (PROTONIX) 40 MG EC tablet, Take 40 mg by mouth Daily., Disp: , Rfl:   •  promethazine (PHENERGAN) 12.5 MG tablet, Take 1 tablet by mouth Every 8 (Eight) Hours As Needed for Nausea or Vomiting., Disp: 12 tablet, Rfl: 0    The following portions of the patient's history were reviewed and updated as appropriate: allergies, current medications, past family history, past medical history, past social history, past surgical history and problem list.    Social History     Tobacco Use   • Smoking status: Former Smoker     Packs/day: 3.00     Years: 40.00     Pack years: 120.00     Types: Cigarettes     Quit date:      Years since quittin.6   • Smokeless tobacco: Former User     Quit date: 1986   Vaping Use   • Vaping Use: Never used   Substance Use Topics   • Alcohol use: No   • Drug use: No       Review of Systems   Constitutional: Negative for chills and fever.   HENT: Negative for nosebleeds and sore throat.   "  Respiratory: Positive for shortness of breath. Negative for cough, hemoptysis and wheezing.    Gastrointestinal: Negative for abdominal pain, hematemesis, hematochezia, melena, nausea and vomiting.   Genitourinary: Negative for dysuria and hematuria.   Neurological: Negative for headaches.       Objective   Vitals:    08/26/21 1154   BP: 137/61   BP Location: Left arm   Patient Position: Sitting   Cuff Size: Adult   Pulse: 90   Temp: 97 °F (36.1 °C)   TempSrc: Infrared   SpO2: 97%   Weight: 64.4 kg (142 lb)   Height: 180.3 cm (71\")     Body mass index is 19.8 kg/m².    Constitutional:       Appearance: Well-developed.   Eyes:      Conjunctiva/sclera: Conjunctivae normal.   HENT:      Head: Normocephalic.   Neck:      Thyroid: No thyromegaly.      Vascular: No JVD.      Trachea: No tracheal deviation.   Pulmonary:      Effort: No respiratory distress.      Breath sounds: Normal breath sounds. No wheezing. No rales.   Cardiovascular:      PMI at left midclavicular line. Normal rate. Regular rhythm. Normal S1. Normal S2.      Murmurs: There is no murmur.      No gallop. No click. No rub.   Pulses:     Intact distal pulses.   Edema:     Peripheral edema absent.   Abdominal:      General: Bowel sounds are normal.      Palpations: Abdomen is soft. There is no abdominal mass.      Tenderness: There is no abdominal tenderness.   Musculoskeletal:      Cervical back: Normal range of motion and neck supple. Skin:     General: Skin is warm and dry.   Neurological:      Mental Status: Alert and oriented to person, place, and time.      Cranial Nerves: No cranial nerve deficit.       Lab Results   Component Value Date     08/24/2021    K 4.5 08/24/2021     (H) 08/24/2021    CO2 21.8 (L) 08/24/2021    BUN 31 (H) 08/24/2021    CREATININE 0.93 08/24/2021    GLUCOSE 89 08/24/2021    CALCIUM 9.2 08/24/2021    AST 28 08/24/2021    ALT 33 08/24/2021    ALKPHOS 93 08/24/2021    LABIL2 1.4 (L) 01/08/2016     Lab Results "   Component Value Date    CKTOTAL 93 03/09/2021     Lab Results   Component Value Date    WBC 10.58 08/24/2021    HGB 14.2 08/24/2021    HCT 41.6 08/24/2021     08/24/2021     Ref Range & Units 2 d ago 1 mo ago 3 mo ago   TSH   0.270 - 4.200 uIU/mL 1.010  1.470  1.510      Component   Ref Range & Units 1 mo ago   (7/20/21) 3 mo ago   (5/24/21) 5 mo ago   (3/13/21) 5 mo ago   (3/9/21)   C-Reactive Protein   0.00 - 0.50 mg/dL <0.30  <0.30  <0.30  <0.30          Lab Results   Component Value Date    MG 2.0 07/20/2021     Lab Results   Component Value Date    TSH 1.010 08/24/2021    PSA 1.440 07/17/2020    CHLPL 109 08/12/2015    TRIG 115 05/25/2021    HDL 29 (L) 05/25/2021    LDL 58 05/25/2021      I reviewed his recent CT chest and chest x-ray reports    During this visit the following were done:  Labs Reviewed [x]    Radiology Reports Reviewed [x]    Hospital Records Reviewed [x]        Assessment/Plan :   Diagnosis Plan   1. ASCVD (arteriosclerotic cardiovascular disease), status post stenting of left circumflex on 10/5/2016.     2. 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       3. Dyspnea on exertion     4. Essential hypertension     5. Hyperlipidemia LDL goal <70            Recommendations:  1. For his dyspnea on exertion, will obtain chest x-ray and a proBNP.  2. Obtain pulmonary function tests.  3. Since he had recent negative cardiac evaluation on the nuclear stress test and echo Doppler study, we will not repeat this.  4. Refer to pulmonary services for his persistent dyspnea in the setting of recent Covid infection.    Return in about 5 weeks (around 9/30/2021).    As always, Camila Ortiz APRN  I appreciate very much the opportunity to participate in the cardiovascular care of your patients. Please do not hesitate to call me with any questions with regards to Fidencio Luciano evaluation and management.         With Best  Regards,        Giovanni Buenrostro MD, FACC    Please note that portions of this note were completed with a voice recognition program.

## 2021-09-01 ENCOUNTER — OFFICE VISIT (OUTPATIENT)
Dept: GASTROENTEROLOGY | Facility: CLINIC | Age: 76
End: 2021-09-01

## 2021-09-01 VITALS
HEIGHT: 71 IN | HEART RATE: 95 BPM | BODY MASS INDEX: 19.88 KG/M2 | WEIGHT: 142 LBS | SYSTOLIC BLOOD PRESSURE: 133 MMHG | DIASTOLIC BLOOD PRESSURE: 70 MMHG

## 2021-09-01 DIAGNOSIS — R19.7 DIARRHEA, UNSPECIFIED TYPE: Primary | ICD-10-CM

## 2021-09-01 DIAGNOSIS — R63.4 WEIGHT LOSS, ABNORMAL: ICD-10-CM

## 2021-09-01 DIAGNOSIS — R63.0 ANOREXIA: ICD-10-CM

## 2021-09-01 PROCEDURE — 99213 OFFICE O/P EST LOW 20 MIN: CPT | Performed by: INTERNAL MEDICINE

## 2021-09-01 RX ORDER — MIRTAZAPINE 45 MG/1
45 TABLET, FILM COATED ORAL
COMMUNITY
Start: 2021-08-23 | End: 2021-09-01 | Stop reason: SDUPTHER

## 2021-09-01 RX ORDER — MONTELUKAST SODIUM 4 MG/1
1 TABLET, CHEWABLE ORAL DAILY
Qty: 30 TABLET | Refills: 2 | Status: ON HOLD | OUTPATIENT
Start: 2021-09-01 | End: 2021-10-27

## 2021-09-01 NOTE — PROGRESS NOTES
Subjective   Fidencio Luciano is a 75 y.o. male who presents to the office today as a follow up appointment regarding abnormal weight loss      History of Present Illness:  The patient presents today for follow-up weight loss and anorexia.  His wife states he began to decline after a bout of pneumonia in March of this year.  He was also diagnosed with Covid at that time.  She states when she was admitted in the hospital he was diagnosed with dementia.  He was recently seen by neurologist who performed an MRI.  He told her that Mr. Luciano had had many mini strokes and cerebral atrophy.  He did not discuss dementia with her but put the patient on a memory drug.  She is concerned because he sleeps most of the day and night.  He will get up about 10 in the morning and take his medications and eat breakfast.  Soon thereafter he will go back to bed until the evening at which time he will get up and eat his evening meal.  He is in bed by 6 pm and stays until the morning.  Currently, he is having issues with urgency with bowel movements.  His stools are loose.  No sooner than he eats, he will have to get up and go to the bathroom.  He has had his gallbladder removed in the past.  I placed the patient on  Megace for appetite.  His wife states he is eating a little better.  He is drinking 2 protein shakes a day and eating most of what she puts before him.  However, he is sleeping a lot during the day and he gets 2 meals in the day.      Review of Systems:  Review of Systems   Constitutional: Positive for unexpected weight change. Negative for fever.   HENT: Negative for trouble swallowing.    Eyes: Negative.    Respiratory: Negative for chest tightness.    Cardiovascular: Negative for chest pain.   Gastrointestinal: Positive for abdominal distention and diarrhea. Negative for abdominal pain, anal bleeding, blood in stool, constipation, nausea and vomiting.   Endocrine: Negative.    Genitourinary: Negative for difficulty urinating.    Musculoskeletal: Negative.    Skin: Negative.    Allergic/Immunologic: Negative.    Neurological: Negative for headaches.   Hematological: Bruises/bleeds easily.   Psychiatric/Behavioral: Negative.        Past Medical History:  Past Medical History:   Diagnosis Date   • BPH (benign prostatic hyperplasia)    • Bradycardia     Positive tilt table testing 07/2016   • Coronary artery disease    • Diabetes mellitus (CMS/HCC)    • Diverticulosis    • Elevated cholesterol    • Gastric ulcer with hemorrhage    • Gastroesophageal reflux disease 1/26/2021   • History of transfusion    • Hyperlipidemia    • Hypertension    • Psoriasis        Past Surgical History:  Past Surgical History:   Procedure Laterality Date   • BACK SURGERY     • CARDIAC CATHETERIZATION N/A 9/20/2016    Procedure: Left Heart Cath;  Surgeon: Giovanni Buenrostro MD;  Location:  [facility]ATH [facility]VASIVE [facility];  Service:    • CARDIAC CATHETERIZATION N/A 10/4/2016    Procedure: Left Heart Cath;  Surgeon: Bharathi Andrew MD;  Location:  [facility];  Service:    • CARDIAC CATHETERIZATION N/A 5/9/2017    Procedure: Left Heart Cath;  Surgeon: Giovanni Buenrostro MD;  Location: [facility] CATH INVASIVE LOCATION;  Service:    • CARDIAC CATHETERIZATION N/A 5/9/2017    Procedure: ERR;  Surgeon: Giovanni Buenrostro MD;  Location:  [facility]ATH INVASIVE [facility];  Service:    • CARDIAC CATHETERIZATION N/A 11/8/2019    Procedure: Left Heart Cath;  Surgeon: Abraham Oviedo MD;  Location:  [facility]ATH INVAS[facility];  Service: Cardiology   • CARDIAC CATHETERIZATION N/A 12/18/2019    Procedure: Coronary angiography;  Surgeon: Jay Barney IV, MD;  Location:  [facility]VAS[facility];  Service: Cardiovascular   • CHOLECYSTECTOMY     • COLONOSCOPY  11/13/2015    Dr. Martinez- internal hemorrhoids, sigmoid diverticulosis   • COLONOSCOPY N/A 8/17/2018    Procedure: COLONOSCOPY CPT CODE: 53834;  Surgeon: Gigi Geronimo MD;  Location: [facility] OR;   Service: Gastroenterology   • CORONARY ANGIOPLASTY WITH STENT PLACEMENT     • ENDOSCOPY N/A 2018    Procedure: ESOPHAGOGASTRODUODENOSCOPY WITH BIOPSY CPT CODE: 51799;  Surgeon: Gigi Geronimo MD;  Location:  [facility] OR;  Service: Gastroenterology   • ENDOSCOPY N/A 2021    Procedure: ESOPHAGOGASTRODUODENOSCOPY;  Surgeon: Geno Vernon MD;  Location:  [facility] OR;  Service: Gastroenterology;  Laterality: N/A;   • INTERVENTIONAL RADIOLOGY PROCEDURE N/A 2019    Procedure: Coil Embolization of septal to pulmonary artery fistuala.;  Surgeon: Jay Barney IV, MD;  Location:  [facility] INVASIVE [facility];  Service: Cardiovascular   • AZ RT/LT HEART CATHETERS N/A 2017    Procedure: Percutaneous Coronary Intervention;  Surgeon: Lincoln De Los Santos MD;  Location:  [facility]ATH INVASIVE [facility];  Service: Cardiology   • STOMACH SURGERY      FUSION OF BLEEDING ULCER   • UPPER GASTROINTESTINAL ENDOSCOPY  2015    Dr. Martinez- mild gastritis       Family History:  Family History   Problem Relation Age of Onset   • Sick sinus syndrome Mother    • Heart failure Mother    • Diabetes Mother    • Liver cancer Father        Social History:  Social History     Socioeconomic History   • Marital status:      Spouse name: Not on file   • Number of children: Not on file   • Years of education: Not on file   • Highest education level: Not on file   Tobacco Use   • Smoking status: Former Smoker     Packs/day: 3.00     Years: 40.00     Pack years: 120.00     Types: Cigarettes     Quit date:      Years since quittin.6   • Smokeless tobacco: Former User     Quit date: 1986   Vaping Use   • Vaping Use: Never used   Substance and Sexual Activity   • Alcohol use: No   • Drug use: No   • Sexual activity: Defer       Current Medication List:    Current Outpatient Medications:   •  aspirin 81 MG tablet, Take 81 mg by mouth Daily., Disp: , Rfl:   •  atorvastatin (LIPITOR) 80 MG tablet,  "Take 1 tablet by mouth Every Night., Disp: 90 tablet, Rfl: 5  •  cetirizine (zyrTEC) 10 MG tablet, Take 10 mg by mouth Daily., Disp: , Rfl:   •  clopidogrel (PLAVIX) 75 MG tablet, Take 1 tablet by mouth Daily., Disp: 30 tablet, Rfl: 5  •  ferrous sulfate 325 (65 FE) MG tablet, Take 325 mg by mouth 2 (two) times a day., Disp: , Rfl:   •  finasteride (PROSCAR) 5 MG tablet, Take 1 tablet by mouth Daily., Disp: 9 tablet, Rfl: 3  •  isosorbide mononitrate (IMDUR) 120 MG 24 hr tablet, Take 1 tablet by mouth Every Morning., Disp: 90 tablet, Rfl: 2  •  megestrol (Megace ES) 625 MG/5ML suspension, Take 5 mL by mouth Daily for 30 days., Disp: 150 mL, Rfl: 2  •  Memantine HCl-Donepezil HCl 28-10 MG capsule sustained-release 24 hr, Take  by mouth Daily., Disp: , Rfl:   •  mirtazapine (REMERON SOL-TAB) 45 MG disintegrating tablet, Place 45 mg on the tongue Every Night., Disp: , Rfl:   •  nitroglycerin (NITROSTAT) 0.4 MG SL tablet, 1 under the tongue as needed for angina, may repeat q5mins for up three doses, Disp: 25 tablet, Rfl: 2  •  ondansetron ODT (ZOFRAN-ODT) 4 MG disintegrating tablet, Place 1 tablet on the tongue Every 8 (Eight) Hours As Needed for Nausea or Vomiting., Disp: 14 tablet, Rfl: 0  •  pantoprazole (PROTONIX) 40 MG EC tablet, Take 40 mg by mouth Daily., Disp: , Rfl:   •  promethazine (PHENERGAN) 12.5 MG tablet, Take 1 tablet by mouth Every 8 (Eight) Hours As Needed for Nausea or Vomiting., Disp: 12 tablet, Rfl: 0  •  colestipol (COLESTID) 1 g tablet, Take 1 tablet by mouth Daily., Disp: 30 tablet, Rfl: 2    Allergies:   Patient has no known allergies.    Vitals:  /70 (BP Location: Left arm, Patient Position: Sitting, Cuff Size: Adult)   Pulse 95   Ht 180.3 cm (70.98\")   Wt 64.4 kg (142 lb)   BMI 19.81 kg/m²     Physical Exam:  Physical Exam  Constitutional:       Appearance: He is normal weight.   HENT:      Head: Normocephalic and atraumatic.      Nose: Nose normal. No congestion or rhinorrhea. "   Eyes:      General: No scleral icterus.     Extraocular Movements: Extraocular movements intact.      Conjunctiva/sclera: Conjunctivae normal.      Pupils: Pupils are equal, round, and reactive to light.   Cardiovascular:      Rate and Rhythm: Normal rate and regular rhythm.      Pulses: Normal pulses.      Heart sounds: Normal heart sounds.   Pulmonary:      Effort: Pulmonary effort is normal.      Breath sounds: Normal breath sounds.   Abdominal:      General: Abdomen is flat. Bowel sounds are normal. There is no distension.      Palpations: Abdomen is soft. There is no shifting dullness, fluid wave, hepatomegaly, splenomegaly, mass or pulsatile mass.      Tenderness: There is no abdominal tenderness. There is no guarding or rebound.      Hernia: No hernia is present.   Musculoskeletal:         General: No swelling or tenderness.      Cervical back: Normal range of motion and neck supple.   Skin:     General: Skin is warm and dry.      Coloration: Skin is not jaundiced.   Neurological:      General: No focal deficit present.      Mental Status: He is alert and oriented to person, place, and time.   Psychiatric:         Mood and Affect: Mood normal.         Behavior: Behavior normal.         Results Review:  Lab Results:   Lab on 08/26/2021   Component Date Value Ref Range Status   • proBNP 08/26/2021 162.0  0.0 - 900.0 pg/mL Final   Lab on 08/24/2021   Component Date Value Ref Range Status   • Glucose 08/24/2021 89  65 - 99 mg/dL Final   • BUN 08/24/2021 31* 8 - 23 mg/dL Final   • Creatinine 08/24/2021 0.93  0.76 - 1.27 mg/dL Final   • Sodium 08/24/2021 142  136 - 145 mmol/L Final   • Potassium 08/24/2021 4.5  3.5 - 5.2 mmol/L Final    Slight hemolysis detected by analyzer. Results may be affected.   • Chloride 08/24/2021 110* 98 - 107 mmol/L Final   • CO2 08/24/2021 21.8* 22.0 - 29.0 mmol/L Final   • Calcium 08/24/2021 9.2  8.6 - 10.5 mg/dL Final   • Total Protein 08/24/2021 7.2  6.0 - 8.5 g/dL Final   •  Albumin 08/24/2021 4.00  3.50 - 5.20 g/dL Final   • ALT (SGPT) 08/24/2021 33  1 - 41 U/L Final   • AST (SGOT) 08/24/2021 28  1 - 40 U/L Final    Slight hemolysis detected by analyzer. Results may be affected.   • Alkaline Phosphatase 08/24/2021 93  39 - 117 U/L Final   • Total Bilirubin 08/24/2021 0.5  0.0 - 1.2 mg/dL Final   • eGFR Non  Amer 08/24/2021 79  >60 mL/min/1.73 Final   • Globulin 08/24/2021 3.2  gm/dL Final   • A/G Ratio 08/24/2021 1.3  g/dL Final   • BUN/Creatinine Ratio 08/24/2021 33.3* 7.0 - 25.0 Final   • Anion Gap 08/24/2021 10.2  5.0 - 15.0 mmol/L Final   • TSH 08/24/2021 1.010  0.270 - 4.200 uIU/mL Final   • WBC 08/24/2021 10.58  3.40 - 10.80 10*3/mm3 Final   • RBC 08/24/2021 4.39  4.14 - 5.80 10*6/mm3 Final   • Hemoglobin 08/24/2021 14.2  13.0 - 17.7 g/dL Final   • Hematocrit 08/24/2021 41.6  37.5 - 51.0 % Final   • MCV 08/24/2021 94.8  79.0 - 97.0 fL Final   • MCH 08/24/2021 32.3  26.6 - 33.0 pg Final   • MCHC 08/24/2021 34.1  31.5 - 35.7 g/dL Final   • RDW 08/24/2021 13.1  12.3 - 15.4 % Final   • RDW-SD 08/24/2021 45.8  37.0 - 54.0 fl Final   • MPV 08/24/2021 10.6  6.0 - 12.0 fL Final   • Platelets 08/24/2021 259  140 - 450 10*3/mm3 Final   • Vitamin B-12 08/24/2021 372  211 - 946 pg/mL Final   • DIASORIN SARS-COV-2 AB, IGG 08/24/2021 Positive  Negative Final    Results suggest recent or prior infection with SARS-CoV-2. Correlation  with epidemiologic risk factors and other clinical and laboratory  findings is recommended. Serologic results should not be used as the  sole basis to diagnose or exclude recent SARS-CoV-2 infection. False  positive results infrequently occur due to prior infection with other  human Coronaviruses.  This assay was performed using the [employer])  SARS-CoV-2 S1/S2 IgG assay.  This assay detects antibodies against SARS-CoV-2 spike protein  including the receptor binding domain (RBD).       Assessment/Plan     Visit Diagnoses:    ICD-10-CM ICD-9-CM   1.  Diarrhea, unspecified type  R19.7 787.91   2. Anorexia  R63.0 783.0   3. Weight loss, abnormal  R63.4 783.21       Plan:  I suspect the patient has a bile induced diarrhea.  He has urgency after meals.  I will place him on Colestid 1 g once a day.  I have informed his wife to make sure that he takes this 2 hours after he takes his morning meds.  I have also suggested that she wake him up during the day to eat a third meal.  I am concerned that he has dementia and this is part of the reason why he is sleeping so much and is not eating.  His wife is very concerned it would like more information about his mental status and what to expect.  I have given her a couple of phone numbers for stroke patients as well as Alzheimer's dementia support group.  I have suggested that she go to  for cognitive work-up at the neurology center.    * Surgery not found *      MEDICATION ISSUES:  Discussed medication options and treatment plan of prescribed medication as well as the risks, benefits, and side effects including potential falls, possible impaired driving and metabolic adversities among others. Patient is agreeable to call the office with any worsening of symptoms or onset of side effects. Patient is agreeable to call 911 or go to the nearest ER should he/she begin having SI/HI.     MEDS ORDERED DURING VISIT:  New Medications Ordered This Visit   Medications   • colestipol (COLESTID) 1 g tablet     Sig: Take 1 tablet by mouth Daily.     Dispense:  30 tablet     Refill:  2       Return in about 6 weeks (around 10/13/2021).             This document has been electronically signed by Geno Vernon MD   September 1, 2021 16:33 EDT      Part of this note may be an electronic transcription/translation of spoken language to printed text using the Dragon Dictation System.

## 2021-09-13 ENCOUNTER — OFFICE VISIT (OUTPATIENT)
Dept: UROLOGY | Facility: CLINIC | Age: 76
End: 2021-09-13

## 2021-09-13 VITALS
WEIGHT: 143 LBS | BODY MASS INDEX: 20.02 KG/M2 | DIASTOLIC BLOOD PRESSURE: 76 MMHG | HEART RATE: 73 BPM | SYSTOLIC BLOOD PRESSURE: 156 MMHG | HEIGHT: 71 IN

## 2021-09-13 DIAGNOSIS — N13.8 BENIGN PROSTATIC HYPERPLASIA WITH URINARY OBSTRUCTION: ICD-10-CM

## 2021-09-13 DIAGNOSIS — R39.9 LOWER URINARY TRACT SYMPTOMS (LUTS): Primary | ICD-10-CM

## 2021-09-13 DIAGNOSIS — N40.1 BENIGN PROSTATIC HYPERPLASIA WITH URINARY OBSTRUCTION: ICD-10-CM

## 2021-09-13 PROCEDURE — 99214 OFFICE O/P EST MOD 30 MIN: CPT | Performed by: UROLOGY

## 2021-09-13 RX ORDER — FINASTERIDE 5 MG/1
5 TABLET, FILM COATED ORAL DAILY
Qty: 90 TABLET | Refills: 3 | Status: SHIPPED | OUTPATIENT
Start: 2021-09-13 | End: 2022-01-31 | Stop reason: SDUPTHER

## 2021-09-13 NOTE — PROGRESS NOTES
Office Visit New Urology      Patient Name: Fidencio Luciano  : 1945   MRN: [id number]     Chief Complaint:    Chief Complaint   Patient presents with   • Benign Prostatic Hypertrophy       Referring Provider: No ref. provider found    History of Present Illness: Fidencio Luciano is a 75 y.o. male who presents to Urology today for lower urinary tract symptoms. He has a history of dementia and is here with his family. They report he has had trouble urinating for quite some time and then improved on his finasteride. He reports that he feels like he is urinating better than he was off of his medication. His family also reports that they were told he had an abnormal prostate exam. His most recent PSA was 2020 which was approximately 1.4. He was due to run out of his finasteride and they came in to make sure he still needs to continue it. They deny any dysuria or gross hematuria.  He was seen by Dr. Tate in July.      Subjective      Review of System: Review of Systems   Constitutional: Positive for fatigue.   HENT: Negative.    Eyes: Negative.    Respiratory: Positive for shortness of breath.    Cardiovascular: Negative.    Gastrointestinal: Negative.    Genitourinary: Positive for flank pain and frequency. Negative for decreased urine volume, difficulty urinating, discharge, dysuria, enuresis, genital sores, hematuria, penile pain, penile swelling, scrotal swelling, testicular pain and urgency.   Musculoskeletal: Negative for back pain.   Skin: Negative.    Allergic/Immunologic: Negative.    Neurological: Positive for dizziness and light-headedness. Negative for headaches.   Hematological: Bruises/bleeds easily.   Psychiatric/Behavioral: Negative.       I have reviewed the ROS documented by my clinical staff, I have updated appropriately and I agree. Dangelo Enrique MD    Past Medical History:   Past Medical History:   Diagnosis Date   • BPH (benign prostatic hyperplasia)    • Bradycardia     Positive  tilt table testing 07/2016   • Coronary artery disease    • Diabetes mellitus (CMS/[facility])    • Diverticulosis    • Elevated cholesterol    • Gastric ulcer with hemorrhage    • Gastroesophageal reflux disease 1/26/2021   • History of transfusion    • Hyperlipidemia    • Hypertension    • Psoriasis        Past Surgical History:   Past Surgical History:   Procedure Laterality Date   • BACK SURGERY     • CARDIAC CATHETERIZATION N/A 9/20/2016    Procedure: Left Heart Cath;  Surgeon: Giovanni Buenrostro MD;  Location:  [facility];  Service:    • CARDIAC CATHETERIZATION N/A 10/4/2016    Procedure: Left Heart Cath;  Surgeon: Bharathi Andrew MD;  Location:  [facility];  Service:    • CARDIAC CATHETERIZATION N/A 5/9/2017    Procedure: Left Heart Cath;  Surgeon: Giovanni Buenrostro MD;  Location:  [facility];  Service:    • CARDIAC CATHETERIZATION N/A 5/9/2017    Procedure: ERR;  Surgeon: Giovanni Buenrostro MD;  Location:  [facility];  Service:    • CARDIAC CATHETERIZATION N/A 11/8/2019    Procedure: Left Heart Cath;  Surgeon: Abraham Oviedo MD;  Location:  [facility];  Service: Cardiology   • CARDIAC CATHETERIZATION N/A 12/18/2019    Procedure: Coronary angiography;  Surgeon: Jay Barney IV, MD;  Location:  [facility];  Service: Cardiovascular   • CHOLECYSTECTOMY     • COLONOSCOPY  11/13/2015    Dr. Martinez- internal hemorrhoids, sigmoid diverticulosis   • COLONOSCOPY N/A 8/17/2018    Procedure: COLONOSCOPY CPT CODE: 64254;  Surgeon: Gigi Geronimo MD;  Location: [facility] OR;  Service: Gastroenterology   • CORONARY ANGIOPLASTY WITH STENT PLACEMENT     • ENDOSCOPY N/A 8/17/2018    Procedure: ESOPHAGOGASTRODUODENOSCOPY WITH BIOPSY CPT CODE: 15491;  Surgeon: Gigi Geronimo MD;  Location: [facility] OR;  Service: Gastroenterology   • ENDOSCOPY N/A 4/20/2021    Procedure: ESOPHAGOGASTRODUODENOSCOPY;  Surgeon:  Geno Vernon MD;  Location:  [facility] OR;  Service: Gastroenterology;  Laterality: N/A;   • INTERVENTIONAL RADIOLOGY PROCEDURE N/A 2019    Procedure: Coil Embolization of septal to pulmonary artery fistuala.;  Surgeon: Jay Barney IV, MD;  Location:  [facility];  Service: Cardiovascular   • NH RT/LT HEART CATHETERS N/A 2017    Procedure: Percutaneous Coronary Intervention;  Surgeon: Lincoln De Los Santos MD;  Location:  [facility];  Service: Cardiology   • STOMACH SURGERY      FUSION OF BLEEDING ULCER   • UPPER GASTROINTESTINAL ENDOSCOPY  2015    Dr. Martinez- mild gastritis       Family History:   Family History   Problem Relation Age of Onset   • Sick sinus syndrome Mother    • Heart failure Mother    • Diabetes Mother    • Liver cancer Father        Social History:   Social History     Socioeconomic History   • Marital status:      Spouse name: Not on file   • Number of children: Not on file   • Years of education: Not on file   • Highest education level: Not on file   Tobacco Use   • Smoking status: Former Smoker     Packs/day: 3.00     Years: 40.00     Pack years: 120.00     Types: Cigarettes     Quit date:      Years since quittin.7   • Smokeless tobacco: Former User     Quit date: 1986   Vaping Use   • Vaping Use: Never used   Substance and Sexual Activity   • Alcohol use: No   • Drug use: No   • Sexual activity: Defer       Medications:     Current Outpatient Medications:   •  aspirin 81 MG tablet, Take 81 mg by mouth Daily., Disp: , Rfl:   •  atorvastatin (LIPITOR) 80 MG tablet, Take 1 tablet by mouth Every Night., Disp: 90 tablet, Rfl: 5  •  cetirizine (zyrTEC) 10 MG tablet, Take 10 mg by mouth Daily., Disp: , Rfl:   •  clopidogrel (PLAVIX) 75 MG tablet, Take 1 tablet by mouth Daily., Disp: 30 tablet, Rfl: 5  •  colestipol (COLESTID) 1 g tablet, Take 1 tablet by mouth Daily., Disp: 30 tablet, Rfl: 2  •  ferrous sulfate  325 (65 FE) MG tablet, Take 325 mg by mouth 2 (two) times a day., Disp: , Rfl:   •  finasteride (PROSCAR) 5 MG tablet, Take 1 tablet by mouth Daily for 360 days., Disp: 90 tablet, Rfl: 3  •  isosorbide mononitrate (IMDUR) 120 MG 24 hr tablet, Take 1 tablet by mouth Every Morning., Disp: 90 tablet, Rfl: 2  •  Memantine HCl-Donepezil HCl 28-10 MG capsule sustained-release 24 hr, Take  by mouth Daily., Disp: , Rfl:   •  mirtazapine (REMERON SOL-TAB) 45 MG disintegrating tablet, Place 45 mg on the tongue Every Night., Disp: , Rfl:   •  nitroglycerin (NITROSTAT) 0.4 MG SL tablet, 1 under the tongue as needed for angina, may repeat q5mins for up three doses, Disp: 25 tablet, Rfl: 2  •  ondansetron ODT (ZOFRAN-ODT) 4 MG disintegrating tablet, Place 1 tablet on the tongue Every 8 (Eight) Hours As Needed for Nausea or Vomiting., Disp: 14 tablet, Rfl: 0  •  pantoprazole (PROTONIX) 40 MG EC tablet, Take 40 mg by mouth Daily., Disp: , Rfl:   •  promethazine (PHENERGAN) 12.5 MG tablet, Take 1 tablet by mouth Every 8 (Eight) Hours As Needed for Nausea or Vomiting., Disp: 12 tablet, Rfl: 0  •  megestrol (Megace ES) 625 MG/5ML suspension, Take 5 mL by mouth Daily for 30 days., Disp: 150 mL, Rfl: 2    Allergies:   No Known Allergies    IPSS Questionnaire (AUA-7):  Over the past month…    1)  Incomplete Emptying  How often have you had a sensation of not emptying your bladder?  4 - More than half the time   2)  Frequency  How often have you had to urinate less than every two hours? 3 - About half the time   3)  Intermittency  How often have you found you stopped and started again several times when you urinated?  0 - Not at all   4) Urgency  How often have you found it difficult to postpone urination?  2 - Less than half the time   5) Weak Stream  How often have you had a weak urinary stream?  0 - Not at all   6) Straining  How often have you had to push or strain to begin urination?  0 - Not at all   7) Nocturia  How many times did  "you typically get up at night to urinate?  3 - 3 times   Total Score:  12   The International Prostate Symptom Score (IPSS) is used to screen, diagnose, track symptoms of benign prostatic hyperplasia (BPH).    0-7 pts (Mild Symptoms)  / 8-19 pts (Moderate) / 20-35 (Severe)    Quality of life due to urinary symptoms:  If you were to spend the rest of your life with your urinary condition the way it is now, how would you feel about that? 4-Mostly Dissatisfied   Urine Leakage (Incontinence) 1- 1 in 5 times         Post void residual bladder scan:   0 mL     Objective     Physical Exam:   Vital Signs:   Vitals:    09/13/21 1144   BP: 156/76   BP Location: Left arm   Patient Position: Sitting   Cuff Size: Adult   Pulse: 73   Weight: 64.9 kg (143 lb)   Height: 180.3 cm (71\")     Body mass index is 19.94 kg/m².     Physical Exam  Vitals and nursing note reviewed.   Constitutional:       General: He is awake. He is not in acute distress.     Appearance: Normal appearance. He is well-developed. He is obese.   HENT:      Head: Normocephalic and atraumatic.      Right Ear: External ear normal.      Left Ear: External ear normal.      Nose: Nose normal.   Eyes:      Conjunctiva/sclera: Conjunctivae normal.   Pulmonary:      Effort: Pulmonary effort is normal.   Abdominal:      General: There is no distension.      Palpations: Abdomen is soft. There is no mass.      Tenderness: There is no abdominal tenderness. There is no right CVA tenderness, left CVA tenderness, guarding or rebound.      Hernia: No hernia is present. There is no hernia in the left inguinal area or right inguinal area.   Genitourinary:     Pubic Area: No rash.       Penis: Normal.       Testes: Normal.      Prostate: Normal.      Rectum: Normal. No mass or tenderness. Normal anal tone.      Comments: Prostate approx 30g.  NO nodules or hard areas.   Musculoskeletal:      Cervical back: Normal range of motion.   Lymphadenopathy:      Lower Body: No right " inguinal adenopathy. No left inguinal adenopathy.   Skin:     General: Skin is warm.   Neurological:      General: No focal deficit present.      Mental Status: He is alert and oriented to person, place, and time.   Psychiatric:         Behavior: Behavior normal. Behavior is cooperative.         Labs:   Brief Urine Lab Results  (Last result in the past 365 days)      Color   Clarity   Blood   Leuk Est   Nitrite   Protein   CREAT   Urine HCG        07/04/21 1013 Yellow Clear Negative Negative Negative Trace               Urine Culture    Urine Culture 5/24/21   Urine Culture No growth              Lab Results   Component Value Date    GLUCOSE 89 08/24/2021    CALCIUM 9.2 08/24/2021     08/24/2021    K 4.5 08/24/2021    CO2 21.8 (L) 08/24/2021     (H) 08/24/2021    BUN 31 (H) 08/24/2021    CREATININE 0.93 08/24/2021    EGFRIFAFRI  09/19/2016      Comment:      <15 Indicative of kidney failure.    EGFRIFNONA 79 08/24/2021    BCR 33.3 (H) 08/24/2021    ANIONGAP 10.2 08/24/2021       Lab Results   Component Value Date    WBC 10.58 08/24/2021    HGB 14.2 08/24/2021    HCT 41.6 08/24/2021    MCV 94.8 08/24/2021     08/24/2021       Lab Results   Component Value Date    PSA 1.440 07/17/2020    PSA 1.850 05/10/2019    PSA 1.110 01/22/2018        Images:   CT Head Without Contrast    Result Date: 5/24/2021  Impression: 1. No clearly acute intracranial pathology demonstrated. 2. Mild cerebral atrophy and nonspecific periventricular hypodensities which are thought to represent white matter microvascular change. No significant change from prior study of 7/25/2018. Signer Name: Audie Carmichael MD  Signed: 5/24/2021 6:49 AM  Workstation Name: [id number]  [facility]    MRI Brain Without Contrast    Result Date: 7/28/2021  1. No parenchymal mass, hemorrhage, or midline shift. 2. Chronic microangiopathic change and cerebral atrophy.  This report was finalized on 7/28/2021 11:15 AM by   Adan Kauffman MD.      XR Chest 1 View    Result Date: 7/20/2021    Unremarkable exam. No acute cardiopulmonary findings identified.  This report was finalized on 7/20/2021 8:35 PM by Dr. Benigno Powers MD.      XR Chest 1 View    Result Date: 7/4/2021  No acute cardiopulmonary findings. Signer Name: LUIS MOULTON MD  Signed: 7/4/2021 10:35 AM  Workstation Name: [facility]  [facility]    XR Chest 1 View    Result Date: 6/18/2021  No evidence of active or acute cardiopulmonary disease on today's chest radiograph.  This report was finalized on 6/18/2021 4:23 PM by Dr. Adan Kauffman MD.      XR Chest 1 View    Result Date: 5/24/2021  No acute cardiopulmonary findings. Left base infiltrate seen on the previous examination has resolved. Signer Name: Cortez Griffin MD  Signed: 5/24/2021 5:18 AM  [facility]: [id number]  [facility]    CT Chest Pulmonary Embolism    Result Date: 5/24/2021  No evidence of a pulmonary embolus.  This report was finalized on 5/24/2021 9:16 AM by Dr. Adan Kauffmna MD.      US Venous Doppler Lower Extremity Bilateral (duplex)    Result Date: 5/24/2021  No DVT in the lower extremities on today's exam.  This report was finalized on 5/24/2021 7:44 PM by Dr. Adan Kauffman MD.        Measures:   Tobacco:   Fidencio Luciano  reports that he quit smoking about 39 years ago. His smoking use included cigarettes. He has a 120.00 pack-year smoking history. He quit smokeless tobacco use about 35 years ago.       Urine Incontinence: ( NOUI)  He reports no urinary incontinence.    Assessment / Plan      Assessment/Plan:   Fidencio Luciano is a 75 y.o. male who presented today for lower urinary tract symptoms. Given his overall medical comorbidities he is fairly well controlled on medication alone. I do not think he would make a good surgical candidate at this time. If he should go into urinary retention or have worsening symptoms we would reconsider. His IPSS is 12  with a quality-of-life score of 4, however, during our discussion he reported that overall he is happy with his lower urinary tract symptoms. We will have him follow-up in 6 months first symptom check and PVR.    Diagnoses and all orders for this visit:    1. Lower urinary tract symptoms (LUTS) (Primary)    2. Benign prostatic hyperplasia with urinary obstruction  -     finasteride (PROSCAR) 5 MG tablet; Take 1 tablet by mouth Daily for 360 days.  Dispense: 90 tablet; Refill: 3         Follow Up:   Return in about 6 months (around 3/13/2022) for Recheck.    I spent approximately 20 minutes providing clinical care for this patient; including review of patient's chart and provider documentation, face to face time spent with patient in examination room (obtaining history, performing physical exam, discussing diagnosis and management options), placing orders, and completing patient documentation.     Dangelo Enrique MD  [facility]

## 2021-09-17 ENCOUNTER — TRANSCRIBE ORDERS (OUTPATIENT)
Dept: ADMINISTRATIVE | Facility: HOSPITAL | Age: 76
End: 2021-09-17

## 2021-09-17 ENCOUNTER — HOSPITAL ENCOUNTER (OUTPATIENT)
Dept: RESPIRATORY THERAPY | Facility: HOSPITAL | Age: 76
Discharge: HOME | End: 2021-09-17

## 2021-09-17 ENCOUNTER — LAB (OUTPATIENT)
Dept: LAB | Facility: HOSPITAL | Age: 76
End: 2021-09-17

## 2021-09-17 DIAGNOSIS — Z01.818 OTHER SPECIFIED PRE-OPERATIVE EXAMINATION: ICD-10-CM

## 2021-09-17 DIAGNOSIS — R06.09 DYSPNEA ON EXERTION: ICD-10-CM

## 2021-09-17 DIAGNOSIS — Z01.818 OTHER SPECIFIED PRE-OPERATIVE EXAMINATION: Primary | ICD-10-CM

## 2021-09-17 LAB
FLUAV RNA RESP QL NAA+PROBE: NOT DETECTED
FLUBV RNA RESP QL NAA+PROBE: NOT DETECTED
SARS-COV-2 RNA RESP QL NAA+PROBE: NOT DETECTED

## 2021-09-17 PROCEDURE — C9803 HOPD COVID-19 SPEC COLLECT: HCPCS

## 2021-09-17 PROCEDURE — 94640 AIRWAY INHALATION TREATMENT: CPT

## 2021-09-17 PROCEDURE — 94726 PLETHYSMOGRAPHY LUNG VOLUMES: CPT

## 2021-09-17 PROCEDURE — 94060 EVALUATION OF WHEEZING: CPT | Performed by: INTERNAL MEDICINE

## 2021-09-17 PROCEDURE — 94729 DIFFUSING CAPACITY: CPT

## 2021-09-17 PROCEDURE — 94060 EVALUATION OF WHEEZING: CPT

## 2021-09-17 PROCEDURE — 94726 PLETHYSMOGRAPHY LUNG VOLUMES: CPT | Performed by: INTERNAL MEDICINE

## 2021-09-17 PROCEDURE — 87636 SARSCOV2 & INF A&B AMP PRB: CPT | Performed by: FAMILY MEDICINE

## 2021-09-17 PROCEDURE — 94729 DIFFUSING CAPACITY: CPT | Performed by: INTERNAL MEDICINE

## 2021-09-17 RX ORDER — ALBUTEROL SULFATE 2.5 MG/3ML
2.5 SOLUTION RESPIRATORY (INHALATION) ONCE
Status: COMPLETED | OUTPATIENT
Start: 2021-09-17 | End: 2021-09-17

## 2021-09-17 RX ADMIN — ALBUTEROL SULFATE 2.5 MG: 2.5 SOLUTION RESPIRATORY (INHALATION) at 09:15

## 2021-09-29 ENCOUNTER — OFFICE VISIT (OUTPATIENT)
Dept: CARDIOLOGY | Facility: CLINIC | Age: 76
End: 2021-09-29

## 2021-09-29 VITALS
HEART RATE: 84 BPM | BODY MASS INDEX: 19.99 KG/M2 | TEMPERATURE: 97.5 F | SYSTOLIC BLOOD PRESSURE: 134 MMHG | WEIGHT: 142.8 LBS | HEIGHT: 71 IN | DIASTOLIC BLOOD PRESSURE: 69 MMHG | RESPIRATION RATE: 16 BRPM

## 2021-09-29 DIAGNOSIS — I10 ESSENTIAL HYPERTENSION: Chronic | ICD-10-CM

## 2021-09-29 DIAGNOSIS — I25.10 ASCVD (ARTERIOSCLEROTIC CARDIOVASCULAR DISEASE): Primary | Chronic | ICD-10-CM

## 2021-09-29 DIAGNOSIS — I25.41 CORONARY ARTERY FISTULA: Chronic | ICD-10-CM

## 2021-09-29 PROCEDURE — 99213 OFFICE O/P EST LOW 20 MIN: CPT | Performed by: PHYSICIAN ASSISTANT

## 2021-09-29 NOTE — PROGRESS NOTES
"Camila Ortiz APRN  Fidencio Luciano  1945  09/29/2021    Patient Active Problem List   Diagnosis   • Essential hypertension   • Type 2 diabetes mellitus (CMS/HCC)   • Benign prostatic hyperplasia   • ASCVD (arteriosclerotic cardiovascular disease), s/p stenting of 80 % stenosis in left circumflex on 10/05/16and 60% stenosis in the LAD, clinically stable.    • Hyperlipidemia LDL goal <70   • Weakness generalized   • Chronic fatigue   • Coronary artery fistula   • Weight loss, unintentional   • Iron deficiency anemia   • Iron deficiency   • Normocytic anemia   • Weight loss, abnormal   • Early satiety   • Esophageal dysphagia   • Gastroesophageal reflux disease   • Dysphagia   • Chest pain       Dear Camila Ortiz APRN:    Subjective     History of Present Illness:    Chief Complaint   Patient presents with   • Palpitations     \"little bit\"   • Shortness of Breath     increased   • Results     PFT's, and lab findings   • Med Management     list provided       Fidencio Luciano is a pleasant 75 y.o. male with a past medical history significant for  coronary artery disease status post stenting of the left circumflex coronary artery in October 2016 with subsequent coiling of the fistulous connection between the LAD and pulmonary artery in December 2019 by Dr. Barney at [facility].  Patient also has history of essential hypertension and dyslipidemia.  He comes in today for routine cardiology follow-up.    Pat reports that he is still short of breath since he was last seen.  He reports his shortness of breath has kept him from performing most of his actives of daily living such as chores around the house and reports they get short of breath just walking to the car.  Since he was last seen he did have PFT which reportedly was unremarkable but he does have an appointment scheduled with pulmonologist for further evaluation.  He has also had an extensive cardiac work-up with a normal stress test " and transthoracic echocardiogram.  His shortness of breath has been ongoing now for at least 6 months now he did have a CT of his chest with PE protocol that was unremarkable.    No Known Allergies:      Current Outpatient Medications:   •  aspirin 81 MG tablet, Take 81 mg by mouth Daily., Disp: , Rfl:   •  atorvastatin (LIPITOR) 80 MG tablet, Take 1 tablet by mouth Every Night., Disp: 90 tablet, Rfl: 5  •  cetirizine (zyrTEC) 10 MG tablet, Take 10 mg by mouth Daily., Disp: , Rfl:   •  clopidogrel (PLAVIX) 75 MG tablet, Take 1 tablet by mouth Daily., Disp: 30 tablet, Rfl: 5  •  colestipol (COLESTID) 1 g tablet, Take 1 tablet by mouth Daily., Disp: 30 tablet, Rfl: 2  •  ferrous sulfate 325 (65 FE) MG tablet, Take 325 mg by mouth 2 (two) times a day., Disp: , Rfl:   •  finasteride (PROSCAR) 5 MG tablet, Take 1 tablet by mouth Daily for 360 days., Disp: 90 tablet, Rfl: 3  •  isosorbide mononitrate (IMDUR) 120 MG 24 hr tablet, Take 1 tablet by mouth Every Morning., Disp: 90 tablet, Rfl: 2  •  megestrol (Megace ES) 625 MG/5ML suspension, Take 5 mL by mouth Daily for 30 days., Disp: 150 mL, Rfl: 2  •  Memantine HCl-Donepezil HCl 28-10 MG capsule sustained-release 24 hr, Take  by mouth Daily., Disp: , Rfl:   •  mirtazapine (REMERON SOL-TAB) 45 MG disintegrating tablet, Place 45 mg on the tongue Every Night., Disp: , Rfl:   •  nitroglycerin (NITROSTAT) 0.4 MG SL tablet, 1 under the tongue as needed for angina, may repeat q5mins for up three doses, Disp: 25 tablet, Rfl: 2  •  pantoprazole (PROTONIX) 40 MG EC tablet, Take 40 mg by mouth Daily., Disp: , Rfl:   •  ondansetron ODT (ZOFRAN-ODT) 4 MG disintegrating tablet, Place 1 tablet on the tongue Every 8 (Eight) Hours As Needed for Nausea or Vomiting., Disp: 14 tablet, Rfl: 0  •  promethazine (PHENERGAN) 12.5 MG tablet, Take 1 tablet by mouth Every 8 (Eight) Hours As Needed for Nausea or Vomiting., Disp: 12 tablet, Rfl: 0    The following portions of the patient's history  "were reviewed and updated as appropriate: allergies, current medications, past family history, past medical history, past social history, past surgical history and problem list.    Social History     Tobacco Use   • Smoking status: Former Smoker     Packs/day: 3.00     Years: 40.00     Pack years: 120.00     Types: Cigarettes     Quit date:      Years since quittin.7   • Smokeless tobacco: Former User     Quit date: 1986   Vaping Use   • Vaping Use: Never used   Substance Use Topics   • Alcohol use: No   • Drug use: No         Objective   Vitals:    21 0956   BP: 134/69   Pulse: 84   Resp: 16   Temp: 97.5 °F (36.4 °C)   Weight: 64.8 kg (142 lb 12.8 oz)   Height: 180.3 cm (71\")     Body mass index is 19.92 kg/m².    Constitutional:       General: Not in acute distress.     Appearance: Healthy appearance. Well-developed and not in distress. Not diaphoretic.   Eyes:      Conjunctiva/sclera: Conjunctivae normal.      Pupils: Pupils are equal, round, and reactive to light.   HENT:      Head: Normocephalic and atraumatic.   Neck:      Vascular: No carotid bruit or JVD.   Pulmonary:      Effort: Pulmonary effort is normal. No respiratory distress.      Breath sounds: Normal breath sounds.   Cardiovascular:      Normal rate. Regular rhythm.   Skin:     General: Skin is cool.   Neurological:      Mental Status: Alert, oriented to person, place, and time and oriented to person, place and time.         Lab Results   Component Value Date     2021    K 4.5 2021     (H) 2021    CO2 21.8 (L) 2021    BUN 31 (H) 2021    CREATININE 0.93 2021    GLUCOSE 89 2021    CALCIUM 9.2 2021    AST 28 2021    ALT 33 2021    ALKPHOS 93 2021    LABIL2 1.4 (L) 2016     Lab Results   Component Value Date    CKTOTAL 93 2021     Lab Results   Component Value Date    WBC 10.58 2021    HGB 14.2 2021    HCT 41.6 2021     " 08/24/2021     Lab Results   Component Value Date    INR 1.02 05/24/2021    INR 1.08 03/13/2021    INR 1.10 03/09/2021     Lab Results   Component Value Date    MG 2.0 07/20/2021     Lab Results   Component Value Date    TSH 1.010 08/24/2021    PSA 1.440 07/17/2020    CHLPL 109 08/12/2015    TRIG 115 05/25/2021    HDL 29 (L) 05/25/2021    LDL 58 05/25/2021      Lab Results   Component Value Date    BNP 55.0 07/16/2016       During this visit the following were done:  Labs Reviewed []    Labs Ordered []    Radiology Reports Reviewed []    Radiology Ordered []    PCP Records Reviewed []    Referring Provider Records Reviewed []    ER Records Reviewed []    Hospital Records Reviewed []    History Obtained From Family []    Radiology Images Reviewed []    Other Reviewed []    Records Requested []       Procedures    Assessment/Plan    Diagnosis Plan   1. ASCVD (arteriosclerotic cardiovascular disease), s/p stenting of 80 % stenosis in left circumflex on 10/05/16and 60% stenosis in the LAD, clinically stable.      2. Coronary artery fistula     3. Essential hypertension              Recommendations:  1. Dyspnea on exertion  1. Encourage patient to pursue further pulmonary work-up as cardiac work-up so far has been unremarkable.  If pulmonary work-up is unremarkable it is possible dyspnea could be anginal equivalent and may consider further ischemic work-up however further testing would require invasive testing so hold off for now since his stress test was normal.  He expressed understanding.  2. Essential hypertension  1. Currently controlled we will continue aspirin, Lipitor, Plavix, Imdur.      Return in about 2 months (around 11/29/2021).    As always, I appreciate very much the opportunity to participate in the cardiovascular care of your patients.      With Best Regards,    Delbert Gupta PA-C

## 2021-10-06 ENCOUNTER — APPOINTMENT (OUTPATIENT)
Dept: GENERAL RADIOLOGY | Facility: HOSPITAL | Age: 76
End: 2021-10-06

## 2021-10-06 ENCOUNTER — APPOINTMENT (OUTPATIENT)
Dept: CT IMAGING | Facility: HOSPITAL | Age: 76
End: 2021-10-06

## 2021-10-06 ENCOUNTER — HOSPITAL ENCOUNTER (EMERGENCY)
Facility: HOSPITAL | Age: 76
Discharge: HOME | End: 2021-10-06
Attending: STUDENT IN AN ORGANIZED HEALTH CARE EDUCATION/TRAINING PROGRAM | Admitting: STUDENT IN AN ORGANIZED HEALTH CARE EDUCATION/TRAINING PROGRAM

## 2021-10-06 VITALS
BODY MASS INDEX: 23.52 KG/M2 | DIASTOLIC BLOOD PRESSURE: 67 MMHG | HEIGHT: 71 IN | TEMPERATURE: 97.7 F | WEIGHT: 168 LBS | RESPIRATION RATE: 14 BRPM | OXYGEN SATURATION: 100 % | SYSTOLIC BLOOD PRESSURE: 146 MMHG | HEART RATE: 69 BPM

## 2021-10-06 DIAGNOSIS — R55 NEAR SYNCOPE: Primary | ICD-10-CM

## 2021-10-06 LAB
ALBUMIN SERPL-MCNC: 3.82 G/DL (ref 3.5–5.2)
ALBUMIN/GLOB SERPL: 1.3 G/DL
ALP SERPL-CCNC: 73 U/L (ref 39–117)
ALT SERPL W P-5'-P-CCNC: 68 U/L (ref 1–41)
ANION GAP SERPL CALCULATED.3IONS-SCNC: 12.2 MMOL/L (ref 5–15)
AST SERPL-CCNC: 72 U/L (ref 1–40)
BACTERIA UR QL AUTO: NORMAL /HPF
BASOPHILS # BLD AUTO: 0.14 10*3/MM3 (ref 0–0.2)
BASOPHILS NFR BLD AUTO: 1 % (ref 0–1.5)
BILIRUB SERPL-MCNC: 0.5 MG/DL (ref 0–1.2)
BILIRUB UR QL STRIP: NEGATIVE
BUN SERPL-MCNC: 25 MG/DL (ref 8–23)
BUN/CREAT SERPL: 33.8 (ref 7–25)
CALCIUM SPEC-SCNC: 9.3 MG/DL (ref 8.6–10.5)
CHLORIDE SERPL-SCNC: 111 MMOL/L (ref 98–107)
CLARITY UR: CLEAR
CO2 SERPL-SCNC: 19.8 MMOL/L (ref 22–29)
COLOR UR: YELLOW
CREAT SERPL-MCNC: 0.74 MG/DL (ref 0.76–1.27)
DEPRECATED RDW RBC AUTO: 53.4 FL (ref 37–54)
EOSINOPHIL # BLD AUTO: 0.14 10*3/MM3 (ref 0–0.4)
EOSINOPHIL NFR BLD AUTO: 1 % (ref 0.3–6.2)
ERYTHROCYTE [DISTWIDTH] IN BLOOD BY AUTOMATED COUNT: 14.8 % (ref 12.3–15.4)
GFR SERPL CREATININE-BSD FRML MDRD: 103 ML/MIN/1.73
GLOBULIN UR ELPH-MCNC: 2.9 GM/DL
GLUCOSE SERPL-MCNC: 92 MG/DL (ref 65–99)
GLUCOSE UR STRIP-MCNC: NEGATIVE MG/DL
HCT VFR BLD AUTO: 45.8 % (ref 37.5–51)
HGB BLD-MCNC: 14.8 G/DL (ref 13–17.7)
HGB UR QL STRIP.AUTO: NEGATIVE
HYALINE CASTS UR QL AUTO: NORMAL /LPF
IMM GRANULOCYTES # BLD AUTO: 0.31 10*3/MM3 (ref 0–0.05)
IMM GRANULOCYTES NFR BLD AUTO: 2.3 % (ref 0–0.5)
INR PPP: 0.97 (ref 0.9–1.1)
KETONES UR QL STRIP: NEGATIVE
LEUKOCYTE ESTERASE UR QL STRIP.AUTO: NEGATIVE
LYMPHOCYTES # BLD AUTO: 2.03 10*3/MM3 (ref 0.7–3.1)
LYMPHOCYTES NFR BLD AUTO: 14.9 % (ref 19.6–45.3)
MCH RBC QN AUTO: 31.9 PG (ref 26.6–33)
MCHC RBC AUTO-ENTMCNC: 32.3 G/DL (ref 31.5–35.7)
MCV RBC AUTO: 98.7 FL (ref 79–97)
MONOCYTES # BLD AUTO: 0.97 10*3/MM3 (ref 0.1–0.9)
MONOCYTES NFR BLD AUTO: 7.1 % (ref 5–12)
NEUTROPHILS NFR BLD AUTO: 10.04 10*3/MM3 (ref 1.7–7)
NEUTROPHILS NFR BLD AUTO: 73.7 % (ref 42.7–76)
NITRITE UR QL STRIP: NEGATIVE
NRBC BLD AUTO-RTO: 0 /100 WBC (ref 0–0.2)
PH UR STRIP.AUTO: 5.5 [PH] (ref 5–8)
PLATELET # BLD AUTO: 175 10*3/MM3 (ref 140–450)
PMV BLD AUTO: 9.6 FL (ref 6–12)
POTASSIUM SERPL-SCNC: 4.2 MMOL/L (ref 3.5–5.2)
PROT SERPL-MCNC: 6.7 G/DL (ref 6–8.5)
PROT UR QL STRIP: ABNORMAL
PROTHROMBIN TIME: 13.2 SECONDS (ref 12.8–14.5)
QT INTERVAL: 418 MS
QTC INTERVAL: 399 MS
RBC # BLD AUTO: 4.64 10*6/MM3 (ref 4.14–5.8)
RBC # UR: NORMAL /HPF
REF LAB TEST METHOD: NORMAL
SODIUM SERPL-SCNC: 143 MMOL/L (ref 136–145)
SP GR UR STRIP: 1.02 (ref 1–1.03)
SQUAMOUS #/AREA URNS HPF: NORMAL /HPF
TROPONIN T SERPL-MCNC: 0.03 NG/ML (ref 0–0.03)
UROBILINOGEN UR QL STRIP: ABNORMAL
WBC # BLD AUTO: 13.63 10*3/MM3 (ref 3.4–10.8)
WBC UR QL AUTO: NORMAL /HPF

## 2021-10-06 PROCEDURE — 73030 X-RAY EXAM OF SHOULDER: CPT

## 2021-10-06 PROCEDURE — 81001 URINALYSIS AUTO W/SCOPE: CPT | Performed by: STUDENT IN AN ORGANIZED HEALTH CARE EDUCATION/TRAINING PROGRAM

## 2021-10-06 PROCEDURE — 85025 COMPLETE CBC W/AUTO DIFF WBC: CPT | Performed by: STUDENT IN AN ORGANIZED HEALTH CARE EDUCATION/TRAINING PROGRAM

## 2021-10-06 PROCEDURE — 93005 ELECTROCARDIOGRAM TRACING: CPT | Performed by: STUDENT IN AN ORGANIZED HEALTH CARE EDUCATION/TRAINING PROGRAM

## 2021-10-06 PROCEDURE — 93010 ELECTROCARDIOGRAM REPORT: CPT | Performed by: INTERNAL MEDICINE

## 2021-10-06 PROCEDURE — 70450 CT HEAD/BRAIN W/O DYE: CPT

## 2021-10-06 PROCEDURE — 73060 X-RAY EXAM OF HUMERUS: CPT | Performed by: RADIOLOGY

## 2021-10-06 PROCEDURE — 96374 THER/PROPH/DIAG INJ IV PUSH: CPT

## 2021-10-06 PROCEDURE — 84484 ASSAY OF TROPONIN QUANT: CPT | Performed by: STUDENT IN AN ORGANIZED HEALTH CARE EDUCATION/TRAINING PROGRAM

## 2021-10-06 PROCEDURE — 80053 COMPREHEN METABOLIC PANEL: CPT | Performed by: STUDENT IN AN ORGANIZED HEALTH CARE EDUCATION/TRAINING PROGRAM

## 2021-10-06 PROCEDURE — 73060 X-RAY EXAM OF HUMERUS: CPT

## 2021-10-06 PROCEDURE — 85610 PROTHROMBIN TIME: CPT | Performed by: STUDENT IN AN ORGANIZED HEALTH CARE EDUCATION/TRAINING PROGRAM

## 2021-10-06 PROCEDURE — 71045 X-RAY EXAM CHEST 1 VIEW: CPT | Performed by: RADIOLOGY

## 2021-10-06 PROCEDURE — 25010000002 HYDRALAZINE PER 20 MG: Performed by: STUDENT IN AN ORGANIZED HEALTH CARE EDUCATION/TRAINING PROGRAM

## 2021-10-06 PROCEDURE — 73030 X-RAY EXAM OF SHOULDER: CPT | Performed by: RADIOLOGY

## 2021-10-06 PROCEDURE — 70450 CT HEAD/BRAIN W/O DYE: CPT | Performed by: RADIOLOGY

## 2021-10-06 PROCEDURE — 99284 EMERGENCY DEPT VISIT MOD MDM: CPT

## 2021-10-06 PROCEDURE — 96376 TX/PRO/DX INJ SAME DRUG ADON: CPT

## 2021-10-06 PROCEDURE — 71045 X-RAY EXAM CHEST 1 VIEW: CPT

## 2021-10-06 RX ORDER — HYDRALAZINE HYDROCHLORIDE 20 MG/ML
10 INJECTION INTRAMUSCULAR; INTRAVENOUS ONCE
Status: COMPLETED | OUTPATIENT
Start: 2021-10-06 | End: 2021-10-06

## 2021-10-06 RX ORDER — HYDRALAZINE HYDROCHLORIDE 20 MG/ML
5 INJECTION INTRAMUSCULAR; INTRAVENOUS ONCE
Status: COMPLETED | OUTPATIENT
Start: 2021-10-06 | End: 2021-10-06

## 2021-10-06 RX ADMIN — HYDRALAZINE HYDROCHLORIDE 10 MG: 20 INJECTION INTRAMUSCULAR; INTRAVENOUS at 11:18

## 2021-10-06 RX ADMIN — HYDRALAZINE HYDROCHLORIDE 5 MG: 20 INJECTION INTRAMUSCULAR; INTRAVENOUS at 10:36

## 2021-10-06 NOTE — ED PROVIDER NOTES
Subjective   75-year-old male presented to the ER after a fall at home.  Patient noted he has had multiple falls over the past several days.  Patient noted he has had increasing weakness without obvious signs of focal neurological deficits.  Patient denied chest pain or shortness of breath.  Patient noted previous diagnosis Covid extended time prior to presentation to the ER.  Denied abdominal pain.  Denied changes in bowel or urinary habits.  Denied hematuria or hematochezia.  No nausea or vomiting.  Denied visual changes or headache.  Denied obvious head trauma loss of consciousness.  Vitals stable          Review of Systems   Musculoskeletal:        Left shoulder and left humerus pain with multiple skin tears on the left arm   Neurological: Positive for dizziness and syncope.   All other systems reviewed and are negative.      Past Medical History:   Diagnosis Date   • BPH (benign prostatic hyperplasia)    • Bradycardia     Positive tilt table testing 07/2016   • Coronary artery disease    • Diabetes mellitus (HCC)    • Diverticulosis    • Elevated cholesterol    • Gastric ulcer with hemorrhage    • Gastroesophageal reflux disease 1/26/2021   • History of transfusion    • Hyperlipidemia    • Hypertension    • Psoriasis        No Known Allergies    Past Surgical History:   Procedure Laterality Date   • BACK SURGERY     • CARDIAC CATHETERIZATION N/A 9/20/2016    Procedure: Left Heart Cath;  Surgeon: Giovanni Buenrostro MD;  Location: [facility] INVASIVE LOCATION;  Service:    • CARDIAC CATHETERIZATION N/A 10/4/2016    Procedure: Left Heart Cath;  Surgeon: Bharathi Andrew MD;  Location: [facility] INVASIVE LOCATION;  Service:    • CARDIAC CATHETERIZATION N/A 5/9/2017    Procedure: Left Heart Cath;  Surgeon: Giovanni Buenrostro MD;  Location: [facility] CATH INVASIVE LOCATION;  Service:    • CARDIAC CATHETERIZATION N/A 5/9/2017    Procedure: ERR;  Surgeon: Giovanni Buenrostro MD;  Location: [facility] INVASIVE LOCATION;   Service:    • CARDIAC CATHETERIZATION N/A 11/8/2019    Procedure: Left Heart Cath;  Surgeon: Abraham Oviedo MD;  Location:  [facility] CATH INVASIVE LOCATION;  Service: Cardiology   • CARDIAC CATHETERIZATION N/A 12/18/2019    Procedure: Coronary angiography;  Surgeon: Jay Barney IV, MD;  Location:  [facility] CATH INVASIVE LOCATION;  Service: Cardiovascular   • CHOLECYSTECTOMY     • COLONOSCOPY  11/13/2015    Dr. Martinez- internal hemorrhoids, sigmoid diverticulosis   • COLONOSCOPY N/A 8/17/2018    Procedure: COLONOSCOPY CPT CODE: 26486;  Surgeon: Gigi Geronimo MD;  Location:  [facility] OR;  Service: Gastroenterology   • CORONARY ANGIOPLASTY WITH STENT PLACEMENT     • ENDOSCOPY N/A 8/17/2018    Procedure: ESOPHAGOGASTRODUODENOSCOPY WITH BIOPSY CPT CODE: 54177;  Surgeon: Gigi Geronimo MD;  Location:  [facility] OR;  Service: Gastroenterology   • ENDOSCOPY N/A 4/20/2021    Procedure: ESOPHAGOGASTRODUODENOSCOPY;  Surgeon: Geno Vernon MD;  Location:  [facility];  Service: Gastroenterology;  Laterality: N/A;   • INTERVENTIONAL RADIOLOGY PROCEDURE N/A 12/18/2019    Procedure: Coil Embolization of septal to pulmonary artery fistuala.;  Surgeon: Jay Barney IV, MD;  Location:  [facility] CATH INVASIVE LOCATION;  Service: Cardiovascular   • IN RT/LT HEART CATHETERS N/A 5/9/2017    Procedure: Percutaneous Coronary Intervention;  Surgeon: Lincoln De Los Santos MD;  Location:  [facility] CATH INVASIVE LOCATION;  Service: Cardiology   • STOMACH SURGERY      FUSION OF BLEEDING ULCER   • UPPER GASTROINTESTINAL ENDOSCOPY  11/13/2015    Dr. Martinez- mild gastritis       Family History   Problem Relation Age of Onset   • Sick sinus syndrome Mother    • Heart failure Mother    • Diabetes Mother    • Liver cancer Father        Social History     Socioeconomic History   • Marital status:      Spouse name: Not on file   • Number of children: Not on file   • Years of education: Not on file   • Highest education  level: Not on file   Tobacco Use   • Smoking status: Former Smoker     Packs/day: 3.00     Years: 40.00     Pack years: 120.00     Types: Cigarettes     Quit date:      Years since quittin.7   • Smokeless tobacco: Former User     Quit date: 1986   Vaping Use   • Vaping Use: Never used   Substance and Sexual Activity   • Alcohol use: No   • Drug use: No   • Sexual activity: Defer           Objective   Physical Exam  Constitutional:       General: He is not in acute distress.     Appearance: He is well-developed. He is not ill-appearing.   HENT:      Head: Normocephalic and atraumatic.   Eyes:      Extraocular Movements: Extraocular movements intact.      Pupils: Pupils are equal, round, and reactive to light.   Neck:      Vascular: No JVD.   Cardiovascular:      Rate and Rhythm: Normal rate and regular rhythm.      Heart sounds: Normal heart sounds. No murmur heard.     Pulmonary:      Effort: No tachypnea, accessory muscle usage or respiratory distress.      Breath sounds: Normal breath sounds. No stridor. No decreased breath sounds, wheezing, rhonchi or rales.   Chest:      Chest wall: No deformity, tenderness or crepitus.   Abdominal:      General: Bowel sounds are normal.      Palpations: Abdomen is soft.      Tenderness: There is no abdominal tenderness. There is no guarding or rebound.   Musculoskeletal:         General: Normal range of motion.      Cervical back: Normal range of motion and neck supple.      Right lower leg: No tenderness. No edema.      Left lower leg: No tenderness. No edema.   Lymphadenopathy:      Cervical: No cervical adenopathy.   Skin:     General: Skin is warm and dry.      Coloration: Skin is not cyanotic.      Findings: No ecchymosis or erythema.      Comments: Multiple skin tears on the left upper extremity.   Neurological:      General: No focal deficit present.      Mental Status: He is alert and oriented to person, place, and time.      Cranial Nerves: No cranial  nerve deficit.      Motor: No weakness.   Psychiatric:         Mood and Affect: Mood normal. Mood is not anxious.         Behavior: Behavior normal. Behavior is not agitated.         Procedures           ED Course  ED Course as of Oct 06 1253   Wed Oct 06, 2021   1015 EKG noted sinus bradycardia.  55 bpm.  QRS 80 ms.   ms.  No acute ST abnormalities    [SF]   1249 CBC and CCP unremarkable. Troponin within normal limits. Coagulation studies unremarkable. EKG unremarkable. Urinalysis unremarkable. Head CT without contrast no no acute abnormality. Plain film imaging of the humerus and left shoulder noted no acute abnormality. Skin tears treated with nonadhesive dressing. Patient was monitored for an extended period of time and blood pressures were treated as needed. Syncope likely. Work up and results were discussed throughly with the patient.  The patient will be discharged for further monitoring and management with their PCP.  Red flags, warning signs, worsening symptoms, and when to return to the ER discussed with and understood by the patient.  Patient will follow up with their PCP in a timely manner.  Vitals stable at discharge.    [SF]      ED Course User Index  [SF] Sam Sher DO                                           [facility]    Final diagnoses:   Near syncope       ED Disposition  ED Disposition     ED Disposition Condition Comment    Discharge Stable           Camila Ortiz, APRDENNY  [address]  [facility] [address]  [phone]    In 2 days      [facility]  [address] [place] [id number] [phone]  In 2 days  If symptoms worsen         Medication List      No changes were made to your prescriptions during this visit.          Sam Sher DO  10/06/21 4889

## 2021-10-08 ENCOUNTER — OFFICE VISIT (OUTPATIENT)
Dept: PULMONOLOGY | Facility: CLINIC | Age: 76
End: 2021-10-08

## 2021-10-08 VITALS
TEMPERATURE: 97.1 F | DIASTOLIC BLOOD PRESSURE: 68 MMHG | SYSTOLIC BLOOD PRESSURE: 148 MMHG | WEIGHT: 143 LBS | OXYGEN SATURATION: 95 % | HEIGHT: 71 IN | BODY MASS INDEX: 20.02 KG/M2 | HEART RATE: 75 BPM

## 2021-10-08 DIAGNOSIS — R06.02 SHORTNESS OF BREATH: Primary | ICD-10-CM

## 2021-10-08 PROCEDURE — 99213 OFFICE O/P EST LOW 20 MIN: CPT | Performed by: NURSE PRACTITIONER

## 2021-10-08 RX ORDER — ALBUTEROL SULFATE 90 UG/1
2 AEROSOL, METERED RESPIRATORY (INHALATION) EVERY 4 HOURS PRN
Qty: 6.7 G | Refills: 5 | Status: SHIPPED | OUTPATIENT
Start: 2021-10-08 | End: 2022-05-29

## 2021-10-08 RX ORDER — BUDESONIDE AND FORMOTEROL FUMARATE DIHYDRATE 80; 4.5 UG/1; UG/1
2 AEROSOL RESPIRATORY (INHALATION) 2 TIMES DAILY
Qty: 10.2 G | Refills: 5 | Status: SHIPPED | OUTPATIENT
Start: 2021-10-08 | End: 2023-01-25

## 2021-10-08 NOTE — PROGRESS NOTES
"Chief Complaint  Shortness of Breath    Subjective          Fidencio Luciano presents to [facility] PULMONARY AND CRITICAL CARE MEDICINE  History of Present Illness     Mr. Luciano is a 75-year-old male with a medical history significant for GERD, BPH, CAD, diabetes, hyperlipidemia and hypertension.    He presents today for evaluation of continued shortness of breath on exertion.  He states that he has had some shortness of breath for years but that it has gotten significantly worse ever having Covid in March.  He states that he had COVID-19 in March 2021 and then was diagnosed with pneumonia a couple months later.  Since this time he has had worsening shortness of breath with exertion.  He also reports a cough.  He underwent PFT testing which showed no obstruction, no significant bronchodilator response, normal lung volumes and normal DLCO.  He is also had chest x-rays and CT scans performed in the last 6 months with no acute findings.  He is currently not on any inhalers and not on any oxygen.  He is a former smoker quitting in 1981.      Objective   Vital Signs:   /68 (BP Location: Right arm, Patient Position: Sitting)   Pulse 75   Temp 97.1 °F (36.2 °C)   Ht 180.3 cm (71\")   Wt 64.9 kg (143 lb)   SpO2 95%   BMI 19.94 kg/m²     Physical Exam     GENERAL APPEARANCE: Well developed, well nourished, alert and cooperative, and appears to be in no acute distress.    HEAD: normocephalic. Atraumatic.    EYES: PERRL, EOMI. Vision is grossly intact.    THROAT: Oral cavity and pharynx normal. No inflammation, swelling, exudate, or lesions.     NECK: Neck supple.  No thyromegaly.    CARDIAC: Normal S1 and S2. No S3, S4 or murmurs. Rhythm is regular.     RESPIRATORY:Bilateral air entry positive. Bilateral diminished breath sounds. No wheezing, crackles or rhonchi noted.    GI: Positive bowel sounds. Soft, nondistended, nontender.     MUSCULOSKELETAL: No significant deformity or joint abnormality. No " edema. Peripheral pulses intact. No varicosities.    NEUROLOGICAL: Strength and sensation symmetric and intact throughout.     PSYCHIATRIC: The mental examination revealed the patient was oriented to person, place, and time.     Result Review :   The following data was reviewed by: ANDREW Amos on 10/08/2021:  Common labs    Common Labsle 7/20/21 7/20/21 8/24/21 8/24/21 10/6/21 10/6/21    1941 1942 [id number]   Glucose  134 (A)  89  92   BUN  15  31 (A)  25 (A)   Creatinine  0.78  0.93  0.74 (A)   eGFR Non African Am  97  79  103   Sodium  140  142  143   Potassium  3.8  4.5  4.2   Chloride  105  110 (A)  111 (A)   Calcium  8.7  9.2  9.3   Albumin  3.66  4.00  3.82   Total Bilirubin  0.5  0.5  0.5   Alkaline Phosphatase  98  93  73   AST (SGOT)  37  28  72 (A)   ALT (SGPT)  42 (A)  33  68 (A)   WBC 8.23  10.58  13.63 (A)    Hemoglobin 13.0  14.2  14.8    Hematocrit 38.5  41.6  45.8    Platelets 186  259  175    (A) Abnormal value       Comments are available for some flowsheets but are not being displayed.           Data reviewed: PFT, chest x-ray and CT chest          Assessment and Plan          Shortness of breath with exertion:  -We will start him on albuterol every 4 hours and Symbicort twice daily.  -PFT was reviewed and showed no obstruction, no significant bronchodilator response.  Lung volumes and DLCO were normal.      -We will order an overnight pulse oximetry study to assess oxygen saturation during sleep.    Patient's Body mass index is 19.94 kg/m². indicating that he is within normal range (BMI 18.5-24.9). No BMI management plan needed..      Follow Up   Return in about 6 weeks (around 11/19/2021).  Patient was given instructions and counseling regarding his condition or for health maintenance advice. Please see specific information pulled into the AVS if appropriate.

## 2021-10-11 DIAGNOSIS — R63.0 ANOREXIA: ICD-10-CM

## 2021-10-11 DIAGNOSIS — R63.4 WEIGHT LOSS, ABNORMAL: ICD-10-CM

## 2021-10-11 RX ORDER — MEGESTROL ACETATE 125 MG/ML
625 SUSPENSION ORAL DAILY
Qty: 150 ML | Refills: 2 | Status: SHIPPED | OUTPATIENT
Start: 2021-10-11 | End: 2022-05-17 | Stop reason: HOSPADM

## 2021-10-18 ENCOUNTER — TELEPHONE (OUTPATIENT)
Dept: PULMONOLOGY | Facility: CLINIC | Age: 76
End: 2021-10-18

## 2021-10-18 DIAGNOSIS — R09.02 COPD WITH HYPOXIA (HCC): Primary | ICD-10-CM

## 2021-10-18 DIAGNOSIS — J44.9 COPD WITH HYPOXIA (HCC): Primary | ICD-10-CM

## 2021-10-18 NOTE — PROGRESS NOTES
Overnight oxygen study was reviewed.  He dose qualify for supplemental oxygen a night. Order placed.

## 2021-10-18 NOTE — TELEPHONE ENCOUNTER
----- Message from ANDREW Amos sent at 10/18/2021 12:07 PM EDT -----  Will you let him know that he does need oxygen at night.  ----- Message -----  From: Violet Herrera Incoming  Sent: 10/14/2021  12:36 PM EDT  To: ANDREW Amos

## 2021-10-21 ENCOUNTER — OFFICE VISIT (OUTPATIENT)
Dept: PULMONOLOGY | Facility: CLINIC | Age: 76
End: 2021-10-21

## 2021-10-21 ENCOUNTER — OFFICE VISIT (OUTPATIENT)
Dept: GASTROENTEROLOGY | Facility: CLINIC | Age: 76
End: 2021-10-21

## 2021-10-21 VITALS
DIASTOLIC BLOOD PRESSURE: 88 MMHG | BODY MASS INDEX: 19.88 KG/M2 | HEIGHT: 71 IN | TEMPERATURE: 97.1 F | WEIGHT: 142 LBS | HEART RATE: 98 BPM | SYSTOLIC BLOOD PRESSURE: 168 MMHG | OXYGEN SATURATION: 97 %

## 2021-10-21 VITALS
SYSTOLIC BLOOD PRESSURE: 121 MMHG | BODY MASS INDEX: 19.91 KG/M2 | HEART RATE: 91 BPM | HEIGHT: 71 IN | WEIGHT: 142.2 LBS | DIASTOLIC BLOOD PRESSURE: 64 MMHG

## 2021-10-21 DIAGNOSIS — R63.0 ANOREXIA: ICD-10-CM

## 2021-10-21 DIAGNOSIS — R19.7 DIARRHEA, UNSPECIFIED TYPE: ICD-10-CM

## 2021-10-21 DIAGNOSIS — G47.34 NOCTURNAL HYPOXIA: ICD-10-CM

## 2021-10-21 DIAGNOSIS — R63.4 WEIGHT LOSS, ABNORMAL: Primary | ICD-10-CM

## 2021-10-21 DIAGNOSIS — R06.02 SHORTNESS OF BREATH: Primary | ICD-10-CM

## 2021-10-21 PROCEDURE — 99213 OFFICE O/P EST LOW 20 MIN: CPT | Performed by: INTERNAL MEDICINE

## 2021-10-21 PROCEDURE — 99213 OFFICE O/P EST LOW 20 MIN: CPT | Performed by: NURSE PRACTITIONER

## 2021-10-21 NOTE — PROGRESS NOTES
"Chief Complaint  Shortness of Breath    Subjective          Fidencio Luciano presents to [facility] PULMONARY & CRITICAL CARE MEDICINE  History of Present Illness     Ms. Luciano is 76 year old male with a medical history significant for hypertension, ASCVD, hyperlipidemia, diabetes, GERD, BPH, and shortness of breath.    He presents today for follow-up on shortness of breath.  He states that he is doing well and that his breathing has been at baseline.  He is currently taking Symbicort twice daily and using albuterol every 4 hours as needed.  He is also compliant with use of supplemental oxygen nightly.    Objective   Vital Signs:   /88   Pulse 98   Temp 97.1 °F (36.2 °C) (Temporal)   Ht 180.3 cm (71\")   Wt 64.4 kg (142 lb)   SpO2 97%   BMI 19.80 kg/m²     Physical Exam     GENERAL APPEARANCE: Well developed, well nourished, alert and cooperative, and appears to be in no acute distress.    HEAD: normocephalic. Atraumatic.    EYES: PERRL, EOMI. Vision is grossly intact.    THROAT: Oral cavity and pharynx normal. No inflammation, swelling, exudate, or lesions.     NECK: Neck supple.  No thyromegaly.    CARDIAC: Normal S1 and S2. No S3, S4 or murmurs. Rhythm is regular.     RESPIRATORY:Bilateral air entry positive. Bilateral diminished breath sounds. No wheezing, crackles or rhonchi noted.    GI: Positive bowel sounds. Soft, nondistended, nontender.     MUSCULOSKELETAL: No significant deformity or joint abnormality. No edema. Peripheral pulses intact. No varicosities.    NEUROLOGICAL: Strength and sensation symmetric and intact throughout.     PSYCHIATRIC: The mental examination revealed the patient was oriented to person, place, and time.     Result Review :   The following data was reviewed by: ANDREW Amos on 10/21/2021:  Common labs    Common Labsle 7/20/21 7/20/21 8/24/21 8/24/21 10/6/21 10/6/21    1941 1942 0846 [id number] 1005 1035   Glucose  134 (A)  89  92   BUN  15  31 (A)  " 25 (A)   Creatinine  0.78  0.93  0.74 (A)   eGFR Non African Am  97  79  103   Sodium  140  142  143   Potassium  3.8  4.5  4.2   Chloride  105  110 (A)  111 (A)   Calcium  8.7  9.2  9.3   Albumin  3.66  4.00  3.82   Total Bilirubin  0.5  0.5  0.5   Alkaline Phosphatase  98  93  73   AST (SGOT)  37  28  72 (A)   ALT (SGPT)  42 (A)  33  68 (A)   WBC 8.23  10.58  13.63 (A)    Hemoglobin 13.0  14.2  14.8    Hematocrit 38.5  41.6  45.8    Platelets 186  259  175    (A) Abnormal value       Comments are available for some flowsheets but are not being displayed.           Data reviewed: Overnight pulse oximetry study.          Assessment and Plan    Diagnoses and all orders for this visit:    1. Shortness of breath (Primary)    2. Nocturnal hypoxia            Shortness of breath with exertion:  -Currently on albuterol every 4 hours.  -Continue Symbicort twice daily.  -PFT showed no obstruction with no significant bronchodilator response.  Lung volumes and DLCO were normal.  -Chest x-ray completed in October was unremarkable.    Nocturnal hypoxia:  -Compliant with supplemental oxygen use nightly.    Patient's Body mass index is 19.8 kg/m². indicating that he is within normal range (BMI 18.5-24.9). No BMI management plan needed.      Follow Up   Return in about 3 months (around 1/21/2022).  Patient was given instructions and counseling regarding his condition or for health maintenance advice. Please see specific information pulled into the AVS if appropriate.

## 2021-10-21 NOTE — PROGRESS NOTES
Subjective   Fidencio Luciano is a 76 y.o. male who presents to the office today as a follow up appointment regarding Nausea      History of Present Illness:  The patient presents today for follow-up weight loss and anorexia.  His wife states he began to decline after a bout of pneumonia in March of this year.  He was also diagnosed with Covid at that time.  She states when she was admitted in the hospital he was diagnosed with dementia.  He was recently seen by neurologist who performed an MRI.  He told her that Mr. Luciano had had many mini strokes and cerebral atrophy.  He did not discuss dementia with her but put the patient on a memory drug.  She is concerned because he sleeps most of the day and night.  He will get up about 10 in the morning and take his medications and eat breakfast.  Soon thereafter he will go back to bed until the evening at which time he will get up and eat his evening meal.  He is in bed by 6 pm and stays until the morning.  Currently, he is having issues with urgency with bowel movements.  His stools are loose.  No sooner than he eats, he will have to get up and go to the bathroom.  He has had his gallbladder removed in the past.  I placed the patient on  Megace for appetite.  His wife states he is eating a little better.  He is drinking 2 protein shakes a day and eating most of what she puts before him.  However, he is sleeping a lot during the day and he gets 2 meals in the day.  I suspect the patient has a bile induced diarrhea.  He has urgency after meals.  I will place him on Colestid 1 g once a day.  I have informed his wife to make sure that he takes this 2 hours after he takes his morning meds.  I have also suggested that she wake him up during the day to eat a third meal.  I am concerned that he has dementia and this is part of the reason why he is sleeping so much and is not eating.  His wife is very concerned it would like more information about his mental status and what to expect.   I have given her a couple of phone numbers for stroke patients as well as Alzheimer's dementia support group.  I have suggested that she go to  for cognitive work-up at the neurology center.  The patient has an appointment with Neurology next week.  He has been on Colestid once a day.  His wife states he is still having diarrhea.  His stool ranges b/w a [facility] score 5-7.  He is not eating any better on the Megace.  He often gets up during a meal to go to the bathroom.  His wife is able to get 3 protein drinks down a day.      Review of Systems:  Review of Systems   Constitutional: Positive for unexpected weight change. Negative for fever.   HENT: Negative for trouble swallowing.    Eyes: Negative.    Respiratory: Negative for chest tightness.    Cardiovascular: Negative for chest pain.   Gastrointestinal: Positive for nausea. Negative for abdominal distention, abdominal pain, anal bleeding, blood in stool, constipation, diarrhea, rectal pain and vomiting.   Endocrine: Negative.    Genitourinary: Negative for difficulty urinating.   Musculoskeletal: Negative.    Skin: Negative.    Allergic/Immunologic: Negative.    Neurological: Negative for headaches.   Hematological: Bruises/bleeds easily.   Psychiatric/Behavioral: Negative.        Past Medical History:  Past Medical History:   Diagnosis Date   • BPH (benign prostatic hyperplasia)    • Bradycardia     Positive tilt table testing 07/2016   • Coronary artery disease    • Diabetes mellitus (HCC)    • Diverticulosis    • Elevated cholesterol    • Gastric ulcer with hemorrhage    • Gastroesophageal reflux disease 1/26/2021   • History of transfusion    • Hyperlipidemia    • Hypertension    • Psoriasis        Past Surgical History:  Past Surgical History:   Procedure Laterality Date   • BACK SURGERY     • CARDIAC CATHETERIZATION N/A 9/20/2016    Procedure: Left Heart Cath;  Surgeon: Giovanni Buenrostro MD;  Location: [facility] CATH INVASIVE LOCATION;  Service:    • CARDIAC  CATHETERIZATION N/A 10/4/2016    Procedure: Left Heart Cath;  Surgeon: Bharathi Andrew MD;  Location:  [facility] CATH INVASIVE LOCATION;  Service:    • CARDIAC CATHETERIZATION N/A 5/9/2017    Procedure: Left Heart Cath;  Surgeon: Giovanni Buenrostro MD;  Location:  [facility] CATH INVASIVE LOCATION;  Service:    • CARDIAC CATHETERIZATION N/A 5/9/2017    Procedure: ERR;  Surgeon: Giovanni Buenrostro MD;  Location:  [facility] CATH INVASIVE [facility];  Service:    • CARDIAC CATHETERIZATION N/A 11/8/2019    Procedure: Left Heart Cath;  Surgeon: Abraham Oviedo MD;  Location:  [facility] CATH INVASIVE LOCATION;  Service: Cardiology   • CARDIAC CATHETERIZATION N/A 12/18/2019    Procedure: Coronary angiography;  Surgeon: Jay Barney IV, MD;  Location:  [facility] CATH INVASIVE LOCATION;  Service: Cardiovascular   • CHOLECYSTECTOMY     • COLONOSCOPY  11/13/2015    Dr. Martinez- internal hemorrhoids, sigmoid diverticulosis   • COLONOSCOPY N/A 8/17/2018    Procedure: COLONOSCOPY CPT CODE: 68119;  Surgeon: Gigi Geronimo MD;  Location: [facility] OR;  Service: Gastroenterology   • CORONARY ANGIOPLASTY WITH STENT PLACEMENT     • ENDOSCOPY N/A 8/17/2018    Procedure: ESOPHAGOGASTRODUODENOSCOPY WITH BIOPSY CPT CODE: 43201;  Surgeon: Gigi Geronimo MD;  Location: [facility] OR;  Service: Gastroenterology   • ENDOSCOPY N/A 4/20/2021    Procedure: ESOPHAGOGASTRODUODENOSCOPY;  Surgeon: Geno Vernon MD;  Location: [facility] OR;  Service: Gastroenterology;  Laterality: N/A;   • INTERVENTIONAL RADIOLOGY PROCEDURE N/A 12/18/2019    Procedure: Coil Embolization of septal to pulmonary artery fistuala.;  Surgeon: Jay Barney IV, MD;  Location:  [facility] CATH INVASIVE LOCATION;  Service: Cardiovascular   • GA RT/LT HEART CATHETERS N/A 5/9/2017    Procedure: Percutaneous Coronary Intervention;  Surgeon: Lincoln De Los Santos MD;  Location:  [facility] CATH INVASIVE LOCATION;  Service: Cardiology   • STOMACH SURGERY      FUSION OF BLEEDING ULCER    • UPPER GASTROINTESTINAL ENDOSCOPY  2015    Dr. Martinez- mild gastritis       Family History:  Family History   Problem Relation Age of Onset   • Sick sinus syndrome Mother    • Heart failure Mother    • Diabetes Mother    • Liver cancer Father        Social History:  Social History     Socioeconomic History   • Marital status:    Tobacco Use   • Smoking status: Former Smoker     Packs/day: 3.00     Years: 40.00     Pack years: 120.00     Types: Cigarettes     Quit date:      Years since quittin.8   • Smokeless tobacco: Former User     Quit date: 1986   Vaping Use   • Vaping Use: Never used   Substance and Sexual Activity   • Alcohol use: No   • Drug use: No   • Sexual activity: Defer       Current Medication List:    Current Outpatient Medications:   •  albuterol sulfate  (90 Base) MCG/ACT inhaler, Inhale 2 puffs Every 4 (Four) Hours As Needed for Wheezing., Disp: 6.7 g, Rfl: 5  •  aspirin 81 MG tablet, Take 81 mg by mouth Daily., Disp: , Rfl:   •  atorvastatin (LIPITOR) 80 MG tablet, Take 1 tablet by mouth Every Night., Disp: 90 tablet, Rfl: 5  •  budesonide-formoterol (SYMBICORT) 80-4.5 MCG/ACT inhaler, Inhale 2 puffs 2 (Two) Times a Day., Disp: 10.2 g, Rfl: 5  •  cetirizine (zyrTEC) 10 MG tablet, Take 10 mg by mouth Daily., Disp: , Rfl:   •  clopidogrel (PLAVIX) 75 MG tablet, Take 1 tablet by mouth Daily., Disp: 30 tablet, Rfl: 5  •  colestipol (COLESTID) 1 g tablet, Take 1 tablet by mouth Daily., Disp: 30 tablet, Rfl: 2  •  ferrous sulfate 325 (65 FE) MG tablet, Take 325 mg by mouth 2 (two) times a day., Disp: , Rfl:   •  finasteride (PROSCAR) 5 MG tablet, Take 1 tablet by mouth Daily for 360 days., Disp: 90 tablet, Rfl: 3  •  isosorbide mononitrate (IMDUR) 120 MG 24 hr tablet, Take 1 tablet by mouth Every Morning., Disp: 90 tablet, Rfl: 2  •  megestrol (Megace ES) 625 MG/5ML suspension, Take 5 mL by mouth Daily for 30 days., Disp: 150 mL, Rfl: 2  •  Memantine HCl-Donepezil HCl  "28-10 MG capsule sustained-release 24 hr, Take  by mouth Daily., Disp: , Rfl:   •  mirtazapine (REMERON SOL-TAB) 45 MG disintegrating tablet, Place 45 mg on the tongue Every Night., Disp: , Rfl:   •  nitroglycerin (NITROSTAT) 0.4 MG SL tablet, 1 under the tongue as needed for angina, may repeat q5mins for up three doses, Disp: 25 tablet, Rfl: 2  •  ondansetron ODT (ZOFRAN-ODT) 4 MG disintegrating tablet, Place 1 tablet on the tongue Every 8 (Eight) Hours As Needed for Nausea or Vomiting., Disp: 14 tablet, Rfl: 0  •  pantoprazole (PROTONIX) 40 MG EC tablet, Take 40 mg by mouth Daily., Disp: , Rfl:   •  promethazine (PHENERGAN) 12.5 MG tablet, Take 1 tablet by mouth Every 8 (Eight) Hours As Needed for Nausea or Vomiting., Disp: 12 tablet, Rfl: 0    Allergies:   Patient has no known allergies.    Vitals:  /64   Pulse 91   Ht 180.3 cm (71\")   Wt 64.5 kg (142 lb 3.2 oz)   BMI 19.83 kg/m²     Physical Exam:  Physical Exam    Results Review:  Lab Results:   Admission on 10/06/2021, Discharged on 10/06/2021   Component Date Value Ref Range Status   • Glucose 10/06/2021 92  65 - 99 mg/dL Final   • BUN 10/06/2021 25* 8 - 23 mg/dL Final   • Creatinine 10/06/2021 0.74* 0.76 - 1.27 mg/dL Final   • Sodium 10/06/2021 143  136 - 145 mmol/L Final   • Potassium 10/06/2021 4.2  3.5 - 5.2 mmol/L Final    Slight hemolysis detected by analyzer. Results may be affected.   • Chloride 10/06/2021 111* 98 - 107 mmol/L Final   • CO2 10/06/2021 19.8* 22.0 - 29.0 mmol/L Final   • Calcium 10/06/2021 9.3  8.6 - 10.5 mg/dL Final   • Total Protein 10/06/2021 6.7  6.0 - 8.5 g/dL Final   • Albumin 10/06/2021 3.82  3.50 - 5.20 g/dL Final   • ALT (SGPT) 10/06/2021 68* 1 - 41 U/L Final   • AST (SGOT) 10/06/2021 72* 1 - 40 U/L Final   • Alkaline Phosphatase 10/06/2021 73  39 - 117 U/L Final   • Total Bilirubin 10/06/2021 0.5  0.0 - 1.2 mg/dL Final   • eGFR Non African Amer 10/06/2021 103  >60 mL/min/1.73 Final   • Globulin 10/06/2021 2.9  gm/dL " Final   • A/G Ratio 10/06/2021 1.3  g/dL Final   • BUN/Creatinine Ratio 10/06/2021 33.8* 7.0 - 25.0 Final   • Anion Gap 10/06/2021 12.2  5.0 - 15.0 mmol/L Final   • Protime 10/06/2021 13.2  12.8 - 14.5 Seconds Final   • INR 10/06/2021 0.97  0.90 - 1.10 Final   • Color, UA 10/06/2021 Yellow  Yellow, Straw Final   • Appearance, UA 10/06/2021 Clear  Clear Final   • pH, UA 10/06/2021 5.5  5.0 - 8.0 Final   • Specific Gravity, UA 10/06/2021 1.024  1.005 - 1.030 Final   • Glucose, UA 10/06/2021 Negative  Negative Final   • Ketones, UA 10/06/2021 Negative  Negative Final   • Bilirubin, UA 10/06/2021 Negative  Negative Final   • Blood, UA 10/06/2021 Negative  Negative Final   • Protein, UA 10/06/2021 30 mg/dL (1+)* Negative Final   • Leuk Esterase, UA 10/06/2021 Negative  Negative Final   • Nitrite, UA 10/06/2021 Negative  Negative Final   • Urobilinogen, UA 10/06/2021 1.0 E.U./dL  0.2 - 1.0 E.U./dL Final   • Troponin T 10/06/2021 0.028  0.000 - 0.030 ng/mL Final   • QT Interval 10/06/2021 418  ms Final   • QTC Interval 10/06/2021 399  ms Final   • WBC 10/06/2021 13.63* 3.40 - 10.80 10*3/mm3 Final   • RBC 10/06/2021 4.64  4.14 - 5.80 10*6/mm3 Final   • Hemoglobin 10/06/2021 14.8  13.0 - 17.7 g/dL Final   • Hematocrit 10/06/2021 45.8  37.5 - 51.0 % Final   • MCV 10/06/2021 98.7* 79.0 - 97.0 fL Final   • MCH 10/06/2021 31.9  26.6 - 33.0 pg Final   • MCHC 10/06/2021 32.3  31.5 - 35.7 g/dL Final   • RDW 10/06/2021 14.8  12.3 - 15.4 % Final   • RDW-SD 10/06/2021 53.4  37.0 - 54.0 fl Final   • MPV 10/06/2021 9.6  6.0 - 12.0 fL Final   • Platelets 10/06/2021 175  140 - 450 10*3/mm3 Final   • Neutrophil % 10/06/2021 73.7  42.7 - 76.0 % Final   • Lymphocyte % 10/06/2021 14.9* 19.6 - 45.3 % Final   • Monocyte % 10/06/2021 7.1  5.0 - 12.0 % Final   • Eosinophil % 10/06/2021 1.0  0.3 - 6.2 % Final   • Basophil % 10/06/2021 1.0  0.0 - 1.5 % Final   • Immature Grans % 10/06/2021 2.3* 0.0 - 0.5 % Final   • Neutrophils, Absolute 10/06/2021  10.04* 1.70 - 7.00 10*3/mm3 Final   • Lymphocytes, Absolute 10/06/2021 2.03  0.70 - 3.10 10*3/mm3 Final   • Monocytes, Absolute 10/06/2021 0.97* 0.10 - 0.90 10*3/mm3 Final   • Eosinophils, Absolute 10/06/2021 0.14  0.00 - 0.40 10*3/mm3 Final   • Basophils, Absolute 10/06/2021 0.14  0.00 - 0.20 10*3/mm3 Final   • Immature Grans, Absolute 10/06/2021 0.31* 0.00 - 0.05 10*3/mm3 Final   • nRBC 10/06/2021 0.0  0.0 - 0.2 /100 WBC Final   • RBC, UA 10/06/2021 0-2  None Seen, 0-2 /HPF Final   • WBC, UA 10/06/2021 0-2  None Seen, 0-2 /HPF Final   • Bacteria, UA 10/06/2021 None Seen  None Seen /HPF Final   • Squamous Epithelial Cells, UA 10/06/2021 None Seen  None Seen, 0-2 /HPF Final   • Hyaline Casts, UA 10/06/2021 None Seen  None Seen /LPF Final   • Methodology 10/06/2021 Automated Microscopy   Final       Assessment/Plan     Visit Diagnoses:    ICD-10-CM ICD-9-CM   1. Weight loss, abnormal  R63.4 783.21   2. Diarrhea, unspecified type  R19.7 787.91   3. Anorexia  R63.0 783.0       Plan:  I did asked the patient several mental status questions.  He is having trouble with place and person per his wife.  He demonstrated some issues with knowing who the current president is.  Here initially said Sony Joshuakaylin but then changed it to Karyna.  He had difficulty telling me which holiday comes next.  He is going to see a neurologist next week.  His recent CT scan of the brain did show mild to moderate chronic small vessel ischemic disease as well as age-related atrophy of the brain.  His wife is having difficulty getting him to eat and drink daily.  She is managing to get him to drink at least 3 protein shakes a day.  He is having some diarrhea which disrupts his meals per his wife.  I will increase his Colestid to twice a day.  I have instructed her that he should take this at least an hour separate from his other medications so that the Colestid does not bind to the other medications.  He will follow-up in 6 weeks.    * Surgery not  found *      MEDS ORDERED DURING VISIT:  No orders of the defined types were placed in this encounter.      Return in about 6 weeks (around 12/2/2021).             This document has been electronically signed by Geno Vernon MD   October 21, 2021 14:56 EDT      Part of this note may be an electronic transcription/translation of spoken language to printed text using the Dragon Dictation System.

## 2021-10-22 PROBLEM — E61.1 IRON DEFICIENCY: Status: RESOLVED | Noted: 2018-08-09 | Resolved: 2021-10-22

## 2021-10-22 PROBLEM — D64.9 NORMOCYTIC ANEMIA: Status: RESOLVED | Noted: 2018-08-09 | Resolved: 2021-10-22

## 2021-10-22 PROBLEM — R53.82 CHRONIC FATIGUE: Chronic | Status: RESOLVED | Noted: 2017-05-18 | Resolved: 2021-10-22

## 2021-10-22 PROBLEM — R63.4 WEIGHT LOSS, ABNORMAL: Status: RESOLVED | Noted: 2018-08-09 | Resolved: 2021-10-22

## 2021-10-22 PROBLEM — R68.81 EARLY SATIETY: Status: RESOLVED | Noted: 2021-01-26 | Resolved: 2021-10-22

## 2021-10-22 PROBLEM — R13.19 ESOPHAGEAL DYSPHAGIA: Status: RESOLVED | Noted: 2021-01-26 | Resolved: 2021-10-22

## 2021-10-27 ENCOUNTER — APPOINTMENT (OUTPATIENT)
Dept: MRI IMAGING | Facility: HOSPITAL | Age: 76
End: 2021-10-27

## 2021-10-27 ENCOUNTER — APPOINTMENT (OUTPATIENT)
Dept: GENERAL RADIOLOGY | Facility: HOSPITAL | Age: 76
End: 2021-10-27

## 2021-10-27 ENCOUNTER — HOSPITAL ENCOUNTER (INPATIENT)
Facility: HOSPITAL | Age: 76
LOS: 13 days | Discharge: NURSING, REHAB OR LONG-TERM CARE | End: 2021-11-09
Attending: EMERGENCY MEDICINE | Admitting: INTERNAL MEDICINE

## 2021-10-27 DIAGNOSIS — R62.7 FAILURE TO THRIVE IN ADULT: ICD-10-CM

## 2021-10-27 DIAGNOSIS — R53.1 WEAKNESS GENERALIZED: Primary | ICD-10-CM

## 2021-10-27 LAB
A-A DO2: 10.2 MMHG (ref 0–300)
A-A DO2: 4.2 MMHG (ref 0–300)
ALBUMIN SERPL-MCNC: 3.53 G/DL (ref 3.5–5.2)
ALBUMIN/GLOB SERPL: 1.2 G/DL
ALP SERPL-CCNC: 56 U/L (ref 39–117)
ALT SERPL W P-5'-P-CCNC: 22 U/L (ref 1–41)
AMPHET+METHAMPHET UR QL: NEGATIVE
AMPHETAMINES UR QL: NEGATIVE
ANION GAP SERPL CALCULATED.3IONS-SCNC: 15.2 MMOL/L (ref 5–15)
APTT PPP: 24.7 SECONDS (ref 25.5–35.4)
ARTERIAL PATENCY WRIST A: POSITIVE
ARTERIAL PATENCY WRIST A: POSITIVE
AST SERPL-CCNC: 23 U/L (ref 1–40)
ATMOSPHERIC PRESS: 723 MMHG
ATMOSPHERIC PRESS: 726 MMHG
BARBITURATES UR QL SCN: NEGATIVE
BASE EXCESS BLDA CALC-SCNC: -1.3 MMOL/L (ref 0–2)
BASE EXCESS BLDA CALC-SCNC: -1.4 MMOL/L (ref 0–2)
BASOPHILS # BLD AUTO: 0.11 10*3/MM3 (ref 0–0.2)
BASOPHILS NFR BLD AUTO: 1 % (ref 0–1.5)
BDY SITE: ABNORMAL
BDY SITE: ABNORMAL
BENZODIAZ UR QL SCN: NEGATIVE
BILIRUB SERPL-MCNC: 0.8 MG/DL (ref 0–1.2)
BILIRUB UR QL STRIP: NEGATIVE
BODY TEMPERATURE: 0 C
BODY TEMPERATURE: 0 C
BUN SERPL-MCNC: 37 MG/DL (ref 8–23)
BUN/CREAT SERPL: 50.7 (ref 7–25)
BUPRENORPHINE SERPL-MCNC: NEGATIVE NG/ML
CALCIUM SPEC-SCNC: 9.2 MG/DL (ref 8.6–10.5)
CANNABINOIDS SERPL QL: NEGATIVE
CHLORIDE SERPL-SCNC: 116 MMOL/L (ref 98–107)
CK SERPL-CCNC: 57 U/L (ref 20–200)
CLARITY UR: CLEAR
CO2 BLDA-SCNC: 20.4 MMOL/L (ref 22–33)
CO2 BLDA-SCNC: 22 MMOL/L (ref 22–33)
CO2 SERPL-SCNC: 12.8 MMOL/L (ref 22–29)
COCAINE UR QL: NEGATIVE
COHGB MFR BLD: 0.2 % (ref 0–5)
COHGB MFR BLD: <0.2 % (ref 0–5)
COLOR UR: YELLOW
CREAT SERPL-MCNC: 0.73 MG/DL (ref 0.76–1.27)
CRP SERPL-MCNC: <0.3 MG/DL (ref 0–0.5)
D-LACTATE SERPL-SCNC: 2.6 MMOL/L (ref 0.5–2)
D-LACTATE SERPL-SCNC: 2.9 MMOL/L (ref 0.5–2)
DEPRECATED RDW RBC AUTO: 57.4 FL (ref 37–54)
EOSINOPHIL # BLD AUTO: 0.12 10*3/MM3 (ref 0–0.4)
EOSINOPHIL NFR BLD AUTO: 1 % (ref 0.3–6.2)
ERYTHROCYTE [DISTWIDTH] IN BLOOD BY AUTOMATED COUNT: 15.2 % (ref 12.3–15.4)
ERYTHROCYTE [SEDIMENTATION RATE] IN BLOOD: 2 MM/HR (ref 0–20)
ETHANOL BLD-MCNC: <10 MG/DL (ref 0–10)
ETHANOL UR QL: <0.01 %
FLUAV RNA RESP QL NAA+PROBE: NOT DETECTED
FLUBV RNA RESP QL NAA+PROBE: NOT DETECTED
GFR SERPL CREATININE-BSD FRML MDRD: 105 ML/MIN/1.73
GLOBULIN UR ELPH-MCNC: 2.9 GM/DL
GLUCOSE BLDC GLUCOMTR-MCNC: 72 MG/DL (ref 70–130)
GLUCOSE SERPL-MCNC: 75 MG/DL (ref 65–99)
GLUCOSE UR STRIP-MCNC: NEGATIVE MG/DL
HCO3 BLDA-SCNC: 19.7 MMOL/L (ref 20–26)
HCO3 BLDA-SCNC: 21.1 MMOL/L (ref 20–26)
HCT VFR BLD AUTO: 43.9 % (ref 37.5–51)
HCT VFR BLD CALC: 42.7 % (ref 38–51)
HCT VFR BLD CALC: 44.5 % (ref 38–51)
HGB BLD-MCNC: 14.1 G/DL (ref 13–17.7)
HGB BLDA-MCNC: 13.9 G/DL (ref 14–18)
HGB BLDA-MCNC: 14.5 G/DL (ref 14–18)
HGB UR QL STRIP.AUTO: NEGATIVE
IMM GRANULOCYTES # BLD AUTO: 0.33 10*3/MM3 (ref 0–0.05)
IMM GRANULOCYTES NFR BLD AUTO: 2.9 % (ref 0–0.5)
INHALED O2 CONCENTRATION: 21 %
INHALED O2 CONCENTRATION: 21 %
INR PPP: 1.05 (ref 0.9–1.1)
KETONES UR QL STRIP: NEGATIVE
LEUKOCYTE ESTERASE UR QL STRIP.AUTO: NEGATIVE
LYMPHOCYTES # BLD AUTO: 2.76 10*3/MM3 (ref 0.7–3.1)
LYMPHOCYTES NFR BLD AUTO: 23.9 % (ref 19.6–45.3)
Lab: ABNORMAL
Lab: ABNORMAL
MAGNESIUM SERPL-MCNC: 2.1 MG/DL (ref 1.6–2.4)
MCH RBC QN AUTO: 32.5 PG (ref 26.6–33)
MCHC RBC AUTO-ENTMCNC: 32.1 G/DL (ref 31.5–35.7)
MCV RBC AUTO: 101.2 FL (ref 79–97)
METHADONE UR QL SCN: NEGATIVE
METHGB BLD QL: 0.1 % (ref 0–3)
METHGB BLD QL: 0.4 % (ref 0–3)
MODALITY: ABNORMAL
MODALITY: ABNORMAL
MONOCYTES # BLD AUTO: 0.67 10*3/MM3 (ref 0.1–0.9)
MONOCYTES NFR BLD AUTO: 5.8 % (ref 5–12)
NEUTROPHILS NFR BLD AUTO: 65.4 % (ref 42.7–76)
NEUTROPHILS NFR BLD AUTO: 7.58 10*3/MM3 (ref 1.7–7)
NITRITE UR QL STRIP: NEGATIVE
NOTE: ABNORMAL
NOTE: ABNORMAL
NRBC BLD AUTO-RTO: 0 /100 WBC (ref 0–0.2)
NT-PROBNP SERPL-MCNC: 212.6 PG/ML (ref 0–1800)
OPIATES UR QL: NEGATIVE
OXYCODONE UR QL SCN: NEGATIVE
OXYHGB MFR BLDV: 97.1 % (ref 94–99)
OXYHGB MFR BLDV: 97.1 % (ref 94–99)
PCO2 BLDA: 24.2 MM HG (ref 35–45)
PCO2 BLDA: 28.7 MM HG (ref 35–45)
PCO2 TEMP ADJ BLD: ABNORMAL MM[HG]
PCO2 TEMP ADJ BLD: ABNORMAL MM[HG]
PCP UR QL SCN: NEGATIVE
PH BLDA: 7.48 PH UNITS (ref 7.35–7.45)
PH BLDA: 7.52 PH UNITS (ref 7.35–7.45)
PH UR STRIP.AUTO: <=5 [PH] (ref 5–8)
PH, TEMP CORRECTED: ABNORMAL
PH, TEMP CORRECTED: ABNORMAL
PLATELET # BLD AUTO: 187 10*3/MM3 (ref 140–450)
PMV BLD AUTO: 9.7 FL (ref 6–12)
PO2 BLDA: 110 MM HG (ref 83–108)
PO2 BLDA: 99.9 MM HG (ref 83–108)
PO2 TEMP ADJ BLD: ABNORMAL MM[HG]
PO2 TEMP ADJ BLD: ABNORMAL MM[HG]
POTASSIUM SERPL-SCNC: 4.2 MMOL/L (ref 3.5–5.2)
PROPOXYPH UR QL: NEGATIVE
PROT SERPL-MCNC: 6.4 G/DL (ref 6–8.5)
PROT UR QL STRIP: ABNORMAL
PROTHROMBIN TIME: 14.1 SECONDS (ref 12.8–14.5)
QT INTERVAL: 414 MS
QTC INTERVAL: 388 MS
RBC # BLD AUTO: 4.34 10*6/MM3 (ref 4.14–5.8)
SAO2 % BLDCOA: 97.4 % (ref 94–99)
SAO2 % BLDCOA: 97.6 % (ref 94–99)
SARS-COV-2 RNA RESP QL NAA+PROBE: NOT DETECTED
SODIUM SERPL-SCNC: 144 MMOL/L (ref 136–145)
SP GR UR STRIP: 1.02 (ref 1–1.03)
T4 FREE SERPL-MCNC: 1.14 NG/DL (ref 0.93–1.7)
TRICYCLICS UR QL SCN: NEGATIVE
TROPONIN T SERPL-MCNC: <0.01 NG/ML (ref 0–0.03)
TSH SERPL DL<=0.05 MIU/L-ACNC: 1.65 UIU/ML (ref 0.27–4.2)
UROBILINOGEN UR QL STRIP: ABNORMAL
VENTILATOR MODE: ABNORMAL
VENTILATOR MODE: ABNORMAL
WBC # BLD AUTO: 11.57 10*3/MM3 (ref 3.4–10.8)

## 2021-10-27 PROCEDURE — 82375 ASSAY CARBOXYHB QUANT: CPT

## 2021-10-27 PROCEDURE — 80306 DRUG TEST PRSMV INSTRMNT: CPT | Performed by: EMERGENCY MEDICINE

## 2021-10-27 PROCEDURE — 82077 ASSAY SPEC XCP UR&BREATH IA: CPT | Performed by: EMERGENCY MEDICINE

## 2021-10-27 PROCEDURE — 80053 COMPREHEN METABOLIC PANEL: CPT | Performed by: EMERGENCY MEDICINE

## 2021-10-27 PROCEDURE — 83735 ASSAY OF MAGNESIUM: CPT | Performed by: EMERGENCY MEDICINE

## 2021-10-27 PROCEDURE — 81003 URINALYSIS AUTO W/O SCOPE: CPT | Performed by: EMERGENCY MEDICINE

## 2021-10-27 PROCEDURE — 71045 X-RAY EXAM CHEST 1 VIEW: CPT | Performed by: RADIOLOGY

## 2021-10-27 PROCEDURE — 36600 WITHDRAWAL OF ARTERIAL BLOOD: CPT

## 2021-10-27 PROCEDURE — 36415 COLL VENOUS BLD VENIPUNCTURE: CPT

## 2021-10-27 PROCEDURE — 83050 HGB METHEMOGLOBIN QUAN: CPT

## 2021-10-27 PROCEDURE — 99222 1ST HOSP IP/OBS MODERATE 55: CPT | Performed by: INTERNAL MEDICINE

## 2021-10-27 PROCEDURE — 70551 MRI BRAIN STEM W/O DYE: CPT | Performed by: RADIOLOGY

## 2021-10-27 PROCEDURE — 25010000002 HYDRALAZINE PER 20 MG: Performed by: EMERGENCY MEDICINE

## 2021-10-27 PROCEDURE — 83605 ASSAY OF LACTIC ACID: CPT | Performed by: EMERGENCY MEDICINE

## 2021-10-27 PROCEDURE — 70551 MRI BRAIN STEM W/O DYE: CPT

## 2021-10-27 PROCEDURE — 82550 ASSAY OF CK (CPK): CPT | Performed by: EMERGENCY MEDICINE

## 2021-10-27 PROCEDURE — 93005 ELECTROCARDIOGRAM TRACING: CPT | Performed by: PHYSICIAN ASSISTANT

## 2021-10-27 PROCEDURE — 85652 RBC SED RATE AUTOMATED: CPT | Performed by: EMERGENCY MEDICINE

## 2021-10-27 PROCEDURE — 84484 ASSAY OF TROPONIN QUANT: CPT | Performed by: EMERGENCY MEDICINE

## 2021-10-27 PROCEDURE — 87636 SARSCOV2 & INF A&B AMP PRB: CPT | Performed by: EMERGENCY MEDICINE

## 2021-10-27 PROCEDURE — 93010 ELECTROCARDIOGRAM REPORT: CPT | Performed by: INTERNAL MEDICINE

## 2021-10-27 PROCEDURE — P9612 CATHETERIZE FOR URINE SPEC: HCPCS

## 2021-10-27 PROCEDURE — 82962 GLUCOSE BLOOD TEST: CPT

## 2021-10-27 PROCEDURE — 99284 EMERGENCY DEPT VISIT MOD MDM: CPT

## 2021-10-27 PROCEDURE — 85730 THROMBOPLASTIN TIME PARTIAL: CPT | Performed by: EMERGENCY MEDICINE

## 2021-10-27 PROCEDURE — 82805 BLOOD GASES W/O2 SATURATION: CPT

## 2021-10-27 PROCEDURE — 85610 PROTHROMBIN TIME: CPT | Performed by: EMERGENCY MEDICINE

## 2021-10-27 PROCEDURE — 83880 ASSAY OF NATRIURETIC PEPTIDE: CPT | Performed by: EMERGENCY MEDICINE

## 2021-10-27 PROCEDURE — 86140 C-REACTIVE PROTEIN: CPT | Performed by: EMERGENCY MEDICINE

## 2021-10-27 PROCEDURE — 84443 ASSAY THYROID STIM HORMONE: CPT | Performed by: EMERGENCY MEDICINE

## 2021-10-27 PROCEDURE — 84439 ASSAY OF FREE THYROXINE: CPT | Performed by: EMERGENCY MEDICINE

## 2021-10-27 PROCEDURE — 71045 X-RAY EXAM CHEST 1 VIEW: CPT

## 2021-10-27 PROCEDURE — 85025 COMPLETE CBC W/AUTO DIFF WBC: CPT | Performed by: EMERGENCY MEDICINE

## 2021-10-27 RX ORDER — SODIUM CHLORIDE 0.9 % (FLUSH) 0.9 %
10 SYRINGE (ML) INJECTION AS NEEDED
Status: DISCONTINUED | OUTPATIENT
Start: 2021-10-27 | End: 2021-11-09 | Stop reason: HOSPADM

## 2021-10-27 RX ORDER — CHOLESTYRAMINE LIGHT 4 G/5.7G
1 POWDER, FOR SUSPENSION ORAL DAILY
Status: CANCELLED | OUTPATIENT
Start: 2021-10-28

## 2021-10-27 RX ORDER — PANTOPRAZOLE SODIUM 40 MG/1
40 TABLET, DELAYED RELEASE ORAL DAILY
Status: CANCELLED | OUTPATIENT
Start: 2021-10-28

## 2021-10-27 RX ORDER — ACETAMINOPHEN 160 MG/5ML
650 SOLUTION ORAL EVERY 4 HOURS PRN
Status: DISCONTINUED | OUTPATIENT
Start: 2021-10-27 | End: 2021-11-09 | Stop reason: HOSPADM

## 2021-10-27 RX ORDER — NITROGLYCERIN 0.4 MG/1
0.4 TABLET SUBLINGUAL
Status: CANCELLED | OUTPATIENT
Start: 2021-10-27

## 2021-10-27 RX ORDER — ASPIRIN 81 MG/1
81 TABLET ORAL DAILY
Status: DISCONTINUED | OUTPATIENT
Start: 2021-10-27 | End: 2021-11-09 | Stop reason: HOSPADM

## 2021-10-27 RX ORDER — ASPIRIN 81 MG/1
81 TABLET ORAL DAILY
Status: CANCELLED | OUTPATIENT
Start: 2021-10-28

## 2021-10-27 RX ORDER — SODIUM CHLORIDE 9 MG/ML
50 INJECTION, SOLUTION INTRAVENOUS CONTINUOUS
Status: DISCONTINUED | OUTPATIENT
Start: 2021-10-27 | End: 2021-10-30

## 2021-10-27 RX ORDER — AMLODIPINE BESYLATE 5 MG/1
5 TABLET ORAL DAILY
Status: ON HOLD | COMMUNITY
End: 2021-10-27

## 2021-10-27 RX ORDER — PANTOPRAZOLE SODIUM 40 MG/1
40 TABLET, DELAYED RELEASE ORAL
Status: DISCONTINUED | OUTPATIENT
Start: 2021-10-28 | End: 2021-11-09 | Stop reason: HOSPADM

## 2021-10-27 RX ORDER — MONTELUKAST SODIUM 4 MG/1
1 TABLET, CHEWABLE ORAL 2 TIMES DAILY
COMMUNITY

## 2021-10-27 RX ORDER — HYDRALAZINE HYDROCHLORIDE 20 MG/ML
20 INJECTION INTRAMUSCULAR; INTRAVENOUS ONCE
Status: COMPLETED | OUTPATIENT
Start: 2021-10-27 | End: 2021-10-27

## 2021-10-27 RX ORDER — SODIUM CHLORIDE 0.9 % (FLUSH) 0.9 %
10 SYRINGE (ML) INJECTION EVERY 12 HOURS SCHEDULED
Status: DISCONTINUED | OUTPATIENT
Start: 2021-10-27 | End: 2021-11-09 | Stop reason: HOSPADM

## 2021-10-27 RX ORDER — NITROGLYCERIN 0.4 MG/1
0.4 TABLET SUBLINGUAL
Status: DISCONTINUED | OUTPATIENT
Start: 2021-10-27 | End: 2021-11-09 | Stop reason: HOSPADM

## 2021-10-27 RX ADMIN — ASPIRIN 81 MG: 81 TABLET, COATED ORAL at 20:46

## 2021-10-27 RX ADMIN — SODIUM CHLORIDE 75 ML/HR: 9 INJECTION, SOLUTION INTRAVENOUS at 20:46

## 2021-10-27 RX ADMIN — SODIUM CHLORIDE, PRESERVATIVE FREE 10 ML: 5 INJECTION INTRAVENOUS at 20:46

## 2021-10-27 RX ADMIN — HYDRALAZINE HYDROCHLORIDE 20 MG: 20 INJECTION INTRAMUSCULAR; INTRAVENOUS at 14:43

## 2021-10-27 RX ADMIN — SODIUM CHLORIDE 500 ML: 9 INJECTION, SOLUTION INTRAVENOUS at 20:51

## 2021-10-27 RX ADMIN — SODIUM CHLORIDE 500 ML: 9 INJECTION, SOLUTION INTRAVENOUS at 10:15

## 2021-10-28 ENCOUNTER — APPOINTMENT (OUTPATIENT)
Dept: CT IMAGING | Facility: HOSPITAL | Age: 76
End: 2021-10-28

## 2021-10-28 ENCOUNTER — APPOINTMENT (OUTPATIENT)
Dept: RESPIRATORY THERAPY | Facility: HOSPITAL | Age: 76
End: 2021-10-28

## 2021-10-28 ENCOUNTER — APPOINTMENT (OUTPATIENT)
Dept: GENERAL RADIOLOGY | Facility: HOSPITAL | Age: 76
End: 2021-10-28

## 2021-10-28 LAB
ALBUMIN SERPL-MCNC: 3.58 G/DL (ref 3.5–5.2)
ALBUMIN/GLOB SERPL: 1.4 G/DL
ALP SERPL-CCNC: 58 U/L (ref 39–117)
ALT SERPL W P-5'-P-CCNC: 23 U/L (ref 1–41)
ANION GAP SERPL CALCULATED.3IONS-SCNC: 11.6 MMOL/L (ref 5–15)
AST SERPL-CCNC: 21 U/L (ref 1–40)
BASOPHILS # BLD AUTO: 0.13 10*3/MM3 (ref 0–0.2)
BASOPHILS NFR BLD AUTO: 0.9 % (ref 0–1.5)
BILIRUB SERPL-MCNC: 1 MG/DL (ref 0–1.2)
BUN SERPL-MCNC: 29 MG/DL (ref 8–23)
BUN/CREAT SERPL: 51.8 (ref 7–25)
CALCIUM SPEC-SCNC: 8.9 MG/DL (ref 8.6–10.5)
CHLORIDE SERPL-SCNC: 112 MMOL/L (ref 98–107)
CO2 SERPL-SCNC: 17.4 MMOL/L (ref 22–29)
CORTIS SERPL-MCNC: 13.84 MCG/DL
CORTIS SERPL-MCNC: 2.39 MCG/DL
CREAT SERPL-MCNC: 0.56 MG/DL (ref 0.76–1.27)
DEPRECATED RDW RBC AUTO: 58.2 FL (ref 37–54)
EOSINOPHIL # BLD AUTO: 0.18 10*3/MM3 (ref 0–0.4)
EOSINOPHIL NFR BLD AUTO: 1.3 % (ref 0.3–6.2)
ERYTHROCYTE [DISTWIDTH] IN BLOOD BY AUTOMATED COUNT: 15.1 % (ref 12.3–15.4)
FOLATE SERPL-MCNC: 19.5 NG/ML (ref 4.78–24.2)
GFR SERPL CREATININE-BSD FRML MDRD: 142 ML/MIN/1.73
GLOBULIN UR ELPH-MCNC: 2.5 GM/DL
GLUCOSE BLDC GLUCOMTR-MCNC: 102 MG/DL (ref 70–130)
GLUCOSE BLDC GLUCOMTR-MCNC: 158 MG/DL (ref 70–130)
GLUCOSE BLDC GLUCOMTR-MCNC: 170 MG/DL (ref 70–130)
GLUCOSE BLDC GLUCOMTR-MCNC: 59 MG/DL (ref 70–130)
GLUCOSE BLDC GLUCOMTR-MCNC: 69 MG/DL (ref 70–130)
GLUCOSE BLDC GLUCOMTR-MCNC: 73 MG/DL (ref 70–130)
GLUCOSE SERPL-MCNC: 73 MG/DL (ref 65–99)
HBA1C MFR BLD: 5.9 % (ref 4.8–5.6)
HCT VFR BLD AUTO: 43.8 % (ref 37.5–51)
HGB BLD-MCNC: 13.9 G/DL (ref 13–17.7)
IMM GRANULOCYTES # BLD AUTO: 0.26 10*3/MM3 (ref 0–0.05)
IMM GRANULOCYTES NFR BLD AUTO: 1.9 % (ref 0–0.5)
LYMPHOCYTES # BLD AUTO: 2.85 10*3/MM3 (ref 0.7–3.1)
LYMPHOCYTES NFR BLD AUTO: 20.4 % (ref 19.6–45.3)
MAGNESIUM SERPL-MCNC: 2.1 MG/DL (ref 1.6–2.4)
MCH RBC QN AUTO: 32.9 PG (ref 26.6–33)
MCHC RBC AUTO-ENTMCNC: 31.7 G/DL (ref 31.5–35.7)
MCV RBC AUTO: 103.8 FL (ref 79–97)
MONOCYTES # BLD AUTO: 0.96 10*3/MM3 (ref 0.1–0.9)
MONOCYTES NFR BLD AUTO: 6.9 % (ref 5–12)
NEUTROPHILS NFR BLD AUTO: 68.6 % (ref 42.7–76)
NEUTROPHILS NFR BLD AUTO: 9.61 10*3/MM3 (ref 1.7–7)
NRBC BLD AUTO-RTO: 0 /100 WBC (ref 0–0.2)
PHOSPHATE SERPL-MCNC: 3.9 MG/DL (ref 2.5–4.5)
PLATELET # BLD AUTO: 164 10*3/MM3 (ref 140–450)
PMV BLD AUTO: 10.4 FL (ref 6–12)
POTASSIUM SERPL-SCNC: 4.3 MMOL/L (ref 3.5–5.2)
PROT SERPL-MCNC: 6.1 G/DL (ref 6–8.5)
PSA SERPL-MCNC: 2.83 NG/ML (ref 0–4)
RBC # BLD AUTO: 4.22 10*6/MM3 (ref 4.14–5.8)
SODIUM SERPL-SCNC: 141 MMOL/L (ref 136–145)
VIT B12 BLD-MCNC: 342 PG/ML (ref 211–946)
WBC # BLD AUTO: 13.99 10*3/MM3 (ref 3.4–10.8)

## 2021-10-28 PROCEDURE — 84153 ASSAY OF PSA TOTAL: CPT | Performed by: INTERNAL MEDICINE

## 2021-10-28 PROCEDURE — 82607 VITAMIN B-12: CPT | Performed by: INTERNAL MEDICINE

## 2021-10-28 PROCEDURE — 97166 OT EVAL MOD COMPLEX 45 MIN: CPT

## 2021-10-28 PROCEDURE — 99232 SBSQ HOSP IP/OBS MODERATE 35: CPT | Performed by: INTERNAL MEDICINE

## 2021-10-28 PROCEDURE — 94799 UNLISTED PULMONARY SVC/PX: CPT

## 2021-10-28 PROCEDURE — 99222 1ST HOSP IP/OBS MODERATE 55: CPT | Performed by: INTERNAL MEDICINE

## 2021-10-28 PROCEDURE — 95819 EEG AWAKE AND ASLEEP: CPT

## 2021-10-28 PROCEDURE — 97162 PT EVAL MOD COMPLEX 30 MIN: CPT

## 2021-10-28 PROCEDURE — 74176 CT ABD & PELVIS W/O CONTRAST: CPT

## 2021-10-28 PROCEDURE — 82746 ASSAY OF FOLIC ACID SERUM: CPT | Performed by: INTERNAL MEDICINE

## 2021-10-28 PROCEDURE — 74176 CT ABD & PELVIS W/O CONTRAST: CPT | Performed by: RADIOLOGY

## 2021-10-28 PROCEDURE — 74230 X-RAY XM SWLNG FUNCJ C+: CPT | Performed by: RADIOLOGY

## 2021-10-28 PROCEDURE — 83036 HEMOGLOBIN GLYCOSYLATED A1C: CPT | Performed by: INTERNAL MEDICINE

## 2021-10-28 PROCEDURE — 94640 AIRWAY INHALATION TREATMENT: CPT

## 2021-10-28 PROCEDURE — 82533 TOTAL CORTISOL: CPT | Performed by: INTERNAL MEDICINE

## 2021-10-28 PROCEDURE — 80053 COMPREHEN METABOLIC PANEL: CPT | Performed by: INTERNAL MEDICINE

## 2021-10-28 PROCEDURE — 85025 COMPLETE CBC W/AUTO DIFF WBC: CPT | Performed by: INTERNAL MEDICINE

## 2021-10-28 PROCEDURE — 74230 X-RAY XM SWLNG FUNCJ C+: CPT

## 2021-10-28 PROCEDURE — 84100 ASSAY OF PHOSPHORUS: CPT | Performed by: INTERNAL MEDICINE

## 2021-10-28 PROCEDURE — 83735 ASSAY OF MAGNESIUM: CPT | Performed by: INTERNAL MEDICINE

## 2021-10-28 PROCEDURE — 82962 GLUCOSE BLOOD TEST: CPT

## 2021-10-28 PROCEDURE — 92611 MOTION FLUOROSCOPY/SWALLOW: CPT

## 2021-10-28 PROCEDURE — 71250 CT THORAX DX C-: CPT

## 2021-10-28 PROCEDURE — 25010000002 COSYNTROPIN PER 0.25 MG: Performed by: INTERNAL MEDICINE

## 2021-10-28 PROCEDURE — 71250 CT THORAX DX C-: CPT | Performed by: RADIOLOGY

## 2021-10-28 RX ORDER — MIRTAZAPINE 15 MG/1
45 TABLET, FILM COATED ORAL NIGHTLY
Status: DISCONTINUED | OUTPATIENT
Start: 2021-10-28 | End: 2021-11-09 | Stop reason: HOSPADM

## 2021-10-28 RX ORDER — FERROUS SULFATE 325(65) MG
325 TABLET ORAL 2 TIMES DAILY
Status: DISCONTINUED | OUTPATIENT
Start: 2021-10-28 | End: 2021-11-09 | Stop reason: HOSPADM

## 2021-10-28 RX ORDER — ISOSORBIDE MONONITRATE 60 MG/1
120 TABLET, EXTENDED RELEASE ORAL DAILY
Status: DISCONTINUED | OUTPATIENT
Start: 2021-10-28 | End: 2021-11-09 | Stop reason: HOSPADM

## 2021-10-28 RX ORDER — FINASTERIDE 5 MG/1
5 TABLET, FILM COATED ORAL DAILY
Status: DISCONTINUED | OUTPATIENT
Start: 2021-10-28 | End: 2021-11-09 | Stop reason: HOSPADM

## 2021-10-28 RX ORDER — DEXTROSE MONOHYDRATE 25 G/50ML
INJECTION, SOLUTION INTRAVENOUS
Status: DISCONTINUED
Start: 2021-10-28 | End: 2021-10-28 | Stop reason: WASHOUT

## 2021-10-28 RX ORDER — CLOPIDOGREL BISULFATE 75 MG/1
75 TABLET ORAL DAILY
Status: DISCONTINUED | OUTPATIENT
Start: 2021-10-28 | End: 2021-11-09 | Stop reason: HOSPADM

## 2021-10-28 RX ORDER — ALBUTEROL SULFATE 2.5 MG/3ML
2.5 SOLUTION RESPIRATORY (INHALATION) EVERY 6 HOURS PRN
Status: DISCONTINUED | OUTPATIENT
Start: 2021-10-28 | End: 2021-11-09 | Stop reason: HOSPADM

## 2021-10-28 RX ORDER — CHOLESTYRAMINE 4 G/9G
1 POWDER, FOR SUSPENSION ORAL DAILY
Status: DISCONTINUED | OUTPATIENT
Start: 2021-10-28 | End: 2021-10-30

## 2021-10-28 RX ORDER — RANOLAZINE 500 MG/1
500 TABLET, EXTENDED RELEASE ORAL EVERY 12 HOURS SCHEDULED
Status: DISCONTINUED | OUTPATIENT
Start: 2021-10-28 | End: 2021-11-09 | Stop reason: HOSPADM

## 2021-10-28 RX ORDER — CETIRIZINE HYDROCHLORIDE 10 MG/1
10 TABLET ORAL DAILY
Status: CANCELLED | OUTPATIENT
Start: 2021-10-28

## 2021-10-28 RX ORDER — BUDESONIDE AND FORMOTEROL FUMARATE DIHYDRATE 80; 4.5 UG/1; UG/1
2 AEROSOL RESPIRATORY (INHALATION) 2 TIMES DAILY
Status: DISCONTINUED | OUTPATIENT
Start: 2021-10-28 | End: 2021-11-09 | Stop reason: HOSPADM

## 2021-10-28 RX ORDER — COSYNTROPIN 0.25 MG/ML
0.25 INJECTION, POWDER, FOR SOLUTION INTRAMUSCULAR; INTRAVENOUS ONCE
Status: COMPLETED | OUTPATIENT
Start: 2021-10-28 | End: 2021-10-28

## 2021-10-28 RX ORDER — ATORVASTATIN CALCIUM 40 MG/1
80 TABLET, FILM COATED ORAL NIGHTLY
Status: DISCONTINUED | OUTPATIENT
Start: 2021-10-28 | End: 2021-11-09 | Stop reason: HOSPADM

## 2021-10-28 RX ORDER — MEGESTROL ACETATE 40 MG/ML
800 SUSPENSION ORAL DAILY
Status: DISCONTINUED | OUTPATIENT
Start: 2021-10-28 | End: 2021-11-09 | Stop reason: HOSPADM

## 2021-10-28 RX ADMIN — MEGESTROL ACETATE 800 MG: 40 SUSPENSION ORAL at 09:55

## 2021-10-28 RX ADMIN — CHOLESTYRAMINE 1 PACKET: 4 POWDER, FOR SUSPENSION ORAL at 09:55

## 2021-10-28 RX ADMIN — ATORVASTATIN CALCIUM 80 MG: 40 TABLET, FILM COATED ORAL at 20:11

## 2021-10-28 RX ADMIN — RANOLAZINE 500 MG: 500 TABLET, FILM COATED, EXTENDED RELEASE ORAL at 12:20

## 2021-10-28 RX ADMIN — MEMANTINE HYDROCHLORIDE AND DONEPEZIL HYDROCHLORIDE 28 MG: 28; 10 CAPSULE ORAL at 16:51

## 2021-10-28 RX ADMIN — SODIUM CHLORIDE, PRESERVATIVE FREE 10 ML: 5 INJECTION INTRAVENOUS at 08:46

## 2021-10-28 RX ADMIN — BUDESONIDE AND FORMOTEROL FUMARATE DIHYDRATE 2 PUFF: 80; 4.5 AEROSOL RESPIRATORY (INHALATION) at 09:43

## 2021-10-28 RX ADMIN — ASPIRIN 81 MG: 81 TABLET, COATED ORAL at 08:46

## 2021-10-28 RX ADMIN — CLOPIDOGREL 75 MG: 75 TABLET, FILM COATED ORAL at 08:46

## 2021-10-28 RX ADMIN — FERROUS SULFATE TAB 325 MG (65 MG ELEMENTAL FE) 325 MG: 325 (65 FE) TAB at 08:46

## 2021-10-28 RX ADMIN — SODIUM CHLORIDE, PRESERVATIVE FREE 10 ML: 5 INJECTION INTRAVENOUS at 20:12

## 2021-10-28 RX ADMIN — BUDESONIDE AND FORMOTEROL FUMARATE DIHYDRATE 2 PUFF: 80; 4.5 AEROSOL RESPIRATORY (INHALATION) at 18:28

## 2021-10-28 RX ADMIN — FERROUS SULFATE TAB 325 MG (65 MG ELEMENTAL FE) 325 MG: 325 (65 FE) TAB at 20:11

## 2021-10-28 RX ADMIN — FINASTERIDE 5 MG: 5 TABLET, FILM COATED ORAL at 08:46

## 2021-10-28 RX ADMIN — PANTOPRAZOLE SODIUM 40 MG: 40 TABLET, DELAYED RELEASE ORAL at 08:46

## 2021-10-28 RX ADMIN — COSYNTROPIN 0.25 MG: 0.25 INJECTION, POWDER, LYOPHILIZED, FOR SOLUTION INTRAMUSCULAR; INTRAVENOUS at 12:20

## 2021-10-28 RX ADMIN — SODIUM CHLORIDE 75 ML/HR: 9 INJECTION, SOLUTION INTRAVENOUS at 04:25

## 2021-10-28 RX ADMIN — MIRTAZAPINE 45 MG: 15 TABLET, FILM COATED ORAL at 20:11

## 2021-10-28 RX ADMIN — ISOSORBIDE MONONITRATE 120 MG: 60 TABLET ORAL at 08:46

## 2021-10-28 RX ADMIN — RANOLAZINE 500 MG: 500 TABLET, FILM COATED, EXTENDED RELEASE ORAL at 20:11

## 2021-10-29 LAB
ANION GAP SERPL CALCULATED.3IONS-SCNC: 11.7 MMOL/L (ref 5–15)
BASOPHILS # BLD AUTO: 0.05 10*3/MM3 (ref 0–0.2)
BASOPHILS NFR BLD AUTO: 0.5 % (ref 0–1.5)
BUN SERPL-MCNC: 24 MG/DL (ref 8–23)
BUN/CREAT SERPL: 30 (ref 7–25)
CALCIUM SPEC-SCNC: 8.1 MG/DL (ref 8.6–10.5)
CHLORIDE SERPL-SCNC: 109 MMOL/L (ref 98–107)
CO2 SERPL-SCNC: 17.3 MMOL/L (ref 22–29)
CORTIS SERPL-MCNC: 18.91 MCG/DL
CORTIS SERPL-MCNC: 19.28 MCG/DL
CORTIS SERPL-MCNC: 3.01 MCG/DL
CREAT SERPL-MCNC: 0.8 MG/DL (ref 0.76–1.27)
DEPRECATED RDW RBC AUTO: 54.2 FL (ref 37–54)
EOSINOPHIL # BLD AUTO: 0.07 10*3/MM3 (ref 0–0.4)
EOSINOPHIL NFR BLD AUTO: 0.7 % (ref 0.3–6.2)
ERYTHROCYTE [DISTWIDTH] IN BLOOD BY AUTOMATED COUNT: 14.9 % (ref 12.3–15.4)
GFR SERPL CREATININE-BSD FRML MDRD: 94 ML/MIN/1.73
GLUCOSE BLDC GLUCOMTR-MCNC: 114 MG/DL (ref 70–130)
GLUCOSE BLDC GLUCOMTR-MCNC: 150 MG/DL (ref 70–130)
GLUCOSE BLDC GLUCOMTR-MCNC: 158 MG/DL (ref 70–130)
GLUCOSE BLDC GLUCOMTR-MCNC: 90 MG/DL (ref 70–130)
GLUCOSE SERPL-MCNC: 159 MG/DL (ref 65–99)
HCT VFR BLD AUTO: 34.9 % (ref 37.5–51)
HGB BLD-MCNC: 11.7 G/DL (ref 13–17.7)
IMM GRANULOCYTES # BLD AUTO: 0.2 10*3/MM3 (ref 0–0.05)
IMM GRANULOCYTES NFR BLD AUTO: 1.9 % (ref 0–0.5)
LYMPHOCYTES # BLD AUTO: 1.43 10*3/MM3 (ref 0.7–3.1)
LYMPHOCYTES NFR BLD AUTO: 13.8 % (ref 19.6–45.3)
MCH RBC QN AUTO: 33.1 PG (ref 26.6–33)
MCHC RBC AUTO-ENTMCNC: 33.5 G/DL (ref 31.5–35.7)
MCV RBC AUTO: 98.9 FL (ref 79–97)
MONOCYTES # BLD AUTO: 0.78 10*3/MM3 (ref 0.1–0.9)
MONOCYTES NFR BLD AUTO: 7.6 % (ref 5–12)
NEUTROPHILS NFR BLD AUTO: 7.8 10*3/MM3 (ref 1.7–7)
NEUTROPHILS NFR BLD AUTO: 75.5 % (ref 42.7–76)
NRBC BLD AUTO-RTO: 0 /100 WBC (ref 0–0.2)
PLATELET # BLD AUTO: 159 10*3/MM3 (ref 140–450)
PMV BLD AUTO: 10 FL (ref 6–12)
POTASSIUM SERPL-SCNC: 3.8 MMOL/L (ref 3.5–5.2)
RBC # BLD AUTO: 3.53 10*6/MM3 (ref 4.14–5.8)
SODIUM SERPL-SCNC: 138 MMOL/L (ref 136–145)
WBC # BLD AUTO: 10.33 10*3/MM3 (ref 3.4–10.8)

## 2021-10-29 PROCEDURE — 99232 SBSQ HOSP IP/OBS MODERATE 35: CPT | Performed by: SPECIALIST

## 2021-10-29 PROCEDURE — 25010000002 COSYNTROPIN PER 0.25 MG: Performed by: INTERNAL MEDICINE

## 2021-10-29 PROCEDURE — 82533 TOTAL CORTISOL: CPT | Performed by: INTERNAL MEDICINE

## 2021-10-29 PROCEDURE — 94799 UNLISTED PULMONARY SVC/PX: CPT

## 2021-10-29 PROCEDURE — 82962 GLUCOSE BLOOD TEST: CPT

## 2021-10-29 PROCEDURE — 85025 COMPLETE CBC W/AUTO DIFF WBC: CPT | Performed by: INTERNAL MEDICINE

## 2021-10-29 PROCEDURE — 99232 SBSQ HOSP IP/OBS MODERATE 35: CPT | Performed by: INTERNAL MEDICINE

## 2021-10-29 PROCEDURE — 92610 EVALUATE SWALLOWING FUNCTION: CPT

## 2021-10-29 PROCEDURE — 80048 BASIC METABOLIC PNL TOTAL CA: CPT | Performed by: INTERNAL MEDICINE

## 2021-10-29 RX ORDER — COSYNTROPIN 0.25 MG/ML
0.25 INJECTION, POWDER, FOR SOLUTION INTRAMUSCULAR; INTRAVENOUS ONCE
Status: COMPLETED | OUTPATIENT
Start: 2021-10-29 | End: 2021-10-29

## 2021-10-29 RX ADMIN — FERROUS SULFATE TAB 325 MG (65 MG ELEMENTAL FE) 325 MG: 325 (65 FE) TAB at 19:53

## 2021-10-29 RX ADMIN — ATORVASTATIN CALCIUM 80 MG: 40 TABLET, FILM COATED ORAL at 19:53

## 2021-10-29 RX ADMIN — FERROUS SULFATE TAB 325 MG (65 MG ELEMENTAL FE) 325 MG: 325 (65 FE) TAB at 08:15

## 2021-10-29 RX ADMIN — PANTOPRAZOLE SODIUM 40 MG: 40 TABLET, DELAYED RELEASE ORAL at 05:36

## 2021-10-29 RX ADMIN — COSYNTROPIN 0.25 MG: 0.25 INJECTION, POWDER, LYOPHILIZED, FOR SOLUTION INTRAMUSCULAR; INTRAVENOUS at 12:07

## 2021-10-29 RX ADMIN — MEMANTINE HYDROCHLORIDE AND DONEPEZIL HYDROCHLORIDE 28 MG: 28; 10 CAPSULE ORAL at 08:17

## 2021-10-29 RX ADMIN — MIRTAZAPINE 45 MG: 15 TABLET, FILM COATED ORAL at 19:53

## 2021-10-29 RX ADMIN — ISOSORBIDE MONONITRATE 120 MG: 60 TABLET ORAL at 08:15

## 2021-10-29 RX ADMIN — RANOLAZINE 500 MG: 500 TABLET, FILM COATED, EXTENDED RELEASE ORAL at 08:15

## 2021-10-29 RX ADMIN — BUDESONIDE AND FORMOTEROL FUMARATE DIHYDRATE 2 PUFF: 80; 4.5 AEROSOL RESPIRATORY (INHALATION) at 18:30

## 2021-10-29 RX ADMIN — BUDESONIDE AND FORMOTEROL FUMARATE DIHYDRATE 2 PUFF: 80; 4.5 AEROSOL RESPIRATORY (INHALATION) at 06:34

## 2021-10-29 RX ADMIN — CHOLESTYRAMINE 1 PACKET: 4 POWDER, FOR SUSPENSION ORAL at 08:15

## 2021-10-29 RX ADMIN — ASPIRIN 81 MG: 81 TABLET, COATED ORAL at 08:15

## 2021-10-29 RX ADMIN — MEGESTROL ACETATE 800 MG: 40 SUSPENSION ORAL at 12:07

## 2021-10-29 RX ADMIN — FINASTERIDE 5 MG: 5 TABLET, FILM COATED ORAL at 08:15

## 2021-10-29 RX ADMIN — RANOLAZINE 500 MG: 500 TABLET, FILM COATED, EXTENDED RELEASE ORAL at 19:52

## 2021-10-29 RX ADMIN — SODIUM CHLORIDE, PRESERVATIVE FREE 10 ML: 5 INJECTION INTRAVENOUS at 19:53

## 2021-10-29 RX ADMIN — CLOPIDOGREL 75 MG: 75 TABLET, FILM COATED ORAL at 08:15

## 2021-10-29 RX ADMIN — SODIUM CHLORIDE 75 ML/HR: 9 INJECTION, SOLUTION INTRAVENOUS at 01:24

## 2021-10-29 RX ADMIN — SODIUM CHLORIDE, PRESERVATIVE FREE 10 ML: 5 INJECTION INTRAVENOUS at 08:17

## 2021-10-30 LAB
ALBUMIN SERPL-MCNC: 3.18 G/DL (ref 3.5–5.2)
ALBUMIN/GLOB SERPL: 1.2 G/DL
ALP SERPL-CCNC: 67 U/L (ref 39–117)
ALT SERPL W P-5'-P-CCNC: 23 U/L (ref 1–41)
ANION GAP SERPL CALCULATED.3IONS-SCNC: 10.4 MMOL/L (ref 5–15)
AST SERPL-CCNC: 21 U/L (ref 1–40)
BASOPHILS # BLD AUTO: 0.09 10*3/MM3 (ref 0–0.2)
BASOPHILS NFR BLD AUTO: 0.8 % (ref 0–1.5)
BILIRUB SERPL-MCNC: 0.6 MG/DL (ref 0–1.2)
BUN SERPL-MCNC: 15 MG/DL (ref 8–23)
BUN/CREAT SERPL: 23.4 (ref 7–25)
CALCIUM SPEC-SCNC: 8.4 MG/DL (ref 8.6–10.5)
CHLORIDE SERPL-SCNC: 111 MMOL/L (ref 98–107)
CO2 SERPL-SCNC: 16.6 MMOL/L (ref 22–29)
CREAT SERPL-MCNC: 0.64 MG/DL (ref 0.76–1.27)
DEPRECATED RDW RBC AUTO: 58.2 FL (ref 37–54)
EOSINOPHIL # BLD AUTO: 0.05 10*3/MM3 (ref 0–0.4)
EOSINOPHIL NFR BLD AUTO: 0.4 % (ref 0.3–6.2)
ERYTHROCYTE [DISTWIDTH] IN BLOOD BY AUTOMATED COUNT: 14.9 % (ref 12.3–15.4)
GFR SERPL CREATININE-BSD FRML MDRD: 122 ML/MIN/1.73
GLOBULIN UR ELPH-MCNC: 2.7 GM/DL
GLUCOSE BLDC GLUCOMTR-MCNC: 128 MG/DL (ref 70–130)
GLUCOSE BLDC GLUCOMTR-MCNC: 136 MG/DL (ref 70–130)
GLUCOSE BLDC GLUCOMTR-MCNC: 144 MG/DL (ref 70–130)
GLUCOSE BLDC GLUCOMTR-MCNC: 86 MG/DL (ref 70–130)
GLUCOSE SERPL-MCNC: 91 MG/DL (ref 65–99)
HCT VFR BLD AUTO: 43.4 % (ref 37.5–51)
HGB BLD-MCNC: 13.8 G/DL (ref 13–17.7)
IMM GRANULOCYTES # BLD AUTO: 0.28 10*3/MM3 (ref 0–0.05)
IMM GRANULOCYTES NFR BLD AUTO: 2.4 % (ref 0–0.5)
LYMPHOCYTES # BLD AUTO: 1.67 10*3/MM3 (ref 0.7–3.1)
LYMPHOCYTES NFR BLD AUTO: 14.2 % (ref 19.6–45.3)
MCH RBC QN AUTO: 33.4 PG (ref 26.6–33)
MCHC RBC AUTO-ENTMCNC: 31.8 G/DL (ref 31.5–35.7)
MCV RBC AUTO: 105.1 FL (ref 79–97)
MONOCYTES # BLD AUTO: 0.83 10*3/MM3 (ref 0.1–0.9)
MONOCYTES NFR BLD AUTO: 7.1 % (ref 5–12)
NEUTROPHILS NFR BLD AUTO: 75.1 % (ref 42.7–76)
NEUTROPHILS NFR BLD AUTO: 8.83 10*3/MM3 (ref 1.7–7)
NRBC BLD AUTO-RTO: 0 /100 WBC (ref 0–0.2)
PLATELET # BLD AUTO: 167 10*3/MM3 (ref 140–450)
PMV BLD AUTO: 10.3 FL (ref 6–12)
POTASSIUM SERPL-SCNC: 4.3 MMOL/L (ref 3.5–5.2)
PROT SERPL-MCNC: 5.9 G/DL (ref 6–8.5)
RBC # BLD AUTO: 4.13 10*6/MM3 (ref 4.14–5.8)
SODIUM SERPL-SCNC: 138 MMOL/L (ref 136–145)
WBC # BLD AUTO: 11.75 10*3/MM3 (ref 3.4–10.8)

## 2021-10-30 PROCEDURE — 94799 UNLISTED PULMONARY SVC/PX: CPT

## 2021-10-30 PROCEDURE — 82962 GLUCOSE BLOOD TEST: CPT

## 2021-10-30 PROCEDURE — 85025 COMPLETE CBC W/AUTO DIFF WBC: CPT | Performed by: INTERNAL MEDICINE

## 2021-10-30 PROCEDURE — 99232 SBSQ HOSP IP/OBS MODERATE 35: CPT | Performed by: INTERNAL MEDICINE

## 2021-10-30 PROCEDURE — 80053 COMPREHEN METABOLIC PANEL: CPT | Performed by: INTERNAL MEDICINE

## 2021-10-30 RX ORDER — LACTULOSE 10 G/15ML
10 SOLUTION ORAL ONCE
Status: COMPLETED | OUTPATIENT
Start: 2021-10-30 | End: 2021-10-30

## 2021-10-30 RX ORDER — POLYETHYLENE GLYCOL 3350 17 G/17G
17 POWDER, FOR SOLUTION ORAL DAILY
Status: DISCONTINUED | OUTPATIENT
Start: 2021-10-30 | End: 2021-11-09 | Stop reason: HOSPADM

## 2021-10-30 RX ADMIN — FINASTERIDE 5 MG: 5 TABLET, FILM COATED ORAL at 09:16

## 2021-10-30 RX ADMIN — BUDESONIDE AND FORMOTEROL FUMARATE DIHYDRATE 2 PUFF: 80; 4.5 AEROSOL RESPIRATORY (INHALATION) at 06:52

## 2021-10-30 RX ADMIN — LACTULOSE 10 G: 10 SOLUTION ORAL at 18:02

## 2021-10-30 RX ADMIN — MIRTAZAPINE 45 MG: 15 TABLET, FILM COATED ORAL at 20:45

## 2021-10-30 RX ADMIN — MEGESTROL ACETATE 800 MG: 40 SUSPENSION ORAL at 09:17

## 2021-10-30 RX ADMIN — FERROUS SULFATE TAB 325 MG (65 MG ELEMENTAL FE) 325 MG: 325 (65 FE) TAB at 20:45

## 2021-10-30 RX ADMIN — RANOLAZINE 500 MG: 500 TABLET, FILM COATED, EXTENDED RELEASE ORAL at 09:16

## 2021-10-30 RX ADMIN — SODIUM CHLORIDE, PRESERVATIVE FREE 10 ML: 5 INJECTION INTRAVENOUS at 20:45

## 2021-10-30 RX ADMIN — ISOSORBIDE MONONITRATE 120 MG: 60 TABLET ORAL at 09:16

## 2021-10-30 RX ADMIN — MEMANTINE HYDROCHLORIDE AND DONEPEZIL HYDROCHLORIDE 28 MG: 28; 10 CAPSULE ORAL at 09:17

## 2021-10-30 RX ADMIN — SODIUM CHLORIDE, PRESERVATIVE FREE 10 ML: 5 INJECTION INTRAVENOUS at 09:17

## 2021-10-30 RX ADMIN — RANOLAZINE 500 MG: 500 TABLET, FILM COATED, EXTENDED RELEASE ORAL at 20:45

## 2021-10-30 RX ADMIN — ASPIRIN 81 MG: 81 TABLET, COATED ORAL at 09:16

## 2021-10-30 RX ADMIN — POLYETHYLENE GLYCOL (3350) 17 G: 17 POWDER, FOR SOLUTION ORAL at 18:02

## 2021-10-30 RX ADMIN — BUDESONIDE AND FORMOTEROL FUMARATE DIHYDRATE 2 PUFF: 80; 4.5 AEROSOL RESPIRATORY (INHALATION) at 18:37

## 2021-10-30 RX ADMIN — CLOPIDOGREL 75 MG: 75 TABLET, FILM COATED ORAL at 09:16

## 2021-10-30 RX ADMIN — ATORVASTATIN CALCIUM 80 MG: 40 TABLET, FILM COATED ORAL at 20:45

## 2021-10-30 RX ADMIN — CHOLESTYRAMINE 1 PACKET: 4 POWDER, FOR SUSPENSION ORAL at 09:15

## 2021-10-30 RX ADMIN — FERROUS SULFATE TAB 325 MG (65 MG ELEMENTAL FE) 325 MG: 325 (65 FE) TAB at 09:16

## 2021-10-30 RX ADMIN — PANTOPRAZOLE SODIUM 40 MG: 40 TABLET, DELAYED RELEASE ORAL at 04:35

## 2021-10-31 ENCOUNTER — APPOINTMENT (OUTPATIENT)
Dept: GENERAL RADIOLOGY | Facility: HOSPITAL | Age: 76
End: 2021-10-31

## 2021-10-31 LAB
ALBUMIN SERPL-MCNC: 3.74 G/DL (ref 3.5–5.2)
ALBUMIN/GLOB SERPL: 1.5 G/DL
ALP SERPL-CCNC: 74 U/L (ref 39–117)
ALT SERPL W P-5'-P-CCNC: 24 U/L (ref 1–41)
ANION GAP SERPL CALCULATED.3IONS-SCNC: 9.4 MMOL/L (ref 5–15)
AST SERPL-CCNC: 20 U/L (ref 1–40)
BASOPHILS # BLD AUTO: 0.1 10*3/MM3 (ref 0–0.2)
BASOPHILS NFR BLD AUTO: 0.8 % (ref 0–1.5)
BILIRUB SERPL-MCNC: 0.5 MG/DL (ref 0–1.2)
BUN SERPL-MCNC: 13 MG/DL (ref 8–23)
BUN/CREAT SERPL: 18.1 (ref 7–25)
CALCIUM SPEC-SCNC: 8.7 MG/DL (ref 8.6–10.5)
CHLORIDE SERPL-SCNC: 113 MMOL/L (ref 98–107)
CO2 SERPL-SCNC: 21.6 MMOL/L (ref 22–29)
CREAT SERPL-MCNC: 0.72 MG/DL (ref 0.76–1.27)
DEPRECATED RDW RBC AUTO: 54.9 FL (ref 37–54)
EOSINOPHIL # BLD AUTO: 0.2 10*3/MM3 (ref 0–0.4)
EOSINOPHIL NFR BLD AUTO: 1.5 % (ref 0.3–6.2)
ERYTHROCYTE [DISTWIDTH] IN BLOOD BY AUTOMATED COUNT: 15.2 % (ref 12.3–15.4)
GFR SERPL CREATININE-BSD FRML MDRD: 106 ML/MIN/1.73
GLOBULIN UR ELPH-MCNC: 2.6 GM/DL
GLUCOSE BLDC GLUCOMTR-MCNC: 108 MG/DL (ref 70–130)
GLUCOSE BLDC GLUCOMTR-MCNC: 110 MG/DL (ref 70–130)
GLUCOSE BLDC GLUCOMTR-MCNC: 129 MG/DL (ref 70–130)
GLUCOSE BLDC GLUCOMTR-MCNC: 78 MG/DL (ref 70–130)
GLUCOSE SERPL-MCNC: 88 MG/DL (ref 65–99)
HCT VFR BLD AUTO: 39.6 % (ref 37.5–51)
HGB BLD-MCNC: 13.3 G/DL (ref 13–17.7)
IMM GRANULOCYTES # BLD AUTO: 0.41 10*3/MM3 (ref 0–0.05)
IMM GRANULOCYTES NFR BLD AUTO: 3.2 % (ref 0–0.5)
LYMPHOCYTES # BLD AUTO: 1.82 10*3/MM3 (ref 0.7–3.1)
LYMPHOCYTES NFR BLD AUTO: 14 % (ref 19.6–45.3)
MCH RBC QN AUTO: 33.3 PG (ref 26.6–33)
MCHC RBC AUTO-ENTMCNC: 33.6 G/DL (ref 31.5–35.7)
MCV RBC AUTO: 99 FL (ref 79–97)
MONOCYTES # BLD AUTO: 0.96 10*3/MM3 (ref 0.1–0.9)
MONOCYTES NFR BLD AUTO: 7.4 % (ref 5–12)
NEUTROPHILS NFR BLD AUTO: 73.1 % (ref 42.7–76)
NEUTROPHILS NFR BLD AUTO: 9.48 10*3/MM3 (ref 1.7–7)
NRBC BLD AUTO-RTO: 0 /100 WBC (ref 0–0.2)
PLATELET # BLD AUTO: 175 10*3/MM3 (ref 140–450)
PMV BLD AUTO: 10.4 FL (ref 6–12)
POTASSIUM SERPL-SCNC: 3.9 MMOL/L (ref 3.5–5.2)
PROT SERPL-MCNC: 6.3 G/DL (ref 6–8.5)
RBC # BLD AUTO: 4 10*6/MM3 (ref 4.14–5.8)
SODIUM SERPL-SCNC: 144 MMOL/L (ref 136–145)
WBC # BLD AUTO: 12.97 10*3/MM3 (ref 3.4–10.8)

## 2021-10-31 PROCEDURE — 82962 GLUCOSE BLOOD TEST: CPT

## 2021-10-31 PROCEDURE — 94760 N-INVAS EAR/PLS OXIMETRY 1: CPT

## 2021-10-31 PROCEDURE — 80053 COMPREHEN METABOLIC PANEL: CPT | Performed by: INTERNAL MEDICINE

## 2021-10-31 PROCEDURE — 94799 UNLISTED PULMONARY SVC/PX: CPT

## 2021-10-31 PROCEDURE — 73501 X-RAY EXAM HIP UNI 1 VIEW: CPT

## 2021-10-31 PROCEDURE — 99232 SBSQ HOSP IP/OBS MODERATE 35: CPT | Performed by: INTERNAL MEDICINE

## 2021-10-31 PROCEDURE — 85025 COMPLETE CBC W/AUTO DIFF WBC: CPT | Performed by: INTERNAL MEDICINE

## 2021-10-31 RX ORDER — LACTULOSE 10 G/15ML
10 SOLUTION ORAL ONCE
Status: COMPLETED | OUTPATIENT
Start: 2021-10-31 | End: 2021-10-31

## 2021-10-31 RX ADMIN — POLYETHYLENE GLYCOL (3350) 17 G: 17 POWDER, FOR SOLUTION ORAL at 09:13

## 2021-10-31 RX ADMIN — ASPIRIN 81 MG: 81 TABLET, COATED ORAL at 09:13

## 2021-10-31 RX ADMIN — ATORVASTATIN CALCIUM 80 MG: 40 TABLET, FILM COATED ORAL at 20:28

## 2021-10-31 RX ADMIN — FINASTERIDE 5 MG: 5 TABLET, FILM COATED ORAL at 09:13

## 2021-10-31 RX ADMIN — CLOPIDOGREL 75 MG: 75 TABLET, FILM COATED ORAL at 09:13

## 2021-10-31 RX ADMIN — FERROUS SULFATE TAB 325 MG (65 MG ELEMENTAL FE) 325 MG: 325 (65 FE) TAB at 20:28

## 2021-10-31 RX ADMIN — MIRTAZAPINE 45 MG: 15 TABLET, FILM COATED ORAL at 20:28

## 2021-10-31 RX ADMIN — LACTULOSE 10 G: 10 SOLUTION ORAL at 15:11

## 2021-10-31 RX ADMIN — SODIUM CHLORIDE, PRESERVATIVE FREE 10 ML: 5 INJECTION INTRAVENOUS at 09:15

## 2021-10-31 RX ADMIN — BUDESONIDE AND FORMOTEROL FUMARATE DIHYDRATE 2 PUFF: 80; 4.5 AEROSOL RESPIRATORY (INHALATION) at 06:22

## 2021-10-31 RX ADMIN — RANOLAZINE 500 MG: 500 TABLET, FILM COATED, EXTENDED RELEASE ORAL at 20:28

## 2021-10-31 RX ADMIN — ISOSORBIDE MONONITRATE 120 MG: 60 TABLET ORAL at 09:13

## 2021-10-31 RX ADMIN — BUDESONIDE AND FORMOTEROL FUMARATE DIHYDRATE 2 PUFF: 80; 4.5 AEROSOL RESPIRATORY (INHALATION) at 19:11

## 2021-10-31 RX ADMIN — FERROUS SULFATE TAB 325 MG (65 MG ELEMENTAL FE) 325 MG: 325 (65 FE) TAB at 09:13

## 2021-10-31 RX ADMIN — MEMANTINE HYDROCHLORIDE AND DONEPEZIL HYDROCHLORIDE 28 MG: 28; 10 CAPSULE ORAL at 09:14

## 2021-10-31 RX ADMIN — RANOLAZINE 500 MG: 500 TABLET, FILM COATED, EXTENDED RELEASE ORAL at 09:13

## 2021-10-31 RX ADMIN — PANTOPRAZOLE SODIUM 40 MG: 40 TABLET, DELAYED RELEASE ORAL at 09:13

## 2021-10-31 RX ADMIN — MEGESTROL ACETATE 800 MG: 40 SUSPENSION ORAL at 09:14

## 2021-10-31 RX ADMIN — SODIUM CHLORIDE, PRESERVATIVE FREE 10 ML: 5 INJECTION INTRAVENOUS at 20:28

## 2021-11-01 LAB
ANION GAP SERPL CALCULATED.3IONS-SCNC: 12.8 MMOL/L (ref 5–15)
BUN SERPL-MCNC: 19 MG/DL (ref 8–23)
BUN/CREAT SERPL: 26.8 (ref 7–25)
CALCIUM SPEC-SCNC: 9.3 MG/DL (ref 8.6–10.5)
CHLORIDE SERPL-SCNC: 110 MMOL/L (ref 98–107)
CO2 SERPL-SCNC: 20.2 MMOL/L (ref 22–29)
CREAT SERPL-MCNC: 0.71 MG/DL (ref 0.76–1.27)
GFR SERPL CREATININE-BSD FRML MDRD: 108 ML/MIN/1.73
GLUCOSE BLDC GLUCOMTR-MCNC: 110 MG/DL (ref 70–130)
GLUCOSE BLDC GLUCOMTR-MCNC: 184 MG/DL (ref 70–130)
GLUCOSE BLDC GLUCOMTR-MCNC: 61 MG/DL (ref 70–130)
GLUCOSE BLDC GLUCOMTR-MCNC: 94 MG/DL (ref 70–130)
GLUCOSE BLDC GLUCOMTR-MCNC: 99 MG/DL (ref 70–130)
GLUCOSE SERPL-MCNC: 95 MG/DL (ref 65–99)
POTASSIUM SERPL-SCNC: 4.2 MMOL/L (ref 3.5–5.2)
SODIUM SERPL-SCNC: 143 MMOL/L (ref 136–145)

## 2021-11-01 PROCEDURE — 94799 UNLISTED PULMONARY SVC/PX: CPT

## 2021-11-01 PROCEDURE — 99231 SBSQ HOSP IP/OBS SF/LOW 25: CPT | Performed by: INTERNAL MEDICINE

## 2021-11-01 PROCEDURE — 82962 GLUCOSE BLOOD TEST: CPT

## 2021-11-01 PROCEDURE — 80048 BASIC METABOLIC PNL TOTAL CA: CPT | Performed by: INTERNAL MEDICINE

## 2021-11-01 RX ADMIN — MIRTAZAPINE 45 MG: 15 TABLET, FILM COATED ORAL at 20:01

## 2021-11-01 RX ADMIN — BUDESONIDE AND FORMOTEROL FUMARATE DIHYDRATE 2 PUFF: 80; 4.5 AEROSOL RESPIRATORY (INHALATION) at 06:25

## 2021-11-01 RX ADMIN — RANOLAZINE 500 MG: 500 TABLET, FILM COATED, EXTENDED RELEASE ORAL at 20:01

## 2021-11-01 RX ADMIN — PANTOPRAZOLE SODIUM 40 MG: 40 TABLET, DELAYED RELEASE ORAL at 08:16

## 2021-11-01 RX ADMIN — BUDESONIDE AND FORMOTEROL FUMARATE DIHYDRATE 2 PUFF: 80; 4.5 AEROSOL RESPIRATORY (INHALATION) at 18:35

## 2021-11-01 RX ADMIN — MEMANTINE HYDROCHLORIDE AND DONEPEZIL HYDROCHLORIDE 28 MG: 28; 10 CAPSULE ORAL at 08:16

## 2021-11-01 RX ADMIN — FERROUS SULFATE TAB 325 MG (65 MG ELEMENTAL FE) 325 MG: 325 (65 FE) TAB at 08:15

## 2021-11-01 RX ADMIN — SODIUM CHLORIDE, PRESERVATIVE FREE 10 ML: 5 INJECTION INTRAVENOUS at 20:01

## 2021-11-01 RX ADMIN — ATORVASTATIN CALCIUM 80 MG: 40 TABLET, FILM COATED ORAL at 20:01

## 2021-11-01 RX ADMIN — FERROUS SULFATE TAB 325 MG (65 MG ELEMENTAL FE) 325 MG: 325 (65 FE) TAB at 20:01

## 2021-11-01 RX ADMIN — FINASTERIDE 5 MG: 5 TABLET, FILM COATED ORAL at 08:15

## 2021-11-01 RX ADMIN — SODIUM CHLORIDE, PRESERVATIVE FREE 10 ML: 5 INJECTION INTRAVENOUS at 08:17

## 2021-11-01 RX ADMIN — ASPIRIN 81 MG: 81 TABLET, COATED ORAL at 08:16

## 2021-11-01 RX ADMIN — CLOPIDOGREL 75 MG: 75 TABLET, FILM COATED ORAL at 08:16

## 2021-11-01 RX ADMIN — RANOLAZINE 500 MG: 500 TABLET, FILM COATED, EXTENDED RELEASE ORAL at 08:15

## 2021-11-01 RX ADMIN — ISOSORBIDE MONONITRATE 120 MG: 60 TABLET ORAL at 08:15

## 2021-11-01 RX ADMIN — MEGESTROL ACETATE 800 MG: 40 SUSPENSION ORAL at 08:16

## 2021-11-01 NOTE — PLAN OF CARE
Goal Outcome Evaluation:              Outcome Summary: Pt aox4, resting in bed this shift w/ family at bedside. VSS, no complaints or s/s of acute distress noted. IV access and telemetry monitoring patent and maintained, will continue to monitor.

## 2021-11-01 NOTE — PLAN OF CARE
Goal Outcome Evaluation:  Plan of Care Reviewed With: patient        Progress: no change  Outcome Summary: Pt resting with no s/s of distress noted. Pt denies complaints of pain. Continue with plan of care.

## 2021-11-01 NOTE — CASE MANAGEMENT/SOCIAL WORK
Discharge Planning Assessment  MILLICENT Recio     Patient Name: Fidencio Luciano  MRN: [id number]  Today's Date: 11/1/2021    Admit Date: 10/27/2021       Discharge Plan     Row Name 11/01/21 1356       Plan    Plan Pt admitted on 10/27/21.  Pt lives at home with spouse.  Pt currently does not utilize home health services.  Pt currently utilizes hospital bed, wheelchair, walker, bedside commode and shower chair via unknown provider.  Pt's PCP is Camila Ortiz.  SS noted inpatient rehab referral and notified rehab at ext [phone] per Kassie.  SS will follow.              TAYO Ugalde

## 2021-11-02 LAB
ALBUMIN SERPL-MCNC: 3.77 G/DL (ref 3.5–5.2)
ALBUMIN/GLOB SERPL: 1.4 G/DL
ALP SERPL-CCNC: 71 U/L (ref 39–117)
ALT SERPL W P-5'-P-CCNC: 23 U/L (ref 1–41)
ANION GAP SERPL CALCULATED.3IONS-SCNC: 10.9 MMOL/L (ref 5–15)
ANISOCYTOSIS BLD QL: ABNORMAL
AST SERPL-CCNC: 22 U/L (ref 1–40)
BASOPHILS # BLD MANUAL: 0.14 10*3/MM3 (ref 0–0.2)
BASOPHILS NFR BLD AUTO: 1 % (ref 0–1.5)
BILIRUB SERPL-MCNC: 0.5 MG/DL (ref 0–1.2)
BUN SERPL-MCNC: 25 MG/DL (ref 8–23)
BUN/CREAT SERPL: 32.5 (ref 7–25)
CALCIUM SPEC-SCNC: 9 MG/DL (ref 8.6–10.5)
CHLORIDE SERPL-SCNC: 106 MMOL/L (ref 98–107)
CO2 SERPL-SCNC: 20.1 MMOL/L (ref 22–29)
CREAT SERPL-MCNC: 0.77 MG/DL (ref 0.76–1.27)
DEPRECATED RDW RBC AUTO: 58.2 FL (ref 37–54)
ERYTHROCYTE [DISTWIDTH] IN BLOOD BY AUTOMATED COUNT: 15.6 % (ref 12.3–15.4)
GFR SERPL CREATININE-BSD FRML MDRD: 98 ML/MIN/1.73
GLOBULIN UR ELPH-MCNC: 2.6 GM/DL
GLUCOSE BLDC GLUCOMTR-MCNC: 144 MG/DL (ref 70–130)
GLUCOSE BLDC GLUCOMTR-MCNC: 176 MG/DL (ref 70–130)
GLUCOSE BLDC GLUCOMTR-MCNC: 201 MG/DL (ref 70–130)
GLUCOSE BLDC GLUCOMTR-MCNC: 84 MG/DL (ref 70–130)
GLUCOSE SERPL-MCNC: 113 MG/DL (ref 65–99)
HCT VFR BLD AUTO: 42.7 % (ref 37.5–51)
HGB BLD-MCNC: 13.8 G/DL (ref 13–17.7)
LYMPHOCYTES # BLD MANUAL: 2.05 10*3/MM3 (ref 0.7–3.1)
LYMPHOCYTES NFR BLD MANUAL: 11 % (ref 5–12)
LYMPHOCYTES NFR BLD MANUAL: 15 % (ref 19.6–45.3)
MACROCYTES BLD QL SMEAR: ABNORMAL
MCH RBC QN AUTO: 32.8 PG (ref 26.6–33)
MCHC RBC AUTO-ENTMCNC: 32.3 G/DL (ref 31.5–35.7)
MCV RBC AUTO: 101.4 FL (ref 79–97)
MONOCYTES # BLD AUTO: 1.5 10*3/MM3 (ref 0.1–0.9)
NEUTROPHILS # BLD AUTO: 9.98 10*3/MM3 (ref 1.7–7)
NEUTROPHILS NFR BLD MANUAL: 73 % (ref 42.7–76)
PLAT MORPH BLD: NORMAL
PLATELET # BLD AUTO: 195 10*3/MM3 (ref 140–450)
PMV BLD AUTO: 9.9 FL (ref 6–12)
POTASSIUM SERPL-SCNC: 4.2 MMOL/L (ref 3.5–5.2)
PROT SERPL-MCNC: 6.4 G/DL (ref 6–8.5)
RBC # BLD AUTO: 4.21 10*6/MM3 (ref 4.14–5.8)
SCAN SLIDE: NORMAL
SODIUM SERPL-SCNC: 137 MMOL/L (ref 136–145)
WBC # BLD AUTO: 13.67 10*3/MM3 (ref 3.4–10.8)

## 2021-11-02 PROCEDURE — 94799 UNLISTED PULMONARY SVC/PX: CPT

## 2021-11-02 PROCEDURE — 97116 GAIT TRAINING THERAPY: CPT

## 2021-11-02 PROCEDURE — 85025 COMPLETE CBC W/AUTO DIFF WBC: CPT | Performed by: INTERNAL MEDICINE

## 2021-11-02 PROCEDURE — 80053 COMPREHEN METABOLIC PANEL: CPT | Performed by: INTERNAL MEDICINE

## 2021-11-02 PROCEDURE — 99231 SBSQ HOSP IP/OBS SF/LOW 25: CPT | Performed by: INTERNAL MEDICINE

## 2021-11-02 PROCEDURE — 82962 GLUCOSE BLOOD TEST: CPT

## 2021-11-02 PROCEDURE — 97110 THERAPEUTIC EXERCISES: CPT

## 2021-11-02 PROCEDURE — 85007 BL SMEAR W/DIFF WBC COUNT: CPT | Performed by: INTERNAL MEDICINE

## 2021-11-02 RX ADMIN — MEMANTINE HYDROCHLORIDE AND DONEPEZIL HYDROCHLORIDE 28 MG: 28; 10 CAPSULE ORAL at 09:07

## 2021-11-02 RX ADMIN — MEGESTROL ACETATE 800 MG: 40 SUSPENSION ORAL at 08:13

## 2021-11-02 RX ADMIN — BUDESONIDE AND FORMOTEROL FUMARATE DIHYDRATE 2 PUFF: 80; 4.5 AEROSOL RESPIRATORY (INHALATION) at 06:33

## 2021-11-02 RX ADMIN — PANTOPRAZOLE SODIUM 40 MG: 40 TABLET, DELAYED RELEASE ORAL at 08:13

## 2021-11-02 RX ADMIN — RANOLAZINE 500 MG: 500 TABLET, FILM COATED, EXTENDED RELEASE ORAL at 08:12

## 2021-11-02 RX ADMIN — ATORVASTATIN CALCIUM 80 MG: 40 TABLET, FILM COATED ORAL at 20:37

## 2021-11-02 RX ADMIN — BUDESONIDE AND FORMOTEROL FUMARATE DIHYDRATE 2 PUFF: 80; 4.5 AEROSOL RESPIRATORY (INHALATION) at 18:40

## 2021-11-02 RX ADMIN — FERROUS SULFATE TAB 325 MG (65 MG ELEMENTAL FE) 325 MG: 325 (65 FE) TAB at 08:13

## 2021-11-02 RX ADMIN — ASPIRIN 81 MG: 81 TABLET, COATED ORAL at 08:13

## 2021-11-02 RX ADMIN — FINASTERIDE 5 MG: 5 TABLET, FILM COATED ORAL at 08:13

## 2021-11-02 RX ADMIN — SODIUM CHLORIDE, PRESERVATIVE FREE 10 ML: 5 INJECTION INTRAVENOUS at 08:13

## 2021-11-02 RX ADMIN — SODIUM CHLORIDE, PRESERVATIVE FREE 10 ML: 5 INJECTION INTRAVENOUS at 20:38

## 2021-11-02 RX ADMIN — ISOSORBIDE MONONITRATE 120 MG: 60 TABLET ORAL at 08:13

## 2021-11-02 RX ADMIN — FERROUS SULFATE TAB 325 MG (65 MG ELEMENTAL FE) 325 MG: 325 (65 FE) TAB at 20:37

## 2021-11-02 RX ADMIN — POLYETHYLENE GLYCOL (3350) 17 G: 17 POWDER, FOR SOLUTION ORAL at 08:12

## 2021-11-02 RX ADMIN — MIRTAZAPINE 45 MG: 15 TABLET, FILM COATED ORAL at 20:38

## 2021-11-02 RX ADMIN — CLOPIDOGREL 75 MG: 75 TABLET, FILM COATED ORAL at 08:13

## 2021-11-02 RX ADMIN — RANOLAZINE 500 MG: 500 TABLET, FILM COATED, EXTENDED RELEASE ORAL at 20:38

## 2021-11-02 NOTE — PROGRESS NOTES
[facility] HOSPITALIST PROGRESS NOTE     Patient Identification:  Name:  Fidencio Luciano  Age:  76 y.o.  Sex:  male  :  1945  MRN:  [id number]  Visit Number:  [id number]  ROOM: [address] [place] [address]     Primary Care Provider:  Camila Ortiz APRN    Length of stay in inpatient status:  5    Subjective     Chief Compliant:    Chief Complaint   Patient presents with   • Chest Pain       History of Presenting Illness:    Patient initially drowsing resting on my exam but easily woke up. He denied any new complaints or pain.     ROS:  Otherwise 10 point ROS negative other than documented above in HPI.     Objective     Current Hospital Meds:aspirin, 81 mg, Oral, Daily  atorvastatin, 80 mg, Oral, Nightly  budesonide-formoterol, 2 puff, Inhalation, BID  clopidogrel, 75 mg, Oral, Daily  ferrous sulfate, 325 mg, Oral, BID  finasteride, 5 mg, Oral, Daily  isosorbide mononitrate, 120 mg, Oral, Daily  megestrol, 800 mg, Oral, Daily  Memantine HCl-Donepezil HCl, 1 capsule, Oral, Daily  mirtazapine, 45 mg, Oral, Nightly  pantoprazole, 40 mg, Oral, QAM AC  polyethylene glycol, 17 g, Oral, Daily  ranolazine, 500 mg, Oral, Q12H  sodium chloride, 10 mL, Intravenous, Q12H         Current Antimicrobial Therapy:  Anti-Infectives (From admission, onward)    None        Current Diuretic Therapy:  Diuretics (From admission, onward)    None        ----------------------------------------------------------------------------------------------------------------------  Vital Signs:  Temp:  [97.4 °F (36.3 °C)-98 °F (36.7 °C)] 97.8 °F (36.6 °C)  Heart Rate:  [62-82] 78  Resp:  [18-20] 20  BP: (112-170)/(64-78) 140/67  SpO2:  [96 %-99 %] 99 %  on   ;   Device (Oxygen Therapy): room air  Body mass index is 20.77 kg/m².    Wt Readings from Last 3 Encounters:   10/31/21 67.5 kg (148 lb 14.4 oz)   10/21/21 64.4 kg (142 lb)   10/21/21 64.5 kg (142 lb 3.2 oz)     Intake & Output (last 3 days)       10/30 0701  10/31 0700 10/31  0701 11/01 0700 11/01 0701 11/02 0700    P.O. 480 480 480    Total Intake(mL/kg) 480 (7.1) 480 (7.1) 480 (7.1)    Urine (mL/kg/hr) 3075 (1.9) 1550 (1) 1350 (1.5)    Stool   0    Total Output 3075 1550 1350    Net -7119 -0272 -410           Urine Unmeasured Occurrence 3 x      Stool Unmeasured Occurrence   1 x        Diet Soft Texture; Chopped; Thin  ----------------------------------------------------------------------------------------------------------------------  Physical exam:  Constitutional:  Well-developed and well-nourished.  No respiratory distress.      HENT:  Head:  Normocephalic and atraumatic.  Mouth:  Moist mucous membranes.    Eyes:  Conjunctivae and EOM are normal. No scleral icterus.    Neck:  Neck supple.  No JVD present.    Cardiovascular:  Normal rate, regular rhythm and normal heart sounds with no murmur.  Pulmonary/Chest:  No respiratory distress, no wheezes, no crackles, with normal breath sounds and good air movement.  Abdominal:  Soft.  Bowel sounds are normal.  No distension and no tenderness.   Musculoskeletal:  No edema, no tenderness, and no deformity.  No red or swollen joints anywhere.    Neurological:  Alert and oriented to person, place, and time.  No cranial nerve deficit.  No tongue deviation.  No facial droop.  No slurred speech.   Skin:  Skin is warm and dry. No rash noted. No pallor.   Peripheral vascular:  Pulses in all 4 extremities with no clubbing, no cyanosis, no edema.  ----------------------------------------------------------------------------------------------------------------------  Tele:    ----------------------------------------------------------------------------------------------------------------------  Results from last 7 days   Lab Units 10/31/21  0052 10/30/21  0227 10/29/21  0102 10/28/21  0154 10/27/21  1933 10/27/21  1711 10/27/21  1115 10/27/21  1057 10/27/21  1044   CRP mg/dL  --   --   --   --   --   --   --   --  <0.30   LACTATE mmol/L  --   --   --    --  2.6* 2.9*  --   --   --    WBC 10*3/mm3 12.97* 11.75* 10.33   < >  --   --    < >  --   --    HEMOGLOBIN g/dL 13.3 13.8 11.7*   < >  --   --    < >  --   --    HEMATOCRIT % 39.6 43.4 34.9*   < >  --   --    < >  --   --    MCV fL 99.0* 105.1* 98.9*   < >  --   --    < >  --   --    MCHC g/dL 33.6 31.8 33.5   < >  --   --    < >  --   --    PLATELETS 10*3/mm3 175 167 159   < >  --   --    < >  --   --    INR   --   --   --   --   --   --   --  1.05  --     < > = values in this interval not displayed.     Results from last 7 days   Lab Units 10/27/21  1651   PH, ARTERIAL pH units 7.518*   PO2 ART mm Hg 110.0*   PCO2, ARTERIAL mm Hg 24.2*   HCO3 ART mmol/L 19.7*     Results from last 7 days   Lab Units 11/01/21  0437 10/31/21  0052 10/30/21  0703 10/29/21  0102 10/28/21  0154 10/27/21  1044 10/27/21  1044   SODIUM mmol/L 143 144 138   < > 141   < > 144   POTASSIUM mmol/L 4.2 3.9 4.3   < > 4.3   < > 4.2   MAGNESIUM mg/dL  --   --   --   --  2.1  --  2.1   CHLORIDE mmol/L 110* 113* 111*   < > 112*   < > 116*   CO2 mmol/L 20.2* 21.6* 16.6*   < > 17.4*   < > 12.8*   BUN mg/dL 19 13 15   < > 29*   < > 37*   CREATININE mg/dL 0.71* 0.72* 0.64*   < > 0.56*   < > 0.73*   EGFR IF NONAFRICN AM mL/min/1.73 108 106 122   < > 142   < > 105   CALCIUM mg/dL 9.3 8.7 8.4*   < > 8.9   < > 9.2   PHOSPHORUS mg/dL  --   --   --   --  3.9  --   --    GLUCOSE mg/dL 95 88 91   < > 73   < > 75   ALBUMIN g/dL  --  3.74 3.18*  --  3.58   < > 3.53   BILIRUBIN mg/dL  --  0.5 0.6  --  1.0   < > 0.8   ALK PHOS U/L  --  74 67  --  58   < > 56   AST (SGOT) U/L  --  20 21  --  21   < > 23   ALT (SGPT) U/L  --  24 23  --  23   < > 22    < > = values in this interval not displayed.   Estimated Creatinine Clearance: 76.2 mL/min (A) (by C-G formula based on SCr of 0.71 mg/dL (L)).  No results found for: AMMONIA  Results from last 7 days   Lab Units 10/27/21  1711 10/27/21  1241 10/27/21  1044   CK TOTAL U/L  --   --  57   TROPONIN T ng/mL <0.010  <0.010 <0.010     Results from last 7 days   Lab Units 10/27/21  1044   PROBNP pg/mL 212.6         Glucose   Date/Time Value Ref Range Status   11/01/2021 1755 184 (H) 70 - 130 mg/dL Final     Comment:     Meter: [id number] : [id number] [facility] Parisa Chow   11/01/2021 1608 94 70 - 130 mg/dL Final     Comment:     Meter: [id number] : [id number] MALGORZATA NIELSON   11/01/2021 1032 110 70 - 130 mg/dL Final     Comment:     Meter: [id number] : [id number] REYNOLD VILLEDA   11/01/2021 0651 99 70 - 130 mg/dL Final     Comment:     Meter: [id number] : [id number] MALGORZATA NAGA   11/01/2021 0625 61 (L) 70 - 130 mg/dL Final     Comment:     Meter: [id number] : [id number] MALGORZATA CHRISTIANSONELSON   10/31/2021 1846 110 70 - 130 mg/dL Final     Comment:     Meter: [id number] : [id number] LACI HARRIET   10/31/2021 1603 129 70 - 130 mg/dL Final     Comment:     Meter: [id number] : [id number] MALGORZATA CHRISTIANSONELSON   10/31/2021 1030 108 70 - 130 mg/dL Final     Comment:     Meter: [id number] : [id number] REYNOLD [facility]     Lab Results   Component Value Date    TSH 1.650 10/27/2021    FREET4 1.14 10/27/2021     No results found for: PREGTESTUR, PREGSERUM, HCG, HCGQUANT  Pain Management Panel     Pain Management Panel Latest Ref Rng & Units 10/27/2021 7/31/2015    AMPHETAMINES SCREEN, URINE Negative Negative Negative    BARBITURATES SCREEN Negative Negative Negative    BENZODIAZEPINE SCREEN, URINE Negative Negative Negative    BUPRENORPHINEUR Negative Negative -    COCAINE SCREEN, URINE Negative Negative Negative    METHADONE SCREEN, URINE Negative Negative Negative    METHAMPHETAMINEUR Negative Negative -        Brief Urine Lab Results  (Last result in the past 365 days)      Color   Clarity   Blood   Leuk Est   Nitrite   Protein   CREAT   Urine HCG        10/27/21 1035 Yellow   Clear   Negative   Negative   Negative   Trace               No results found for: BLOODCX  No results found for: URINECX  No results found for:  WOUNDCX  No results found for: STOOLCX  No results found for: RESPCX  No results found for: AFBCX  Results from last 7 days   Lab Units 10/27/21  1933 10/27/21  1711 10/27/21  1115 10/27/21  1044   LACTATE mmol/L 2.6* 2.9*  --   --    SED RATE mm/hr  --   --  2  --    CRP mg/dL  --   --   --  <0.30       I have personally looked at the labs and they are summarized above.  ----------------------------------------------------------------------------------------------------------------------  Detailed radiology reports for the last 24 hours:    Imaging Results (Last 24 Hours)     ** No results found for the last 24 hours. **        Assessment & Plan    #Debility   #Falls   #CAD  #Constipation   #Dementia  #Concern for esophagitis     L hip plain films did not reveal fracture. Suspect debility could be long-term complications from COVID infection earlier this year.   Repeat labs in AM to f/u leukocytosis and electrolytes. PT/OT consulted. Inpatient rehab consulted.       VTE Prophylaxis:   Mechanical Order History:      Ordered        10/27/21 1924  Place Sequential Compression Device  Once            10/27/21 1924  Maintain Sequential Compression Device  Continuous                    Pharmalogical Order History:     None       Ryan Lackey MD  [facility]  11/01/21  20:07 EDT

## 2021-11-02 NOTE — PLAN OF CARE
Goal Outcome Evaluation:              Outcome Summary: Pt resting in bed today, aox4, VSS w/ no complaints or s/s of acute distress noted. IV access and telemetry monitoring patent and maintained, will continue to monitor.

## 2021-11-02 NOTE — PLAN OF CARE
Goal Outcome Evaluation:  Plan of Care Reviewed With: patient        Progress: no change  Outcome Summary: Pt resting with no complaints noted. No s/s of distress. Continue with plan of care.

## 2021-11-02 NOTE — PLAN OF CARE
Goal Outcome Evaluation:              Outcome Summary: Pt tolerated session well, overall independent with bed mobility demonstrated this date, CGA with transfer, and CGA with ambulation using RW. Pt would benefit from rehabilitation at [facility] to increase safety and independence with functional mobility.

## 2021-11-02 NOTE — THERAPY TREATMENT NOTE
Acute Care - Physical Therapy Treatment Note   [facility]     Patient Name: Fidencio Luciano  : 1945  MRN: [id number]  Today's Date: 2021   Onset of Illness/Injury or Date of Surgery: 10/27/21  Visit Dx:     ICD-10-CM ICD-9-CM   1. Weakness generalized  R53.1 780.79   2. Failure to thrive in adult  R62.7 783.7     Patient Active Problem List   Diagnosis   • Essential hypertension   • Type 2 diabetes mellitus (HCC)   • Benign prostatic hyperplasia   • ASCVD (arteriosclerotic cardiovascular disease), s/p stenting of 80 % stenosis in left circumflex on 10/05/16and 60% stenosis in the LAD, clinically stable.    • Hyperlipidemia LDL goal <70   • Weakness generalized   • Coronary artery fistula   • Weight loss, unintentional   • Iron deficiency anemia   • Gastroesophageal reflux disease   • Dysphagia   • Chest pain     Past Medical History:   Diagnosis Date   • BPH (benign prostatic hyperplasia)    • Bradycardia     Positive tilt table testing 2016   • Coronary artery disease    • Diabetes mellitus (HCC)    • Diverticulosis    • Elevated cholesterol    • Gastric ulcer with hemorrhage    • Gastroesophageal reflux disease 2021   • History of transfusion    • Hyperlipidemia    • Hypertension    • Psoriasis      Past Surgical History:   Procedure Laterality Date   • BACK SURGERY     • CARDIAC CATHETERIZATION N/A 2016    Procedure: Left Heart Cath;  Surgeon: Giovanni Buenrostro MD;  Location: [facility] INVASIVE LOCATION;  Service:    • CARDIAC CATHETERIZATION N/A 10/4/2016    Procedure: Left Heart Cath;  Surgeon: Bharathi Andrew MD;  Location: [facility] INVASIVE LOCATION;  Service:    • CARDIAC CATHETERIZATION N/A 2017    Procedure: Left Heart Cath;  Surgeon: Giovanni Buenrostro MD;  Location: [facility] CATH INVASIVE LOCATION;  Service:    • CARDIAC CATHETERIZATION N/A 2017    Procedure: ERR;  Surgeon: Giovanni Buenrostro MD;  Location: [facility] CATH INVASIVE LOCATION;  Service:    • CARDIAC CATHETERIZATION  N/A 11/8/2019    Procedure: Left Heart Cath;  Surgeon: Abraham Oviedo MD;  Location:  [facility] CATH INVASIVE LOCATION;  Service: Cardiology   • CARDIAC CATHETERIZATION N/A 12/18/2019    Procedure: Coronary angiography;  Surgeon: Jay Barney IV, MD;  Location:  [facility] CATH INVASIVE LOCATION;  Service: Cardiovascular   • CHOLECYSTECTOMY     • COLONOSCOPY  11/13/2015    Dr. Martinez- internal hemorrhoids, sigmoid diverticulosis   • COLONOSCOPY N/A 8/17/2018    Procedure: COLONOSCOPY CPT CODE: 90757;  Surgeon: Gigi Geronimo MD;  Location:  [facility] OR;  Service: Gastroenterology   • CORONARY ANGIOPLASTY WITH STENT PLACEMENT     • ENDOSCOPY N/A 8/17/2018    Procedure: ESOPHAGOGASTRODUODENOSCOPY WITH BIOPSY CPT CODE: 83794;  Surgeon: Gigi Geronimo MD;  Location:  [facility] OR;  Service: Gastroenterology   • ENDOSCOPY N/A 4/20/2021    Procedure: ESOPHAGOGASTRODUODENOSCOPY;  Surgeon: Geno Vernon MD;  [facility]: [facility] OR;  Service: Gastroenterology;  Laterality: N/A;   • INTERVENTIONAL RADIOLOGY PROCEDURE N/A 12/18/2019    Procedure: Coil Embolization of septal to pulmonary artery fistuala.;  Surgeon: Jay Barney IV, MD;  Location:  [facility] CATH INVASIVE LOCATION;  Service: Cardiovascular   • NM RT/LT HEART CATHETERS N/A 5/9/2017    Procedure: Percutaneous Coronary Intervention;  Surgeon: Lincoln De Los Santos MD;  Location:  [facility] CATH INVASIVE LOCATION;  Service: Cardiology   • STOMACH SURGERY      FUSION OF BLEEDING ULCER   • UPPER GASTROINTESTINAL ENDOSCOPY  11/13/2015    Dr. Martinez- mild gastritis     PT Assessment (last 12 hours)     PT Evaluation and Treatment     Row Name 11/02/21 4725          Physical Therapy Time and Intention    Subjective Information no complaints  -KH     Document Type therapy note (daily note)  -KH     Mode of Treatment physical therapy  -KH     Patient Effort adequate  -KH     Symptoms Noted During/After Treatment none  -KH     Comment Pt tolerated  treatment well this date. Pt participated in ambulation and TE/TP EOB and supine  -     Row Name 11/02/21 1653          Bed Mobility    Bed Mobility supine-sit; sit-supine  -     Supine-Sit [place] (Bed Mobility) independent  -     Sit-Supine [place] (Bed Mobility) independent  -     Row Name 11/02/21 1653          Transfers    Transfers sit-stand transfer; stand-sit transfer  -     Sit-Stand [place] (Transfers) contact guard  -     Stand-Sit [place] (Transfers) contact guard; standby assist  -     Row Name 11/02/21 1653          Sit-Stand Transfer    Assistive Device (Sit-Stand Transfers) walker, front-wheeled  -     Row Name 11/02/21 1653          Stand-Sit Transfer    Assistive Device (Stand-Sit Transfers) walker, front-wheeled  -     Row Name 11/02/21 1653          Gait/Stairs (Locomotion)    [place] Level (Gait) contact guard; verbal cues  -     Assistive Device (Gait) walker, front-wheeled  -     Distance in Feet (Gait) 10  -KH     Comment (Gait/Stairs) Pt demonstrates mild balance deficit with support of CGA and RW  -     Row Name 11/02/21 1653          Balance    Balance Assessment standing dynamic balance  -     Dynamic Standing Balance mild impairment; supported  -     Row Name 11/02/21 1653          Motor Skills    Therapeutic Exercise hip; knee; ankle; core strength  marches, LAQ, PF, hip abd/add, bridging, modified crunches  -     Row Name 11/02/21 1653          Plan of Care Review    Outcome Summary Pt tolerated session well, overall independent with bed mobility demonstrated this date, CGA with transfer, and CGA with ambulation using RW. Pt would benefit from rehabilitation at [facility] to increase safety and independence with functional mobility.  -     Row Name 11/02/21 1653          Positioning and Restraints    Pre-Treatment Position in bed  -     Post Treatment Position bed  -KH     In Bed side lying right; call light within reach; exit alarm  on  -           User Key  (r) = Recorded By, (t) = Taken By, (c) = Cosigned By    Initials Name Provider Type    Veronica Saleh PT Physical Therapist                PT Recommendation and Plan     Outcome Summary: Pt tolerated session well, overall independent with bed mobility demonstrated this date, CGA with transfer, and CGA with ambulation using RW. Pt would benefit from rehabilitation at [facility] to increase safety and independence with functional mobility.       Time Calculation:    PT Charges     Row Name 11/02/21 1701             Time Calculation    PT Received On 11/02/21  -      PT Goal Re-Cert Due Date 11/11/21  -              Time Calculation- PT    Total Timed Code Minutes- PT 24 minute(s)  -            User Key  (r) = Recorded By, (t) = Taken By, (c) = Cosigned By    Initials Name Provider Type    Veronica Saleh PT Physical Therapist              Therapy Charges for Today     Code Description Service Date Service Provider Modifiers Qty    74631809642 HC GAIT TRAINING EA 15 MIN 11/2/2021 Veronica Barton, PT GP 1    [id number]  PT THER PROC EA 15 MIN 11/2/2021 Veronica Barton PT GP 1               Veronica Barton PT  11/2/2021

## 2021-11-02 NOTE — NURSING NOTE
Rehab continues to follow patient, we will not have any open but beds today, but plan on beds opening up this week.

## 2021-11-02 NOTE — PROGRESS NOTES
[facility] HOSPITALIST PROGRESS NOTE     Patient Identification:  Name:  Fidencio Luciano  Age:  76 y.o.  Sex:  male  :  1945  MRN:  [id number]  Visit Number:  [id number]  ROOM: [address] [place] [address]     Primary Care Provider:  Camila Ortiz APRN    Length of stay in inpatient status:  6    Subjective     Chief Compliant:    Chief Complaint   Patient presents with   • Chest Pain       History of Presenting Illness:    Patient less drowsy today. Reports working well with PT. Denies any new complaints.     ROS:  Otherwise 10 point ROS negative other than documented above in HPI.     Objective     Current Hospital Meds:aspirin, 81 mg, Oral, Daily  atorvastatin, 80 mg, Oral, Nightly  budesonide-formoterol, 2 puff, Inhalation, BID  clopidogrel, 75 mg, Oral, Daily  ferrous sulfate, 325 mg, Oral, BID  finasteride, 5 mg, Oral, Daily  isosorbide mononitrate, 120 mg, Oral, Daily  megestrol, 800 mg, Oral, Daily  Memantine HCl-Donepezil HCl, 1 capsule, Oral, Daily  mirtazapine, 45 mg, Oral, Nightly  pantoprazole, 40 mg, Oral, QAM AC  polyethylene glycol, 17 g, Oral, Daily  ranolazine, 500 mg, Oral, Q12H  sodium chloride, 10 mL, Intravenous, Q12H         Current Antimicrobial Therapy:  Anti-Infectives (From admission, onward)    None        Current Diuretic Therapy:  Diuretics (From admission, onward)    None        ----------------------------------------------------------------------------------------------------------------------  Vital Signs:  Temp:  [97 °F (36.1 °C)-98.5 °F (36.9 °C)] 98 °F (36.7 °C)  Heart Rate:  [61-85] 78  Resp:  [18] 18  BP: (128-175)/(64-74) 148/70  SpO2:  [95 %-97 %] 96 %  on   ;   Device (Oxygen Therapy): room air  Body mass index is 20.39 kg/m².    Wt Readings from Last 3 Encounters:   21 66.3 kg (146 lb 3.2 oz)   10/21/21 64.4 kg (142 lb)   10/21/21 64.5 kg (142 lb 3.2 oz)     Intake & Output (last 3 days)       10/31 07 07 07  0700    P.O. 480 960 960    Total Intake(mL/kg) 480 (7.1) 960 (14.5) 960 (14.5)    Urine (mL/kg/hr) 1550 (1) 2600 (1.6) 1000 (1.2)    Stool  0     Total Output 1550 2600 1000    Net -1070 -1640 -40           Stool Unmeasured Occurrence  1 x         Diet Soft Texture; Chopped; Thin  ----------------------------------------------------------------------------------------------------------------------  Physical exam:  Constitutional:  Well-developed and well-nourished.  No respiratory distress.      HENT:  Head:  Normocephalic and atraumatic.  Mouth:  Moist mucous membranes.    Eyes:  Conjunctivae and EOM are normal. No scleral icterus.    Neck:  Neck supple.  No JVD present.    Cardiovascular:  Normal rate, regular rhythm and normal heart sounds with no murmur.  Pulmonary/Chest:  No respiratory distress, no wheezes, no crackles, with normal breath sounds and good air movement.  Abdominal:  Soft.  Bowel sounds are normal.  No distension and no tenderness.   Musculoskeletal:  No edema, no tenderness, and no deformity.  No red or swollen joints anywhere.    Neurological:  Alert and oriented to person, place, and time.  No cranial nerve deficit.  No tongue deviation.  No facial droop.  No slurred speech.   Skin:  Skin is warm and dry. No rash noted. No pallor.   Peripheral vascular:  Pulses in all 4 extremities with no clubbing, no cyanosis, no edema.  ----------------------------------------------------------------------------------------------------------------------  Tele:    ----------------------------------------------------------------------------------------------------------------------  Results from last 7 days   Lab Units 11/02/21  0158 10/31/21  0052 10/30/21  0227 10/28/21  0154 10/27/21  1933 10/27/21  1711 10/27/21  1115 10/27/21  1057 10/27/21  1044   CRP mg/dL  --   --   --   --   --   --   --   --  <0.30   LACTATE mmol/L  --   --   --   --  2.6* 2.9*  --   --   --    WBC 10*3/mm3 13.67* 12.97* 11.75*   <  >  --   --    < >  --   --    HEMOGLOBIN g/dL 13.8 13.3 13.8   < >  --   --    < >  --   --    HEMATOCRIT % 42.7 39.6 43.4   < >  --   --    < >  --   --    MCV fL 101.4* 99.0* 105.1*   < >  --   --    < >  --   --    MCHC g/dL 32.3 33.6 31.8   < >  --   --    < >  --   --    PLATELETS 10*3/mm3 195 175 167   < >  --   --    < >  --   --    INR   --   --   --   --   --   --   --  1.05  --     < > = values in this interval not displayed.     Results from last 7 days   Lab Units 10/27/21  1651   PH, ARTERIAL pH units 7.518*   PO2 ART mm Hg 110.0*   PCO2, ARTERIAL mm Hg 24.2*   HCO3 ART mmol/L 19.7*     Results from last 7 days   Lab Units 11/02/21  0158 11/01/21  0437 10/31/21  0052 10/30/21  0703 10/30/21  0703 10/29/21  0102 10/28/21  0154 10/27/21  1044 10/27/21  1044   SODIUM mmol/L 137 143 144   < > 138   < > 141   < > 144   POTASSIUM mmol/L 4.2 4.2 3.9   < > 4.3   < > 4.3   < > 4.2   MAGNESIUM mg/dL  --   --   --   --   --   --  2.1  --  2.1   CHLORIDE mmol/L 106 110* 113*   < > 111*   < > 112*   < > 116*   CO2 mmol/L 20.1* 20.2* 21.6*   < > 16.6*   < > 17.4*   < > 12.8*   BUN mg/dL 25* 19 13   < > 15   < > 29*   < > 37*   CREATININE mg/dL 0.77 0.71* 0.72*   < > 0.64*   < > 0.56*   < > 0.73*   EGFR IF NONAFRICN AM mL/min/1.73 98 108 106   < > 122   < > 142   < > 105   CALCIUM mg/dL 9.0 9.3 8.7   < > 8.4*   < > 8.9   < > 9.2   PHOSPHORUS mg/dL  --   --   --   --   --   --  3.9  --   --    GLUCOSE mg/dL 113* 95 88   < > 91   < > 73   < > 75   ALBUMIN g/dL 3.77  --  3.74  --  3.18*  --  3.58   < > 3.53   BILIRUBIN mg/dL 0.5  --  0.5  --  0.6  --  1.0   < > 0.8   ALK PHOS U/L 71  --  74  --  67  --  58   < > 56   AST (SGOT) U/L 22  --  20  --  21  --  21   < > 23   ALT (SGPT) U/L 23  --  24  --  23  --  23   < > 22    < > = values in this interval not displayed.   Estimated Creatinine Clearance: 74.8 mL/min (by C-G formula based on SCr of 0.77 mg/dL).  No results found for: AMMONIA  Results from last 7 days   Lab  Units 10/27/21  1711 10/27/21  1241 10/27/21  1044   CK TOTAL U/L  --   --  57   TROPONIN T ng/mL <0.010 <0.010 <0.010     Results from last 7 days   Lab Units 10/27/21  1044   PROBNP pg/mL 212.6         Glucose   Date/Time Value Ref Range Status   11/02/2021 1822 201 (H) 70 - 130 mg/dL Final     Comment:     Meter: [id number] : [id number] [facility]   11/02/2021 1620 144 (H) 70 - 130 mg/dL Final     Comment:     Meter: [id number] : [id number] [facility] [place]   11/02/2021 1019 176 (H) 70 - 130 mg/dL Final     Comment:     Meter: [id number] : [id number] [facility] [place]   11/02/2021 0632 84 70 - 130 mg/dL Final     Comment:     Meter: [id number] : [id number] [facility]   11/01/2021 1755 184 (H) 70 - 130 mg/dL Final     Comment:     Meter: [id number] : [id number] [facility]   11/01/2021 1608 94 70 - 130 mg/dL Final     Comment:     Meter: [id number] : [id number] MALGORZATA NIELSON   11/01/2021 1032 110 70 - 130 mg/dL Final     Comment:     Meter: [id number] : [id number] REYNOLD VILLEDA   11/01/2021 0651 99 70 - 130 mg/dL Final     Comment:     Meter: [id number] : [id number] NIELSON     Lab Results   Component Value Date    TSH 1.650 10/27/2021    FREET4 1.14 10/27/2021     No results found for: PREGTESTUR, PREGSERUM, HCG, HCGQUANT  Pain Management Panel     Pain Management Panel Latest Ref Rng & Units 10/27/2021 7/31/2015    AMPHETAMINES SCREEN, URINE Negative Negative Negative    BARBITURATES SCREEN Negative Negative Negative    BENZODIAZEPINE SCREEN, URINE Negative Negative Negative    BUPRENORPHINEUR Negative Negative -    COCAINE SCREEN, URINE Negative Negative Negative    METHADONE SCREEN, URINE Negative Negative Negative    METHAMPHETAMINEUR Negative Negative -        Brief Urine Lab Results  (Last result in the past 365 days)      Color   Clarity   Blood   Leuk Est   Nitrite   Protein   CREAT   Urine HCG        10/27/21 1035 Yellow   Clear   Negative   Negative    Negative   Trace               No results found for: BLOODCX  No results found for: URINECX  No results found for: WOUNDCX  No results found for: STOOLCX  No results found for: RESPCX  No results found for: AFBCX  Results from last 7 days   Lab Units 10/27/21  1933 10/27/21  1711 10/27/21  1115 10/27/21  1044   LACTATE mmol/L 2.6* 2.9*  --   --    SED RATE mm/hr  --   --  2  --    CRP mg/dL  --   --   --  <0.30       I have personally looked at the labs and they are summarized above.  ----------------------------------------------------------------------------------------------------------------------  Detailed radiology reports for the last 24 hours:    Imaging Results (Last 24 Hours)     ** No results found for the last 24 hours. **        Assessment & Plan    #Debility   #Falls   #CAD  #Constipation   #Dementia  #Concern for esophagitis     L hip plain films did not reveal fracture. Suspect debility could be long-term complications from COVID infection earlier this year. Degenerative arthropathy also likely playing a part. Repeat labs in AM to f/u leukocytosis and electrolytes. PT/OT consulted. Inpatient rehab consulted. IPR consulted.       VTE Prophylaxis:   Mechanical Order History:      Ordered        10/27/21 1924  Place Sequential Compression Device  Once            10/27/21 1924  Maintain Sequential Compression Device  Continuous                    Pharmalogical Order History:     None          Ryan Lackey MD  [facility]  11/02/21  19:27 EDT

## 2021-11-03 ENCOUNTER — APPOINTMENT (OUTPATIENT)
Dept: GENERAL RADIOLOGY | Facility: HOSPITAL | Age: 76
End: 2021-11-03

## 2021-11-03 LAB
ANION GAP SERPL CALCULATED.3IONS-SCNC: 13.2 MMOL/L (ref 5–15)
BASOPHILS # BLD AUTO: 0.13 10*3/MM3 (ref 0–0.2)
BASOPHILS NFR BLD AUTO: 0.9 % (ref 0–1.5)
BILIRUB UR QL STRIP: NEGATIVE
BUN SERPL-MCNC: 34 MG/DL (ref 8–23)
BUN/CREAT SERPL: 39.1 (ref 7–25)
CALCIUM SPEC-SCNC: 8.8 MG/DL (ref 8.6–10.5)
CHLORIDE SERPL-SCNC: 108 MMOL/L (ref 98–107)
CLARITY UR: CLEAR
CO2 SERPL-SCNC: 18.8 MMOL/L (ref 22–29)
COLOR UR: ABNORMAL
CREAT SERPL-MCNC: 0.87 MG/DL (ref 0.76–1.27)
DEPRECATED RDW RBC AUTO: 57.4 FL (ref 37–54)
EOSINOPHIL # BLD AUTO: 0.16 10*3/MM3 (ref 0–0.4)
EOSINOPHIL NFR BLD AUTO: 1.1 % (ref 0.3–6.2)
ERYTHROCYTE [DISTWIDTH] IN BLOOD BY AUTOMATED COUNT: 15.3 % (ref 12.3–15.4)
GFR SERPL CREATININE-BSD FRML MDRD: 86 ML/MIN/1.73
GLUCOSE BLDC GLUCOMTR-MCNC: 101 MG/DL (ref 70–130)
GLUCOSE BLDC GLUCOMTR-MCNC: 122 MG/DL (ref 70–130)
GLUCOSE BLDC GLUCOMTR-MCNC: 130 MG/DL (ref 70–130)
GLUCOSE BLDC GLUCOMTR-MCNC: 162 MG/DL (ref 70–130)
GLUCOSE SERPL-MCNC: 145 MG/DL (ref 65–99)
GLUCOSE UR STRIP-MCNC: NEGATIVE MG/DL
HCT VFR BLD AUTO: 43.5 % (ref 37.5–51)
HGB BLD-MCNC: 14.2 G/DL (ref 13–17.7)
HGB UR QL STRIP.AUTO: NEGATIVE
IMM GRANULOCYTES # BLD AUTO: 0.47 10*3/MM3 (ref 0–0.05)
IMM GRANULOCYTES NFR BLD AUTO: 3.3 % (ref 0–0.5)
KETONES UR QL STRIP: NEGATIVE
LEUKOCYTE ESTERASE UR QL STRIP.AUTO: NEGATIVE
LYMPHOCYTES # BLD AUTO: 2.17 10*3/MM3 (ref 0.7–3.1)
LYMPHOCYTES NFR BLD AUTO: 15.2 % (ref 19.6–45.3)
MCH RBC QN AUTO: 32.9 PG (ref 26.6–33)
MCHC RBC AUTO-ENTMCNC: 32.6 G/DL (ref 31.5–35.7)
MCV RBC AUTO: 100.7 FL (ref 79–97)
MONOCYTES # BLD AUTO: 0.96 10*3/MM3 (ref 0.1–0.9)
MONOCYTES NFR BLD AUTO: 6.7 % (ref 5–12)
NEUTROPHILS NFR BLD AUTO: 10.4 10*3/MM3 (ref 1.7–7)
NEUTROPHILS NFR BLD AUTO: 72.8 % (ref 42.7–76)
NITRITE UR QL STRIP: NEGATIVE
NRBC BLD AUTO-RTO: 0 /100 WBC (ref 0–0.2)
PH UR STRIP.AUTO: 5.5 [PH] (ref 5–8)
PLATELET # BLD AUTO: 206 10*3/MM3 (ref 140–450)
PMV BLD AUTO: 10.2 FL (ref 6–12)
POTASSIUM SERPL-SCNC: 4.2 MMOL/L (ref 3.5–5.2)
PROT UR QL STRIP: NEGATIVE
RBC # BLD AUTO: 4.32 10*6/MM3 (ref 4.14–5.8)
SODIUM SERPL-SCNC: 140 MMOL/L (ref 136–145)
SP GR UR STRIP: 1.02 (ref 1–1.03)
UROBILINOGEN UR QL STRIP: ABNORMAL
WBC # BLD AUTO: 14.29 10*3/MM3 (ref 3.4–10.8)

## 2021-11-03 PROCEDURE — 80048 BASIC METABOLIC PNL TOTAL CA: CPT | Performed by: INTERNAL MEDICINE

## 2021-11-03 PROCEDURE — 81003 URINALYSIS AUTO W/O SCOPE: CPT | Performed by: PHYSICIAN ASSISTANT

## 2021-11-03 PROCEDURE — 71046 X-RAY EXAM CHEST 2 VIEWS: CPT | Performed by: RADIOLOGY

## 2021-11-03 PROCEDURE — 94799 UNLISTED PULMONARY SVC/PX: CPT

## 2021-11-03 PROCEDURE — 71046 X-RAY EXAM CHEST 2 VIEWS: CPT

## 2021-11-03 PROCEDURE — 82962 GLUCOSE BLOOD TEST: CPT

## 2021-11-03 PROCEDURE — 97530 THERAPEUTIC ACTIVITIES: CPT

## 2021-11-03 PROCEDURE — 99232 SBSQ HOSP IP/OBS MODERATE 35: CPT | Performed by: PHYSICIAN ASSISTANT

## 2021-11-03 PROCEDURE — 85025 COMPLETE CBC W/AUTO DIFF WBC: CPT | Performed by: INTERNAL MEDICINE

## 2021-11-03 RX ORDER — SODIUM CHLORIDE, SODIUM LACTATE, POTASSIUM CHLORIDE, CALCIUM CHLORIDE 600; 310; 30; 20 MG/100ML; MG/100ML; MG/100ML; MG/100ML
50 INJECTION, SOLUTION INTRAVENOUS CONTINUOUS
Status: DISCONTINUED | OUTPATIENT
Start: 2021-11-03 | End: 2021-11-05

## 2021-11-03 RX ADMIN — ASPIRIN 81 MG: 81 TABLET, COATED ORAL at 08:36

## 2021-11-03 RX ADMIN — FINASTERIDE 5 MG: 5 TABLET, FILM COATED ORAL at 08:36

## 2021-11-03 RX ADMIN — PANTOPRAZOLE SODIUM 40 MG: 40 TABLET, DELAYED RELEASE ORAL at 08:37

## 2021-11-03 RX ADMIN — ISOSORBIDE MONONITRATE 120 MG: 60 TABLET ORAL at 08:36

## 2021-11-03 RX ADMIN — SODIUM CHLORIDE, PRESERVATIVE FREE 10 ML: 5 INJECTION INTRAVENOUS at 20:15

## 2021-11-03 RX ADMIN — MEGESTROL ACETATE 800 MG: 40 SUSPENSION ORAL at 08:37

## 2021-11-03 RX ADMIN — SODIUM CHLORIDE, POTASSIUM CHLORIDE, SODIUM LACTATE AND CALCIUM CHLORIDE 50 ML/HR: 600; 310; 30; 20 INJECTION, SOLUTION INTRAVENOUS at 14:38

## 2021-11-03 RX ADMIN — FERROUS SULFATE TAB 325 MG (65 MG ELEMENTAL FE) 325 MG: 325 (65 FE) TAB at 08:36

## 2021-11-03 RX ADMIN — SODIUM CHLORIDE, PRESERVATIVE FREE 10 ML: 5 INJECTION INTRAVENOUS at 08:37

## 2021-11-03 RX ADMIN — RANOLAZINE 500 MG: 500 TABLET, FILM COATED, EXTENDED RELEASE ORAL at 20:14

## 2021-11-03 RX ADMIN — POLYETHYLENE GLYCOL (3350) 17 G: 17 POWDER, FOR SOLUTION ORAL at 08:36

## 2021-11-03 RX ADMIN — ALBUTEROL SULFATE 2.5 MG: 2.5 SOLUTION RESPIRATORY (INHALATION) at 19:09

## 2021-11-03 RX ADMIN — RANOLAZINE 500 MG: 500 TABLET, FILM COATED, EXTENDED RELEASE ORAL at 08:36

## 2021-11-03 RX ADMIN — ATORVASTATIN CALCIUM 80 MG: 40 TABLET, FILM COATED ORAL at 20:15

## 2021-11-03 RX ADMIN — MEMANTINE HYDROCHLORIDE AND DONEPEZIL HYDROCHLORIDE 28 MG: 28; 10 CAPSULE ORAL at 08:37

## 2021-11-03 RX ADMIN — BUDESONIDE AND FORMOTEROL FUMARATE DIHYDRATE 2 PUFF: 80; 4.5 AEROSOL RESPIRATORY (INHALATION) at 19:09

## 2021-11-03 RX ADMIN — FERROUS SULFATE TAB 325 MG (65 MG ELEMENTAL FE) 325 MG: 325 (65 FE) TAB at 20:15

## 2021-11-03 RX ADMIN — MIRTAZAPINE 45 MG: 15 TABLET, FILM COATED ORAL at 20:14

## 2021-11-03 RX ADMIN — CLOPIDOGREL 75 MG: 75 TABLET, FILM COATED ORAL at 08:36

## 2021-11-03 RX ADMIN — ALBUTEROL SULFATE 2.5 MG: 2.5 SOLUTION RESPIRATORY (INHALATION) at 06:38

## 2021-11-03 RX ADMIN — BUDESONIDE AND FORMOTEROL FUMARATE DIHYDRATE 2 PUFF: 80; 4.5 AEROSOL RESPIRATORY (INHALATION) at 06:38

## 2021-11-03 NOTE — PROGRESS NOTES
[facility] HOSPITALIST PROGRESS NOTE     Patient Identification:  Name:  Fidencio Luciano  Age:  76 y.o.  Sex:  male  :  1945  MRN:  [id number]  Visit Number:  [id number]  Primary Care Provider:  Camila Ortiz APRN    Date of admission: 10/27/2021  Length of stay:  7    ----------------------------------------------------------------------------------------------------------------------  Subjective     Chief Complaint:   Chief Complaint   Patient presents with   • Chest Pain     Subjective/Interval History:    76 y.o. male who was admitted on 10/27/2021 with generalized weakness and decreased responsiveness.  He was also having some complaints of chest pain at home.  He was having some myoclonic jerks in the ED.  It was noted that the patient had been diagnosed with COVID-19 in 2021.  Since then, he has had a steady physical and mental decline.  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  He was admitted for further evaluation and therapy.    PMH is significant for CAD status post previous stenting, hypertension, dyslipidemia, iron deficiency anemia, type 2 diabetes mellitus. For complete admission information, please see history and physical.     Consultations:  1. Cardiology    Procedures/Scans:  1. MRI brain without contrast  2. CT chest without contrast  3. CT abdomen/pelvis without contrast  4. SLP evaluation    Today, the patient reports that he is doing fairly well.  He is alert and oriented.  He denies any complaints other than some mild sinus congestion.  Denies any cough or shortness of breath.  Denies any dysuria.  No abdominal pain or diarrhea.  Discussed with his RN, Claudette, no new events or concerns reported.    Review of Systems   Constitutional: Negative for chills and fever.   HENT: Positive for postnasal drip and rhinorrhea. Negative for sore throat and trouble swallowing.    Respiratory: Negative for cough and shortness of breath.    Cardiovascular:  Negative for chest pain.   Gastrointestinal: Negative for abdominal pain, diarrhea, nausea and vomiting.   Genitourinary: Negative for difficulty urinating and dysuria.   Skin: Negative for color change and rash.      ----------------------------------------------------------------------------------------------------------------------  Objective   [facility] Meds:  aspirin, 81 mg, Oral, Daily  atorvastatin, 80 mg, Oral, Nightly  budesonide-formoterol, 2 puff, Inhalation, BID  clopidogrel, 75 mg, Oral, Daily  ferrous sulfate, 325 mg, Oral, BID  finasteride, 5 mg, Oral, Daily  isosorbide mononitrate, 120 mg, Oral, Daily  megestrol, 800 mg, Oral, Daily  Memantine HCl-Donepezil HCl, 1 capsule, Oral, Daily  mirtazapine, 45 mg, Oral, Nightly  pantoprazole, 40 mg, Oral, QAM AC  polyethylene glycol, 17 g, Oral, Daily  ranolazine, 500 mg, Oral, Q12H  sodium chloride, 10 mL, Intravenous, Q12H         ----------------------------------------------------------------------------------------------------------------------  Vital Signs:  Temp:  [97.6 °F (36.4 °C)-98.3 °F (36.8 °C)] 97.6 °F (36.4 °C)  Heart Rate:  [78-98] 98  Resp:  [18-20] 20  BP: (116-148)/(64-70) 116/68  Mean Arterial Pressure (Non-Invasive) for the past 24 hrs (Last 3 readings):   Noninvasive MAP (mmHg)   11/03/21 1036 85   11/03/21 0651 92   11/03/21 0300 107     SpO2 Percentage    11/03/21 0638 11/03/21 0651 11/03/21 1036   SpO2: 96% 96% 98%     SpO2:  [95 %-98 %] 98 %  on   ;   Device (Oxygen Therapy): room air    Body mass index is 20.54 kg/m².  Wt Readings from Last 3 Encounters:   11/03/21 66.8 kg (147 lb 4.8 oz)   10/21/21 64.4 kg (142 lb)   10/21/21 64.5 kg (142 lb 3.2 oz)        Intake/Output Summary (Last 24 hours) at 11/3/2021 1240  Last data filed at 11/3/2021 1036  Gross per 24 hour   Intake 1440 ml   Output 2900 ml   Net -1460 ml     Diet Soft Texture; Chopped;  Thin  ----------------------------------------------------------------------------------------------------------------------  Physical exam:  Constitutional: Vital signs reviewed. Well-developed and well-nourished.  No respiratory distress on room air.      HENT:  Head:  Normocephalic and atraumatic.    Eyes:  Conjunctivae and EOM are normal. No scleral icterus. No erythema or drainage.  Neck:  Neck supple.   Cardiovascular:  Normal rate, regular rhythm and normal heart sounds with no murmur.  Pulmonary/Chest:  No respiratory distress, no wheezes, no crackles, with normal breath sounds and good air movement.  Abdominal:  Soft.  Bowel sounds are present x4.  No distension and no tenderness.   Musculoskeletal:  No edema, no tenderness, and no deformity.  No red or swollen joints anywhere.    Neurological:  Alert and oriented to person, place, and time. No facial droop.  No slurred speech.   Skin:  Skin is warm and dry. No rash noted. No pallor.   Peripheral vascular: No clubbing, no cyanosis, no edema.   Genitourinary: No terrell catheter in place.     I have reviewed and updated the physical exam as needed to reflect that of 11/03/21  ----------------------------------------------------------------------------------------------------------------------  Tele: Sinus rhythm/sinus tachycardia in the 80s to low 100s.    ----------------------------------------------------------------------------------------------------------------------  Results from last 7 days   Lab Units 10/27/21  1711 10/27/21  1241   TROPONIN T ng/mL <0.010 <0.010         Results from last 7 days   Lab Units 10/27/21  1651   PH, ARTERIAL pH units 7.518*   PO2 ART mm Hg 110.0*   PCO2, ARTERIAL mm Hg 24.2*   HCO3 ART mmol/L 19.7*     Results from last 7 days   Lab Units 11/03/21  0035 11/02/21  0158 10/31/21  0052 10/28/21  0154 10/27/21  1933 10/27/21  1711   LACTATE mmol/L  --   --   --   --  2.6* 2.9*   WBC 10*3/mm3 14.29* 13.67* 12.97*   < >  --   --     HEMOGLOBIN g/dL 14.2 13.8 13.3   < >  --   --    HEMATOCRIT % 43.5 42.7 39.6   < >  --   --    MCV fL 100.7* 101.4* 99.0*   < >  --   --    MCHC g/dL 32.6 32.3 33.6   < >  --   --    PLATELETS 10*3/mm3 206 195 175   < >  --   --     < > = values in this interval not displayed.     Results from last 7 days   Lab Units 11/03/21  0035 11/02/21  0158 11/01/21  0437 10/31/21  0052 10/31/21  0052 10/30/21  0703 10/30/21  0703 10/29/21  0102 10/28/21  0154   SODIUM mmol/L 140 137 143   < > 144   < > 138   < > 141   POTASSIUM mmol/L 4.2 4.2 4.2   < > 3.9   < > 4.3   < > 4.3   MAGNESIUM mg/dL  --   --   --   --   --   --   --   --  2.1   CHLORIDE mmol/L 108* 106 110*   < > 113*   < > 111*   < > 112*   CO2 mmol/L 18.8* 20.1* 20.2*   < > 21.6*   < > 16.6*   < > 17.4*   BUN mg/dL 34* 25* 19   < > 13   < > 15   < > 29*   CREATININE mg/dL 0.87 0.77 0.71*   < > 0.72*   < > 0.64*   < > 0.56*   EGFR IF NONAFRICN AM mL/min/1.73 86 98 108   < > 106   < > 122   < > 142   CALCIUM mg/dL 8.8 9.0 9.3   < > 8.7   < > 8.4*   < > 8.9   GLUCOSE mg/dL 145* 113* 95   < > 88   < > 91   < > 73   ALBUMIN g/dL  --  3.77  --   --  3.74  --  3.18*  --  3.58   BILIRUBIN mg/dL  --  0.5  --   --  0.5  --  0.6  --  1.0   ALK PHOS U/L  --  71  --   --  74  --  67  --  58   AST (SGOT) U/L  --  22  --   --  20  --  21  --  21   ALT (SGPT) U/L  --  23  --   --  24  --  23  --  23    < > = values in this interval not displayed.   Estimated Creatinine Clearance: 69.3 mL/min (by C-G formula based on SCr of 0.87 mg/dL).  No results found for: AMMONIA    Glucose   Date/Time Value Ref Range Status   11/03/2021 1026 162 (H) 70 - 130 mg/dL Final     Comment:     Meter: [id number] : [id number] michael rausch   11/03/2021 0708 101 70 - 130 mg/dL Final     Comment:     Meter: [id number] : [id number] michael rausch   11/02/2021 1822 201 (H) 70 - 130 mg/dL Final     Comment:     Meter: [id number] : [id number] [facility] Parisa Chow   11/02/2021 1620 144 (H) 70  - 130 mg/dL Final     Comment:     Meter: [id number] : [id number] Darryn Nathan   11/02/2021 1019 176 (H) 70 - 130 mg/dL Final     Comment:     Meter: [id number] : [id number] Darryn Nathan   11/02/2021 0632 84 70 - 130 mg/dL Final     Comment:     Meter: [id number] : [id number] Darryn Nathan   11/01/2021 1755 184 (H) 70 - 130 mg/dL Final     Comment:     Meter: [id number] : [id number] [facility] Parisa Chow   11/01/2021 1608 94 70 - 130 mg/dL Final     Comment:     Meter: [id number] : [id number] MALGORZATA NIELSON     Lab Results   Component Value Date    HGBA1C 5.90 (H) 10/28/2021     Lab Results   Component Value Date    TSH 1.650 10/27/2021    FREET4 1.14 10/27/2021     No results found for: BLOODCX  No results found for: URINECX  No results found for: WOUNDCX  No results found for: STOOLCX  No results found for: RESPCX    Pain Management Panel     Pain Management Panel Latest Ref Rng & Units 10/27/2021 7/31/2015    AMPHETAMINES SCREEN, URINE Negative Negative Negative    BARBITURATES SCREEN Negative Negative Negative    BENZODIAZEPINE SCREEN, URINE Negative Negative Negative    BUPRENORPHINEUR Negative Negative -    COCAINE SCREEN, URINE Negative Negative Negative    METHADONE SCREEN, URINE Negative Negative Negative    METHAMPHETAMINEUR Negative Negative -        I have personally reviewed the above laboratory results for 11/03/21  ----------------------------------------------------------------------------------------------------------------------  Imaging Results (Last 24 Hours)     Procedure Component Value Units Date/Time    XR Chest PA & Lateral [[id number]] Collected: 11/03/21 0907     Updated: 11/03/21 0909    Narrative:      EXAM:    XR Chest, 2 Views     EXAM DATE:    11/3/2021 8:58 AM     CLINICAL HISTORY:    Leukocytosis, rule out pneumonia.; R53.1-Weakness; R62.7-Adult failure  to thrive     TECHNIQUE:    Frontal and lateral views of the chest.     COMPARISON:    10/27/2021      FINDINGS:    LUNGS:  Unremarkable.  No consolidation.    PLEURAL SPACE:  Unremarkable.  No pneumothorax.    HEART:  Unremarkable.  No cardiomegaly.    MEDIASTINUM:  Unremarkable.    BONES/JOINTS:  Unremarkable.       Impression:        No acute findings in the chest.     This report was finalized on 11/3/2021 9:07 AM by Dr. Benigno Powers MD.           ----------------------------------------------------------------------------------------------------------------------  Assessment/Plan     #Debility   #Falls  #Suspected long COVID syndrome   · PT/OT following.  Patient reports improvement noted.  · Inpatient rehab consulted, awaiting determination.    #Leukocytosis   · Patient denies cough, shortness of breath, dysuria, abdominal pain, diarrhea.  · CT chest on admission showed no consolidation.  CT abdomen/pelvis on admission also showed no acute findings.  · We will repeat UA and PA/LAT chest x-ray.  · If continued leukocytosis, will consider checking blood cultures.  · May be secondary to dehydration as BUN is also mildly elevated as well as heart rate.  · Will give gentle IV fluids with LR at 50mL/hr.  Encouraged on oral fluid intake.  · Repeat CBC in AM.    #Constipation   · BM x1 reported yesterday.  · Continue scheduled MiraLAX.    #Concern for esophagitis   · Continue PPI.    #Dementia   · Patient currently alert and oriented.  · PCP had reported some concern for advancing dementia.  · MRI of the brain without contrast on admission showed no acute findings, mild cerebral atrophy and moderate chronic small vessel ischemic disease.  · Continue home medications and follow-up with PCP as an outpatient.    #CAD s/p prior stenting   · Denies any chest pain.  · Continue low-dose aspirin, atorvastatin, Plavix, Imdur, Ranexa.    #DVT Prophylaxis  · SCDs.     The patient is high risk due to the following diagnoses/reasons: Generalized debility, suspected long Covid syndrome.    I have discussed the patient's  assessment and plan with the patient, DIOR Wilkins, and attending physician, Dr. Prado.     Disposition: Inpatient rehab consulted.     Discharge needs:  • None at this time.    IGOR Mcrae  11/03/21  12:40 EDT

## 2021-11-03 NOTE — PLAN OF CARE
Goal Outcome Evaluation:           Progress: improving   Pt resting in bed with no concerns or complaints at this time. VS stable. Urine collected for urinalysis, CXR done. Awaiting SNF placement. Will continue to monitor and follow plan of care

## 2021-11-03 NOTE — CASE MANAGEMENT/SOCIAL WORK
Discharge Planning Assessment  MILLICENT Recio     Patient Name: Fidencio Luciano  MRN: [id number]  Today's Date: 11/3/2021    Admit Date: 10/27/2021       Discharge Plan     Row Name 11/03/21 1335       Plan    Plan Pt admitted on 10/27/21.  Pt lives at home with spouse/CHINAngela.  Pt currently does not utilize home health services.  Pt currently utilizes hospital bed, wheelchair, walker, bedside commode and shower chair via unknown provider.  Pt's PCP is Camila Ortiz.  Pt's spouse continues to be agreeable to inpatient rehab.  SS noted per [facility] inpatient rehab has submitted pt's information to insurance for possible admit.  SS will follow.                  TAYO Ugalde

## 2021-11-03 NOTE — PLAN OF CARE
Goal Outcome Evaluation:  Plan of Care Reviewed With: patient        Progress: no change  Outcome Summary: Pt sleeping for majority of shift. Pt stayed 95% on RA while sleeping. Denies any complaints. No s.s of distress noted. Continue with plan of care.

## 2021-11-03 NOTE — THERAPY TREATMENT NOTE
Acute Care - Occupational Therapy Treatment Note   [facility]     Patient Name: Fidencio Luciano  : 1945  MRN: [id number]  Today's Date: 11/3/2021  Onset of Illness/Injury or Date of Surgery: 10/27/21     Referring Physician: Cherrie    Admit Date: 10/27/2021       ICD-10-CM ICD-9-CM   1. Weakness generalized  R53.1 780.79   2. Failure to thrive in adult  R62.7 783.7     Patient Active Problem List   Diagnosis   • Essential hypertension   • Type 2 diabetes mellitus (HCC)   • Benign prostatic hyperplasia   • ASCVD (arteriosclerotic cardiovascular disease), s/p stenting of 80 % stenosis in left circumflex on 10/05/16and 60% stenosis in the LAD, clinically stable.    • Hyperlipidemia LDL goal <70   • Weakness generalized   • Coronary artery fistula   • Weight loss, unintentional   • Iron deficiency anemia   • Gastroesophageal reflux disease   • Dysphagia   • Chest pain     Past Medical History:   Diagnosis Date   • BPH (benign prostatic hyperplasia)    • Bradycardia     Positive tilt table testing 2016   • Coronary artery disease    • Diabetes mellitus (HCC)    • Diverticulosis    • Elevated cholesterol    • Gastric ulcer with hemorrhage    • Gastroesophageal reflux disease 2021   • History of transfusion    • Hyperlipidemia    • Hypertension    • Psoriasis      Past Surgical History:   Procedure Laterality Date   • BACK SURGERY     • CARDIAC CATHETERIZATION N/A 2016    Procedure: Left Heart Cath;  Surgeon: Giovanni Buenrostro MD;  Location: formerly [facility] INVASIVE LOCATION;  Service:    • CARDIAC CATHETERIZATION N/A 10/4/2016    Procedure: Left Heart Cath;  Surgeon: Bharathi Andrew MD;  Location: formerly [facility] INVASIVE LOCATION;  Service:    • CARDIAC CATHETERIZATION N/A 2017    Procedure: Left Heart Cath;  Surgeon: Giovanni Buenrostro MD;  Location: [facility] CATH INVASIVE LOCATION;  Service:    • CARDIAC CATHETERIZATION N/A 2017    Procedure: ERR;  Surgeon: Giovanni Buenrostro MD;  Location: formerly [facility]  INVASIVE LOCATION;  Service:    • CARDIAC CATHETERIZATION N/A 11/8/2019    Procedure: Left Heart Cath;  Surgeon: Abraham Oviedo MD;  Location:  [facility] CATH INVASIVE LOCATION;  Service: Cardiology   • CARDIAC CATHETERIZATION N/A 12/18/2019    Procedure: Coronary angiography;  Surgeon: Jay Barney IV, MD;  Location:  [facility] CATH INVASIVE LOCATION;  Service: Cardiovascular   • CHOLECYSTECTOMY     • COLONOSCOPY  11/13/2015    Dr. Martinez- internal hemorrhoids, sigmoid diverticulosis   • COLONOSCOPY N/A 8/17/2018    Procedure: COLONOSCOPY CPT CODE: 92652;  Surgeon: Gigi Geronimo MD;  Location:  [facility] OR;  Service: Gastroenterology   • CORONARY ANGIOPLASTY WITH STENT PLACEMENT     • ENDOSCOPY N/A 8/17/2018    Procedure: ESOPHAGOGASTRODUODENOSCOPY WITH BIOPSY CPT CODE: 95596;  Surgeon: Gigi Geronimo MD;  Location:  [facility] OR;  Service: Gastroenterology   • ENDOSCOPY N/A 4/20/2021    Procedure: ESOPHAGOGASTRODUODENOSCOPY;  Surgeon: Geno Vernon MD;  Location:  [facility];  Service: Gastroenterology;  Laterality: N/A;   • INTERVENTIONAL RADIOLOGY PROCEDURE N/A 12/18/2019    Procedure: Coil Embolization of septal to pulmonary artery fistuala.;  Surgeon: Jay Barney IV, MD;  Location:  [facility] CATH INVASIVE LOCATION;  Service: Cardiovascular   • CO RT/LT HEART CATHETERS N/A 5/9/2017    Procedure: Percutaneous Coronary Intervention;  Surgeon: Lincoln De Los Santos MD;  Location:  [facility] CATH INVASIVE LOCATION;  Service: Cardiology   • STOMACH SURGERY      FUSION OF BLEEDING ULCER   • UPPER GASTROINTESTINAL ENDOSCOPY  11/13/2015    Dr. Martinez- mild gastritis         OT ASSESSMENT FLOWSHEET (last 12 hours)     OT Evaluation and Treatment     Row Name 11/03/21 0942                   OT Time and Intention    Document Type therapy note (daily note)  -KR        Mode of Treatment occupational therapy  -KR        Patient Effort good  -KR        Comment Pt. alert/cooperative, motivated to  improve fxl endurance/fxl status with self care/mobility skills  -KR                  General Information    Prior Level of Function independent:  -KR                  Cognition    Affect/Mental Status (Cognitive) WFL  -KR        Orientation Status (Cognition) oriented x 3  -KR        Follows Commands (Cognition) WFL  -KR                  Motor Skills    Therapeutic Exercise shoulder; hand  -KR                  Shoulder (Therapeutic Exercise)    Shoulder (Therapeutic Exercise) AROM (active range of motion); wand exercises  -KR        Shoulder AROM (Therapeutic Exercise) bilateral  -KR        Shoulder Wand Exercises (Therapeutic Exercise) AROM (active range of motion); flexion; extension  -KR                  Hand (Therapeutic Exercise)    Hand (Therapeutic Exercise) AROM (active range of motion); strengthening exercise  -KR        Hand AROM/AAROM (Therapeutic Exercise) bilateral  -KR        Hand Strengthening (Therapeutic Exercise)  strengthening  -KR                  Plan of Care Review    Plan of Care Reviewed With patient  -KR        Progress improving  -KR        Outcome Summary Pt. continues to be a good candidate for participation in rehabilitation to enhance safety/independence in home environment  -KR                  Therapy Assessment/Plan (OT)    Rehab Potential (OT) good, to achieve stated therapy goals  -KR        Criteria for Skilled Therapeutic Interventions Met (OT) yes; skilled treatment is necessary  -KR        Planned Therapy Interventions (OT) activity tolerance training; BADL retraining; ROM/therapeutic exercise; strengthening exercise; transfer/mobility retraining  -KR                  Progress Summary (OT)    Progress Toward Functional Goals (OT) progress toward functional goals is good  -KR                  Therapy Plan Review/Discharge Plan (OT)    Anticipated Discharge Disposition (OT) inpatient rehabilitation facility; home with assist  -KR              User Key  (r) = Recorded By, (t)  = Taken By, (c) = Cosigned By    Initials Name Effective Dates    KR Rakesh Rosenthal OT 06/16/21 -                        OT Recommendation and Plan  Planned Therapy Interventions (OT): activity tolerance training, BADL retraining, ROM/therapeutic exercise, strengthening exercise, transfer/mobility retraining  Progress Toward Functional Goals (OT): progress toward functional goals is good  Plan of Care Review  Plan of Care Reviewed With: patient  Progress: improving  Outcome Summary: Pt. continues to be a good candidate for participation in rehabilitation to enhance safety/independence in home environment  Plan of Care Reviewed With: patient  Outcome Summary: Pt. continues to be a good candidate for participation in rehabilitation to enhance safety/independence in home environment        Time Calculation:     Therapy Charges for Today     Code Description Service Date Service Provider Modifiers Qty    [id number]  OT THERAPEUTIC ACT EA 15 MIN 11/3/2021 Rakesh Rosenthal OT GO 1               Rakesh Rosenthal OT  11/3/2021

## 2021-11-04 LAB
ALBUMIN SERPL-MCNC: 3.43 G/DL (ref 3.5–5.2)
ALBUMIN/GLOB SERPL: 1.3 G/DL
ALP SERPL-CCNC: 80 U/L (ref 39–117)
ALT SERPL W P-5'-P-CCNC: 22 U/L (ref 1–41)
ANION GAP SERPL CALCULATED.3IONS-SCNC: 12.7 MMOL/L (ref 5–15)
AST SERPL-CCNC: 20 U/L (ref 1–40)
BASOPHILS # BLD AUTO: 0.16 10*3/MM3 (ref 0–0.2)
BASOPHILS NFR BLD AUTO: 0.9 % (ref 0–1.5)
BILIRUB SERPL-MCNC: 0.3 MG/DL (ref 0–1.2)
BUN SERPL-MCNC: 36 MG/DL (ref 8–23)
BUN/CREAT SERPL: 37.1 (ref 7–25)
CALCIUM SPEC-SCNC: 8.6 MG/DL (ref 8.6–10.5)
CHLORIDE SERPL-SCNC: 108 MMOL/L (ref 98–107)
CO2 SERPL-SCNC: 18.3 MMOL/L (ref 22–29)
CREAT SERPL-MCNC: 0.97 MG/DL (ref 0.76–1.27)
DEPRECATED RDW RBC AUTO: 57.6 FL (ref 37–54)
EOSINOPHIL # BLD AUTO: 0.06 10*3/MM3 (ref 0–0.4)
EOSINOPHIL NFR BLD AUTO: 0.3 % (ref 0.3–6.2)
ERYTHROCYTE [DISTWIDTH] IN BLOOD BY AUTOMATED COUNT: 15.6 % (ref 12.3–15.4)
FOLATE SERPL-MCNC: 19 NG/ML (ref 4.78–24.2)
GFR SERPL CREATININE-BSD FRML MDRD: 75 ML/MIN/1.73
GLOBULIN UR ELPH-MCNC: 2.6 GM/DL
GLUCOSE BLDC GLUCOMTR-MCNC: 131 MG/DL (ref 70–130)
GLUCOSE BLDC GLUCOMTR-MCNC: 135 MG/DL (ref 70–130)
GLUCOSE BLDC GLUCOMTR-MCNC: 93 MG/DL (ref 70–130)
GLUCOSE SERPL-MCNC: 127 MG/DL (ref 65–99)
HCT VFR BLD AUTO: 39.5 % (ref 37.5–51)
HGB BLD-MCNC: 13.1 G/DL (ref 13–17.7)
IMM GRANULOCYTES # BLD AUTO: 0.49 10*3/MM3 (ref 0–0.05)
IMM GRANULOCYTES NFR BLD AUTO: 2.9 % (ref 0–0.5)
LYMPHOCYTES # BLD AUTO: 2.13 10*3/MM3 (ref 0.7–3.1)
LYMPHOCYTES NFR BLD AUTO: 12.4 % (ref 19.6–45.3)
MCH RBC QN AUTO: 33.4 PG (ref 26.6–33)
MCHC RBC AUTO-ENTMCNC: 33.2 G/DL (ref 31.5–35.7)
MCV RBC AUTO: 100.8 FL (ref 79–97)
MONOCYTES # BLD AUTO: 1.22 10*3/MM3 (ref 0.1–0.9)
MONOCYTES NFR BLD AUTO: 7.1 % (ref 5–12)
NEUTROPHILS NFR BLD AUTO: 13.13 10*3/MM3 (ref 1.7–7)
NEUTROPHILS NFR BLD AUTO: 76.4 % (ref 42.7–76)
NRBC BLD AUTO-RTO: 0 /100 WBC (ref 0–0.2)
PLATELET # BLD AUTO: 203 10*3/MM3 (ref 140–450)
PMV BLD AUTO: 9.9 FL (ref 6–12)
POTASSIUM SERPL-SCNC: 4.1 MMOL/L (ref 3.5–5.2)
PROCALCITONIN SERPL-MCNC: 0.05 NG/ML (ref 0–0.25)
PROT SERPL-MCNC: 6 G/DL (ref 6–8.5)
RBC # BLD AUTO: 3.92 10*6/MM3 (ref 4.14–5.8)
SODIUM SERPL-SCNC: 139 MMOL/L (ref 136–145)
VIT B12 BLD-MCNC: 296 PG/ML (ref 211–946)
WBC # BLD AUTO: 17.19 10*3/MM3 (ref 3.4–10.8)

## 2021-11-04 PROCEDURE — 94799 UNLISTED PULMONARY SVC/PX: CPT

## 2021-11-04 PROCEDURE — 85025 COMPLETE CBC W/AUTO DIFF WBC: CPT | Performed by: PHYSICIAN ASSISTANT

## 2021-11-04 PROCEDURE — 82607 VITAMIN B-12: CPT | Performed by: INTERNAL MEDICINE

## 2021-11-04 PROCEDURE — 99232 SBSQ HOSP IP/OBS MODERATE 35: CPT | Performed by: PHYSICIAN ASSISTANT

## 2021-11-04 PROCEDURE — 82746 ASSAY OF FOLIC ACID SERUM: CPT | Performed by: INTERNAL MEDICINE

## 2021-11-04 PROCEDURE — 87040 BLOOD CULTURE FOR BACTERIA: CPT | Performed by: PHYSICIAN ASSISTANT

## 2021-11-04 PROCEDURE — 84145 PROCALCITONIN (PCT): CPT | Performed by: INTERNAL MEDICINE

## 2021-11-04 PROCEDURE — 82962 GLUCOSE BLOOD TEST: CPT

## 2021-11-04 PROCEDURE — 80053 COMPREHEN METABOLIC PANEL: CPT | Performed by: PHYSICIAN ASSISTANT

## 2021-11-04 RX ADMIN — MIRTAZAPINE 45 MG: 15 TABLET, FILM COATED ORAL at 19:38

## 2021-11-04 RX ADMIN — RANOLAZINE 500 MG: 500 TABLET, FILM COATED, EXTENDED RELEASE ORAL at 08:55

## 2021-11-04 RX ADMIN — RANOLAZINE 500 MG: 500 TABLET, FILM COATED, EXTENDED RELEASE ORAL at 19:39

## 2021-11-04 RX ADMIN — PANTOPRAZOLE SODIUM 40 MG: 40 TABLET, DELAYED RELEASE ORAL at 08:55

## 2021-11-04 RX ADMIN — ACETAMINOPHEN 650 MG: 160 SOLUTION ORAL at 13:50

## 2021-11-04 RX ADMIN — ISOSORBIDE MONONITRATE 120 MG: 60 TABLET ORAL at 08:55

## 2021-11-04 RX ADMIN — MEMANTINE HYDROCHLORIDE AND DONEPEZIL HYDROCHLORIDE 28 MG: 28; 10 CAPSULE ORAL at 08:56

## 2021-11-04 RX ADMIN — BUDESONIDE AND FORMOTEROL FUMARATE DIHYDRATE 2 PUFF: 80; 4.5 AEROSOL RESPIRATORY (INHALATION) at 06:40

## 2021-11-04 RX ADMIN — MEGESTROL ACETATE 800 MG: 40 SUSPENSION ORAL at 08:55

## 2021-11-04 RX ADMIN — SODIUM CHLORIDE, PRESERVATIVE FREE 10 ML: 5 INJECTION INTRAVENOUS at 19:39

## 2021-11-04 RX ADMIN — FINASTERIDE 5 MG: 5 TABLET, FILM COATED ORAL at 08:55

## 2021-11-04 RX ADMIN — SODIUM CHLORIDE, PRESERVATIVE FREE 10 ML: 5 INJECTION INTRAVENOUS at 08:56

## 2021-11-04 RX ADMIN — CLOPIDOGREL 75 MG: 75 TABLET, FILM COATED ORAL at 08:55

## 2021-11-04 RX ADMIN — FERROUS SULFATE TAB 325 MG (65 MG ELEMENTAL FE) 325 MG: 325 (65 FE) TAB at 19:38

## 2021-11-04 RX ADMIN — SODIUM CHLORIDE, POTASSIUM CHLORIDE, SODIUM LACTATE AND CALCIUM CHLORIDE 50 ML/HR: 600; 310; 30; 20 INJECTION, SOLUTION INTRAVENOUS at 10:18

## 2021-11-04 RX ADMIN — ATORVASTATIN CALCIUM 80 MG: 40 TABLET, FILM COATED ORAL at 19:38

## 2021-11-04 RX ADMIN — BUDESONIDE AND FORMOTEROL FUMARATE DIHYDRATE 2 PUFF: 80; 4.5 AEROSOL RESPIRATORY (INHALATION) at 18:31

## 2021-11-04 RX ADMIN — ASPIRIN 81 MG: 81 TABLET, COATED ORAL at 08:55

## 2021-11-04 RX ADMIN — FERROUS SULFATE TAB 325 MG (65 MG ELEMENTAL FE) 325 MG: 325 (65 FE) TAB at 08:55

## 2021-11-04 NOTE — PLAN OF CARE
Goal Outcome Evaluation: Patient has been very pleasant today. Compliant with all medications and care as ordered. He remains on room air, saturations maintaining well above 90%. Family has been at bedside during visiting hours, updated on patient's status and plan of care. LR remains infusing at 50 mL/hr. External cath remains in place, good output noted. Patient did complain of pain this AM, PRN medication given as ordered. He is currently resting in bed. No distress noted. Will continue to monitor and follow plan of care.

## 2021-11-04 NOTE — DISCHARGE PLACEMENT REQUEST
"Fidencio Luciano (76 y.o. Male)             Date of Birth Social Security Number Address Home Phone MRN    1945  [address] [facility] [phone] [id number]    [employer] Mar[employer] Status             Non-Gnosticism        Admission Date Admission Type Admitting Provider Attending Provider Department, Room/Bed    10/27/21 Emergency Carlos Grier MD [place], Emmanuel [place], MD [address], 3305/1S    Discharge Date Discharge Disposition Discharge Destination                         Attending Provider: Jewel Prado MD    Allergies: No Known Allergies    Isolation: None   Infection: None   Code Status: CPR   Advance Care Planning Activity    Ht: 180.3 cm (71\")   Wt: 66.4 kg (146 lb 6.4 oz)    Admission Cmt: None   Principal Problem: Weakness generalized [R53.1]                 Active Insurance as of 10/27/2021     Primary Coverage     Payor Plan Insurance Group Employer/Plan Group    ANTH MEDICARE REPLACEMENT ANTH MEDICARE ADVANTAGE [id number]     Payor Plan Address Payor Plan Phone Number Payor Plan Fax Number Effective Dates    [address] [phone]  1/1/2021 - None Entered    [facility] [address]       Subscriber Name Subscriber Birth Date Member ID       FIDENCIO LUCIANO 1945 [id number]                 Emergency Contacts      (Rel.) Home Phone Work Phone Mobile Phone    Mustapha(POA)Angela (Spouse) [phone] -- [phone]    Dylan García (Son) [phone] -- [phone]    AkbarSofie (Daughter) [phone] -- --    Zhane Conner (Daughter) [phone] -- --    ShannonAries (Son) [phone] -- --            Emergency Contact Information     Name Relation Home Work Mobile    Mustapha(POA)Angela Spouse [phone]    García,Dylan Son [phone]    AkbarSofie Daughter [phone]      Zhane Conner Daughter [phone]      Aries Ortega Son [phone]      "       Insurance Information                ANTH MEDICARE REPLACEMENT/Critical access hospital MEDICARE ADVANTAGE Phone: [phone]    Subscriber: Fidencio Luciano Subscriber#: [id number]    Group#: [id number] Precert#: --          Treatment Team  Chat With All Active Members    Provider Relationship Specialty Contact    Jewel Prado MD  Attending --  [phone]    Fredy Frias  Patient Care Technician --     Cora Perry RN  Registered Nurse --     Jennifer Luque, PA  Physician Assistant Hospitalist  [phone]    Veronica Barton, PT  Physical Therapist Physical Therapy     Callie Kebede, RRT  Respiratory Therapist --  [phone]          Problem List           Codes Noted - Resolved       Hospital    * (Principal) Weakness generalized ICD-10-CM: R53.1  ICD-9-CM: 780.79 5/18/2017 - Present       Non-Hospital    Chest pain ICD-10-CM: R07.9  ICD-9-CM: 786.50 5/24/2021 - Present    Dysphagia ICD-10-CM: R13.10  ICD-9-CM: 787.20 4/2/2021 - Present    Gastroesophageal reflux disease ICD-10-CM: K21.9  ICD-9-CM: 530.81 1/26/2021 - Present    Iron deficiency anemia ICD-10-CM: D50.9  ICD-9-CM: 280.9 7/17/2018 - Present    Weight loss, unintentional ICD-10-CM: R63.4  ICD-9-CM: 783.21 2/1/2018 - Present    Coronary artery fistula (Chronic) ICD-10-CM: I25.41  ICD-9-CM: 414.19 7/30/2017 - Present    Hyperlipidemia LDL goal <70 ICD-10-CM: E78.5  ICD-9-CM: 272.4 1/9/2017 - Present    ASCVD (arteriosclerotic cardiovascular disease), s/p stenting of 80 % stenosis in left circumflex on 10/05/16and 60% stenosis in the LAD, clinically stable.  (Chronic) ICD-10-CM: I25.10  ICD-9-CM: 429.2, 440.9 12/15/2016 - Present    Essential hypertension (Chronic) ICD-10-CM: I10  ICD-9-CM: 401.9 8/18/2016 - Present    Type 2 diabetes mellitus (HCC) ICD-10-CM: E11.9  ICD-9-CM: 250.00 8/18/2016 - Present    Benign prostatic hyperplasia (Chronic) ICD-10-CM: N40.0  ICD-9-CM: 600.00 8/18/2016 - Present             History &  "Physical      Carlos Grier MD at 10/27/21 6861        Date of admission: 10/27/2021    Seen in     Chief complaint: Chest pain    HPI: Most of this history was obtained from patient's wife who is at bedside and assisted with the H&P.  Patient's history is limited.  This morning patient's wife states she was going to go out briefly and tried to wake him up to tell him and he was not very responsive.  He then at some point however shortly after that woke up and stated he was having some chest pain.  He wanted to get up and go to the bathroom and she states although he shuffles he was able to get up and legs moving to go to the bathroom.  He states he has incontinence at times and leaves a trail of urine into the bathroom if he needs to go.  Sometimes he can get up on his own with a walker sometimes he cannot.  Patient carries a history of dementia and apparently has had a fairly rapid progression, I have discussed with the patient's PCP who confirms that the patient is declining, he did see a local neurologist and was told that he had \"mini strokes\" as evidenced by small vessel disease on his MRI which is most likely cause of this decline.  Patient is now complaining of 0/10 chest pain, he does not feel associated short of breath.  He does not remember the character of the pain this morning.  He does have a history of coronary artery disease status post stents x2 in the past.  He follows up with Dr. Buenrostro.  I discussed further about this mental decline as the patient was to be discharged from the emergency room because nothing significant found but when the wife got here he was having some occasional myoclonic jerks and she stated that she could not take him home.  I was then called for consultation.  Of interest patient retired from [employer], he then went to work for [employer].  He actually worked there several days a week right up until March 2021 and was fairly normal.  He then developed Covid, he was " unvaccinated, and apparently has not been the same since and has had a fairly steady physical and mental decline.  Wife has not seen any definite seizure activity at home.  Has some urinary incontinence but bowels move okay.  He walks with a walker he has been losing weight has a tremor at times.  Patient has had 2 falls recently but he has not hit his head that the wife knows of.    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    Family history reviewed with him as much as I could and nothing significant to contribute to current illness    ROS: Really unobtainable except for the patient's wife's input as above.    Exam: Patient appears chronically ill but in no distress he is currently on room air.  152/66, 18, 57, 97% on room air temperature 97.5.  Pupils are equal conjunctiva unremarkable hearing appears to be intact patient actually is oriented to self place city year and president.  Voice is weak.  He does have an occasional myoclonic jerk in his hands and feet that seem to occur at the same time.  Strength is symmetric and I would rate 4/5.  He has adequate distal pulses DP greater than PT bilaterally lungs have bilateral breath sounds rare rhonchi but otherwise clear no wheezing or rales is not use accessory muscles to breathe, heart is a regular rate and rhythm without murmur rub or gallop, abdomen soft benign bowel sounds are active no organomegaly is appreciated nondistended nontender.  Mucous membranes are dry    Data:  Stress  test June 2021 low risk study  Echo May 2021 normal EF with grade 1 diastolic dysfunction  Brain MRI cerebral atrophy and moderate chronic small vessel disease  Chest x-ray negative  Troponin negative  Lactate 2.9  ABG x2 showed respiratory alkalosis with metabolic compensation  Sed rate 2  White count 1.5 hemoglobin 14 hematocrit 44  platelet count 187  PT and PTT essentially normal  Electrolytes unremarkable except for CO2 of 12.8 which is not consistent with the ABG CO2 x2, this may be spurious but he was not acidotic  TSH free T4 normal  CRP less than 0.3  proBNP normal  COVID-19 negative  UA trace protein otherwise negative tox screen negative  EKG image reviewed sinus rhythm and bradycardic rate some artifact nonspecific findings otherwise unremarkable    Assessment and plan:  Decline post Covid, in general I feel like 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  He does appear weak however and may benefit from therapy, PT/OT/speech therapy will be consulted.  I would like to get a better feel for his true functional status.  Of note I did inform the patient's wife that we do not have neurology here that I am happy to admit the patient and take care of him but she did not did not front the neurology service is not available, she voiced understanding and wanted the patient admitted here.  Since patient is oriented, this would question the diagnosis of dementia.    Urinary incontinence, he may be having overflow incontinence, I am going to check a postvoid residual.    Episode of poorly responsiveness this morning, etiology is uncertain, he has some chest pain after this, I am going to ask his cardiologist to evaluate, troponins are negative, I am also going to check an EEG.  Brain just shows small vessel disease by MRI.    Coronary artery disease, as above, review home medications and restart as appropriate    History of diabetes, check  A1c but he is no longer on medication.    Lactic acidosis, he overall appears dry, will give fluid bolus followed by saline, recheck electrolytes in the a.m.  He does have a prerenal azotemia by laboratory data.  I do not see any evidence of infectious process.    CODE STATUS, discussed with the patient as well as patient's wife.  Patient was oriented and he states that in the event of cardiopulmonary as he would want to be resuscitated therefore they in agreement to make the patient a full code.    Macrocytosis, check B12 and folate, thyroid status is euthyroid    DVT prophylaxis, SCUDs, holding on anticoagulants due to the history of ulcer.                        Electronically signed by Carlos Grier MD at 10/27/21 1819       Vital Signs (last day)     Date/Time Temp Temp src Pulse Resp BP Patient Position SpO2    11/04/21 1534 98 (36.7) Oral 90 18 134/76 Lying 96    11/04/21 1031 97.7 (36.5) Oral 78 18 128/68 Lying 95    11/04/21 0640 -- -- 71 18 -- -- 96    11/04/21 0635 98 (36.7) Oral 69 18 155/72 Lying 95    11/04/21 0312 97.5 (36.4) Oral 71 20 148/71 Lying 95    11/03/21 1910 -- -- 91 20 -- -- 99    11/03/21 1909 -- -- 89 20 -- -- 96    11/03/21 1845 98.2 (36.8) Oral 82 18 152/80 Lying 96    11/03/21 1432 98.8 (37.1) Axillary 95 -- 131/80 Lying 96    11/03/21 1036 97.6 (36.4) Oral 98 20 116/68 Lying 98    11/03/21 0651 98 (36.7) Oral 84 20 147/70 Lying 96    11/03/21 0638 -- -- 80 20 -- -- 96    11/03/21 0300 98.3 (36.8) Oral 82 -- 141/68 Lying 95    11/03/21 0212 -- -- -- -- -- -- 96    11/03/21 0004 -- -- -- -- -- -- 95          Lines, Drains & Airways     Active LDAs     Name Placement date Placement time Site Days    Peripheral IV 11/03/21 1836 Anterior; Right; Upper Arm 11/03/21 1836  Arm  less than 1    External Urinary Catheter 10/31/21  0600  --  4                  Current Facility-Administered Medications   Medication Dose Route Frequency Provider Last Rate Last Admin   • acetaminophen (TYLENOL)  160 MG/5ML solution 650 mg  650 mg Oral Q4H PRN Carlos Grier MD   650 mg at 11/04/21 1350   • albuterol (PROVENTIL) nebulizer solution 0.083% 2.5 mg/3mL  2.5 mg Nebulization Q6H PRN Carlos Grier MD   2.5 mg at 11/03/21 1909   • aspirin EC tablet 81 mg  81 mg Oral Daily Carlos Grier MD   81 mg at 11/04/21 0855   • atorvastatin (LIPITOR) tablet 80 mg  80 mg Oral Nightly Carlos Grier MD   80 mg at 11/03/21 2015   • budesonide-formoterol (SYMBICORT) 80-4.5 MCG/ACT inhaler 2 puff  2 puff Inhalation BID Carlos Girer MD   2 puff at 11/04/21 0640   • clopidogrel (PLAVIX) tablet 75 mg  75 mg Oral Daily Carlos Grier MD   75 mg at 11/04/21 0855   • ferrous sulfate tablet 325 mg  325 mg Oral BID Carlos Grier MD   325 mg at 11/04/21 0855   • finasteride (PROSCAR) tablet 5 mg  5 mg Oral Daily Carlos Grier MD   5 mg at 11/04/21 0855   • isosorbide mononitrate (IMDUR) 24 hr tablet 120 mg  120 mg Oral Daily Carlos Grier MD   120 mg at 11/04/21 0855   • lactated ringers infusion  50 mL/hr Intravenous Continuous Jnenifer Luque PA 50 mL/hr at 11/04/21 1018 50 mL/hr at 11/04/21 1018   • megestrol (MEGACE) 40 MG/ML suspension 800 mg  800 mg Oral Daily Carlos Grier MD   800 mg at 11/04/21 0855   • Memantine HCl-Donepezil HCl 28-10 MG capsule sustained-release 24 hr 28 mg  1 capsule Oral Daily Carlos Grier MD   28 mg at 11/04/21 0856   • mirtazapine (REMERON) tablet 45 mg  45 mg Oral Nightly Carlos Grier MD   45 mg at 11/03/21 2014   • nitroglycerin (NITROSTAT) SL tablet 0.4 mg  0.4 mg Sublingual Q5 Min PRN Carlos Grier MD       • pantoprazole (PROTONIX) EC tablet 40 mg  40 mg Oral QAM AC Carlos Grier MD   40 mg at 11/04/21 0855   • polyethylene glycol (MIRALAX) packet 17 g  17 g Oral Daily Carlos Grier MD   17 g at 11/03/21 0836   • ranolazine (RANEXA) 12 hr tablet 500 mg  500 mg Oral Q12H Giovanni Buenrostro MD   500 mg at 11/04/21 0855   • sodium chloride 0.9 % flush 10 mL  10 mL  Intravenous Jaswinder Jin MD       • sodium chloride 0.9 % flush 10 mL  10 mL Intravenous Q12H Carlos Grier MD   10 mL at 11/04/21 0856   • sodium chloride 0.9 % flush 10 mL  10 mL Intravenous Carlos Saenz MD           Lab Results (last 24 hours)     Procedure Component Value Units Date/Time    POC Glucose Once [[id number]]  (Abnormal) Collected: 11/04/21 1614    Specimen: Blood Updated: 11/04/21 1630     Glucose 135 mg/dL      Comment: Meter: [id number] : [id number] trent gallego       Procalcitonin [[id number]] Collected: 11/04/21 0717    Specimen: Blood Updated: 11/04/21 1236    POC Glucose Once [[id number]]  (Abnormal) Collected: 11/04/21 1033    Specimen: Blood Updated: 11/04/21 1042     Glucose 131 mg/dL      Comment: Meter: [id number] : [id number] trent gallego       Blood Culture - Blood, Arm, Left [[id number]] Collected: 11/04/21 0851    Specimen: Blood from Arm, Left Updated: 11/04/21 0905    Blood Culture - Blood, Hand, Left [[id number]] Collected: 11/04/21 0851    Specimen: Blood from Hand, Left Updated: 11/04/21 0905    Vitamin B12 [[id number]] Collected: 11/04/21 0750    Specimen: Blood Updated: 11/04/21 0757    Folate [[id number]] Collected: 11/04/21 0750    Specimen: Blood Updated: 11/04/21 0757    POC Glucose Once [[id number]]  (Normal) Collected: 11/04/21 0638    Specimen: Blood Updated: 11/04/21 0654     Glucose 93 mg/dL      Comment: Meter: [id number] : [id number] newman joshua       Comprehensive Metabolic Panel [[id number]]  (Abnormal) Collected: 11/04/21 0045    Specimen: Blood Updated: 11/04/21 0208     Glucose 127 mg/dL      BUN 36 mg/dL      Creatinine 0.97 mg/dL      Sodium 139 mmol/L      Potassium 4.1 mmol/L      Comment: Slight hemolysis detected by analyzer. Results may be affected.        Chloride 108 mmol/L      CO2 18.3 mmol/L      Calcium 8.6 mg/dL      Total Protein 6.0 g/dL      Albumin 3.43 g/dL      ALT (SGPT) 22 U/L      AST (SGOT)  20 U/L      Alkaline Phosphatase 80 U/L      Total Bilirubin 0.3 mg/dL      eGFR Non African Amer 75 mL/min/1.73      Globulin 2.6 gm/dL      A/G Ratio 1.3 g/dL      BUN/Creatinine Ratio 37.1     Anion Gap 12.7 mmol/L     Narrative:      GFR Normal >60  Chronic Kidney Disease <60  Kidney Failure <15      CBC & Differential [[id number]]  (Abnormal) Collected: 11/04/21 0045    Specimen: Blood Updated: 11/04/21 0147    Narrative:      The following orders were created for panel order CBC & Differential.  Procedure                               Abnormality         Status                     ---------                               -----------         ------                     CBC Auto Differential[[id number]]        Abnormal            Final result                 Please view results for these tests on the individual orders.    CBC Auto Differential [[id number]]  (Abnormal) Collected: 11/04/21 0045    Specimen: Blood Updated: 11/04/21 0147     WBC 17.19 10*3/mm3      RBC 3.92 10*6/mm3      Hemoglobin 13.1 g/dL      Hematocrit 39.5 %      .8 fL      MCH 33.4 pg      MCHC 33.2 g/dL      RDW 15.6 %      RDW-SD 57.6 fl      MPV 9.9 fL      Platelets 203 10*3/mm3      Neutrophil % 76.4 %      Lymphocyte % 12.4 %      Monocyte % 7.1 %      Eosinophil % 0.3 %      Basophil % 0.9 %      Immature Grans % 2.9 %      Neutrophils, Absolute 13.13 10*3/mm3      Lymphocytes, Absolute 2.13 10*3/mm3      Monocytes, Absolute 1.22 10*3/mm3      Eosinophils, Absolute 0.06 10*3/mm3      Basophils, Absolute 0.16 10*3/mm3      Immature Grans, Absolute 0.49 10*3/mm3      nRBC 0.0 /100 WBC     POC Glucose Once [[id number]]  (Normal) Collected: 11/03/21 1900    Specimen: Blood Updated: 11/03/21 1916     Glucose 130 mg/dL      Comment: Meter: [id number] : [id number] [facility] Parisa Chow       Urinalysis With Culture If Indicated - Urine, Random Void [[id number]]  (Abnormal) Collected: 11/03/21 1734    Specimen: Urine, Random Void Updated:  "11/03/21 1812     Color, UA Dark Yellow     Appearance, UA Clear     pH, UA 5.5     Specific Gravity, UA 1.021     Glucose, UA Negative     Ketones, UA Negative     Bilirubin, UA Negative     Blood, UA Negative     Protein, UA Negative     Leuk Esterase, UA Negative     Nitrite, UA Negative     Urobilinogen, UA 1.0 E.U./dL    Narrative:      Urine microscopic not indicated.        Orders (last 24 hrs)      Start     Ordered    11/05/21 0600  Basic Metabolic Panel  Morning Draw         11/04/21 1451    11/05/21 0600  CBC & Differential  Morning Draw         11/04/21 1451    11/05/21 0600  C-reactive Protein  Morning Draw         11/04/21 1451    11/04/21 1631  POC Glucose Once  PROCEDURE ONCE         11/04/21 1614    11/04/21 1043  POC Glucose Once  PROCEDURE ONCE         11/04/21 1033    11/04/21 0751  Blood Culture - Blood, Arm, Left  Once        \"And\" Linked Group Details    11/04/21 0742    11/04/21 0751  Blood Culture - Blood, Hand, Left  Once        Comments: From a different site than #1.     \"And\" Linked Group Details    11/04/21 0742    11/04/21 0717  Vitamin B12  Once         11/04/21 0716    11/04/21 0717  Folate  Once         11/04/21 0716    11/04/21 0715  Procalcitonin  STAT         11/04/21 0714    11/04/21 0655  POC Glucose Once  PROCEDURE ONCE         11/04/21 0638    11/04/21 0600  Comprehensive Metabolic Panel  Morning Draw         11/03/21 1300    11/04/21 0600  CBC & Differential  Morning Draw         11/03/21 1300    11/04/21 0600  CBC Auto Differential  PROCEDURE ONCE         11/03/21 2207    11/03/21 1917  POC Glucose Once  PROCEDURE ONCE         11/03/21 1900    11/03/21 1721  Please collect repeat UA if not yet collected.  Nursing Communication  Once        Comments: Please collect repeat UA if not yet collected.    11/03/21 1720    11/03/21 1345  lactated ringers infusion  Continuous         11/03/21 1258    10/30/21 1830  polyethylene glycol (MIRALAX) packet 17 g  Daily         10/30/21 " "1623    10/28/21 2100  atorvastatin (LIPITOR) tablet 80 mg  Nightly         10/28/21 0747    10/28/21 2100  mirtazapine (REMERON) tablet 45 mg  Nightly         10/28/21 0747    10/28/21 1400  Memantine HCl-Donepezil HCl 28-10 MG capsule sustained-release 24 hr 28 mg  Daily         10/28/21 0747    10/28/21 1200  ranolazine (RANEXA) 12 hr tablet 500 mg  Every 12 Hours Scheduled         10/28/21 1108    10/28/21 0900  clopidogrel (PLAVIX) tablet 75 mg  Daily         10/28/21 0747    10/28/21 0900  ferrous sulfate tablet 325 mg  2 Times Daily         10/28/21 0747    10/28/21 0900  isosorbide mononitrate (IMDUR) 24 hr tablet 120 mg  Daily         10/28/21 0747    10/28/21 0900  finasteride (PROSCAR) tablet 5 mg  Daily         10/28/21 0747    10/28/21 0900  budesonide-formoterol (SYMBICORT) 80-4.5 MCG/ACT inhaler 2 puff  2 Times Daily         10/28/21 0747    10/28/21 0900  megestrol (MEGACE) 40 MG/ML suspension 800 mg  Daily         10/28/21 0747    10/28/21 0800  POC Glucose Finger BID  2 Times Daily       10/27/21 1924    10/28/21 0746  albuterol (PROVENTIL) nebulizer solution 0.083% 2.5 mg/3mL  Every 6 Hours PRN         10/28/21 0747    10/28/21 0730  pantoprazole (PROTONIX) EC tablet 40 mg  Every Morning Before Breakfast         10/27/21 1924    10/27/21 2100  sodium chloride 0.9 % flush 10 mL  Every 12 Hours Scheduled         10/27/21 1924    10/27/21 2015  aspirin EC tablet 81 mg  Daily         10/27/21 1924    10/27/21 2000  Vital Signs  Every 4 Hours       10/27/21 1924    10/27/21 1925  Intake & Output  Every Shift       10/27/21 1924    10/27/21 1924  sodium chloride 0.9 % flush 10 mL  As Needed         10/27/21 1924    10/27/21 1924  nitroglycerin (NITROSTAT) SL tablet 0.4 mg  Every 5 Minutes PRN         10/27/21 1924    10/27/21 1924  acetaminophen (TYLENOL) 160 MG/5ML solution 650 mg  Every 4 Hours PRN         10/27/21 1924    10/27/21 1011  sodium chloride 0.9 % flush 10 mL  As Needed        \"And\" Linked " Group Details    10/27/21 1011    Unscheduled  Measure Post Void Residual  As Needed       10/27/21 1924    Unscheduled  ECG 12 Lead  As Needed      Comments: Nurse to Release if Patient Expericences Acute Chest Pain or Dysrhythmias    10/27/21 1924    Unscheduled  Potassium  As Needed      Comments: For Ventricular Arrhythmias      10/27/21 1924    Unscheduled  Magnesium  As Needed      Comments: For Ventricular Arrhythmias      10/27/21 1924    Unscheduled  Troponin  As Needed      Comments: For Chest Pain      10/27/21 1924    Unscheduled  Digoxin Level  As Needed      Comments: For Atrial Arrhythmias      10/27/21 1924    Unscheduled  Blood Gas, Arterial -With Co-Ox Panel: Yes  As Needed      Comments: Per O2 PolicyNotify Physician      10/27/21 1924    Unscheduled  Up With Assistance  As Needed       10/27/21 1924    --  colestipol (COLESTID) 1 g tablet  2 Times Daily         10/27/21 2059    --  SCANNED - TELEMETRY           10/27/21 0000    --  SCANNED - TELEMETRY           10/27/21 0000    --  SCANNED - TELEMETRY           10/27/21 0000    --  SCANNED - TELEMETRY           10/27/21 0000    --  SCANNED - TELEMETRY           10/27/21 0000    --  SCANNED - TELEMETRY           10/27/21 0000    --  SCANNED - TELEMETRY           10/27/21 0000    --  SCANNED - TELEMETRY           10/27/21 0000    --  SCANNED - TELEMETRY           10/27/21 0000    --  SCANNED - TELEMETRY           10/27/21 0000    --  SCANNED - TELEMETRY           10/27/21 0000    --  SCANNED - TELEMETRY           10/27/21 0000    --  SCANNED - TELEMETRY           10/27/21 0000    --  SCANNED - TELEMETRY           10/27/21 0000    --  SCANNED - TELEMETRY           10/27/21 0000                Operative/Procedure Notes (last 24 hours)  Notes from 11/03/21 1701 through 11/04/21 1701   No notes of this type exist for this encounter.            Physician Progress Notes (last 24 hours)      Jennifer Luque PA at 11/04/21 1136                  [facility] HOSPITALIST PROGRESS NOTE     Patient Identification:  Name:  Fidencio Luciano  Age:  76 y.o.  Sex:  male  :  1945  MRN:  [id number]  Visit Number:  [id number]  Primary Care Provider:  Camila Ortiz APRN    Date of admission: 10/27/2021  Length of stay:  8    ----------------------------------------------------------------------------------------------------------------------  Subjective     Chief Complaint:   Chief Complaint   Patient presents with   • Chest Pain     Subjective/Interval History:    76 y.o. male who was admitted on 10/27/2021 with generalized weakness and decreased responsiveness.  He was also having some complaints of chest pain at home.  He was having some myoclonic jerks in the ED.  It was noted that the patient had been diagnosed with COVID-19 in 2021.  Since then, he has had a steady physical and mental decline.  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  He was admitted for further evaluation and therapy.    PMH is significant for CAD status post previous stenting, hypertension, dyslipidemia, iron deficiency anemia, type 2 diabetes mellitus. For complete admission information, please see history and physical.     Consultations:  1. Cardiology    Procedures/Scans:  1. MRI brain without contrast  2. CT chest without contrast  3. CT abdomen/pelvis without contrast  4. SLP evaluation    Today, the patient reports that he is doing well. He denies any complaints of chest pains, shortness of breath, cough, abdominal pain or diarrhea.  No no wounds.  Discussed with RNCora, no new events or concerns reported.    Review of Systems   Constitutional: Negative for chills and fever.   HENT: Negative for sore throat and trouble swallowing.    Respiratory: Negative for cough and shortness of breath.    Cardiovascular: Negative for chest pain.   Gastrointestinal: Negative for abdominal pain, diarrhea, nausea and vomiting.   Genitourinary: Negative for  difficulty urinating and dysuria.   Skin: Negative for color change and rash.      ----------------------------------------------------------------------------------------------------------------------  Objective   [facility] Meds:  aspirin, 81 mg, Oral, Daily  atorvastatin, 80 mg, Oral, Nightly  budesonide-formoterol, 2 puff, Inhalation, BID  clopidogrel, 75 mg, Oral, Daily  ferrous sulfate, 325 mg, Oral, BID  finasteride, 5 mg, Oral, Daily  isosorbide mononitrate, 120 mg, Oral, Daily  megestrol, 800 mg, Oral, Daily  Memantine HCl-Donepezil HCl, 1 capsule, Oral, Daily  mirtazapine, 45 mg, Oral, Nightly  pantoprazole, 40 mg, Oral, QAM AC  polyethylene glycol, 17 g, Oral, Daily  ranolazine, 500 mg, Oral, Q12H  sodium chloride, 10 mL, Intravenous, Q12H      lactated ringers, 50 mL/hr, Last Rate: 50 mL/hr (11/04/21 1018)      ----------------------------------------------------------------------------------------------------------------------  Vital Signs:  Temp:  [97.5 °F (36.4 °C)-98.8 °F (37.1 °C)] 97.7 °F (36.5 °C)  Heart Rate:  [69-95] 78  Resp:  [18-20] 18  BP: (128-155)/(68-80) 128/68  Mean Arterial Pressure (Non-Invasive) for the past 24 hrs (Last 3 readings):   Noninvasive MAP (mmHg)   11/04/21 1031 84   11/04/21 0635 82   11/04/21 0312 95     SpO2 Percentage    11/04/21 0635 11/04/21 0640 11/04/21 1031   SpO2: 95% 96% 95%     SpO2:  [95 %-99 %] 95 %  on   ;   Device (Oxygen Therapy): room air    Body mass index is 20.42 kg/m².  Wt Readings from Last 3 Encounters:   11/04/21 66.4 kg (146 lb 6.4 oz)   10/21/21 64.4 kg (142 lb)   10/21/21 64.5 kg (142 lb 3.2 oz)        Intake/Output Summary (Last 24 hours) at 11/4/2021 1137  Last data filed at 11/4/2021 0450  Gross per 24 hour   Intake 1440 ml   Output 750 ml   Net 690 ml     Diet Soft Texture; Chopped; Thin  ----------------------------------------------------------------------------------------------------------------------  Physical  exam:  Constitutional: Vital signs reviewed. Well-developed and well-nourished.  No respiratory distress on room air.    HENT:  Head:  Normocephalic and atraumatic.    Eyes:  Conjunctivae and EOM are normal. No scleral icterus. No erythema or drainage.  Neck:  Neck supple.   Cardiovascular:  Normal rate, regular rhythm and normal heart sounds with no murmur.  Pulmonary/Chest:  No respiratory distress, no wheezes, no crackles, with normal breath sounds and good air movement.  Abdominal:  Soft.  Bowel sounds are present x4.  No distension and no tenderness.   Musculoskeletal:  No edema, no tenderness, and no deformity.  No red or swollen joints anywhere.    Neurological:  Alert and oriented to person, place, and time. No facial droop.  No slurred speech.   Skin:  Skin is warm and dry. No rash noted. No pallor. The patient was rolled on to his side with the assistance of his RN, Katerina. No rashes/wounds/or areas of significant skin break down appreciated on the neck, back, buttocks, or inguinal area. Patient had no tenderness along the spine.   Peripheral vascular: No clubbing, no cyanosis, no edema.   Genitourinary: No terrell catheter in place.     I have reviewed and updated the physical exam as needed to reflect that of 11/04/21  ----------------------------------------------------------------------------------------------------------------------  Tele: Sinus rhythm 70s-80s.    ----------------------------------------------------------------------------------------------------------------------              Results from last 7 days   Lab Units 11/04/21 0045 11/03/21 0035 11/02/21 0158   WBC 10*3/mm3 17.19* 14.29* 13.67*   HEMOGLOBIN g/dL 13.1 14.2 13.8   HEMATOCRIT % 39.5 43.5 42.7   MCV fL 100.8* 100.7* 101.4*   MCHC g/dL 33.2 32.6 32.3   PLATELETS 10*3/mm3 203 206 195     Results from last 7 days   Lab Units 11/04/21 0045 11/03/21 0035 11/02/21 0158 11/01/21  0437 10/31/21  0052   SODIUM mmol/L 139 140 137    < > 144   POTASSIUM mmol/L 4.1 4.2 4.2   < > 3.9   CHLORIDE mmol/L 108* 108* 106   < > 113*   CO2 mmol/L 18.3* 18.8* 20.1*   < > 21.6*   BUN mg/dL 36* 34* 25*   < > 13   CREATININE mg/dL 0.97 0.87 0.77   < > 0.72*   EGFR IF NONAFRICN AM mL/min/1.73 75 86 98   < > 106   CALCIUM mg/dL 8.6 8.8 9.0   < > 8.7   GLUCOSE mg/dL 127* 145* 113*   < > 88   ALBUMIN g/dL 3.43*  --  3.77  --  3.74   BILIRUBIN mg/dL 0.3  --  0.5  --  0.5   ALK PHOS U/L 80  --  71  --  74   AST (SGOT) U/L 20  --  22  --  20   ALT (SGPT) U/L 22  --  23  --  24    < > = values in this interval not displayed.   Estimated Creatinine Clearance: 61.8 mL/min (by C-G formula based on SCr of 0.97 mg/dL).  No results found for: AMMONIA     Glucose   Date/Time Value Ref Range Status   11/04/2021 1033 131 (H) 70 - 130 mg/dL Final     Comment:     Meter: [id number] : [id number] [facility]   11/04/2021 0638 93 70 - 130 mg/dL Final     Comment:     Meter: [id number] : [id number] [facility]   11/03/2021 1900 130 70 - 130 mg/dL Final     Comment:     Meter: [id number] : [id number] [facility] Parisa Chow   11/03/2021 1641 122 70 - 130 mg/dL Final     Comment:     Meter: [id number] : [id number] [facility]   11/03/2021 1026 162 (H) 70 - 130 mg/dL Final     Comment:     Meter: [id number] : [id number] [facility]   11/03/2021 0708 101 70 - 130 mg/dL Final     Comment:     Meter: [id number] : [id number] [facility]   11/02/2021 1822 201 (H) 70 - 130 mg/dL Final     Comment:     Meter: [id number] : [id number] [facility] Parisa Chow   11/02/2021 1620 144 (H) 70 - 130 mg/dL Final     Comment:     Meter: [id number] : [id number] [place]     Lab Results   Component Value Date    HGBA1C 5.90 (H) 10/28/2021     Lab Results   Component Value Date    TSH 1.650 10/27/2021    FREET4 1.14 10/27/2021     No results found for: BLOODCX  No results found for: URINECX  No results found for: WOUNDCX  No results found for: STOOLCX  No  results found for: RESPCX    Pain Management Panel     Pain Management Panel Latest Ref Rng & Units 10/27/2021 7/31/2015    AMPHETAMINES SCREEN, URINE Negative Negative Negative    BARBITURATES SCREEN Negative Negative Negative    BENZODIAZEPINE SCREEN, URINE Negative Negative Negative    BUPRENORPHINEUR Negative Negative -    COCAINE SCREEN, URINE Negative Negative Negative    METHADONE SCREEN, URINE Negative Negative Negative    METHAMPHETAMINEUR Negative Negative -        I have personally reviewed the above laboratory results for 11/04/21  ----------------------------------------------------------------------------------------------------------------------  Imaging Results (Last 24 Hours)     ** No results found for the last 24 hours. **        ----------------------------------------------------------------------------------------------------------------------  Assessment/Plan     #Debility   #Falls  #Suspected long COVID syndrome   · PT/OT following.  Patient reports improvement noted.  · Inpatient rehab consulted, awaiting insurance determination.    #Leukocytosis   · Patient denies cough, shortness of breath, dysuria, abdominal pain, diarrhea.  · CT chest on admission showed no consolidation.  CT abdomen/pelvis on admission also showed no acute findings.  · Repeat UA and chest x-ray 11/3 without acute findings.  · WBC count continues to increase.  Blood cultures added x2.  · Patient remains afebrile and heart rate is improved today with addition of IV fluids yesterday.  · Skin was assessed and no evidence of cellulitis or wounds noted.  · Discussed with Dr. Prado, will continue to monitor off of antibiotics. Leukocytosis possibly related to megace, although, it appears he was taking this at home as well.   · Repeat CBC in AM.    #Constipation   · Patient is now moving his bowels.   · Continue scheduled MiraLAX.    #Concern for esophagitis   · Continue PPI.    #Dementia   · Patient currently alert and  oriented.  · PCP had reported some concern for advancing dementia.  · MRI of the brain without contrast on admission showed no acute findings, mild cerebral atrophy and moderate chronic small vessel ischemic disease.  · Continue home medications and follow-up with PCP as an outpatient.    #CAD s/p prior stenting   · Denies any chest pain.  · Continue low-dose aspirin, atorvastatin, Plavix, Imdur, Ranexa.    #DVT Prophylaxis  · SCDs.     The patient is high risk due to the following diagnoses/reasons: Generalized debility, suspected long Covid syndrome.    I have discussed the patient's assessment and plan with the patient, DIOR Borges who evaluated the patient along with me, and attending physician, Dr. Prado.     Disposition:   · Inpatient rehab consulted with insurance approval pending.     Discharge needs:  • None at this time.    IGOR Mcrae  11/04/21  11:38 EDT          Electronically signed by Jennifer Luque PA at 11/04/21 1450       Consult Notes (last 24 hours)  Notes from 11/03/21 1701 through 11/04/21 1701   No notes of this type exist for this encounter.         Physical Therapy Notes (last 24 hours)  Notes from 11/03/21 1701 through 11/04/21 1701   No notes exist for this encounter.         Occupational Therapy Notes (last 24 hours)  Notes from 11/03/21 1701 through 11/04/21 1701   No notes exist for this encounter.         ADL Documentation (last day)     Date/Time Transferring Toileting Bathing Dressing Eating Communication Swallowing    11/04/21 0745 2 - assistive person 3 - assistive equipment and person 2 - assistive person 2 - assistive person 0 - independent 0 - understands/communicates without difficulty 0 - swallows foods/liquids without difficulty    11/03/21 1940 2 - assistive person 3 - assistive equipment and person 2 - assistive person 2 - assistive person 0 - independent 0 - understands/communicates without difficulty 0 - swallows foods/liquids without difficulty     11/03/21 0838 2 - assistive person 3 - assistive equipment and person 2 - assistive person 2 - assistive person 0 - independent 0 - understands/communicates without difficulty 0 - swallows foods/liquids without difficulty

## 2021-11-04 NOTE — DISCHARGE PLACEMENT REQUEST
"Fidencio Luciano (76 y.o. Male)             Date of Birth Social Security Number Address Home Phone MRN    1945  [address] [phone] [id number]    [employer] Marital Status             Non-Hinduism        Admission Date Admission Type Admitting Provider Attending Provider Department, Room/Bed    10/27/21 Emergency Carlos Grier MD [place], Emmanuel [place], MD [address], [address]305[address]S    Discharge Date Discharge Disposition Discharge Destination                         Attending Provider: Jewel Prado MD    Allergies: No Known Allergies    Isolation: None   Infection: None   Code Status: CPR   Advance Care Planning Activity    Ht: 180.3 cm (71\")   Wt: 66.4 kg (146 lb 6.4 oz)    Admission Cmt: None   Principal Problem: Weakness generalized [R53.1]                 Active Insurance as of 10/27/2021     Primary Coverage     Payor Plan Insurance Group Employer/Plan Group    ANTH MEDICARE REPLACEMENT ANTH MEDICARE ADVANTAGE [id number]     Payor Plan Address Payor Plan Phone Number Payor Plan Fax Number Effective Dates    [address] [phone]  1/1/2021 - None Entered    [facility] [id number]       Subscriber Name Subscriber Birth Date Member ID       FIDENCIO LUCIANO 1945 [id number]                 Emergency Contacts      (Rel.) Home Phone Work Phone Mobile Phone    Mustapha(POA)Angela (Spouse) [phone] -- [phone]    Dylan García (Son) [phone] -- [phone]    AkbarSofie (Daughter) [phone] -- --    Zhane Conner (Daughter) [phone] -- --    ShannonAries (Son) [phone] -- --            Emergency Contact Information     Name Relation Home Work Mobile    Mustapha(POA)Angela Spouse [phone]    Gacría,Dylan Son [phone]    AkbarSofie Daughter [phone]      Zhane Conner Daughter [phone]      Aries Ortega Son [phone]      "       Insurance Information                ANTH MEDICARE REPLACEMENT/[facility] MEDICARE ADVANTAGE Phone: [phone]    Subscriber: Fidencio Luciano Subscriber#: [id number]    Group#: [id number] Precert#: --          Treatment Team  Chat With All Active Members    Provider Relationship Specialty Contact    Jewel Prado MD  Attending --  [phone]    Fredy Frias  Patient Care Technician --     Cora Perry RN  Registered Nurse --     Jennifer Luque, PA  Physician Assistant Hospitalist  [phone]    Veronica Barton, PT  Physical Therapist Physical Therapy     Callie Kebede, RRT  Respiratory Therapist --  [phone]          Problem List           Codes Noted - Resolved       Hospital    * (Principal) Weakness generalized ICD-10-CM: R53.1  ICD-9-CM: 780.79 5/18/2017 - Present       Non-Hospital    Chest pain ICD-10-CM: R07.9  ICD-9-CM: 786.50 5/24/2021 - Present    Dysphagia ICD-10-CM: R13.10  ICD-9-CM: 787.20 4/2/2021 - Present    Gastroesophageal reflux disease ICD-10-CM: K21.9  ICD-9-CM: 530.81 1/26/2021 - Present    Iron deficiency anemia ICD-10-CM: D50.9  ICD-9-CM: 280.9 7/17/2018 - Present    Weight loss, unintentional ICD-10-CM: R63.4  ICD-9-CM: 783.21 2/1/2018 - Present    Coronary artery fistula (Chronic) ICD-10-CM: I25.41  ICD-9-CM: 414.19 7/30/2017 - Present    Hyperlipidemia LDL goal <70 ICD-10-CM: E78.5  ICD-9-CM: 272.4 1/9/2017 - Present    ASCVD (arteriosclerotic cardiovascular disease), s/p stenting of 80 % stenosis in left circumflex on 10/05/16and 60% stenosis in the LAD, clinically stable.  (Chronic) ICD-10-CM: I25.10  ICD-9-CM: 429.2, 440.9 12/15/2016 - Present    Essential hypertension (Chronic) ICD-10-CM: I10  ICD-9-CM: 401.9 8/18/2016 - Present    Type 2 diabetes mellitus (HCC) ICD-10-CM: E11.9  ICD-9-CM: 250.00 8/18/2016 - Present    Benign prostatic hyperplasia (Chronic) ICD-10-CM: N40.0  ICD-9-CM: 600.00 8/18/2016 - Present             History &  "Physical      Carlos Grier MD at 10/27/21 9201        Date of admission: 10/27/2021    Seen in     Chief complaint: Chest pain    HPI: Most of this history was obtained from patient's wife who is at bedside and assisted with the H&P.  Patient's history is limited.  This morning patient's wife states she was going to go out briefly and tried to wake him up to tell him and he was not very responsive.  He then at some point however shortly after that woke up and stated he was having some chest pain.  He wanted to get up and go to the bathroom and she states although he shuffles he was able to get up and legs moving to go to the bathroom.  He states he has incontinence at times and leaves a trail of urine into the bathroom if he needs to go.  Sometimes he can get up on his own with a walker sometimes he cannot.  Patient carries a history of dementia and apparently has had a fairly rapid progression, I have discussed with the patient's PCP who confirms that the patient is declining, he did see a local neurologist and was told that he had \"mini strokes\" as evidenced by small vessel disease on his MRI which is most likely cause of this decline.  Patient is now complaining of 0/10 chest pain, he does not feel associated short of breath.  He does not remember the character of the pain this morning.  He does have a history of coronary artery disease status post stents x2 in the past.  He follows up with Dr. Buenrostro.  I discussed further about this mental decline as the patient was to be discharged from the emergency room because nothing significant found but when the wife got here he was having some occasional myoclonic jerks and she stated that she could not take him home.  I was then called for consultation.  Of interest patient retired from [employer], he then went to work for [employer].  He actually worked there several days a week right up until March 2021 and was fairly normal.  He then developed Covid, he was " unvaccinated, and apparently has not been the same since and has had a fairly steady physical and mental decline.  Wife has not seen any definite seizure activity at home.  Has some urinary incontinence but bowels move okay.  He walks with a walker he has been losing weight has a tremor at times.  Patient has had 2 falls recently but he has not hit his head that the wife knows of.    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    Family history reviewed with him as much as I could and nothing significant to contribute to current illness    ROS: Really unobtainable except for the patient's wife's input as above.    Exam: Patient appears chronically ill but in no distress he is currently on room air.  152/66, 18, 57, 97% on room air temperature 97.5.  Pupils are equal conjunctiva unremarkable hearing appears to be intact patient actually is oriented to self place city year and president.  Voice is weak.  He does have an occasional myoclonic jerk in his hands and feet that seem to occur at the same time.  Strength is symmetric and I would rate 4/5.  He has adequate distal pulses DP greater than PT bilaterally lungs have bilateral breath sounds rare rhonchi but otherwise clear no wheezing or rales is not use accessory muscles to breathe, heart is a regular rate and rhythm without murmur rub or gallop, abdomen soft benign bowel sounds are active no organomegaly is appreciated nondistended nontender.  Mucous membranes are dry    Data:  Stress  test June 2021 low risk study  Echo May 2021 normal EF with grade 1 diastolic dysfunction  Brain MRI cerebral atrophy and moderate chronic small vessel disease  Chest x-ray negative  Troponin negative  Lactate 2.9  ABG x2 showed respiratory alkalosis with metabolic compensation  Sed rate 2  White count 1.5 hemoglobin 14 hematocrit 44  platelet count 187  PT and PTT essentially normal  Electrolytes unremarkable except for CO2 of 12.8 which is not consistent with the ABG CO2 x2, this may be spurious but he was not acidotic  TSH free T4 normal  CRP less than 0.3  proBNP normal  COVID-19 negative  UA trace protein otherwise negative tox screen negative  EKG image reviewed sinus rhythm and bradycardic rate some artifact nonspecific findings otherwise unremarkable    Assessment and plan:  Decline post Covid, in general I feel like 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  He does appear weak however and may benefit from therapy, PT/OT/speech therapy will be consulted.  I would like to get a better feel for his true functional status.  Of note I did inform the patient's wife that we do not have neurology here that I am happy to admit the patient and take care of him but she did not did not front the neurology service is not available, she voiced understanding and wanted the patient admitted here.  Since patient is oriented, this would question the diagnosis of dementia.    Urinary incontinence, he may be having overflow incontinence, I am going to check a postvoid residual.    Episode of poorly responsiveness this morning, etiology is uncertain, he has some chest pain after this, I am going to ask his cardiologist to evaluate, troponins are negative, I am also going to check an EEG.  Brain just shows small vessel disease by MRI.    Coronary artery disease, as above, review home medications and restart as appropriate    History of diabetes, check  A1c but he is no longer on medication.    Lactic acidosis, he overall appears dry, will give fluid bolus followed by saline, recheck electrolytes in the a.m.  He does have a prerenal azotemia by laboratory data.  I do not see any evidence of infectious process.    CODE STATUS, discussed with the patient as well as patient's wife.  Patient was oriented and he states that in the event of cardiopulmonary as he would want to be resuscitated therefore they in agreement to make the patient a full code.    Macrocytosis, check B12 and folate, thyroid status is euthyroid    DVT prophylaxis, SCUDs, holding on anticoagulants due to the history of ulcer.                        Electronically signed by Carlos Grier MD at 10/27/21 1819       Lines, Drains & Airways     Active LDAs     Name Placement date Placement time Site Days    Peripheral IV 11/03/21 1836 Anterior; Right; Upper Arm 11/03/21 1836  Arm  less than 1    External Urinary Catheter 10/31/21  0600  --  4                  Current Facility-Administered Medications   Medication Dose Route Frequency Provider Last Rate Last Admin   • acetaminophen (TYLENOL) 160 MG/5ML solution 650 mg  650 mg Oral Q4H PRN Carlos Grier MD   650 mg at 11/04/21 1350   • albuterol (PROVENTIL) nebulizer solution 0.083% 2.5 mg/3mL  2.5 mg Nebulization Q6H PRN Carlos Grier MD   2.5 mg at 11/03/21 1909   • aspirin EC tablet 81 mg  81 mg Oral Daily Carlos Grier MD   81 mg at 11/04/21 0855   • atorvastatin (LIPITOR) tablet 80 mg  80 mg Oral Nightly Carlos Grier MD   80 mg at 11/03/21 2015   • budesonide-formoterol (SYMBICORT) 80-4.5 MCG/ACT inhaler 2 puff  2 puff Inhalation BID Carlos Grier MD   2 puff at 11/04/21 0640   • clopidogrel (PLAVIX) tablet 75 mg  75 mg Oral Daily Carlos Grier MD   75 mg at 11/04/21 0855   • ferrous sulfate tablet 325 mg  325 mg Oral BID Carlos Grier MD   325 mg at 11/04/21 0855   • finasteride (PROSCAR) tablet 5 mg  5 mg Oral Daily Carlos Grier,  MD   5 mg at 11/04/21 0855   • isosorbide mononitrate (IMDUR) 24 hr tablet 120 mg  120 mg Oral Daily Carlos Grier MD   120 mg at 11/04/21 0855   • lactated ringers infusion  50 mL/hr Intravenous Continuous Jennifer Luque PA 50 mL/hr at 11/04/21 1018 50 mL/hr at 11/04/21 1018   • megestrol (MEGACE) 40 MG/ML suspension 800 mg  800 mg Oral Daily Carlos Grier MD   800 mg at 11/04/21 0855   • Memantine HCl-Donepezil HCl 28-10 MG capsule sustained-release 24 hr 28 mg  1 capsule Oral Daily Carlos Grier MD   28 mg at 11/04/21 0856   • mirtazapine (REMERON) tablet 45 mg  45 mg Oral Nightly Carlos Grier MD   45 mg at 11/03/21 2014   • nitroglycerin (NITROSTAT) SL tablet 0.4 mg  0.4 mg Sublingual Q5 Min PRN Carlos Grier MD       • pantoprazole (PROTONIX) EC tablet 40 mg  40 mg Oral QAM AC Carlos Grier MD   40 mg at 11/04/21 0855   • polyethylene glycol (MIRALAX) packet 17 g  17 g Oral Daily Carlos Greir MD   17 g at 11/03/21 0836   • ranolazine (RANEXA) 12 hr tablet 500 mg  500 mg Oral Q12H Giovanni Buenrostro MD   500 mg at 11/04/21 0855   • sodium chloride 0.9 % flush 10 mL  10 mL Intravenous PRN Jaswinder Braun MD       • sodium chloride 0.9 % flush 10 mL  10 mL Intravenous Q12H Carlos Grier MD   10 mL at 11/04/21 0856   • sodium chloride 0.9 % flush 10 mL  10 mL Intravenous PRN Carlos Grier MD           Lab Results (last 24 hours)     Procedure Component Value Units Date/Time    POC Glucose Once [[id number]]  (Abnormal) Collected: 11/04/21 1614    Specimen: Blood Updated: 11/04/21 1630     Glucose 135 mg/dL      Comment: Meter: [id number] : [id number] trent gallego       Procalcitonin [[id number]] Collected: 11/04/21 0717    Specimen: Blood Updated: 11/04/21 1236    POC Glucose Once [[id number]]  (Abnormal) Collected: 11/04/21 1033    Specimen: Blood Updated: 11/04/21 1042     Glucose 131 mg/dL      Comment: Meter: [id number] : [id number] trent Denton  Culture - Blood, Arm, Left [[id number]] Collected: 11/04/21 0851    Specimen: Blood from Arm, Left Updated: 11/04/21 0905    Blood Culture - Blood, Hand, Left [[id number]] Collected: 11/04/21 0851    Specimen: Blood from Hand, Left Updated: 11/04/21 0905    Vitamin B12 [[id number]] Collected: 11/04/21 0750    Specimen: Blood Updated: 11/04/21 0757    Folate [[id number]] Collected: 11/04/21 0750    Specimen: Blood Updated: 11/04/21 0757    POC Glucose Once [[id number]]  (Normal) Collected: 11/04/21 0638    Specimen: Blood Updated: 11/04/21 0654     Glucose 93 mg/dL      Comment: Meter: [id number] : [id number] newman lashonda       Comprehensive Metabolic Panel [[id number]]  (Abnormal) Collected: 11/04/21 0045    Specimen: Blood Updated: 11/04/21 0208     Glucose 127 mg/dL      BUN 36 mg/dL      Creatinine 0.97 mg/dL      Sodium 139 mmol/L      Potassium 4.1 mmol/L      Comment: Slight hemolysis detected by analyzer. Results may be affected.        Chloride 108 mmol/L      CO2 18.3 mmol/L      Calcium 8.6 mg/dL      Total Protein 6.0 g/dL      Albumin 3.43 g/dL      ALT (SGPT) 22 U/L      AST (SGOT) 20 U/L      Alkaline Phosphatase 80 U/L      Total Bilirubin 0.3 mg/dL      eGFR Non African Amer 75 mL/min/1.73      Globulin 2.6 gm/dL      A/G Ratio 1.3 g/dL      BUN/Creatinine Ratio 37.1     Anion Gap 12.7 mmol/L     Narrative:      GFR Normal >60  Chronic Kidney Disease <60  Kidney Failure <15      CBC & Differential [[id number]]  (Abnormal) Collected: 11/04/21 0045    Specimen: Blood Updated: 11/04/21 0147    Narrative:      The following orders were created for panel order CBC & Differential.  Procedure                               Abnormality         Status                     ---------                               -----------         ------                     CBC Auto Differential[[id number]]        Abnormal            Final result                 Please view results for these tests on the individual orders.     CBC Auto Differential [[id number]]  (Abnormal) Collected: 11/04/21 0045    Specimen: Blood Updated: 11/04/21 0147     WBC 17.19 10*3/mm3      RBC 3.92 10*6/mm3      Hemoglobin 13.1 g/dL      Hematocrit 39.5 %      .8 fL      MCH 33.4 pg      MCHC 33.2 g/dL      RDW 15.6 %      RDW-SD 57.6 fl      MPV 9.9 fL      Platelets 203 10*3/mm3      Neutrophil % 76.4 %      Lymphocyte % 12.4 %      Monocyte % 7.1 %      Eosinophil % 0.3 %      Basophil % 0.9 %      Immature Grans % 2.9 %      Neutrophils, Absolute 13.13 10*3/mm3      Lymphocytes, Absolute 2.13 10*3/mm3      Monocytes, Absolute 1.22 10*3/mm3      Eosinophils, Absolute 0.06 10*3/mm3      Basophils, Absolute 0.16 10*3/mm3      Immature Grans, Absolute 0.49 10*3/mm3      nRBC 0.0 /100 WBC     POC Glucose Once [[id number]]  (Normal) Collected: 11/03/21 1900    Specimen: Blood Updated: 11/03/21 1916     Glucose 130 mg/dL      Comment: Meter: [id number] : [id number] [facility]       Urinalysis With Culture If Indicated - Urine, Random Void [[id number]]  (Abnormal) Collected: 11/03/21 1734    Specimen: Urine, Random Void Updated: 11/03/21 1812     Color, UA Dark Yellow     Appearance, UA Clear     pH, UA 5.5     Specific Gravity, UA 1.021     Glucose, UA Negative     Ketones, UA Negative     Bilirubin, UA Negative     Blood, UA Negative     Protein, UA Negative     Leuk Esterase, UA Negative     Nitrite, UA Negative     Urobilinogen, UA 1.0 E.U./dL    Narrative:      Urine microscopic not indicated.        Orders (last 24 hrs)      Start     Ordered    11/05/21 0600  Basic Metabolic Panel  Morning Draw         11/04/21 1451    11/05/21 0600  CBC & Differential  Morning Draw         11/04/21 1451    11/05/21 0600  C-reactive Protein  Morning Draw         11/04/21 1451    11/04/21 1631  POC Glucose Once  PROCEDURE ONCE         11/04/21 1614    11/04/21 1043  POC Glucose Once  PROCEDURE ONCE         11/04/21 1033    11/04/21 0751  Blood Culture - Blood,  "Arm, Left  Once        \"And\" Linked Group Details    11/04/21 0742    11/04/21 0751  Blood Culture - Blood, Hand, Left  Once        Comments: From a different site than #1.     \"And\" Linked Group Details    11/04/21 0742    11/04/21 0717  Vitamin B12  Once         11/04/21 0716    11/04/21 0717  Folate  Once         11/04/21 0716    11/04/21 0715  Procalcitonin  STAT         11/04/21 0714    11/04/21 0655  POC Glucose Once  PROCEDURE ONCE         11/04/21 0638    11/04/21 0600  Comprehensive Metabolic Panel  Morning Draw         11/03/21 1300    11/04/21 0600  CBC & Differential  Morning Draw         11/03/21 1300    11/04/21 0600  CBC Auto Differential  PROCEDURE ONCE         11/03/21 2207    11/03/21 1917  POC Glucose Once  PROCEDURE ONCE         11/03/21 1900    11/03/21 1721  Please collect repeat UA if not yet collected.  Nursing Communication  Once        Comments: Please collect repeat UA if not yet collected.    11/03/21 1720    11/03/21 1345  lactated ringers infusion  Continuous         11/03/21 1258    10/30/21 1830  polyethylene glycol (MIRALAX) packet 17 g  Daily         10/30/21 1623    10/28/21 2100  atorvastatin (LIPITOR) tablet 80 mg  Nightly         10/28/21 0747    10/28/21 2100  mirtazapine (REMERON) tablet 45 mg  Nightly         10/28/21 0747    10/28/21 1400  Memantine HCl-Donepezil HCl 28-10 MG capsule sustained-release 24 hr 28 mg  Daily         10/28/21 0747    10/28/21 1200  ranolazine (RANEXA) 12 hr tablet 500 mg  Every 12 Hours Scheduled         10/28/21 1108    10/28/21 0900  clopidogrel (PLAVIX) tablet 75 mg  Daily         10/28/21 0747    10/28/21 0900  ferrous sulfate tablet 325 mg  2 Times Daily         10/28/21 0747    10/28/21 0900  isosorbide mononitrate (IMDUR) 24 hr tablet 120 mg  Daily         10/28/21 0747    10/28/21 0900  finasteride (PROSCAR) tablet 5 mg  Daily         10/28/21 0747    10/28/21 0900  budesonide-formoterol (SYMBICORT) 80-4.5 MCG/ACT inhaler 2 puff  2 Times " "Daily         10/28/21 0747    10/28/21 0900  megestrol (MEGACE) 40 MG/ML suspension 800 mg  Daily         10/28/21 0747    10/28/21 0800  POC Glucose Finger BID  2 Times Daily       10/27/21 1924    10/28/21 0746  albuterol (PROVENTIL) nebulizer solution 0.083% 2.5 mg/3mL  Every 6 Hours PRN         10/28/21 0747    10/28/21 0730  pantoprazole (PROTONIX) EC tablet 40 mg  Every Morning Before Breakfast         10/27/21 1924    10/27/21 2100  sodium chloride 0.9 % flush 10 mL  Every 12 Hours Scheduled         10/27/21 1924    10/27/21 2015  aspirin EC tablet 81 mg  Daily         10/27/21 1924    10/27/21 2000  Vital Signs  Every 4 Hours       10/27/21 1924    10/27/21 1925  Intake & Output  Every Shift       10/27/21 1924    10/27/21 1924  sodium chloride 0.9 % flush 10 mL  As Needed         10/27/21 1924    10/27/21 1924  nitroglycerin (NITROSTAT) SL tablet 0.4 mg  Every 5 Minutes PRN         10/27/21 1924    10/27/21 1924  acetaminophen (TYLENOL) 160 MG/5ML solution 650 mg  Every 4 Hours PRN         10/27/21 1924    10/27/21 1011  sodium chloride 0.9 % flush 10 mL  As Needed        \"And\" Linked Group Details    10/27/21 1011    Unscheduled  Measure Post Void Residual  As Needed       10/27/21 1924    Unscheduled  ECG 12 Lead  As Needed      Comments: Nurse to Release if Patient Expericences Acute Chest Pain or Dysrhythmias    10/27/21 1924    Unscheduled  Potassium  As Needed      Comments: For Ventricular Arrhythmias      10/27/21 1924    Unscheduled  Magnesium  As Needed      Comments: For Ventricular Arrhythmias      10/27/21 1924    Unscheduled  Troponin  As Needed      Comments: For Chest Pain      10/27/21 1924    Unscheduled  Digoxin Level  As Needed      Comments: For Atrial Arrhythmias      10/27/21 1924    Unscheduled  Blood Gas, Arterial -With Co-Ox Panel: Yes  As Needed      Comments: Per O2 PolicyNotify Physician      10/27/21 1924    Unscheduled  Up With Assistance  As Needed       10/27/21 1924    --  " colestipol (COLESTID) 1 g tablet  2 Times Daily         10/27/21 2059    --  SCANNED - TELEMETRY           10/27/21 0000    --  SCANNED - TELEMETRY           10/27/21 0000    --  SCANNED - TELEMETRY           10/27/21 0000    --  SCANNED - TELEMETRY           10/27/21 0000    --  SCANNED - TELEMETRY           10/27/21 0000    --  SCANNED - TELEMETRY           10/27/21 0000    --  SCANNED - TELEMETRY           10/27/21 0000    --  SCANNED - TELEMETRY           10/27/21 0000    --  SCANNED - TELEMETRY           10/27/21 0000    --  SCANNED - TELEMETRY           10/27/21 0000    --  SCANNED - TELEMETRY           10/27/21 0000    --  SCANNED - TELEMETRY           10/27/21 0000    --  SCANNED - TELEMETRY           10/27/21 0000    --  SCANNED - TELEMETRY           10/27/21 0000    --  SCANNED - TELEMETRY           10/27/21 0000                Operative/Procedure Notes (last 24 hours)  Notes from 21 through 21   No notes of this type exist for this encounter.            Physician Progress Notes (last 24 hours)      Jennifer Luque PA at 21 1138                 [facility]     Patient Identification:  Name:  Fidencio Luciano  Age:  76 y.o.  Sex:  male  :  1945  MRN:  [id number]  Visit Number:  [id number]  Primary Care Provider:  Camila Ortiz APRN    Date of admission: 10/27/2021  Length of stay:  8    ----------------------------------------------------------------------------------------------------------------------  Subjective     Chief Complaint:   Chief Complaint   Patient presents with   • Chest Pain     Subjective/Interval History:    76 y.o. male who was admitted on 10/27/2021 with generalized weakness and decreased responsiveness.  He was also having some complaints of chest pain at home.  He was having some myoclonic jerks in the ED.  It was noted that the patient had been diagnosed with COVID-19 in 2021.  Since then,  he has had a steady physical and mental decline.  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  He was admitted for further evaluation and therapy.    PMH is significant for CAD status post previous stenting, hypertension, dyslipidemia, iron deficiency anemia, type 2 diabetes mellitus. For complete admission information, please see history and physical.     Consultations:  1. Cardiology    Procedures/Scans:  1. MRI brain without contrast  2. CT chest without contrast  3. CT abdomen/pelvis without contrast  4. SLP evaluation    Today, the patient reports that he is doing well. He denies any complaints of chest pains, shortness of breath, cough, abdominal pain or diarrhea.  No no wounds.  Discussed with RNCora, no new events or concerns reported.    Review of Systems   Constitutional: Negative for chills and fever.   HENT: Negative for sore throat and trouble swallowing.    Respiratory: Negative for cough and shortness of breath.    Cardiovascular: Negative for chest pain.   Gastrointestinal: Negative for abdominal pain, diarrhea, nausea and vomiting.   Genitourinary: Negative for difficulty urinating and dysuria.   Skin: Negative for color change and rash.      ----------------------------------------------------------------------------------------------------------------------  Objective   Current Hospital Meds:  aspirin, 81 mg, Oral, Daily  atorvastatin, 80 mg, Oral, Nightly  budesonide-formoterol, 2 puff, Inhalation, BID  clopidogrel, 75 mg, Oral, Daily  ferrous sulfate, 325 mg, Oral, BID  finasteride, 5 mg, Oral, Daily  isosorbide mononitrate, 120 mg, Oral, Daily  megestrol, 800 mg, Oral, Daily  Memantine HCl-Donepezil HCl, 1 capsule, Oral, Daily  mirtazapine, 45 mg, Oral, Nightly  pantoprazole, 40 mg, Oral, QAM AC  polyethylene glycol, 17 g, Oral, Daily  ranolazine, 500 mg, Oral, Q12H  sodium chloride, 10 mL, Intravenous, Q12H      lactated ringers, 50 mL/hr, Last Rate: 50 mL/hr (11/04/21  1018)      ----------------------------------------------------------------------------------------------------------------------  Vital Signs:  Temp:  [97.5 °F (36.4 °C)-98.8 °F (37.1 °C)] 97.7 °F (36.5 °C)  Heart Rate:  [69-95] 78  Resp:  [18-20] 18  BP: (128-155)/(68-80) 128/68  Mean Arterial Pressure (Non-Invasive) for the past 24 hrs (Last 3 readings):   Noninvasive MAP (mmHg)   11/04/21 1031 84   11/04/21 0635 82   11/04/21 0312 95     SpO2 Percentage    11/04/21 0635 11/04/21 0640 11/04/21 1031   SpO2: 95% 96% 95%     SpO2:  [95 %-99 %] 95 %  on   ;   Device (Oxygen Therapy): room air    Body mass index is 20.42 kg/m².  Wt Readings from Last 3 Encounters:   11/04/21 66.4 kg (146 lb 6.4 oz)   10/21/21 64.4 kg (142 lb)   10/21/21 64.5 kg (142 lb 3.2 oz)        Intake/Output Summary (Last 24 hours) at 11/4/2021 1138  Last data filed at 11/4/2021 0450  Gross per 24 hour   Intake 1440 ml   Output 750 ml   Net 690 ml     Diet Soft Texture; Chopped; Thin  ----------------------------------------------------------------------------------------------------------------------  Physical exam:  Constitutional: Vital signs reviewed. Well-developed and well-nourished.  No respiratory distress on room air.    HENT:  Head:  Normocephalic and atraumatic.    Eyes:  Conjunctivae and EOM are normal. No scleral icterus. No erythema or drainage.  Neck:  Neck supple.   Cardiovascular:  Normal rate, regular rhythm and normal heart sounds with no murmur.  Pulmonary/Chest:  No respiratory distress, no wheezes, no crackles, with normal breath sounds and good air movement.  Abdominal:  Soft.  Bowel sounds are present x4.  No distension and no tenderness.   Musculoskeletal:  No edema, no tenderness, and no deformity.  No red or swollen joints anywhere.    Neurological:  Alert and oriented to person, place, and time. No facial droop.  No slurred speech.   Skin:  Skin is warm and dry. No rash noted. No pallor. The patient was rolled on to  his side with the assistance of his RN, Katerina. No rashes/wounds/or areas of significant skin break down appreciated on the neck, back, buttocks, or inguinal area. Patient had no tenderness along the spine.   Peripheral vascular: No clubbing, no cyanosis, no edema.   Genitourinary: No terrell catheter in place.     I have reviewed and updated the physical exam as needed to reflect that of 11/04/21  ----------------------------------------------------------------------------------------------------------------------  Tele: Sinus rhythm 70s-80s.    ----------------------------------------------------------------------------------------------------------------------              Results from last 7 days   Lab Units 11/04/21 0045 11/03/21 0035 11/02/21 0158   WBC 10*3/mm3 17.19* 14.29* 13.67*   HEMOGLOBIN g/dL 13.1 14.2 13.8   HEMATOCRIT % 39.5 43.5 42.7   MCV fL 100.8* 100.7* 101.4*   MCHC g/dL 33.2 32.6 32.3   PLATELETS 10*3/mm3 203 206 195     Results from last 7 days   Lab Units 11/04/21 0045 11/03/21 0035 11/02/21 0158 11/01/21  0437 10/31/21  0052   SODIUM mmol/L 139 140 137   < > 144   POTASSIUM mmol/L 4.1 4.2 4.2   < > 3.9   CHLORIDE mmol/L 108* 108* 106   < > 113*   CO2 mmol/L 18.3* 18.8* 20.1*   < > 21.6*   BUN mg/dL 36* 34* 25*   < > 13   CREATININE mg/dL 0.97 0.87 0.77   < > 0.72*   EGFR IF NONAFRICN AM mL/min/1.73 75 86 98   < > 106   CALCIUM mg/dL 8.6 8.8 9.0   < > 8.7   GLUCOSE mg/dL 127* 145* 113*   < > 88   ALBUMIN g/dL 3.43*  --  3.77  --  3.74   BILIRUBIN mg/dL 0.3  --  0.5  --  0.5   ALK PHOS U/L 80  --  71  --  74   AST (SGOT) U/L 20  --  22  --  20   ALT (SGPT) U/L 22  --  23  --  24    < > = values in this interval not displayed.   Estimated Creatinine Clearance: 61.8 mL/min (by C-G formula based on SCr of 0.97 mg/dL).  No results found for: AMMONIA     Glucose   Date/Time Value Ref Range Status   11/04/2021 1033 131 (H) 70 - 130 mg/dL Final     Comment:     Meter: [id number] : [id number]  newman lashonda   11/04/2021 0638 93 70 - 130 mg/dL Final     Comment:     Meter: [id number] : [id number] trent gallego   11/03/2021 1900 130 70 - 130 mg/dL Final     Comment:     Meter: [id number] : [id number] [facility]   11/03/2021 1641 122 70 - 130 mg/dL Final     Comment:     Meter: [id number] : [id number] michael miracle   11/03/2021 1026 162 (H) 70 - 130 mg/dL Final     Comment:     Meter: [id number] : [id number] rodriguez miracle   11/03/2021 0708 101 70 - 130 mg/dL Final     Comment:     Meter: [id number] : [id number] rodriguez miracle   11/02/2021 1822 201 (H) 70 - 130 mg/dL Final     Comment:     Meter: [id number] : [id number] [facility]   11/02/2021 1620 144 (H) 70 - 130 mg/dL Final     Comment:     Meter: [id number] : [id number] Draryn Nathan     Lab Results   Component Value Date    HGBA1C 5.90 (H) 10/28/2021     Lab Results   Component Value Date    TSH 1.650 10/27/2021    FREET4 1.14 10/27/2021     No results found for: BLOODCX  No results found for: URINECX  No results found for: WOUNDCX  No results found for: STOOLCX  No results found for: RESPCX    Pain Management Panel     Pain Management Panel Latest Ref Rng & Units 10/27/2021 7/31/2015    AMPHETAMINES SCREEN, URINE Negative Negative Negative    BARBITURATES SCREEN Negative Negative Negative    BENZODIAZEPINE SCREEN, URINE Negative Negative Negative    BUPRENORPHINEUR Negative Negative -    COCAINE SCREEN, URINE Negative Negative Negative    METHADONE SCREEN, URINE Negative Negative Negative    METHAMPHETAMINEUR Negative Negative -        I have personally reviewed the above laboratory results for 11/04/21  ----------------------------------------------------------------------------------------------------------------------  Imaging Results (Last 24 Hours)     ** No results found for the last 24 hours. 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  Patient reports improvement noted.  · Inpatient rehab consulted, awaiting insurance determination.    #Leukocytosis   · Patient denies cough, shortness of breath, dysuria, abdominal pain, diarrhea.  · CT chest on admission showed no consolidation.  CT abdomen/pelvis on admission also showed no acute findings.  · Repeat UA and chest x-ray 11/3 without acute findings.  · WBC count continues to increase.  Blood cultures added x2.  · Patient remains afebrile and heart rate is improved today with addition of IV fluids yesterday.  · Skin was assessed and no evidence of cellulitis or wounds noted.  · Discussed with Dr. Prado, will continue to monitor off of antibiotics. Leukocytosis possibly related to megace, although, it appears he was taking this at home as well.   · Repeat CBC in AM.    #Constipation   · Patient is now moving his bowels.   · Continue scheduled MiraLAX.    #Concern for esophagitis   · Continue PPI.    #Dementia   · Patient currently alert and oriented.  · PCP had reported some concern for advancing dementia.  · MRI of the brain without contrast on admission showed no acute findings, mild cerebral atrophy and moderate chronic small vessel ischemic disease.  · Continue home medications and follow-up with PCP as an outpatient.    #CAD s/p prior stenting   · Denies any chest pain.  · Continue low-dose aspirin, atorvastatin, Plavix, Imdur, Ranexa.    #DVT Prophylaxis  · SCDs.     The patient is high risk due to the following diagnoses/reasons: Generalized debility, suspected long Covid syndrome.    I have discussed the patient's assessment and plan with the patient, DIOR Borges who evaluated the patient along with me, and attending physician, Dr. Prado.     Disposition:   · Inpatient rehab  consulted with insurance approval pending.     Discharge needs:  • None at this time.    IGOR Mcrae  11/04/21  11:38 EDT          Electronically signed by Jennifer Luque PA at 11/04/21 1450       Consult Notes (last 24 hours)  Notes from 11/03/21 1659 through 11/04/21 1659   No notes of this type exist for this encounter.         Physical Therapy Notes (last 24 hours)  Notes from 11/03/21 1659 through 11/04/21 1659   No notes exist for this encounter.         Occupational Therapy Notes (last 24 hours)  Notes from 11/03/21 1659 through 11/04/21 1659   No notes exist for this encounter.         Speech Language Pathology Notes (last 24 hours)  Notes from 11/03/21 1659 through 11/04/21 1659   No notes exist for this encounter.         ADL Documentation (last day)     Date/Time Transferring Toileting Bathing Dressing Eating Communication Swallowing    11/04/21 0745 2 - assistive person 3 - assistive equipment and person 2 - assistive person 2 - assistive person 0 - independent 0 - understands/communicates without difficulty 0 - swallows foods/liquids without difficulty    11/03/21 1940 2 - assistive person 3 - assistive equipment and person 2 - assistive person 2 - assistive person 0 - independent 0 - understands/communicates without difficulty 0 - swallows foods/liquids without difficulty    11/03/21 0838 2 - assistive person 3 - assistive equipment and person 2 - assistive person 2 - assistive person 0 - independent 0 - understands/communicates without difficulty 0 - swallows foods/liquids without difficulty

## 2021-11-04 NOTE — NURSING NOTE
Patient's insurance has denied his admission for inpatient rehab.  Peer to Peer number is [id number] and reference number is [id number].

## 2021-11-04 NOTE — CASE MANAGEMENT/SOCIAL WORK
Discharge Planning Assessment  MILLICENT Recio     Patient Name: Fidencio Luciano  MRN: [id number]  Today's Date: 11/4/2021    Admit Date: 10/27/2021       Discharge Plan     Row Name 11/04/21 1655       Plan    Plan Pt's insurance per Kassie has denied pt admit to inpatient rehab.  SS spoke with pt's spouse/CHIN Miller who is agreeable to SS assessing for short term nursing home placement with [facility] and [facility] as preferred facilities.  SS will fax pt's referral.  SS will follow.              JOSE UgaldeW

## 2021-11-04 NOTE — PLAN OF CARE
Patient resting in bed comfortably at this time. No reports of any acute distress during the shift. Tolerated his PM PO meds well. Tolerating the RA well. No changes during the shift. HOB elevated. Bed alarm on and call bell in reach. Will continue to follow the care plan.

## 2021-11-04 NOTE — PROGRESS NOTES
[facility] HOSPITALIST PROGRESS NOTE     Patient Identification:  Name:  Fidencio Luciano  Age:  76 y.o.  Sex:  male  :  1945  MRN:  [id number]  Visit Number:  [id number]  Primary Care Provider:  Camila Ortiz APRN    Date of admission: 10/27/2021  Length of stay:  8    ----------------------------------------------------------------------------------------------------------------------  Subjective     Chief Complaint:   Chief Complaint   Patient presents with   • Chest Pain     Subjective/Interval History:    76 y.o. male who was admitted on 10/27/2021 with generalized weakness and decreased responsiveness.  He was also having some complaints of chest pain at home.  He was having some myoclonic jerks in the ED.  It was noted that the patient had been diagnosed with COVID-19 in 2021.  Since then, he has had a steady physical and mental decline.  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  He was admitted for further evaluation and therapy.    PMH is significant for CAD status post previous stenting, hypertension, dyslipidemia, iron deficiency anemia, type 2 diabetes mellitus. For complete admission information, please see history and physical.     Consultations:  1. Cardiology    Procedures/Scans:  1. MRI brain without contrast  2. CT chest without contrast  3. CT abdomen/pelvis without contrast  4. SLP evaluation    Today, the patient reports that he is doing well. He denies any complaints of chest pains, shortness of breath, cough, abdominal pain or diarrhea.  No no wounds.  Discussed with RNCora, no new events or concerns reported.    Review of Systems   Constitutional: Negative for chills and fever.   HENT: Negative for sore throat and trouble swallowing.    Respiratory: Negative for cough and shortness of breath.    Cardiovascular: Negative for chest pain.   Gastrointestinal: Negative for abdominal pain, diarrhea, nausea and vomiting.   Genitourinary: Negative  for difficulty urinating and dysuria.   Skin: Negative for color change and rash.      ----------------------------------------------------------------------------------------------------------------------  Objective   [facility] Meds:  aspirin, 81 mg, Oral, Daily  atorvastatin, 80 mg, Oral, Nightly  budesonide-formoterol, 2 puff, Inhalation, BID  clopidogrel, 75 mg, Oral, Daily  ferrous sulfate, 325 mg, Oral, BID  finasteride, 5 mg, Oral, Daily  isosorbide mononitrate, 120 mg, Oral, Daily  megestrol, 800 mg, Oral, Daily  Memantine HCl-Donepezil HCl, 1 capsule, Oral, Daily  mirtazapine, 45 mg, Oral, Nightly  pantoprazole, 40 mg, Oral, QAM AC  polyethylene glycol, 17 g, Oral, Daily  ranolazine, 500 mg, Oral, Q12H  sodium chloride, 10 mL, Intravenous, Q12H      lactated ringers, 50 mL/hr, Last Rate: 50 mL/hr (11/04/21 1018)      ----------------------------------------------------------------------------------------------------------------------  Vital Signs:  Temp:  [97.5 °F (36.4 °C)-98.8 °F (37.1 °C)] 97.7 °F (36.5 °C)  Heart Rate:  [69-95] 78  Resp:  [18-20] 18  BP: (128-155)/(68-80) 128/68  Mean Arterial Pressure (Non-Invasive) for the past 24 hrs (Last 3 readings):   Noninvasive MAP (mmHg)   11/04/21 1031 84   11/04/21 0635 82   11/04/21 0312 95     SpO2 Percentage    11/04/21 0635 11/04/21 0640 11/04/21 1031   SpO2: 95% 96% 95%     SpO2:  [95 %-99 %] 95 %  on   ;   Device (Oxygen Therapy): room air    Body mass index is 20.42 kg/m².  Wt Readings from Last 3 Encounters:   11/04/21 66.4 kg (146 lb 6.4 oz)   10/21/21 64.4 kg (142 lb)   10/21/21 64.5 kg (142 lb 3.2 oz)        Intake/Output Summary (Last 24 hours) at 11/4/2021 1138  Last data filed at 11/4/2021 0450  Gross per 24 hour   Intake 1440 ml   Output 750 ml   Net 690 ml     Diet Soft Texture; Chopped; Thin  ----------------------------------------------------------------------------------------------------------------------  Physical  exam:  Constitutional: Vital signs reviewed. Well-developed and well-nourished.  No respiratory distress on room air.    HENT:  Head:  Normocephalic and atraumatic.    Eyes:  Conjunctivae and EOM are normal. No scleral icterus. No erythema or drainage.  Neck:  Neck supple.   Cardiovascular:  Normal rate, regular rhythm and normal heart sounds with no murmur.  Pulmonary/Chest:  No respiratory distress, no wheezes, no crackles, with normal breath sounds and good air movement.  Abdominal:  Soft.  Bowel sounds are present x4.  No distension and no tenderness.   Musculoskeletal:  No edema, no tenderness, and no deformity.  No red or swollen joints anywhere.    Neurological:  Alert and oriented to person, place, and time. No facial droop.  No slurred speech.   Skin:  Skin is warm and dry. No rash noted. No pallor. The patient was rolled on to his side with the assistance of his RN, Katerina. No rashes/wounds/or areas of significant skin break down appreciated on the neck, back, buttocks, or inguinal area. Patient had no tenderness along the spine.   Peripheral vascular: No clubbing, no cyanosis, no edema.   Genitourinary: No terrell catheter in place.     I have reviewed and updated the physical exam as needed to reflect that of 11/04/21  ----------------------------------------------------------------------------------------------------------------------  Tele: Sinus rhythm 70s-80s.    ----------------------------------------------------------------------------------------------------------------------              Results from last 7 days   Lab Units 11/04/21 0045 11/03/21 0035 11/02/21 0158   WBC 10*3/mm3 17.19* 14.29* 13.67*   HEMOGLOBIN g/dL 13.1 14.2 13.8   HEMATOCRIT % 39.5 43.5 42.7   MCV fL 100.8* 100.7* 101.4*   MCHC g/dL 33.2 32.6 32.3   PLATELETS 10*3/mm3 203 206 195     Results from last 7 days   Lab Units 11/04/21 0045 11/03/21 0035 11/02/21 0158 11/01/21  0437 10/31/21  0052   SODIUM mmol/L 139 140 137    < > 144   POTASSIUM mmol/L 4.1 4.2 4.2   < > 3.9   CHLORIDE mmol/L 108* 108* 106   < > 113*   CO2 mmol/L 18.3* 18.8* 20.1*   < > 21.6*   BUN mg/dL 36* 34* 25*   < > 13   CREATININE mg/dL 0.97 0.87 0.77   < > 0.72*   EGFR IF NONAFRICN AM mL/min/1.73 75 86 98   < > 106   CALCIUM mg/dL 8.6 8.8 9.0   < > 8.7   GLUCOSE mg/dL 127* 145* 113*   < > 88   ALBUMIN g/dL 3.43*  --  3.77  --  3.74   BILIRUBIN mg/dL 0.3  --  0.5  --  0.5   ALK PHOS U/L 80  --  71  --  74   AST (SGOT) U/L 20  --  22  --  20   ALT (SGPT) U/L 22  --  23  --  24    < > = values in this interval not displayed.   Estimated Creatinine Clearance: 61.8 mL/min (by C-G formula based on SCr of 0.97 mg/dL).  No results found for: AMMONIA     Glucose   Date/Time Value Ref Range Status   11/04/2021 1033 131 (H) 70 - 130 mg/dL Final     Comment:     Meter: [id number] : [id number] [facility]   11/04/2021 0638 93 70 - 130 mg/dL Final     Comment:     Meter: [id number] : [id number] [facility]   11/03/2021 1900 130 70 - 130 mg/dL Final     Comment:     Meter: [id number] : [id number] [facility] [place] Parisa Chow   11/03/2021 1641 122 70 - 130 mg/dL Final     Comment:     Meter: [id number] : [id number] michael rausch   11/03/2021 1026 162 (H) 70 - 130 mg/dL Final     Comment:     Meter: [id number] : [id number] michael rausch   11/03/2021 0708 101 70 - 130 mg/dL Final     Comment:     Meter: [id number] : [id number] michael rausch   11/02/2021 1822 201 (H) 70 - 130 mg/dL Final     Comment:     Meter: [id number] : [id number] [facility] [place] Parisa Chow   11/02/2021 1620 144 (H) 70 - 130 mg/dL Final     Comment:     Meter: [id number] : [id number] [place]     Lab Results   Component Value Date    HGBA1C 5.90 (H) 10/28/2021     Lab Results   Component Value Date    TSH 1.650 10/27/2021    FREET4 1.14 10/27/2021     No results found for: BLOODCX  No results found for: URINECX  No results found for: WOUNDCX  No results found for: STOOLCX  No  results found for: RESPCX    Pain Management Panel     Pain Management Panel Latest Ref Rng & Units 10/27/2021 7/31/2015    AMPHETAMINES SCREEN, URINE Negative Negative Negative    BARBITURATES SCREEN Negative Negative Negative    BENZODIAZEPINE SCREEN, URINE Negative Negative Negative    BUPRENORPHINEUR Negative Negative -    COCAINE SCREEN, URINE Negative Negative Negative    METHADONE SCREEN, URINE Negative Negative Negative    METHAMPHETAMINEUR Negative Negative -        I have personally reviewed the above laboratory results for 11/04/21  ----------------------------------------------------------------------------------------------------------------------  Imaging Results (Last 24 Hours)     ** No results found for the last 24 hours. **        ----------------------------------------------------------------------------------------------------------------------  Assessment/Plan     #Debility   #Falls  #Suspected long COVID syndrome   · PT/OT following.  Patient reports improvement noted.  · Inpatient rehab consulted, awaiting insurance determination.    #Leukocytosis   · Patient denies cough, shortness of breath, dysuria, abdominal pain, diarrhea.  · CT chest on admission showed no consolidation.  CT abdomen/pelvis on admission also showed no acute findings.  · Repeat UA and chest x-ray 11/3 without acute findings.  · WBC count continues to increase.  Blood cultures added x2.  · Patient remains afebrile and heart rate is improved today with addition of IV fluids yesterday.  · Skin was assessed and no evidence of cellulitis or wounds noted.  · Discussed with Dr. Prado, will continue to monitor off of antibiotics. Leukocytosis possibly related to megace, although, it appears he was taking this at home as well.   · Repeat CBC in AM.    #Constipation   · Patient is now moving his bowels.   · Continue scheduled MiraLAX.    #Concern for esophagitis   · Continue PPI.    #Dementia   · Patient currently alert and  oriented.  · PCP had reported some concern for advancing dementia.  · MRI of the brain without contrast on admission showed no acute findings, mild cerebral atrophy and moderate chronic small vessel ischemic disease.  · Continue home medications and follow-up with PCP as an outpatient.    #CAD s/p prior stenting   · Denies any chest pain.  · Continue low-dose aspirin, atorvastatin, Plavix, Imdur, Ranexa.    #DVT Prophylaxis  · SCDs.     The patient is high risk due to the following diagnoses/reasons: Generalized debility, suspected long Covid syndrome.    I have discussed the patient's assessment and plan with the patient, DIOR Borges who evaluated the patient along with me, and attending physician, Dr. Prado.     Disposition:   · Inpatient rehab consulted with insurance approval pending.     Discharge needs:  • None at this time.    IGOR Mcrae  11/04/21  11:38 EDT

## 2021-11-05 ENCOUNTER — APPOINTMENT (OUTPATIENT)
Dept: ULTRASOUND IMAGING | Facility: HOSPITAL | Age: 76
End: 2021-11-05

## 2021-11-05 LAB
ANION GAP SERPL CALCULATED.3IONS-SCNC: 14.5 MMOL/L (ref 5–15)
ANISOCYTOSIS BLD QL: ABNORMAL
BUN SERPL-MCNC: 35 MG/DL (ref 8–23)
BUN/CREAT SERPL: 39.8 (ref 7–25)
CALCIUM SPEC-SCNC: 8.5 MG/DL (ref 8.6–10.5)
CHLORIDE SERPL-SCNC: 106 MMOL/L (ref 98–107)
CO2 SERPL-SCNC: 15.5 MMOL/L (ref 22–29)
CREAT SERPL-MCNC: 0.88 MG/DL (ref 0.76–1.27)
CRP SERPL-MCNC: <0.3 MG/DL (ref 0–0.5)
DEPRECATED RDW RBC AUTO: 56.4 FL (ref 37–54)
EOSINOPHIL # BLD MANUAL: 0.16 10*3/MM3 (ref 0–0.4)
EOSINOPHIL NFR BLD MANUAL: 1 % (ref 0.3–6.2)
ERYTHROCYTE [DISTWIDTH] IN BLOOD BY AUTOMATED COUNT: 15.4 % (ref 12.3–15.4)
GFR SERPL CREATININE-BSD FRML MDRD: 84 ML/MIN/1.73
GLUCOSE BLDC GLUCOMTR-MCNC: 141 MG/DL (ref 70–130)
GLUCOSE BLDC GLUCOMTR-MCNC: 182 MG/DL (ref 70–130)
GLUCOSE BLDC GLUCOMTR-MCNC: 77 MG/DL (ref 70–130)
GLUCOSE SERPL-MCNC: 120 MG/DL (ref 65–99)
HCT VFR BLD AUTO: 41.1 % (ref 37.5–51)
HGB BLD-MCNC: 13.7 G/DL (ref 13–17.7)
LYMPHOCYTES # BLD MANUAL: 1.76 10*3/MM3 (ref 0.7–3.1)
LYMPHOCYTES NFR BLD MANUAL: 11 % (ref 19.6–45.3)
LYMPHOCYTES NFR BLD MANUAL: 5 % (ref 5–12)
MACROCYTES BLD QL SMEAR: ABNORMAL
MCH RBC QN AUTO: 33.2 PG (ref 26.6–33)
MCHC RBC AUTO-ENTMCNC: 33.3 G/DL (ref 31.5–35.7)
MCV RBC AUTO: 99.5 FL (ref 79–97)
MONOCYTES # BLD AUTO: 0.8 10*3/MM3 (ref 0.1–0.9)
NEUTROPHILS # BLD AUTO: 13.3 10*3/MM3 (ref 1.7–7)
NEUTROPHILS NFR BLD MANUAL: 79 % (ref 42.7–76)
NEUTS BAND NFR BLD MANUAL: 4 % (ref 0–5)
PLAT MORPH BLD: NORMAL
PLATELET # BLD AUTO: 211 10*3/MM3 (ref 140–450)
PMV BLD AUTO: 9.8 FL (ref 6–12)
POTASSIUM SERPL-SCNC: 4.2 MMOL/L (ref 3.5–5.2)
RBC # BLD AUTO: 4.13 10*6/MM3 (ref 4.14–5.8)
SCAN SLIDE: NORMAL
SODIUM SERPL-SCNC: 136 MMOL/L (ref 136–145)
WBC # BLD AUTO: 16.03 10*3/MM3 (ref 3.4–10.8)

## 2021-11-05 PROCEDURE — 85025 COMPLETE CBC W/AUTO DIFF WBC: CPT | Performed by: PHYSICIAN ASSISTANT

## 2021-11-05 PROCEDURE — 94799 UNLISTED PULMONARY SVC/PX: CPT

## 2021-11-05 PROCEDURE — 93970 EXTREMITY STUDY: CPT

## 2021-11-05 PROCEDURE — 85007 BL SMEAR W/DIFF WBC COUNT: CPT | Performed by: PHYSICIAN ASSISTANT

## 2021-11-05 PROCEDURE — 99232 SBSQ HOSP IP/OBS MODERATE 35: CPT | Performed by: PHYSICIAN ASSISTANT

## 2021-11-05 PROCEDURE — 82962 GLUCOSE BLOOD TEST: CPT

## 2021-11-05 PROCEDURE — 80048 BASIC METABOLIC PNL TOTAL CA: CPT | Performed by: PHYSICIAN ASSISTANT

## 2021-11-05 PROCEDURE — 86140 C-REACTIVE PROTEIN: CPT | Performed by: PHYSICIAN ASSISTANT

## 2021-11-05 RX ADMIN — MIRTAZAPINE 45 MG: 15 TABLET, FILM COATED ORAL at 19:32

## 2021-11-05 RX ADMIN — RANOLAZINE 500 MG: 500 TABLET, FILM COATED, EXTENDED RELEASE ORAL at 08:50

## 2021-11-05 RX ADMIN — SODIUM CHLORIDE, PRESERVATIVE FREE 10 ML: 5 INJECTION INTRAVENOUS at 19:33

## 2021-11-05 RX ADMIN — POLYETHYLENE GLYCOL (3350) 17 G: 17 POWDER, FOR SOLUTION ORAL at 08:50

## 2021-11-05 RX ADMIN — METOPROLOL TARTRATE 12.5 MG: 25 TABLET, FILM COATED ORAL at 19:33

## 2021-11-05 RX ADMIN — FERROUS SULFATE TAB 325 MG (65 MG ELEMENTAL FE) 325 MG: 325 (65 FE) TAB at 08:50

## 2021-11-05 RX ADMIN — FINASTERIDE 5 MG: 5 TABLET, FILM COATED ORAL at 08:50

## 2021-11-05 RX ADMIN — ASPIRIN 81 MG: 81 TABLET, COATED ORAL at 08:50

## 2021-11-05 RX ADMIN — MEGESTROL ACETATE 800 MG: 40 SUSPENSION ORAL at 08:50

## 2021-11-05 RX ADMIN — RANOLAZINE 500 MG: 500 TABLET, FILM COATED, EXTENDED RELEASE ORAL at 19:32

## 2021-11-05 RX ADMIN — ISOSORBIDE MONONITRATE 120 MG: 60 TABLET ORAL at 08:50

## 2021-11-05 RX ADMIN — PANTOPRAZOLE SODIUM 40 MG: 40 TABLET, DELAYED RELEASE ORAL at 08:50

## 2021-11-05 RX ADMIN — ATORVASTATIN CALCIUM 80 MG: 40 TABLET, FILM COATED ORAL at 19:32

## 2021-11-05 RX ADMIN — MEMANTINE HYDROCHLORIDE AND DONEPEZIL HYDROCHLORIDE 28 MG: 28; 10 CAPSULE ORAL at 08:51

## 2021-11-05 RX ADMIN — CLOPIDOGREL 75 MG: 75 TABLET, FILM COATED ORAL at 08:50

## 2021-11-05 RX ADMIN — BUDESONIDE AND FORMOTEROL FUMARATE DIHYDRATE 2 PUFF: 80; 4.5 AEROSOL RESPIRATORY (INHALATION) at 18:29

## 2021-11-05 RX ADMIN — SODIUM CHLORIDE, PRESERVATIVE FREE 10 ML: 5 INJECTION INTRAVENOUS at 08:51

## 2021-11-05 RX ADMIN — BUDESONIDE AND FORMOTEROL FUMARATE DIHYDRATE 2 PUFF: 80; 4.5 AEROSOL RESPIRATORY (INHALATION) at 06:29

## 2021-11-05 RX ADMIN — FERROUS SULFATE TAB 325 MG (65 MG ELEMENTAL FE) 325 MG: 325 (65 FE) TAB at 19:32

## 2021-11-05 RX ADMIN — METOPROLOL TARTRATE 12.5 MG: 25 TABLET, FILM COATED ORAL at 09:21

## 2021-11-05 NOTE — CASE MANAGEMENT/SOCIAL WORK
Discharge Planning Assessment   Hemal     Patient Name: Fidencio Luciano  MRN: [id number]  Today's Date: 11/5/2021    Admit Date: 10/27/2021       Discharge Plan     Row Name 11/05/21 1359       Plan    Plan Per Kat at [facility] facility can begin prior authorization with pt's insurance and will need updated therapy notes.  SS will follow.              Continued Care and Services - Admitted Since 10/27/2021     Destination     Service Provider Request Status Selected Services Address Phone Fax Patient Preferred    [facility]  Pending - Request Sent N/A [address] [place] [address] [phone] --    [facility]  Pending - Request Sent N/A [address] [facility] [address] [place] [address] [phone] --                    JOSE UgaldeW

## 2021-11-05 NOTE — PLAN OF CARE
Goal Outcome Evaluation: Patient has been very pleasant today. Compliant with all medications and care as ordered. He remains on room air, saturations maintaining well above 90%. External Cath remains in place, good output noted. He is currently resting in bed. No distress noted. VSS. Will continue to monitor and follow plan of care.

## 2021-11-05 NOTE — PROGRESS NOTES
[facility] HOSPITALIST PROGRESS NOTE     Patient Identification:  Name:  Fidencio Luciano  Age:  76 y.o.  Sex:  male  :  1945  MRN:  [id number]  Visit Number:  [id number]  Primary Care Provider:  Camila Ortiz APRN    Date of admission: 10/27/2021  Length of stay:  9    ----------------------------------------------------------------------------------------------------------------------  Subjective     Chief Complaint:   Chief Complaint   Patient presents with   • Chest Pain     Subjective/Interval History:    76 y.o. male who was admitted on 10/27/2021 with generalized weakness and decreased responsiveness.  He was also having some complaints of chest pain at home.  He was having some myoclonic jerks in the ED.  It was noted that the patient had been diagnosed with COVID-19 in 2021.  Since then, he has had a steady physical and mental decline.  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  He was admitted for further evaluation and therapy.    PMH is significant for CAD status post previous stenting, hypertension, dyslipidemia, iron deficiency anemia, type 2 diabetes mellitus. For complete admission information, please see history and physical.     Consultations:  1. Cardiology    Procedures/Scans:  1. MRI brain without contrast  2. CT chest without contrast  3. CT abdomen/pelvis without contrast  4. SLP evaluation    Today, the patient reports that he is doing well. He denies any complaints of chest pains, shortness of breath, cough, abdominal pain or diarrhea. He does complain of some right side hip pain from his recent fall. Prior XR showed no fracture.  Discussed with RN, Cora, no new events or concerns reported.  The patient's wife is present at bedside today.  She reports the above-mentioned hip pain.  The patient was denied inpatient rehab and they are interested in local SNF placement.    Review of Systems   Constitutional: Negative for chills and fever.    HENT: Negative for sore throat and trouble swallowing.    Respiratory: Negative for cough and shortness of breath.    Cardiovascular: Negative for chest pain.   Gastrointestinal: Negative for abdominal pain, diarrhea, nausea and vomiting.   Genitourinary: Negative for difficulty urinating and dysuria.   Musculoskeletal: Positive for arthralgias.   Skin: Negative for color change and rash.      ----------------------------------------------------------------------------------------------------------------------  Objective   [facility] Meds:  aspirin, 81 mg, Oral, Daily  atorvastatin, 80 mg, Oral, Nightly  budesonide-formoterol, 2 puff, Inhalation, BID  clopidogrel, 75 mg, Oral, Daily  ferrous sulfate, 325 mg, Oral, BID  finasteride, 5 mg, Oral, Daily  isosorbide mononitrate, 120 mg, Oral, Daily  megestrol, 800 mg, Oral, Daily  Memantine HCl-Donepezil HCl, 1 capsule, Oral, Daily  metoprolol tartrate, 12.5 mg, Oral, Q12H  mirtazapine, 45 mg, Oral, Nightly  pantoprazole, 40 mg, Oral, QAM AC  polyethylene glycol, 17 g, Oral, Daily  ranolazine, 500 mg, Oral, Q12H  sodium chloride, 10 mL, Intravenous, Q12H         ----------------------------------------------------------------------------------------------------------------------  Vital Signs:  Temp:  [97.5 °F (36.4 °C)-98.7 °F (37.1 °C)] 97.5 °F (36.4 °C)  Heart Rate:  [65-98] 96  Resp:  [18-20] 18  BP: (113-169)/(64-80) 113/64  Mean Arterial Pressure (Non-Invasive) for the past 24 hrs (Last 3 readings):   Noninvasive MAP (mmHg)   11/05/21 1021 85   11/05/21 0621 112   11/05/21 0325 87     SpO2 Percentage    11/05/21 0621 11/05/21 0629 11/05/21 1021   SpO2: 98% 97% 97%     SpO2:  [93 %-98 %] 97 %  on   ;   Device (Oxygen Therapy): room air    Body mass index is 20.67 kg/m².  Wt Readings from Last 3 Encounters:   11/05/21 67.2 kg (148 lb 3.2 oz)   10/21/21 64.4 kg (142 lb)   10/21/21 64.5 kg (142 lb 3.2 oz)        Intake/Output Summary (Last 24 hours) at 11/5/2021  1109  Last data filed at 11/5/2021 0830  Gross per 24 hour   Intake 600 ml   Output 600 ml   Net 0 ml     Diet Soft Texture; Chopped; Thin  ----------------------------------------------------------------------------------------------------------------------  Physical exam:  Constitutional: Vital signs reviewed. Well-developed and well-nourished.  No respiratory distress on room air.    HENT:  Head:  Normocephalic and atraumatic.    Eyes:  Conjunctivae and EOM are normal. No scleral icterus. No erythema or drainage.  Neck:  Neck supple.   Cardiovascular:  Normal rate, regular rhythm and normal heart sounds with no murmur.  Pulmonary/Chest:  No respiratory distress, no wheezes, no crackles, with normal breath sounds and good air movement.  Abdominal:  Soft.  Bowel sounds are present x4.  No distension and no tenderness.   Musculoskeletal:  No edema, no tenderness, and no deformity.  No red or swollen joints anywhere.    Neurological:  Alert and oriented to person, place, and time. No facial droop.  No slurred speech.   Skin:  Skin is warm and dry. No rash noted. No pallor.   Peripheral vascular: No clubbing, no cyanosis, no edema.   Genitourinary: No terrell catheter in place.     I have reviewed and updated the physical exam as needed to reflect that of 11/05/21  ----------------------------------------------------------------------------------------------------------------------  Tele: Sinus rhythm 70s.     ----------------------------------------------------------------------------------------------------------------------              Results from last 7 days   Lab Units 11/05/21  0528 11/05/21  0111 11/04/21 0045 11/03/21  0035   CRP mg/dL  --  <0.30  --   --    WBC 10*3/mm3 16.03*  --  17.19* 14.29*   HEMOGLOBIN g/dL 13.7  --  13.1 14.2   HEMATOCRIT % 41.1  --  39.5 43.5   MCV fL 99.5*  --  100.8* 100.7*   MCHC g/dL 33.3  --  33.2 32.6   PLATELETS 10*3/mm3 211  --  203 206     Results from last 7 days   Lab  Units 11/05/21  0111 11/04/21  0045 11/03/21  0035 11/02/21  0158 11/02/21  0158 11/01/21  0437 10/31/21  0052   SODIUM mmol/L 136 139 140   < > 137   < > 144   POTASSIUM mmol/L 4.2 4.1 4.2   < > 4.2   < > 3.9   CHLORIDE mmol/L 106 108* 108*   < > 106   < > 113*   CO2 mmol/L 15.5* 18.3* 18.8*   < > 20.1*   < > 21.6*   BUN mg/dL 35* 36* 34*   < > 25*   < > 13   CREATININE mg/dL 0.88 0.97 0.87   < > 0.77   < > 0.72*   EGFR IF NONAFRICN AM mL/min/1.73 84 75 86   < > 98   < > 106   CALCIUM mg/dL 8.5* 8.6 8.8   < > 9.0   < > 8.7   GLUCOSE mg/dL 120* 127* 145*   < > 113*   < > 88   ALBUMIN g/dL  --  3.43*  --   --  3.77  --  3.74   BILIRUBIN mg/dL  --  0.3  --   --  0.5  --  0.5   ALK PHOS U/L  --  80  --   --  71  --  74   AST (SGOT) U/L  --  20  --   --  22  --  20   ALT (SGPT) U/L  --  22  --   --  23  --  24    < > = values in this interval not displayed.   Estimated Creatinine Clearance: 68.9 mL/min (by C-G formula based on SCr of 0.88 mg/dL).  No results found for: AMMONIA     Glucose   Date/Time Value Ref Range Status   11/05/2021 0627 77 70 - 130 mg/dL Final     Comment:     Meter: [id number] : [id number] NANNETTE KAPADIA   11/04/2021 1614 135 (H) 70 - 130 mg/dL Final     Comment:     M[facility]: [id number] : [id number] [facility]   11/04/2021 1033 131 (H) 70 - 130 mg/dL Final     Comment:     Meter: [id number] : [id number] newman lashonda   11/04/2021 0638 93 70 - 130 mg/dL Final     Comment:     Meter: [id number] : [id number] newmanbrian gallego   11/03/2021 1900 130 70 - 130 mg/dL Final     Comment:     Meter: [id number] : [id number] [facility] Parisa Chow   11/03/2021 1641 122 70 - 130 mg/dL Final     Comment:     Meter: [id number] : [id number] michael rausch   11/03/2021 1026 162 (H) 70 - 130 mg/dL Final     Comment:     Meter: [id number] : [id number] michael rausch   11/03/2021 0708 101 70 - 130 mg/dL Final     Comment:     Meter: [id number] : [id number] michael rausch     Lab  Results   Component Value Date    HGBA1C 5.90 (H) 10/28/2021     Lab Results   Component Value Date    TSH 1.650 10/27/2021    FREET4 1.14 10/27/2021     No results found for: BLOODCX  No results found for: URINECX  No results found for: WOUNDCX  No results found for: STOOLCX  No results found for: RESPCX    Pain Management Panel     Pain Management Panel Latest Ref Rng & Units 10/27/2021 7/31/2015    AMPHETAMINES SCREEN, URINE Negative Negative Negative    BARBITURATES SCREEN Negative Negative Negative    BENZODIAZEPINE SCREEN, URINE Negative Negative Negative    BUPRENORPHINEUR Negative Negative -    COCAINE SCREEN, URINE Negative Negative Negative    METHADONE SCREEN, URINE Negative Negative Negative    METHAMPHETAMINEUR Negative Negative -        I have personally reviewed the above laboratory results for 11/05/21  ----------------------------------------------------------------------------------------------------------------------  Imaging Results (Last 24 Hours)     ** No results found for the last 24 hours. **        ----------------------------------------------------------------------------------------------------------------------  Assessment/Plan     #Debility   #Falls  #Suspected long COVID syndrome   · PT/OT following.  Patient reports improvement noted.  · Patient fell while here in the hospital. XR hip/pelvis shows no fracture.   · Inpatient rehab consulted and denied. SS assisting with SNF placement. Will follow up on Monday.     #Leukocytosis   · Patient denies cough, shortness of breath, dysuria, abdominal pain, diarrhea.  · CT chest on admission showed no consolidation.  CT abdomen/pelvis on admission also showed no acute findings.  · Repeat UA and chest x-ray 11/3 without acute findings.  · WBC now trending down. CRP/Procal normal.   · BC x2 with no growth x 24 hrs.   · Will check venous dopplers bilateral lower extremities to rule out DVT.   · Repeat CBC in AM.    #Constipation    · Patient is  now moving his bowels.   · Continue scheduled MiraLAX.    #Concern for esophagitis   · Continue PPI.    #Dementia   · Patient currently alert and oriented.  · PCP had reported some concern for advancing dementia.  · MRI of the brain without contrast on admission showed no acute findings, mild cerebral atrophy and moderate chronic small vessel ischemic disease.  · Continue home medications and follow-up with PCP as an outpatient.    #CAD s/p prior stenting   · Denies any chest pain.  · Continue low-dose aspirin, atorvastatin, Plavix, Imdur, Ranexa.    #DVT Prophylaxis  · SCDs. (Patient does not have these on at this time, will ask RN to replace them. Discussed DVT prophylaxis with Dr. Prado. Patient was apparently not placed on pharmacologic DVT prophylaxis on admission 2/2 history of bleeding gastric ulcer. Discussed with his wife who reports this was in the 1990s. The patient was admitted to the CCU at that time and required multiple units of blood at that time. No known recurrence and no further blood transfusions needed since then. Dr. Prado would like to continue SCDS for now).     The patient is high risk due to the following diagnoses/reasons: Generalized debility, suspected long Covid syndrome.    I have discussed the patient's assessment and plan with the patient, DIOR Borges, the patient's wife at bedside, and attending physician, Dr. Prado.     Disposition:   · Inpatient rehab denied.   · SS assisting with possible short term rehab placement.     Discharge needs:  • None at this time.    IGOR Mcrae  11/05/21  11:09 EDT

## 2021-11-05 NOTE — PLAN OF CARE
Patient pleasant through out the shift. No s/s of any acute distress noted. Tolerating the RA well. No reports of any SOB. IVF continue per order. No reports of any pain during the shift. Bed alarm continues. HOB elevated and CB in reach. Will continue to follow the care plan.

## 2021-11-06 LAB
ANION GAP SERPL CALCULATED.3IONS-SCNC: 11 MMOL/L (ref 5–15)
ANISOCYTOSIS BLD QL: ABNORMAL
BUN SERPL-MCNC: 23 MG/DL (ref 8–23)
BUN/CREAT SERPL: 31.1 (ref 7–25)
CALCIUM SPEC-SCNC: 8.7 MG/DL (ref 8.6–10.5)
CHLORIDE SERPL-SCNC: 111 MMOL/L (ref 98–107)
CO2 SERPL-SCNC: 18 MMOL/L (ref 22–29)
CREAT SERPL-MCNC: 0.74 MG/DL (ref 0.76–1.27)
DEPRECATED RDW RBC AUTO: 58 FL (ref 37–54)
EOSINOPHIL # BLD MANUAL: 0.46 10*3/MM3 (ref 0–0.4)
EOSINOPHIL NFR BLD MANUAL: 3 % (ref 0.3–6.2)
ERYTHROCYTE [DISTWIDTH] IN BLOOD BY AUTOMATED COUNT: 15.6 % (ref 12.3–15.4)
GFR SERPL CREATININE-BSD FRML MDRD: 103 ML/MIN/1.73
GLUCOSE BLDC GLUCOMTR-MCNC: 139 MG/DL (ref 70–130)
GLUCOSE BLDC GLUCOMTR-MCNC: 182 MG/DL (ref 70–130)
GLUCOSE BLDC GLUCOMTR-MCNC: 194 MG/DL (ref 70–130)
GLUCOSE BLDC GLUCOMTR-MCNC: 91 MG/DL (ref 70–130)
GLUCOSE SERPL-MCNC: 113 MG/DL (ref 65–99)
HCT VFR BLD AUTO: 40 % (ref 37.5–51)
HGB BLD-MCNC: 13 G/DL (ref 13–17.7)
LYMPHOCYTES # BLD MANUAL: 2.3 10*3/MM3 (ref 0.7–3.1)
LYMPHOCYTES NFR BLD MANUAL: 15 % (ref 19.6–45.3)
LYMPHOCYTES NFR BLD MANUAL: 8 % (ref 5–12)
MACROCYTES BLD QL SMEAR: ABNORMAL
MCH RBC QN AUTO: 32.7 PG (ref 26.6–33)
MCHC RBC AUTO-ENTMCNC: 32.5 G/DL (ref 31.5–35.7)
MCV RBC AUTO: 100.8 FL (ref 79–97)
MONOCYTES # BLD AUTO: 1.23 10*3/MM3 (ref 0.1–0.9)
NEUTROPHILS # BLD AUTO: 11.36 10*3/MM3 (ref 1.7–7)
NEUTROPHILS NFR BLD MANUAL: 66 % (ref 42.7–76)
NEUTS BAND NFR BLD MANUAL: 8 % (ref 0–5)
PLAT MORPH BLD: NORMAL
PLATELET # BLD AUTO: 202 10*3/MM3 (ref 140–450)
PMV BLD AUTO: 9.8 FL (ref 6–12)
POTASSIUM SERPL-SCNC: 4.2 MMOL/L (ref 3.5–5.2)
RBC # BLD AUTO: 3.97 10*6/MM3 (ref 4.14–5.8)
SODIUM SERPL-SCNC: 140 MMOL/L (ref 136–145)
WBC # BLD AUTO: 15.35 10*3/MM3 (ref 3.4–10.8)

## 2021-11-06 PROCEDURE — 99231 SBSQ HOSP IP/OBS SF/LOW 25: CPT | Performed by: PHYSICIAN ASSISTANT

## 2021-11-06 PROCEDURE — 82962 GLUCOSE BLOOD TEST: CPT

## 2021-11-06 PROCEDURE — 94799 UNLISTED PULMONARY SVC/PX: CPT

## 2021-11-06 PROCEDURE — 85007 BL SMEAR W/DIFF WBC COUNT: CPT | Performed by: PHYSICIAN ASSISTANT

## 2021-11-06 PROCEDURE — 80048 BASIC METABOLIC PNL TOTAL CA: CPT | Performed by: PHYSICIAN ASSISTANT

## 2021-11-06 PROCEDURE — 85025 COMPLETE CBC W/AUTO DIFF WBC: CPT | Performed by: PHYSICIAN ASSISTANT

## 2021-11-06 RX ADMIN — METOPROLOL TARTRATE 12.5 MG: 25 TABLET, FILM COATED ORAL at 20:39

## 2021-11-06 RX ADMIN — BUDESONIDE AND FORMOTEROL FUMARATE DIHYDRATE 2 PUFF: 80; 4.5 AEROSOL RESPIRATORY (INHALATION) at 18:52

## 2021-11-06 RX ADMIN — PANTOPRAZOLE SODIUM 40 MG: 40 TABLET, DELAYED RELEASE ORAL at 08:46

## 2021-11-06 RX ADMIN — MEGESTROL ACETATE 800 MG: 40 SUSPENSION ORAL at 08:45

## 2021-11-06 RX ADMIN — BUDESONIDE AND FORMOTEROL FUMARATE DIHYDRATE 2 PUFF: 80; 4.5 AEROSOL RESPIRATORY (INHALATION) at 06:38

## 2021-11-06 RX ADMIN — SODIUM CHLORIDE, PRESERVATIVE FREE 10 ML: 5 INJECTION INTRAVENOUS at 08:45

## 2021-11-06 RX ADMIN — ATORVASTATIN CALCIUM 80 MG: 40 TABLET, FILM COATED ORAL at 20:39

## 2021-11-06 RX ADMIN — SODIUM CHLORIDE, PRESERVATIVE FREE 10 ML: 5 INJECTION INTRAVENOUS at 20:39

## 2021-11-06 RX ADMIN — RANOLAZINE 500 MG: 500 TABLET, FILM COATED, EXTENDED RELEASE ORAL at 20:39

## 2021-11-06 RX ADMIN — ISOSORBIDE MONONITRATE 120 MG: 60 TABLET ORAL at 08:45

## 2021-11-06 RX ADMIN — FERROUS SULFATE TAB 325 MG (65 MG ELEMENTAL FE) 325 MG: 325 (65 FE) TAB at 08:46

## 2021-11-06 RX ADMIN — ALBUTEROL SULFATE 2.5 MG: 2.5 SOLUTION RESPIRATORY (INHALATION) at 18:52

## 2021-11-06 RX ADMIN — FERROUS SULFATE TAB 325 MG (65 MG ELEMENTAL FE) 325 MG: 325 (65 FE) TAB at 20:39

## 2021-11-06 RX ADMIN — ASPIRIN 81 MG: 81 TABLET, COATED ORAL at 08:46

## 2021-11-06 RX ADMIN — CLOPIDOGREL 75 MG: 75 TABLET, FILM COATED ORAL at 08:46

## 2021-11-06 RX ADMIN — FINASTERIDE 5 MG: 5 TABLET, FILM COATED ORAL at 08:46

## 2021-11-06 RX ADMIN — RANOLAZINE 500 MG: 500 TABLET, FILM COATED, EXTENDED RELEASE ORAL at 08:46

## 2021-11-06 RX ADMIN — MIRTAZAPINE 45 MG: 15 TABLET, FILM COATED ORAL at 20:39

## 2021-11-06 RX ADMIN — METOPROLOL TARTRATE 12.5 MG: 25 TABLET, FILM COATED ORAL at 08:46

## 2021-11-06 RX ADMIN — MEMANTINE HYDROCHLORIDE AND DONEPEZIL HYDROCHLORIDE 28 MG: 28; 10 CAPSULE ORAL at 08:46

## 2021-11-06 NOTE — PLAN OF CARE
Goal Outcome Evaluation: Patient has been very pleasant today. Compliant with all medications and care as ordered. He remains on room air, saturations maintaining well above 90%. External Cath remains in place, good output noted. Family has been at bedside during visiting hours, updated on plan of care. Patient is currently resting in bed. No distress noted. VSS. Will continue to monitor and follow plan of care.

## 2021-11-06 NOTE — PLAN OF CARE
Patient resting comfortably. No s/s of any acute distress. Tolerating RA well. HOB elevated. Bed alarm on. No changes from previous shift. Will continue to follow the care plan.

## 2021-11-06 NOTE — PROGRESS NOTES
[facility] HOSPITALIST PROGRESS NOTE     Patient Identification:  Name:  Fidencio Luciano  Age:  76 y.o.  Sex:  male  :  1945  MRN:  [id number]  Visit Number:  [id number]  Primary Care Provider:  Camila Ortiz APRN    Date of admission: 10/27/2021  Length of stay:  10    ----------------------------------------------------------------------------------------------------------------------  Subjective     Chief Complaint:   Chief Complaint   Patient presents with   • Chest Pain     Subjective/Interval History:    76 y.o. male who was admitted on 10/27/2021 with generalized weakness and decreased responsiveness.  He was also having some complaints of chest pain at home.  He was having some myoclonic jerks in the ED.  It was noted that the patient had been diagnosed with COVID-19 in 2021.  Since then, he has had a steady physical and mental decline.  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  He was admitted for further evaluation and therapy.    PMH is significant for CAD status post previous stenting, hypertension, dyslipidemia, iron deficiency anemia, type 2 diabetes mellitus. For complete admission information, please see history and physical.     Consultations:  1. Cardiology    Procedures/Scans:  1. MRI brain without contrast  2. CT chest without contrast  3. CT abdomen/pelvis without contrast  4. SLP evaluation  5. US venous doppler bilateral     Today, the patient reports that he is doing well. He is feeling tired, but states he did eat breakfast this AM. His wife is present at bedside again today.  Discussed with RNCora, no noted events or concerns reported.    Review of Systems   Constitutional: Positive for fatigue. Negative for chills and fever.   HENT: Negative for sore throat and trouble swallowing.    Respiratory: Negative for cough and shortness of breath.    Cardiovascular: Negative for chest pain.   Gastrointestinal: Negative for abdominal pain,  diarrhea, nausea and vomiting.   Genitourinary: Negative for difficulty urinating and dysuria.   Skin: Negative for color change and rash.   Psychiatric/Behavioral: Negative for agitation, behavioral problems and confusion.      ----------------------------------------------------------------------------------------------------------------------  Objective   Current [facility] Meds:  aspirin, 81 mg, Oral, Daily  atorvastatin, 80 mg, Oral, Nightly  budesonide-formoterol, 2 puff, Inhalation, BID  clopidogrel, 75 mg, Oral, Daily  ferrous sulfate, 325 mg, Oral, BID  finasteride, 5 mg, Oral, Daily  isosorbide mononitrate, 120 mg, Oral, Daily  megestrol, 800 mg, Oral, Daily  Memantine HCl-Donepezil HCl, 1 capsule, Oral, Daily  metoprolol tartrate, 12.5 mg, Oral, Q12H  mirtazapine, 45 mg, Oral, Nightly  pantoprazole, 40 mg, Oral, QAM AC  polyethylene glycol, 17 g, Oral, Daily  ranolazine, 500 mg, Oral, Q12H  sodium chloride, 10 mL, Intravenous, Q12H         ----------------------------------------------------------------------------------------------------------------------  Vital Signs:  Temp:  [98 °F (36.7 °C)-98.3 °F (36.8 °C)] 98 °F (36.7 °C)  Heart Rate:  [62-89] 70  Resp:  [18-20] 18  BP: (135-161)/(60-72) 135/60  Mean Arterial Pressure (Non-Invasive) for the past 24 hrs (Last 3 readings):   Noninvasive MAP (mmHg)   11/06/21 1046 81   11/06/21 0654 110   11/06/21 0310 114     SpO2 Percentage    11/06/21 0638 11/06/21 0654 11/06/21 1046   SpO2: 96% 96% 96%     SpO2:  [92 %-98 %] 96 %  on   ;   Device (Oxygen Therapy): room air    Body mass index is 20.54 kg/m².  Wt Readings from Last 3 Encounters:   11/06/21 66.8 kg (147 lb 4.8 oz)   10/21/21 64.4 kg (142 lb)   10/21/21 64.5 kg (142 lb 3.2 oz)        Intake/Output Summary (Last 24 hours) at 11/6/2021 1414  Last data filed at 11/6/2021 1046  Gross per 24 hour   Intake 600 ml   Output 1075 ml   Net -475 ml     Diet Soft Texture; Chopped;  Thin  ----------------------------------------------------------------------------------------------------------------------  Physical exam:  Constitutional: Vital signs reviewed. Well-developed and well-nourished.  No respiratory distress on room air.  Drowsy, but easily arousable.  Answers questions appropriately.  HENT:  Head:  Normocephalic and atraumatic.  No oral lesions appreciated.   Eyes:  Conjunctivae and EOM are normal. No scleral icterus. No erythema or drainage.  Neck:  Neck supple.   Cardiovascular:  Normal rate, regular rhythm and normal heart sounds with no murmur.  Pulmonary/Chest:  No respiratory distress, no wheezes, no crackles, with normal breath sounds and good air movement.  Abdominal:  Soft.  Bowel sounds are present x4.  No distension and no tenderness.   Musculoskeletal:  No edema, no tenderness, and no deformity.  No red or swollen joints anywhere.    Neurological:  Alert and oriented to person, place, and time. No facial droop.  No slurred speech.   Skin:  Skin is warm and dry. No rash noted. No pallor.   Peripheral vascular: No clubbing, no cyanosis, no edema.   Genitourinary: No terrell catheter in place.     I have reviewed and updated the physical exam as needed to reflect that of 11/06/21  ----------------------------------------------------------------------------------------------------------------------  Tele: Sinus rhythm 60s-70s.    ----------------------------------------------------------------------------------------------------------------------              Results from last 7 days   Lab Units 11/06/21 0105 11/05/21 0528 11/05/21 0111 11/04/21  0045   CRP mg/dL  --   --  <0.30  --    WBC 10*3/mm3 15.35* 16.03*  --  17.19*   HEMOGLOBIN g/dL 13.0 13.7  --  13.1   HEMATOCRIT % 40.0 41.1  --  39.5   MCV fL 100.8* 99.5*  --  100.8*   MCHC g/dL 32.5 33.3  --  33.2   PLATELETS 10*3/mm3 202 211  --  203     Results from last 7 days   Lab Units 11/06/21 0105 11/05/21 0111  11/04/21  0045 11/03/21  0035 11/02/21  0158 11/01/21  0437 10/31/21  0052   SODIUM mmol/L 140 136 139   < > 137   < > 144   POTASSIUM mmol/L 4.2 4.2 4.1   < > 4.2   < > 3.9   CHLORIDE mmol/L 111* 106 108*   < > 106   < > 113*   CO2 mmol/L 18.0* 15.5* 18.3*   < > 20.1*   < > 21.6*   BUN mg/dL 23 35* 36*   < > 25*   < > 13   CREATININE mg/dL 0.74* 0.88 0.97   < > 0.77   < > 0.72*   EGFR IF NONAFRICN AM mL/min/1.73 103 84 75   < > 98   < > 106   CALCIUM mg/dL 8.7 8.5* 8.6   < > 9.0   < > 8.7   GLUCOSE mg/dL 113* 120* 127*   < > 113*   < > 88   ALBUMIN g/dL  --   --  3.43*  --  3.77  --  3.74   BILIRUBIN mg/dL  --   --  0.3  --  0.5  --  0.5   ALK PHOS U/L  --   --  80  --  71  --  74   AST (SGOT) U/L  --   --  20  --  22  --  20   ALT (SGPT) U/L  --   --  22  --  23  --  24    < > = values in this interval not displayed.   Estimated Creatinine Clearance: 75.4 mL/min (A) (by C-G formula based on SCr of 0.74 mg/dL (L)).  No results found for: AMMONIA     Glucose   Date/Time Value Ref Range Status   11/06/2021 1030 139 (H) 70 - 130 mg/dL Final     Comment:     Meter: [id number] : [id number] michael rausch   11/06/2021 0709 91 70 - 130 mg/dL Final     Comment:     Meter: [id number] : [id number] michael rausch   11/05/2021 2001 141 (H) 70 - 130 mg/dL Final     Comment:     Meter: [id number] : [id number] GERI RAMIREZ   11/05/2021 1623 182 (H) 70 - 130 mg/dL Final     Comment:     Meter: [id number] : [id number] NANNETTE KAPADIA   11/05/2021 0627 77 70 - 130 mg/dL Final     Comment:     Meter: [id number] : [id number] NANNETTE KAPADIA   11/04/2021 1614 135 (H) 70 - 130 mg/dL Final     Comment:     Meter: [id number] : [id number] newman lashonda   11/04/2021 1033 131 (H) 70 - 130 mg/dL Final     Comment:     Meter: [id number] : [id number] newman lashonda   11/04/2021 0638 93 70 - 130 mg/dL Final     Comment:     Meter: [id number] : [id number] [facility]     Lab Results   Component Value Date     HGBA1C 5.90 (H) 10/28/2021     Lab Results   Component Value Date    TSH 1.650 10/27/2021    FREET4 1.14 10/27/2021     No results found for: BLOODCX  No results found for: URINECX  No results found for: WOUNDCX  No results found for: STOOLCX  No results found for: RESPCX    Pain Management Panel     Pain Management Panel Latest Ref Rng & Units 10/27/2021 7/31/2015    AMPHETAMINES SCREEN, URINE Negative Negative Negative    BARBITURATES SCREEN Negative Negative Negative    BENZODIAZEPINE SCREEN, URINE Negative Negative Negative    BUPRENORPHINEUR Negative Negative -    COCAINE SCREEN, URINE Negative Negative Negative    METHADONE SCREEN, URINE Negative Negative Negative    METHAMPHETAMINEUR Negative Negative -        I have personally reviewed the above laboratory results for 11/06/21  ----------------------------------------------------------------------------------------------------------------------  Imaging Results (Last 24 Hours)     Procedure Component Value Units Date/Time    US Venous Doppler Lower Extremity Bilateral (duplex) [[id number]] Collected: 11/05/21 1742     Updated: 11/05/21 1744    Narrative:      US Veins LE Duplex BILAT    HISTORY:   Leukocytosis and weakness    TECHNIQUE:   Real-time ultrasound was performed of both lower extremities utilizing spectral and color Doppler with compression and augmentation techniques.    COMPARISON:  May 24, 2021    FINDINGS:  Right Lower Extremity:  There is no deep venous thrombus seen in the right lower extremity, including the right common femoral veins, right femoral veins and right popliteal veins.  Normal compressibility and respiratory phasicity was visualized.  No calf vein thrombus. Greater  saphenous vein is patent.    Left Lower Extremity:  There is no deep venous thrombus seen in the left lower extremity, including the left common femoral veins, left femoral veins and left popliteal veins.   Normal compressibility and respiratory phasicity was  visualized.  No calf vein thrombus. Greater  saphenous vein is patent.        Impression:      No deep venous thrombus seen in either lower extremity.    Signer Name: Audie Carmichael MD   Signed: 11/5/2021 5:42 PM   Workstation Name: [id number]    [facility]        ----------------------------------------------------------------------------------------------------------------------  Assessment/Plan     #Debility   #Falls  #Suspected long COVID syndrome   · PT/OT following.  Patient reports improvement noted.  · Patient fell while here in the hospital. XR hip/pelvis shows no fracture.   · Inpatient rehab consulted and denied. SS assisting with SNF placement. Will follow up on Monday.     #Leukocytosis   · Patient denies cough, shortness of breath, dysuria, abdominal pain, diarrhea.  · CT chest on admission showed no consolidation.  CT abdomen/pelvis on admission also showed no acute findings.  · Repeat UA and chest x-ray 11/3 without acute findings.  · WBC now trending down. CRP/Procal normal.   · BC x2 with no growth to date.  · Venous Dopplers negative for DVT.   · WBC count trending down. Repeat CBC in AM.    #Constipation    · Patient is now moving his bowels.   · Continue scheduled MiraLAX.    #Concern for esophagitis   · Continue PPI.    #Dementia   · Patient currently alert and oriented.  · PCP had reported some concern for advancing dementia.  · MRI of the brain without contrast on admission showed no acute findings, mild cerebral atrophy and moderate chronic small vessel ischemic disease.  · Continue home medications and follow-up with PCP as an outpatient.    #CAD s/p prior stenting   · Denies any chest pain.  · Continue low-dose aspirin, atorvastatin, Plavix, Imdur, Ranexa.    #DVT Prophylaxis  · SCDs    The patient is high risk due to the following diagnoses/reasons: Generalized debility, suspected long Covid syndrome.    I have discussed the patient's assessment and plan with the  patient, RN Katerina, the patient's wife at bedside, and attending physician, Dr. Prado.     Disposition:   · Inpatient rehab denied.   · SS assisting with possible short term rehab placement.     Discharge needs:  • None at this time.    IGOR Mcrae  11/06/21  14:14 EDT

## 2021-11-07 LAB
ANION GAP SERPL CALCULATED.3IONS-SCNC: 11.5 MMOL/L (ref 5–15)
BUN SERPL-MCNC: 23 MG/DL (ref 8–23)
BUN/CREAT SERPL: 30.3 (ref 7–25)
CALCIUM SPEC-SCNC: 8.7 MG/DL (ref 8.6–10.5)
CHLORIDE SERPL-SCNC: 110 MMOL/L (ref 98–107)
CO2 SERPL-SCNC: 19.5 MMOL/L (ref 22–29)
CREAT SERPL-MCNC: 0.76 MG/DL (ref 0.76–1.27)
DEPRECATED RDW RBC AUTO: 57.4 FL (ref 37–54)
EOSINOPHIL # BLD MANUAL: 0.15 10*3/MM3 (ref 0–0.4)
EOSINOPHIL NFR BLD MANUAL: 1 % (ref 0.3–6.2)
ERYTHROCYTE [DISTWIDTH] IN BLOOD BY AUTOMATED COUNT: 15.5 % (ref 12.3–15.4)
GFR SERPL CREATININE-BSD FRML MDRD: 100 ML/MIN/1.73
GLUCOSE BLDC GLUCOMTR-MCNC: 130 MG/DL (ref 70–130)
GLUCOSE BLDC GLUCOMTR-MCNC: 132 MG/DL (ref 70–130)
GLUCOSE BLDC GLUCOMTR-MCNC: 170 MG/DL (ref 70–130)
GLUCOSE BLDC GLUCOMTR-MCNC: 75 MG/DL (ref 70–130)
GLUCOSE SERPL-MCNC: 109 MG/DL (ref 65–99)
HCT VFR BLD AUTO: 39.7 % (ref 37.5–51)
HGB BLD-MCNC: 13.1 G/DL (ref 13–17.7)
LYMPHOCYTES # BLD MANUAL: 1.96 10*3/MM3 (ref 0.7–3.1)
LYMPHOCYTES NFR BLD MANUAL: 13 % (ref 19.6–45.3)
LYMPHOCYTES NFR BLD MANUAL: 4 % (ref 5–12)
MACROCYTES BLD QL SMEAR: ABNORMAL
MCH RBC QN AUTO: 33.4 PG (ref 26.6–33)
MCHC RBC AUTO-ENTMCNC: 33 G/DL (ref 31.5–35.7)
MCV RBC AUTO: 101.3 FL (ref 79–97)
MONOCYTES # BLD AUTO: 0.6 10*3/MM3 (ref 0.1–0.9)
NEUTROPHILS # BLD AUTO: 12.37 10*3/MM3 (ref 1.7–7)
NEUTROPHILS NFR BLD MANUAL: 82 % (ref 42.7–76)
PLAT MORPH BLD: NORMAL
PLATELET # BLD AUTO: 207 10*3/MM3 (ref 140–450)
PMV BLD AUTO: 9.4 FL (ref 6–12)
POTASSIUM SERPL-SCNC: 4.3 MMOL/L (ref 3.5–5.2)
RBC # BLD AUTO: 3.92 10*6/MM3 (ref 4.14–5.8)
SODIUM SERPL-SCNC: 141 MMOL/L (ref 136–145)
WBC # BLD AUTO: 15.08 10*3/MM3 (ref 3.4–10.8)

## 2021-11-07 PROCEDURE — 85007 BL SMEAR W/DIFF WBC COUNT: CPT | Performed by: PHYSICIAN ASSISTANT

## 2021-11-07 PROCEDURE — 94799 UNLISTED PULMONARY SVC/PX: CPT

## 2021-11-07 PROCEDURE — 82962 GLUCOSE BLOOD TEST: CPT

## 2021-11-07 PROCEDURE — 99231 SBSQ HOSP IP/OBS SF/LOW 25: CPT | Performed by: PHYSICIAN ASSISTANT

## 2021-11-07 PROCEDURE — 85025 COMPLETE CBC W/AUTO DIFF WBC: CPT | Performed by: PHYSICIAN ASSISTANT

## 2021-11-07 PROCEDURE — 80048 BASIC METABOLIC PNL TOTAL CA: CPT | Performed by: PHYSICIAN ASSISTANT

## 2021-11-07 RX ADMIN — FINASTERIDE 5 MG: 5 TABLET, FILM COATED ORAL at 08:26

## 2021-11-07 RX ADMIN — ACETAMINOPHEN 649.6 MG: 160 SOLUTION ORAL at 20:04

## 2021-11-07 RX ADMIN — RANOLAZINE 500 MG: 500 TABLET, FILM COATED, EXTENDED RELEASE ORAL at 08:26

## 2021-11-07 RX ADMIN — POLYETHYLENE GLYCOL (3350) 17 G: 17 POWDER, FOR SOLUTION ORAL at 08:25

## 2021-11-07 RX ADMIN — MIRTAZAPINE 45 MG: 15 TABLET, FILM COATED ORAL at 20:05

## 2021-11-07 RX ADMIN — ASPIRIN 81 MG: 81 TABLET, COATED ORAL at 08:26

## 2021-11-07 RX ADMIN — SODIUM CHLORIDE, PRESERVATIVE FREE 10 ML: 5 INJECTION INTRAVENOUS at 20:05

## 2021-11-07 RX ADMIN — MEGESTROL ACETATE 800 MG: 40 SUSPENSION ORAL at 10:14

## 2021-11-07 RX ADMIN — PANTOPRAZOLE SODIUM 40 MG: 40 TABLET, DELAYED RELEASE ORAL at 08:26

## 2021-11-07 RX ADMIN — ATORVASTATIN CALCIUM 80 MG: 40 TABLET, FILM COATED ORAL at 20:05

## 2021-11-07 RX ADMIN — ISOSORBIDE MONONITRATE 120 MG: 60 TABLET ORAL at 08:26

## 2021-11-07 RX ADMIN — FERROUS SULFATE TAB 325 MG (65 MG ELEMENTAL FE) 325 MG: 325 (65 FE) TAB at 08:26

## 2021-11-07 RX ADMIN — METOPROLOL TARTRATE 12.5 MG: 25 TABLET, FILM COATED ORAL at 08:25

## 2021-11-07 RX ADMIN — MEMANTINE HYDROCHLORIDE AND DONEPEZIL HYDROCHLORIDE 28 MG: 28; 10 CAPSULE ORAL at 08:26

## 2021-11-07 RX ADMIN — RANOLAZINE 500 MG: 500 TABLET, FILM COATED, EXTENDED RELEASE ORAL at 20:05

## 2021-11-07 RX ADMIN — CLOPIDOGREL 75 MG: 75 TABLET, FILM COATED ORAL at 08:26

## 2021-11-07 RX ADMIN — BUDESONIDE AND FORMOTEROL FUMARATE DIHYDRATE 2 PUFF: 80; 4.5 AEROSOL RESPIRATORY (INHALATION) at 18:19

## 2021-11-07 RX ADMIN — FERROUS SULFATE TAB 325 MG (65 MG ELEMENTAL FE) 325 MG: 325 (65 FE) TAB at 20:05

## 2021-11-07 RX ADMIN — BUDESONIDE AND FORMOTEROL FUMARATE DIHYDRATE 2 PUFF: 80; 4.5 AEROSOL RESPIRATORY (INHALATION) at 07:02

## 2021-11-07 RX ADMIN — SODIUM CHLORIDE, PRESERVATIVE FREE 10 ML: 5 INJECTION INTRAVENOUS at 08:27

## 2021-11-07 RX ADMIN — METOPROLOL TARTRATE 12.5 MG: 25 TABLET, FILM COATED ORAL at 20:04

## 2021-11-07 NOTE — PLAN OF CARE
Goal Outcome Evaluation:  Patient resting in bed with no complaints. Patient awaiting placement at this time.

## 2021-11-07 NOTE — PROGRESS NOTES
[facility] HOSPITALIST PROGRESS NOTE     Patient Identification:  Name:  Fidencio Luciano  Age:  76 y.o.  Sex:  male  :  1945  MRN:  [id number]  Visit Number:  [id number]  Primary Care Provider:  Camila Ortiz APRN    Date of admission: 10/27/2021  Length of stay:  11    ----------------------------------------------------------------------------------------------------------------------  Subjective     Chief Complaint:   Chief Complaint   Patient presents with   • Chest Pain     Subjective/Interval History:    76 y.o. male who was admitted on 10/27/2021 with generalized weakness and decreased responsiveness.  He was also having some complaints of chest pain at home.  He was having some myoclonic jerks in the ED.  It was noted that the patient had been diagnosed with COVID-19 in 2021.  Since then, he has had a steady physical and mental decline.  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  He was admitted for further evaluation and therapy.    PMH is significant for CAD status post previous stenting, hypertension, dyslipidemia, iron deficiency anemia, type 2 diabetes mellitus. For complete admission information, please see history and physical.     Consultations:  1. Cardiology    Procedures/Scans:  1. MRI brain without contrast  2. CT chest without contrast  3. CT abdomen/pelvis without contrast  4. SLP evaluation  5. US venous doppler bilateral     Today, the patient reports that he is doing well. No new complaints. Discussed with RNKirk. No new events or concerns.     Review of Systems   Constitutional: Negative for chills, fatigue and fever.   HENT: Negative for sore throat and trouble swallowing.    Respiratory: Negative for cough and shortness of breath.    Cardiovascular: Negative for chest pain.   Gastrointestinal: Negative for abdominal pain, diarrhea, nausea and vomiting.   Genitourinary: Negative for difficulty urinating and dysuria.   Skin: Negative  for color change and rash.   Neurological: Positive for weakness (Generalized).   Psychiatric/Behavioral: Negative for agitation, behavioral problems and confusion.      ----------------------------------------------------------------------------------------------------------------------  Objective   [facility] Meds:  aspirin, 81 mg, Oral, Daily  atorvastatin, 80 mg, Oral, Nightly  budesonide-formoterol, 2 puff, Inhalation, BID  clopidogrel, 75 mg, Oral, Daily  ferrous sulfate, 325 mg, Oral, BID  finasteride, 5 mg, Oral, Daily  isosorbide mononitrate, 120 mg, Oral, Daily  megestrol, 800 mg, Oral, Daily  Memantine HCl-Donepezil HCl, 1 capsule, Oral, Daily  metoprolol tartrate, 12.5 mg, Oral, Q12H  mirtazapine, 45 mg, Oral, Nightly  pantoprazole, 40 mg, Oral, QAM AC  polyethylene glycol, 17 g, Oral, Daily  ranolazine, 500 mg, Oral, Q12H  sodium chloride, 10 mL, Intravenous, Q12H         ----------------------------------------------------------------------------------------------------------------------  Vital Signs:  Temp:  [98 °F (36.7 °C)-98.4 °F (36.9 °C)] 98.4 °F (36.9 °C)  Heart Rate:  [65-94] 69  Resp:  [18-20] 18  BP: (119-159)/(60-68) 127/68  Mean Arterial Pressure (Non-Invasive) for the past 24 hrs (Last 3 readings):   Noninvasive MAP (mmHg)   11/06/21 1820 82   11/06/21 1413 87     SpO2 Percentage    11/07/21 0633 11/07/21 0702 11/07/21 1003   SpO2: 98% 99% 98%     SpO2:  [97 %-99 %] 98 %  on   ;   Device (Oxygen Therapy): room air    Body mass index is 20.54 kg/m².  Wt Readings from Last 3 Encounters:   11/06/21 66.8 kg (147 lb 4.8 oz)   10/21/21 64.4 kg (142 lb)   10/21/21 64.5 kg (142 lb 3.2 oz)        Intake/Output Summary (Last 24 hours) at 11/7/2021 1115  Last data filed at 11/7/2021 0929  Gross per 24 hour   Intake 915 ml   Output 900 ml   Net 15 ml     Diet Soft Texture; Chopped;  Thin  ----------------------------------------------------------------------------------------------------------------------  Physical exam:  Constitutional: Vital signs reviewed. Well-developed and well-nourished.  No respiratory distress on room air.   HENT:  Head:  Normocephalic and atraumatic.    Eyes:  Conjunctivae and EOM are normal. No scleral icterus. No erythema or drainage.  Neck:  Neck supple.   Cardiovascular:  Normal rate, regular rhythm and normal heart sounds with no murmur.  Pulmonary/Chest:  No respiratory distress, no wheezes, no crackles, with normal breath sounds and good air movement.  Abdominal:  Soft.  Bowel sounds are present x4.  No distension and no tenderness.   Musculoskeletal:  No edema, no tenderness, and no deformity.  No red or swollen joints anywhere.    Neurological:  Alert and oriented to person, place, and time. No facial droop.  No slurred speech. Somewhat flattened affect.   Skin:  Skin is warm and dry. No rash noted. No pallor.   Peripheral vascular: No clubbing, no cyanosis, no edema.   Genitourinary: No terrlel catheter in place.     I have reviewed and updated the physical exam as needed to reflect that of 11/07/21  ----------------------------------------------------------------------------------------------------------------------  Tele: Sinus rhythm/sinus bradycardia in the 50s to 60s.    ----------------------------------------------------------------------------------------------------------------------              Results from last 7 days   Lab Units 11/07/21  0052 11/06/21 0105 11/05/21 0528 11/05/21  0111 11/04/21  0045   CRP mg/dL  --   --   --  <0.30  --    WBC 10*3/mm3 15.08* 15.35* 16.03*  --    < >   HEMOGLOBIN g/dL 13.1 13.0 13.7  --    < >   HEMATOCRIT % 39.7 40.0 41.1  --    < >   MCV fL 101.3* 100.8* 99.5*  --    < >   MCHC g/dL 33.0 32.5 33.3  --    < >   PLATELETS 10*3/mm3 207 202 211  --    < >    < > = values in this interval not displayed.     Results  from last 7 days   Lab Units 11/07/21  0052 11/06/21  0105 11/05/21  0111 11/04/21  0045 11/04/21  0045 11/03/21  0035 11/02/21  0158   SODIUM mmol/L 141 140 136   < > 139   < > 137   POTASSIUM mmol/L 4.3 4.2 4.2   < > 4.1   < > 4.2   CHLORIDE mmol/L 110* 111* 106   < > 108*   < > 106   CO2 mmol/L 19.5* 18.0* 15.5*   < > 18.3*   < > 20.1*   BUN mg/dL 23 23 35*   < > 36*   < > 25*   CREATININE mg/dL 0.76 0.74* 0.88   < > 0.97   < > 0.77   EGFR IF NONAFRICN AM mL/min/1.73 100 103 84   < > 75   < > 98   CALCIUM mg/dL 8.7 8.7 8.5*   < > 8.6   < > 9.0   GLUCOSE mg/dL 109* 113* 120*   < > 127*   < > 113*   ALBUMIN g/dL  --   --   --   --  3.43*  --  3.77   BILIRUBIN mg/dL  --   --   --   --  0.3  --  0.5   ALK PHOS U/L  --   --   --   --  80  --  71   AST (SGOT) U/L  --   --   --   --  20  --  22   ALT (SGPT) U/L  --   --   --   --  22  --  23    < > = values in this interval not displayed.   Estimated Creatinine Clearance: 75.4 mL/min (by C-G formula based on SCr of 0.76 mg/dL).  No results found for: AMMONIA     Glucose   Date/Time Value Ref Range Status   11/07/2021 1005 132 (H) 70 - 130 mg/dL Final     Comment:     Meter: [id number] : [id number] LADY TANNER   11/07/2021 0702 75 70 - 130 mg/dL Final     Comment:     Meter: [id number] : [id number] LADY BERNARDARA   11/06/2021 2001 182 (H) 70 - 130 mg/dL Final     Comment:     Meter: [id number] : [id number] [facility] Parisa Chow   11/06/2021 1627 194 (H) 70 - 130 mg/dL Final     Comment:     Meter: [id number] : [id number] michael rausch   11/06/2021 1030 139 (H) 70 - 130 mg/dL Final     Comment:     Meter: [id number] : [id number] michael rausch   11/06/2021 0709 91 70 - 130 mg/dL Final     Comment:     Meter: [id number] : [id number] michael rausch   11/05/2021 2001 141 (H) 70 - 130 mg/dL Final     Comment:     Meter: [id number] : [id number] GERI RAMIREZ   11/05/2021 1623 182 (H) 70 - 130 mg/dL Final     Comment:     Meter: [id number]  : [id number] NANNETTE KAPADIA     Lab Results   Component Value Date    HGBA1C 5.90 (H) 10/28/2021     Lab Results   Component Value Date    TSH 1.650 10/27/2021    FREET4 1.14 10/27/2021     No results found for: BLOODCX  No results found for: URINECX  No results found for: WOUNDCX  No results found for: STOOLCX  No results found for: RESPCX    Pain Management Panel     Pain Management Panel Latest Ref Rng & Units 10/27/2021 7/31/2015    AMPHETAMINES SCREEN, URINE Negative Negative Negative    BARBITURATES SCREEN Negative Negative Negative    BENZODIAZEPINE SCREEN, URINE Negative Negative Negative    BUPRENORPHINEUR Negative Negative -    COCAINE SCREEN, URINE Negative Negative Negative    METHADONE SCREEN, URINE Negative Negative Negative    METHAMPHETAMINEUR Negative Negative -        I have personally reviewed the above laboratory results for 11/07/21  ----------------------------------------------------------------------------------------------------------------------  Imaging Results (Last 24 Hours)     ** No results found for the last 24 hours. **        ----------------------------------------------------------------------------------------------------------------------  Assessment/Plan     #Debility   #Falls  #Suspected long COVID syndrome   · PT/OT following.  Patient reports improvement noted.  · Patient fell while here in the hospital. XR hip/pelvis shows no fracture.   · Inpatient rehab consulted and denied. SS assisting with SNF placement. Will follow up on Monday.   · Will see if we can get him up to chair today. Follow up PT/OT in AM.     #Leukocytosis   · Patient denies cough, shortness of breath, dysuria, abdominal pain, diarrhea.  · CT chest on admission showed no consolidation.  CT abdomen/pelvis on admission also showed no acute findings.  · Repeat UA and chest x-ray 11/3 without acute findings.  · WBC now trending down. CRP/Procal normal.   · BC x2 with no growth to date.  · Venous dopplers  negative for DVT.   · WBC count trending down. Repeat CBC in AM.    #Constipation    · Patient is now moving his bowels.   · Continue scheduled MiraLAX.    #Concern for esophagitis   · Continue PPI.    #Dementia   · Patient currently alert and oriented.  · PCP had reported some concern for advancing dementia.  · MRI of the brain without contrast on admission showed no acute findings, mild cerebral atrophy and moderate chronic small vessel ischemic disease.  · Continue home medications and follow-up with PCP as an outpatient.    #CAD s/p prior stenting   · Denies any chest pain.  · Continue low-dose aspirin, atorvastatin, Plavix, Imdur, Ranexa.    #DVT Prophylaxis  · SCDs    The patient is high risk due to the following diagnoses/reasons: Generalized debility, suspected long Covid syndrome.    I have discussed the patient's assessment and plan with the patient, DIOR Bradley, the patient's wife at bedside, and attending physician, Dr. Prado.     Disposition:   · Inpatient rehab denied.   · SS assisting with short term rehab placement.     Discharge needs:  • None at this time.    IGOR Mcrae  11/07/21  11:15 EST

## 2021-11-08 LAB
ANION GAP SERPL CALCULATED.3IONS-SCNC: 12.8 MMOL/L (ref 5–15)
ANISOCYTOSIS BLD QL: ABNORMAL
BUN SERPL-MCNC: 29 MG/DL (ref 8–23)
BUN/CREAT SERPL: 30.5 (ref 7–25)
CALCIUM SPEC-SCNC: 8.5 MG/DL (ref 8.6–10.5)
CHLORIDE SERPL-SCNC: 107 MMOL/L (ref 98–107)
CO2 SERPL-SCNC: 18.2 MMOL/L (ref 22–29)
CREAT SERPL-MCNC: 0.95 MG/DL (ref 0.76–1.27)
DEPRECATED RDW RBC AUTO: 57 FL (ref 37–54)
EOSINOPHIL # BLD MANUAL: 0.13 10*3/MM3 (ref 0–0.4)
EOSINOPHIL NFR BLD MANUAL: 1 % (ref 0.3–6.2)
ERYTHROCYTE [DISTWIDTH] IN BLOOD BY AUTOMATED COUNT: 15.6 % (ref 12.3–15.4)
GFR SERPL CREATININE-BSD FRML MDRD: 77 ML/MIN/1.73
GLUCOSE BLDC GLUCOMTR-MCNC: 127 MG/DL (ref 70–130)
GLUCOSE BLDC GLUCOMTR-MCNC: 161 MG/DL (ref 70–130)
GLUCOSE BLDC GLUCOMTR-MCNC: 163 MG/DL (ref 70–130)
GLUCOSE BLDC GLUCOMTR-MCNC: 186 MG/DL (ref 70–130)
GLUCOSE SERPL-MCNC: 174 MG/DL (ref 65–99)
HCT VFR BLD AUTO: 39.4 % (ref 37.5–51)
HGB BLD-MCNC: 13 G/DL (ref 13–17.7)
LYMPHOCYTES # BLD MANUAL: 2.62 10*3/MM3 (ref 0.7–3.1)
LYMPHOCYTES NFR BLD MANUAL: 20 % (ref 19.6–45.3)
LYMPHOCYTES NFR BLD MANUAL: 8 % (ref 5–12)
MACROCYTES BLD QL SMEAR: ABNORMAL
MCH RBC QN AUTO: 33.2 PG (ref 26.6–33)
MCHC RBC AUTO-ENTMCNC: 33 G/DL (ref 31.5–35.7)
MCV RBC AUTO: 100.5 FL (ref 79–97)
METAMYELOCYTES NFR BLD MANUAL: 6 % (ref 0–0)
MONOCYTES # BLD AUTO: 1.05 10*3/MM3 (ref 0.1–0.9)
NEUTROPHILS # BLD AUTO: 8.51 10*3/MM3 (ref 1.7–7)
NEUTROPHILS NFR BLD MANUAL: 63 % (ref 42.7–76)
NEUTS BAND NFR BLD MANUAL: 2 % (ref 0–5)
PLAT MORPH BLD: NORMAL
PLATELET # BLD AUTO: 223 10*3/MM3 (ref 140–450)
PMV BLD AUTO: 9.4 FL (ref 6–12)
POTASSIUM SERPL-SCNC: 4.6 MMOL/L (ref 3.5–5.2)
RBC # BLD AUTO: 3.92 10*6/MM3 (ref 4.14–5.8)
SARS-COV-2 RNA RESP QL NAA+PROBE: NOT DETECTED
SCAN SLIDE: NORMAL
SODIUM SERPL-SCNC: 138 MMOL/L (ref 136–145)
WBC # BLD AUTO: 13.09 10*3/MM3 (ref 3.4–10.8)

## 2021-11-08 PROCEDURE — 82962 GLUCOSE BLOOD TEST: CPT

## 2021-11-08 PROCEDURE — 97116 GAIT TRAINING THERAPY: CPT

## 2021-11-08 PROCEDURE — 80048 BASIC METABOLIC PNL TOTAL CA: CPT | Performed by: PHYSICIAN ASSISTANT

## 2021-11-08 PROCEDURE — 97530 THERAPEUTIC ACTIVITIES: CPT

## 2021-11-08 PROCEDURE — 94761 N-INVAS EAR/PLS OXIMETRY MLT: CPT

## 2021-11-08 PROCEDURE — 94799 UNLISTED PULMONARY SVC/PX: CPT

## 2021-11-08 PROCEDURE — 85007 BL SMEAR W/DIFF WBC COUNT: CPT | Performed by: PHYSICIAN ASSISTANT

## 2021-11-08 PROCEDURE — U0003 INFECTIOUS AGENT DETECTION BY NUCLEIC ACID (DNA OR RNA); SEVERE ACUTE RESPIRATORY SYNDROME CORONAVIRUS 2 (SARS-COV-2) (CORONAVIRUS DISEASE [COVID-19]), AMPLIFIED PROBE TECHNIQUE, MAKING USE OF HIGH THROUGHPUT TECHNOLOGIES AS DESCRIBED BY CMS-2020-01-R: HCPCS | Performed by: INTERNAL MEDICINE

## 2021-11-08 PROCEDURE — 97535 SELF CARE MNGMENT TRAINING: CPT

## 2021-11-08 PROCEDURE — 85025 COMPLETE CBC W/AUTO DIFF WBC: CPT | Performed by: PHYSICIAN ASSISTANT

## 2021-11-08 PROCEDURE — 99231 SBSQ HOSP IP/OBS SF/LOW 25: CPT | Performed by: PHYSICIAN ASSISTANT

## 2021-11-08 PROCEDURE — 94760 N-INVAS EAR/PLS OXIMETRY 1: CPT

## 2021-11-08 RX ADMIN — SODIUM CHLORIDE, PRESERVATIVE FREE 10 ML: 5 INJECTION INTRAVENOUS at 20:03

## 2021-11-08 RX ADMIN — ASPIRIN 81 MG: 81 TABLET, COATED ORAL at 08:14

## 2021-11-08 RX ADMIN — METOPROLOL TARTRATE 12.5 MG: 25 TABLET, FILM COATED ORAL at 20:00

## 2021-11-08 RX ADMIN — POLYETHYLENE GLYCOL (3350) 17 G: 17 POWDER, FOR SOLUTION ORAL at 08:14

## 2021-11-08 RX ADMIN — MIRTAZAPINE 45 MG: 15 TABLET, FILM COATED ORAL at 20:00

## 2021-11-08 RX ADMIN — ACETAMINOPHEN 649.6 MG: 160 SOLUTION ORAL at 20:01

## 2021-11-08 RX ADMIN — ATORVASTATIN CALCIUM 80 MG: 40 TABLET, FILM COATED ORAL at 20:01

## 2021-11-08 RX ADMIN — RANOLAZINE 500 MG: 500 TABLET, FILM COATED, EXTENDED RELEASE ORAL at 08:14

## 2021-11-08 RX ADMIN — ISOSORBIDE MONONITRATE 120 MG: 60 TABLET ORAL at 08:14

## 2021-11-08 RX ADMIN — FINASTERIDE 5 MG: 5 TABLET, FILM COATED ORAL at 08:14

## 2021-11-08 RX ADMIN — ALBUTEROL SULFATE 2.5 MG: 2.5 SOLUTION RESPIRATORY (INHALATION) at 07:34

## 2021-11-08 RX ADMIN — METOPROLOL TARTRATE 12.5 MG: 25 TABLET, FILM COATED ORAL at 08:14

## 2021-11-08 RX ADMIN — FERROUS SULFATE TAB 325 MG (65 MG ELEMENTAL FE) 325 MG: 325 (65 FE) TAB at 08:14

## 2021-11-08 RX ADMIN — PANTOPRAZOLE SODIUM 40 MG: 40 TABLET, DELAYED RELEASE ORAL at 08:14

## 2021-11-08 RX ADMIN — MEMANTINE HYDROCHLORIDE AND DONEPEZIL HYDROCHLORIDE 28 MG: 28; 10 CAPSULE ORAL at 08:14

## 2021-11-08 RX ADMIN — CLOPIDOGREL 75 MG: 75 TABLET, FILM COATED ORAL at 08:14

## 2021-11-08 RX ADMIN — BUDESONIDE AND FORMOTEROL FUMARATE DIHYDRATE 2 PUFF: 80; 4.5 AEROSOL RESPIRATORY (INHALATION) at 07:34

## 2021-11-08 RX ADMIN — MEGESTROL ACETATE 800 MG: 40 SUSPENSION ORAL at 08:14

## 2021-11-08 RX ADMIN — BUDESONIDE AND FORMOTEROL FUMARATE DIHYDRATE 2 PUFF: 80; 4.5 AEROSOL RESPIRATORY (INHALATION) at 18:30

## 2021-11-08 RX ADMIN — RANOLAZINE 500 MG: 500 TABLET, FILM COATED, EXTENDED RELEASE ORAL at 20:00

## 2021-11-08 RX ADMIN — SODIUM CHLORIDE, PRESERVATIVE FREE 10 ML: 5 INJECTION INTRAVENOUS at 09:05

## 2021-11-08 RX ADMIN — FERROUS SULFATE TAB 325 MG (65 MG ELEMENTAL FE) 325 MG: 325 (65 FE) TAB at 20:00

## 2021-11-08 NOTE — THERAPY TREATMENT NOTE
Acute Care - Physical Therapy Treatment Note   [facility]     Patient Name: Fidencio Luciano  : 1945  MRN: [id number]  Today's Date: 2021   Onset of Illness/Injury or Date of Surgery: 10/27/21  Visit Dx:     ICD-10-CM ICD-9-CM   1. Weakness generalized  R53.1 780.79   2. Failure to thrive in adult  R62.7 783.7     Patient Active Problem List   Diagnosis   • Essential hypertension   • Type 2 diabetes mellitus (HCC)   • Benign prostatic hyperplasia   • ASCVD (arteriosclerotic cardiovascular disease), s/p stenting of 80 % stenosis in left circumflex on 10/05/16and 60% stenosis in the LAD, clinically stable.    • Hyperlipidemia LDL goal <70   • Weakness generalized   • Coronary artery fistula   • Weight loss, unintentional   • Iron deficiency anemia   • Gastroesophageal reflux disease   • Dysphagia   • Chest pain     Past Medical History:   Diagnosis Date   • BPH (benign prostatic hyperplasia)    • Bradycardia     Positive tilt table testing 2016   • Coronary artery disease    • Diabetes mellitus (HCC)    • Diverticulosis    • Elevated cholesterol    • Gastric ulcer with hemorrhage    • Gastroesophageal reflux disease 2021   • History of transfusion    • Hyperlipidemia    • Hypertension    • Psoriasis      Past Surgical History:   Procedure Laterality Date   • BACK SURGERY     • CARDIAC CATHETERIZATION N/A 2016    Procedure: Left Heart Cath;  Surgeon: Giovanni Buenrostro MD;  Location: [facility] INVASIVE LOCATION;  Service:    • CARDIAC CATHETERIZATION N/A 10/4/2016    Procedure: Left Heart Cath;  Surgeon: Bharathi Andrew MD;  Location: [facility] INVASIVE LOCATION;  Service:    • CARDIAC CATHETERIZATION N/A 2017    Procedure: Left Heart Cath;  Surgeon: Giovanni Buenrostro MD;  Location: [facility] INVASIVE LOCATION;  Service:    • CARDIAC CATHETERIZATION N/A 2017    Procedure: ERR;  Surgeon: Giovanni Buenrostro MD;  Location: [facility] CATH INVASIVE LOCATION;  Service:    • CARDIAC CATHETERIZATION  N/A 11/8/2019    Procedure: Left Heart Cath;  Surgeon: Abraham Oviedo MD;  [facility] CATH INVASIVE LOCATION;  Service: Cardiology   • CARDIAC CATHETERIZATION N/A 12/18/2019    Procedure: Coronary angiography;  Surgeon: Jay Barney IV, MD;  Location:  [facility] CATH INVASIVE LOCATION;  Service: Cardiovascular   • CHOLECYSTECTOMY     • COLONOSCOPY  11/13/2015    Dr. Martinez- internal hemorrhoids, sigmoid diverticulosis   • COLONOSCOPY N/A 8/17/2018    Procedure: COLONOSCOPY CPT CODE: 20424;  Surgeon: Gigi Geronimo MD;  Location:  [facility] OR;  Service: Gastroenterology   • CORONARY ANGIOPLASTY WITH STENT PLACEMENT     • ENDOSCOPY N/A 8/17/2018    Procedure: ESOPHAGOGASTRODUODENOSCOPY WITH BIOPSY CPT CODE: 67422;  Surgeon: Gigi Geronimo MD;  Location:  [facility];  Service: Gastroenterology   • ENDOSCOPY N/A 4/20/2021    Procedure: ESOPHAGOGASTRODUODENOSCOPY;  Surgeon: Geno Vernon MD;  [facility]:  [facility];  Service: Gastroenterology;  Laterality: N/A;   • INTERVENTIONAL RADIOLOGY PROCEDURE N/A 12/18/2019    Procedure: Coil Embolization of septal to pulmonary artery fistuala.;  Surgeon: Jay Barney IV, MD;  Location:  [facility] CATH INVASIVE LOCATION;  Service: Cardiovascular   • SC RT/LT HEART CATHETERS N/A 5/9/2017    Procedure: Percutaneous Coronary Intervention;  Surgeon: Lincoln De Los Santos MD;  Location:  [facility] CATH INVASIVE LOCATION;  Service: Cardiology   • STOMACH SURGERY      FUSION OF BLEEDING ULCER   • UPPER GASTROINTESTINAL ENDOSCOPY  11/13/2015    Dr. Martinez- mild gastritis     PT Assessment (last 12 hours)     PT Evaluation and Treatment     Row Name 11/08/21 1013          Physical Therapy Time and Intention    Subjective Information no complaints  -CT     Document Type therapy note (daily note)  -CT     Mode of Treatment physical therapy  -CT     Patient Effort adequate  -CT     Comment Pt tolerated treatment session well. Pt ambulated in room with use of  RW.  -CT     Row Name 11/08/21 1013          Bed Mobility    Bed Mobility supine-sit; sit-supine  -CT     Supine-Sit Roberts (Bed Mobility) independent  -CT     Sit-Supine Roberts (Bed Mobility) independent  -CT     Row Name 11/08/21 1013          Transfers    Transfers sit-stand transfer; stand-sit transfer  -CT     Sit-Stand Roberts (Transfers) contact guard  -CT     Stand-Sit Roberts (Transfers) contact guard; standby assist  -CT     Row Name 11/08/21 1013          Sit-Stand Transfer    Assistive Device (Sit-Stand Transfers) walker, front-wheeled  -CT     Row Name 11/08/21 1013          Stand-Sit Transfer    Assistive Device (Stand-Sit Transfers) walker, front-wheeled  -CT     Row Name 11/08/21 1013          Gait/Stairs (Locomotion)    Roberts Level (Gait) contact guard; verbal cues  -CT     Assistive Device (Gait) walker, front-wheeled  -CT     Distance in Feet (Gait) 20  distance limited d/t toileting needs  -CT     Pattern (Gait) step-to  -CT     Row Name 11/08/21 1013          Plan of Care Review    Plan of Care Reviewed With patient  -CT     Row Name 11/08/21 1013          Positioning and Restraints    Pre-Treatment Position in bed  -CT     Post Treatment Position bed  -CT     In Bed supine; call light within reach; encouraged to call for assist; exit alarm on  -CT           User Key  (r) = Recorded By, (t) = Taken By, (c) = Cosigned By    Initials Name Provider Type    CT Marycruz Soto PT Physical Therapist                PT Recommendation and Plan     Plan of Care Reviewed With: patient       Time Calculation:    PT Charges     Row Name 11/08/21 1015             Time Calculation    PT Received On 11/08/21  -CT      PT Goal Re-Cert Due Date 11/11/21  -CT              Time Calculation- PT    Total Timed Code Minutes- PT 28 minute(s)  -CT            User Key  (r) = Recorded By, (t) = Taken By, (c) = Cosigned By    Initials Name Provider Type    CT Marycruz Soto PT Physical  Therapist              Therapy Charges for Today     Code Description Service Date Service Provider Modifiers Qty    13731431194  GAIT TRAINING EA 15 MIN 11/8/2021 Marycruz Soto, PT GP 1    [id number]  PT THERAPEUTIC ACT EA 15 MIN 11/8/2021 Marycruz Soto, PT GP 1               Marycruz Soto, PT  11/8/2021

## 2021-11-08 NOTE — CASE MANAGEMENT/SOCIAL WORK
Discharge Planning Assessment   [facility]     Patient Name: Fidencio Luciano  MRN: [id number]  Today's Date: 11/8/2021    Admit Date: 10/27/2021       Discharge Plan     Row Name 11/08/21 1406       Plan    Plan [facility] farzana Stephens has began prior authorization with pt's insurance for possible admit.  Pt's spouse, Angela, aware and agreeable.  SS will follow and assist with discharge needs.              Continued Care and Services - Admitted Since 10/27/2021     Destination     Service Provider Request Status Selected Services Address Phone Fax Patient Preferred    [facility]  Pending - Request Sent N/A [address] [place] [address] [phone] --    [facility]  Pending - Request Sent N/A [address] [place] [address] [phone] --                  JOSE UgaldeW

## 2021-11-08 NOTE — THERAPY TREATMENT NOTE
Acute Care - Occupational Therapy Treatment Note   [facility]al     Patient Name: Fidencio Luciano  : 1945  MRN: [id number]  Today's Date: 2021  Onset of Illness/Injury or Date of Surgery: 10/27/21     Referring Physician: Cherrie    Admit Date: 10/27/2021       ICD-10-CM ICD-9-CM   1. Weakness generalized  R53.1 780.79   2. Failure to thrive in adult  R62.7 783.7     Patient Active Problem List   Diagnosis   • Essential hypertension   • Type 2 diabetes mellitus (HCC)   • Benign prostatic hyperplasia   • ASCVD (arteriosclerotic cardiovascular disease), s/p stenting of 80 % stenosis in left circumflex on 10/05/16and 60% stenosis in the LAD, clinically stable.    • Hyperlipidemia LDL goal <70   • Weakness generalized   • Coronary artery fistula   • Weight loss, unintentional   • Iron deficiency anemia   • Gastroesophageal reflux disease   • Dysphagia   • Chest pain     Past Medical History:   Diagnosis Date   • BPH (benign prostatic hyperplasia)    • Bradycardia     Positive tilt table testing 2016   • Coronary artery disease    • Diabetes mellitus (HCC)    • Diverticulosis    • Elevated cholesterol    • Gastric ulcer with hemorrhage    • Gastroesophageal reflux disease 2021   • History of transfusion    • Hyperlipidemia    • Hypertension    • Psoriasis      Past Surgical History:   Procedure Laterality Date   • BACK SURGERY     • CARDIAC CATHETERIZATION N/A 2016    Procedure: Left Heart Cath;  Surgeon: Giovanni Buenrostro MD;  Location: [facility] INVASIVE LOCATION;  Service:    • CARDIAC CATHETERIZATION N/A 10/4/2016    Procedure: Left Heart Cath;  Surgeon: Bharathi Andrew MD;  Location: [facility] INVASIVE LOCATION;  Service:    • CARDIAC CATHETERIZATION N/A 2017    Procedure: Left Heart Cath;  Surgeon: Giovanni Buenrostro MD;  Location: [facility] CATH INVASIVE LOCATION;  Service:    • CARDIAC CATHETERIZATION N/A 2017    Procedure: ERR;  Surgeon: Giovanni Buenrostro MD;  Location: [facility]  INVASIVE LOCATION;  Service:    • CARDIAC CATHETERIZATION N/A 11/8/2019    Procedure: Left Heart Cath;  Surgeon: Abraham Oviedo MD;  Location:  [facility] CATH INVASIVE LOCATION;  Service: Cardiology   • CARDIAC CATHETERIZATION N/A 12/18/2019    Procedure: Coronary angiography;  Surgeon: Jay Barney IV, MD;  Location:  [facility] CATH INVASIVE LOCATION;  Service: Cardiovascular   • CHOLECYSTECTOMY     • COLONOSCOPY  11/13/2015    Dr. Martinez- internal hemorrhoids, sigmoid diverticulosis   • COLONOSCOPY N/A 8/17/2018    Procedure: COLONOSCOPY CPT CODE: 59092;  Surgeon: Gigi Geronimo MD;  Location:  [facility];  Service: Gastroenterology   • CORONARY ANGIOPLASTY WITH STENT PLACEMENT     • ENDOSCOPY N/A 8/17/2018    Procedure: ESOPHAGOGASTRODUODENOSCOPY WITH BIOPSY CPT CODE: 01687;  Surgeon: Gigi Geronimo MD;  Location:  [facility];  Service: Gastroenterology   • ENDOSCOPY N/A 4/20/2021    Procedure: ESOPHAGOGASTRODUODENOSCOPY;  Surgeon: Geno Vernon MD;  Location:  [facility];  Service: Gastroenterology;  Laterality: N/A;   • INTERVENTIONAL RADIOLOGY PROCEDURE N/A 12/18/2019    Procedure: Coil Embolization of septal to pulmonary artery fistuala.;  Surgeon: Jay Barney IV, MD;  Location:  [facility] CATH INVASIVE LOCATION;  Service: Cardiovascular   • SD RT/LT HEART CATHETERS N/A 5/9/2017    Procedure: Percutaneous Coronary Intervention;  Surgeon: Lincoln De Los Santos MD;  [facility] CATH INVASIVE LOCATION;  Service: Cardiology   • STOMACH SURGERY      FUSION OF BLEEDING ULCER   • UPPER GASTROINTESTINAL ENDOSCOPY  11/13/2015    Dr. Martinez- mild gastritis         OT ASSESSMENT FLOWSHEET (last 12 hours)     OT Evaluation and Treatment     Row Name 11/08/21 1015                   OT Time and Intention    Subjective Information no complaints  -LM        Document Type therapy note (daily note)  -LM        Mode of Treatment occupational therapy  -LM        Patient Effort adequate  -LM         Comment Patient seen this date for adl retraining/education, fxl mobility.  Patient required min assist with bed mobility, supervision with sitting balance on eob.  Total/max assist with toileting.  Patient able to transfer using rw and min assist x 2.  Required mod cues due to problem solving/safety.  Good participation and motivation.  -LM                  General Information    Existing Precautions/Restrictions fall  -LM        Limitations/Impairments safety/cognitive  -LM                  Cognition    Affect/Mental Status (Cognitive) WFL  -LM        Orientation Status (Cognition) oriented to; person; verbal cues/prompts needed for orientation  -LM        Follows Commands (Cognition) increased processing time needed; physical/tactile prompts required; repetition of directions required; verbal cues/prompting required  -LM                  Positioning and Restraints    Post Treatment Position bed  -LM        In Bed call light within reach; encouraged to call for assist; with nsg  -LM              User Key  (r) = Recorded By, (t) = Taken By, (c) = Cosigned By    Initials Name Effective Dates    LM Hansa [facility] OT 06/16/21 -                        OT Recommendation and Plan  Planned Therapy Interventions (OT): activity tolerance training, adaptive equipment training, BADL retraining, patient/caregiver education/training, transfer/mobility retraining, strengthening exercise, ROM/therapeutic exercise  Plan of Care Review  Plan of Care Reviewed With: patient  Plan of Care Reviewed With: patient        Time Calculation:     Therapy Charges for Today     Code Description Service Date Service Provider Modifiers Qty    [id number]  OT SELF CARE/MGMT/TRAIN EA 15 MIN 11/8/2021 Hansa [facility] OT GO 2               Hansa [facility] OT  11/8/2021

## 2021-11-08 NOTE — CASE MANAGEMENT/SOCIAL WORK
Discharge Planning Assessment  MILLICENT Recio     Patient Name: Fidencio Luciano  MRN: [id number]  Today's Date: 11/8/2021    Admit Date: 10/27/2021       Discharge Plan     Row Name 11/08/21 1710       Plan    Plan Per Kat pt has been approved for admit to [facility] in the am if medically stable and will need a covid test prior to discharge.  Pt's spouse aware and agreeable.  SS will follow.                      JOSE UgaldeW

## 2021-11-08 NOTE — PROGRESS NOTES
[facility] HOSPITALIST PROGRESS NOTE     Patient Identification:  Name:  Fidencio Luciano  Age:  76 y.o.  Sex:  male  :  1945  MRN:  [id number]  Visit Number:  [id number]  Primary Care Provider:  Camila Ortiz APRN    Date of admission: 10/27/2021  Length of stay:  12    ----------------------------------------------------------------------------------------------------------------------  Subjective     Chief Complaint:   Chief Complaint   Patient presents with   • Chest Pain     Subjective/Interval History:    76 y.o. male who was admitted on 10/27/2021 with generalized weakness and decreased responsiveness.  He was also having some complaints of chest pain at home.  He was having some myoclonic jerks in the ED.  It was noted that the patient had been diagnosed with COVID-19 in 2021.  Since then, he has had a steady physical and mental decline.  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  He was admitted for further evaluation and therapy.    PMH is significant for CAD status post previous stenting, hypertension, dyslipidemia, iron deficiency anemia, type 2 diabetes mellitus. For complete admission information, please see history and physical.     Consultations:  1. Cardiology    Procedures/Scans:  1. MRI brain without contrast  2. CT chest without contrast  3. CT abdomen/pelvis without contrast  4. SLP evaluation  5. US venous doppler bilateral     Today, the patient reports that he is doing well. No new complaints. Discussed with RNKirk. No new events or concerns.     Review of Systems   Constitutional: Negative for chills, fatigue and fever.   HENT: Negative for sore throat and trouble swallowing.    Respiratory: Negative for cough and shortness of breath.    Cardiovascular: Negative for chest pain.   Gastrointestinal: Negative for abdominal pain, diarrhea, nausea and vomiting.   Genitourinary: Negative for difficulty urinating and dysuria.   Skin: Negative  for color change and rash.   Neurological: Positive for weakness (Generalized).   Psychiatric/Behavioral: Negative for agitation, behavioral problems and confusion.      ----------------------------------------------------------------------------------------------------------------------  Objective   [facility] Meds:  aspirin, 81 mg, Oral, Daily  atorvastatin, 80 mg, Oral, Nightly  budesonide-formoterol, 2 puff, Inhalation, BID  clopidogrel, 75 mg, Oral, Daily  ferrous sulfate, 325 mg, Oral, BID  finasteride, 5 mg, Oral, Daily  isosorbide mononitrate, 120 mg, Oral, Daily  megestrol, 800 mg, Oral, Daily  Memantine HCl-Donepezil HCl, 1 capsule, Oral, Daily  metoprolol tartrate, 12.5 mg, Oral, Q12H  mirtazapine, 45 mg, Oral, Nightly  pantoprazole, 40 mg, Oral, QAM AC  polyethylene glycol, 17 g, Oral, Daily  ranolazine, 500 mg, Oral, Q12H  sodium chloride, 10 mL, Intravenous, Q12H         ----------------------------------------------------------------------------------------------------------------------  Vital Signs:  Temp:  [97.6 °F (36.4 °C)-98.6 °F (37 °C)] 97.6 °F (36.4 °C)  Heart Rate:  [63-73] 63  Resp:  [18-20] 19  BP: (120-135)/(58-63) 135/60  Mean Arterial Pressure (Non-Invasive) for the past 24 hrs (Last 3 readings):   Noninvasive MAP (mmHg)   11/08/21 0311 88   11/07/21 1907 92     SpO2 Percentage    11/08/21 0311 11/08/21 0630 11/08/21 0734   SpO2: 96% 96% 99%     SpO2:  [96 %-99 %] 99 %  on   ;   Device (Oxygen Therapy): room air    Body mass index is 20.6 kg/m².  Wt Readings from Last 3 Encounters:   11/08/21 67 kg (147 lb 11.2 oz)   10/21/21 64.4 kg (142 lb)   10/21/21 64.5 kg (142 lb 3.2 oz)        Intake/Output Summary (Last 24 hours) at 11/8/2021 1004  Last data filed at 11/8/2021 0832  Gross per 24 hour   Intake 980 ml   Output 1325 ml   Net -345 ml     Diet Soft Texture; Chopped;  Thin  ----------------------------------------------------------------------------------------------------------------------  Physical exam:  Constitutional: Vital signs reviewed. Well-developed and well-nourished.  No respiratory distress on room air.   HENT:  Head:  Normocephalic and atraumatic.    Eyes:  Conjunctivae and EOM are normal. No scleral icterus. No erythema or drainage.  Neck:  Neck supple.   Cardiovascular:  Normal rate, regular rhythm and normal heart sounds with no murmur.  Pulmonary/Chest:  No respiratory distress, no wheezes, no crackles, with normal breath sounds and good air movement.  Abdominal:  Soft.  Bowel sounds are present x4.  No distension and no tenderness.   Musculoskeletal:  No edema, no tenderness, and no deformity.  No red or swollen joints anywhere.    Neurological:  Alert and oriented to person, place, and time. No facial droop.  No slurred speech. Somewhat flattened affect.   Skin:  Skin is warm and dry. No rash noted. No pallor.   Peripheral vascular: No clubbing, no cyanosis, no edema.   Genitourinary: No terrell catheter in place.     I have reviewed and updated the physical exam as needed to reflect that of 11/08/21  ----------------------------------------------------------------------------------------------------------------------  Tele: Sinus rhythm 60s.    ----------------------------------------------------------------------------------------------------------------------              Results from last 7 days   Lab Units 11/08/21  0410 11/07/21  0052 11/06/21  0105 11/05/21  0528 11/05/21  0111   CRP mg/dL  --   --   --   --  <0.30   WBC 10*3/mm3 13.09* 15.08* 15.35*   < >  --    HEMOGLOBIN g/dL 13.0 13.1 13.0   < >  --    HEMATOCRIT % 39.4 39.7 40.0   < >  --    MCV fL 100.5* 101.3* 100.8*   < >  --    MCHC g/dL 33.0 33.0 32.5   < >  --    PLATELETS 10*3/mm3 223 207 202   < >  --     < > = values in this interval not displayed.     Results from last 7 days   Lab Units  11/08/21  0410 11/07/21  0052 11/06/21  0105 11/05/21  0111 11/04/21  0045 11/03/21  0035 11/02/21  0158   SODIUM mmol/L 138 141 140   < > 139   < > 137   POTASSIUM mmol/L 4.6 4.3 4.2   < > 4.1   < > 4.2   CHLORIDE mmol/L 107 110* 111*   < > 108*   < > 106   CO2 mmol/L 18.2* 19.5* 18.0*   < > 18.3*   < > 20.1*   BUN mg/dL 29* 23 23   < > 36*   < > 25*   CREATININE mg/dL 0.95 0.76 0.74*   < > 0.97   < > 0.77   EGFR IF NONAFRICN AM mL/min/1.73 77 100 103   < > 75   < > 98   CALCIUM mg/dL 8.5* 8.7 8.7   < > 8.6   < > 9.0   GLUCOSE mg/dL 174* 109* 113*   < > 127*   < > 113*   ALBUMIN g/dL  --   --   --   --  3.43*  --  3.77   BILIRUBIN mg/dL  --   --   --   --  0.3  --  0.5   ALK PHOS U/L  --   --   --   --  80  --  71   AST (SGOT) U/L  --   --   --   --  20  --  22   ALT (SGPT) U/L  --   --   --   --  22  --  23    < > = values in this interval not displayed.   Estimated Creatinine Clearance: 63.7 mL/min (by C-G formula based on SCr of 0.95 mg/dL).  No results found for: AMMONIA     Glucose   Date/Time Value Ref Range Status   11/08/2021 0633 127 70 - 130 mg/dL Final     Comment:     Meter: [id number] : [id number] LADY TANNER   11/07/2021 1909 170 (H) 70 - 130 mg/dL Final     Comment:     Meter: [id number] : [id number] GERI ASHLEY   11/07/2021 1523 130 70 - 130 mg/dL Final     Comment:     Meter: [id number] : [id number] LADY TANNER   11/07/2021 1005 132 (H) 70 - 130 mg/dL Final     Comment:     Meter: [id number] : [id number] LADY TANNER   11/07/2021 0702 75 70 - 130 mg/dL Final     Comment:     Meter: [id number] : [id number] LADY TANNER   11/06/2021 2001 182 (H) 70 - 130 mg/dL Final     Comment:     Meter: [id number] : [id number] [facility] Parisa Chow   11/06/2021 1627 194 (H) 70 - 130 mg/dL Final     Comment:     Meter: [id number] : [id number] michael rausch   11/06/2021 1030 139 (H) 70 - 130 mg/dL Final     Comment:     Meter: [id number] : [id number] michael rausch      Lab Results   Component Value Date    HGBA1C 5.90 (H) 10/28/2021     Lab Results   Component Value Date    TSH 1.650 10/27/2021    FREET4 1.14 10/27/2021     No results found for: BLOODCX  No results found for: URINECX  No results found for: WOUNDCX  No results found for: STOOLCX  No results found for: RESPCX    Pain Management Panel     Pain Management Panel Latest Ref Rng & Units 10/27/2021 7/31/2015    AMPHETAMINES SCREEN, URINE Negative Negative Negative    BARBITURATES SCREEN Negative Negative Negative    BENZODIAZEPINE SCREEN, URINE Negative Negative Negative    BUPRENORPHINEUR Negative Negative -    COCAINE SCREEN, URINE Negative Negative Negative    METHADONE SCREEN, URINE Negative Negative Negative    METHAMPHETAMINEUR Negative Negative -        I have personally reviewed the above laboratory results for 11/08/21  ----------------------------------------------------------------------------------------------------------------------  Imaging Results (Last 24 Hours)     ** No results found for the last 24 hours. **        ----------------------------------------------------------------------------------------------------------------------  Assessment/Plan     #Debility   #Falls  #Suspected long COVID syndrome   · PT/OT following.  Patient reports improvement noted.  · Patient fell while here in the hospital. XR hip/pelvis shows no fracture.   · Inpatient rehab consulted and denied. SS assisting with SNF placement. Will follow up today.   · Follow up PT/OT today.     #Leukocytosis   · Patient denies cough, shortness of breath, dysuria, abdominal pain, diarrhea.  · CT chest on admission showed no consolidation.  CT abdomen/pelvis on admission also showed no acute findings.  · Repeat UA and chest x-ray 11/3 without acute findings.  · WBC now trending down. CRP/Procal normal.   · BC x2 with no growth to date.  · Venous dopplers negative for DVT.   · WBC count trending down. Repeat CBC in AM.    #Constipation     · Patient is now moving his bowels.   · Continue scheduled MiraLAX.    #Concern for esophagitis   · Continue PPI.    #Dementia   · Patient currently alert and oriented.  · PCP had reported some concern for advancing dementia.  · MRI of the brain without contrast on admission showed no acute findings, mild cerebral atrophy and moderate chronic small vessel ischemic disease.  · Continue home medications and follow-up with PCP as an outpatient.    #CAD s/p prior stenting   · Denies any chest pain.  · Continue low-dose aspirin, atorvastatin, Plavix, Imdur, Ranexa.    #DVT Prophylaxis  · SCDs    The patient is high risk due to the following diagnoses/reasons: Generalized debility, suspected long Covid syndrome.    I have discussed the patient's assessment and plan with the patient, DIOR Bradley, and attending physician, Dr. Grier.     Disposition:   · Inpatient rehab denied.   · SS assisting with short term rehab placement.     Discharge needs:  • None at this time.    IGOR Mcrae  11/08/21  10:04 EST

## 2021-11-08 NOTE — PLAN OF CARE
Goal Outcome Evaluation:      Patient has been pleasant tonight. No complaints of chest pain or shortness of breath. Vital signs stable. Will continue to monitor and follow plan of care.        Problem: Adult Inpatient Plan of Care  Goal: Plan of Care Review  Outcome: Ongoing, Progressing  Goal: Patient-Specific Goal (Individualized)  Outcome: Ongoing, Progressing  Goal: Absence of Hospital-Acquired Illness or Injury  Outcome: Ongoing, Progressing  Intervention: Identify and Manage Fall Risk  Recent Flowsheet Documentation  Taken 11/8/2021 0300 by Murphy Burdick RN  Safety Promotion/Fall Prevention:   assistive device/personal items within reach   clutter free environment maintained   fall prevention program maintained   nonskid shoes/slippers when out of bed   room organization consistent   safety round/check completed  Taken 11/8/2021 0100 by Murphy Burdick RN  Safety Promotion/Fall Prevention:   assistive device/personal items within reach   clutter free environment maintained   fall prevention program maintained   nonskid shoes/slippers when out of bed   room organization consistent   safety round/check completed  Taken 11/7/2021 2300 by Murphy Burdick RN  Safety Promotion/Fall Prevention:   assistive device/personal items within reach   clutter free environment maintained   safety round/check completed  Taken 11/7/2021 2004 by Murphy Burdick RN  Safety Promotion/Fall Prevention:   assistive device/personal items within reach   clutter free environment maintained   fall prevention program maintained   nonskid shoes/slippers when out of bed   room organization consistent   safety round/check completed  Intervention: Prevent Skin Injury  Recent Flowsheet Documentation  Taken 11/7/2021 2004 by Murphy Burdick RN  Skin Protection:   adhesive use limited   electrode sites changed   pulse oximeter probe site changed  Intervention: Prevent and Manage VTE (venous thromboembolism)  Risk  Recent Flowsheet Documentation  Taken 11/7/2021 2004 by Murphy Burdick RN  VTE Prevention/Management: sequential compression devices on  Goal: Optimal Comfort and Wellbeing  Outcome: Ongoing, Progressing  Intervention: Provide Person-Centered Care  Recent Flowsheet Documentation  Taken 11/7/2021 2004 by Murphy Burdick RN  Trust Relationship/Rapport: care explained  Goal: Readiness for Transition of Care  Outcome: Ongoing, Progressing     Problem: Diabetes Comorbidity  Goal: Blood Glucose Level Within Desired Range  Outcome: Ongoing, Progressing  Intervention: Maintain Glycemic Control  Recent Flowsheet Documentation  Taken 11/7/2021 2004 by Murphy Burdick RN  Glycemic Management: blood glucose monitoring     Problem: Fall Injury Risk  Goal: Absence of Fall and Fall-Related Injury  Outcome: Ongoing, Progressing  Intervention: Promote Injury-Free Environment  Recent Flowsheet Documentation  Taken 11/8/2021 0300 by Murphy Burdick RN  Safety Promotion/Fall Prevention:   assistive device/personal items within reach   clutter free environment maintained   fall prevention program maintained   nonskid shoes/slippers when out of bed   room organization consistent   safety round/check completed  Taken 11/8/2021 0100 by Murphy Burdick RN  Safety Promotion/Fall Prevention:   assistive device/personal items within reach   clutter free environment maintained   fall prevention program maintained   nonskid shoes/slippers when out of bed   room organization consistent   safety round/check completed  Taken 11/7/2021 2300 by Murphy Burdick RN  Safety Promotion/Fall Prevention:   assistive device/personal items within reach   clutter free environment maintained   safety round/check completed  Taken 11/7/2021 2004 by Murphy Burdick RN  Safety Promotion/Fall Prevention:   assistive device/personal items within reach   clutter free environment maintained   fall prevention program  maintained   nonskid shoes/slippers when out of bed   room organization consistent   safety round/check completed     Problem: Skin Injury Risk Increased  Goal: Skin Health and Integrity  Outcome: Ongoing, Progressing  Intervention: Optimize Skin Protection  Recent Flowsheet Documentation  Taken 11/7/2021 2004 by Murphy Burdick, RN  Pressure Reduction Techniques:   frequent weight shift encouraged   weight shift assistance provided  Pressure Reduction Devices: (REfuses wedge at times, educated on skin risks)   foam padding utilized   pressure-redistributing mattress utilized  Skin Protection:   adhesive use limited   electrode sites changed   pulse oximeter probe site changed

## 2021-11-08 NOTE — CASE MANAGEMENT/SOCIAL WORK
Discharge Planning Assessment   [place]     Patient Name: Fidencio Luciano  MRN: [id number]  Today's Date: 11/8/2021    Admit Date: 10/27/2021       Discharge Plan     Row Name 11/08/21 1058       Plan    Plan SS sent pt's updated PT/OT notes to [facility] on this date for review for possible prior authorization with pt's insurance.  SS will follow.              Continued Care and Services - Admitted Since 10/27/2021     Destination     Service Provider Request Status Selected Services Address Phone Fax Patient Preferred    [facility]  Pending - Request Sent N/A [address] [place] [address] [phone] --    [facility]  Pending - Request Sent N/A [address] [place] [address] [phone] --                  JOSE UgaldeW

## 2021-11-08 NOTE — DISCHARGE PLACEMENT REQUEST
"Fidencio Luciano (76 y.o. Male)             Date of Birth Social Security Number Address Home Phone MRN    1945  [address] [address] [phone] [id number]    Episcopal Marital Status             Non-Baptism        Admission Date Admission Type Admitting Provider Attending Provider Department, Room/Bed    10/27/21 Emergency Carlos Grier MD Heinss, Karl F, MD [address], 3305/1S    Discharge Date Discharge Disposition Discharge Destination                         Attending Provider: Carlos Grier MD    Allergies: No Known Allergies    Isolation: None   Infection: None   Code Status: CPR   Advance Care Planning Activity    Ht: 180.3 cm (71\")   Wt: 67 kg (147 lb 11.2 oz)    Admission Cmt: None   Principal Problem: Weakness generalized [R53.1]                 Active Insurance as of 10/27/2021     Primary Coverage     Payor Plan Insurance Group Employer/Plan Group    ANTHEM MEDICARE REPLACEMENT ANTHEM MEDICARE ADVANTAGE [id number]     Payor Plan Address Payor Plan Phone Number Payor Plan Fax Number Effective Dates    [address] [phone]  2021 - None Entered    [facility] [id number]       Subscriber Name Subscriber Birth Date Member ID       FIDENCIO LUCIANO 1945 [id number]                 Emergency Contacts      (Rel.) Home Phone Work Phone Mobile Phone    Mustapha(POA)Angela (Spouse) [phone] -- [phone]    Dylan García (Son) [phone] -- [phone]    Sofie Webber (Daughter) [phone] -- --    Zhane Conner (Daughter) [phone] -- --    Aries Ortega (Son) [phone] -- --               Physical Therapy Notes (all)      Tristian Khan, PT at 10/28/21 1156  Version 1 of 1         Acute Care - Physical Therapy Initial Evaluation  [facility]     Patient Name: Fidencio Luciano  : 1945  MRN: [id number]  Today's Date: 10/28/2021   Onset of Illness/Injury or Date of Surgery: 10/27/21  Visit Dx:     ICD-10-CM " ICD-9-CM   1. Weakness generalized  R53.1 780.79   2. Failure to thrive in adult  R62.7 783.7     Patient Active Problem List   Diagnosis   • Essential hypertension   • Type 2 diabetes mellitus (HCC)   • Benign prostatic hyperplasia   • ASCVD (arteriosclerotic cardiovascular disease), s/p stenting of 80 % stenosis in left circumflex on 10/05/16and 60% stenosis in the LAD, clinically stable.    • Hyperlipidemia LDL goal <70   • Weakness generalized   • Coronary artery fistula   • Weight loss, unintentional   • Iron deficiency anemia   • Gastroesophageal reflux disease   • Dysphagia   • Chest pain     Past Medical History:   Diagnosis Date   • BPH (benign prostatic hyperplasia)    • Bradycardia     Positive tilt table testing 07/2016   • Coronary artery disease    • Diabetes mellitus (HCC)    • Diverticulosis    • Elevated cholesterol    • Gastric ulcer with hemorrhage    • Gastroesophageal reflux disease 1/26/2021   • History of transfusion    • Hyperlipidemia    • Hypertension    • Psoriasis      Past Surgical History:   Procedure Laterality Date   • BACK SURGERY     • CARDIAC CATHETERIZATION N/A 9/20/2016    Procedure: Left Heart Cath;  Surgeon: Giovanni Buenrostro MD;  Location: [facility] CATH INVASIVE LOCATION;  Service:    • CARDIAC CATHETERIZATION N/A 10/4/2016    Procedure: Left Heart Cath;  Surgeon: Bharathi Andrew MD;  Location: [facility] CATH INVASIVE LOCATION;  Service:    • CARDIAC CATHETERIZATION N/A 5/9/2017    Procedure: Left Heart Cath;  Surgeon: Giovanni Buenrostro MD;  [facility] CATH INVASIVE LOCATION;  Service:    • CARDIAC CATHETERIZATION N/A 5/9/2017    Procedure: ERR;  Surgeon: Giovanni Buenrostro MD;  [facility]:  [facility] CATH INVASIVE [facility];  Service:    • CARDIAC CATHETERIZATION N/A 11/8/2019    Procedure: Left Heart Cath;  Surgeon: Abraham Oviedo MD;  Location:  [facility] CATH INVASIVE LOCATION;  Service: Cardiology   • CARDIAC CATHETERIZATION N/A 12/18/2019    Procedure: Coronary angiography;  Surgeon:  Jay Barney IV, MD;  Location:  [facility] CATH INVASIVE LOCATION;  Service: Cardiovascular   • CHOLECYSTECTOMY     • COLONOSCOPY  11/13/2015    Dr. Martinez- internal hemorrhoids, sigmoid diverticulosis   • COLONOSCOPY N/A 8/17/2018    Procedure: COLONOSCOPY CPT CODE: 54711;  Surgeon: Gigi Geronimo MD;  Location:  [facility] OR;  Service: Gastroenterology   • CORONARY ANGIOPLASTY WITH STENT PLACEMENT     • ENDOSCOPY N/A 8/17/2018    Procedure: ESOPHAGOGASTRODUODENOSCOPY WITH BIOPSY CPT CODE: 93843;  Surgeon: Gigi Geronimo MD;  Location:  [facility] OR;  Service: Gastroenterology   • ENDOSCOPY N/A 4/20/2021    Procedure: ESOPHAGOGASTRODUODENOSCOPY;  Surgeon: Geno Vernon MD;  Location:  [facility] OR;  Service: Gastroenterology;  Laterality: N/A;   • INTERVENTIONAL RADIOLOGY PROCEDURE N/A 12/18/2019    Procedure: Coil Embolization of septal to pulmonary artery fistuala.;  Surgeon: Jay Barney IV, MD;  Location:  [facility] CATH INVASIVE LOCATION;  Service: Cardiovascular   • DC RT/LT HEART CATHETERS N/A 5/9/2017    Procedure: Percutaneous Coronary Intervention;  Surgeon: Lincoln De Los Santos MD;  [facility]:  [facility] CATH INVASIVE LOC[facility];  Service: Cardiology   • STOMACH SURGERY      FUSION OF BLEEDING ULCER   • UPPER GASTROINTESTINAL ENDOSCOPY  11/13/2015    Dr. Martinez- mild gastritis     PT Assessment (last 12 hours)     PT Evaluation and Treatment     Row Name 10/28/21 1137          Physical Therapy Time and Intention    Subjective Information no complaints  -CW     Document Type evaluation  -CW     Mode of Treatment physical therapy  -CW     Patient Effort adequate  -CW     Symptoms Noted During/After Treatment none  -CW     Comment Patient agreeable to physical therapy evaluation this date. He reports that he has had some falls at home, but does not recall how he fell. He reports that he lives with his wife who assists him if necessary. He does remember last admission here requiring  inpatient rehabilitation stay and that he had to retire following this.  -CW     Row Name 10/28/21 1137          General Information    Patient Profile Reviewed yes  -CW     Onset of Illness/Injury or Date of Surgery 10/27/21  -CW     Referring Physician Cherrie  -CW     Patient Observations alert; cooperative; agree to therapy  -CW     Prior Level of Function min assist:  -CW     Equipment Currently Used at Home walker, rolling  -CW     Equipment Issued to Patient gait belt  -CW     Row Name 10/28/21 1137          Previous Level of Function/Home Environm    BADLs, Premorbid Functional Level needs assistance for safe performance; needs assistive device for safe performance  -CW     Household Ambulation, Premorbid Functional Level needs assistive device for safe performance  -CW     Row Name 10/28/21 1137          Living Environment    Current Living Arrangements home/apartment/condo  -CW     Lives With spouse  -CW     Row Name 10/28/21 1137          Cognition    Affect/Mental Status (Cognitive) flat/blunted affect  -CW     Orientation Status (Cognition) oriented to; person; place; situation  -CW     Follows Commands (Cognition) follows one-step commands; physical/tactile prompts required; verbal cues/prompting required  -CW     Row Name 10/28/21 1137          Pain Scale: Word Pre/Post-Treatment    Pre/Posttreatment Pain Comment Patient reports no pain during physical therapy evaluation this date.  -CW     Row Name 10/28/21 1137          Range of Motion Comprehensive    Comment, General Range of Motion AROM grossly WFL  -CW     Row Name 10/28/21 1137          Strength Comprehensive (MMT)    Comment, General Manual Muscle Testing (MMT) Assessment BLE MMT grossly 4/5  -CW     Row Name 10/28/21 1137          Bed Mobility    Bed Mobility bed mobility (all) activities  -CW     All Activities, [place] (Bed Mobility) minimum assist (75% patient effort)  -CW     Bed Mobility, Safety Issues decreased use of legs for  bridging/pushing  -CW     Assistive Device (Bed Mobility) bed rails; head of bed elevated  -CW     Row Name 10/28/21 1137          Transfers    Transfers sit-stand transfer; stand-sit transfer; bed-chair transfer; chair-bed transfer  -CW     Bed-Chair [place] (Transfers) minimum assist (75% patient effort)  -CW     Chair-Bed [place] (Transfers) minimum assist (75% patient effort)  -CW     Assistive Device (Bed-Chair Transfers) other (see comments)  HHA  -CW     Sit-Stand [place] (Transfers) contact guard  -CW     Stand-Sit [place] (Transfers) contact guard  -CW     Row Name 10/28/21 1137          Chair-Bed Transfer    Assistive Device (Chair-Bed Transfers) other (see comments)  HHA  -CW     Row Name 10/28/21 1137          Sit-Stand Transfer    Assistive Device (Sit-Stand Transfers) walker, front-wheeled  -CW     Row Name 10/28/21 1137          Stand-Sit Transfer    Assistive Device (Stand-Sit Transfers) walker, front-wheeled  -CW     Row Name 10/28/21 1137          Gait/Stairs (Locomotion)    [place] Level (Gait) contact guard; verbal cues; nonverbal cues (demo/gesture)  -CW     Assistive Device (Gait) walker, front-wheeled  -CW     Distance in Feet (Gait) 100  -CW     Pattern (Gait) step-through  -CW     Deviations/Abnormal Patterns (Gait) base of support, narrow; gait speed decreased; stride length decreased  -CW     Bilateral Gait Deviations forward flexed posture  -CW     Row Name 10/28/21 1137          Safety Issues, Functional Mobility    Safety Issues Affecting Function (Mobility) insight into deficits/self-awareness; problem-solving  -CW     Impairments Affecting Function (Mobility) balance; strength  -CW     Row Name 10/28/21 1137          Balance    Balance Assessment sitting static balance; standing static balance  -CW     Static Sitting Balance WFL  -CW     Static Standing Balance supported; WFL  -CW     Row Name 10/28/21 1137          Coping    Observed Emotional State  cooperative; flat; calm  -CW     Trust Relationship/Rapport care explained; choices provided; questions answered; thoughts/feelings acknowledged  -CW     Family/Support Persons spouse  -CW     Involvement in Care at bedside; attentive to patient  -CW     Row Name 10/28/21 1137          Plan of Care Review    Plan of Care Reviewed With patient; spouse  -CW     Outcome Summary Patient demonstrates mild functional mobility deficits persisting since patient's Covid-19 infection earlier this year. He is able to ambulate 100' with CGA and RW use this date. He requires minimal assist for bed mobility and transfers. His wife reports that he does not get up much at home though, and needs more assistance some days than others. He may benefit from IPR prior to discharge home to reduce caregiver burden.  -     Row Name 10/28/21 1137          Physical Therapy Goals    Bed Mobility Goal Selection (PT) bed mobility, PT goal 1  -CW     Transfer Goal Selection (PT) transfer, PT goal 1  -CW     Gait Training Goal Selection (PT) gait training, PT goal 1  -CW     Row Name 10/28/21 1137          Bed Mobility Goal 1 (PT)    Activity/Assistive Device (Bed Mobility Goal 1, PT) bed mobility activities, all  -CW     [place] Level/Cues Needed (Bed Mobility Goal 1, PT) modified independence  -CW     Time Frame (Bed Mobility Goal 1, PT) by discharge  -CW     Row Name 10/28/21 1137          Transfer Goal 1 (PT)    Activity/Assistive Device (Transfer Goal 1, PT) transfers, all  -CW     [place] Level/Cues Needed (Transfer Goal 1, PT) standby assist  -CW     Time Frame (Transfer Goal 1, PT) by discharge  -CW     Row Name 10/28/21 1137          Gait Training Goal 1 (PT)    Activity/Assistive Device (Gait Training Goal 1, PT) gait (walking locomotion); assistive device use; decrease fall risk; diminish gait deviation; improve balance and speed; increase endurance/gait distance; walker, rolling  -CW     [place] Level (Gait Training  Goal 1, PT) standby assist  -CW     Distance (Gait Training Goal 1, PT) 300  -CW     Time Frame (Gait Training Goal 1, PT) by discharge  -CW     Row Name 10/28/21 9197          Positioning and Restraints    Pre-Treatment Position in bed  -CW     Post Treatment Position chair  -CW     In Chair notified nsg; sitting; encouraged to call for assist; call light within reach; with family/caregiver  -CW     Row Name 10/28/21 4099          Therapy Assessment/Plan (PT)    PT Diagnosis (PT) Functional Mobility Deficits  -CW     Rehab Potential (PT) fair, will monitor progress closely  -CW     Criteria for Skilled Interventions Met (PT) yes; meets criteria  -CW     Row Name 10/28/21 9497          Therapy Plan Review/Discharge Plan (PT)    Therapy Plan Review (PT) evaluation/treatment results reviewed; care plan/treatment goals reviewed  -CW           User Key  (r) = Recorded By, (t) = Taken By, (c) = Cosigned By    Initials Name Provider Type    Tristian Ramachandran, PT Physical Therapist                PT Recommendation and Plan  Anticipated Discharge Disposition (PT): inpatient rehabilitation facility, home with assist  Planned Therapy Interventions (PT): balance training, bed mobility training, gait training, home exercise program, patient/family education, strengthening, transfer training  Therapy Frequency (PT): 3 times/wk (3-5 times/week)  Plan of Care Reviewed With: patient, spouse  Outcome Summary: Patient demonstrates mild functional mobility deficits persisting since patient's Covid-19 infection earlier this year. He is able to ambulate 100' with CGA and RW use this date. He requires minimal assist for bed mobility and transfers. His wife reports that he does not get up much at home though, and needs more assistance some days than others. He may benefit from IPR prior to discharge home to reduce caregiver burden.       Time Calculation:    PT Charges     Row Name 10/28/21 7443             Time Calculation    PT Received  On 10/28/21  -CW      PT Goal Re-Cert Due Date 21  -            User Key  (r) = Recorded By, (t) = Taken By, (c) = Cosigned By    Initials Name Provider Type    CW Tristian Khan PT Physical Therapist              Therapy Charges for Today     Code Description Service Date Service Provider Modifiers Qty    93679944669 HC PT EVAL MOD COMPLEXITY 4 10/28/2021 Tristian Khan, PT GP 1               Tristian Khan PT  10/28/2021      Electronically signed by Tristian Khan PT at 10/28/21 1156     Tristian Khan PT at 10/28/21 1156  Version 1 of 1       Goal Outcome Evaluation:  Plan of Care Reviewed With: patient, spouse           Outcome Summary: Patient demonstrates mild functional mobility deficits persisting since patient's Covid-19 infection earlier this year. He is able to ambulate 100' with CGA and RW use this date. He requires minimal assist for bed mobility and transfers. His wife reports that he does not get up much at home though, and needs more assistance some days than others. He may benefit from IPR prior to discharge home to reduce caregiver burden.    Electronically signed by Tristian Khan PT at 10/28/21 1156     Veronica Barton, PT at 21 1701  Version 1 of 1         Acute Care - Physical Therapy Treatment Note  [facility]     Patient Name: Fidencio Luciano  : 1945  MRN: [id number]  Today's Date: 2021   Onset of Illness/Injury or Date of Surgery: 10/27/21  Visit Dx:     ICD-10-CM ICD-9-CM   1. Weakness generalized  R53.1 780.79   2. Failure to thrive in adult  R62.7 783.7     Patient Active Problem List   Diagnosis   • Essential hypertension   • Type 2 diabetes mellitus (HCC)   • Benign prostatic hyperplasia   • ASCVD (arteriosclerotic cardiovascular disease), s/p stenting of 80 % stenosis in left circumflex on 10/05/16and 60% stenosis in the LAD, clinically stable.    • Hyperlipidemia LDL goal <70   • Weakness generalized   • Coronary artery fistula   • Weight loss,  unintentional   • Iron deficiency anemia   • Gastroesophageal reflux disease   • Dysphagia   • Chest pain     Past Medical History:   Diagnosis Date   • BPH (benign prostatic hyperplasia)    • Bradycardia     Positive tilt table testing 07/2016   • Coronary artery disease    • Diabetes mellitus (HCC)    • Diverticulosis    • Elevated cholesterol    • Gastric ulcer with hemorrhage    • Gastroesophageal reflux disease 1/26/2021   • History of transfusion    • Hyperlipidemia    • Hypertension    • Psoriasis      Past Surgical History:   Procedure Laterality Date   • BACK SURGERY     • CARDIAC CATHETERIZATION N/A 9/20/2016    Procedure: Left Heart Cath;  Surgeon: Giovanni Buenrostro MD;  Location:  [facility] CATH INVASIVE LOCATION;  Service:    • CARDIAC CATHETERIZATION N/A 10/4/2016    Procedure: Left Heart Cath;  Surgeon: Bharathi Andrew MD;  Location:  [facility] CATH INVASIVE LOCATION;  Service:    • CARDIAC CATHETERIZATION N/A 5/9/2017    Procedure: Left Heart Cath;  Surgeon: Giovanni Buenrostro MD;  Location:  [facility] CATH INVASIVE LOCATION;  Service:    • CARDIAC CATHETERIZATION N/A 5/9/2017    Procedure: ERR;  Surgeon: Giovanni Buenrostro MD;  Location:  [facility] CATH INVASIVE LOC[facility];  Service:    • CARDIAC CATHETERIZATION N/A 11/8/2019    Procedure: Left Heart Cath;  Surgeon: Abraham Oviedo MD;  Location:  [facility] CATH INVASIVE LOCATION;  Service: Cardiology   • CARDIAC CATHETERIZATION N/A 12/18/2019    Procedure: Coronary angiography;  Surgeon: Jay Barney IV, MD;  Location:  [facility] CATH INVASIVE LOCATION;  Service: Cardiovascular   • CHOLECYSTECTOMY     • COLONOSCOPY  11/13/2015    Dr. Martinez- internal hemorrhoids, sigmoid diverticulosis   • COLONOSCOPY N/A 8/17/2018    Procedure: COLONOSCOPY CPT CODE: 72056;  Surgeon: Gigi Geronimo MD;  [facility];  Service: Gastroenterology   • CORONARY ANGIOPLASTY WITH STENT PLACEMENT     • ENDOSCOPY N/A 8/17/2018    Procedure: ESOPHAGOGASTRODUODENOSCOPY WITH BIOPSY  CPT CODE: 81515;  Surgeon: Gigi Geronimo MD;  Location:  [facility];  Service: Gastroenterology   • ENDOSCOPY N/A 4/20/2021    Procedure: ESOPHAGOGASTRODUODENOSCOPY;  Surgeon: Geno Vernon MD;  Location:  [facility];  Service: Gastroenterology;  Laterality: N/A;   • INTERVENTIONAL RADIOLOGY PROCEDURE N/A 12/18/2019    Procedure: Coil Embolization of septal to pulmonary artery fistuala.;  Surgeon: Jay Barney IV, MD;  Location:  [facility] CATH INVASIVE LOCATION;  Service: Cardiovascular   • CO RT/LT HEART CATHETERS N/A 5/9/2017    Procedure: Percutaneous Coronary Intervention;  Surgeon: Lincoln De Los Santos MD;  Location:  [facility] CATH INVASIVE LOC[facility];  Service: Cardiology   • STOMACH SURGERY      FUSION OF BLEEDING ULCER   • UPPER GASTROINTESTINAL ENDOSCOPY  11/13/2015    Dr. Martinez- mild gastritis     PT Assessment (last 12 hours)     PT Evaluation and Treatment     Row Name 11/02/21 1653          Physical Therapy Time and Intention    Subjective Information no complaints  -     Document Type therapy note (daily note)  -     Mode of Treatment physical therapy  -     Patient Effort adequate  -     Symptoms Noted During/After Treatment none  -     Comment Pt tolerated treatment well this date. Pt participated in ambulation and TE/TP EOB and supine  -     Row Name 11/02/21 1653          Bed Mobility    Bed Mobility supine-sit; sit-supine  -     Supine-Sit [employer] (Bed Mobility) independent  -     Sit-Supine [employer] (Bed Mobility) independent  -     Row Name 11/02/21 1653          Transfers    Transfers sit-stand transfer; stand-sit transfer  -     Sit-Stand [employer] (Transfers) contact guard  -     Stand-Sit [employer] (Transfers) contact guard; standby assist  -     Row Name 11/02/21 1653          Sit-Stand Transfer    Assistive Device (Sit-Stand Transfers) walker, front-wheeled  -     Row Name 11/02/21 1653          Stand-Sit Transfer    Assistive  Device (Stand-Sit Transfers) walker, front-wheeled  -     Row Name 11/02/21 1653          Gait/Stairs (Locomotion)    [place] Level (Gait) contact guard; verbal cues  -     Assistive Device (Gait) walker, front-wheeled  -     Distance in Feet (Gait) 10  -KH     Comment (Gait/Stairs) Pt demonstrates mild balance deficit with support of CGA and RW  -     Row Name 11/02/21 1653          Balance    Balance Assessment standing dynamic balance  -     Dynamic Standing Balance mild impairment; supported  -     Row Name 11/02/21 1653          Motor Skills    Therapeutic Exercise hip; knee; ankle; core strength  marches, LAQ, PF, hip abd/add, bridging, modified crunches  -     Row Name 11/02/21 1653          Plan of Care Review    Outcome Summary Pt tolerated session well, overall independent with bed mobility demonstrated this date, CGA with transfer, and CGA with ambulation using RW. Pt would benefit from rehabilitation at [facility] to increase safety and independence with functional mobility.  -     Row Name 11/02/21 1653          Positioning and Restraints    Pre-Treatment Position in bed  -     Post Treatment Position bed  -     In Bed side lying right; call light within reach; exit alarm on  -           User Key  (r) = Recorded By, (t) = Taken By, (c) = Cosigned By    Initials Name Provider Type    Veronica Saleh, PT Physical Therapist                PT Recommendation and Plan     Outcome Summary: Pt tolerated session well, overall independent with bed mobility demonstrated this date, CGA with transfer, and CGA with ambulation using RW. Pt would benefit from rehabilitation at [facility] to increase safety and independence with functional mobility.       Time Calculation:    PT Charges     Row Name 11/02/21 4947             Time Calculation    PT Received On 11/02/21  [facility]      PT Goal Re-Cert Due Date 11/11/21  -              Time Calculation- PT    Total Timed Code Minutes- PT 24 minute(s)  -             User Key  (r) = Recorded By, (t) = Taken By, (c) = Cosigned By    Initials Name Provider Type     Veronica Barton, PT Physical Therapist              Therapy Charges for Today     Code Description Service Date Service Provider Modifiers Qty    40727749172 HC GAIT TRAINING EA 15 MIN 2021 Veronica Barton, PT GP 1    [id number] HC PT THER PROC EA 15 MIN 2021 Veronica Barton PT GP 1               Veronica Barton PT  2021      Electronically signed by Veronica Barton PT at 21 170     Veronica Barton PT at 21 170  Version 1 of 1       Goal Outcome Evaluation:              Outcome Summary: Pt tolerated session well, overall independent with bed mobility demonstrated this date, CGA with transfer, and CGA with ambulation using RW. Pt would benefit from rehabilitation at [facility] to increase safety and independence with functional mobility.    Electronically signed by Veronica Barton PT at 21     Marycruz Soto PT at 21 1016  Version 1 of 1         Acute Care - Physical Therapy Treatment Note  [facility]     Patient Name: Fidencio Luciano  : 1945  MRN: [id number]  Today's Date: 2021   Onset of Illness/Injury or Date of Surgery: 10/27/21  Visit Dx:     ICD-10-CM ICD-9-CM   1. Weakness generalized  R53.1 780.79   2. Failure to thrive in adult  R62.7 783.7     Patient Active Problem List   Diagnosis   • Essential hypertension   • Type 2 diabetes mellitus (HCC)   • Benign prostatic hyperplasia   • ASCVD (arteriosclerotic cardiovascular disease), s/p stenting of 80 % stenosis in left circumflex on 10/05/16and 60% stenosis in the LAD, clinically stable.    • Hyperlipidemia LDL goal <70   • Weakness generalized   • Coronary artery fistula   • Weight loss, unintentional   • Iron deficiency anemia   • Gastroesophageal reflux disease   • Dysphagia   • Chest pain     Past Medical History:   Diagnosis Date   • BPH (benign prostatic hyperplasia)    • Bradycardia      Positive tilt table testing 07/2016   • Coronary artery disease    • Diabetes mellitus (HCC)    • Diverticulosis    • Elevated cholesterol    • Gastric ulcer with hemorrhage    • Gastroesophageal reflux disease 1/26/2021   • History of transfusion    • Hyperlipidemia    • Hypertension    • Psoriasis      Past Surgical History:   Procedure Laterality Date   • BACK SURGERY     • CARDIAC CATHETERIZATION N/A 9/20/2016    Procedure: Left Heart Cath;  Surgeon: Giovanni Buenrostro MD;  Location:  [facility] LOC[facility];  Service:    • CARDIAC CATHETERIZATION N/A 10/4/2016    Procedure: Left Heart Cath;  Surgeon: Bharathi Andrew MD;  Location:  [facility] CATH [facility]IVE LOC[facility];  Service:    • CARDIAC CATHETERIZATION N/A 5/9/2017    Procedure: Left Heart Cath;  Surgeon: Giovanni Buenrostro MD;  [facility]:  [facility];  Service:    • CARDIAC CATHETERIZATION N/A 5/9/2017    Procedure: ERR;  Surgeon: Giovanni Buenrostro MD;  [facility]  [facility];  Service:    • CARDIAC CATHETERIZATION N/A 11/8/2019    Procedure: Left Heart Cath;  Surgeon: Abraham Oviedo MD;  [facility]:  [facility];  Service: Cardiology   • CARDIAC CATHETERIZATION N/A 12/18/2019    Procedure: Coronary angiography;  Surgeon: Jay Barney IV, MD;  Location:  [facility];  Service: Cardiovascular   • CHOLECYSTECTOMY     • COLONOSCOPY  11/13/2015    Dr. Martinez- internal hemorrhoids, sigmoid diverticulosis   • COLONOSCOPY N/A 8/17/2018    Procedure: COLONOSCOPY CPT CODE: 65608;  Surgeon: Gigi Geronimo MD;  Location: [facility] OR;  Service: Gastroenterology   • CORONARY ANGIOPLASTY WITH STENT PLACEMENT     • ENDOSCOPY N/A 8/17/2018    Procedure: ESOPHAGOGASTRODUODENOSCOPY WITH BIOPSY CPT CODE: 05545;  Surgeon: Gigi Geronimo MD;  Location: [facility] OR;  Service: Gastroenterology   • ENDOSCOPY N/A 4/20/2021    Procedure: ESOPHAGOGASTRODUODENOSCOPY;  Surgeon: Geno Vernon  MD;  Location:  [facility];  Service: Gastroenterology;  Laterality: N/A;   • INTERVENTIONAL RADIOLOGY PROCEDURE N/A 12/18/2019    Procedure: Coil Embolization of septal to pulmonary artery fistuala.;  Surgeon: Jay Barney IV, MD;  Location:  [facility];  Service: Cardiovascular   • [place] RT/LT HEART CATHETERS N/A 5/9/2017    Procedure: Percutaneous Coronary Intervention;  Surgeon: Lincoln De Los Santos MD;  [facility];  Service: Cardiology   • STOMACH SURGERY      FUSION OF BLEEDING ULCER   • UPPER GASTROINTESTINAL ENDOSCOPY  11/13/2015    Dr. Martinez- mild gastritis     PT Assessment (last 12 hours)     PT Evaluation and Treatment     Row Name 11/08/21 1013          Physical Therapy Time and Intention    Subjective Information no complaints  -CT     Document Type therapy note (daily note)  -CT     Mode of Treatment physical therapy  -CT     Patient Effort adequate  -CT     Comment Pt tolerated treatment session well. Pt ambulated in room with use of RW.  -CT     Row Name 11/08/21 1013          Bed Mobility    Bed Mobility supine-sit; sit-supine  -CT     Supine-Sit [place] (Bed Mobility) independent  -CT     Sit-Supine [place] (Bed Mobility) independent  -CT     Row Name 11/08/21 1013          Transfers    Transfers sit-stand transfer; stand-sit transfer  -CT     Sit-Stand [place] (Transfers) contact guard  -CT     Stand-Sit [place] (Transfers) contact guard; standby assist  -CT     Row Name 11/08/21 1013          Sit-Stand Transfer    Assistive Device (Sit-Stand Transfers) walker, front-wheeled  -CT     Row Name 11/08/21 1013          Stand-Sit Transfer    Assistive Device (Stand-Sit Transfers) walker, front-wheeled  -CT     Row Name 11/08/21 1013          Gait/Stairs (Locomotion)    [place] Level (Gait) contact guard; verbal cues  -CT     Assistive Device (Gait) walker, front-wheeled  -CT     Distance in Feet (Gait) 20  distance limited d/t  toileting needs  -CT     Pattern (Gait) step-to  -CT     Row Name 21 1013          Plan of Care Review    Plan of Care Reviewed With patient  -CT     Row Name 21 1013          Positioning and Restraints    Pre-Treatment Position in bed  -CT     Post Treatment Position bed  -CT     In Bed supine; call light within reach; encouraged to call for assist; exit alarm on  -CT           User Key  (r) = Recorded By, (t) = Taken By, (c) = Cosigned By    Initials Name Provider Type    CT Marycruz Soto PT Physical Therapist                PT Recommendation and Plan     Plan of Care Reviewed With: patient       Time Calculation:    PT Charges     Row Name 21 1015             Time Calculation    PT Received On 21  -CT      PT Goal Re-Cert Due Date 21  -CT              Time Calculation- PT    Total Timed Code Minutes- PT 28 minute(s)  -CT            User Key  (r) = Recorded By, (t) = Taken By, (c) = Cosigned By    Initials Name Provider Type    CT Marycruz Soto PT Physical Therapist              Therapy Charges for Today     Code Description Service Date Service Provider Modifiers Qty    59627630013 HC GAIT TRAINING EA 15 MIN 2021 Marycruz Soto, PT GP 1    27749788663 HC PT THERAPEUTIC ACT EA 15 MIN 2021 Marycruz Soto, KEITH GP 1               Marycruz Soto PT  2021      Electronically signed by Marycruz Soto PT at 21 1016          Occupational Therapy Notes (all)      Hansa Danielle, OT at 21 1020          Acute Care - Occupational Therapy Treatment Note  MILLICENT Recio     Patient Name: Fidencio Luciano  : 1945  MRN: [id number]  Today's Date: 2021  Onset of Illness/Injury or Date of Surgery: 10/27/21     Referring Physician: Cherrie    Admit Date: 10/27/2021       ICD-10-CM ICD-9-CM   1. Weakness generalized  R53.1 780.79   2. Failure to thrive in adult  R62.7 783.7     Patient Active Problem List   Diagnosis   • Essential hypertension   • Type 2 diabetes  mellitus (HCC)   • Benign prostatic hyperplasia   • ASCVD (arteriosclerotic cardiovascular disease), s/p stenting of 80 % stenosis in left circumflex on 10/05/16and 60% stenosis in the LAD, clinically stable.    • Hyperlipidemia LDL goal <70   • Weakness generalized   • Coronary artery fistula   • Weight loss, unintentional   • Iron deficiency anemia   • Gastroesophageal reflux disease   • Dysphagia   • Chest pain     Past Medical History:   Diagnosis Date   • BPH (benign prostatic hyperplasia)    • Bradycardia     Positive tilt table testing 07/2016   • Coronary artery disease    • Diabetes mellitus (HCC)    • Diverticulosis    • Elevated cholesterol    • Gastric ulcer with hemorrhage    • Gastroesophageal reflux disease 1/26/2021   • History of transfusion    • Hyperlipidemia    • Hypertension    • Psoriasis      Past Surgical History:   Procedure Laterality Date   • BACK SURGERY     • CARDIAC CATHETERIZATION N/A 9/20/2016    Procedure: Left Heart Cath;  Surgeon: Giovanni Buenrostro MD;  [facility]  [facility];  Service:    • CARDIAC CATHETERIZATION N/A 10/4/2016    Procedure: Left Heart Cath;  Surgeon: Bharathi Andrew MD;  [facility];  Service:    • CARDIAC CATHETERIZATION N/A 5/9/2017    Procedure: Left Heart Cath;  Surgeon: Giovanni Buenrostro MD;  [facility];  Service:    • CARDIAC CATHETERIZATION N/A 5/9/2017    Procedure: ERR;  Surgeon: Giovanni Buenrostro MD;  [facility]  [facility];  Service:    • CARDIAC CATHETERIZATION N/A 11/8/2019    Procedure: Left Heart Cath;  Surgeon: Abraham Oviedo MD;  [facility];  Service: Cardiology   • CARDIAC CATHETERIZATION N/A 12/18/2019    Procedure: Coronary angiography;  Surgeon: Jay Barney IV, MD;  Location:  [facility];  Service: Cardiovascular   • CHOLECYSTECTOMY     • COLONOSCOPY  11/13/2015    Dr. Martinez- internal  hemorrhoids, sigmoid diverticulosis   • COLONOSCOPY N/A 8/17/2018    Procedure: COLONOSCOPY CPT CODE: 97110;  Surgeon: Gigi Geronimo MD;  Location: [facility] OR;  Service: Gastroenterology   • CORONARY ANGIOPLASTY WITH STENT PLACEMENT     • ENDOSCOPY N/A 8/17/2018    Procedure: ESOPHAGOGASTRODUODENOSCOPY WITH BIOPSY CPT CODE: 63330;  Surgeon: Gigi Geronimo MD;  Location:  [facility];  Service: Gastroenterology   • ENDOSCOPY N/A 4/20/2021    Procedure: ESOPHAGOGASTRODUODENOSCOPY;  Surgeon: Geno Vernon MD;  Location:  [facility];  Service: Gastroenterology;  Laterality: N/A;   • INTERVENTIONAL RADIOLOGY PROCEDURE N/A 12/18/2019    Procedure: Coil Embolization of septal to pulmonary artery fistuala.;  Surgeon: Jay Barney IV, MD;  Location:  [facility];  Service: Cardiovascular   • [place] RT/LT HEART CATHETERS N/A 5/9/2017    Procedure: Percutaneous Coronary Intervention;  Surgeon: Lincoln De Los Santos MD;  [facility];  Service: Cardiology   • STOMACH SURGERY      FUSION OF BLEEDING ULCER   • UPPER GASTROINTESTINAL ENDOSCOPY  11/13/2015    Dr. Martinez- mild gastritis         OT ASSESSMENT FLOWSHEET (last 12 hours)     OT Evaluation and Treatment     Row Name 11/08/21 1015                   OT Time and Intention    Subjective Information no complaints  -LM        Document Type therapy note (daily note)  -LM        Mode of Treatment occupational therapy  -LM        Patient Effort adequate  -LM        Comment Patient seen this date for adl retraining/education, fxl mobility.  Patient required min assist with bed mobility, supervision with sitting balance on eob.  Total/max assist with toileting.  Patient able to transfer using rw and min assist x 2.  Required mod cues due to problem solving/safety.  Good participation and motivation.  -LM                  General Information    Existing Precautions/Restrictions fall  -LM         Limitations/Impairments safety/cognitive  -LM                  Cognition    Affect/Mental Status (Cognitive) WFL  -LM        Orientation Status (Cognition) oriented to; person; verbal cues/prompts needed for orientation  -LM        Follows Commands (Cognition) increased processing time needed; physical/tactile prompts required; repetition of directions required; verbal cues/prompting required  -LM                  Positioning and Restraints    Post Treatment Position bed  -LM        In Bed call light within reach; encouraged to call for assist; with nsg  -LM              User Key  (r) = Recorded By, (t) = Taken By, (c) = Cosigned By    Initials Name Effective Dates    LM Hansa Danielle OT 21 -                        OT Recommendation and Plan  Planned Therapy Interventions (OT): activity tolerance training, adaptive equipment training, BADL retraining, patient/caregiver education/training, transfer/mobility retraining, strengthening exercise, ROM/therapeutic exercise  Plan of Care Review  Plan of Care Reviewed With: patient  Plan of Care Reviewed With: patient        Time Calculation:     Therapy Charges for Today     Code Description Service Date Service Provider Modifiers Qty    [id number]  OT SELF CARE/MGMT/TRAIN EA 15 MIN 2021 Hansa Danielle OT GO 2               Hansa Danielle OT  2021    Electronically signed by Hansa Danielle OT at 21 1020     Rakesh Rosenthal OT at 21 0948          Acute Care - Occupational Therapy Treatment Note  MILLICENT Recio     Patient Name: Fidencio Luciano  : 1945  MRN: [id number]  Today's Date: 11/3/2021  Onset of Illness/Injury or Date of Surgery: 10/27/21     Referring Physician: Cherrie    Admit Date: 10/27/2021       ICD-10-CM ICD-9-CM   1. Weakness generalized  R53.1 780.79   2. Failure to thrive in adult  R62.7 783.7     Patient Active Problem List   Diagnosis   • Essential hypertension   • Type 2 diabetes mellitus (HCC)   • Benign prostatic  hyperplasia   • ASCVD (arteriosclerotic cardiovascular disease), s/p stenting of 80 % stenosis in left circumflex on 10/05/16and 60% stenosis in the LAD, clinically stable.    • Hyperlipidemia LDL goal <70   • Weakness generalized   • Coronary artery fistula   • Weight loss, unintentional   • Iron deficiency anemia   • Gastroesophageal reflux disease   • Dysphagia   • Chest pain     Past Medical History:   Diagnosis Date   • BPH (benign prostatic hyperplasia)    • Bradycardia     Positive tilt table testing 07/2016   • Coronary artery disease    • Diabetes mellitus (HCC)    • Diverticulosis    • Elevated cholesterol    • Gastric ulcer with hemorrhage    • Gastroesophageal reflux disease 1/26/2021   • History of transfusion    • Hyperlipidemia    • Hypertension    • Psoriasis      Past Surgical History:   Procedure Laterality Date   • BACK SURGERY     • CARDIAC CATHETERIZATION N/A 9/20/2016    Procedure: Left Heart Cath;  Surgeon: Giovanni Buenrostro MD;  [facility];  Service:    • CARDIAC CATHETERIZATION N/A 10/4/2016    Procedure: Left Heart Cath;  Surgeon: Bharathi Andrew MD;  [facility]  [facility];  Service:    • CARDIAC CATHETERIZATION N/A 5/9/2017    Procedure: Left Heart Cath;  Surgeon: Giovanni Buenrostro MD;  [facility];  Service:    • CARDIAC CATHETERIZATION N/A 5/9/2017    Procedure: ERR;  Surgeon: Giovanni Buenrostro MD;  [facility];  Service:    • CARDIAC CATHETERIZATION N/A 11/8/2019    Procedure: Left Heart Cath;  Surgeon: Abraham Oviedo MD;  [facility];  Service: Cardiology   • CARDIAC CATHETERIZATION N/A 12/18/2019    Procedure: Coronary angiography;  Surgeon: Jay Barney IV, MD;  Location:  [facility];  Service: Cardiovascular   • CHOLECYSTECTOMY     • COLONOSCOPY  11/13/2015    Dr. Martinez- internal hemorrhoids, sigmoid diverticulosis   •  COLONOSCOPY N/A 8/17/2018    Procedure: COLONOSCOPY CPT CODE: 51551;  Surgeon: Gigi Geronimo MD;  Location:  [facility];  Service: Gastroenterology   • CORONARY ANGIOPLASTY WITH STENT PLACEMENT     • ENDOSCOPY N/A 8/17/2018    Procedure: ESOPHAGOGASTRODUODENOSCOPY WITH BIOPSY CPT CODE: 24196;  Surgeon: Gigi Geronimo MD;  [facility];  Service: Gastroenterology   • ENDOSCOPY N/A 4/20/2021    Procedure: ESOPHAGOGASTRODUODENOSCOPY;  Surgeon: Geno Vernon MD;  [facility];  Service: Gastroenterology;  Laterality: N/A;   • INTERVENTIONAL RADIOLOGY PROCEDURE N/A 12/18/2019    Procedure: Coil Embolization of septal to pulmonary artery fistuala.;  Surgeon: Jay Barney IV, MD;  Location:  [facility];  Service: Cardiovascular   • CT RT/LT HEART CATHETERS N/A 5/9/2017    Procedure: Percutaneous Coronary Intervention;  Surgeon: Lincoln De Los Santos MD;  [facility];  Service: Cardiology   • STOMACH SURGERY      FUSION OF BLEEDING ULCER   • UPPER GASTROINTESTINAL ENDOSCOPY  11/13/2015    Dr. Martinez- mild gastritis         OT ASSESSMENT FLOWSHEET (last 12 hours)     OT Evaluation and Treatment     Row Name 11/03/21 0942                   OT Time and Intention    Document Type therapy note (daily note)  -KR        Mode of Treatment occupational therapy  -KR        Patient Effort good  -KR        Comment Pt. alert/cooperative, motivated to improve fxl endurance/fxl status with self care/mobility skills  -KR                  General Information    Prior Level of Function independent:  -KR                  Cognition    Affect/Mental Status (Cognitive) WFL  -KR        Orientation Status (Cognition) oriented x 3  -KR        Follows Commands (Cognition) WFL  -KR                  Motor Skills    Therapeutic Exercise shoulder; hand  -KR                  Shoulder (Therapeutic Exercise)    Shoulder (Therapeutic Exercise) AROM (active range of  motion); wand exercises  -KR        Shoulder AROM (Therapeutic Exercise) bilateral  -KR        Shoulder Wand Exercises (Therapeutic Exercise) AROM (active range of motion); flexion; extension  -KR                  Hand (Therapeutic Exercise)    Hand (Therapeutic Exercise) AROM (active range of motion); strengthening exercise  -KR        Hand AROM/AAROM (Therapeutic Exercise) bilateral  -KR        Hand Strengthening (Therapeutic Exercise)  strengthening  -KR                  Plan of Care Review    Plan of Care Reviewed With patient  -KR        Progress improving  -KR        Outcome Summary Pt. continues to be a good candidate for participation in rehabilitation to enhance safety/independence in home environment  -KR                  Therapy Assessment/Plan (OT)    Rehab Potential (OT) good, to achieve stated therapy goals  -KR        Criteria for Skilled Therapeutic Interventions Met (OT) yes; skilled treatment is necessary  -KR        Planned Therapy Interventions (OT) activity tolerance training; BADL retraining; ROM/therapeutic exercise; strengthening exercise; transfer/mobility retraining  -KR                  Progress Summary (OT)    Progress Toward Functional Goals (OT) progress toward functional goals is good  -KR                  Therapy Plan Review/Discharge Plan (OT)    Anticipated Discharge Disposition (OT) inpatient rehabilitation facility; home with assist  -KR              User Key  (r) = Recorded By, (t) = Taken By, (c) = Cosigned By    Initials Name Effective Dates    KR Rakesh Rosenthal, OT 06/16/21 -                        OT Recommendation and Plan  Planned Therapy Interventions (OT): activity tolerance training, BADL retraining, ROM/therapeutic exercise, strengthening exercise, transfer/mobility retraining  Progress Toward Functional Goals (OT): progress toward functional goals is good  Plan of Care Review  Plan of Care Reviewed With: patient  Progress: improving  Outcome Summary: Pt. continues  to be a good candidate for participation in rehabilitation to enhance safety/independence in home environment  Plan of Care Reviewed With: patient  Outcome Summary: Pt. continues to be a good candidate for participation in rehabilitation to enhance safety/independence in home environment        Time Calculation:     Therapy Charges for Today     Code Description Service Date Service Provider Modifiers Qty    [id number]  OT THERAPEUTIC ACT EA 15 MIN 11/3/2021 Rakesh Rosenthal OT GO 1               Rakesh Rosenthal OT  11/3/2021    Electronically signed by Rakesh Rosenthal OT at 21 0948     Hansa Danielle OT at 10/28/21 1355          Patient Name: Fidencio Luciano  : 1945    MRN: [id number]                              Today's Date: 10/28/2021       Admit Date: 10/27/2021    Visit Dx:     ICD-10-CM ICD-9-CM   1. Weakness generalized  R53.1 780.79   2. Failure to thrive in adult  R62.7 783.7     Patient Active Problem List   Diagnosis   • Essential hypertension   • Type 2 diabetes mellitus (HCC)   • Benign prostatic hyperplasia   • ASCVD (arteriosclerotic cardiovascular disease), s/p stenting of 80 % stenosis in left circumflex on 10/05/16and 60% stenosis in the LAD, clinically stable.    • Hyperlipidemia LDL goal <70   • Weakness generalized   • Coronary artery fistula   • Weight loss, unintentional   • Iron deficiency anemia   • Gastroesophageal reflux disease   • Dysphagia   • Chest pain     Past Medical History:   Diagnosis Date   • BPH (benign prostatic hyperplasia)    • Bradycardia     Positive tilt table testing 2016   • Coronary artery disease    • Diabetes mellitus (HCC)    • Diverticulosis    • Elevated cholesterol    • Gastric ulcer with hemorrhage    • Gastroesophageal reflux disease 2021   • History of transfusion    • Hyperlipidemia    • Hypertension    • Psoriasis      Past Surgical History:   Procedure Laterality Date   • BACK SURGERY     • CARDIAC CATHETERIZATION N/A 2016     Procedure: Left Heart Cath;  Surgeon: Giovanni Buenrostro MD;  [facility];  Service:    • CARDIAC CATHETERIZATION N/A 10/4/2016    Procedure: Left Heart Cath;  Surgeon: Bharathi Andrew MD;  [facility];  Service:    • CARDIAC CATHETERIZATION N/A 5/9/2017    Procedure: Left Heart Cath;  Surgeon: Giovanni Buenrostro MD;  [facility]  [facility];  Service:    • CARDIAC CATHETERIZATION N/A 5/9/2017    Procedure: ERR;  Surgeon: Giovanni Buenrostro MD;  [facility];  Service:    • CARDIAC CATHETERIZATION N/A 11/8/2019    Procedure: Left Heart Cath;  Surgeon: Abraham Oviedo MD;  [facility];  Service: Cardiology   • CARDIAC CATHETERIZATION N/A 12/18/2019    Procedure: Coronary angiography;  Surgeon: Jay Barney IV, MD;  Location:  [facility];  Service: Cardiovascular   • CHOLECYSTECTOMY     • COLONOSCOPY  11/13/2015    Dr. Martinez- internal hemorrhoids, sigmoid diverticulosis   • COLONOSCOPY N/A 8/17/2018    Procedure: COLONOSCOPY CPT CODE: 70948;  Surgeon: Gigi Geronimo MD;  Location:  [facility];  Service: Gastroenterology   • CORONARY ANGIOPLASTY WITH STENT PLACEMENT     • ENDOSCOPY N/A 8/17/2018    Procedure: ESOPHAGOGASTRODUODENOSCOPY WITH BIOPSY CPT CODE: 54520;  Surgeon: Gigi Geronimo MD;  [facility]:  [facility];  Service: Gastroenterology   • ENDOSCOPY N/A 4/20/2021    Procedure: ESOPHAGOGASTRODUODENOSCOPY;  Surgeon: Geno Vernon MD;  Location:  [facility];  Service: Gastroenterology;  Laterality: N/A;   • INTERVENTIONAL RADIOLOGY PROCEDURE N/A 12/18/2019    Procedure: Coil Embolization of septal to pulmonary artery fistuala.;  Surgeon: Jay Barney IV, MD;  Location:  [facility];  Service: Cardiovascular   • [place] RT/LT HEART CATHETERS N/A 5/9/2017    Procedure: Percutaneous Coronary Intervention;  Surgeon: Lincoln DE LA ROSA  MD Filiberto;  Location: [facility] INVASIVE LOC[facility];  Service: Cardiology   • STOMACH SURGERY      FUSION OF BLEEDING ULCER   • UPPER GASTROINTESTINAL ENDOSCOPY  11/13/2015    Dr. Martinez- mild gastritis      General Information     Row Name 10/28/21 1348          OT Time and Intention    Document Type evaluation  -     Mode of Treatment occupational therapy  -     Row Name 10/28/21 1348          General Information    Patient Profile Reviewed yes  -LM     Prior Level of Function min assist:; mod assist:; ADL's; all household mobility  spouse reports significant fxl decline since March 2021  -LM     Existing Precautions/Restrictions fall  -LM     Barriers to Rehab previous functional deficit; cognitive status  -LM     Row Name 10/28/21 1348          Living Environment    Lives With spouse  -LM     Row Name 10/28/21 1348          Cognition    Orientation Status (Cognition) oriented to; person; place; verbal cues/prompts needed for orientation  -LM     Row Name 10/28/21 1347          Safety Issues, Functional Mobility    Impairments Affecting Function (Mobility) balance; cognition; endurance/activity tolerance; strength  -LM           User Key  (r) = Recorded By, (t) = Taken By, (c) = Cosigned By    Initials Name Provider Type    LM Hansa Danielle, OT Occupational Therapist                 Mobility/ADL's     Row Name 10/28/21 1350          Activities of Daily Living    BADL Assessment/Intervention bathing; upper body dressing; lower body dressing; grooming; feeding; toileting  -     Row Name 10/28/21 1350          Bathing Assessment/Intervention    [place] Level (Bathing) maximum assist (25% patient effort)  -LM     Row Name 10/28/21 1350          Upper Body Dressing Assessment/Training    [place] Level (Upper Body Dressing) minimum assist (75% patient effort)  -LM     Row Name 10/28/21 1350          Lower Body Dressing Assessment/Training    [place] Level (Lower Body Dressing) maximum assist (25%  patient effort)  -LM     Row Name 10/28/21 1350          Grooming Assessment/Training    [place] Level (Grooming) maximum assist (25% patient effort)  -LM     Row Name 10/28/21 [facility]          Self-Feeding Assessment/Training    [place] Level (Feeding) set up  -     Row Name 10/28/21 [facility]          Toileting Assessment/Training    [place] Level (Toileting) maximum assist (25% patient effort)  -LM           User Key  (r) = Recorded By, (t) = Taken By, (c) = Cosigned By    Initials Name Provider Type    LM Hansa Danielle, OT Occupational Therapist               Obj/Interventions     Row Name 10/28/21 [facility]          Sensory Assessment (Somatosensory)    Sensory Assessment (Somatosensory) sensation intact  -     Row Name 10/28/21 [facility]          Vision Assessment/Intervention    Visual Impairment/Limitations WFL  -LM     Row Name 10/28/21 [facility]          Range of Motion Comprehensive    General Range of Motion no range of motion deficits identified  -     Row Name 10/28/21 [facility]          Strength Comprehensive (MMT)    General Manual Muscle Testing (MMT) Assessment no strength deficits identified  -LM           User Key  (r) = Recorded By, (t) = Taken By, (c) = Cosigned By    Initials Name Provider Type    LM Hansa Danielle, OT Occupational Therapist               Goals/Plan     Row Name 10/28/21 3479          Transfer Goal 1 (OT)    Activity/Assistive Device (Transfer Goal 1, OT) transfers, all; commode, 3-in-1; walker, rolling  -LM     [place] Level/Cues Needed (Transfer Goal 1, OT) contact guard assist  -LM     Time Frame (Transfer Goal 1, OT) by discharge  -     Row Name 10/28/21 1189          Bathing Goal 1 (OT)    Activity/Device (Bathing Goal 1, OT) bathing skills, all  -LM     [place] Level/Cues Needed (Bathing Goal 1, OT) minimum assist (75% or more patient effort)  -LM     Time Frame (Bathing Goal 1, OT) by discharge  -LM     Row Name 10/28/21 [facility]8          Therapy Assessment/Plan (OT)     Planned Therapy Interventions (OT) activity tolerance training; adaptive equipment training; BADL retraining; patient/caregiver education/training; transfer/mobility retraining; strengthening exercise; ROM/therapeutic exercise  -           User Key  (r) = Recorded By, (t) = Taken By, (c) = Cosigned By    Initials Name Provider Type    Hansa Neal OT Occupational Therapist               Clinical Impression     Row Name 10/28/21 [facility]          Plan of Care Review    Plan of Care Reviewed With patient  -     Row Name 10/28/21 [facility]          Therapy Assessment/Plan (OT)    Patient/Family Therapy Goal Statement (OT) return to of  -     Rehab Potential (OT) fair, will monitor progress closely  -     Criteria for Skilled Therapeutic Interventions Met (OT) yes; meets criteria; skilled treatment is necessary  -     Row Name 10/28/21 [facility]          Therapy Plan Review/Discharge Plan (OT)    Anticipated Discharge Disposition (OT) home with 24/7 care  -     Row Name 10/28/21 [facility]          Positioning and Restraints    In Chair with family/caregiver; encouraged to call for assist; call light within reach  -           User Key  (r) = Recorded By, (t) = Taken By, (c) = Cosigned By    Initials Name Provider Type     Hansa Danielle OT Occupational Therapist               Outcome Measures    No documentation.                   OT Recommendation and Plan  Planned Therapy Interventions (OT): activity tolerance training, adaptive equipment training, BADL retraining, patient/caregiver education/training, transfer/mobility retraining, strengthening exercise, ROM/therapeutic exercise  Plan of Care Review  Plan of Care Reviewed With: patient     Time Calculation:     Therapy Charges for Today     Code Description Service Date Service Provider Modifiers Qty    [id number]  OT EVAL MOD COMPLEXITY 4 10/28/2021 Hansa Danielle OT GO 1               Hansa Danielle OT  10/28/2021    Electronically signed by Lolis  Hansa PIMENTEL, OT at 10/28/21 9783

## 2021-11-09 VITALS
TEMPERATURE: 97.4 F | WEIGHT: 146.4 LBS | SYSTOLIC BLOOD PRESSURE: 122 MMHG | BODY MASS INDEX: 20.5 KG/M2 | RESPIRATION RATE: 18 BRPM | HEIGHT: 71 IN | HEART RATE: 74 BPM | DIASTOLIC BLOOD PRESSURE: 59 MMHG | OXYGEN SATURATION: 98 %

## 2021-11-09 LAB
ANION GAP SERPL CALCULATED.3IONS-SCNC: 12.4 MMOL/L (ref 5–15)
ANISOCYTOSIS BLD QL: ABNORMAL
BACTERIA SPEC AEROBE CULT: NORMAL
BACTERIA SPEC AEROBE CULT: NORMAL
BUN SERPL-MCNC: 28 MG/DL (ref 8–23)
BUN/CREAT SERPL: 30.8 (ref 7–25)
CALCIUM SPEC-SCNC: 8.6 MG/DL (ref 8.6–10.5)
CHLORIDE SERPL-SCNC: 107 MMOL/L (ref 98–107)
CO2 SERPL-SCNC: 19.6 MMOL/L (ref 22–29)
CREAT SERPL-MCNC: 0.91 MG/DL (ref 0.76–1.27)
DEPRECATED RDW RBC AUTO: 57.7 FL (ref 37–54)
ERYTHROCYTE [DISTWIDTH] IN BLOOD BY AUTOMATED COUNT: 15.5 % (ref 12.3–15.4)
GFR SERPL CREATININE-BSD FRML MDRD: 81 ML/MIN/1.73
GLUCOSE BLDC GLUCOMTR-MCNC: 156 MG/DL (ref 70–130)
GLUCOSE BLDC GLUCOMTR-MCNC: 94 MG/DL (ref 70–130)
GLUCOSE SERPL-MCNC: 124 MG/DL (ref 65–99)
HCT VFR BLD AUTO: 37.5 % (ref 37.5–51)
HGB BLD-MCNC: 12.3 G/DL (ref 13–17.7)
LYMPHOCYTES # BLD MANUAL: 1.93 10*3/MM3 (ref 0.7–3.1)
LYMPHOCYTES NFR BLD MANUAL: 13 % (ref 19.6–45.3)
LYMPHOCYTES NFR BLD MANUAL: 9 % (ref 5–12)
MACROCYTES BLD QL SMEAR: ABNORMAL
MCH RBC QN AUTO: 33.1 PG (ref 26.6–33)
MCHC RBC AUTO-ENTMCNC: 32.8 G/DL (ref 31.5–35.7)
MCV RBC AUTO: 100.8 FL (ref 79–97)
METAMYELOCYTES NFR BLD MANUAL: 3 % (ref 0–0)
MONOCYTES # BLD AUTO: 1.34 10*3/MM3 (ref 0.1–0.9)
MYELOCYTES NFR BLD MANUAL: 3 % (ref 0–0)
NEUTROPHILS # BLD AUTO: 10.69 10*3/MM3 (ref 1.7–7)
NEUTROPHILS NFR BLD MANUAL: 69 % (ref 42.7–76)
NEUTS BAND NFR BLD MANUAL: 3 % (ref 0–5)
PLAT MORPH BLD: NORMAL
PLATELET # BLD AUTO: 239 10*3/MM3 (ref 140–450)
PMV BLD AUTO: 9.8 FL (ref 6–12)
POTASSIUM SERPL-SCNC: 4.2 MMOL/L (ref 3.5–5.2)
RBC # BLD AUTO: 3.72 10*6/MM3 (ref 4.14–5.8)
SCAN SLIDE: NORMAL
SODIUM SERPL-SCNC: 139 MMOL/L (ref 136–145)
WBC # BLD AUTO: 14.85 10*3/MM3 (ref 3.4–10.8)

## 2021-11-09 PROCEDURE — 94799 UNLISTED PULMONARY SVC/PX: CPT

## 2021-11-09 PROCEDURE — 99239 HOSP IP/OBS DSCHRG MGMT >30: CPT | Performed by: PHYSICIAN ASSISTANT

## 2021-11-09 PROCEDURE — 80048 BASIC METABOLIC PNL TOTAL CA: CPT | Performed by: PHYSICIAN ASSISTANT

## 2021-11-09 PROCEDURE — 85025 COMPLETE CBC W/AUTO DIFF WBC: CPT | Performed by: PHYSICIAN ASSISTANT

## 2021-11-09 PROCEDURE — 85007 BL SMEAR W/DIFF WBC COUNT: CPT | Performed by: PHYSICIAN ASSISTANT

## 2021-11-09 PROCEDURE — 82962 GLUCOSE BLOOD TEST: CPT

## 2021-11-09 RX ORDER — POLYETHYLENE GLYCOL 3350 17 G/17G
17 POWDER, FOR SOLUTION ORAL DAILY
Start: 2021-11-09 | End: 2022-05-29

## 2021-11-09 RX ORDER — RANOLAZINE 500 MG/1
500 TABLET, EXTENDED RELEASE ORAL EVERY 12 HOURS SCHEDULED
Start: 2021-11-09

## 2021-11-09 RX ADMIN — ASPIRIN 81 MG: 81 TABLET, COATED ORAL at 09:22

## 2021-11-09 RX ADMIN — CLOPIDOGREL 75 MG: 75 TABLET, FILM COATED ORAL at 09:22

## 2021-11-09 RX ADMIN — BUDESONIDE AND FORMOTEROL FUMARATE DIHYDRATE 2 PUFF: 80; 4.5 AEROSOL RESPIRATORY (INHALATION) at 06:55

## 2021-11-09 RX ADMIN — MEGESTROL ACETATE 800 MG: 40 SUSPENSION ORAL at 09:35

## 2021-11-09 RX ADMIN — ISOSORBIDE MONONITRATE 120 MG: 60 TABLET ORAL at 09:22

## 2021-11-09 RX ADMIN — FINASTERIDE 5 MG: 5 TABLET, FILM COATED ORAL at 09:22

## 2021-11-09 RX ADMIN — FERROUS SULFATE TAB 325 MG (65 MG ELEMENTAL FE) 325 MG: 325 (65 FE) TAB at 09:22

## 2021-11-09 RX ADMIN — METOPROLOL TARTRATE 12.5 MG: 25 TABLET, FILM COATED ORAL at 09:22

## 2021-11-09 RX ADMIN — PANTOPRAZOLE SODIUM 40 MG: 40 TABLET, DELAYED RELEASE ORAL at 09:22

## 2021-11-09 RX ADMIN — POLYETHYLENE GLYCOL (3350) 17 G: 17 POWDER, FOR SOLUTION ORAL at 09:21

## 2021-11-09 RX ADMIN — ALBUTEROL SULFATE 2.5 MG: 2.5 SOLUTION RESPIRATORY (INHALATION) at 06:55

## 2021-11-09 RX ADMIN — MEMANTINE HYDROCHLORIDE AND DONEPEZIL HYDROCHLORIDE 28 MG: 28; 10 CAPSULE ORAL at 09:23

## 2021-11-09 RX ADMIN — RANOLAZINE 500 MG: 500 TABLET, FILM COATED, EXTENDED RELEASE ORAL at 09:21

## 2021-11-09 NOTE — PROGRESS NOTES
See discharge summary from the wound H [facility].  Patient resting comfortably no complaints nursing states he is doing well labs noted, still with a leukocytosis but benign differential and there is no evidence of active infectious process at this time.  Agree with transfer to the SNF rehab unit.  Patient is awake alert, no complaints, strength symmetric no edema lungs clear abdominal exam is benign.  131/60, 18, 63, 96.8.  Monitor showing sinus rhythm.

## 2021-11-09 NOTE — DISCHARGE SUMMARY
[facility] HOSPITALIST DISCHARGE SUMMARY    Patient Identification:  Name:  Fidencio Luciano  Age:  76 y.o.  Sex:  male  :  1945  MRN:  [id number]  Visit Number:  [id number]    Date of Admission: 10/27/2021  Date of Discharge: 2021    PCP: Camila Ortiz APRN    Discharging Provider: Jennifer Luque PA-C / Dr. 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     Consults/Procedures     Consults:   Consults     Date and Time Order Name Status Description    10/27/2021  7:24 PM Inpatient Cardiology Consult Completed         Procedures/Scans Performed:  MRI brain without contrast  CT chest without contrast  CT abdomen/pelvis without contrast  SLP evaluation  US venous doppler bilateral     History of Presenting Illness     Chief Complaint   Patient presents with   • Chest Pain     Mr. Luciano is a 76 y.o. male presented to [facility] complaining of decreased responsiveness, weakness, and chest pain. He was having some myoclonic jerks in the ED.  It was noted that the patient had been diagnosed with COVID-19 in 2021.  Since then, he has had a steady physical and mental decline as well as intermittent falls.  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  He was admitted for further evaluation and therapy. Please see the admitting history and physical for further details.      Hospital Course     Patient was admitted to the telemetry unit.  On  admission, he had a MRI of the brain without contrast which showed mild cerebral atrophy and moderate chronic small vessel ischemic disease with no acute findings.  CT chest without contrast showed stable small lung nodules favoring benign etiology as they have been stable since 2016, chronic changes of interstitial fibrosis, distal esophageal wall thickening which can be seen with esophagitis.  CT abdomen/pelvis without contrast showed no acute findings.  There was prostate enlargement and urinary bladder distention noted.  PSA was checked and normal.  Nodular liver margins, correlate for early or mild cirrhosis.    The patient's mental status quickly improved and he was alert and oriented x3.  PT/OT/SLP were consulted. SLP evaluation did show some transient laryngeal penetration of thin liquids, but no aspiration.  Mechanical soft diet with chopped meats and thin liquids recommended.    Cardiology was consulted for chest pains with a known history of CAD.  It was noted that he had a normal nuclear stress test in June 2021.  Cardiac markers were negative and EKG was felt to be unremarkable.  Ranexa was added and the patient denied any further chest pains during his stay.  It was noted that if he has significant recurrent anginal symptoms that he would need FFR of the proximal LAD stenosis previously noted on left heart catheterization in 2019.    The patient was denied inpatient rehab.  He and his wife decided on short-term rehab placement at a local nursing home.  The patient did have a fall while in the hospital on 10/31/2021.  He complained of some left-sided hip pain.  X-ray of the hips showed no acute abnormality or fracture.  He also developed some increasing leukocytosis during his stay.  A repeat UA and chest x-ray were unremarkable.  Blood cultures were obtained and have since finalized with no growth after 5 days.  The patient did not have any signs/symptoms of infection.  Venous Dopplers were negative  for DVT.  It was felt that his WBC count could be monitored at the nursing home.  On 11/9/2021, the patient was accepted to the [facility].  It was felt that he had reached maximum inpatient benefit and was stable for discharge.  Follow-up with nursing home PCP in 1 week. Recommend repeat CBC and CMP in the next 3 to 4 days.  The patient was discharged in a stable condition on this date.  Repeat testing for COVID-19 at the prior to discharge was negative.    Discharge Vitals/Physical Examination     Vital Signs:  Temp:  [96.8 °F (36 °C)-97.8 °F (36.6 °C)] 96.8 °F (36 °C)  Heart Rate:  [63-90] 63  Resp:  [18] 18  BP: (112-157)/(60-67) 131/60  Mean Arterial Pressure (Non-Invasive) for the past 24 hrs (Last 3 readings):   Noninvasive MAP (mmHg)   11/09/21 0655 82   11/09/21 0330 91   11/08/21 1900 88     SpO2 Percentage    11/08/21 1900 11/09/21 0330 11/09/21 0655   SpO2: 98% 98% 98%     SpO2:  [97 %-98 %] 98 %  on   ;   Device (Oxygen Therapy): room air    Body mass index is 20.42 kg/m².  Wt Readings from Last 3 Encounters:   11/09/21 66.4 kg (146 lb 6.4 oz)   10/21/21 64.4 kg (142 lb)   10/21/21 64.5 kg (142 lb 3.2 oz)       Physical Exam:  Physical Exam  Vitals reviewed.   Constitutional:       General: He is not in acute distress.     Appearance: Normal appearance. He is not ill-appearing.   HENT:      Head: Normocephalic and atraumatic.   Cardiovascular:      Rate and Rhythm: Normal rate and regular rhythm.   Pulmonary:      Effort: Pulmonary effort is normal.      Breath sounds: Normal breath sounds.   Abdominal:      General: Abdomen is flat.      Palpations: Abdomen is soft.   Musculoskeletal:      Right lower leg: No edema.      Left lower leg: No edema.   Skin:     General: Skin is warm and dry.   Neurological:      General: No focal deficit present.      Mental Status: He is alert and oriented to person, place, and time.   Psychiatric:         Mood and Affect: Mood normal.         Behavior:  Behavior normal.         Thought Content: Thought content normal.         Judgment: Judgment normal.       Pertinent Laboratory/Radiology Results     Pertinent Laboratory Results:            Results from last 7 days   Lab Units 11/09/21 0054 11/08/21 0410 11/07/21 0052 11/05/21 0528 11/05/21 0111   CRP mg/dL  --   --   --   --  <0.30   WBC 10*3/mm3 14.85* 13.09* 15.08*   < >  --    HEMOGLOBIN g/dL 12.3* 13.0 13.1   < >  --    HEMATOCRIT % 37.5 39.4 39.7   < >  --    MCV fL 100.8* 100.5* 101.3*   < >  --    MCHC g/dL 32.8 33.0 33.0   < >  --    PLATELETS 10*3/mm3 239 223 207   < >  --     < > = values in this interval not displayed.     Results from last 7 days   Lab Units 11/09/21 0054 11/08/21 0410 11/07/21 0052 11/05/21 0111 11/04/21 0045   SODIUM mmol/L 139 138 141   < > 139   POTASSIUM mmol/L 4.2 4.6 4.3   < > 4.1   CHLORIDE mmol/L 107 107 110*   < > 108*   CO2 mmol/L 19.6* 18.2* 19.5*   < > 18.3*   BUN mg/dL 28* 29* 23   < > 36*   CREATININE mg/dL 0.91 0.95 0.76   < > 0.97   EGFR IF NONAFRICN AM mL/min/1.73 81 77 100   < > 75   CALCIUM mg/dL 8.6 8.5* 8.7   < > 8.6   GLUCOSE mg/dL 124* 174* 109*   < > 127*   ALBUMIN g/dL  --   --   --   --  3.43*   BILIRUBIN mg/dL  --   --   --   --  0.3   ALK PHOS U/L  --   --   --   --  80   AST (SGOT) U/L  --   --   --   --  20   ALT (SGPT) U/L  --   --   --   --  22    < > = values in this interval not displayed.   Estimated Creatinine Clearance: 65.9 mL/min (by C-G formula based on SCr of 0.91 mg/dL).  No results found for: AMMONIA    Glucose   Date/Time Value Ref Range Status   11/09/2021 0711 94 70 - 130 mg/dL Final     Comment:     Meter: [id number] : [id number] Darryn Nathan   11/08/2021 2023 186 (H) 70 - 130 mg/dL Final     Comment:     Meter: [id number] : [id number] PAVITHRA RAMIREZ   11/08/2021 1618 163 (H) 70 - 130 mg/dL Final     Comment:     Meter: [id number] : [id number] tacos rios   11/08/2021 1018 161 (H) 70 - 130 mg/dL Final     Comment:      Meter: [id number] : [id number] LADY TANNER   11/08/2021 0633 127 70 - 130 mg/dL Final     Comment:     Meter: [id number] : [id number] LADY TANNER   11/07/2021 1909 170 (H) 70 - 130 mg/dL Final     Comment:     Meter: [id number] : [id number] GERI RAMIREZ   11/07/2021 1523 130 70 - 130 mg/dL Final     Comment:     Meter: [id number] : [id number] LADY TANNER   11/07/2021 1005 132 (H) 70 - 130 mg/dL Final     Comment:     Meter: [id number] : [id number] LADY TANNER     Lab Results   Component Value Date    HGBA1C 5.90 (H) 10/28/2021     Lab Results   Component Value Date    TSH 1.650 10/27/2021    FREET4 1.14 10/27/2021     Blood Culture   Date Value Ref Range Status   11/04/2021 No growth at 5 days  Final   11/04/2021 No growth at 5 days  Final     No results found for: URINECX  No results found for: WOUNDCX  No results found for: STOOLCX  No results found for: RESPCX    Pain Management Panel     Pain Management Panel Latest Ref Rng & Units 10/27/2021 7/31/2015    AMPHETAMINES SCREEN, URINE Negative Negative Negative    BARBITURATES SCREEN Negative Negative Negative    BENZODIAZEPINE SCREEN, URINE Negative Negative Negative    BUPRENORPHINEUR Negative Negative -    COCAINE SCREEN, URINE Negative Negative Negative    METHADONE SCREEN, URINE Negative Negative Negative    METHAMPHETAMINEUR Negative Negative -          Pertinent Radiology Results:  Imaging Results (All)     Procedure Component Value Units Date/Time    US Venous Doppler Lower Extremity Bilateral (duplex) [[id number]] Collected: 11/05/21 1742     Updated: 11/05/21 1744    Narrative:      US Veins LE Duplex BILAT    HISTORY:   Leukocytosis and weakness    TECHNIQUE:   Real-time ultrasound was performed of both lower extremities utilizing spectral and color Doppler with compression and augmentation techniques.    COMPARISON:  May 24, 2021    FINDINGS:  Right Lower Extremity:  There is no deep venous thrombus seen in the  right lower extremity, including the right common femoral veins, right femoral veins and right popliteal veins.  Normal compressibility and respiratory phasicity was visualized.  No calf vein thrombus. Greater  saphenous vein is patent.    Left Lower Extremity:  There is no deep venous thrombus seen in the left lower extremity, including the left common femoral veins, left femoral veins and left popliteal veins.   Normal compressibility and respiratory phasicity was visualized.  No calf vein thrombus. Greater  saphenous vein is patent.        Impression:      No deep venous thrombus seen in either lower extremity.    Signer Name: Audie Carmichael MD   Signed: 11/5/2021 5:42 PM   Workstation Name: [id number]    [facility]    XR Chest PA & Lateral [[id number]] Collected: 11/03/21 0907     Updated: 11/03/21 0909    Narrative:      EXAM:    XR Chest, 2 Views     EXAM DATE:    11/3/2021 8:58 AM     CLINICAL HISTORY:    Leukocytosis, rule out pneumonia.; R53.1-Weakness; R62.7-Adult failure  to thrive     TECHNIQUE:    Frontal and lateral views of the chest.     COMPARISON:    10/27/2021     FINDINGS:    LUNGS:  Unremarkable.  No consolidation.    PLEURAL SPACE:  Unremarkable.  No pneumothorax.    HEART:  Unremarkable.  No cardiomegaly.    MEDIASTINUM:  Unremarkable.    BONES/JOINTS:  Unremarkable.       Impression:        No acute findings in the chest.     This report was finalized on 11/3/2021 9:07 AM by Dr. Benigno Powers MD.       XR Hip With or Without Pelvis 1 View Left [[id number]] Collected: 10/31/21 1449     Updated: 10/31/21 1452    Narrative:      LEFT HIP, 10/31/2021      HISTORY:    76-year-old male hospital inpatient with left hip pain after a fall.      TECHNIQUE:  AP pelvis and two-view left hip series.      FINDINGS:  No fracture, dislocation or other acute osseous abnormality is demonstrated involving the left hip or left acetabulum. No visible pelvis fracture, although GI contrast  within the colon obscures some portions of the pelvis and sacrum. Contralateral right  hip is also grossly negative. Mild bilateral degenerative hip arthropathy.      Impression:      1. No acute osseous abnormality.  2. Bilateral mild degenerative hip arthropathy.    Signer Name: Kam Gomez MD   Signed: 10/31/2021 2:49 PM   Workstation Name: [id number]    [facility]    CT Chest Without Contrast Diagnostic [[id number]] Collected: 10/28/21 1407     Updated: 10/28/21 1411    Narrative:      EXAM:    CT Chest Without Intravenous Contrast     EXAM DATE:    10/28/2021 1:36 PM     CLINICAL HISTORY:    Unexplained weight loss; R53.1-Weakness; R62.7-Adult failure to thrive     TECHNIQUE:    Axial computed tomography images of the chest without intravenous  contrast.  Sagittal and coronal reformatted images were created and  reviewed.  This CT exam was performed using one or more of the following  dose reduction techniques:  automated exposure control, adjustment of  the mA and/or kV according to patient size, and/or use of iterative  reconstruction technique.     COMPARISON:    05/24/2021, 09/23/2016     FINDINGS:    Lungs:  Chronic appearing interstitial lung changes are noted with  mild areas of peripheral fibrosis noted.  No consolidative airspace  disease identified.  Calcified granuloma left lower lobe.  Stable  subpleural nodule left lower lobe along the mitral fissure. No change in  size. 4.8 mm.  Stable left upper lobe pulmonary nodule that is  approximately 4.8 mm. No change.    Pleural space:  No pleural effusions.  No pneumothorax.    Heart:  Severe coronary artery calcifications.  No cardiomegaly.  No  pericardial effusion.    Mediastinum:  No mediastinal or hilar adenopathy.  Distal esophageal  wall thickening. Correlate for mild esophagitis.    Bones/joints:  Unremarkable.  No acute fracture.  No dislocation.    Soft tissues:  Unremarkable.    Vasculature:  Stable mild  atherosclerosis aorta.  No thoracic aortic  aneurysm.    Lymph nodes:  Unremarkable.  No enlarged lymph nodes.       Impression:      1.  Stable small nodules left lung which favor benign etiology.  Stability since 2016 indicates benign etiology.  2.  Stable chronic interstitial fibrosis and peripheral fibrosis.  Although in extent.  3.  Distal esophageal wall thickening which can be seen with  esophagitis.  4.  No consolidative airspace disease. No pleural effusions or  pneumothorax.  5.  Refer to CT abdomen/pelvis for findings of the upper abdomen.     This report was finalized on 10/28/2021 2:09 PM by Dr. Rakesh Carcamo MD.       CT Abdomen Pelvis Without Contrast [[id number]] Collected: 10/28/21 1403     Updated: 10/28/21 1409    Narrative:      EXAM:    CT Abdomen and Pelvis Without Intravenous Contrast     EXAM DATE:    10/28/2021 1:36 PM     CLINICAL HISTORY:    Unexplained weight loss; R53.1-Weakness; R62.7-Adult failure to thrive     TECHNIQUE:    Axial computed tomography images of the abdomen and pelvis without  intravenous contrast.  Sagittal and coronal reformatted images were  created and reviewed.  This CT exam was performed using one or more of  the following dose reduction techniques:  automated exposure control,  adjustment of the mA and/or kV according to patient size, and/or use of  iterative reconstruction technique.     COMPARISON:    03/06/2021     FINDINGS:    Lung bases:  Chronic interstitial lung changes are noted. The lung  bases are otherwise clear.    Heart:  Coronary artery calcifications.      ABDOMEN:    Liver:  Nodular liver margins can be seen with mild/early cirrhosis.  No focal liver lesion.    Gallbladder and bile ducts:  Cholecystectomy.  No ductal dilation.    Pancreas:  Stable appearance of pancreas.  Probable pancreatic cyst  near the tail the pancreas and adjacent to the upper pole left kidney is  stable from previous and is again shown to be fluid density. Cyst  is  approximately 5.6 cm.  No ductal dilation.    Spleen:  Spleen is unenlarged.    Adrenals:  Small left adrenal nodule is stable and most consistent  with benign adenoma given low-density features.    Kidneys and ureters:  Tiny nonobstructing left kidney stone. No  hydronephrosis.    Stomach and bowel:  Sigmoid diverticulosis without diverticulitis.   Marked constipation is noted. No bowel obstruction identified.  Probable  prominent ileocecal region fat but a small submucosal lipoma not  excluded. No change.  No bowel obstruction identified.  There is  distention of the stomach.      PELVIS:    Appendix:  No findings to suggest acute appendicitis.    Bladder:  Distended urinary bladder.  No stones.    Reproductive:  Prostate enlargement, stable.      ABDOMEN and PELVIS:    Intraperitoneal space:  No ascites or abscess.  No free air.    Bones/joints:  Stable appearance of the bony structures with  degenerative changes of the SI joints and degenerative changes of the  lumbar spine with multilevel stenosis.  L4 pars defects are noted with  anterolisthesis of L4 on L5 which accounts for severe neural foraminal  stenosis at this level.  No acute fracture.  No dislocation.    Soft tissues:  Unremarkable.    Vasculature:  Stable atherosclerosis. No evidence of aneurysm.    Lymph nodes:  Unremarkable.  No enlarged lymph nodes.       Impression:      1.  There are no acute findings identified within the abdomen or pelvis.  2.  Constipation and diverticulosis. No bowel obstruction.  3.  Prostate enlargement and urinary bladder distention.  4.  Stable cyst presumably involving the tail of pancreas. Favor benign  etiology.  5.  Nodular liver margins. Correlate for early/mild cirrhosis.  6.  Probable prominent ileocecal region fat versus submucosal lipoma. No  change. No bowel obstruction.  7.  Advanced degenerative changes lumbar spine again noted with  spondylolisthesis and severe stenosis at the L4/5 level.  8.  Tiny  nonobstructing left kidney stone.  9.  Other nonacute findings as above which appear stable from previous.     This report was finalized on 10/28/2021 2:07 PM by Dr. Rakesh Carcamo MD.       FL Video Swallow Single Contrast [[id number]] Collected: 10/28/21 1359     Updated: 10/28/21 1402    Narrative:      EXAMINATION: FL VIDEO SWALLOW SINGLE-CONTRAST-      CLINICAL INDICATION:     neurological symptoms w/ dysphagia.;  R53.1-Weakness; R62.7-Adult failure to thrive     TECHNIQUE: Modified barium swallow was performed utilizing video  fluoroscopy in conjunction with the Speech Pathology team. The patient  ingested multiple barium media including thin barium, nectar, and honey  liquid as well as barium pudding and a barium pudding coated cracker.      FLUOROSCOPY TIME: 1.3 minutes     COMPARISON: NONE      FINDINGS:   Premature loss is noted with vallecular pooling.  Transient laryngeal penetration of thin liquids. No aspiration.          Impression:      1.  No aspiration. Penetration with thin liquids.  2. Please see dysphasia notes for further details.     This report was finalized on 10/28/2021 2:00 PM by Dr. Rakesh Carcamo MD.       MRI Brain Without Contrast [[id number]] Collected: 10/27/21 1407     Updated: 10/27/21 1411    Narrative:      EXAM:    MR Head Without Intravenous Contrast     EXAM DATE:    10/27/2021 1:36 PM     CLINICAL HISTORY:    Stroke, follow up     TECHNIQUE:    Magnetic resonance images of the head/brain without intravenous  contrast in multiple planes.     COMPARISON:    CT 10/06/2021, MRI 07/20/2021     FINDINGS:    Brain:  Mild cerebral atrophy is noted and is in part age-related.   Moderate chronic small vessel ischemic disease.  No restricted diffusion  to indicate acute infarct.  Flow related signal in the major  intracranial vessels is preserved.  No hemorrhage.    Ventricles:  Unremarkable.  No ventriculomegaly.    Bones/joints:  Unremarkable.    Sinuses:  Unremarkable as visualized.   No acute sinusitis.    Mastoid air cells:  Unremarkable as visualized.  No mastoid effusion.    Orbits:  Unremarkable as visualized.    Sella:  Sella and suprasellar aspects are unremarkable.       Impression:      1.  Mild cerebral atrophy and moderate chronic small vessel ischemic  disease.  2.  No acute intracranial findings identified. No acute infarct is  noted.  3.  Otherwise stable exam with other nonacute findings as above.     This report was finalized on 10/27/2021 2:08 PM by Dr. Rakesh Carcamo MD.       XR Chest 1 View [[id number]] Collected: 10/27/21 1040     Updated: 10/27/21 1121    Narrative:      EXAM:    XR Chest, 1 View     EXAM DATE:    10/27/2021 10:24 AM     CLINICAL HISTORY:    cp     TECHNIQUE:    Frontal view of the chest.     COMPARISON:    10/06/2021     FINDINGS:    Lungs:  Unremarkable.  No consolidation.    Pleural space:  Unremarkable.  No pneumothorax.    Heart:  Unremarkable.  No cardiomegaly.    Mediastinum:  Unremarkable.    Bones/joints:  Unremarkable.       Impression:        Unremarkable exam. No acute cardiopulmonary findings identified.     This report was finalized on 10/27/2021 10:40 AM by Dr. Rakesh Carcamo MD.           Test Results Pending at Discharge:  None.     Discharge Disposition/Discharge Medications/Discharge Appointments     Discharge Disposition:   Skilled Nursing Facility (DC - External)    Condition at Discharge:  Stable    Discharge Diet:  Diet Instructions     Advance Diet As Tolerated      Diet: Regular, Soft Texture; Thin Liquids, No Restrictions; Chopped      Discharge Diet:  Regular  Soft Texture       Fluid Consistency: Thin Liquids, No Restrictions    Soft Options: Chopped          Discharge Activity:  Activity Instructions     Gradually Increase Activity Until at Pre-Hospitalization Level      Up WIth Assist            Code Status While Inpatient:  Code Status and Medical Interventions:   Ordered at: 10/27/21 1818     Code Status (Patient has no pulse  and is not breathing):    CPR (Attempt to Resuscitate)     Medical Interventions (Patient has pulse or is breathing):    Full       Discharge Medications:     Discharge Medications      New Medications      Instructions Start Date   metoprolol tartrate 25 MG tablet  Commonly known as: LOPRESSOR   12.5 mg, Oral, Every 12 Hours Scheduled      polyethylene glycol 17 g packet  Commonly known as: MIRALAX   17 g, Oral, Daily      ranolazine 500 MG 12 hr tablet  Commonly known as: RANEXA   500 mg, Oral, Every 12 Hours Scheduled         Continue These Medications      Instructions Start Date   albuterol sulfate  (90 Base) MCG/ACT inhaler  Commonly known as: PROVENTIL HFA;VENTOLIN HFA;PROAIR HFA   2 puffs, Inhalation, Every 4 Hours PRN      aspirin 81 MG tablet   81 mg, Oral, Daily      atorvastatin 80 MG tablet  Commonly known as: LIPITOR   80 mg, Oral, Nightly      budesonide-formoterol 80-4.5 MCG/ACT inhaler  Commonly known as: SYMBICORT   2 puffs, Inhalation, 2 Times Daily      cetirizine 10 MG tablet  Commonly known as: zyrTEC   10 mg, Oral, Daily      clopidogrel 75 MG tablet  Commonly known as: PLAVIX   75 mg, Oral, Daily      colestipol 1 g tablet  Commonly known as: COLESTID   1 g, Oral, 2 Times Daily      ferrous sulfate 325 (65 FE) MG tablet   325 mg, Oral, 2 times daily      finasteride 5 MG tablet  Commonly known as: PROSCAR   5 mg, Oral, Daily      isosorbide mononitrate 120 MG 24 hr tablet  Commonly known as: IMDUR   120 mg, Oral, Every Morning      megestrol 625 MG/5ML suspension  Commonly known as: Megace ES   625 mg, Oral, Daily      Memantine HCl-Donepezil HCl 28-10 MG capsule sustained-release 24 hr   1 capsule, Oral, Daily      mirtazapine 45 MG disintegrating tablet  Commonly known as: REMERON SOL-TAB   45 mg, Translingual, Nightly      nitroglycerin 0.4 MG SL tablet  Commonly known as: NITROSTAT   1 under the tongue as needed for angina, may repeat q5mins for up three doses      pantoprazole 40  MG EC tablet  Commonly known as: PROTONIX   40 mg, Oral, Daily             Discharge Appointments:  Your Scheduled Appointments    Nov 29, 2021  8:15 AM  Follow Up with Giovanni Buenrostro MD  [facility] CARDIOLOGY ([place]) [address]  [facility] [address] [phone]   -Bring photo ID, insurance card, and list of medications to appointment  -If testing was completed outside of [facility] then patient must bring images on a disc  -Copay will be collected at time of appointment  -Established patients should arrive 10 minutes prior to appointment       Dec 09, 2021 11:30 AM  Follow Up with Geno Vernon MD  [facility] [facility] GASTROENTEROLOGY & UROLOGY ([place]) [address]  [facility] [address] [phone]    Please bring any related outside labs/imaging on a disc. If insurance has changed, please bring updated information.       Jan 19, 2022 10:45 AM  Follow Up with ANDREW Amos  [facility] PULMONARY & CRITICAL CARE MEDICINE ([place]) [address] [facility] [address]  [facility] [address] [phone]   Established: Please bring outside images or reports.              Additional Instructions for the Follow-ups that You Need to Schedule     Call MD With Problems / Concerns   As directed      Instructions: Contact PCP or return to the ED with recurrent symptoms or concerns.    Order Comments: Instructions: Contact PCP or return to the ED with recurrent symptoms or concerns.          Discharge Follow-up with PCP   As directed       Currently Documented PCP:    Camila Ortiz APRN    PCP Phone Number:    [phone]     Follow Up Details: Nursing home provider in 1 week. Recommend CBC, CMP in 3-4 days.            Contact information for follow-up providers     Camila Ortiz APRN. Schedule an appointment as soon as possible for a visit in 1 day.    Specialty: Family Medicine  Why: EVALUATE  Contact information:  [address]  [place] [address]  [phone]             Camila Ortiz APRN .    Specialty: Family Medicine  Why: Nursing home provider in 1 week. Recommend CBC, CMP in 3-4 days.  Contact information:  [address]  [place] [address]  [phone]                   Contact information for after-discharge care     Destination     [facility] .    Service: Skilled Nursing  Contact information:  [address] [facility] [address]  [phone]                             Additional Instructions for the Follow-ups that You Need to Schedule     Call MD With Problems / Concerns   As directed      Instructions: Contact PCP or return to the ED with recurrent symptoms or concerns.    Order Comments: Instructions: Contact PCP or return to the ED with recurrent symptoms or concerns.          Discharge Follow-up with PCP   As directed       Currently Documented PCP:    Camila Ortiz APRN    PCP Phone Number:    [phone]     Follow Up Details: Nursing home provider in 1 week. Recommend CBC, CMP in 3-4 days.               Attestation: I personally discussed the patient's hospital course, disposition, discharge planning, and discharge medications with attending physician, Carlos Pena MD, prior to time of discharge. I have also discussed with RN, Livier, prior to discharge.     Jennifer Luque PA-C  11/09/21  08:26 EST    Time to complete discharge: >30 minutes.     Please send a copy of this dictation to the following providers:  Camila Ortiz APRN

## 2021-11-09 NOTE — CASE MANAGEMENT/SOCIAL WORK
Discharge Planning Assessment  MILLICENT Recio     Patient Name: Fidencio Luciano  MRN: [id number]  Today's Date: 11/9/2021    Admit Date: 10/27/2021     Discharge Needs Assessment    No documentation.                Discharge Plan     Row Name 11/09/21 0924       Plan    Final Discharge Disposition Code 03 - skilled nursing facility (SNF)    Final Note Pt is being discharged to [facility] on this date. SS spoke with Angela NEELY who is aware and agreeable to discharge. POA requested to be notified when EMS transports Pt, SS made nurse aware. SS faxed AVS summary, clinial update, negative cov-id results and discharge summary to fax [phone]. SS provided nurse with report number for [facility] [phone]. SS to arrange EMS to transport.    1138am: SS scheduled [facility] per Dispatcher Javier for transport.                Kat Taylor

## 2021-11-09 NOTE — PLAN OF CARE
Goal Outcome Evaluation:      Patient has been pleasant tonight. He has not had any complaints of chest pain or shortness of breath. Vital signs remain stable. COVID swab completed for transfer to nursing home tomorrow; came back negative. Will continue to monitor and follow plan of care.        Problem: Adult Inpatient Plan of Care  Goal: Plan of Care Review  Outcome: Ongoing, Progressing  Goal: Patient-Specific Goal (Individualized)  Outcome: Ongoing, Progressing  Goal: Absence of Hospital-Acquired Illness or Injury  Outcome: Ongoing, Progressing  Intervention: Identify and Manage Fall Risk  Recent Flowsheet Documentation  Taken 11/9/2021 0100 by Murphy Burdick RN  Safety Promotion/Fall Prevention:   assistive device/personal items within reach   clutter free environment maintained   fall prevention program maintained   nonskid shoes/slippers when out of bed   room organization consistent   safety round/check completed  Taken 11/8/2021 2300 by Murphy Burdick RN  Safety Promotion/Fall Prevention:   assistive device/personal items within reach   clutter free environment maintained   fall prevention program maintained   safety round/check completed  Taken 11/8/2021 2000 by Heavenly, Murphy Mikhail, RN  Safety Promotion/Fall Prevention:   assistive device/personal items within reach   clutter free environment maintained   fall prevention program maintained   nonskid shoes/slippers when out of bed   room organization consistent   safety round/check completed  Intervention: Prevent Skin Injury  Recent Flowsheet Documentation  Taken 11/8/2021 2000 by Murphy Burdick RN  Skin Protection:   adhesive use limited   electrode sites changed   pulse oximeter probe site changed  Intervention: Prevent and Manage VTE (venous thromboembolism) Risk  Recent Flowsheet Documentation  Taken 11/8/2021 2000 by Murphy Burdick RN  VTE Prevention/Management: sequential compression devices on  Goal: Optimal Comfort and  Wellbeing  Outcome: Ongoing, Progressing  Intervention: Provide Person-Centered Care  Recent Flowsheet Documentation  Taken 11/8/2021 2000 by Murphy Burdick, RN  Trust Relationship/Rapport: care explained  Goal: Readiness for Transition of Care  Outcome: Ongoing, Progressing     Problem: Diabetes Comorbidity  Goal: Blood Glucose Level Within Desired Range  Outcome: Ongoing, Progressing  Intervention: Maintain Glycemic Control  Recent Flowsheet Documentation  Taken 11/8/2021 2000 by Murphy Burdick, RN  Glycemic Management: blood glucose monitoring     Problem: Fall Injury Risk  Goal: Absence of Fall and Fall-Related Injury  Outcome: Ongoing, Progressing  Intervention: Promote Injury-Free Environment  Recent Flowsheet Documentation  Taken 11/9/2021 0100 by Murphy Burdick, RN  Safety Promotion/Fall Prevention:   assistive device/personal items within reach   clutter free environment maintained   fall prevention program maintained   nonskid shoes/slippers when out of bed   room organization consistent   safety round/check completed  Taken 11/8/2021 2300 by Murphy Burdick, RN  Safety Promotion/Fall Prevention:   assistive device/personal items within reach   clutter free environment maintained   fall prevention program maintained   safety round/check completed  Taken 11/8/2021 2000 by Murphy Burdick RN  Safety Promotion/Fall Prevention:   assistive device/personal items within reach   clutter free environment maintained   fall prevention program maintained   nonskid shoes/slippers when out of bed   room organization consistent   safety round/check completed     Problem: Skin Injury Risk Increased  Goal: Skin Health and Integrity  Outcome: Ongoing, Progressing  Intervention: Optimize Skin Protection  Recent Flowsheet Documentation  Taken 11/8/2021 2000 by Murphy Budrick, RN  Pressure Reduction Techniques:   frequent weight shift encouraged   weight shift assistance provided  Pressure  Reduction Devices:   foam padding utilized   pressure-redistributing mattress utilized  Skin Protection:   adhesive use limited   electrode sites changed   pulse oximeter probe site changed

## 2021-11-09 NOTE — DISCHARGE PLACEMENT REQUEST
"Fidencio Luciano (76 y.o. Male)             Date of Birth Social Security Number Address Home Phone MRN    1945  [address] [facility] [phone] [id number]    Latter day Marital Status             Non-Religious        Admission Date Admission Type Admitting Provider Attending Provider Department, Room/Bed    10/27/21 Emergency Carlos Grier MD Heinss, Karl F, MD [address], 3305/1S    Discharge Date Discharge Disposition Discharge Destination           Skilled Nursing Facility (DC - External)              Attending Provider: Carlos Grier MD    Allergies: No Known Allergies    Isolation: None   Infection: None   Code Status: CPR   Advance Care Planning Activity    Ht: 180.3 cm (71\")   Wt: 66.4 kg (146 lb 6.4 oz)    Admission Cmt: None   Principal Problem: Weakness generalized [R53.1]                 Active Insurance as of 10/27/2021     Primary Coverage     Payor Plan Insurance Group Employer/Plan Group    ANTH MEDICARE REPLACEMENT ANTH MEDICARE ADVANTAGE [id number]     Payor Plan Address Payor Plan Phone Number Payor Plan Fax Number Effective Dates    [address] [phone]  1/1/2021 - None Entered    [facility] [address]       Subscriber Name Subscriber Birth Date Member ID       FIDENCIO LUCIANO 1945 [id number]                 Emergency Contacts      (Rel.) Home Phone Work Phone Mobile Phone    Mustapha(POA)Angela (Spouse) [phone] -- [phone]    Dylan García (Son) [phone] -- [phone]    Sofie Webber (Daughter) [phone] -- --    UbaldoZhane (Daughter) [phone] -- --    Aries Ortega (Son) [phone] -- --        COVID PRE-OP / PRE-PROCEDURE SCREENING ORDER (NO ISOLATION) - Swab, Nasopharynx [[id number]] (Order [id number])  Order  Date: 11/8/2021 Department: [address] Released By/Authorizing: Carlos Grier MD (auto-released)       Linked Results    Procedure Abnormality Status "   COVID-19,CEPHEID/TABBY/BDMAX,COR/GUERA/PAD/VEGA IN-HOUSE(OR EMERGENT/ADD-ON),NP SWAB IN TRANSPORT MEDIA 3-4 HR TAT, RT-PCR - Swab, Nasopharynx Normal Final result     Reprint Order Requisition    COVID-19,CEPHEID/TABBY/BDMAX,COR/GUERA/PAD/VEGA IN-HOUSE(OR EMERGENT/ADD-ON),NP SWAB IN TRANSPORT MEDIA 3-4 HR TAT, RT-PCR - Swab, Nasopharynx (Order #[id number]) on 11/8/21           COVID-19,CEPHEID/TABBY/BDMAX,COR/GUERA/PAD/VEGA IN-HOUSE(OR EMERGENT/ADD-ON),NP SWAB IN TRANSPORT MEDIA 3-4 HR TAT, RT-PCR - Swab, Nasopharynx  Order: [id number] - Part of Panel Order [id number]   Status: Final result       Visible to patient: Yes (not seen)       Next appt: 11/29/2021 at 08:15 AM in Cardiology (Giovanni Buenrostro MD)      Specimen Information: Nasopharynx; Swab         0 Result Notes      Component   Ref Range & Units    COVID19   Not Detected - Ref. Range Not Detected    Resulting Agency [facility]               Narrative  Performed by:  [facility]  Fact sheet for providers: https://www.fda.gov/media/409418/download     Fact sheet for patients: https://www.fda.gov/media/627408/download   Fact sheet for providers: https://www.fda.gov/media/274767/download     Fact sheet for patients: https://www.fda.gov/media/678782/download      Specimen Collected: 11/08/21 20:08 Last Resulted: 11/08/21 23:24       Order Details       View Encounter       Lab and Collection Details       Routing       Result History               Result Care Coordination      Patient Communication    Released Not seen Back to Top           Provider Comment To Patient    Released Not seen     Result Read / Acknowledged    Acknowledge result  No acknowledgement history exists for this order.     Lab Component SmartPhrase Guide    COVID-19,CEPHEID/TABBY/BDMAX,COR/GUERA/PAD/VEGA IN-HOUSE(OR EMERGENT/ADD-ON),NP SWAB IN TRANSPORT MEDIA 3-4 HR TAT, RT-PCR - Swab, Nasopharynx (Order #[id number]) on 11/8/21       Other Results from 10/27/2021     POC Glucose Once  Final result 11/9/2021     Basic Metabolic Panel  Final result 11/9/2021    CBC Auto Differential  Final result 11/9/2021    Scan Slide  Final result 11/9/2021    Manual Differential  Final result 11/9/2021    POC Glucose Once  Final result 11/8/2021    POC Glucose Once  Final result 11/8/2021    POC Glucose Once  Final result 11/8/2021    POC Glucose Once  Final result 11/8/2021    Basic Metabolic Panel  Final result 11/8/2021    CBC Auto Differential  Final result 11/8/2021    Scan Slide  Final result 11/8/2021    Manual Differential  Final result 11/8/2021    POC Glucose Once  Final result 11/7/2021    POC Glucose Once  Final result 11/7/2021    POC Glucose Once  Final result 11/7/2021    POC Glucose Once  Final result 11/7/2021    Basic Metabolic Panel  Final result 11/7/2021    CBC Auto Differential  Final result 11/7/2021    Manual Differential  Final result 11/7/2021    (important suggestion)  Warning: Additional results from 10/27/2021 are available but are not displayed in this report.           Vital Signs (last day)     Date/Time Temp Temp src Pulse Resp BP Patient Position SpO2    11/09/21 0655 96.8 (36) Axillary 63 18 131/60 Lying 98    11/09/21 0330 97.8 (36.6) Oral 90 18 134/61 Lying 98    11/08/21 1900 97.7 (36.5) Oral 83 18 124/64 Lying 98    11/08/21 1830 -- -- 84 18 -- -- 97    11/08/21 1436 97.7 (36.5) Oral 71 18 112/60 Lying 97    11/08/21 1010 97.4 (36.3) Oral 66 18 157/67 Lying 98    11/08/21 0742 -- -- 63 19 -- -- 99    11/08/21 0734 -- -- 63 19 -- -- 99    11/08/21 0630 97.6 (36.4) Oral 73 20 135/60 Lying 96    11/08/21 0311 98.2 (36.8) Oral 68 20 133/63 Lying 96          Intake & Output (last day)       11/08 0701  11/09 0700 11/09 0701  11/10 0700    P.O. 1500     Total Intake(mL/kg) 1500 (22.6)     Urine (mL/kg/hr) 500 (0.3)     Stool      Total Output 500     Net +1000           Urine Unmeasured Occurrence 1 x         Lines, Drains & Airways     Active LDAs     Name Placement date Placement time Site Days     Peripheral IV 11/03/21 1836 Anterior; Right; Upper Arm 11/03/21 1836  Arm  5    External Urinary Catheter 10/31/21  0600  --  9                  Current Facility-Administered Medications   Medication Dose Route Frequency Provider Last Rate Last Admin   • acetaminophen (TYLENOL) 160 MG/5ML solution 650 mg  650 mg Oral Q4H PRN Carlos Grier MD   649.6 mg at 11/08/21 2001   • albuterol (PROVENTIL) nebulizer solution 0.083% 2.5 mg/3mL  2.5 mg Nebulization Q6H PRN Carlos Grier MD   2.5 mg at 11/09/21 0655   • aspirin EC tablet 81 mg  81 mg Oral Daily Carlos Grier MD   81 mg at 11/08/21 0814   • atorvastatin (LIPITOR) tablet 80 mg  80 mg Oral Nightly Carlos Grier MD   80 mg at 11/08/21 2001   • budesonide-formoterol (SYMBICORT) 80-4.5 MCG/ACT inhaler 2 puff  2 puff Inhalation BID Carlos Grier MD   2 puff at 11/09/21 0655   • clopidogrel (PLAVIX) tablet 75 mg  75 mg Oral Daily Carlos Grier MD   75 mg at 11/08/21 0814   • ferrous sulfate tablet 325 mg  325 mg Oral BID Carlos Grier MD   325 mg at 11/08/21 2000   • finasteride (PROSCAR) tablet 5 mg  5 mg Oral Daily Carlos Grier MD   5 mg at 11/08/21 0814   • isosorbide mononitrate (IMDUR) 24 hr tablet 120 mg  120 mg Oral Daily Carlos Grier MD   120 mg at 11/08/21 0814   • megestrol (MEGACE) 40 MG/ML suspension 800 mg  800 mg Oral Daily Carlos Grier MD   800 mg at 11/08/21 0814   • Memantine HCl-Donepezil HCl 28-10 MG capsule sustained-release 24 hr 28 mg  1 capsule Oral Daily Carlos Grier MD   28 mg at 11/08/21 0814   • metoprolol tartrate (LOPRESSOR) tablet 12.5 mg  12.5 mg Oral Q12H Jewel Prado MD   12.5 mg at 11/08/21 2000   • mirtazapine (REMERON) tablet 45 mg  45 mg Oral Nightly Carlos Grier MD   45 mg at 11/08/21 2000   • nitroglycerin (NITROSTAT) SL tablet 0.4 mg  0.4 mg Sublingual Q5 Min PRN Carlos Grier MD       • pantoprazole (PROTONIX) EC tablet 40 mg  40 mg Oral QAM AC Carlos Grier MD   40 mg at  21   • polyethylene glycol (MIRALAX) packet 17 g  17 g Oral Daily Carlos Grier MD   17 g at 21   • ranolazine (RANEXA) 12 hr tablet 500 mg  500 mg Oral Q12H Giovanni Buenrostro MD   500 mg at 21   • sodium chloride 0.9 % flush 10 mL  10 mL Intravenous PRN Jaswinder Braun MD       • sodium chloride 0.9 % flush 10 mL  10 mL Intravenous Q12H Carlos Grier MD   10 mL at 21   • sodium chloride 0.9 % flush 10 mL  10 mL Intravenous PRN Carlos Grier MD            Physician Progress Notes (most recent note)      Jennifer Luque PA at 21 1004     Attestation signed by Carlos Grier MD at 21 1304    I have reviewed this documentation and agree. Patient was also seen independently, appears to be doing very well, strength is symmetric, he is eaten all of his lunch. Awaiting rehab decision. White count is coming down, no obvious infectious process seen                           [facility]     Patient Identification:  Name:  Fidencio Luciano  Age:  76 y.o.  Sex:  male  :  1945  MRN:  [id number]  Visit Number:  [id number]  Primary Care Provider:  Camila Ortiz APRN    Date of admission: 10/27/2021  Length of stay:  12    ----------------------------------------------------------------------------------------------------------------------  Subjective     Chief Complaint:   Chief Complaint   Patient presents with   • Chest Pain     Subjective/Interval History:    76 y.o. male who was admitted on 10/27/2021 with generalized weakness and decreased responsiveness.  He was also having some complaints of chest pain at home.  He was having some myoclonic jerks in the ED.  It was noted that the patient had been diagnosed with COVID-19 in 2021.  Since then, he has had a steady physical and mental decline.  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  He was admitted for further evaluation and  therapy.    PMH is significant for CAD status post previous stenting, hypertension, dyslipidemia, iron deficiency anemia, type 2 diabetes mellitus. For complete admission information, please see history and physical.     Consultations:  1. Cardiology    Procedures/Scans:  1. MRI brain without contrast  2. CT chest without contrast  3. CT abdomen/pelvis without contrast  4. SLP evaluation  5. US venous doppler bilateral     Today, the patient reports that he is doing well. No new complaints. Discussed with RNKirk. No new events or concerns.     Review of Systems   Constitutional: Negative for chills, fatigue and fever.   HENT: Negative for sore throat and trouble swallowing.    Respiratory: Negative for cough and shortness of breath.    Cardiovascular: Negative for chest pain.   Gastrointestinal: Negative for abdominal pain, diarrhea, nausea and vomiting.   Genitourinary: Negative for difficulty urinating and dysuria.   Skin: Negative for color change and rash.   Neurological: Positive for weakness (Generalized).   Psychiatric/Behavioral: Negative for agitation, behavioral problems and confusion.      ----------------------------------------------------------------------------------------------------------------------  Objective   Current Hospital Meds:  aspirin, 81 mg, Oral, Daily  atorvastatin, 80 mg, Oral, Nightly  budesonide-formoterol, 2 puff, Inhalation, BID  clopidogrel, 75 mg, Oral, Daily  ferrous sulfate, 325 mg, Oral, BID  finasteride, 5 mg, Oral, Daily  isosorbide mononitrate, 120 mg, Oral, Daily  megestrol, 800 mg, Oral, Daily  Memantine HCl-Donepezil HCl, 1 capsule, Oral, Daily  metoprolol tartrate, 12.5 mg, Oral, Q12H  mirtazapine, 45 mg, Oral, Nightly  pantoprazole, 40 mg, Oral, QAM AC  polyethylene glycol, 17 g, Oral, Daily  ranolazine, 500 mg, Oral, Q12H  sodium chloride, 10 mL, Intravenous, Q12H          ----------------------------------------------------------------------------------------------------------------------  Vital Signs:  Temp:  [97.6 °F (36.4 °C)-98.6 °F (37 °C)] 97.6 °F (36.4 °C)  Heart Rate:  [63-73] 63  Resp:  [18-20] 19  BP: (120-135)/(58-63) 135/60  Mean Arterial Pressure (Non-Invasive) for the past 24 hrs (Last 3 readings):   Noninvasive MAP (mmHg)   11/08/21 0311 88   11/07/21 1907 92     SpO2 Percentage    11/08/21 0311 11/08/21 0630 11/08/21 0734   SpO2: 96% 96% 99%     SpO2:  [96 %-99 %] 99 %  on   ;   Device (Oxygen Therapy): room air    Body mass index is 20.6 kg/m².  Wt Readings from Last 3 Encounters:   11/08/21 67 kg (147 lb 11.2 oz)   10/21/21 64.4 kg (142 lb)   10/21/21 64.5 kg (142 lb 3.2 oz)        Intake/Output Summary (Last 24 hours) at 11/8/2021 1004  Last data filed at 11/8/2021 0832  Gross per 24 hour   Intake 980 ml   Output 1325 ml   Net -345 ml     Diet Soft Texture; Chopped; Thin  ----------------------------------------------------------------------------------------------------------------------  Physical exam:  Constitutional: Vital signs reviewed. Well-developed and well-nourished.  No respiratory distress on room air.   HENT:  Head:  Normocephalic and atraumatic.    Eyes:  Conjunctivae and EOM are normal. No scleral icterus. No erythema or drainage.  Neck:  Neck supple.   Cardiovascular:  Normal rate, regular rhythm and normal heart sounds with no murmur.  Pulmonary/Chest:  No respiratory distress, no wheezes, no crackles, with normal breath sounds and good air movement.  Abdominal:  Soft.  Bowel sounds are present x4.  No distension and no tenderness.   Musculoskeletal:  No edema, no tenderness, and no deformity.  No red or swollen joints anywhere.    Neurological:  Alert and oriented to person, place, and time. No facial droop.  No slurred speech. Somewhat flattened affect.   Skin:  Skin is warm and dry. No rash noted. No pallor.   Peripheral vascular: No clubbing,  no cyanosis, no edema.   Genitourinary: No terrell catheter in place.     I have reviewed and updated the physical exam as needed to reflect that of 11/08/21  ----------------------------------------------------------------------------------------------------------------------  Tele: Sinus rhythm 60s.    ----------------------------------------------------------------------------------------------------------------------              Results from last 7 days   Lab Units 11/08/21 0410 11/07/21 0052 11/06/21 0105 11/05/21 0528 11/05/21 0111   CRP mg/dL  --   --   --   --  <0.30   WBC 10*3/mm3 13.09* 15.08* 15.35*   < >  --    HEMOGLOBIN g/dL 13.0 13.1 13.0   < >  --    HEMATOCRIT % 39.4 39.7 40.0   < >  --    MCV fL 100.5* 101.3* 100.8*   < >  --    MCHC g/dL 33.0 33.0 32.5   < >  --    PLATELETS 10*3/mm3 223 207 202   < >  --     < > = values in this interval not displayed.     Results from last 7 days   Lab Units 11/08/21 0410 11/07/21 0052 11/06/21 0105 11/05/21 0111 11/04/21 0045 11/03/21  0035 11/02/21  0158   SODIUM mmol/L 138 141 140   < > 139   < > 137   POTASSIUM mmol/L 4.6 4.3 4.2   < > 4.1   < > 4.2   CHLORIDE mmol/L 107 110* 111*   < > 108*   < > 106   CO2 mmol/L 18.2* 19.5* 18.0*   < > 18.3*   < > 20.1*   BUN mg/dL 29* 23 23   < > 36*   < > 25*   CREATININE mg/dL 0.95 0.76 0.74*   < > 0.97   < > 0.77   EGFR IF NONAFRICN AM mL/min/1.73 77 100 103   < > 75   < > 98   CALCIUM mg/dL 8.5* 8.7 8.7   < > 8.6   < > 9.0   GLUCOSE mg/dL 174* 109* 113*   < > 127*   < > 113*   ALBUMIN g/dL  --   --   --   --  3.43*  --  3.77   BILIRUBIN mg/dL  --   --   --   --  0.3  --  0.5   ALK PHOS U/L  --   --   --   --  80  --  71   AST (SGOT) U/L  --   --   --   --  20  --  22   ALT (SGPT) U/L  --   --   --   --  22  --  23    < > = values in this interval not displayed.   Estimated Creatinine Clearance: 63.7 mL/min (by C-G formula based on SCr of 0.95 mg/dL).  No results found for: AMMONIA     Glucose   Date/Time  Value Ref Range Status   11/08/2021 0633 127 70 - 130 mg/dL Final     Comment:     Meter: [id number] : [id number] LADY TANNER   11/07/2021 1909 170 (H) 70 - 130 mg/dL Final     Comment:     Meter: [id number] : [id number] GERI RAMIREZ   11/07/2021 1523 130 70 - 130 mg/dL Final     Comment:     Meter: [id number] : [id number] LADY TANNER   11/07/2021 1005 132 (H) 70 - 130 mg/dL Final     Comment:     Meter: [id number] : [id number] LADY TANNER   11/07/2021 0702 75 70 - 130 mg/dL Final     Comment:     Meter: [id number] : [id number] LADY TANNER   11/06/2021 2001 182 (H) 70 - 130 mg/dL Final     Comment:     Meter: [id number] : [id number] [facility] Parisa Chow   11/06/2021 1627 194 (H) 70 - 130 mg/dL Final     Comment:     Meter: [id number] : [id number] michael rausch   11/06/2021 1030 139 (H) 70 - 130 mg/dL Final     Comment:     Meter: [id number] : [id number] michael rausch     Lab Results   Component Value Date    HGBA1C 5.90 (H) 10/28/2021     Lab Results   Component Value Date    TSH 1.650 10/27/2021    FREET4 1.14 10/27/2021     No results found for: BLOODCX  No results found for: URINECX  No results found for: WOUNDCX  No results found for: STOOLCX  No results found for: RESPCX    Pain Management Panel     Pain Management Panel Latest Ref Rng & Units 10/27/2021 7/31/2015    AMPHETAMINES SCREEN, URINE Negative Negative Negative    BARBITURATES SCREEN Negative Negative Negative    BENZODIAZEPINE SCREEN, URINE Negative Negative Negative    BUPRENORPHINEUR Negative Negative -    COCAINE SCREEN, URINE Negative Negative Negative    METHADONE SCREEN, URINE Negative Negative Negative    METHAMPHETAMINEUR Negative Negative -        I have personally reviewed the above laboratory results for 11/08/21  ----------------------------------------------------------------------------------------------------------------------  Imaging Results (Last 24 Hours)     ** No results found for  the last 24 hours. **        ----------------------------------------------------------------------------------------------------------------------  Assessment/Plan     #Debility   #Falls  #Suspected long COVID syndrome   · PT/OT following.  Patient reports improvement noted.  · Patient fell while here in the hospital. XR hip/pelvis shows no fracture.   · Inpatient rehab consulted and denied. SS assisting with SNF placement. Will follow up today.   · Follow up PT/OT today.     #Leukocytosis   · Patient denies cough, shortness of breath, dysuria, abdominal pain, diarrhea.  · CT chest on admission showed no consolidation.  CT abdomen/pelvis on admission also showed no acute findings.  · Repeat UA and chest x-ray 11/3 without acute findings.  · WBC now trending down. CRP/Procal normal.   · BC x2 with no growth to date.  · Venous dopplers negative for DVT.   · WBC count trending down. Repeat CBC in AM.    #Constipation    · Patient is now moving his bowels.   · Continue scheduled MiraLAX.    #Concern for esophagitis   · Continue PPI.    #Dementia   · Patient currently alert and oriented.  · PCP had reported some concern for advancing dementia.  · MRI of the brain without contrast on admission showed no acute findings, mild cerebral atrophy and moderate chronic small vessel ischemic disease.  · Continue home medications and follow-up with PCP as an outpatient.    #CAD s/p prior stenting   · Denies any chest pain.  · Continue low-dose aspirin, atorvastatin, Plavix, Imdur, Ranexa.    #DVT Prophylaxis  · SCDs    The patient is high risk due to the following diagnoses/reasons: Generalized debility, suspected long Covid syndrome.    I have discussed the patient's assessment and plan with the patient, DIOR Bradley, and attending physician, Dr. Grier.     Disposition:   · Inpatient rehab denied.   · SS assisting with short term rehab placement.     Discharge needs:  • None at this time.    IGOR Mcrae  11/08/21  10:04  EST          Electronically signed by Carlos Grier MD at 11/08/21 1304          Consult Notes (most recent note)      Giovanni Buenrostro MD at 10/28/21 0842      Consult Orders    1. Inpatient Cardiology Consult [[id number]] ordered by Carlos Grier MD at 10/27/21 1817               Date of Admit: 10/27/2021  Date of Consult: 10/28/21  No ref. provider found        Weakness generalized      Assessment      1. Chronic intermittent chest pains with some typical and atypical features for CCS class III angina, patient is currently chest pain-free.  2. 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 Barney at [facility].  Borderline significant stenosis noted in the proximal LAD on the previous coronary angiography.    3. Normal pharmacological nuclear stress test in June 2021.  4. Intermittent confusion with a diagnosis of dementia, being managed by Dr. Carrasquillo with apparently some increased confusion since Covid infection recently in March 2021.  (Possible brain fog due to Covid).      Recommendations     1. For his chest pains and coronary artery disease, given his normal nuclear stress test in June 2021 and with patient denies any chest pains at this time and with negative cardiac markers and unremarkable EKG, will continue to treat him with medical therapy only for now especially in the setting of recent confusion.  Patient and his wife seem to be agreeable with this plan.  2. We will add Ranexa 500 mg p.o. twice daily.  3. If he has recurrent significant anginal symptoms then he would need FFR of the proximal LAD stenosis to decide on need for further coronary intervention for this.    Reason for consultation: Chest pain    Subjective       Subjective     History of Present Illness     Fidencio Luciano is a 76-year-old male with a past medical history significant for coronary  artery disease status post PCI of the left circumflex artery in October 2016 with subsequent coiling of the fistulous connection between the LAD and pulmonary artery, hyperlipidemia, diabetes mellitus type 2, GERD and anemia.  Patient presented to the ER with complaints of lethargy and chest pain.  Patient's wife states that he has a history of dementia however after he got COVID-19 she feels like his confusion has worsened and his memory has not been good.  She reports he had a fall 3 weeks ago but she is unsure if he became dizzy or it was a mechanical fall she was asleep at the time.  Patient is alert and oriented to place. He does report intermittent chest pains that are his baseline. He does have chronic chest pains and states that have not worsened. Denies any shortness of breath.  His wife states that he has been following with a local neurologist who told the patient that he had mini strokes as evidenced by small vessel disease on his MRI. Denies any history of atrial fibrillation.     Patient had left heart catheterization in 2019 that showed patent stent in the LAD, RCA and circumflex with moderate diffuse disease.  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 It was recommended at that time if patient continues to have angina despite medical therapy would recommend FFR of the proximal LAD lesion with possible PCI.  Echocardiogram from May 2021 showed normal LV function.  Nuclear stress test from June 2021 showed no evidence of ischemia.  Troponin negative x3.  EKG showed no acute ischemia.    Cardiac risk factors:arteriosclerotic heart disease, diabetes mellitus, hypercholesterolemia, hypertension and Sedentary life style    Last Echo: 5/25/2021  · Normal left ventricular cavity size and wall thickness noted. All left  ventricular wall segments contract normally  · Left ventricular ejection fraction appears to be 61 - 65%.  · Left ventricular diastolic function is consistent with (grade I) impaired relaxation.  · The aortic valve is structurally normal with no stenosis present. Mild aortic valve regurgitation is present  · The mitral valve is structurally normal with no significant stenosis present. Trace mitral valve regurgitation is present.  · . There is no evidence of pericardial effusion.    Last Stress: 69/2/2021  · Myocardial perfusion imaging indicates a normal myocardial perfusion study with no evidence of ischemia.  · Diaphragmatic attenuation artifact is present.  · Left ventricular ejection fraction is hyperdynamic (Calculated EF > 70%). .  · Impressions are consistent with a low risk study.  · Findings consistent with a normal ECG stress test.       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 Successful embolization of the predominant LAD to PA fistula. Unsuccessful attempts at locating several smaller fistulous connections, however. Reduction of shunt fraction from coiling of main fistula is estimated to be 50-60%  · Possibly hemodynamically significant stenosis of the proximal LAD.     Recommendations:  · Reassess symptoms after coil embolization of the primary LAD to PA fistula.  I do not suspect that repeat attempts at coiling will be of benefit as there are several small fistulas with unclear origins.  · If anginal symptoms present despite OMT, recommend FFR of proximal LAD lesion with possible PCI      Past Medical History:   Diagnosis Date   • BPH (benign prostatic hyperplasia)    • Bradycardia     Positive tilt table testing 07/2016   • Coronary artery disease    • Diabetes mellitus (HCC)    • Diverticulosis    • Elevated cholesterol    • Gastric ulcer with hemorrhage    • Gastroesophageal reflux disease 1/26/2021   • History of  transfusion    • Hyperlipidemia    • Hypertension    • Psoriasis      Past Surgical History:   Procedure Laterality Date   • BACK SURGERY     • CARDIAC CATHETERIZATION N/A 9/20/2016    Procedure: Left Heart Cath;  Surgeon: Giovanni Buenrostro MD;  Location:  [facility];  Service:    • CARDIAC CATHETERIZATION N/A 10/4/2016    Procedure: Left Heart Cath;  Surgeon: Bharathi Andrew MD;  Location:  [facility];  Service:    • CARDIAC CATHETERIZATION N/A 5/9/2017    Procedure: Left Heart Cath;  Surgeon: Giovanni Buenrostro MD;  Location:  [facility];  Service:    • CARDIAC CATHETERIZATION N/A 5/9/2017    Procedure: ERR;  Surgeon: Giovanni Buenrostro MD;  [facility]:  [facility];  Service:    • CARDIAC CATHETERIZATION N/A 11/8/2019    Procedure: Left Heart Cath;  Surgeon: Abraham Oviedo MD;  Location:  [facility];  Service: Cardiology   • CARDIAC CATHETERIZATION N/A 12/18/2019    Procedure: Coronary angiography;  Surgeon: Jay Barney IV, MD;  Location:  [facility];  Service: Cardiovascular   • CHOLECYSTECTOMY     • COLONOSCOPY  11/13/2015    Dr. Martinez- internal hemorrhoids, sigmoid diverticulosis   • COLONOSCOPY N/A 8/17/2018    Procedure: COLONOSCOPY CPT CODE: 21060;  Surgeon: Gigi Geronimo MD;  Location: [facility] OR;  Service: Gastroenterology   • CORONARY ANGIOPLASTY WITH STENT PLACEMENT     • ENDOSCOPY N/A 8/17/2018    Procedure: ESOPHAGOGASTRODUODENOSCOPY WITH BIOPSY CPT CODE: 67981;  Surgeon: Gigi Geronimo MD;  Location: [facility] OR;  Service: Gastroenterology   • ENDOSCOPY N/A 4/20/2021    Procedure: ESOPHAGOGASTRODUODENOSCOPY;  Surgeon: Geno Vernon MD;  Location: [facility] OR;  Service: Gastroenterology;  Laterality: N/A;   • INTERVENTIONAL RADIOLOGY PROCEDURE N/A 12/18/2019    Procedure: Coil Embolization of septal to pulmonary artery fistuala.;  Surgeon: Jay Barney  MD TAMMI;  Location:  [facility];  Service: Cardiovascular   • ND RT/LT HEART CATHETERS N/A 2017    Procedure: Percutaneous Coronary Intervention;  Surgeon: Lincoln De Los Santos MD;  Location:  [facility];  Service: Cardiology   • STOMACH SURGERY      FUSION OF BLEEDING ULCER   • UPPER GASTROINTESTINAL ENDOSCOPY  2015    Dr. Martinez- mild gastritis     Family History   Problem Relation Age of Onset   • Sick sinus syndrome Mother    • Heart failure Mother    • Diabetes Mother    • Liver cancer Father      Social History     Tobacco Use   • Smoking status: Former Smoker     Packs/day: 3.00     Years: 40.00     Pack years: 120.00     Types: Cigarettes     Quit date:      Years since quittin.8   • Smokeless tobacco: Former User     Quit date: 1986   Vaping Use   • Vaping Use: Never used   Substance Use Topics   • Alcohol use: No   • Drug use: No     Medications Prior to Admission   Medication Sig Dispense Refill Last Dose   • albuterol sulfate  (90 Base) MCG/ACT inhaler Inhale 2 puffs Every 4 (Four) Hours As Needed for Wheezing. 6.7 g 5 Past Week at Unknown time   • aspirin 81 MG tablet Take 81 mg by mouth Daily.   10/26/2021 at 0800   • atorvastatin (LIPITOR) 80 MG tablet Take 1 tablet by mouth Every Night. 90 tablet 5 10/26/2021 at Unknown time   • budesonide-formoterol (SYMBICORT) 80-4.5 MCG/ACT inhaler Inhale 2 puffs 2 (Two) Times a Day. 10.2 g 5 Past Week at Unknown time   • cetirizine (zyrTEC) 10 MG tablet Take 10 mg by mouth Daily.   10/26/2021 at 0800   • clopidogrel (PLAVIX) 75 MG tablet Take 1 tablet by mouth Daily. 30 tablet 5 10/26/2021 at 0800   • colestipol (COLESTID) 1 g tablet Take 1 g by mouth 2 (Two) Times a Day.   10/26/2021 at Unknown time   • ferrous sulfate 325 (65 FE) MG tablet Take 325 mg by mouth 2 (two) times a day.   10/26/2021 at 0800   • finasteride (PROSCAR) 5 MG tablet Take 1 tablet by mouth Daily for 360 days. 90 tablet 3  10/26/2021 at 0800   • isosorbide mononitrate (IMDUR) 120 MG 24 hr tablet Take 1 tablet by mouth Every Morning. 90 tablet 2 10/26/2021 at 0800   • megestrol (Megace ES) 625 MG/5ML suspension Take 5 mL by mouth Daily for 30 days. 150 mL 2 10/26/2021 at 0800   • Memantine HCl-Donepezil HCl 28-10 MG capsule sustained-release 24 hr Take 1 capsule by mouth Daily.   10/26/2021 at 0800   • mirtazapine (REMERON SOL-TAB) 45 MG disintegrating tablet Place 45 mg on the tongue Every Night.   10/26/2021 at Unknown time   • pantoprazole (PROTONIX) 40 MG EC tablet Take 40 mg by mouth Daily.   10/26/2021 at 0800   • nitroglycerin (NITROSTAT) 0.4 MG SL tablet 1 under the tongue as needed for angina, may repeat q5mins for up three doses 25 tablet 2 Unknown at Unknown time     Allergies:  Patient has no known allergies.    Review of Systems   Constitutional: Positive for fatigue. Negative for chills, diaphoresis and fever.   HENT: Negative for congestion and trouble swallowing.    Eyes: Negative for photophobia and visual disturbance.   Respiratory: Positive for chest tightness. Negative for shortness of breath and wheezing.    Cardiovascular: Positive for chest pain. Negative for palpitations and leg swelling.   Gastrointestinal: Negative for nausea and vomiting.   Endocrine: Negative for polyphagia and polyuria.   Genitourinary: Negative for dysuria and hematuria.   Musculoskeletal: Negative for neck pain and neck stiffness.   Skin: Negative for rash and wound.   Allergic/Immunologic: Negative for food allergies and immunocompromised state.   Neurological: Positive for weakness. Negative for syncope.   Hematological: Negative for adenopathy. Does not bruise/bleed easily.   Psychiatric/Behavioral: Positive for confusion. Negative for suicidal ideas.       Objective       Objective      Vital Signs  Temp:  [97.4 °F (36.3 °C)-97.7 °F (36.5 °C)] 97.4 °F (36.3 °C)  Heart Rate:  [42-73] 62  Resp:  [16-20] 16  BP: (112-191)/(55-98) 134/56      Vital Signs (last 72 hrs)       10/25 0700  10/26 0659 10/26 0700  10/27 0659 10/27 0700  10/28 0659 10/28 0700  10/28 0852   Most Recent      Temp (°F)     97.4 -  97.7       97.4 (36.3) 10/28 0219    Heart Rate     42 -  73       62 10/28 0219    Resp     16 -  20       16 10/28 0219    BP     112/66 -  191/74       134/56 10/28 0219    SpO2 (%)     96 -  100       96 10/28 0219        Body mass index is 19.58 kg/m².  Documented weights    10/27/21 0941 10/27/21 1930   Weight: 65.8 kg (145 lb) 63.7 kg (140 lb 6.4 oz)            Intake/Output Summary (Last 24 hours) at 10/28/2021 0852  Last data filed at 10/28/2021 0846  Gross per 24 hour   Intake 240 ml   Output 600 ml   Net -360 ml     I have seen and examined Mr. Luciano today.  Physical Exam  Constitutional:       General: He is not in acute distress.     Appearance: Normal appearance. He is well-developed.   HENT:      Head: Normocephalic and atraumatic.      Mouth/Throat:      Mouth: Mucous membranes are moist.   Eyes:      Extraocular Movements: Extraocular movements intact.      Pupils: Pupils are equal, round, and reactive to light.   Neck:      Vascular: No JVD.   Cardiovascular:      Rate and Rhythm: Normal rate and regular rhythm.      Pulses:           Dorsalis pedis pulses are 1+ on the right side and 1+ on the left side.        Posterior tibial pulses are 1+ on the right side and 1+ on the left side.      Heart sounds: Normal heart sounds. No murmur heard.  No S3 or S4 sounds.    Pulmonary:      Effort: Pulmonary effort is normal. No respiratory distress.      Breath sounds: Normal breath sounds. No wheezing.   Abdominal:      General: Bowel sounds are normal. There is no distension.      Palpations: Abdomen is soft. There is no hepatomegaly.      Tenderness: There is no abdominal tenderness.   Musculoskeletal:         General: Normal range of motion.      Cervical back: Normal range of motion and neck supple.   Skin:     General: Skin is warm and  dry.      Coloration: Skin is not jaundiced or pale.   Neurological:      General: No focal deficit present.      Mental Status: He is alert and oriented to person, place, and time. Mental status is at baseline.   Psychiatric:         Mood and Affect: Mood normal.         Behavior: Behavior normal.         Thought Content: Thought content normal.         Judgment: Judgment normal.       Results review     Results Review:    I reviewed the patient's new clinical results.  Results from last 7 days   Lab Units 10/27/21  1711 10/27/21  1241 10/27/21  1044   CK TOTAL U/L  --   --  57   TROPONIN T ng/mL <0.010 <0.010 <0.010     Results from last 7 days   Lab Units 10/28/21  0154 10/27/21  1115   WBC 10*3/mm3 13.99* 11.57*   HEMOGLOBIN g/dL 13.9 14.1   PLATELETS 10*3/mm3 164 187     Results from last 7 days   Lab Units 10/28/21  0154 10/27/21  1044   SODIUM mmol/L 141 144   POTASSIUM mmol/L 4.3 4.2   CHLORIDE mmol/L 112* 116*   CO2 mmol/L 17.4* 12.8*   BUN mg/dL 29* 37*   CREATININE mg/dL 0.56* 0.73*   CALCIUM mg/dL 8.9 9.2   GLUCOSE mg/dL 73 75   ALT (SGPT) U/L 23 22   AST (SGOT) U/L 21 23     Lab Results   Component Value Date    INR 1.05 10/27/2021    INR 0.97 10/06/2021    INR 1.02 05/24/2021    INR 1.08 03/13/2021    INR 1.10 03/09/2021    INR 1.06 10/03/2016    INR 1.01 07/16/2016     Lab Results   Component Value Date    MG 2.1 10/28/2021    MG 2.1 10/27/2021    MG 2.0 07/20/2021     Lab Results   Component Value Date    TSH 1.650 10/27/2021    PSA 1.440 07/17/2020    CHLPL 109 08/12/2015    TRIG 115 05/25/2021    HDL 29 (L) 05/25/2021    LDL 58 05/25/2021      Lab Results   Component Value Date    PROBNP 212.6 10/27/2021    PROBNP 162.0 08/26/2021    PROBNP 248.4 07/20/2021       ECG         ECG/EMG Results (last 24 hours)     Procedure Component Value Units Date/Time    ECG 12 Lead [[id number]] Collected: 10/27/21 0941     Updated: 10/27/21 1250     QT Interval 414 ms      QTC Interval 388 ms     Narrative:       Test Reason : chest pain  Blood Pressure :   */*   mmHG  Vent. Rate :  53 BPM     Atrial Rate :  53 BPM     P-R Int : 180 ms          QRS Dur :  76 ms      QT Int : 414 ms       P-R-T Axes :  37  70  86 degrees     QTc Int : 388 ms    Sinus bradycardia  Otherwise normal ECG  Poor data quality  When compared with ECG of 06-OCT-2021 10:04,  No significant change was found  Confirmed by Abraham Everett ([id number]) on 10/27/2021 12:49:57 PM    Referred By: HARLEY           Confirmed By: Abraham Everett          Imaging Results (Last 72 Hours)     Procedure Component Value Units Date/Time    MRI Brain Without Contrast [[id number]] Collected: 10/27/21 1407     Updated: 10/27/21 1411    Narrative:      EXAM:    MR Head Without Intravenous Contrast     EXAM DATE:    10/27/2021 1:36 PM     CLINICAL HISTORY:    Stroke, follow up     TECHNIQUE:    Magnetic resonance images of the head/brain without intravenous  contrast in multiple planes.     COMPARISON:    CT 10/06/2021, MRI 07/20/2021     FINDINGS:    Brain:  Mild cerebral atrophy is noted and is in part age-related.   Moderate chronic small vessel ischemic disease.  No restricted diffusion  to indicate acute infarct.  Flow related signal in the major  intracranial vessels is preserved.  No hemorrhage.    Ventricles:  Unremarkable.  No ventriculomegaly.    Bones/joints:  Unremarkable.    Sinuses:  Unremarkable as visualized.  No acute sinusitis.    Mastoid air cells:  Unremarkable as visualized.  No mastoid effusion.    Orbits:  Unremarkable as visualized.    Sella:  Sella and suprasellar aspects are unremarkable.       Impression:      1.  Mild cerebral atrophy and moderate chronic small vessel ischemic  disease.  2.  No acute intracranial findings identified. No acute infarct is  noted.  3.  Otherwise stable exam with other nonacute findings as above.     This report was finalized on 10/27/2021 2:08 PM by Dr. Rakesh Carcamo MD.       XR Chest 1 View [[id number]]  Collected: 10/27/21 1040     Updated: 10/27/21 1121    Narrative:      EXAM:    XR Chest, 1 View     EXAM DATE:    10/27/2021 10:24 AM     CLINICAL HISTORY:    cp     TECHNIQUE:    Frontal view of the chest.     COMPARISON:    10/06/2021     FINDINGS:    Lungs:  Unremarkable.  No consolidation.    Pleural space:  Unremarkable.  No pneumothorax.    Heart:  Unremarkable.  No cardiomegaly.    Mediastinum:  Unremarkable.    Bones/joints:  Unremarkable.       Impression:        Unremarkable exam. No acute cardiopulmonary findings identified.     This report was finalized on 10/27/2021 10:40 AM by Dr. Rakesh Carcamo MD.             I have discussed my impression and recommendations with the patient and family.    Thank you very much for asking us to be involved in this patient's care.  We will follow along with you.    Electronically signed by ANDREW Miller, 10/28/21, 8:52 AM EDT.    Electronically signed by Giovanni Buenrostro MD, 10/28/21, 11:07 AM EDT.      Please note that portions of this note were completed with a voice recognition program.          Electronically signed by Giovanni Buenrostro MD at 10/28/21 1108       Discharge Summary    No notes of this type exist for this encounter.         Discharge Order (From admission, onward)     Start     Ordered    11/09/21 0821  Discharge patient  Once        Expected Discharge Date: 11/09/21    Expected Discharge Time: Morning    Discharge Disposition: Skilled Nursing Facility (DC - External)    Physician of Record for Attribution - Please select from Treatment Team: FRED RAJPUT [[id number]]    Review needed by CMO to determine Physician of Record: No       Question Answer Comment   Physician of Record for Attribution - Please select from Treatment Team FRED RAJPUT    Review needed by CMO to determine Physician of Record No        11/09/21 9005

## 2021-11-09 NOTE — PLAN OF CARE
Problem: Adult Inpatient Plan of Care  Goal: Plan of Care Review  Outcome: Ongoing, Progressing  Goal: Patient-Specific Goal (Individualized)  Outcome: Ongoing, Progressing  Goal: Absence of Hospital-Acquired Illness or Injury  Outcome: Ongoing, Progressing  Intervention: Identify and Manage Fall Risk  Recent Flowsheet Documentation  Taken 11/9/2021 1300 by Livier García RN  Safety Promotion/Fall Prevention: safety round/check completed  Taken 11/9/2021 1100 by Livier García RN  Safety Promotion/Fall Prevention: safety round/check completed  Taken 11/9/2021 0900 by Livier García RN  Safety Promotion/Fall Prevention: safety round/check completed  Taken 11/9/2021 0700 by Livier García RN  Safety Promotion/Fall Prevention: safety round/check completed  Intervention: Prevent Infection  Recent Flowsheet Documentation  Taken 11/9/2021 0700 by Livier García RN  Infection Prevention:   equipment surfaces disinfected   hand hygiene promoted   personal protective equipment utilized   rest/sleep promoted  Goal: Optimal Comfort and Wellbeing  Outcome: Ongoing, Progressing  Goal: Readiness for Transition of Care  Outcome: Ongoing, Progressing     Problem: Diabetes Comorbidity  Goal: Blood Glucose Level Within Desired Range  Outcome: Ongoing, Progressing     Problem: Fall Injury Risk  Goal: Absence of Fall and Fall-Related Injury  Outcome: Ongoing, Progressing  Intervention: Identify and Manage Contributors to Fall Injury Risk  Recent Flowsheet Documentation  Taken 11/9/2021 0700 by Livier García RN  Medication Review/Management: medications reviewed  Intervention: Promote Injury-Free Environment  Recent Flowsheet Documentation  Taken 11/9/2021 1300 by Livier García RN  Safety Promotion/Fall Prevention: safety round/check completed  Taken 11/9/2021 1100 by Livier García RN  Safety Promotion/Fall Prevention: safety round/check completed  Taken 11/9/2021 0900 by Livier García RN  Safety  Promotion/Fall Prevention: safety round/check completed  Taken 11/9/2021 0700 by Livier García, RN  Safety Promotion/Fall Prevention: safety round/check completed     Problem: Skin Injury Risk Increased  Goal: Skin Health and Integrity  Outcome: Ongoing, Progressing   Goal Outcome Evaluation:

## 2021-11-10 NOTE — PAYOR COMM NOTE
"[facility]  NPI:[id number]    Utilization Review  Contact: Lyndsay Feliciano RN  Phone: [phone]  Fax:[phone]    DISCHARGE NOTIFICATION [id number]       MustaphaFidencio (76 y.o. Male)             Date of Birth Social Security Number Address Home Phone MRN    1945  [address] [address] [phone] [id number]    Protestant Marital Status             Non-Buddhism        Admission Date Admission Type Admitting Provider Attending Provider Department, Room/Bed    10/27/21 Emergency Carlos Grier MD  [facility] 3 SOUTH, 3305/1S    Discharge Date Discharge Disposition Discharge Destination          11/9/2021 Skilled Nursing Facility (DC - External)              Attending Provider: (none)   Allergies: No Known Allergies    Isolation: None   Infection: None   Code Status: Prior   Advance Care Planning Activity    Ht: 180.3 cm (71\")   Wt: 66.4 kg (146 lb 6.4 oz)    Admission Cmt: None   Principal Problem: Weakness generalized [R53.1]                 Active Insurance as of 10/27/2021     Primary Coverage     Payor Plan Insurance Group Employer/Plan Group    ANTHEM MEDICARE REPLACEMENT ANTHEM MEDICARE ADVANTAGE [id number]     Payor Plan Address Payor Plan Phone Number Payor Plan Fax Number Effective Dates    [address] [phone]  1/1/2021 - None Entered    [facility] [address]       Subscriber Name Subscriber Birth Date Member ID       FIDENCIO GLOVER 1945 [id number]                 Emergency Contacts      (Rel.) Home Phone Work Phone Mobile Phone    Mustapha(POA)Angela (Spouse) [phone] -- [phone]    Dylan García (Son) [phone] -- [phone]    Sofie Webber (Daughter) [phone] -- --    Zhane Conner (Daughter) [phone] -- --    ShannonAries (Son) [phone] -- --               Discharge Summary      Jennifer Luque PA at 11/09/21 0826              [facility] HOSPITALIST DISCHARGE " SUMMARY    Patient Identification:  Name:  Fidencio Luciano  Age:  76 y.o.  Sex:  male  :  1945  MRN:  [id number]  Visit Number:  [id number]    Date of Admission: 10/27/2021  Date of Discharge: 2021    PCP: Camila Ortiz APRN    Discharging Provider: Jennifer Luque PA-C / Dr. Carlos Grier MD    Discharge Diagnoses     Discharge Diagnoses:  1. Debility  2. Falls  3. Suspected long Covid syndrome  4. Leukocytosis without signs of infection  5. Constipation, resolved  6. Possible esophagitis  7. Prostate enlargement  8. Tiny nonobstructing left kidney stone  9. 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 Nodular liver margins with possible early or mild cirrhosis    Secondary Diagnoses:  1. CAD status post prior stenting  2. Chronic stable lung nodules  3. History of gastric ulcer with hemorrhage    Needs on follow up:  · Repeat CBC/CMP in 3-4 days.     Consults/Procedures     Consults:   Consults     Date and Time Order Name Status Description    10/27/2021  7:24 PM Inpatient Cardiology Consult Completed         Procedures/Scans Performed:  1. MRI brain without contrast  2. CT chest without contrast  3. CT abdomen/pelvis without contrast  4. SLP evaluation  5. US venous doppler bilateral     History of Presenting Illness     Chief Complaint   Patient presents with   • Chest Pain     Mr. Luciano is a 76 y.o. male presented to [facility] complaining of decreased responsiveness, weakness, and chest pain. He was having some myoclonic jerks in the ED.  It was noted that the patient had been diagnosed with COVID-19 in 2021.  Since then, he has had a steady physical and mental decline as well as intermittent falls.  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  He was admitted for further evaluation and therapy. Please see the admitting history and physical for further details.      Hospital Course     Patient was admitted to the telemetry unit.   On admission, he had a MRI of the brain without contrast which showed mild cerebral atrophy and moderate chronic small vessel ischemic disease with no acute findings.  CT chest without contrast showed stable small lung nodules favoring benign etiology as they have been stable since 2016, chronic changes of interstitial fibrosis, distal esophageal wall thickening which can be seen with esophagitis.  CT abdomen/pelvis without contrast showed no acute findings.  There was prostate enlargement and urinary bladder distention noted.  PSA was checked and normal.  Nodular liver margins, correlate for early or mild cirrhosis.    The patient's mental status quickly improved and he was alert and oriented x3.  PT/OT/SLP were consulted. SLP evaluation did show some transient laryngeal penetration of thin liquids, but no aspiration.  Mechanical soft diet with chopped meats and thin liquids recommended.    Cardiology was consulted for chest pains with a known history of CAD.  It was noted that he had a normal nuclear stress test in June 2021.  Cardiac markers were negative and EKG was felt to be unremarkable.  Ranexa was added and the patient denied any further chest pains during his stay.  It was noted that if he has significant recurrent anginal symptoms that he would need FFR of the proximal LAD stenosis previously noted on left heart catheterization in 2019.    The patient was denied inpatient rehab.  He and his wife decided on short-term rehab placement at a local nursing home.  The patient did have a fall while in the hospital on 10/31/2021.  He complained of some left-sided hip pain.  X-ray of the hips showed no acute abnormality or fracture.  He also developed some increasing leukocytosis during his stay.  A repeat UA and chest x-ray were unremarkable.  Blood cultures were obtained and have since finalized with no growth after 5 days.  The patient did not have any signs/symptoms of infection.  Venous Dopplers were negative  for DVT.  It was felt that his WBC count could be monitored at the nursing home.  On 11/9/2021, the patient was accepted to the [facility] [place].  It was felt that he had reached maximum inpatient benefit and was stable for discharge.  Follow-up with nursing home PCP in 1 week. Recommend repeat CBC and CMP in the next 3 to 4 days.  The patient was discharged in a stable condition on this date.  Repeat testing for COVID-19 at the prior to discharge was negative.    Discharge Vitals/Physical Examination     Vital Signs:  Temp:  [96.8 °F (36 °C)-97.8 °F (36.6 °C)] 96.8 °F (36 °C)  Heart Rate:  [63-90] 63  Resp:  [18] 18  BP: (112-157)/(60-67) 131/60  Mean Arterial Pressure (Non-Invasive) for the past 24 hrs (Last 3 readings):   Noninvasive MAP (mmHg)   11/09/21 0655 82   11/09/21 0330 91   11/08/21 1900 88     SpO2 Percentage    11/08/21 1900 11/09/21 0330 11/09/21 0655   SpO2: 98% 98% 98%     SpO2:  [97 %-98 %] 98 %  on   ;   Device (Oxygen Therapy): room air    Body mass index is 20.42 kg/m².  Wt Readings from Last 3 Encounters:   11/09/21 66.4 kg (146 lb 6.4 oz)   10/21/21 64.4 kg (142 lb)   10/21/21 64.5 kg (142 lb 3.2 oz)       Physical Exam:  Physical Exam  Vitals reviewed.   Constitutional:       General: He is not in acute distress.     Appearance: Normal appearance. He is not ill-appearing.   HENT:      Head: Normocephalic and atraumatic.   Cardiovascular:      Rate and Rhythm: Normal rate and regular rhythm.   Pulmonary:      Effort: Pulmonary effort is normal.      Breath sounds: Normal breath sounds.   Abdominal:      General: Abdomen is flat.      Palpations: Abdomen is soft.   Musculoskeletal:      Right lower leg: No edema.      Left lower leg: No edema.   Skin:     General: Skin is warm and dry.   Neurological:      General: No focal deficit present.      Mental Status: He is alert and oriented to person, place, and time.   Psychiatric:         Mood and Affect: Mood normal.         Behavior:  Behavior normal.         Thought Content: Thought content normal.         Judgment: Judgment normal.       Pertinent Laboratory/Radiology Results     Pertinent Laboratory Results:            Results from last 7 days   Lab Units 11/09/21 0054 11/08/21 0410 11/07/21 0052 11/05/21 0528 11/05/21 0111   CRP mg/dL  --   --   --   --  <0.30   WBC 10*3/mm3 14.85* 13.09* 15.08*   < >  --    HEMOGLOBIN g/dL 12.3* 13.0 13.1   < >  --    HEMATOCRIT % 37.5 39.4 39.7   < >  --    MCV fL 100.8* 100.5* 101.3*   < >  --    MCHC g/dL 32.8 33.0 33.0   < >  --    PLATELETS 10*3/mm3 239 223 207   < >  --     < > = values in this interval not displayed.     Results from last 7 days   Lab Units 11/09/21 0054 11/08/21 0410 11/07/21 0052 11/05/21 0111 11/04/21 0045   SODIUM mmol/L 139 138 141   < > 139   POTASSIUM mmol/L 4.2 4.6 4.3   < > 4.1   CHLORIDE mmol/L 107 107 110*   < > 108*   CO2 mmol/L 19.6* 18.2* 19.5*   < > 18.3*   BUN mg/dL 28* 29* 23   < > 36*   CREATININE mg/dL 0.91 0.95 0.76   < > 0.97   EGFR IF NONAFRICN AM mL/min/1.73 81 77 100   < > 75   CALCIUM mg/dL 8.6 8.5* 8.7   < > 8.6   GLUCOSE mg/dL 124* 174* 109*   < > 127*   ALBUMIN g/dL  --   --   --   --  3.43*   BILIRUBIN mg/dL  --   --   --   --  0.3   ALK PHOS U/L  --   --   --   --  80   AST (SGOT) U/L  --   --   --   --  20   ALT (SGPT) U/L  --   --   --   --  22    < > = values in this interval not displayed.   Estimated Creatinine Clearance: 65.9 mL/min (by C-G formula based on SCr of 0.91 mg/dL).  No results found for: AMMONIA    Glucose   Date/Time Value Ref Range Status   11/09/2021 0711 94 70 - 130 mg/dL Final     Comment:     Meter: [id number] : [id number] Darryn Nathan   11/08/2021 2023 186 (H) 70 - 130 mg/dL Final     Comment:     Meter: [id number] : [id number] PAVITHRA RAMIREZ   11/08/2021 1618 163 (H) 70 - 130 mg/dL Final     Comment:     Meter: [id number] : [id number] tacos rios   11/08/2021 1018 161 (H) 70 - 130 mg/dL Final     Comment:      Meter: [id number] : [id number] LADY TANNER   11/08/2021 0633 127 70 - 130 mg/dL Final     Comment:     Meter: [id number] : [id number] LADY TANNER   11/07/2021 1909 170 (H) 70 - 130 mg/dL Final     Comment:     Meter: [id number] : [id number] GERI RAMIREZ   11/07/2021 1523 130 70 - 130 mg/dL Final     Comment:     Meter: [id number] : [id number] LADY TANNER   11/07/2021 1005 132 (H) 70 - 130 mg/dL Final     Comment:     Meter: [id number] : [id number] LADY TANNER     Lab Results   Component Value Date    HGBA1C 5.90 (H) 10/28/2021     Lab Results   Component Value Date    TSH 1.650 10/27/2021    FREET4 1.14 10/27/2021     Blood Culture   Date Value Ref Range Status   11/04/2021 No growth at 5 days  Final   11/04/2021 No growth at 5 days  Final     No results found for: URINECX  No results found for: WOUNDCX  No results found for: STOOLCX  No results found for: RESPCX    Pain Management Panel     Pain Management Panel Latest Ref Rng & Units 10/27/2021 7/31/2015    AMPHETAMINES SCREEN, URINE Negative Negative Negative    BARBITURATES SCREEN Negative Negative Negative    BENZODIAZEPINE SCREEN, URINE Negative Negative Negative    BUPRENORPHINEUR Negative Negative -    COCAINE SCREEN, URINE Negative Negative Negative    METHADONE SCREEN, URINE Negative Negative Negative    METHAMPHETAMINEUR Negative Negative -          Pertinent Radiology Results:  Imaging Results (All)     Procedure Component Value Units Date/Time    US Venous Doppler Lower Extremity Bilateral (duplex) [[id number]] Collected: 11/05/21 1742     Updated: 11/05/21 1744    Narrative:      US Veins LE Duplex BILAT    HISTORY:   Leukocytosis and weakness    TECHNIQUE:   Real-time ultrasound was performed of both lower extremities utilizing spectral and color Doppler with compression and augmentation techniques.    COMPARISON:  May 24, 2021    FINDINGS:  Right Lower Extremity:  There is no deep venous thrombus seen in the  right lower extremity, including the right common femoral veins, right femoral veins and right popliteal veins.  Normal compressibility and respiratory phasicity was visualized.  No calf vein thrombus. Greater  saphenous vein is patent.    Left Lower Extremity:  There is no deep venous thrombus seen in the left lower extremity, including the left common femoral veins, left femoral veins and left popliteal veins.   Normal compressibility and respiratory phasicity was visualized.  No calf vein thrombus. Greater  saphenous vein is patent.        Impression:      No deep venous thrombus seen in either lower extremity.    Signer Name: Audie Carmichael MD   Signed: 11/5/2021 5:42 PM   Workstation Name: [id number]    [facility]    XR Chest PA & Lateral [[id number]] Collected: 11/03/21 0907     Updated: 11/03/21 0909    Narrative:      EXAM:    XR Chest, 2 Views     EXAM DATE:    11/3/2021 8:58 AM     CLINICAL HISTORY:    Leukocytosis, rule out pneumonia.; R53.1-Weakness; R62.7-Adult failure  to thrive     TECHNIQUE:    Frontal and lateral views of the chest.     COMPARISON:    10/27/2021     FINDINGS:    LUNGS:  Unremarkable.  No consolidation.    PLEURAL SPACE:  Unremarkable.  No pneumothorax.    HEART:  Unremarkable.  No cardiomegaly.    MEDIASTINUM:  Unremarkable.    BONES/JOINTS:  Unremarkable.       Impression:        No acute findings in the chest.     This report was finalized on 11/3/2021 9:07 AM by Dr. Benigno Powers MD.       XR Hip With or Without Pelvis 1 View Left [[id number]] Collected: 10/31/21 1449     Updated: 10/31/21 1452    Narrative:      LEFT HIP, 10/31/2021      HISTORY:    76-year-old male hospital inpatient with left hip pain after a fall.      TECHNIQUE:  AP pelvis and two-view left hip series.      FINDINGS:  No fracture, dislocation or other acute osseous abnormality is demonstrated involving the left hip or left acetabulum. No visible pelvis fracture, although GI contrast  within the colon obscures some portions of the pelvis and sacrum. Contralateral right  hip is also grossly negative. Mild bilateral degenerative hip arthropathy.      Impression:      1. No acute osseous abnormality.  2. Bilateral mild degenerative hip arthropathy.    Signer Name: Kam Gomez MD   Signed: 10/31/2021 2:49 PM   Workstation Name: [id number]    [facility]    CT Chest Without Contrast Diagnostic [[id number]] Collected: 10/28/21 1407     Updated: 10/28/21 1411    Narrative:      EXAM:    CT Chest Without Intravenous Contrast     EXAM DATE:    10/28/2021 1:36 PM     CLINICAL HISTORY:    Unexplained weight loss; R53.1-Weakness; R62.7-Adult failure to thrive     TECHNIQUE:    Axial computed tomography images of the chest without intravenous  contrast.  Sagittal and coronal reformatted images were created and  reviewed.  This CT exam was performed using one or more of the following  dose reduction techniques:  automated exposure control, adjustment of  the mA and/or kV according to patient size, and/or use of iterative  reconstruction technique.     COMPARISON:    05/24/2021, 09/23/2016     FINDINGS:    Lungs:  Chronic appearing interstitial lung changes are noted with  mild areas of peripheral fibrosis noted.  No consolidative airspace  disease identified.  Calcified granuloma left lower lobe.  Stable  subpleural nodule left lower lobe along the mitral fissure. No change in  size. 4.8 mm.  Stable left upper lobe pulmonary nodule that is  approximately 4.8 mm. No change.    Pleural space:  No pleural effusions.  No pneumothorax.    Heart:  Severe coronary artery calcifications.  No cardiomegaly.  No  pericardial effusion.    Mediastinum:  No mediastinal or hilar adenopathy.  Distal esophageal  wall thickening. Correlate for mild esophagitis.    Bones/joints:  Unremarkable.  No acute fracture.  No dislocation.    Soft tissues:  Unremarkable.    Vasculature:  Stable mild  atherosclerosis aorta.  No thoracic aortic  aneurysm.    Lymph nodes:  Unremarkable.  No enlarged lymph nodes.       Impression:      1.  Stable small nodules left lung which favor benign etiology.  Stability since 2016 indicates benign etiology.  2.  Stable chronic interstitial fibrosis and peripheral fibrosis.  Although in extent.  3.  Distal esophageal wall thickening which can be seen with  esophagitis.  4.  No consolidative airspace disease. No pleural effusions or  pneumothorax.  5.  Refer to CT abdomen/pelvis for findings of the upper abdomen.     This report was finalized on 10/28/2021 2:09 PM by Dr. Rakesh Carcamo MD.       CT Abdomen Pelvis Without Contrast [[id number]] Collected: 10/28/21 1403     Updated: 10/28/21 1409    Narrative:      EXAM:    CT Abdomen and Pelvis Without Intravenous Contrast     EXAM DATE:    10/28/2021 1:36 PM     CLINICAL HISTORY:    Unexplained weight loss; R53.1-Weakness; R62.7-Adult failure to thrive     TECHNIQUE:    Axial computed tomography images of the abdomen and pelvis without  intravenous contrast.  Sagittal and coronal reformatted images were  created and reviewed.  This CT exam was performed using one or more of  the following dose reduction techniques:  automated exposure control,  adjustment of the mA and/or kV according to patient size, and/or use of  iterative reconstruction technique.     COMPARISON:    03/06/2021     FINDINGS:    Lung bases:  Chronic interstitial lung changes are noted. The lung  bases are otherwise clear.    Heart:  Coronary artery calcifications.      ABDOMEN:    Liver:  Nodular liver margins can be seen with mild/early cirrhosis.  No focal liver lesion.    Gallbladder and bile ducts:  Cholecystectomy.  No ductal dilation.    Pancreas:  Stable appearance of pancreas.  Probable pancreatic cyst  near the tail the pancreas and adjacent to the upper pole left kidney is  stable from previous and is again shown to be fluid density. Cyst  is  approximately 5.6 cm.  No ductal dilation.    Spleen:  Spleen is unenlarged.    Adrenals:  Small left adrenal nodule is stable and most consistent  with benign adenoma given low-density features.    Kidneys and ureters:  Tiny nonobstructing left kidney stone. No  hydronephrosis.    Stomach and bowel:  Sigmoid diverticulosis without diverticulitis.   Marked constipation is noted. No bowel obstruction identified.  Probable  prominent ileocecal region fat but a small submucosal lipoma not  excluded. No change.  No bowel obstruction identified.  There is  distention of the stomach.      PELVIS:    Appendix:  No findings to suggest acute appendicitis.    Bladder:  Distended urinary bladder.  No stones.    Reproductive:  Prostate enlargement, stable.      ABDOMEN and PELVIS:    Intraperitoneal space:  No ascites or abscess.  No free air.    Bones/joints:  Stable appearance of the bony structures with  degenerative changes of the SI joints and degenerative changes of the  lumbar spine with multilevel stenosis.  L4 pars defects are noted with  anterolisthesis of L4 on L5 which accounts for severe neural foraminal  stenosis at this level.  No acute fracture.  No dislocation.    Soft tissues:  Unremarkable.    Vasculature:  Stable atherosclerosis. No evidence of aneurysm.    Lymph nodes:  Unremarkable.  No enlarged lymph nodes.       Impression:      1.  There are no acute findings identified within the abdomen or pelvis.  2.  Constipation and diverticulosis. No bowel obstruction.  3.  Prostate enlargement and urinary bladder distention.  4.  Stable cyst presumably involving the tail of pancreas. Favor benign  etiology.  5.  Nodular liver margins. Correlate for early/mild cirrhosis.  6.  Probable prominent ileocecal region fat versus submucosal lipoma. No  change. No bowel obstruction.  7.  Advanced degenerative changes lumbar spine again noted with  spondylolisthesis and severe stenosis at the L4/5 level.  8.  Tiny  nonobstructing left kidney stone.  9.  Other nonacute findings as above which appear stable from previous.     This report was finalized on 10/28/2021 2:07 PM by Dr. Rakesh Carcamo MD.       FL Video Swallow Single Contrast [[id number]] Collected: 10/28/21 1359     Updated: 10/28/21 1402    Narrative:      EXAMINATION: FL VIDEO SWALLOW SINGLE-CONTRAST-      CLINICAL INDICATION:     neurological symptoms w/ dysphagia.;  R53.1-Weakness; R62.7-Adult failure to thrive     TECHNIQUE: Modified barium swallow was performed utilizing video  fluoroscopy in conjunction with the Speech Pathology team. The patient  ingested multiple barium media including thin barium, nectar, and honey  liquid as well as barium pudding and a barium pudding coated cracker.      FLUOROSCOPY TIME: 1.3 minutes     COMPARISON: NONE      FINDINGS:   Premature loss is noted with vallecular pooling.  Transient laryngeal penetration of thin liquids. No aspiration.          Impression:      1.  No aspiration. Penetration with thin liquids.  2. Please see dysphasia notes for further details.     This report was finalized on 10/28/2021 2:00 PM by Dr. Rakesh Carcamo MD.       MRI Brain Without Contrast [[id number]] Collected: 10/27/21 1407     Updated: 10/27/21 1411    Narrative:      EXAM:    MR Head Without Intravenous Contrast     EXAM DATE:    10/27/2021 1:36 PM     CLINICAL HISTORY:    Stroke, follow up     TECHNIQUE:    Magnetic resonance images of the head/brain without intravenous  contrast in multiple planes.     COMPARISON:    CT 10/06/2021, MRI 07/20/2021     FINDINGS:    Brain:  Mild cerebral atrophy is noted and is in part age-related.   Moderate chronic small vessel ischemic disease.  No restricted diffusion  to indicate acute infarct.  Flow related signal in the major  intracranial vessels is preserved.  No hemorrhage.    Ventricles:  Unremarkable.  No ventriculomegaly.    Bones/joints:  Unremarkable.    Sinuses:  Unremarkable as visualized.   No acute sinusitis.    Mastoid air cells:  Unremarkable as visualized.  No mastoid effusion.    Orbits:  Unremarkable as visualized.    Sella:  Sella and suprasellar aspects are unremarkable.       Impression:      1.  Mild cerebral atrophy and moderate chronic small vessel ischemic  disease.  2.  No acute intracranial findings identified. No acute infarct is  noted.  3.  Otherwise stable exam with other nonacute findings as above.     This report was finalized on 10/27/2021 2:08 PM by Dr. Rakesh Carcamo MD.       XR Chest 1 View [[id number]] Collected: 10/27/21 1040     Updated: 10/27/21 1121    Narrative:      EXAM:    XR Chest, 1 View     EXAM DATE:    10/27/2021 10:24 AM     CLINICAL HISTORY:    cp     TECHNIQUE:    Frontal view of the chest.     COMPARISON:    10/06/2021     FINDINGS:    Lungs:  Unremarkable.  No consolidation.    Pleural space:  Unremarkable.  No pneumothorax.    Heart:  Unremarkable.  No cardiomegaly.    Mediastinum:  Unremarkable.    Bones/joints:  Unremarkable.       Impression:        Unremarkable exam. No acute cardiopulmonary findings identified.     This report was finalized on 10/27/2021 10:40 AM by Dr. Rakesh Carcamo MD.           Test Results Pending at Discharge:  None.     Discharge Disposition/Discharge Medications/Discharge Appointments     Discharge Disposition:   Skilled Nursing Facility (DC - External)    Condition at Discharge:  Stable    Discharge Diet:  Diet Instructions     Advance Diet As Tolerated      Diet: Regular, Soft Texture; Thin Liquids, No Restrictions; Chopped      Discharge Diet:  Regular  Soft Texture       Fluid Consistency: Thin Liquids, No Restrictions    Soft Options: Chopped          Discharge Activity:  Activity Instructions     Gradually Increase Activity Until at Pre-Hospitalization Level      Up WIth Assist            Code Status While Inpatient:  Code Status and Medical Interventions:   Ordered at: 10/27/21 1818     Code Status (Patient has no pulse  and is not breathing):    CPR (Attempt to Resuscitate)     Medical Interventions (Patient has pulse or is breathing):    Full       Discharge Medications:     Discharge Medications      New Medications      Instructions Start Date   metoprolol tartrate 25 MG tablet  Commonly known as: LOPRESSOR   12.5 mg, Oral, Every 12 Hours Scheduled      polyethylene glycol 17 g packet  Commonly known as: MIRALAX   17 g, Oral, Daily      ranolazine 500 MG 12 hr tablet  Commonly known as: RANEXA   500 mg, Oral, Every 12 Hours Scheduled         Continue These Medications      Instructions Start Date   albuterol sulfate  (90 Base) MCG/ACT inhaler  Commonly known as: PROVENTIL HFA;VENTOLIN HFA;PROAIR HFA   2 puffs, Inhalation, Every 4 Hours PRN      aspirin 81 MG tablet   81 mg, Oral, Daily      atorvastatin 80 MG tablet  Commonly known as: LIPITOR   80 mg, Oral, Nightly      budesonide-formoterol 80-4.5 MCG/ACT inhaler  Commonly known as: SYMBICORT   2 puffs, Inhalation, 2 Times Daily      cetirizine 10 MG tablet  Commonly known as: zyrTEC   10 mg, Oral, Daily      clopidogrel 75 MG tablet  Commonly known as: PLAVIX   75 mg, Oral, Daily      colestipol 1 g tablet  Commonly known as: COLESTID   1 g, Oral, 2 Times Daily      ferrous sulfate 325 (65 FE) MG tablet   325 mg, Oral, 2 times daily      finasteride 5 MG tablet  Commonly known as: PROSCAR   5 mg, Oral, Daily      isosorbide mononitrate 120 MG 24 hr tablet  Commonly known as: IMDUR   120 mg, Oral, Every Morning      megestrol 625 MG/5ML suspension  Commonly known as: Megace ES   625 mg, Oral, Daily      Memantine HCl-Donepezil HCl 28-10 MG capsule sustained-release 24 hr   1 capsule, Oral, Daily      mirtazapine 45 MG disintegrating tablet  Commonly known as: REMERON SOL-TAB   45 mg, Translingual, Nightly      nitroglycerin 0.4 MG SL tablet  Commonly known as: NITROSTAT   1 under the tongue as needed for angina, may repeat q5mins for up three doses      pantoprazole 40  MG EC tablet  Commonly known as: PROTONIX   40 mg, Oral, Daily             Discharge Appointments:  Your Scheduled Appointments    Nov 29, 2021  8:15 AM  Follow Up with Giovanni Buenrostro MD  [facility] CARDIOLOGY ([place]) [address]  [facility] [address] [phone]   -Bring photo ID, insurance card, and list of medications to appointment  -If testing was completed outside of [facility] then patient must bring images on a disc  -Copay will be collected at time of appointment  -Established patients should arrive 10 minutes prior to appointment       Dec 09, 2021 11:30 AM  Follow Up with Geno Vernon MD  [facility] GASTROENTEROLOGY & UROLOGY ([place]) [address]  [facility] [address] [phone]    Please bring any related outside labs/imaging on a disc. If insurance has changed, please bring updated information.       Jan 19, 2022 10:45 AM  Follow Up with ANDREW Amos  [facility] PULMONARY & CRITICAL CARE MEDICINE ([place]) [address] [facility] [address]  [facility] [address] [phone]   Established: Please bring outside images or reports.              Additional Instructions for the Follow-ups that You Need to Schedule     Call MD With Problems / Concerns   As directed      Instructions: Contact PCP or return to the ED with recurrent symptoms or concerns.    Order Comments: Instructions: Contact PCP or return to the ED with recurrent symptoms or concerns.          Discharge Follow-up with PCP   As directed       Currently Documented PCP:    Camila Ortiz APRN    PCP Phone Number:    [phone]     Follow Up Details: Nursing home provider in 1 week. Recommend CBC, CMP in 3-4 days.            Contact information for follow-up providers     Camila Ortiz APRN. Schedule an appointment as soon as possible for a visit in 1 day.    Specialty: Family Medicine  Why: EVALUATE  Contact information:  [address]  [place] [address]  [phone]             Camila Ortiz APRN .    Specialty: Family Medicine  Why: Nursing home provider in 1 week. Recommend CBC, CMP in 3-4 days.  Contact information:  [address]  [place] [address]  [phone]                   Contact information for after-discharge care     Destination     [facility] .    Service: Skilled Nursing  Contact information:  [address] [facility] [address]  [phone]                             Additional Instructions for the Follow-ups that You Need to Schedule     Call MD With Problems / Concerns   As directed      Instructions: Contact PCP or return to the ED with recurrent symptoms or concerns.    Order Comments: Instructions: Contact PCP or return to the ED with recurrent symptoms or concerns.          Discharge Follow-up with PCP   As directed       Currently Documented PCP:    Camila Ortiz APRN    PCP Phone Number:    [phone]     Follow Up Details: Nursing home provider in 1 week. Recommend CBC, CMP in 3-4 days.               Attestation: I personally discussed the patient's hospital course, disposition, discharge planning, and discharge medications with attending physician, Carlos Pena MD, prior to time of discharge. I have also discussed with RN, Livier, prior to discharge.     Jennifer Luque PA-C  11/09/21  08:26 EST    Time to complete discharge: >30 minutes.     Please send a copy of this dictation to the following providers:  Camila Ortiz APRN      Electronically signed by Jennifer Luque PA at 11/09/21 0717

## 2021-11-29 ENCOUNTER — OFFICE VISIT (OUTPATIENT)
Dept: CARDIOLOGY | Facility: CLINIC | Age: 76
End: 2021-11-29

## 2021-11-29 VITALS
SYSTOLIC BLOOD PRESSURE: 113 MMHG | HEIGHT: 71 IN | DIASTOLIC BLOOD PRESSURE: 65 MMHG | WEIGHT: 143.8 LBS | BODY MASS INDEX: 20.13 KG/M2 | HEART RATE: 79 BPM | TEMPERATURE: 96.4 F

## 2021-11-29 DIAGNOSIS — I25.41 CORONARY ARTERY FISTULA: ICD-10-CM

## 2021-11-29 DIAGNOSIS — I10 ESSENTIAL HYPERTENSION: ICD-10-CM

## 2021-11-29 DIAGNOSIS — I25.10 ASCVD (ARTERIOSCLEROTIC CARDIOVASCULAR DISEASE): Primary | ICD-10-CM

## 2021-11-29 PROCEDURE — 99213 OFFICE O/P EST LOW 20 MIN: CPT | Performed by: INTERNAL MEDICINE

## 2021-12-09 ENCOUNTER — OFFICE VISIT (OUTPATIENT)
Dept: GASTROENTEROLOGY | Facility: CLINIC | Age: 76
End: 2021-12-09

## 2021-12-09 VITALS
WEIGHT: 147 LBS | SYSTOLIC BLOOD PRESSURE: 135 MMHG | DIASTOLIC BLOOD PRESSURE: 72 MMHG | HEIGHT: 71 IN | HEART RATE: 72 BPM | BODY MASS INDEX: 20.58 KG/M2

## 2021-12-09 DIAGNOSIS — R19.7 DIARRHEA, UNSPECIFIED TYPE: ICD-10-CM

## 2021-12-09 DIAGNOSIS — R63.0 ANOREXIA: ICD-10-CM

## 2021-12-09 DIAGNOSIS — R63.4 WEIGHT LOSS, ABNORMAL: Primary | ICD-10-CM

## 2021-12-09 PROCEDURE — 99213 OFFICE O/P EST LOW 20 MIN: CPT | Performed by: INTERNAL MEDICINE

## 2021-12-09 NOTE — PROGRESS NOTES
Subjective   Fidencio Luciano is a 76 y.o. male who presents to the office today as a follow up appointment regarding Weight Loss and Diarrhea      History of Present Illness:  The patient recently was admitted to [facility] rehab for falling.  He has gained weight since he was admitted there 5 weeks ago.  His wife states he is eating better at he NH.   He was recently diagnosed with acute HAV.  He is still taking Megace.  He feels better per his report.  Overall, he seems to be doing better.  His wife states that on Thanksgiving day she brought him a plate of food and a plate of desserts.  He ate most of it and also ate what was served to him at the nursing home.  She is worried that he is not getting up and walking enough.  He is confined to wheelchair now.  He is at [facility] due to falling and she is hopeful that they will help him start walking again.  He currently is not having issues with diarrhea.      Review of Systems:  Review of Systems   Constitutional: Positive for unexpected weight change. Negative for fever.   HENT: Negative for trouble swallowing.    Eyes: Negative.    Respiratory: Negative for chest tightness.    Cardiovascular: Negative for chest pain.   Gastrointestinal: Positive for diarrhea and nausea. Negative for abdominal distention, abdominal pain, anal bleeding, blood in stool, constipation, rectal pain and vomiting.   Endocrine: Negative.    Genitourinary: Negative for difficulty urinating.   Musculoskeletal: Negative.    Skin: Negative.    Allergic/Immunologic: Negative.    Neurological: Negative for headaches.   Hematological: Bruises/bleeds easily.   Psychiatric/Behavioral: Negative.        Past Medical History:  Past Medical History:   Diagnosis Date   • BPH (benign prostatic hyperplasia)    • Bradycardia     Positive tilt table testing 07/2016   • Coronary artery disease    • Diabetes mellitus (HCC)    • Diverticulosis    • Elevated cholesterol    • Gastric  ulcer with hemorrhage    • Gastroesophageal reflux disease 1/26/2021   • History of transfusion    • Hyperlipidemia    • Hypertension    • Psoriasis        Past Surgical History:  Past Surgical History:   Procedure Laterality Date   • BACK SURGERY     • CARDIAC CATHETERIZATION N/A 9/20/2016    Procedure: Left Heart Cath;  Surgeon: Giovanni Buenrostro MD;  Location:  [facility];  Service:    • CARDIAC CATHETERIZATION N/A 10/4/2016    Procedure: Left Heart Cath;  Surgeon: Bharathi Andrew MD;  Location:  [facility];  Service:    • CARDIAC CATHETERIZATION N/A 5/9/2017    Procedure: Left Heart Cath;  Surgeon: Giovanni Buenrostro MD;  Location:  [facility];  Service:    • CARDIAC CATHETERIZATION N/A 5/9/2017    Procedure: ERR;  Surgeon: Giovanni Buenrostro MD;  Location:  [facility];  Service:    • CARDIAC CATHETERIZATION N/A 11/8/2019    Procedure: Left Heart Cath;  Surgeon: Abraham Oviedo MD;  Location:  [facility];  Service: Cardiology   • CARDIAC CATHETERIZATION N/A 12/18/2019    Procedure: Coronary angiography;  Surgeon: Jay Barney IV, MD;  Location:  [facility];  Service: Cardiovascular   • CHOLECYSTECTOMY     • COLONOSCOPY  11/13/2015    Dr. Martinez- internal hemorrhoids, sigmoid diverticulosis   • COLONOSCOPY N/A 8/17/2018    Procedure: COLONOSCOPY CPT CODE: 20046;  Surgeon: Gigi Geronimo MD;  Location: [facility] OR;  Service: Gastroenterology   • CORONARY ANGIOPLASTY WITH STENT PLACEMENT     • ENDOSCOPY N/A 8/17/2018    Procedure: ESOPHAGOGASTRODUODENOSCOPY WITH BIOPSY CPT CODE: 89079;  Surgeon: Gigi Geronimo MD;  Location: [facility] OR;  Service: Gastroenterology   • ENDOSCOPY N/A 4/20/2021    Procedure: ESOPHAGOGASTRODUODENOSCOPY;  Surgeon: Geno Vernon MD;  Location: [facility] OR;  Service: Gastroenterology;  Laterality: N/A;   • INTERVENTIONAL RADIOLOGY PROCEDURE N/A 12/18/2019     Procedure: Coil Embolization of septal to pulmonary artery fistuala.;  Surgeon: Jay Barney IV, MD;  Location:  [facility];  Service: Cardiovascular   • NY RT/LT HEART CATHETERS N/A 2017    Procedure: Percutaneous Coronary Intervention;  Surgeon: Lincoln De Los Santos MD;  Location:  [facility];  Service: Cardiology   • STOMACH SURGERY      FUSION OF BLEEDING ULCER   • UPPER GASTROINTESTINAL ENDOSCOPY  2015    Dr. Martinez- mild gastritis       Family History:  Family History   Problem Relation Age of Onset   • Sick sinus syndrome Mother    • Heart failure Mother    • Diabetes Mother    • Liver cancer Father        Social History:  Social History     Socioeconomic History   • Marital status:    Tobacco Use   • Smoking status: Former Smoker     Packs/day: 3.00     Years: 40.00     Pack years: 120.00     Types: Cigarettes     Quit date:      Years since quittin.9   • Smokeless tobacco: Former User     Quit date: 1986   Vaping Use   • Vaping Use: Never used   Substance and Sexual Activity   • Alcohol use: No   • Drug use: No   • Sexual activity: Defer       Current Medication List:    Current Outpatient Medications:   •  albuterol sulfate  (90 Base) MCG/ACT inhaler, Inhale 2 puffs Every 4 (Four) Hours As Needed for Wheezing., Disp: 6.7 g, Rfl: 5  •  aspirin 81 MG tablet, Take 81 mg by mouth Daily., Disp: , Rfl:   •  atorvastatin (LIPITOR) 80 MG tablet, Take 1 tablet by mouth Every Night., Disp: 90 tablet, Rfl: 5  •  budesonide-formoterol (SYMBICORT) 80-4.5 MCG/ACT inhaler, Inhale 2 puffs 2 (Two) Times a Day., Disp: 10.2 g, Rfl: 5  •  cetirizine (zyrTEC) 10 MG tablet, Take 10 mg by mouth Daily., Disp: , Rfl:   •  clopidogrel (PLAVIX) 75 MG tablet, Take 1 tablet by mouth Daily., Disp: 30 tablet, Rfl: 5  •  colestipol (COLESTID) 1 g tablet, Take 1 g by mouth 2 (Two) Times a Day., Disp: , Rfl:   •  ferrous sulfate 325 (65 FE) MG tablet, Take  "325 mg by mouth 2 (two) times a day., Disp: , Rfl:   •  finasteride (PROSCAR) 5 MG tablet, Take 1 tablet by mouth Daily for 360 days., Disp: 90 tablet, Rfl: 3  •  isosorbide mononitrate (IMDUR) 120 MG 24 hr tablet, Take 1 tablet by mouth Every Morning., Disp: 90 tablet, Rfl: 2  •  Memantine HCl-Donepezil HCl 28-10 MG capsule sustained-release 24 hr, Take 1 capsule by mouth Daily., Disp: , Rfl:   •  metoprolol tartrate (LOPRESSOR) 25 MG tablet, Take 0.5 tablets by mouth Every 12 (Twelve) Hours., Disp: , Rfl:   •  mirtazapine (REMERON SOL-TAB) 45 MG disintegrating tablet, Place 45 mg on the tongue Every Night., Disp: , Rfl:   •  nitroglycerin (NITROSTAT) 0.4 MG SL tablet, 1 under the tongue as needed for angina, may repeat q5mins for up three doses, Disp: 25 tablet, Rfl: 2  •  pantoprazole (PROTONIX) 40 MG EC tablet, Take 40 mg by mouth Daily., Disp: , Rfl:   •  polyethylene glycol (MIRALAX) 17 g packet, Take 17 g by mouth Daily., Disp: , Rfl:   •  ranolazine (RANEXA) 500 MG 12 hr tablet, Take 1 tablet by mouth Every 12 (Twelve) Hours., Disp: , Rfl:   •  megestrol (Megace ES) 625 MG/5ML suspension, Take 5 mL by mouth Daily for 30 days., Disp: 150 mL, Rfl: 2    Allergies:   Patient has no known allergies.    Vitals:  /72   Pulse 72   Ht 180.3 cm (71\")   Wt 66.7 kg (147 lb)   BMI 20.50 kg/m²     Physical Exam:  Physical Exam  Constitutional:       Appearance: He is normal weight.   HENT:      Head: Normocephalic and atraumatic.      Nose: Nose normal. No congestion or rhinorrhea.   Eyes:      General: No scleral icterus.     Extraocular Movements: Extraocular movements intact.      Conjunctiva/sclera: Conjunctivae normal.      Pupils: Pupils are equal, round, and reactive to light.   Cardiovascular:      Rate and Rhythm: Normal rate and regular rhythm.      Pulses: Normal pulses.      Heart sounds: Normal heart sounds.   Pulmonary:      Effort: Pulmonary effort is normal.      Breath sounds: Normal breath " sounds.   Abdominal:      General: Abdomen is flat. Bowel sounds are normal. There is no distension.      Palpations: Abdomen is soft. There is no shifting dullness, fluid wave, hepatomegaly, splenomegaly, mass or pulsatile mass.      Tenderness: There is no abdominal tenderness. There is no guarding or rebound.      Hernia: No hernia is present.   Musculoskeletal:         General: No swelling or tenderness.      Cervical back: Normal range of motion and neck supple.      Comments: The patient is in a wheelchair due to instability   Skin:     General: Skin is warm and dry.      Coloration: Skin is not jaundiced.   Neurological:      General: No focal deficit present.      Mental Status: He is alert and oriented to person, place, and time.   Psychiatric:         Mood and Affect: Mood normal.         Behavior: Behavior normal.         Results Review:  Lab Results:   No visits with results within 1 Month(s) from this visit.   Latest known visit with results is:   No results displayed because visit has over 200 results.          Assessment/Plan     Visit Diagnoses:    ICD-10-CM ICD-9-CM   1. Weight loss, abnormal  R63.4 783.21   2. Diarrhea, unspecified type  R19.7 787.91   3. Anorexia  R63.0 783.0       Plan:  The patient is doing fairly well in respect to weight and diarrhea since being admitted to a nursing home for rehab.  He has gained about 4 pounds since I last saw him in November.  His wife states that he had actually gotten down to about 137.  Feels that he has gained probably about 10 pounds.  He will stop the Megace once his weight stabilizes. I will have them follow-up in about 3 months.    * Surgery not found *      MEDS ORDERED DURING VISIT:  No orders of the defined types were placed in this encounter.      Return in about 3 months (around 3/9/2022).             This document has been electronically signed by Geno Vernon MD   December 9, 2021 14:27 EST      Part of this note may be an  electronic transcription/translation of spoken language to printed text using the Dragon Dictation System.

## 2021-12-21 ENCOUNTER — OFFICE VISIT (OUTPATIENT)
Dept: ONCOLOGY | Facility: CLINIC | Age: 76
End: 2021-12-21

## 2021-12-21 VITALS
SYSTOLIC BLOOD PRESSURE: 165 MMHG | HEART RATE: 65 BPM | RESPIRATION RATE: 20 BRPM | TEMPERATURE: 97.1 F | WEIGHT: 162.4 LBS | DIASTOLIC BLOOD PRESSURE: 70 MMHG | BODY MASS INDEX: 22.65 KG/M2 | OXYGEN SATURATION: 100 %

## 2021-12-21 DIAGNOSIS — D50.9 IRON DEFICIENCY ANEMIA, UNSPECIFIED IRON DEFICIENCY ANEMIA TYPE: ICD-10-CM

## 2021-12-21 DIAGNOSIS — D72.829 LEUKOCYTOSIS, UNSPECIFIED TYPE: ICD-10-CM

## 2021-12-21 DIAGNOSIS — D64.9 ANEMIA, UNSPECIFIED TYPE: Primary | ICD-10-CM

## 2021-12-21 LAB
ALBUMIN SERPL-MCNC: 3.56 G/DL (ref 3.5–5.2)
ALBUMIN/GLOB SERPL: 1.2 G/DL
ALP SERPL-CCNC: 82 U/L (ref 39–117)
ALT SERPL W P-5'-P-CCNC: 25 U/L (ref 1–41)
ANION GAP SERPL CALCULATED.3IONS-SCNC: 12.4 MMOL/L (ref 5–15)
AST SERPL-CCNC: 21 U/L (ref 1–40)
BASOPHILS # BLD AUTO: 0.09 10*3/MM3 (ref 0–0.2)
BASOPHILS NFR BLD AUTO: 1 % (ref 0–1.5)
BILIRUB SERPL-MCNC: 0.4 MG/DL (ref 0–1.2)
BUN SERPL-MCNC: 18 MG/DL (ref 8–23)
BUN/CREAT SERPL: 23.1 (ref 7–25)
CALCIUM SPEC-SCNC: 8.9 MG/DL (ref 8.6–10.5)
CHLORIDE SERPL-SCNC: 110 MMOL/L (ref 98–107)
CO2 SERPL-SCNC: 18.6 MMOL/L (ref 22–29)
CREAT SERPL-MCNC: 0.78 MG/DL (ref 0.76–1.27)
CRP SERPL-MCNC: 0.45 MG/DL (ref 0–0.5)
DEPRECATED RDW RBC AUTO: 58.1 FL (ref 37–54)
EOSINOPHIL # BLD AUTO: 0.24 10*3/MM3 (ref 0–0.4)
EOSINOPHIL NFR BLD AUTO: 2.7 % (ref 0.3–6.2)
ERYTHROCYTE [DISTWIDTH] IN BLOOD BY AUTOMATED COUNT: 14.1 % (ref 12.3–15.4)
FERRITIN SERPL-MCNC: 287.3 NG/ML (ref 30–400)
FOLATE SERPL-MCNC: 12.7 NG/ML (ref 4.78–24.2)
GFR SERPL CREATININE-BSD FRML MDRD: 97 ML/MIN/1.73
GLOBULIN UR ELPH-MCNC: 3 GM/DL
GLUCOSE SERPL-MCNC: 146 MG/DL (ref 65–99)
HCT VFR BLD AUTO: 43.4 % (ref 37.5–51)
HGB BLD-MCNC: 13.2 G/DL (ref 13–17.7)
HYPOCHROMIA BLD QL: NORMAL
IMM GRANULOCYTES # BLD AUTO: 0.19 10*3/MM3 (ref 0–0.05)
IMM GRANULOCYTES NFR BLD AUTO: 2.1 % (ref 0–0.5)
IRON 24H UR-MRATE: 109 MCG/DL (ref 59–158)
IRON SATN MFR SERPL: 31 % (ref 20–50)
LARGE PLATELETS: NORMAL
LYMPHOCYTES # BLD AUTO: 1.76 10*3/MM3 (ref 0.7–3.1)
LYMPHOCYTES NFR BLD AUTO: 19.9 % (ref 19.6–45.3)
MACROCYTES BLD QL SMEAR: NORMAL
MCH RBC QN AUTO: 33.8 PG (ref 26.6–33)
MCHC RBC AUTO-ENTMCNC: 30.4 G/DL (ref 31.5–35.7)
MCV RBC AUTO: 111.3 FL (ref 79–97)
MONOCYTES # BLD AUTO: 0.69 10*3/MM3 (ref 0.1–0.9)
MONOCYTES NFR BLD AUTO: 7.8 % (ref 5–12)
NEUTROPHILS NFR BLD AUTO: 5.89 10*3/MM3 (ref 1.7–7)
NEUTROPHILS NFR BLD AUTO: 66.5 % (ref 42.7–76)
NRBC BLD AUTO-RTO: 0 /100 WBC (ref 0–0.2)
PLATELET # BLD AUTO: 185 10*3/MM3 (ref 140–450)
PMV BLD AUTO: 9.7 FL (ref 6–12)
POTASSIUM SERPL-SCNC: 3.9 MMOL/L (ref 3.5–5.2)
PROT SERPL-MCNC: 6.6 G/DL (ref 6–8.5)
RBC # BLD AUTO: 3.9 10*6/MM3 (ref 4.14–5.8)
SODIUM SERPL-SCNC: 141 MMOL/L (ref 136–145)
TIBC SERPL-MCNC: 356 MCG/DL (ref 298–536)
TRANSFERRIN SERPL-MCNC: 239 MG/DL (ref 200–360)
TSH SERPL DL<=0.05 MIU/L-ACNC: 3.45 UIU/ML (ref 0.27–4.2)
VIT B12 BLD-MCNC: 375 PG/ML (ref 211–946)
WBC NRBC COR # BLD: 8.86 10*3/MM3 (ref 3.4–10.8)

## 2021-12-21 PROCEDURE — 86140 C-REACTIVE PROTEIN: CPT | Performed by: NURSE PRACTITIONER

## 2021-12-21 PROCEDURE — 84466 ASSAY OF TRANSFERRIN: CPT | Performed by: NURSE PRACTITIONER

## 2021-12-21 PROCEDURE — 82607 VITAMIN B-12: CPT | Performed by: NURSE PRACTITIONER

## 2021-12-21 PROCEDURE — 82728 ASSAY OF FERRITIN: CPT | Performed by: NURSE PRACTITIONER

## 2021-12-21 PROCEDURE — 83540 ASSAY OF IRON: CPT | Performed by: NURSE PRACTITIONER

## 2021-12-21 PROCEDURE — 80053 COMPREHEN METABOLIC PANEL: CPT | Performed by: NURSE PRACTITIONER

## 2021-12-21 PROCEDURE — 99215 OFFICE O/P EST HI 40 MIN: CPT | Performed by: NURSE PRACTITIONER

## 2021-12-21 PROCEDURE — 85060 BLOOD SMEAR INTERPRETATION: CPT | Performed by: NURSE PRACTITIONER

## 2021-12-21 PROCEDURE — 82746 ASSAY OF FOLIC ACID SERUM: CPT | Performed by: NURSE PRACTITIONER

## 2021-12-21 PROCEDURE — 84443 ASSAY THYROID STIM HORMONE: CPT | Performed by: NURSE PRACTITIONER

## 2021-12-21 PROCEDURE — 85007 BL SMEAR W/DIFF WBC COUNT: CPT | Performed by: NURSE PRACTITIONER

## 2021-12-21 PROCEDURE — 85025 COMPLETE CBC W/AUTO DIFF WBC: CPT | Performed by: NURSE PRACTITIONER

## 2021-12-21 NOTE — PROGRESS NOTES
DATE:  12/21/2021    REASON FOR FOLLOW UP: Anemia, leukocytosis     REFERRING PHYSICIAN:  Dr. Riley    CHIEF COMPLAINT:   Elevated WBC. Follow up of anemia     HISTORY OF PRESENT ILLNESS:   Fidencio Luciano is a very pleasant 76 y.o. male who is being seen today at the request of Gideon Charles MD   for evaluation and treatment of Anemia. Mr. Luciano reports following with his PCP routinely with repeat labs every 4-6 months. He says he has had chronic anemia which was recently worsening in June. Previous available CBCs were reviewed and patient has had normocytic anemia with Hg ranging 11-13 g/dL since July 2015. CBC from June 2018 showed drop in Hg to 10.0. Patient's WBC and platelets have been in the normal range. Patient reports intermittent dark stool and bleeding per rectum for the past few months. He reports having upper/lower endoscopy approximately 3 years ago with Dr. Martinez which was negative for active bleeding. He says his PCP has referred him to Dr. Unger and he will see him next week. Patient denies any recent blood transfusions. His main complaint today is ongoing fatigue which has been recently worsening. He also reports decreased appetite and weight loss but he is unsure how much weight he has lost. Otherwise, he denies any other specific complaints.     Interval History:  Mr. Luciano presents today for follow up. Patient was previously being followed for ERROL and was taking oral iron therapy, ferrous sulfate TID which he was tolerating well. He was last seen in March 2019 and planned to follow up 4 months later but did not return for follow up. 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 He is currently in NH for rehab and following with Dr. Riley. Recent CBCs were reviewed and since March 2021, he has had intermittent leukocytosis with fluctuation of counts but WBC has remained consistently elevated since ~October - November  2021 ranging ~11-17, predominantly neutrophils. Workup in hospital was negative for infectious process. He has had chronic macrocytosis with occasional mild anemia. At present, he remains on ferrous sulfate BID which he tolerates well. 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 His wife says he also struggles with frequent sinus infections, none currently. Patient really has no specific complaints today other than chronic fatigue. Denies obvious bleeding from any source. No fevers/chills or drenching NS. He has had poor appetite and now follows with GI. He is taking megace and drinking supplemental ensure with improvement/weight gain. He denies any other complaints today.     PAST MEDICAL HISTORY:  Past Medical History:   Diagnosis Date   • BPH (benign prostatic hyperplasia)    • Bradycardia     Positive tilt table testing 07/2016   • Coronary artery disease    • Diabetes mellitus (HCC)    • Diverticulosis    • Elevated cholesterol    • Gastric ulcer with hemorrhage    • Gastroesophageal reflux disease 1/26/2021   • History of transfusion    • Hyperlipidemia    • Hypertension    • Psoriasis        PAST SURGICAL HISTORY:  Past Surgical History:   Procedure Laterality Date   • BACK SURGERY     • CARDIAC CATHETERIZATION N/A 9/20/2016    Procedure: Left Heart Cath;  Surgeon: Giovanni Buenrostro MD;  Location: [facility] INVASIVE LOCATION;  Service:    • CARDIAC CATHETERIZATION N/A 10/4/2016    Procedure: Left Heart Cath;  Surgeon: Bharathi Andrew MD;  Location: [facility] INVASIVE LOCATION;  Service:    • CARDIAC CATHETERIZATION N/A 5/9/2017    Procedure: Left Heart Cath;  Surgeon: Giovanni Buenrostro MD;  Location: [facility] CATH INVASIVE LOCATION;  Service:    • CARDIAC CATHETERIZATION N/A 5/9/2017    Procedure: ERR;  Surgeon: Giovanni Buenrostro MD;  Location: [facility] INVASIVE LOCATION;  Service:    • CARDIAC CATHETERIZATION N/A 11/8/2019    Procedure: Left Heart Cath;  Surgeon: Preet  MD Abraham;  Location:  [facility] CATH INVASIVE LOCATION;  Service: Cardiology   • CARDIAC CATHETERIZATION N/A 2019    Procedure: Coronary angiography;  Surgeon: Jay Barney IV, MD;  Location:  [facility] CATH INVASIVE LOCATION;  Service: Cardiovascular   • CHOLECYSTECTOMY     • COLONOSCOPY  2015    Dr. Martinez- internal hemorrhoids, sigmoid diverticulosis   • COLONOSCOPY N/A 2018    Procedure: COLONOSCOPY CPT CODE: 05675;  Surgeon: Gigi Geronimo MD;  Location:  [facility] OR;  Service: Gastroenterology   • CORONARY ANGIOPLASTY WITH STENT PLACEMENT     • ENDOSCOPY N/A 2018    Procedure: ESOPHAGOGASTRODUODENOSCOPY WITH BIOPSY CPT CODE: 25023;  Surgeon: Gigi Geronimo MD;  Location:  [facility] OR;  Service: Gastroenterology   • ENDOSCOPY N/A 2021    Procedure: ESOPHAGOGASTRODUODENOSCOPY;  Surgeon: Geno Vernon MD;  Location: [facility] OR;  Service: Gastroenterology;  Laterality: N/A;   • INTERVENTIONAL RADIOLOGY PROCEDURE N/A 2019    Procedure: Coil Embolization of septal to pulmonary artery fistuala.;  Surgeon: Jay Barney IV, MD;  Location:  [facility] CATH INVASIVE LOCATION;  Service: Cardiovascular   • OR RT/LT HEART CATHETERS N/A 2017    Procedure: Percutaneous Coronary Intervention;  Surgeon: Lincoln De Los Santos MD;  Location:  [facility] CATH INVASIVE LOCATION;  Service: Cardiology   • STOMACH SURGERY      FUSION OF BLEEDING ULCER   • UPPER GASTROINTESTINAL ENDOSCOPY  2015    Dr. Martinez- mild gastritis       FAMILY HISTORY:  Family History   Problem Relation Age of Onset   • Sick sinus syndrome Mother    • Heart failure Mother    • Diabetes Mother    • Liver cancer Father        SOCIAL HISTORY:  Social History     Socioeconomic History   • Marital status:    Tobacco Use   • Smoking status: Former Smoker     Packs/day: 3.00     Years: 40.00     Pack years: 120.00     Types: Cigarettes     Quit date: 1982     Years since quittin.9   •  Smokeless tobacco: Former User     Quit date: 1/16/1986   Vaping Use   • Vaping Use: Never used   Substance and Sexual Activity   • Alcohol use: No   • Drug use: No   • Sexual activity: Defer     MEDICATIONS:  The current medication list was reviewed in the EMR    Current Outpatient Medications:   •  albuterol sulfate  (90 Base) MCG/ACT inhaler, Inhale 2 puffs Every 4 (Four) Hours As Needed for Wheezing., Disp: 6.7 g, Rfl: 5  •  aspirin 81 MG tablet, Take 81 mg by mouth Daily., Disp: , Rfl:   •  atorvastatin (LIPITOR) 80 MG tablet, Take 1 tablet by mouth Every Night., Disp: 90 tablet, Rfl: 5  •  budesonide-formoterol (SYMBICORT) 80-4.5 MCG/ACT inhaler, Inhale 2 puffs 2 (Two) Times a Day., Disp: 10.2 g, Rfl: 5  •  cetirizine (zyrTEC) 10 MG tablet, Take 10 mg by mouth Daily., Disp: , Rfl:   •  clopidogrel (PLAVIX) 75 MG tablet, Take 1 tablet by mouth Daily., Disp: 30 tablet, Rfl: 5  •  colestipol (COLESTID) 1 g tablet, Take 1 g by mouth 2 (Two) Times a Day., Disp: , Rfl:   •  finasteride (PROSCAR) 5 MG tablet, Take 1 tablet by mouth Daily for 360 days., Disp: 90 tablet, Rfl: 3  •  isosorbide mononitrate (IMDUR) 120 MG 24 hr tablet, Take 1 tablet by mouth Every Morning., Disp: 90 tablet, Rfl: 2  •  Memantine HCl-Donepezil HCl 28-10 MG capsule sustained-release 24 hr, Take 1 capsule by mouth Daily., Disp: , Rfl:   •  metoprolol tartrate (LOPRESSOR) 25 MG tablet, Take 0.5 tablets by mouth Every 12 (Twelve) Hours., Disp: , Rfl:   •  mirtazapine (REMERON SOL-TAB) 45 MG disintegrating tablet, Place 45 mg on the tongue Every Night., Disp: , Rfl:   •  nitroglycerin (NITROSTAT) 0.4 MG SL tablet, 1 under the tongue as needed for angina, may repeat q5mins for up three doses, Disp: 25 tablet, Rfl: 2  •  pantoprazole (PROTONIX) 40 MG EC tablet, Take 40 mg by mouth Daily., Disp: , Rfl:   •  polyethylene glycol (MIRALAX) 17 g packet, Take 17 g by mouth Daily., Disp: , Rfl:   •  ranolazine (RANEXA) 500 MG 12 hr tablet, Take 1  tablet by mouth Every 12 (Twelve) Hours., Disp: , Rfl:   •  megestrol (Megace ES) 625 MG/5ML suspension, Take 5 mL by mouth Daily for 30 days., Disp: 150 mL, Rfl: 2    ALLERGIES:  No Known Allergies    REVIEW OF SYSTEMS:    A comprehensive 14 point review of systems was performed.  Significant findings as mentioned above.  All other systems reviewed and are negative.      Physical Exam   Vital Signs: /70   Pulse 65   Temp 97.1 °F (36.2 °C) (Temporal)   Resp 20   Wt 73.7 kg (162 lb 6.4 oz)   SpO2 100%   BMI 22.65 kg/m²    General: Well developed, well nourished, alert and oriented x 3, in no acute distress. Sitting in wheelchair  Head: ATNC   Eyes: PERRL, No evidence of conjunctivitis.   Nose: No nasal discharge.   Mouth: Oral mucosal membranes moist. No oral ulceration or hemorrhages.   Neck: Neck supple. No thyromegaly. No JVD.   Lungs: CTAB  Heart: RRR. No murmurs, rubs, or gallops.   Abdomen: Soft. Bowel sounds are normoactive. Nontender with palpation. No Hepatosplenomegaly can be appreciated.   Extremities: No cyanosis or edema. Peripheral pulses palpable and equal bilaterally.   Neurologic: Grossly non-focal exam.    ENDOSCOPY:  EGD/Colonoscopy 08/17/18  Findings: Sliding hiatal hernia, diverticulosis without diverticulitis  Recommendations: Screening colonoscopy in 10 years    RECENT LABS:  Lab Results   Component Value Date    WBC 8.86 12/21/2021    HGB 13.2 12/21/2021    HCT 43.4 12/21/2021    .3 (H) 12/21/2021    RDW 14.1 12/21/2021     12/21/2021    NEUTRORELPCT 66.5 12/21/2021    LYMPHORELPCT 19.9 12/21/2021    MONORELPCT 7.8 12/21/2021    EOSRELPCT 2.7 12/21/2021    BASORELPCT 1.0 12/21/2021    NEUTROABS 5.89 12/21/2021    LYMPHSABS 1.76 12/21/2021       Lab Results   Component Value Date     12/21/2021    K 3.9 12/21/2021    CO2 18.6 (L) 12/21/2021     (H) 12/21/2021    BUN 18 12/21/2021    CREATININE 0.78 12/21/2021    EGFRIFNONA 97 12/21/2021    EGFRIFAFRI   09/19/2016      Comment:      <15 Indicative of kidney failure.    GLUCOSE 146 (H) 12/21/2021    CALCIUM 8.9 12/21/2021    ALKPHOS 82 12/21/2021    AST 21 12/21/2021    ALT 25 12/21/2021    BILITOT 0.4 12/21/2021    ALBUMIN 3.56 12/21/2021    PROTEINTOT 6.6 12/21/2021    MG 2.1 10/28/2021    PHOS 3.9 10/28/2021       Lab Results   Component Value Date/Time     03/13/2021 12:41 PM     Lab Results   Component Value Date    FERRITIN 287.30 12/21/2021    IRON 109 12/21/2021    TIBC 356 12/21/2021    LABIRON 31 12/21/2021    LJSAYWSA16 296 11/04/2021    FOLATE 19.00 11/04/2021    HAPTOGLOBIN 112 07/17/2018    RETICCTPCT 0.66 07/17/2018    RETIC 0.0242 07/17/2018       PBS 07/17/18  Hypochromic normocytic anemia with eosinophilia    Labs 07/17/17  C-Reactive Protein 0.00 - 0.99 mg/dL <0.50      Sed Rate 0 - 20 mm/hr 10       - 225 U/L 163      Iron 53 - 167 mcg/dL 41     TIBC 241 - 421 mcg/dL 445     Iron Saturation 20 - 50 % 9       Ferritin 21.90 - 321.70 ng/mL 11.00       Vitamin B-12 211 - 911 pg/mL 932       Folate 5.40 - 20.00 ng/mL 18.87      ASSESSMENT & PLAN:  Fidencio Luciano is a very pleasant 76 y.o. male with    1.  Macrocytosis, worsening  2. History of ERROL    -Patient was previously being followed for ERROL. Initially reported intermittent dark stool and bleeding per rectum (now resolved). Reported upper/lower endoscopy several years ago with Dr. Martinez which was negative for active bleeding.    -Obtained additional workup including repeat CBC which showed stable H/H (Hg 10.2), WBC and platelets were again in the normal range. CMP unremarkable. PBS showed hypochromic normocytic anemia with eosinophilia as above. B12 and Folate replete. CRP and ESR were normal. No evidence of hemolysis with normal Retic, LDH and Haptoglobin. Iron studies were consistent with Iron deficiency. Therefore, started patient on oral iron therapy, ferrous sulfate TID which he was tolerating tolerating well. He denied obvious  blood loss from any source.   -PCP referred him back to GI and underwent upper/lower endoscopy on 08/17/18 which showed no evidence of bleeding. He had improvement in Hg but remained low and iron stores were improving, therefore, continued oral iron and planned to follow up in 4 months. However, he was lost in follow up and was last seen in March 2019.   -At present, he remains on ferrous sulfate PO BID and tolerating this well. He again denies obvious bleeding from any source. Obtained repeat CBC today and noted worsening macrocytosis without anemia. Repeat iron panel and ferritin show iron is replete. Therefore, advised patient to discontinue oral iron which he has chronically taken and will continue to monitor. Will repeat CBC along with iron panel and ferritin with next follow up in 3 months. To further evaluate macrocytosis, obtained repeat B12, folate and TSH today which are pending. Will notify patient/NH if any further intervention is needed.     3. Leukocytosis:  -He was last seen in March 2019 and planned to follow up 4 months later but did not return for follow up as above.  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 He is currently in NH for rehab and following with Dr. Riley. Recent CBCs were reviewed and since March 2021, he has had intermittent leukocytosis with fluctuation of counts but has consistently remained elevated from ~October - November 2021 ranging ~11-17, predominantly neutrophils. Workup in hospital was negative for infectious process. 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 His wife also reports recurrent sinusitis. He has had weight loss for which he follows with GI and this is improving with megace and supplemental ensure. He otherwise has no other concerning B symptoms.  -Based on history, could be related to possible chronic underlying inflammation plus  previous COVID and reported HAV also contributing.   -Obtained repeat CBC today which happily showed normalized WBC. Also sent PBS and CRP today which are pending. Given normalized counts, will continue with conservative follow up for now. Will repeat CBC in 3 months and have further workup at that time if needed.       ACO / ESPINOZA/Other  Quality measures  -  Fidencio Luciano did not receive 2021 flu vaccine. This was recommended.   -  Fidencio Luciano reports a pain score of 0.    -  Current outpatient and discharge medications have been reconciled for the patient.  Reviewed by: ANDREW Dawson      The patient, wife and caregiver were in agreement with the plan and all questions were answered to his satisfaction.     Thank you so much for allowing us to participate in the care of Fidencio Luciano . Please do not hesitate to contact us with any questions or concerns.     I spent 45 minutes with Fidencio Luciano today.  In the office today, more than 50% of this time was spent face-to-face with him  in counseling / coordination of care, reviewing his interim medical history and counseling on the current treatment plan.  All questions were answered to his satisfaction.       Electronically Signed by: ANDREW Dawson , December 21, 2021 10:26 EST       CC:   Camila Ortiz APRN

## 2021-12-22 LAB
CYTOLOGIST CVX/VAG CYTO: NORMAL
PATH INTERP BLD-IMP: NORMAL

## 2022-01-19 ENCOUNTER — OFFICE VISIT (OUTPATIENT)
Dept: PULMONOLOGY | Facility: CLINIC | Age: 77
End: 2022-01-19

## 2022-01-19 VITALS
TEMPERATURE: 96.4 F | HEART RATE: 77 BPM | SYSTOLIC BLOOD PRESSURE: 138 MMHG | DIASTOLIC BLOOD PRESSURE: 62 MMHG | OXYGEN SATURATION: 98 % | HEIGHT: 71 IN | WEIGHT: 171 LBS | BODY MASS INDEX: 23.94 KG/M2

## 2022-01-19 DIAGNOSIS — R06.02 SHORTNESS OF BREATH: Primary | ICD-10-CM

## 2022-01-19 DIAGNOSIS — G47.34 NOCTURNAL HYPOXIA: ICD-10-CM

## 2022-01-19 PROCEDURE — 99213 OFFICE O/P EST LOW 20 MIN: CPT | Performed by: NURSE PRACTITIONER

## 2022-01-19 NOTE — PROGRESS NOTES
"Chief Complaint  Shortness of Breath    Subjective          Fidencio Luciano presents to [facility] PULMONARY & CRITICAL CARE MEDICINE  History of Present Illness     Mr. Luciano is a 76 year old male with a medical history significant for GERD, BPH, CAD, Diabetes, hyperlipidemia, and hypertension.    He presents today for a routine follow up on shortness of breath.  He is now a resident of [facility].  He states that he was on oxygen before going to the nursing home but that now he is not.  He report that his shortness of breath is at baseline.  He is currently on Symbicort BID and albuterol every 4 hours as needed.              Objective   Vital Signs:   /62   Pulse 77   Temp 96.4 °F (35.8 °C) (Temporal)   Ht 180.3 cm (71\")   Wt 77.6 kg (171 lb)   SpO2 98%   BMI 23.85 kg/m²     Physical Exam     GENERAL APPEARANCE: Well developed, well nourished, alert and cooperative, and appears to be in no acute distress.    HEAD: normocephalic. Atraumatic.    EYES: PERRL, EOMI. Vision is grossly intact.    THROAT: Oral cavity and pharynx normal. No inflammation, swelling, exudate, or lesions.     NECK: Neck supple.  No thyromegaly.    CARDIAC: Normal S1 and S2. No S3, S4 or murmurs. Rhythm is regular.     RESPIRATORY:Bilateral air entry positive. Bilateral diminished breath sounds. No wheezing, crackles or rhonchi noted.    GI: Positive bowel sounds. Soft, nondistended, nontender.     MUSCULOSKELETAL: No significant deformity or joint abnormality. No edema. Peripheral pulses intact. No varicosities.    NEUROLOGICAL: Strength and sensation symmetric and intact throughout.     PSYCHIATRIC: The mental examination revealed the patient was oriented to person, place, and time.     Result Review :   The following data was reviewed by: ANDREW Amos on 01/19/2022:  Common labs    Common Labsle 11/8/21 11/8/21 11/9/21 11/9/21 12/21/21 12/21/21    0410 0410 0054 0054 0941 0941   Glucose " 174 (A)  124 (A)   146 (A)   BUN 29 (A)  28 (A)   18   Creatinine 0.95  0.91   0.78   eGFR Non  Am 77  81   97   Sodium 138  139   141   Potassium 4.6  4.2   3.9   Chloride 107  107   110 (A)   Calcium 8.5 (A)  8.6   8.9   Albumin      3.56   Total Bilirubin      0.4   Alkaline Phosphatase      82   AST (SGOT)      21   ALT (SGPT)      25   WBC  13.09 (A)  14.85 (A) 8.86    Hemoglobin  13.0  12.3 (A) 13.2    Hematocrit  39.4  37.5 43.4    Platelets  223  239 185    (A) Abnormal value       Comments are available for some flowsheets but are not being displayed.                    Assessment and Plan    Diagnoses and all orders for this visit:    1. Shortness of breath (Primary)    2. Nocturnal hypoxia  -     Overnight Sleep Oximetry Study; Future           Shortness of breath with exertion:  -Continue albuterol every 4 hours as needed.  -Continue Symbicort BID.      Nocturnal hypoxia:  -Will order an overnight oxygen study to assess oxygen saturation at night.    Patient's Body mass index is 23.85 kg/m². indicating that he is within normal range (BMI 18.5-24.9). No BMI management plan needed..      Follow Up   Return in about 6 months (around 7/19/2022).  Patient was given instructions and counseling regarding his condition or for health maintenance advice. Please see specific information pulled into the AVS if appropriate.

## 2022-01-28 ENCOUNTER — OFFICE VISIT (OUTPATIENT)
Dept: UROLOGY | Facility: CLINIC | Age: 77
End: 2022-01-28

## 2022-01-28 VITALS — BODY MASS INDEX: 23.94 KG/M2 | WEIGHT: 171 LBS | HEIGHT: 71 IN

## 2022-01-28 DIAGNOSIS — N13.8 BENIGN PROSTATIC HYPERPLASIA WITH URINARY OBSTRUCTION: ICD-10-CM

## 2022-01-28 DIAGNOSIS — R35.0 FREQUENCY OF URINATION: ICD-10-CM

## 2022-01-28 DIAGNOSIS — N40.0 BENIGN PROSTATIC HYPERPLASIA WITHOUT LOWER URINARY TRACT SYMPTOMS: Primary | Chronic | ICD-10-CM

## 2022-01-28 DIAGNOSIS — N40.1 BENIGN PROSTATIC HYPERPLASIA WITH URINARY OBSTRUCTION: ICD-10-CM

## 2022-01-28 DIAGNOSIS — N39.0 URINARY TRACT INFECTION WITHOUT HEMATURIA, SITE UNSPECIFIED: ICD-10-CM

## 2022-01-28 DIAGNOSIS — R33.9 INCOMPLETE EMPTYING OF BLADDER: ICD-10-CM

## 2022-01-28 PROCEDURE — 84154 ASSAY OF PSA FREE: CPT | Performed by: NURSE PRACTITIONER

## 2022-01-28 PROCEDURE — 99214 OFFICE O/P EST MOD 30 MIN: CPT | Performed by: NURSE PRACTITIONER

## 2022-01-28 PROCEDURE — 84153 ASSAY OF PSA TOTAL: CPT | Performed by: NURSE PRACTITIONER

## 2022-01-28 RX ORDER — MEMANTINE HYDROCHLORIDE 28 MG/1
28 CAPSULE, EXTENDED RELEASE ORAL DAILY
COMMUNITY
Start: 2022-01-17

## 2022-01-28 NOTE — PROGRESS NOTES
"Chief Complaint  Benign Prostatic Hypertrophy WITH LUTS (3 MONTH FOLOW UP/MED REFILLS)    Subjective          Fidencio Luciano presents to [facility]  History of Present Illness    MR Fidencio Luciano is a 75 y.o.  pleasant male who presents to Urology clinic today for evaluation of his lower urinary tract symptoms.  This is his 3 month follow-up.  He has a history of dementia, currently resident at [facility] and is wheelchair dependent.  He is here with a caregiver from his recent nursing home facility, and also accompanied by his wife Angela, to evaluate effectiveness of his medications-finasteride.    They report he has had trouble urinating for quite some time and then improved on his finasteride.  He is in no apparent discomfort today and reports doing relatively well since restarting medications. His most recent PSA since starting finasteride is 0.6, with a free PSA of 23.3 on 01/28/2022.  His PSA 3 months prior was 2.830, it was 1.4401-year ago.  He denies any side effects from his finasteride, and would like to continue.     He reports that he feels like he is urinating better than he was off of his medication. HE Is pleased with his current symptoms and denies episodes of incontinence.  He however wears depends at nighttime only. HE denies any recent urinary symptoms of frequency, urgency, or dysuria, he denies any burning on urination today, pelvic pressure or suprapubic discomfort. Denies any episodes of gross or microscopic hematuria reported.  He is unable to give us any urine sample today, states he voided prior to his appointment time.  His PVR is 11 cc.,  His IPSS score is 3.    Objective   Vital Signs:   Ht 180.3 cm (70.98\")   Wt 77.6 kg (171 lb)   BMI 23.86 kg/m²     Physical Exam  Constitutional:       General: He is not in acute distress.     Appearance: He is well-developed.      Comments: History of dementia, pleasantly confused.   HENT:     "  Head: Normocephalic and atraumatic.      Right Ear: External ear normal.      Left Ear: External ear normal.   Eyes:      General:         Right eye: No discharge.         Left eye: No discharge.      Conjunctiva/sclera: Conjunctivae normal.      Pupils: Pupils are equal, round, and reactive to light.   Neck:      Thyroid: No thyromegaly.      Trachea: No tracheal deviation.   Cardiovascular:      Rate and Rhythm: Normal rate and regular rhythm.      Heart sounds: No murmur heard.  No friction rub.   Pulmonary:      Effort: Pulmonary effort is normal. No respiratory distress.      Breath sounds: Normal breath sounds. No stridor.   Abdominal:      General: Bowel sounds are normal. There is distension.      Palpations: Abdomen is soft.      Tenderness: There is abdominal tenderness. There is no guarding.   Genitourinary:     Penis: Normal and uncircumcised. No tenderness or discharge.       Testes: Normal.      Rectum: Normal. Guaiac result negative.   Musculoskeletal:         General: Tenderness present. No deformity. Normal range of motion.      Cervical back: Normal range of motion and neck supple.      Comments: Wheelchair dependent   Skin:     General: Skin is warm and dry.      Capillary Refill: Capillary refill takes less than 2 seconds.      Coloration: Skin is not pale.   Neurological:      Mental Status: He is alert and oriented to person, place, and time.      Cranial Nerves: No cranial nerve deficit.      Coordination: Coordination normal.   Psychiatric:         Behavior: Behavior normal.         Thought Content: Thought content normal.         Judgment: Judgment normal.      IPSS Questionnaire (AUA-7):  Over the past month…    1)  How often have you had a sensation of not emptying your bladder completely after you finish urinating?  1 - Less than 1 time in 5   2)  How often have you had to urinate again less than two hours after you finished urinating? 0 - Not at all   3)  How often have you found you  stopped and started again several times when you urinated?  0 - Not at all   4) How difficult have you found it to postpone urination?  0 - Not at all   5) How often have you had a weak urinary stream?  1 - Less than 1 time in 5   6) How often have you had to push or strain to begin urination?  1 - Less than 1 time in 5   7) How many times did you most typically get up to urinate from the time you went to bed until the time you got up in the morning?  0 - None   Total score:  0-7 mildly symptomatic                                              3    8-19 moderately symptomatic    20-35 severely symptomatic       Result Review :     PSA    PSA 10/28/21 1/28/22   PSA 2.830 0.6      Comments are available for some flowsheets but are not being displayed.                     Assessment and Plan    Diagnoses and all orders for this visit:    1. Benign prostatic hyperplasia without lower urinary tract symptoms (Primary)  -     PSA, Total & Free  -     PSA DIAGNOSTIC; Future  -     finasteride (PROSCAR) 5 MG tablet; Take 1 tablet by mouth Daily for 360 days.  Dispense: 90 tablet; Refill: 5    2. Urinary tract infection without hematuria, site unspecified  -     Cancel: Urine Culture - Urine, Urine, Clean Catch    3. Frequency of urination  -     POC Urinalysis Dipstick, Automated  -     PSA, Total & Free  -     PSA DIAGNOSTIC; Future  -     finasteride (PROSCAR) 5 MG tablet; Take 1 tablet by mouth Daily for 360 days.  Dispense: 90 tablet; Refill: 5    4. Incomplete emptying of bladder  -     Bladder Scan  -     PSA DIAGNOSTIC; Future  -     finasteride (PROSCAR) 5 MG tablet; Take 1 tablet by mouth Daily for 360 days.  Dispense: 90 tablet; Refill: 5    5. Benign prostatic hyperplasia with urinary obstruction  -     PSA DIAGNOSTIC; Future  -     finasteride (PROSCAR) 5 MG tablet; Take 1 tablet by mouth Daily for 360 days.  Dispense: 90 tablet; Refill: 5        PLAN  Pleasantly confused 76-year-old male with dementia, wheelchair  dependent, nursing home extensive care facility returns to clinic today accompanied by his wife for 3-month follow-up.  Reports significant improvement since starting finasteride, he denies any urinary issues today.  Unable to obtain any urine sample, PVR is 11 cc, IPSS score is 3.    BPH: WE Discussed the pathophysiology of BPH and obstruction. We discussed the static and dynamic effects of BPH as well as using 5 alpha reductase inhibitors versus alpha blockade.  We discussed the indications for transurethral surgery as well.  And/ or other therapeutic options available including all of the newer techniques.  He has no real symptomatology referable to his prostate other than moderate enlargement.  Rather than proceed with an expensive workup he doesn't need, at this time I am recommending he continue with an  alpha reductase inhibitor-finasteride 5 mg daily.  Patient denies any side effects from medications.  His most recent PSA is 0.6, with a free PSA of 23%    WE Discussed maximal medical therapy with finasteride and tamsulosin and how each medication works I explained that finasteride would reduce the size of the prostate by 20-30% reduce his PSA by 50% reduce the risks of either surgery or urinary retention by 50% and may reduce the risk of prostate cancer well-differentiated by 20-30% however he may increase the risk of poorly differentiated prostate cancer by half to 1% and that I also explained how.    The natural history of prostate cancer and ongoing controversy regarding screening and potential treatment outcomes of prostate cancer has been discussed with the patient. The meaning of a false positive PSA and a false negative PSA has been discussed. He indicates understanding of the limitations of this screening test and wishes To proceed with screening PSA testing.  Also explained the variations in PSA with age group,       PLAN  We checked his PSA today free and total, I will call with results if any  concerns.    Refilled his medication, currently managed at his nursing home facility.    Follow-up in 1 year, recheck his PSA.  He may return sooner if need be or concerns with urinary retention.    Patient/wife agreeable plan of care.      Follow Up   Return in about 1 year (around 1/28/2023) for Next scheduled follow up/PSA/MED REFILS.  Patient was given instructions and counseling regarding his condition or for health maintenance advice. Please see specific information pulled into the AVS if appropriate.

## 2022-01-30 LAB
PSA FREE MFR SERPL: 23.3 %
PSA FREE SERPL-MCNC: 0.14 NG/ML
PSA SERPL-MCNC: 0.6 NG/ML (ref 0–4)

## 2022-01-31 RX ORDER — FINASTERIDE 5 MG/1
5 TABLET, FILM COATED ORAL DAILY
Qty: 90 TABLET | Refills: 5 | Status: SHIPPED | OUTPATIENT
Start: 2022-01-31 | End: 2022-05-29

## 2022-02-02 ENCOUNTER — APPOINTMENT (OUTPATIENT)
Dept: CT IMAGING | Facility: HOSPITAL | Age: 77
End: 2022-02-02

## 2022-02-02 ENCOUNTER — HOSPITAL ENCOUNTER (EMERGENCY)
Facility: HOSPITAL | Age: 77
Discharge: HOME | End: 2022-02-03
Attending: EMERGENCY MEDICINE | Admitting: EMERGENCY MEDICINE

## 2022-02-02 DIAGNOSIS — I26.93 SINGLE SUBSEGMENTAL PULMONARY EMBOLISM WITHOUT ACUTE COR PULMONALE: Primary | ICD-10-CM

## 2022-02-02 LAB
ALBUMIN SERPL-MCNC: 3.53 G/DL (ref 3.5–5.2)
ALBUMIN/GLOB SERPL: 1.1 G/DL
ALP SERPL-CCNC: 117 U/L (ref 39–117)
ALT SERPL W P-5'-P-CCNC: 23 U/L (ref 1–41)
ANION GAP SERPL CALCULATED.3IONS-SCNC: 11.3 MMOL/L (ref 5–15)
APTT PPP: 27.5 SECONDS (ref 25.5–35.4)
AST SERPL-CCNC: 35 U/L (ref 1–40)
BASOPHILS # BLD AUTO: 0.07 10*3/MM3 (ref 0–0.2)
BASOPHILS NFR BLD AUTO: 0.8 % (ref 0–1.5)
BILIRUB SERPL-MCNC: 0.3 MG/DL (ref 0–1.2)
BUN SERPL-MCNC: 22 MG/DL (ref 8–23)
BUN/CREAT SERPL: 28.6 (ref 7–25)
CALCIUM SPEC-SCNC: 8.9 MG/DL (ref 8.6–10.5)
CHLORIDE SERPL-SCNC: 105 MMOL/L (ref 98–107)
CO2 SERPL-SCNC: 20.7 MMOL/L (ref 22–29)
CREAT SERPL-MCNC: 0.77 MG/DL (ref 0.76–1.27)
DEPRECATED RDW RBC AUTO: 47.4 FL (ref 37–54)
EOSINOPHIL # BLD AUTO: 0.36 10*3/MM3 (ref 0–0.4)
EOSINOPHIL NFR BLD AUTO: 4.1 % (ref 0.3–6.2)
ERYTHROCYTE [DISTWIDTH] IN BLOOD BY AUTOMATED COUNT: 13.1 % (ref 12.3–15.4)
GFR SERPL CREATININE-BSD FRML MDRD: 98 ML/MIN/1.73
GLOBULIN UR ELPH-MCNC: 3.3 GM/DL
GLUCOSE SERPL-MCNC: 219 MG/DL (ref 65–99)
HCT VFR BLD AUTO: 36.3 % (ref 37.5–51)
HGB BLD-MCNC: 11.9 G/DL (ref 13–17.7)
HOLD SPECIMEN: NORMAL
IMM GRANULOCYTES # BLD AUTO: 0.14 10*3/MM3 (ref 0–0.05)
IMM GRANULOCYTES NFR BLD AUTO: 1.6 % (ref 0–0.5)
INR PPP: 1.18 (ref 0.9–1.1)
LYMPHOCYTES # BLD AUTO: 1.81 10*3/MM3 (ref 0.7–3.1)
LYMPHOCYTES NFR BLD AUTO: 20.6 % (ref 19.6–45.3)
MCH RBC QN AUTO: 32.4 PG (ref 26.6–33)
MCHC RBC AUTO-ENTMCNC: 32.8 G/DL (ref 31.5–35.7)
MCV RBC AUTO: 98.9 FL (ref 79–97)
MONOCYTES # BLD AUTO: 0.74 10*3/MM3 (ref 0.1–0.9)
MONOCYTES NFR BLD AUTO: 8.4 % (ref 5–12)
NEUTROPHILS NFR BLD AUTO: 5.67 10*3/MM3 (ref 1.7–7)
NEUTROPHILS NFR BLD AUTO: 64.5 % (ref 42.7–76)
NRBC BLD AUTO-RTO: 0 /100 WBC (ref 0–0.2)
PLATELET # BLD AUTO: 224 10*3/MM3 (ref 140–450)
PMV BLD AUTO: 10.7 FL (ref 6–12)
POTASSIUM SERPL-SCNC: 4.8 MMOL/L (ref 3.5–5.2)
PROT SERPL-MCNC: 6.8 G/DL (ref 6–8.5)
PROTHROMBIN TIME: 15.4 SECONDS (ref 12.8–14.5)
RBC # BLD AUTO: 3.67 10*6/MM3 (ref 4.14–5.8)
SODIUM SERPL-SCNC: 137 MMOL/L (ref 136–145)
WBC NRBC COR # BLD: 8.79 10*3/MM3 (ref 3.4–10.8)
WHOLE BLOOD HOLD SPECIMEN: NORMAL
WHOLE BLOOD HOLD SPECIMEN: NORMAL

## 2022-02-02 PROCEDURE — 85025 COMPLETE CBC W/AUTO DIFF WBC: CPT | Performed by: EMERGENCY MEDICINE

## 2022-02-02 PROCEDURE — 99283 EMERGENCY DEPT VISIT LOW MDM: CPT

## 2022-02-02 PROCEDURE — 85610 PROTHROMBIN TIME: CPT | Performed by: NURSE PRACTITIONER

## 2022-02-02 PROCEDURE — 71275 CT ANGIOGRAPHY CHEST: CPT

## 2022-02-02 PROCEDURE — 36415 COLL VENOUS BLD VENIPUNCTURE: CPT

## 2022-02-02 PROCEDURE — 80053 COMPREHEN METABOLIC PANEL: CPT | Performed by: NURSE PRACTITIONER

## 2022-02-02 PROCEDURE — 85730 THROMBOPLASTIN TIME PARTIAL: CPT | Performed by: NURSE PRACTITIONER

## 2022-02-02 PROCEDURE — 0 IOPAMIDOL PER 1 ML: Performed by: EMERGENCY MEDICINE

## 2022-02-02 RX ORDER — SODIUM CHLORIDE 0.9 % (FLUSH) 0.9 %
10 SYRINGE (ML) INJECTION AS NEEDED
Status: DISCONTINUED | OUTPATIENT
Start: 2022-02-02 | End: 2022-02-03 | Stop reason: HOSPADM

## 2022-02-02 RX ADMIN — IOPAMIDOL 80 ML: 755 INJECTION, SOLUTION INTRAVENOUS at 23:26

## 2022-02-03 VITALS
HEART RATE: 70 BPM | RESPIRATION RATE: 18 BRPM | OXYGEN SATURATION: 96 % | SYSTOLIC BLOOD PRESSURE: 142 MMHG | HEIGHT: 71 IN | TEMPERATURE: 98.1 F | WEIGHT: 170 LBS | DIASTOLIC BLOOD PRESSURE: 57 MMHG | BODY MASS INDEX: 23.8 KG/M2

## 2022-02-03 PROCEDURE — 96372 THER/PROPH/DIAG INJ SC/IM: CPT

## 2022-02-03 PROCEDURE — 25010000002 ENOXAPARIN PER 10 MG: Performed by: PHYSICIAN ASSISTANT

## 2022-02-03 RX ADMIN — ENOXAPARIN SODIUM 80 MG: 80 INJECTION SUBCUTANEOUS at 00:48

## 2022-02-03 NOTE — ED NOTES
Called [facility] spoke with Mat for transport back to [facility]. He will send a truck as soon as he can.      Symes, Heather  02/03/22 0046

## 2022-02-03 NOTE — ED NOTES
Updated DIOR Childs from [facility] at this time on the patient being discharged back to facility. Waiting for ambulance at this time.      Krishna Roberts RN  02/03/22 0057

## 2022-02-03 NOTE — ED PROVIDER NOTES
Subjective     Illness  Location:  Patient sent for evaluation of blood clots  Quality:  Denies compalints   Severity:  Moderate  Onset quality:  Sudden  Duration:  1 day  Timing:  Constant  Progression:  Waxing and waning  Chronicity:  New  Context:  Had doppler that showed loose area of DVT that puts patient for increased risk of DVT  Relieved by:  Nothing  Worsened by:  Nothing  Ineffective treatments:  None tried  Associated symptoms: no chest pain, no congestion, no ear pain, no fever, no loss of consciousness, no nausea and no shortness of breath    Risk factors:  Patient is a nursing home resident; history of athersclerotic heart disease      Review of Systems   Constitutional: Negative.  Negative for fever.   HENT: Negative.  Negative for congestion and ear pain.    Eyes: Negative.    Respiratory: Negative.  Negative for shortness of breath.    Cardiovascular: Negative.  Negative for chest pain.   Gastrointestinal: Negative.  Negative for nausea.   Endocrine: Negative.    Genitourinary: Negative.    Musculoskeletal: Negative.    Skin: Negative.    Allergic/Immunologic: Negative.    Neurological: Negative.  Negative for loss of consciousness.   Hematological: Negative.    Psychiatric/Behavioral: Negative.        Past Medical History:   Diagnosis Date   • BPH (benign prostatic hyperplasia)    • Bradycardia     Positive tilt table testing 07/2016   • Coronary artery disease    • Diabetes mellitus (HCC)    • Diverticulosis    • Elevated cholesterol    • Gastric ulcer with hemorrhage    • Gastroesophageal reflux disease 1/26/2021   • History of transfusion    • Hyperlipidemia    • Hypertension    • Psoriasis        No Known Allergies    Past Surgical History:   Procedure Laterality Date   • BACK SURGERY     • CARDIAC CATHETERIZATION N/A 9/20/2016    Procedure: Left Heart Cath;  Surgeon: Giovanni Buenrostro MD;  Location: [facility] INVASIVE LOCATION;  Service:    • CARDIAC CATHETERIZATION N/A 10/4/2016    Procedure:  Left Heart Cath;  Surgeon: Bharathi Andrew MD;  Location:  [facility] INVASIVE LOCATION;  Service:    • CARDIAC CATHETERIZATION N/A 5/9/2017    Procedure: Left Heart Cath;  Surgeon: Giovanni Buenrostro MD;  Location:  [facility] INV[facility] LOC[facility];  Service:    • CARDIAC CATHETERIZATION N/A 5/9/2017    Procedure: ERR;  Surgeon: Giovanni Buenrostro MD;  Location:  [facility] INV[facility] LOC[facility];  Service:    • CARDIAC CATHETERIZATION N/A 11/8/2019    Procedure: Left Heart Cath;  Surgeon: Abraham Oviedo MD;  Location:  [facility] INV[facility] LOC[facility];  Service: Cardiology   • CARDIAC CATHETERIZATION N/A 12/18/2019    Procedure: Coronary angiography;  Surgeon: Jay Barney IV, MD;  Location:  [facility];  Service: Cardiovascular   • CHOLECYSTECTOMY     • COLONOSCOPY  11/13/2015    Dr. Martinez- internal hemorrhoids, sigmoid diverticulosis   • COLONOSCOPY N/A 8/17/2018    Procedure: COLONOSCOPY CPT CODE: 98929;  Surgeon: Gigi Geronimo MD;  Location: [facility] OR;  Service: Gastroenterology   • CORONARY ANGIOPLASTY WITH STENT PLACEMENT     • ENDOSCOPY N/A 8/17/2018    Procedure: ESOPHAGOGASTRODUODENOSCOPY WITH BIOPSY CPT CODE: 24820;  Surgeon: Gigi Geronimo MD;  Location: [facility] OR;  Service: Gastroenterology   • ENDOSCOPY N/A 4/20/2021    Procedure: ESOPHAGOGASTRODUODENOSCOPY;  Surgeon: Geno Vernon MD;  Location: [facility] OR;  Service: Gastroenterology;  Laterality: N/A;   • INTERVENTIONAL RADIOLOGY PROCEDURE N/A 12/18/2019    Procedure: Coil Embolization of septal to pulmonary artery fistuala.;  Surgeon: Jay Barney IV, MD;  Location:  [facility];  Service: Cardiovascular   • NC RT/LT HEART CATHETERS N/A 5/9/2017    Procedure: Percutaneous Coronary Intervention;  Surgeon: Lincoln De Los Santos MD;  Location:  [facility] LOC[facility];  Service: Cardiology   • STOMACH SURGERY      FUSION OF BLEEDING ULCER   • UPPER GASTROINTESTINAL ENDOSCOPY   2015    Dr. Martinez- mild gastritis       Family History   Problem Relation Age of Onset   • Sick sinus syndrome Mother    • Heart failure Mother    • Diabetes Mother    • Liver cancer Father        Social History     Socioeconomic History   • Marital status:    Tobacco Use   • Smoking status: Former Smoker     Packs/day: 3.00     Types: Cigarettes     Quit date:      Years since quittin.1   • Smokeless tobacco: Former User     Quit date: 1986   Vaping Use   • Vaping Use: Never used   Substance and Sexual Activity   • Alcohol use: No   • Drug use: No   • Sexual activity: Defer           Objective   Physical Exam  Vitals and nursing note reviewed.   Constitutional:       Appearance: He is well-developed.   HENT:      Head: Normocephalic.      Right Ear: External ear normal.      Left Ear: External ear normal.   Eyes:      Conjunctiva/sclera: Conjunctivae normal.      Pupils: Pupils are equal, round, and reactive to light.   Cardiovascular:      Rate and Rhythm: Normal rate and regular rhythm.      Heart sounds: Normal heart sounds.   Pulmonary:      Effort: Pulmonary effort is normal.      Breath sounds: Normal breath sounds.   Abdominal:      General: Bowel sounds are normal.      Palpations: Abdomen is soft.   Musculoskeletal:         General: Normal range of motion.      Cervical back: Normal range of motion and neck supple.      Right lower leg: Edema present.      Left lower leg: Edema present.   Skin:     General: Skin is warm and dry.      Capillary Refill: Capillary refill takes less than 2 seconds.   Neurological:      Mental Status: He is alert and oriented to person, place, and time.   Psychiatric:         Behavior: Behavior normal.         Thought Content: Thought content normal.         Procedures           ED Course  ED Course as of 22 0103    Patient had bilateral lower extremity venous doppler at nursing home that showed DVT bilaterally in common  femoral, femoral, and popliteal veins bilaterally.  Thrombus is also noted in the right posterior tibial vein, in both saphenofemoral junction regions.  Loose thrombus is noted in the left common femoral vein and is at increased risk for pulmonary embolic disease. Was ordered per Dr. Pritchett and interpreted per Dr. Gunnar Cadena MD [GWEN]   2323 Endorsed to Tal Santos PA-C [GWEN]   5898 Discussed with pharmacist Bsasam, who recommended that the patient be placed on Lovenox for anticoagulation secondary to his pulmonary embolus. [RB]   Thu Feb 03, 2022   0045 [place] with on-call physician for the nursing home Dr. Riley who is okay with patient being discharged back on anticoagulation.  Patient will be given Lovenox here and then started on more of a therapeutic dosing of Eliquis to be continued with Eliquis 5 mg twice a day. [RB]      ED Course User Index  [GWEN] Sander Santos APRN  [RB] Tal Santos II, PA               Electronically signed by ANDREW Bingham, 02/02/22, 9:59 PM EST.                                     MDM  Number of Diagnoses or Management Options  Single subsegmental pulmonary embolism without acute cor pulmonale (HCC): new and requires workup     Amount and/or Complexity of Data Reviewed  Clinical lab tests: ordered and reviewed  Tests in the radiology section of CPT®: ordered and reviewed  Review and summarize past medical records: yes  Discuss the patient with other providers: yes    Risk of Complications, Morbidity, and/or Mortality  Presenting problems: moderate  Diagnostic procedures: moderate  Management options: moderate    Patient Progress  Patient progress: stable      Final diagnoses:   Single subsegmental pulmonary embolism without acute cor pulmonale (HCC)       ED Disposition  ED Disposition     ED Disposition Condition Comment    Discharge Stable           Bharathi Riley MD  [address] [facility] [address]  [phone]    Schedule an appointment as soon as  possible for a visit            Medication List      New Prescriptions    apixaban 5 MG tablet tablet  Commonly known as: ELIQUIS  Take 2 tablets by mouth Every 12 (Twelve) Hours for 7 days. 5mg BID after the 7 days.           Where to Get Your Medications      You can get these medications from any pharmacy    Bring a paper prescription for each of these medications  · apixaban 5 MG tablet tablet          Tal Santos II, PA  02/03/22 0103

## 2022-02-03 NOTE — ED NOTES
Dr. Riley, on call [facility] physician called for provider Tal Rojo at this time.      Millie Castro, RN  02/03/22 0051

## 2022-02-16 ENCOUNTER — TELEPHONE (OUTPATIENT)
Dept: CARDIOLOGY | Facility: CLINIC | Age: 77
End: 2022-02-16

## 2022-02-16 NOTE — TELEPHONE ENCOUNTER
Please call patients wife regarding concerns over recent blood clots while patient is in the nursing home.

## 2022-02-16 NOTE — TELEPHONE ENCOUNTER
Called Angela back and she stated she was wanting to know what could have caused the clots.   She is going to call the pulmonologist office and I made Fidencio a an with  on 4/13/22 at 8:30 am.

## 2022-02-16 NOTE — TELEPHONE ENCOUNTER
What questions does he have? I reviewed chart. Looks like he is now on anticoagulation. He may need to follow up with pulmonology as well. We can try to get him in to be seen but should see Dr. Buenrostro given this recent development.

## 2022-02-22 NOTE — TELEPHONE ENCOUNTER
Several things can lead to a PE. Sometimes they can even be unprovoked, meaning no cause is found. Common causes include any recent surgical procedure, hospitalization, recent long travel where the patient does move much (such as sitting in a car driving for several hours).

## 2022-03-10 ENCOUNTER — OFFICE VISIT (OUTPATIENT)
Dept: GASTROENTEROLOGY | Facility: CLINIC | Age: 77
End: 2022-03-10

## 2022-03-10 VITALS
HEIGHT: 71 IN | OXYGEN SATURATION: 99 % | SYSTOLIC BLOOD PRESSURE: 148 MMHG | BODY MASS INDEX: 23.71 KG/M2 | HEART RATE: 55 BPM | DIASTOLIC BLOOD PRESSURE: 81 MMHG

## 2022-03-10 DIAGNOSIS — R63.4 WEIGHT LOSS, ABNORMAL: ICD-10-CM

## 2022-03-10 DIAGNOSIS — R19.7 DIARRHEA, UNSPECIFIED TYPE: Primary | ICD-10-CM

## 2022-03-10 PROCEDURE — 99213 OFFICE O/P EST LOW 20 MIN: CPT | Performed by: INTERNAL MEDICINE

## 2022-03-10 RX ORDER — LANOLIN ALCOHOL/MO/W.PET/CERES
1000 CREAM (GRAM) TOPICAL DAILY
COMMUNITY
End: 2022-05-29

## 2022-03-10 NOTE — PROGRESS NOTES
Subjective   Fdiencio Luciano is a 76 y.o. male who presents to the office today as a follow up appointment regarding Diarrhea      History of Present Illness:  Mr. Luciano presents today with his wife for follow-up weight loss and diarrhea.  He is now at [facility].  He has moved from the rehab center to the long-term care center.  His wife states that he is still wheelchair-bound and unsteady on his feet.  Therefore, she has not been able to take him home.  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  His wife states he is eating better.  He finishes his plate and states that he is full.  He does suffer from dementia and it is not easy to get much history from him.  His wife states when he went into rehab he weighed 139 lbs.  He now weights around 170 lbs. He no longer is on Megace per the med list that I received today.  His wife is worried now that he may be gaining too much weight.  He is no longer getting rehab.  His wife feels he still needs it.  He states he still is having intermittent diarrhea is not clear how many bowel movements he is having daily and if they are indeed loose.      Review of Systems:  Review of Systems   Constitutional: Positive for unexpected weight change. Negative for fever.   HENT: Negative for trouble swallowing.    Eyes: Negative.    Respiratory: Negative for chest tightness.    Cardiovascular: Negative for chest pain.   Gastrointestinal: Positive for diarrhea. Negative for abdominal distention, abdominal pain, anal bleeding, blood in stool, constipation, nausea, rectal pain and vomiting.   Endocrine: Negative.    Genitourinary: Negative for difficulty urinating.   Musculoskeletal: Negative.    Skin: Negative.    Allergic/Immunologic: Negative.    Neurological: Negative for headaches.   Hematological: Bruises/bleeds easily.   Psychiatric/Behavioral: Negative.        Past Medical History:  Past Medical History:   Diagnosis Date   • BPH (benign prostatic hyperplasia)    •  Bradycardia     Positive tilt table testing 07/2016   • Coronary artery disease    • Diabetes mellitus (HCC)    • Diverticulosis    • Elevated cholesterol    • Gastric ulcer with hemorrhage    • Gastroesophageal reflux disease 1/26/2021   • History of transfusion    • Hyperlipidemia    • Hypertension    • Psoriasis        Past Surgical History:  Past Surgical History:   Procedure Laterality Date   • BACK SURGERY     • CARDIAC CATHETERIZATION N/A 9/20/2016    Procedure: Left Heart Cath;  Surgeon: Giovanni Buenrostro MD;  Location:  [facility];  Service:    • CARDIAC CATHETERIZATION N/A 10/4/2016    Procedure: Left Heart Cath;  Surgeon: Bharathi Andrew MD;  Location:  [facility];  Service:    • CARDIAC CATHETERIZATION N/A 5/9/2017    Procedure: Left Heart Cath;  Surgeon: Giovanni Buenrostro MD;  Location:  [facility];  Service:    • CARDIAC CATHETERIZATION N/A 5/9/2017    Procedure: ERR;  Surgeon: Giovanni Buenrostro MD;  Location:  [facility];  Service:    • CARDIAC CATHETERIZATION N/A 11/8/2019    Procedure: Left Heart Cath;  Surgeon: Abraham Oviedo MD;  Location:  [facility];  Service: Cardiology   • CARDIAC CATHETERIZATION N/A 12/18/2019    Procedure: Coronary angiography;  Surgeon: Jay Barney IV, MD;  Location:  [facility];  Service: Cardiovascular   • CHOLECYSTECTOMY     • COLONOSCOPY  11/13/2015    Dr. Martinez- internal hemorrhoids, sigmoid diverticulosis   • COLONOSCOPY N/A 8/17/2018    Procedure: COLONOSCOPY CPT CODE: 31755;  Surgeon: Gigi Geronimo MD;  Location: [facility];  Service: Gastroenterology   • CORONARY ANGIOPLASTY WITH STENT PLACEMENT     • ENDOSCOPY N/A 8/17/2018    Procedure: ESOPHAGOGASTRODUODENOSCOPY WITH BIOPSY CPT CODE: 57372;  Surgeon: Gigi Geronimo MD;  Location: [facility] OR;  Service: Gastroenterology   • ENDOSCOPY N/A 4/20/2021    Procedure:  ESOPHAGOGASTRODUODENOSCOPY;  Surgeon: Geno Vernon MD;  Location:  [facility];  Service: Gastroenterology;  Laterality: N/A;   • INTERVENTIONAL RADIOLOGY PROCEDURE N/A 2019    Procedure: Coil Embolization of septal to pulmonary artery fistuala.;  Surgeon: Jay Barney IV, MD;  Location:  [facility];  Service: Cardiovascular   • MI RT/LT HEART CATHETERS N/A 2017    Procedure: Percutaneous Coronary Intervention;  Surgeon: Lincoln De Los Santos MD;  Location:  [facility];  Service: Cardiology   • STOMACH SURGERY      FUSION OF BLEEDING ULCER   • UPPER GASTROINTESTINAL ENDOSCOPY  2015    Dr. Martinez- mild gastritis       Family History:  Family History   Problem Relation Age of Onset   • Sick sinus syndrome Mother    • Heart failure Mother    • Diabetes Mother    • Liver cancer Father        Social History:  Social History     Socioeconomic History   • Marital status:    Tobacco Use   • Smoking status: Former Smoker     Packs/day: 3.00     Types: Cigarettes     Quit date:      Years since quittin.2   • Smokeless tobacco: Former User     Quit date: 1986   Vaping Use   • Vaping Use: Never used   Substance and Sexual Activity   • Alcohol use: No   • Drug use: No   • Sexual activity: Defer       Current Medication List:    Current Outpatient Medications:   •  albuterol sulfate  (90 Base) MCG/ACT inhaler, Inhale 2 puffs Every 4 (Four) Hours As Needed for Wheezing., Disp: 6.7 g, Rfl: 5  •  apixaban (Eliquis) 5 MG tablet tablet, Take 5 mg by mouth 2 (Two) Times a Day., Disp: , Rfl:   •  aspirin 81 MG tablet, Take 81 mg by mouth Daily., Disp: , Rfl:   •  atorvastatin (LIPITOR) 80 MG tablet, Take 1 tablet by mouth Every Night., Disp: 90 tablet, Rfl: 5  •  budesonide-formoterol (SYMBICORT) 80-4.5 MCG/ACT inhaler, Inhale 2 puffs 2 (Two) Times a Day., Disp: 10.2 g, Rfl: 5  •  cetirizine (zyrTEC) 10 MG tablet, Take 10 mg by mouth Daily.,  "Disp: , Rfl:   •  clopidogrel (PLAVIX) 75 MG tablet, Take 1 tablet by mouth Daily., Disp: 30 tablet, Rfl: 5  •  colestipol (COLESTID) 1 g tablet, Take 1 g by mouth 2 (Two) Times a Day., Disp: , Rfl:   •  finasteride (PROSCAR) 5 MG tablet, Take 1 tablet by mouth Daily for 360 days., Disp: 90 tablet, Rfl: 5  •  FLUoxetine (PROzac) 20 MG capsule, Take 20 mg by mouth Daily., Disp: , Rfl:   •  isosorbide mononitrate (IMDUR) 120 MG 24 hr tablet, Take 1 tablet by mouth Every Morning., Disp: 90 tablet, Rfl: 2  •  memantine (NAMENDA XR) 28 MG capsule sustained-release 24 hr extended release capsule, , Disp: , Rfl:   •  metoprolol tartrate (LOPRESSOR) 25 MG tablet, Take 0.5 tablets by mouth Every 12 (Twelve) Hours., Disp: , Rfl:   •  mirtazapine (REMERON SOL-TAB) 45 MG disintegrating tablet, Place 45 mg on the tongue Every Night., Disp: , Rfl:   •  nitroglycerin (NITROSTAT) 0.4 MG SL tablet, 1 under the tongue as needed for angina, may repeat q5mins for up three doses, Disp: 25 tablet, Rfl: 2  •  pantoprazole (PROTONIX) 40 MG EC tablet, Take 40 mg by mouth Daily., Disp: , Rfl:   •  polyethylene glycol (MIRALAX) 17 g packet, Take 17 g by mouth Daily., Disp: , Rfl:   •  ranolazine (RANEXA) 500 MG 12 hr tablet, Take 1 tablet by mouth Every 12 (Twelve) Hours., Disp: , Rfl:   •  vitamin B-12 (CYANOCOBALAMIN) 1000 MCG tablet, Take 1,000 mcg by mouth Daily., Disp: , Rfl:   •  megestrol (Megace ES) 625 MG/5ML suspension, Take 5 mL by mouth Daily for 30 days., Disp: 150 mL, Rfl: 2    Allergies:   Patient has no known allergies.    Vitals:  /81   Pulse 55   Ht 180.3 cm (71\")   SpO2 99%   BMI 23.71 kg/m²     Physical Exam:  Physical Exam  Constitutional:       Appearance: He is normal weight.   HENT:      Head: Normocephalic and atraumatic.      Nose: Nose normal. No congestion or rhinorrhea.   Eyes:      General: No scleral icterus.     Extraocular Movements: Extraocular movements intact.      Conjunctiva/sclera: Conjunctivae " normal.      Pupils: Pupils are equal, round, and reactive to light.   Cardiovascular:      Rate and Rhythm: Normal rate and regular rhythm.      Pulses: Normal pulses.      Heart sounds: Normal heart sounds.   Pulmonary:      Effort: Pulmonary effort is normal.      Breath sounds: Normal breath sounds.   Abdominal:      General: Abdomen is flat. Bowel sounds are normal. There is no distension.      Palpations: Abdomen is soft. There is no shifting dullness, fluid wave, hepatomegaly, splenomegaly, mass or pulsatile mass.      Tenderness: There is no abdominal tenderness. There is no guarding or rebound.      Hernia: No hernia is present.   Musculoskeletal:         General: No swelling or tenderness.      Cervical back: Normal range of motion and neck supple.      Comments: 1+ LE bilaterally   Skin:     General: Skin is warm and dry.      Coloration: Skin is not jaundiced.   Neurological:      General: No focal deficit present.      Mental Status: He is alert and oriented to person, place, and time.   Psychiatric:         Mood and Affect: Mood normal.         Behavior: Behavior normal.         Results Review:  Lab Results:   No visits with results within 1 Month(s) from this visit.   Latest known visit with results is:   Admission on 02/02/2022, Discharged on 02/03/2022   Component Date Value Ref Range Status   • Extra Tube 02/02/2022 Hold for add-ons.   Final    Auto resulted.   • Extra Tube 02/02/2022 hold for add-on   Final    Auto resulted   • Extra Tube 02/02/2022 hold for add-on   Final    Auto resulted   • Glucose 02/02/2022 219 (A) 65 - 99 mg/dL Final   • BUN 02/02/2022 22  8 - 23 mg/dL Final   • Creatinine 02/02/2022 0.77  0.76 - 1.27 mg/dL Final   • Sodium 02/02/2022 137  136 - 145 mmol/L Final   • Potassium 02/02/2022 4.8  3.5 - 5.2 mmol/L Final    Specimen hemolyzed.  Results may be affected. 2+ Hemolysis     • Chloride 02/02/2022 105  98 - 107 mmol/L Final   • CO2 02/02/2022 20.7 (A) 22.0 - 29.0 mmol/L  Final   • Calcium 02/02/2022 8.9  8.6 - 10.5 mg/dL Final   • Total Protein 02/02/2022 6.8  6.0 - 8.5 g/dL Final   • Albumin 02/02/2022 3.53  3.50 - 5.20 g/dL Final   • ALT (SGPT) 02/02/2022 23  1 - 41 U/L Final    Specimen hemolyzed.  Results may be affected.   • AST (SGOT) 02/02/2022 35  1 - 40 U/L Final   • Alkaline Phosphatase 02/02/2022 117  39 - 117 U/L Final   • Total Bilirubin 02/02/2022 0.3  0.0 - 1.2 mg/dL Final   • eGFR Non African Amer 02/02/2022 98  >60 mL/min/1.73 Final   • Globulin 02/02/2022 3.3  gm/dL Final   • A/G Ratio 02/02/2022 1.1  g/dL Final   • BUN/Creatinine Ratio 02/02/2022 28.6 (A) 7.0 - 25.0 Final   • Anion Gap 02/02/2022 11.3  5.0 - 15.0 mmol/L Final   • Protime 02/02/2022 15.4 (A) 12.8 - 14.5 Seconds Final   • INR 02/02/2022 1.18 (A) 0.90 - 1.10 Final   • PTT 02/02/2022 27.5  25.5 - 35.4 seconds Final   • WBC 02/02/2022 8.79  3.40 - 10.80 10*3/mm3 Final   • RBC 02/02/2022 3.67 (A) 4.14 - 5.80 10*6/mm3 Final   • Hemoglobin 02/02/2022 11.9 (A) 13.0 - 17.7 g/dL Final   • Hematocrit 02/02/2022 36.3 (A) 37.5 - 51.0 % Final   • MCV 02/02/2022 98.9 (A) 79.0 - 97.0 fL Final   • MCH 02/02/2022 32.4  26.6 - 33.0 pg Final   • MCHC 02/02/2022 32.8  31.5 - 35.7 g/dL Final   • RDW 02/02/2022 13.1  12.3 - 15.4 % Final   • RDW-SD 02/02/2022 47.4  37.0 - 54.0 fl Final   • MPV 02/02/2022 10.7  6.0 - 12.0 fL Final   • Platelets 02/02/2022 224  140 - 450 10*3/mm3 Final   • Neutrophil % 02/02/2022 64.5  42.7 - 76.0 % Final   • Lymphocyte % 02/02/2022 20.6  19.6 - 45.3 % Final   • Monocyte % 02/02/2022 8.4  5.0 - 12.0 % Final   • Eosinophil % 02/02/2022 4.1  0.3 - 6.2 % Final   • Basophil % 02/02/2022 0.8  0.0 - 1.5 % Final   • Immature Grans % 02/02/2022 1.6 (A) 0.0 - 0.5 % Final   • Neutrophils, Absolute 02/02/2022 5.67  1.70 - 7.00 10*3/mm3 Final   • Lymphocytes, Absolute 02/02/2022 1.81  0.70 - 3.10 10*3/mm3 Final   • Monocytes, Absolute 02/02/2022 0.74  0.10 - 0.90 10*3/mm3 Final   • Eosinophils,  Absolute 02/02/2022 0.36  0.00 - 0.40 10*3/mm3 Final   • Basophils, Absolute 02/02/2022 0.07  0.00 - 0.20 10*3/mm3 Final   • Immature Grans, Absolute 02/02/2022 0.14 (A) 0.00 - 0.05 10*3/mm3 Final   • nRBC 02/02/2022 0.0  0.0 - 0.2 /100 WBC Final       Assessment/Plan     Visit Diagnoses:    ICD-10-CM ICD-9-CM   1. Diarrhea, unspecified type  R19.7 787.91   2. Weight loss, abnormal  R63.4 783.21       Plan:  I have asked the patient's wife to talk with the nursing staff at the nursing home about keeping track of how many bowel movements the patient is having daily and the stool consistency.  She will keep a record for the patient's next visit.  His weight has improved significantly.  I have recommended that he stop the megestrol.  It was not on his med list so I assume that the nursing home physician has stopped this medication.  He will follow-up in 3 months.    * Surgery not found *      MEDS ORDERED DURING VISIT:  No orders of the defined types were placed in this encounter.      Return in about 3 months (around 6/10/2022).             This document has been electronically signed by Geno Vernon MD   March 10, 2022 19:30 EST      Part of this note may be an electronic transcription/translation of spoken language to printed text using the Dragon Dictation System.

## 2022-03-21 NOTE — PROGRESS NOTES
Patient's wife called and asked for an appointment for Fidencio due to having some rectal bleeding. I made him an appointment on 4-7-17 (as that is our next availblity). I told her if he continues to have rectal bleeding he needs to go to ER. She voiced understanding.   Azathioprine Pregnancy And Lactation Text: This medication is Pregnancy Category D and isn't considered safe during pregnancy. It is unknown if this medication is excreted in breast milk.

## 2022-03-29 ENCOUNTER — TELEMEDICINE (OUTPATIENT)
Dept: PSYCHIATRY | Facility: CLINIC | Age: 77
End: 2022-03-29

## 2022-03-29 DIAGNOSIS — F33.1 MAJOR DEPRESSIVE DISORDER, RECURRENT EPISODE, MODERATE: ICD-10-CM

## 2022-03-29 DIAGNOSIS — G47.09 OTHER INSOMNIA: ICD-10-CM

## 2022-03-29 DIAGNOSIS — Z79.899 MEDICATION MANAGEMENT: ICD-10-CM

## 2022-03-29 DIAGNOSIS — R41.89 COGNITIVE DECLINE: ICD-10-CM

## 2022-03-29 DIAGNOSIS — F41.1 GENERALIZED ANXIETY DISORDER: Primary | ICD-10-CM

## 2022-03-29 PROCEDURE — 99214 OFFICE O/P EST MOD 30 MIN: CPT | Performed by: NURSE PRACTITIONER

## 2022-03-29 RX ORDER — MIRTAZAPINE 45 MG/1
45 TABLET, ORALLY DISINTEGRATING ORAL NIGHTLY
Qty: 30 TABLET | Refills: 1 | Status: SHIPPED | OUTPATIENT
Start: 2022-03-29 | End: 2022-04-26 | Stop reason: SDUPTHER

## 2022-03-29 RX ORDER — FLUOXETINE HYDROCHLORIDE 20 MG/1
20 CAPSULE ORAL DAILY
Qty: 30 CAPSULE | Refills: 1 | Status: SHIPPED | OUTPATIENT
Start: 2022-03-29 | End: 2022-04-26 | Stop reason: SDUPTHER

## 2022-03-29 RX ORDER — FLUOXETINE 10 MG/1
10 CAPSULE ORAL DAILY
Qty: 30 CAPSULE | Refills: 1 | Status: SHIPPED | OUTPATIENT
Start: 2022-03-29 | End: 2022-04-26

## 2022-03-29 NOTE — PROGRESS NOTES
"This provider is located at the [facility] (through [facility]), [address] [place] [address] using Zoom.  The patient is seen remotely at [place], [place] via Zoom, an encrypted service provided through [facility] with staff present as well as a nurse being present with the patient.  The patient's condition being diagnosed/treated is appropriate for telemedicine. The provider identified herself as well as her credentials.  The patient and/or patient's guardian consent to be seen remotely, and when consent is given they understand that the consent allows for patient identifiable information to be sent to a third party as needed.  They may refuse to be seen remotely at any time. The electronic data is encrypted and password protected, and the patient has been advised of the potential risks to privacy notwithstanding such measures.     I did not complete this video visit with the patient using [employer].  You have chosen to receive care through a telehealth visit.  Do you consent to use a video/audio connection for your medical care today? Yes        Subjective   Fidencio Luciano is a 76 y.o. male who presents today for follow up    Chief Complaint:  Depression    History of Present Illness: Patient presents as follow up via telehealth visit. He reports continuing to feel \"sad\". States he also has difficulty sleeping during the night. He is unable to rate anxiety or depression on a scale 0/10.  Patient appears to be more alert today and was disoriented to time only. He reports appetite is good, per chart review he has gained some weight since being admitted. He denies SI/HI/AVH.    The following portions of the patient's history were reviewed and updated as appropriate: allergies, current medications, past family history, past medical history, past social history, past surgical history and problem list.      Past Medical History:  Past Medical History:   Diagnosis " Date   • BPH (benign prostatic hyperplasia)    • Bradycardia     Positive tilt table testing 2016   • Coronary artery disease    • Diabetes mellitus (HCC)    • Diverticulosis    • Elevated cholesterol    • Gastric ulcer with hemorrhage    • Gastroesophageal reflux disease 2021   • History of transfusion    • Hyperlipidemia    • Hypertension    • Psoriasis        Social History:  Social History     Socioeconomic History   • Marital status:    Tobacco Use   • Smoking status: Former Smoker     Packs/day: 3.00     Types: Cigarettes     Quit date:      Years since quittin.2   • Smokeless tobacco: Former User     Quit date: 1986   Vaping Use   • Vaping Use: Never used   Substance and Sexual Activity   • Alcohol use: No   • Drug use: No   • Sexual activity: Defer       Family History:  Family History   Problem Relation Age of Onset   • Sick sinus syndrome Mother    • Heart failure Mother    • Diabetes Mother    • Liver cancer Father        Past Surgical History:  Past Surgical History:   Procedure Laterality Date   • BACK SURGERY     • CARDIAC CATHETERIZATION N/A 2016    Procedure: Left Heart Cath;  Surgeon: Giovanni Buenrostro MD;  Location: [facility] CATH INVASIVE LOCATION;  Service:    • CARDIAC CATHETERIZATION N/A 10/4/2016    Procedure: Left Heart Cath;  Surgeon: Bharathi Andrew MD;  Location: [facility] CATH INVASIVE LOCATION;  Service:    • CARDIAC CATHETERIZATION N/A 2017    Procedure: Left Heart Cath;  Surgeon: Giovanni Buenrostro MD;  [facility] CATH INVASIVE LOCATION;  Service:    • CARDIAC CATHETERIZATION N/A 2017    Procedure: ERR;  Surgeon: Giovanni Buenrostro MD;  [facility] CATH INVASIVE LOC[facility];  Service:    • CARDIAC CATHETERIZATION N/A 2019    Procedure: Left Heart Cath;  Surgeon: Abraham Oviedo MD;  Location:  [facility] CATH INVASIVE LOC[facility];  Service: Cardiology   • CARDIAC CATHETERIZATION N/A 2019    Procedure: Coronary angiography;  Surgeon: Ector  Jay MELO IV, MD;  Location:  [facility] CATH INVASIVE LOCATION;  Service: Cardiovascular   • CHOLECYSTECTOMY     • COLONOSCOPY  11/13/2015    Dr. Martinez- internal hemorrhoids, sigmoid diverticulosis   • COLONOSCOPY N/A 8/17/2018    Procedure: COLONOSCOPY CPT CODE: 73418;  Surgeon: Gigi Geronimo MD;  Location:  [facility] OR;  Service: Gastroenterology   • CORONARY ANGIOPLASTY WITH STENT PLACEMENT     • ENDOSCOPY N/A 8/17/2018    Procedure: ESOPHAGOGASTRODUODENOSCOPY WITH BIOPSY CPT CODE: 60433;  Surgeon: Gigi Geronimo MD;  Location:  [facility] OR;  Service: Gastroenterology   • ENDOSCOPY N/A 4/20/2021    Procedure: ESOPHAGOGASTRODUODENOSCOPY;  Surgeon: Geno Vernon MD;  Location:  [facility];  Service: Gastroenterology;  Laterality: N/A;   • INTERVENTIONAL RADIOLOGY PROCEDURE N/A 12/18/2019    Procedure: Coil Embolization of septal to pulmonary artery fistuala.;  Surgeon: Jay Barney IV, MD;  Location:  [facility] CATH INVASIVE LOCATION;  Service: Cardiovascular   • HI RT/LT HEART CATHETERS N/A 5/9/2017    Procedure: Percutaneous Coronary Intervention;  Surgeon: Lincoln De Los Santos MD;  [facility]:  [facility] CATH INVASIVE LOC[facility];  Service: Cardiology   • STOMACH SURGERY      FUSION OF BLEEDING ULCER   • UPPER GASTROINTESTINAL ENDOSCOPY  11/13/2015    Dr. Martinez- mild gastritis       Problem List:  Patient Active Problem List   Diagnosis   • Essential hypertension   • Type 2 diabetes mellitus (HCC)   • Benign prostatic hyperplasia   • ASCVD (arteriosclerotic cardiovascular disease), s/p stenting of 80 % stenosis in left circumflex on 10/05/16and 60% stenosis in the LAD, clinically stable.    • Hyperlipidemia LDL goal <70   • Weakness generalized   • Coronary artery fistula   • Weight loss, unintentional   • Iron deficiency anemia   • Gastroesophageal reflux disease   • Dysphagia   • Chest pain        Allergy:   No Known Allergies     Current Medications:   Current Outpatient Medications    Medication Sig Dispense Refill   • mirtazapine (REMERON SOL-TAB) 45 MG disintegrating tablet Place 1 tablet on the tongue Every Night. 30 tablet 1   • albuterol sulfate  (90 Base) MCG/ACT inhaler Inhale 2 puffs Every 4 (Four) Hours As Needed for Wheezing. 6.7 g 5   • apixaban (Eliquis) 5 MG tablet tablet Take 5 mg by mouth 2 (Two) Times a Day.     • aspirin 81 MG tablet Take 81 mg by mouth Daily.     • atorvastatin (LIPITOR) 80 MG tablet Take 1 tablet by mouth Every Night. 90 tablet 5   • budesonide-formoterol (SYMBICORT) 80-4.5 MCG/ACT inhaler Inhale 2 puffs 2 (Two) Times a Day. 10.2 g 5   • cetirizine (zyrTEC) 10 MG tablet Take 10 mg by mouth Daily.     • clopidogrel (PLAVIX) 75 MG tablet Take 1 tablet by mouth Daily. 30 tablet 5   • colestipol (COLESTID) 1 g tablet Take 1 g by mouth 2 (Two) Times a Day.     • finasteride (PROSCAR) 5 MG tablet Take 1 tablet by mouth Daily for 360 days. 90 tablet 5   • FLUoxetine (PROzac) 10 MG capsule Take 1 capsule by mouth Daily. 30 capsule 1   • FLUoxetine (PROzac) 20 MG capsule Take 1 capsule by mouth Daily. 30 capsule 1   • isosorbide mononitrate (IMDUR) 120 MG 24 hr tablet Take 1 tablet by mouth Every Morning. 90 tablet 2   • megestrol (Megace ES) 625 MG/5ML suspension Take 5 mL by mouth Daily for 30 days. 150 mL 2   • Melatonin 5 MG capsule Take 5 mg by mouth Every Night. 30 each 1   • memantine (NAMENDA XR) 28 MG capsule sustained-release 24 hr extended release capsule      • metoprolol tartrate (LOPRESSOR) 25 MG tablet Take 0.5 tablets by mouth Every 12 (Twelve) Hours.     • nitroglycerin (NITROSTAT) 0.4 MG SL tablet 1 under the tongue as needed for angina, may repeat q5mins for up three doses 25 tablet 2   • pantoprazole (PROTONIX) 40 MG EC tablet Take 40 mg by mouth Daily.     • polyethylene glycol (MIRALAX) 17 g packet Take 17 g by mouth Daily.     • ranolazine (RANEXA) 500 MG 12 hr tablet Take 1 tablet by mouth Every 12 (Twelve) Hours.     • vitamin B-12  (CYANOCOBALAMIN) 1000 MCG tablet Take 1,000 mcg by mouth Daily.       No current facility-administered medications for this visit.       Review of Symptoms:    Review of Systems   Constitutional: Positive for fatigue.   HENT: Negative.    Eyes: Negative.    Respiratory: Negative.    Cardiovascular: Negative.    Gastrointestinal: Negative.    Genitourinary: Negative.    Musculoskeletal: Negative.    Skin: Negative.    Neurological: Positive for memory problem and confusion.   Psychiatric/Behavioral: Positive for sleep disturbance and depressed mood. Negative for suicidal ideas. The patient is nervous/anxious.          Physical Exam:   There were no vitals taken for this visit.  There is no height or weight on file to calculate BMI.    Appearance: Well nourished male, appropriately dressed, appears stated age and in no acute distress  Gait, Station, Strength: Unable to evaluate due to video visit    Mental Status Exam:   Hygiene:   good  Cooperation:  Cooperative  Eye Contact:  Good  Psychomotor Behavior:  Slow  Affect:  Appropriate  Mood: normal  Hopelessness: Denies  Speech:  Minimal  Thought Process:  Linear  Thought Content:  Mood congruent  Suicidal:  None  Homicidal:  None  Hallucinations:  None  Delusion:  None  Memory:  Intact  Orientation:  Person, Place, Time and Situation  Reliability:  fair  Insight:  Poor  Judgement:  Impaired  Impulse Control:  Impaired  Physical/Medical Issues:  Yes Chronic health issues     PHQ-Score Total:  PHQ-9 Total Score: 1           Lab Results:   No visits with results within 1 Month(s) from this visit.   Latest known visit with results is:   Admission on 02/02/2022, Discharged on 02/03/2022   Component Date Value Ref Range Status   • Extra Tube 02/02/2022 Hold for add-ons.   Final    Auto resulted.   • Extra Tube 02/02/2022 hold for add-on   Final    Auto resulted   • Extra Tube 02/02/2022 hold for add-on   Final    Auto resulted   • Glucose 02/02/2022 219 (A) 65 - 99 mg/dL  Final   • BUN 02/02/2022 22  8 - 23 mg/dL Final   • Creatinine 02/02/2022 0.77  0.76 - 1.27 mg/dL Final   • Sodium 02/02/2022 137  136 - 145 mmol/L Final   • Potassium 02/02/2022 4.8  3.5 - 5.2 mmol/L Final    Specimen hemolyzed.  Results may be affected. 2+ Hemolysis     • Chloride 02/02/2022 105  98 - 107 mmol/L Final   • CO2 02/02/2022 20.7 (A) 22.0 - 29.0 mmol/L Final   • Calcium 02/02/2022 8.9  8.6 - 10.5 mg/dL Final   • Total Protein 02/02/2022 6.8  6.0 - 8.5 g/dL Final   • Albumin 02/02/2022 3.53  3.50 - 5.20 g/dL Final   • ALT (SGPT) 02/02/2022 23  1 - 41 U/L Final    Specimen hemolyzed.  Results may be affected.   • AST (SGOT) 02/02/2022 35  1 - 40 U/L Final   • Alkaline Phosphatase 02/02/2022 117  39 - 117 U/L Final   • Total Bilirubin 02/02/2022 0.3  0.0 - 1.2 mg/dL Final   • eGFR Non African Amer 02/02/2022 98  >60 mL/min/1.73 Final   • Globulin 02/02/2022 3.3  gm/dL Final   • A/G Ratio 02/02/2022 1.1  g/dL Final   • BUN/Creatinine Ratio 02/02/2022 28.6 (A) 7.0 - 25.0 Final   • Anion Gap 02/02/2022 11.3  5.0 - 15.0 mmol/L Final   • Protime 02/02/2022 15.4 (A) 12.8 - 14.5 Seconds Final   • INR 02/02/2022 1.18 (A) 0.90 - 1.10 Final   • PTT 02/02/2022 27.5  25.5 - 35.4 seconds Final   • WBC 02/02/2022 8.79  3.40 - 10.80 10*3/mm3 Final   • RBC 02/02/2022 3.67 (A) 4.14 - 5.80 10*6/mm3 Final   • Hemoglobin 02/02/2022 11.9 (A) 13.0 - 17.7 g/dL Final   • Hematocrit 02/02/2022 36.3 (A) 37.5 - 51.0 % Final   • MCV 02/02/2022 98.9 (A) 79.0 - 97.0 fL Final   • MCH 02/02/2022 32.4  26.6 - 33.0 pg Final   • MCHC 02/02/2022 32.8  31.5 - 35.7 g/dL Final   • RDW 02/02/2022 13.1  12.3 - 15.4 % Final   • RDW-SD 02/02/2022 47.4  37.0 - 54.0 fl Final   • MPV 02/02/2022 10.7  6.0 - 12.0 fL Final   • Platelets 02/02/2022 224  140 - 450 10*3/mm3 Final   • Neutrophil % 02/02/2022 64.5  42.7 - 76.0 % Final   • Lymphocyte % 02/02/2022 20.6  19.6 - 45.3 % Final   • Monocyte % 02/02/2022 8.4  5.0 - 12.0 % Final   • Eosinophil %  02/02/2022 4.1  0.3 - 6.2 % Final   • Basophil % 02/02/2022 0.8  0.0 - 1.5 % Final   • Immature Grans % 02/02/2022 1.6 (A) 0.0 - 0.5 % Final   • Neutrophils, Absolute 02/02/2022 5.67  1.70 - 7.00 10*3/mm3 Final   • Lymphocytes, Absolute 02/02/2022 1.81  0.70 - 3.10 10*3/mm3 Final   • Monocytes, Absolute 02/02/2022 0.74  0.10 - 0.90 10*3/mm3 Final   • Eosinophils, Absolute 02/02/2022 0.36  0.00 - 0.40 10*3/mm3 Final   • Basophils, Absolute 02/02/2022 0.07  0.00 - 0.20 10*3/mm3 Final   • Immature Grans, Absolute 02/02/2022 0.14 (A) 0.00 - 0.05 10*3/mm3 Final   • nRBC 02/02/2022 0.0  0.0 - 0.2 /100 WBC Final       Assessment/Plan   Diagnoses and all orders for this visit:    1. Generalized anxiety disorder (Primary)  -     FLUoxetine (PROzac) 10 MG capsule; Take 1 capsule by mouth Daily.  Dispense: 30 capsule; Refill: 1  -     FLUoxetine (PROzac) 20 MG capsule; Take 1 capsule by mouth Daily.  Dispense: 30 capsule; Refill: 1  -     mirtazapine (REMERON SOL-TAB) 45 MG disintegrating tablet; Place 1 tablet on the tongue Every Night.  Dispense: 30 tablet; Refill: 1    2. Major depressive disorder, recurrent episode, moderate (HCC)  -     FLUoxetine (PROzac) 10 MG capsule; Take 1 capsule by mouth Daily.  Dispense: 30 capsule; Refill: 1  -     FLUoxetine (PROzac) 20 MG capsule; Take 1 capsule by mouth Daily.  Dispense: 30 capsule; Refill: 1    3. Cognitive decline    4. Medication management    5. Other insomnia  -     mirtazapine (REMERON SOL-TAB) 45 MG disintegrating tablet; Place 1 tablet on the tongue Every Night.  Dispense: 30 tablet; Refill: 1  -     Melatonin 5 MG capsule; Take 5 mg by mouth Every Night.  Dispense: 30 each; Refill: 1      -Increase fluoxetine 30 mg daily for anxiety and depression  -Begin melatonin 5 mg nightly for sleep   -Continue mirtazapine 45 mg nightly for sleep  -Will trial melatonin in addition to mirtazapine to help with sleep before switching to different medication  -BETH reviewed and  appropriate. Patient counseled on use of controlled substances.   -The benefits of a healthy diet and exercise were discussed with patient, especially the positive effects they have on mental health. Patient encouraged to consider lifestyle modification regarding  diet and exercise patterns to maximize results of mental health treatment.  -Reviewed previous available documentation  -Reviewed most recent available labs      -Interactive Complexity Yes If yes, due to:  Managing maladaptive communication (related to, e.g., depression, PTSD, high anxiety, high reactivity, repeated questions, or disagreement)      -Session began at 3:45 PM and ended at 4:00 PM    Visit Diagnoses:    ICD-10-CM ICD-9-CM   1. Generalized anxiety disorder  F41.1 300.02   2. Major depressive disorder, recurrent episode, moderate (HCC)  F33.1 296.32   3. Cognitive decline  R41.89 294.9   4. Medication management  Z79.899 V58.69   5. Other insomnia  G47.09 780.52       TREATMENT PLAN/GOALS: Continue supportive psychotherapy efforts and medications as indicated. Treatment and medication options discussed during today's visit. Patient acknowledged and verbally consented to continue with current treatment plan and was educated on the importance of compliance with treatment and follow-up appointments.    MEDICATION ISSUES:    Discussed medication options and treatment plan of prescribed medication as well as the risks, benefits, and side effects including potential falls, possible impaired driving and metabolic adversities among others. Patient is agreeable to call the office with any worsening of symptoms or onset of side effects. Patient is agreeable to call 911 or go to the nearest ER should he/she begin having SI/HI.     MEDS ORDERED DURING VISIT:  New Medications Ordered This Visit   Medications   • FLUoxetine (PROzac) 10 MG capsule     Sig: Take 1 capsule by mouth Daily.     Dispense:  30 capsule     Refill:  1   • FLUoxetine (PROzac) 20 MG  capsule     Sig: Take 1 capsule by mouth Daily.     Dispense:  30 capsule     Refill:  1   • mirtazapine (REMERON SOL-TAB) 45 MG disintegrating tablet     Sig: Place 1 tablet on the tongue Every Night.     Dispense:  30 tablet     Refill:  1   • Melatonin 5 MG capsule     Sig: Take 5 mg by mouth Every Night.     Dispense:  30 each     Refill:  1       Return in about 4 weeks (around 4/26/2022).               This document has been electronically signed by ANDREW Servin  March 29, 2022 16:30 EDT    Part of this note may be an electronic transcription/translation of spoken language to printed text using the Dragon Dictation System.

## 2022-04-11 ENCOUNTER — OFFICE VISIT (OUTPATIENT)
Dept: CARDIOLOGY | Facility: CLINIC | Age: 77
End: 2022-04-11

## 2022-04-11 VITALS
RESPIRATION RATE: 16 BRPM | WEIGHT: 183.2 LBS | BODY MASS INDEX: 24.81 KG/M2 | DIASTOLIC BLOOD PRESSURE: 57 MMHG | HEIGHT: 72 IN | SYSTOLIC BLOOD PRESSURE: 123 MMHG | HEART RATE: 61 BPM | TEMPERATURE: 96.9 F

## 2022-04-11 DIAGNOSIS — Z86.711 HISTORY OF PULMONARY EMBOLISM: ICD-10-CM

## 2022-04-11 DIAGNOSIS — I10 ESSENTIAL HYPERTENSION: ICD-10-CM

## 2022-04-11 DIAGNOSIS — I25.41 CORONARY ARTERY FISTULA: ICD-10-CM

## 2022-04-11 DIAGNOSIS — R06.09 DYSPNEA ON EXERTION: ICD-10-CM

## 2022-04-11 DIAGNOSIS — I25.10 ASCVD (ARTERIOSCLEROTIC CARDIOVASCULAR DISEASE): Primary | ICD-10-CM

## 2022-04-11 PROCEDURE — 99213 OFFICE O/P EST LOW 20 MIN: CPT | Performed by: INTERNAL MEDICINE

## 2022-04-11 NOTE — PROGRESS NOTES
Camila Ortiz, ANDREW  Fidencio Luciano  1945  04/11/2022    Patient Active Problem List   Diagnosis   • Essential hypertension   • Type 2 diabetes mellitus (HCC)   • Benign prostatic hyperplasia   • ASCVD (arteriosclerotic cardiovascular disease), s/p stenting of 80 % stenosis in left circumflex on 10/05/16and 60% stenosis in the LAD, clinically stable.    • Hyperlipidemia LDL goal <70   • Weakness generalized   • Coronary artery fistula   • Weight loss, unintentional   • Iron deficiency anemia   • Gastroesophageal reflux disease   • Dysphagia   • Chest pain   • History of pulmonary embolism in February 22, patient is on Eliquis.       Dear Camila Ortiz, APRN:    Subjective     Fidencio Luciano is a 76 y.o. male with the problems as listed above, presents    Chief Complaint   Patient presents with   • Coronary Artery Disease     4+ mos follow   • Shortness of Breath     Routine activity, recent lung clot   • Edema     LE, recent hosp stay, bilat clots   • Med Management     Nursing home list       History of Present Illness: Ms. Luciano is a pleasant 76-year-old  male with history of known coronary artery disease, status post stenting of the 80% stenosis in the left circumflex coronary artery in October 2016 and coiling of the pulmonary artery LAD fistula in December 2019. He is here for regular cardiology follow-up.  On today's visit he complains of having shortness of breath with mild exertion.  On further questioning family states that patient does not walk at all at the nursing home.  He denies any chest pains.        No Known Allergies:      Current Outpatient Medications:   •  albuterol sulfate  (90 Base) MCG/ACT inhaler, Inhale 2 puffs Every 4 (Four) Hours As Needed for Wheezing., Disp: 6.7 g, Rfl: 5  •  apixaban (ELIQUIS) 5 MG tablet tablet, Take 5 mg by mouth 2 (Two) Times a Day., Disp: , Rfl:   •  atorvastatin (LIPITOR) 80 MG tablet, Take 1 tablet by mouth Every Night., Disp: 90  tablet, Rfl: 5  •  budesonide-formoterol (SYMBICORT) 80-4.5 MCG/ACT inhaler, Inhale 2 puffs 2 (Two) Times a Day., Disp: 10.2 g, Rfl: 5  •  cetirizine (zyrTEC) 10 MG tablet, Take 10 mg by mouth Daily., Disp: , Rfl:   •  clopidogrel (PLAVIX) 75 MG tablet, Take 1 tablet by mouth Daily., Disp: 30 tablet, Rfl: 5  •  colestipol (COLESTID) 1 g tablet, Take 1 g by mouth 2 (Two) Times a Day., Disp: , Rfl:   •  finasteride (PROSCAR) 5 MG tablet, Take 1 tablet by mouth Daily for 360 days., Disp: 90 tablet, Rfl: 5  •  FLUoxetine (PROzac) 10 MG capsule, Take 1 capsule by mouth Daily., Disp: 30 capsule, Rfl: 1  •  FLUoxetine (PROzac) 20 MG capsule, Take 1 capsule by mouth Daily., Disp: 30 capsule, Rfl: 1  •  isosorbide mononitrate (IMDUR) 120 MG 24 hr tablet, Take 1 tablet by mouth Every Morning., Disp: 90 tablet, Rfl: 2  •  Melatonin 5 MG capsule, Take 5 mg by mouth Every Night., Disp: 30 each, Rfl: 1  •  memantine (NAMENDA XR) 28 MG capsule sustained-release 24 hr extended release capsule, , Disp: , Rfl:   •  metoprolol tartrate (LOPRESSOR) 25 MG tablet, Take 0.5 tablets by mouth Every 12 (Twelve) Hours., Disp: , Rfl:   •  mirtazapine (REMERON SOL-TAB) 45 MG disintegrating tablet, Place 1 tablet on the tongue Every Night., Disp: 30 tablet, Rfl: 1  •  nitroglycerin (NITROSTAT) 0.4 MG SL tablet, 1 under the tongue as needed for angina, may repeat q5mins for up three doses, Disp: 25 tablet, Rfl: 2  •  pantoprazole (PROTONIX) 40 MG EC tablet, Take 40 mg by mouth Daily., Disp: , Rfl:   •  ranolazine (RANEXA) 500 MG 12 hr tablet, Take 1 tablet by mouth Every 12 (Twelve) Hours., Disp: , Rfl:   •  vitamin B-12 (CYANOCOBALAMIN) 1000 MCG tablet, Take 1,000 mcg by mouth Daily., Disp: , Rfl:   •  cholecalciferol (VITAMIN D3) 1.25 MG (53193 UT) capsule, Take 50,000 Units by mouth 1 (One) Time Per Week., Disp: , Rfl:   •  iron polysaccharides (NIFEREX) 150 MG capsule, Take 1 capsule by mouth Daily., Disp: 30 capsule, Rfl: 3  •  megestrol  "(Megace ES) 625 MG/5ML suspension, Take 5 mL by mouth Daily for 30 days., Disp: 150 mL, Rfl: 2  •  polyethylene glycol (MIRALAX) 17 g packet, Take 17 g by mouth Daily., Disp: , Rfl:       The following portions of the patient's history were reviewed and updated as appropriate: allergies, current medications, past family history, past medical history, past social history, past surgical history and problem list.    Social History     Tobacco Use   • Smoking status: Former Smoker     Packs/day: 3.00     Types: Cigarettes     Quit date:      Years since quittin.3   • Smokeless tobacco: Former User     Quit date: 1986   Vaping Use   • Vaping Use: Never used   Substance Use Topics   • Alcohol use: No   • Drug use: No       Review of Systems   Cardiovascular: Positive for leg swelling. Negative for chest pain and palpitations.   Respiratory: Positive for shortness of breath.        Objective   Vitals:    22 0922   BP: 123/57   Pulse: 61   Resp: 16   Temp: 96.9 °F (36.1 °C)   Weight: 83.1 kg (183 lb 3.2 oz)   Height: 182.9 cm (72\")     Body mass index is 24.85 kg/m².    Vitals reviewed.   Constitutional:       Appearance: Well-developed.   Eyes:      Conjunctiva/sclera: Conjunctivae normal.   HENT:      Head: Normocephalic.   Neck:      Thyroid: No thyromegaly.      Vascular: No JVD.      Trachea: No tracheal deviation.   Pulmonary:      Effort: No respiratory distress.      Breath sounds: Normal breath sounds. No wheezing. No rales.   Cardiovascular:      PMI at left midclavicular line. Normal rate. Regular rhythm. Normal S1. Normal S2.      Murmurs: There is no murmur.      No gallop. No click. No rub.   Pulses:     Intact distal pulses.   Edema:     Pretibial: bilateral 1+ edema of the pretibial area.     Ankle: bilateral 1+ edema of the ankle.     Feet: bilateral 1+ edema of the feet.  Abdominal:      General: Bowel sounds are normal.      Palpations: Abdomen is soft. There is no abdominal mass.      " Tenderness: There is no abdominal tenderness.   Musculoskeletal:      Cervical back: Normal range of motion and neck supple. Skin:     General: Skin is warm and dry.   Neurological:      Mental Status: Alert and oriented to person, place, and time.      Cranial Nerves: No cranial nerve deficit.         Lab Results   Component Value Date     04/12/2022    K 4.3 04/12/2022     04/12/2022    CO2 22.3 04/12/2022    BUN 16 04/12/2022    CREATININE 0.84 04/12/2022    GLUCOSE 240 (H) 04/12/2022    CALCIUM 8.7 04/12/2022    AST 17 04/12/2022    ALT 22 04/12/2022    ALKPHOS 100 04/12/2022    LABIL2 1.4 (L) 01/08/2016     Lab Results   Component Value Date    CKTOTAL 57 10/27/2021     Lab Results   Component Value Date    WBC 6.78 04/12/2022    HGB 12.2 (L) 04/12/2022    HCT 37.7 04/12/2022     04/12/2022     Lab Results   Component Value Date    INR 1.18 (H) 02/02/2022    INR 1.05 10/27/2021    INR 0.97 10/06/2021     Lab Results   Component Value Date    MG 2.1 10/28/2021     Lab Results   Component Value Date    TSH 3.450 12/21/2021    PSA 0.6 01/28/2022    CHLPL 109 08/12/2015    TRIG 115 05/25/2021    HDL 29 (L) 05/25/2021    LDL 58 05/25/2021      Lab Results   Component Value Date    BNP 55.0 07/16/2016       Procedures    Assessment/Plan :   Diagnosis Plan   1. ASCVD (arteriosclerotic cardiovascular disease), s/p stenting of 80 % stenosis in left circumflex on 10/05/16and 60% stenosis in the LAD, clinically stable.      2. 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     3. Essential hypertension, controlled.     4. Dyspnea on exertion     5. History of pulmonary embolism in February 22, patient is on Eliquis.            Recommendations:  1. For his coronary artery disease, continue with Plavix, ranolazine, metoprolol and atorvastatin.  2. He does not need to be on dual antiplatelet therapy as he is on anticoagulant (Eliquis).  Hence will discontinue the  aspirin to decrease risk of bleeding and continue with clopidogrel.  3. He will need physical therapy at the nursing home to strengthen him up as patient seems to be getting physically weaker.    Return in about 5 months (around 9/11/2022).    As always, Camila Ortiz APRN  I appreciate very much the opportunity to participate in the cardiovascular care of your patients. Please do not hesitate to call me with any questions with regards to Fidencio Luciano evaluation and management.       With Best Regards,        Giovanni Buenrostro MD, FACC    Please note that portions of this note were completed with a voice recognition program.

## 2022-04-12 ENCOUNTER — LAB (OUTPATIENT)
Dept: ONCOLOGY | Facility: CLINIC | Age: 77
End: 2022-04-12

## 2022-04-12 ENCOUNTER — OFFICE VISIT (OUTPATIENT)
Dept: ONCOLOGY | Facility: CLINIC | Age: 77
End: 2022-04-12

## 2022-04-12 VITALS
RESPIRATION RATE: 18 BRPM | OXYGEN SATURATION: 99 % | HEART RATE: 77 BPM | BODY MASS INDEX: 25.01 KG/M2 | SYSTOLIC BLOOD PRESSURE: 102 MMHG | WEIGHT: 184.4 LBS | TEMPERATURE: 97.1 F | DIASTOLIC BLOOD PRESSURE: 60 MMHG

## 2022-04-12 DIAGNOSIS — D50.9 IRON DEFICIENCY ANEMIA, UNSPECIFIED IRON DEFICIENCY ANEMIA TYPE: ICD-10-CM

## 2022-04-12 DIAGNOSIS — D72.829 LEUKOCYTOSIS, UNSPECIFIED TYPE: ICD-10-CM

## 2022-04-12 DIAGNOSIS — E53.8 B12 DEFICIENCY: ICD-10-CM

## 2022-04-12 DIAGNOSIS — D64.9 ANEMIA, UNSPECIFIED TYPE: Primary | ICD-10-CM

## 2022-04-12 DIAGNOSIS — D64.9 ANEMIA, UNSPECIFIED TYPE: ICD-10-CM

## 2022-04-12 LAB
ALBUMIN SERPL-MCNC: 3.66 G/DL (ref 3.5–5.2)
ALBUMIN/GLOB SERPL: 1.3 G/DL
ALP SERPL-CCNC: 100 U/L (ref 39–117)
ALT SERPL W P-5'-P-CCNC: 22 U/L (ref 1–41)
ANION GAP SERPL CALCULATED.3IONS-SCNC: 11.7 MMOL/L (ref 5–15)
AST SERPL-CCNC: 17 U/L (ref 1–40)
BASOPHILS # BLD AUTO: 0.05 10*3/MM3 (ref 0–0.2)
BASOPHILS NFR BLD AUTO: 0.7 % (ref 0–1.5)
BILIRUB SERPL-MCNC: 0.3 MG/DL (ref 0–1.2)
BUN SERPL-MCNC: 16 MG/DL (ref 8–23)
BUN/CREAT SERPL: 19 (ref 7–25)
CALCIUM SPEC-SCNC: 8.7 MG/DL (ref 8.6–10.5)
CHLORIDE SERPL-SCNC: 105 MMOL/L (ref 98–107)
CO2 SERPL-SCNC: 22.3 MMOL/L (ref 22–29)
CREAT SERPL-MCNC: 0.84 MG/DL (ref 0.76–1.27)
DEPRECATED RDW RBC AUTO: 49.1 FL (ref 37–54)
EGFRCR SERPLBLD CKD-EPI 2021: 90.9 ML/MIN/1.73
EOSINOPHIL # BLD AUTO: 0.31 10*3/MM3 (ref 0–0.4)
EOSINOPHIL NFR BLD AUTO: 4.6 % (ref 0.3–6.2)
ERYTHROCYTE [DISTWIDTH] IN BLOOD BY AUTOMATED COUNT: 14 % (ref 12.3–15.4)
FERRITIN SERPL-MCNC: 91.03 NG/ML (ref 30–400)
FOLATE SERPL-MCNC: 8.23 NG/ML (ref 4.78–24.2)
GLOBULIN UR ELPH-MCNC: 2.8 GM/DL
GLUCOSE SERPL-MCNC: 240 MG/DL (ref 65–99)
HCT VFR BLD AUTO: 37.7 % (ref 37.5–51)
HGB BLD-MCNC: 12.2 G/DL (ref 13–17.7)
IMM GRANULOCYTES # BLD AUTO: 0.05 10*3/MM3 (ref 0–0.05)
IMM GRANULOCYTES NFR BLD AUTO: 0.7 % (ref 0–0.5)
IRON 24H UR-MRATE: 72 MCG/DL (ref 59–158)
IRON SATN MFR SERPL: 18 % (ref 20–50)
LYMPHOCYTES # BLD AUTO: 1.34 10*3/MM3 (ref 0.7–3.1)
LYMPHOCYTES NFR BLD AUTO: 19.8 % (ref 19.6–45.3)
MCH RBC QN AUTO: 30.9 PG (ref 26.6–33)
MCHC RBC AUTO-ENTMCNC: 32.4 G/DL (ref 31.5–35.7)
MCV RBC AUTO: 95.4 FL (ref 79–97)
MONOCYTES # BLD AUTO: 0.53 10*3/MM3 (ref 0.1–0.9)
MONOCYTES NFR BLD AUTO: 7.8 % (ref 5–12)
NEUTROPHILS NFR BLD AUTO: 4.5 10*3/MM3 (ref 1.7–7)
NEUTROPHILS NFR BLD AUTO: 66.4 % (ref 42.7–76)
NRBC BLD AUTO-RTO: 0 /100 WBC (ref 0–0.2)
PLATELET # BLD AUTO: 225 10*3/MM3 (ref 140–450)
PMV BLD AUTO: 10.3 FL (ref 6–12)
POTASSIUM SERPL-SCNC: 4.3 MMOL/L (ref 3.5–5.2)
PROT SERPL-MCNC: 6.5 G/DL (ref 6–8.5)
RBC # BLD AUTO: 3.95 10*6/MM3 (ref 4.14–5.8)
SODIUM SERPL-SCNC: 139 MMOL/L (ref 136–145)
TIBC SERPL-MCNC: 398 MCG/DL (ref 298–536)
TRANSFERRIN SERPL-MCNC: 267 MG/DL (ref 200–360)
VIT B12 BLD-MCNC: >2000 PG/ML (ref 211–946)
WBC NRBC COR # BLD: 6.78 10*3/MM3 (ref 3.4–10.8)

## 2022-04-12 PROCEDURE — 83540 ASSAY OF IRON: CPT | Performed by: NURSE PRACTITIONER

## 2022-04-12 PROCEDURE — 99214 OFFICE O/P EST MOD 30 MIN: CPT | Performed by: NURSE PRACTITIONER

## 2022-04-12 PROCEDURE — 82746 ASSAY OF FOLIC ACID SERUM: CPT | Performed by: NURSE PRACTITIONER

## 2022-04-12 PROCEDURE — 80053 COMPREHEN METABOLIC PANEL: CPT | Performed by: NURSE PRACTITIONER

## 2022-04-12 PROCEDURE — 85025 COMPLETE CBC W/AUTO DIFF WBC: CPT | Performed by: NURSE PRACTITIONER

## 2022-04-12 PROCEDURE — 84466 ASSAY OF TRANSFERRIN: CPT | Performed by: NURSE PRACTITIONER

## 2022-04-12 PROCEDURE — 82607 VITAMIN B-12: CPT | Performed by: NURSE PRACTITIONER

## 2022-04-12 PROCEDURE — 82728 ASSAY OF FERRITIN: CPT | Performed by: NURSE PRACTITIONER

## 2022-04-12 RX ORDER — IRON POLYSACCHARIDE COMPLEX 150 MG
150 CAPSULE ORAL DAILY
Qty: 30 CAPSULE | Refills: 3 | Status: ON HOLD | OUTPATIENT
Start: 2022-04-12 | End: 2022-05-17

## 2022-04-12 NOTE — PROGRESS NOTES
DATE:  4/12/2022    REASON FOR FOLLOW UP: Anemia    REFERRING PHYSICIAN:  Dr. Riley    CHIEF COMPLAINT:   Follow up of anemia     HISTORY OF PRESENT ILLNESS:   Fidencio Luciano is a very pleasant 76 y.o. male who is being seen today at the request of Gideon Charles MD   for evaluation and treatment of Anemia. Mr. Luciano reports following with his PCP routinely with repeat labs every 4-6 months. He says he has had chronic anemia which was recently worsening in June. Previous available CBCs were reviewed and patient has had normocytic anemia with Hg ranging 11-13 g/dL since July 2015. CBC from June 2018 showed drop in Hg to 10.0. Patient's WBC and platelets have been in the normal range. Patient reports intermittent dark stool and bleeding per rectum for the past few months. He reports having upper/lower endoscopy approximately 3 years ago with Dr. Martinez which was negative for active bleeding. He says his PCP has referred him to Dr. Unger and he will see him next week. Patient denies any recent blood transfusions. His main complaint today is ongoing fatigue which has been recently worsening. He also reports decreased appetite and weight loss but he is unsure how much weight he has lost.   -Patient was previously being followed for ERROL and was taking oral iron therapy, ferrous sulfate TID which he was tolerating well but was lost in follow up and last seen in March 2019 and planned to follow up 4 months later but did not return for follow up. 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 He is currently in NH for rehab and following with Dr. Riley. Recent CBCs were reviewed and since March 2021, he has had intermittent leukocytosis with fluctuation of counts but WBC has remained consistently elevated since ~October - November 2021 ranging ~11-17, predominantly neutrophils. Workup in hospital was negative for infectious process. He has had chronic  macrocytosis with occasional mild anemia. He remains on ferrous sulfate BID which he tolerates well. 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 His wife says he also struggles with frequent sinus infections, none currently. Patient really has no specific complaints today other than chronic fatigue. Denies obvious bleeding from any source. No fevers/chills or drenching NS. He has had poor appetite and now follows with GI. He is taking megace and drinking supplemental ensure with improvement/weight gain.     Interval History:  Mr. Luciano presents today for follow up. He remains in NH and complains of chronic fatigue and weakness. He is currently off of oral iron supplement. Since his last visit, he has been noticing melena ~2-3 times/week. He says his follow up with GI is not until June. He denies obvious bleeding from any other source. He was also started on B12 injections by PCP. He has no other complaints today.     PAST MEDICAL HISTORY:  Past Medical History:   Diagnosis Date   • BPH (benign prostatic hyperplasia)    • Bradycardia     Positive tilt table testing 07/2016   • Coronary artery disease    • Diabetes mellitus (HCC)    • Diverticulosis    • Elevated cholesterol    • Gastric ulcer with hemorrhage    • Gastroesophageal reflux disease 1/26/2021   • History of transfusion    • Hyperlipidemia    • Hypertension    • Psoriasis        PAST SURGICAL HISTORY:  Past Surgical History:   Procedure Laterality Date   • BACK SURGERY     • CARDIAC CATHETERIZATION N/A 9/20/2016    Procedure: Left Heart Cath;  Surgeon: Giovanni Buenrostro MD;  Location: [facility] INVASIVE LOCATION;  Service:    • CARDIAC CATHETERIZATION N/A 10/4/2016    Procedure: Left Heart Cath;  Surgeon: Bharathi Andrew MD;  Location: [facility] INVASIVE LOCATION;  Service:    • CARDIAC CATHETERIZATION N/A 5/9/2017    Procedure: Left Heart Cath;  Surgeon: Giovanni Buenrostro MD;  Location: [facility] INVASIVE LOCATION;   Service:    • CARDIAC CATHETERIZATION N/A 5/9/2017    Procedure: ERR;  Surgeon: Giovanni Buenrostro MD;  Location:  [facility] INVASIVE LOCATION;  Service:    • CARDIAC CATHETERIZATION N/A 11/8/2019    Procedure: Left Heart Cath;  Surgeon: Abraham Oviedo MD;  Location:  [facility] INVASIVE LOCATION;  Service: Cardiology   • CARDIAC CATHETERIZATION N/A 12/18/2019    Procedure: Coronary angiography;  Surgeon: Jay Barney IV, MD;  Location:  [facility];  Service: Cardiovascular   • CHOLECYSTECTOMY     • COLONOSCOPY  11/13/2015    Dr. Martinez- internal hemorrhoids, sigmoid diverticulosis   • COLONOSCOPY N/A 8/17/2018    Procedure: COLONOSCOPY CPT CODE: 90616;  Surgeon: Gigi Geronimo MD;  Location:  [facility];  Service: Gastroenterology   • CORONARY ANGIOPLASTY WITH STENT PLACEMENT     • ENDOSCOPY N/A 8/17/2018    Procedure: ESOPHAGOGASTRODUODENOSCOPY WITH BIOPSY CPT CODE: 21508;  Surgeon: Gigi Geronimo MD;  Location:  [facility];  Service: Gastroenterology   • ENDOSCOPY N/A 4/20/2021    Procedure: ESOPHAGOGASTRODUODENOSCOPY;  Surgeon: Geno Vernon MD;  Location:  [facility];  Service: Gastroenterology;  Laterality: N/A;   • INTERVENTIONAL RADIOLOGY PROCEDURE N/A 12/18/2019    Procedure: Coil Embolization of septal to pulmonary artery fistuala.;  Surgeon: Jay Barney IV, MD;  Location:  [facility];  Service: Cardiovascular   • GA RT/LT HEART CATHETERS N/A 5/9/2017    Procedure: Percutaneous Coronary Intervention;  Surgeon: Lincoln De Los Santos MD;  Location:  [facility] INV[facility];  Service: Cardiology   • STOMACH SURGERY      FUSION OF BLEEDING ULCER   • UPPER GASTROINTESTINAL ENDOSCOPY  11/13/2015    Dr. Martinez- mild gastritis       FAMILY HISTORY:  Family History   Problem Relation Age of Onset   • Sick sinus syndrome Mother    • Heart failure Mother    • Diabetes Mother    • Liver cancer Father        SOCIAL HISTORY:  Social History      Socioeconomic History   • Marital status:    Tobacco Use   • Smoking status: Former Smoker     Packs/day: 3.00     Types: Cigarettes     Quit date:      Years since quittin.3   • Smokeless tobacco: Former User     Quit date: 1986   Vaping Use   • Vaping Use: Never used   Substance and Sexual Activity   • Alcohol use: No   • Drug use: No   • Sexual activity: Defer     MEDICATIONS:  The current medication list was reviewed in the EMR    Current Outpatient Medications:   •  albuterol sulfate  (90 Base) MCG/ACT inhaler, Inhale 2 puffs Every 4 (Four) Hours As Needed for Wheezing., Disp: 6.7 g, Rfl: 5  •  apixaban (ELIQUIS) 5 MG tablet tablet, Take 5 mg by mouth 2 (Two) Times a Day., Disp: , Rfl:   •  atorvastatin (LIPITOR) 80 MG tablet, Take 1 tablet by mouth Every Night., Disp: 90 tablet, Rfl: 5  •  budesonide-formoterol (SYMBICORT) 80-4.5 MCG/ACT inhaler, Inhale 2 puffs 2 (Two) Times a Day., Disp: 10.2 g, Rfl: 5  •  cetirizine (zyrTEC) 10 MG tablet, Take 10 mg by mouth Daily., Disp: , Rfl:   •  cholecalciferol (VITAMIN D3) 1.25 MG (21851 UT) capsule, Take 50,000 Units by mouth 1 (One) Time Per Week., Disp: , Rfl:   •  clopidogrel (PLAVIX) 75 MG tablet, Take 1 tablet by mouth Daily., Disp: 30 tablet, Rfl: 5  •  colestipol (COLESTID) 1 g tablet, Take 1 g by mouth 2 (Two) Times a Day., Disp: , Rfl:   •  finasteride (PROSCAR) 5 MG tablet, Take 1 tablet by mouth Daily for 360 days., Disp: 90 tablet, Rfl: 5  •  FLUoxetine (PROzac) 10 MG capsule, Take 1 capsule by mouth Daily., Disp: 30 capsule, Rfl: 1  •  FLUoxetine (PROzac) 20 MG capsule, Take 1 capsule by mouth Daily., Disp: 30 capsule, Rfl: 1  •  isosorbide mononitrate (IMDUR) 120 MG 24 hr tablet, Take 1 tablet by mouth Every Morning., Disp: 90 tablet, Rfl: 2  •  Melatonin 5 MG capsule, Take 5 mg by mouth Every Night., Disp: 30 each, Rfl: 1  •  memantine (NAMENDA XR) 28 MG capsule sustained-release 24 hr extended release capsule, , Disp: ,  Rfl:   •  metoprolol tartrate (LOPRESSOR) 25 MG tablet, Take 0.5 tablets by mouth Every 12 (Twelve) Hours., Disp: , Rfl:   •  mirtazapine (REMERON SOL-TAB) 45 MG disintegrating tablet, Place 1 tablet on the tongue Every Night., Disp: 30 tablet, Rfl: 1  •  nitroglycerin (NITROSTAT) 0.4 MG SL tablet, 1 under the tongue as needed for angina, may repeat q5mins for up three doses, Disp: 25 tablet, Rfl: 2  •  pantoprazole (PROTONIX) 40 MG EC tablet, Take 40 mg by mouth Daily., Disp: , Rfl:   •  polyethylene glycol (MIRALAX) 17 g packet, Take 17 g by mouth Daily., Disp: , Rfl:   •  ranolazine (RANEXA) 500 MG 12 hr tablet, Take 1 tablet by mouth Every 12 (Twelve) Hours., Disp: , Rfl:   •  vitamin B-12 (CYANOCOBALAMIN) 1000 MCG tablet, Take 1,000 mcg by mouth Daily., Disp: , Rfl:   •  megestrol (Megace ES) 625 MG/5ML suspension, Take 5 mL by mouth Daily for 30 days., Disp: 150 mL, Rfl: 2    ALLERGIES:  No Known Allergies    REVIEW OF SYSTEMS:    A comprehensive 14 point review of systems was performed.  Significant findings as mentioned above.  All other systems reviewed and are negative.      Physical Exam   Vital Signs: /60   Pulse 77   Temp 97.1 °F (36.2 °C) (Temporal)   Resp 18   Wt 83.6 kg (184 lb 6.4 oz)   SpO2 99%   BMI 25.01 kg/m²    General: Well developed, well nourished, alert and oriented x 3, in no acute distress. Sitting in wheelchair  Head: ATNC   Eyes: PERRL, No evidence of conjunctivitis.   Nose: No nasal discharge.   Mouth: Oral mucosal membranes moist. No oral ulceration or hemorrhages.   Neck: Neck supple. No thyromegaly. No JVD.   Lungs: CTAB, no wheezing   Heart: RRR. No murmurs, rubs, or gallops.   Abdomen: Soft. Bowel sounds are normoactive. Nontender with palpation. No Hepatosplenomegaly can be appreciated.   Extremities: No cyanosis or edema.   Neurologic: Grossly non-focal exam.    ENDOSCOPY:  EGD/Colonoscopy 08/17/18  Findings: Sliding hiatal hernia, diverticulosis without  diverticulitis  Recommendations: Screening colonoscopy in 10 years    RECENT LABS:  Lab Results   Component Value Date    WBC 6.78 04/12/2022    HGB 12.2 (L) 04/12/2022    HCT 37.7 04/12/2022    MCV 95.4 04/12/2022    RDW 14.0 04/12/2022     04/12/2022    NEUTRORELPCT 66.4 04/12/2022    LYMPHORELPCT 19.8 04/12/2022    MONORELPCT 7.8 04/12/2022    EOSRELPCT 4.6 04/12/2022    BASORELPCT 0.7 04/12/2022    NEUTROABS 4.50 04/12/2022    LYMPHSABS 1.34 04/12/2022       Lab Results   Component Value Date     04/12/2022    K 4.3 04/12/2022    CO2 22.3 04/12/2022     04/12/2022    BUN 16 04/12/2022    CREATININE 0.84 04/12/2022    EGFRIFNONA 98 02/02/2022    EGFRIFAFRI  09/19/2016      Comment:      <15 Indicative of kidney failure.    GLUCOSE 240 (H) 04/12/2022    CALCIUM 8.7 04/12/2022    ALKPHOS 100 04/12/2022    AST 17 04/12/2022    ALT 22 04/12/2022    BILITOT 0.3 04/12/2022    ALBUMIN 3.66 04/12/2022    PROTEINTOT 6.5 04/12/2022    MG 2.1 10/28/2021    PHOS 3.9 10/28/2021       Lab Results   Component Value Date/Time     03/13/2021 12:41 PM     Lab Results   Component Value Date    FERRITIN 91.03 04/12/2022    IRON 72 04/12/2022    TIBC 398 04/12/2022    LABIRON 18 (L) 04/12/2022    GJAZOKAC16 375 12/21/2021    FOLATE 12.70 12/21/2021    HAPTOGLOBIN 112 07/17/2018    RETICCTPCT 0.66 07/17/2018    RETIC 0.0242 07/17/2018 12/21/22  Ferritin   30.00 - 400.00 ng/mL 287.30     Iron   59 - 158 mcg/dL 109          Iron Saturation   20 - 50 % 31          Transferrin   200 - 360 mg/dL 239          TIBC   298 - 536 mcg/dL 356            Vitamin B-12   211 - 946 pg/mL 375      Folate   4.78 - 24.20 ng/mL 12.70     TSH   0.270 - 4.200 uIU/mL 3.450           PBS 07/17/18  Hypochromic normocytic anemia with eosinophilia    Labs 07/17/17  C-Reactive Protein 0.00 - 0.99 mg/dL <0.50      Sed Rate 0 - 20 mm/hr 10       - 225 U/L 163      Iron 53 - 167 mcg/dL 41     TIBC 241 - 421 mcg/dL 445     Iron  Saturation 20 - 50 % 9       Ferritin 21.90 - 321.70 ng/mL 11.00       Vitamin B-12 211 - 911 pg/mL 932       Folate 5.40 - 20.00 ng/mL 18.87      ASSESSMENT & PLAN:  Fidencio Luciano is a very pleasant 76 y.o. male with    1.  Macrocytosis, previously worsening (currently resolved)  2.  ERROL  3. B12 deficiency     -Patient was previously being followed for ERROL. Initially reported intermittent dark stool and bleeding per rectum (now resolved). Reported upper/lower endoscopy several years ago with Dr. Martinez which was negative for active bleeding.  -Obtained additional workup including repeat CBC which showed stable H/H (Hg 10.2), WBC and platelets were again in the normal range. CMP unremarkable. PBS showed hypochromic normocytic anemia with eosinophilia as above. B12 and Folate replete. CRP and ESR were normal. No evidence of hemolysis with normal Retic, LDH and Haptoglobin. Iron studies were consistent with Iron deficiency. Therefore, started patient on oral iron therapy, ferrous sulfate TID which he was tolerating tolerating well.  -PCP referred him back to GI and underwent upper/lower endoscopy on 08/17/18 which showed no evidence of bleeding. He had improvement in Hg but remained low and iron stores were improving, therefore, continued oral iron and planned to follow up in 4 months. However, he was lost in follow up and was last seen in March 2019 and represented in December 2021.   -At his last visit, discontinued ferrous sulfate given normal Hg and iron was replete. To further evaluate  macrocytosis, obtained repeat B12 and folate which were replete. TSH was normal.   -Since his last visit, he was started on B12 injections for B12 deficiency per PCP. Obtained repeat B12 and folate today which are pending.   -Repeat CBC from today shows sl decrease in Hg ~12.2. Macrocytosis has resolved.  Repeat iron panel/ferritin showing iron stores are dropping off of oral iron supplement. He also complains of melena ~2-3  times/week. Therefore, recommended to resume oral iron , will start Niferex 150 mg PO daily. Also recommended follow up with GI for possible repeat endoscopy and his wife says she will call and schedule appointment today.   -Will plan to follow up in 3 months with repeat labs.     4. Leukocytosis:  -He was previously seen as above and lost in follow up. He represented in December 2021.  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 He is currently in NH for rehab and following with Dr. Riley. Recent CBCs were reviewed and since March 2021, he has had intermittent leukocytosis with fluctuation of counts but has consistently remained elevated from ~October - November 2021 ranging ~11-17, predominantly neutrophils. Workup in hospital was negative for infectious process. 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 His wife also reports recurrent sinusitis. He has had weight loss for which he follows with GI and this is improving with megace and supplemental ensure. He otherwise has no other concerning B symptoms.  -Based on history, could be related to possible chronic underlying inflammation plus previous COVID and reported HAV also contributing.   -Obtained repeat CBC at last visit which happily showed normalized WBC. Also sent PBS which showed mild granulocytic left shift, otherwise unremarkable with no blasts or granulocytic dysplasia seen. CRP was normal.   -Repeat CBC from today again showing normal counts. Will continue with conservative follow up.        ACO / ESPINOZA/Other  Quality measures  -  Fidencio Luciano did not receive 2021 flu vaccine.   -  Fidencio WHITNEY Mustapha reports a pain score of 0.    -  Current outpatient and discharge medications have been reconciled for the patient.  Reviewed by: ANDREW Dawson      The patient, wife and caregiver were in agreement with the plan and all  questions were answered to his satisfaction.     Thank you so much for allowing us to participate in the care of Fidencio Luciano . Please do not hesitate to contact us with any questions or concerns.     I spent 30 minutes with Fidencio Luciano today.  In the office today, more than 50% of this time was spent face-to-face with him  in counseling / coordination of care, reviewing his interim medical history and counseling on the current treatment plan.  All questions were answered to his satisfaction.       Electronically Signed by: ANDREW Dawson , April 12, 2022 09:24 EDT       CC:   Camila Ortiz APRN

## 2022-04-14 ENCOUNTER — OFFICE VISIT (OUTPATIENT)
Dept: GASTROENTEROLOGY | Facility: CLINIC | Age: 77
End: 2022-04-14

## 2022-04-14 VITALS
SYSTOLIC BLOOD PRESSURE: 130 MMHG | HEART RATE: 63 BPM | BODY MASS INDEX: 26.38 KG/M2 | WEIGHT: 188.4 LBS | OXYGEN SATURATION: 99 % | HEIGHT: 71 IN | DIASTOLIC BLOOD PRESSURE: 64 MMHG

## 2022-04-14 DIAGNOSIS — D50.0 IRON DEFICIENCY ANEMIA DUE TO CHRONIC BLOOD LOSS: Primary | ICD-10-CM

## 2022-04-14 DIAGNOSIS — R68.81 EARLY SATIETY: ICD-10-CM

## 2022-04-14 PROCEDURE — 99214 OFFICE O/P EST MOD 30 MIN: CPT | Performed by: INTERNAL MEDICINE

## 2022-04-14 RX ORDER — BISACODYL 5 MG
TABLET, DELAYED RELEASE (ENTERIC COATED) ORAL
Qty: 4 TABLET | Refills: 0 | Status: SHIPPED | OUTPATIENT
Start: 2022-04-14 | End: 2022-05-17 | Stop reason: HOSPADM

## 2022-04-14 NOTE — PROGRESS NOTES
Subjective   Fidencio Luciano is a 76 y.o. male who presents to the office today as a follow up appointment regarding GI Bleeding      History of Present Illness:  Previous History:Mr. Luciano presents today with his wife for follow-up weight loss and diarrhea.  He is now at [facility].  He has moved from the rehab center to the long-term care center.  His wife states that he is still wheelchair-bound and unsteady on his feet.  Therefore, she has not been able to take him home.  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  His wife states he is eating better.  He finishes his plate and states that he is full.  He does suffer from dementia and it is not easy to get much history from him.  His wife states when he went into rehab he weighed 139 lbs.  He now weights around 170 lbs. He no longer is on Megace per the med list that I received today.  His wife is worried now that he may be gaining too much weight.  He is no longer getting rehab.  His wife feels he still needs it.  He states he still is having intermittent diarrhea is not clear how many bowel movements he is having daily and if they are indeed loose.  Interval History:  The patient complains of BRBPR.  He has was recently seen by Oncology and was found to be mildly anemic.  He is comlaining of feeling full.  He has gained weight.         Review of Systems:  Review of Systems   Constitutional: Positive for unexpected weight change. Negative for fever.   HENT: Negative for trouble swallowing.    Eyes: Negative.    Respiratory: Negative for chest tightness.    Cardiovascular: Negative for chest pain.   Gastrointestinal: Positive for diarrhea. Negative for abdominal distention, abdominal pain, anal bleeding, blood in stool, constipation, nausea, rectal pain and vomiting.   Endocrine: Negative.    Genitourinary: Negative for difficulty urinating.   Musculoskeletal: Negative.    Skin: Negative.    Allergic/Immunologic: Negative.    Neurological:  Negative for headaches.   Hematological: Bruises/bleeds easily.   Psychiatric/Behavioral: Negative.        Past Medical History:  Past Medical History:   Diagnosis Date   • BPH (benign prostatic hyperplasia)    • Bradycardia     Positive tilt table testing 07/2016   • Coronary artery disease    • Diabetes mellitus (HCC)    • Diverticulosis    • Elevated cholesterol    • Gastric ulcer with hemorrhage    • Gastroesophageal reflux disease 1/26/2021   • History of transfusion    • Hyperlipidemia    • Hypertension    • Psoriasis        Past Surgical History:  Past Surgical History:   Procedure Laterality Date   • BACK SURGERY     • CARDIAC CATHETERIZATION N/A 9/20/2016    Procedure: Left Heart Cath;  Surgeon: Giovanni Buenrostro MD;  Location:  [facility];  Service:    • CARDIAC CATHETERIZATION N/A 10/4/2016    Procedure: Left Heart Cath;  Surgeon: Bharathi Andrew MD;  Location:  [facility];  Service:    • CARDIAC CATHETERIZATION N/A 5/9/2017    Procedure: Left Heart Cath;  Surgeon: Giovanni Buenrostro MD;  Location:  [facility];  Service:    • CARDIAC CATHETERIZATION N/A 5/9/2017    Procedure: ERR;  Surgeon: Giovanni Buenrostro MD;  [facility]:  [facility];  Service:    • CARDIAC CATHETERIZATION N/A 11/8/2019    Procedure: Left Heart Cath;  Surgeon: Abraham Oviedo MD;  Location:  [facility];  Service: Cardiology   • CARDIAC CATHETERIZATION N/A 12/18/2019    Procedure: Coronary angiography;  Surgeon: Jay Barney IV, MD;  Location:  [facility];  Service: Cardiovascular   • CHOLECYSTECTOMY     • COLONOSCOPY  11/13/2015    Dr. Martinez- internal hemorrhoids, sigmoid diverticulosis   • COLONOSCOPY N/A 8/17/2018    Procedure: COLONOSCOPY CPT CODE: 70931;  Surgeon: Gigi Geronimo MD;  Location: [facility];  Service: Gastroenterology   • CORONARY ANGIOPLASTY WITH STENT PLACEMENT     • ENDOSCOPY N/A 8/17/2018     Procedure: ESOPHAGOGASTRODUODENOSCOPY WITH BIOPSY CPT CODE: 17056;  Surgeon: Gigi Geronimo MD;  Location:  [facility];  Service: Gastroenterology   • ENDOSCOPY N/A 2021    Procedure: ESOPHAGOGASTRODUODENOSCOPY;  Surgeon: Geno Vernon MD;  Location:  [facility];  Service: Gastroenterology;  Laterality: N/A;   • INTERVENTIONAL RADIOLOGY PROCEDURE N/A 2019    Procedure: Coil Embolization of septal to pulmonary artery fistuala.;  Surgeon: Jay Barney IV, MD;  Location:  [facility];  Service: Cardiovascular   • MA RT/LT HEART CATHETERS N/A 2017    Procedure: Percutaneous Coronary Intervention;  Surgeon: Lincoln De Los Santos MD;  Location:  [facility];  Service: Cardiology   • STOMACH SURGERY      FUSION OF BLEEDING ULCER   • UPPER GASTROINTESTINAL ENDOSCOPY  2015    Dr. Martinez- mild gastritis       Family History:  Family History   Problem Relation Age of Onset   • Sick sinus syndrome Mother    • Heart failure Mother    • Diabetes Mother    • Liver cancer Father        Social History:  Social History     Socioeconomic History   • Marital status:    Tobacco Use   • Smoking status: Former Smoker     Packs/day: 3.00     Types: Cigarettes     Quit date:      Years since quittin.3   • Smokeless tobacco: Former User     Quit date: 1986   Vaping Use   • Vaping Use: Never used   Substance and Sexual Activity   • Alcohol use: No   • Drug use: No   • Sexual activity: Defer       Current Medication List:    Current Outpatient Medications:   •  albuterol sulfate  (90 Base) MCG/ACT inhaler, Inhale 2 puffs Every 4 (Four) Hours As Needed for Wheezing., Disp: 6.7 g, Rfl: 5  •  apixaban (ELIQUIS) 5 MG tablet tablet, Take 5 mg by mouth 2 (Two) Times a Day., Disp: , Rfl:   •  atorvastatin (LIPITOR) 80 MG tablet, Take 1 tablet by mouth Every Night., Disp: 90 tablet, Rfl: 5  •  budesonide-formoterol (SYMBICORT) 80-4.5 MCG/ACT  inhaler, Inhale 2 puffs 2 (Two) Times a Day., Disp: 10.2 g, Rfl: 5  •  cetirizine (zyrTEC) 10 MG tablet, Take 10 mg by mouth Daily., Disp: , Rfl:   •  cholecalciferol (VITAMIN D3) 1.25 MG (41858 UT) capsule, Take 50,000 Units by mouth 1 (One) Time Per Week., Disp: , Rfl:   •  clopidogrel (PLAVIX) 75 MG tablet, Take 1 tablet by mouth Daily., Disp: 30 tablet, Rfl: 5  •  colestipol (COLESTID) 1 g tablet, Take 1 g by mouth 2 (Two) Times a Day., Disp: , Rfl:   •  finasteride (PROSCAR) 5 MG tablet, Take 1 tablet by mouth Daily for 360 days., Disp: 90 tablet, Rfl: 5  •  FLUoxetine (PROzac) 10 MG capsule, Take 1 capsule by mouth Daily., Disp: 30 capsule, Rfl: 1  •  FLUoxetine (PROzac) 20 MG capsule, Take 1 capsule by mouth Daily., Disp: 30 capsule, Rfl: 1  •  iron polysaccharides (NIFEREX) 150 MG capsule, Take 1 capsule by mouth Daily., Disp: 30 capsule, Rfl: 3  •  isosorbide mononitrate (IMDUR) 120 MG 24 hr tablet, Take 1 tablet by mouth Every Morning., Disp: 90 tablet, Rfl: 2  •  Melatonin 5 MG capsule, Take 5 mg by mouth Every Night., Disp: 30 each, Rfl: 1  •  memantine (NAMENDA XR) 28 MG capsule sustained-release 24 hr extended release capsule, , Disp: , Rfl:   •  metoprolol tartrate (LOPRESSOR) 25 MG tablet, Take 0.5 tablets by mouth Every 12 (Twelve) Hours., Disp: , Rfl:   •  mirtazapine (REMERON SOL-TAB) 45 MG disintegrating tablet, Place 1 tablet on the tongue Every Night., Disp: 30 tablet, Rfl: 1  •  nitroglycerin (NITROSTAT) 0.4 MG SL tablet, 1 under the tongue as needed for angina, may repeat q5mins for up three doses, Disp: 25 tablet, Rfl: 2  •  pantoprazole (PROTONIX) 40 MG EC tablet, Take 40 mg by mouth Daily., Disp: , Rfl:   •  polyethylene glycol (MIRALAX) 17 g packet, Take 17 g by mouth Daily., Disp: , Rfl:   •  ranolazine (RANEXA) 500 MG 12 hr tablet, Take 1 tablet by mouth Every 12 (Twelve) Hours., Disp: , Rfl:   •  vitamin B-12 (CYANOCOBALAMIN) 1000 MCG tablet, Take 1,000 mcg by mouth Daily., Disp: ,  "Rfl:   •  bisacodyl (Dulcolax) 5 MG EC tablet, Take 4 tablets at 4pm the day prior to procedure with a full glass of water., Disp: 4 tablet, Rfl: 0  •  magnesium citrate solution, Take 296 mL by mouth Take As Directed. Follow bowel prep instructions given at office, Disp: 592 mL, Rfl: 0  •  megestrol (Megace ES) 625 MG/5ML suspension, Take 5 mL by mouth Daily for 30 days., Disp: 150 mL, Rfl: 2    Allergies:   Patient has no known allergies.    Vitals:  /64   Pulse 63   Ht 180.3 cm (71\")   Wt 85.5 kg (188 lb 6.4 oz)   SpO2 99%   BMI 26.28 kg/m²     Physical Exam:  Physical Exam  Constitutional:       Appearance: He is normal weight.   HENT:      Head: Normocephalic and atraumatic.      Nose: Nose normal. No congestion or rhinorrhea.   Eyes:      General: No scleral icterus.     Extraocular Movements: Extraocular movements intact.      Conjunctiva/sclera: Conjunctivae normal.      Pupils: Pupils are equal, round, and reactive to light.   Cardiovascular:      Rate and Rhythm: Normal rate and regular rhythm.      Pulses: Normal pulses.      Heart sounds: Normal heart sounds.   Pulmonary:      Effort: Pulmonary effort is normal.      Breath sounds: Normal breath sounds.   Abdominal:      General: Abdomen is flat. Bowel sounds are normal. There is no distension.      Palpations: Abdomen is soft. There is no shifting dullness, fluid wave, hepatomegaly, splenomegaly, mass or pulsatile mass.      Tenderness: There is no abdominal tenderness. There is no guarding or rebound.      Hernia: No hernia is present.   Musculoskeletal:         General: No swelling or tenderness.      Cervical back: Normal range of motion and neck supple.   Skin:     General: Skin is warm and dry.      Coloration: Skin is not jaundiced.   Neurological:      General: No focal deficit present.      Mental Status: He is alert and oriented to person, place, and time.   Psychiatric:         Mood and Affect: Mood normal.         Behavior: " Behavior normal.         Results Review:  Lab Results:   Lab on 04/12/2022   Component Date Value Ref Range Status   • Glucose 04/12/2022 240 (A) 65 - 99 mg/dL Final   • BUN 04/12/2022 16  8 - 23 mg/dL Final   • Creatinine 04/12/2022 0.84  0.76 - 1.27 mg/dL Final   • Sodium 04/12/2022 139  136 - 145 mmol/L Final   • Potassium 04/12/2022 4.3  3.5 - 5.2 mmol/L Final   • Chloride 04/12/2022 105  98 - 107 mmol/L Final   • CO2 04/12/2022 22.3  22.0 - 29.0 mmol/L Final   • Calcium 04/12/2022 8.7  8.6 - 10.5 mg/dL Final   • Total Protein 04/12/2022 6.5  6.0 - 8.5 g/dL Final   • Albumin 04/12/2022 3.66  3.50 - 5.20 g/dL Final   • ALT (SGPT) 04/12/2022 22  1 - 41 U/L Final   • AST (SGOT) 04/12/2022 17  1 - 40 U/L Final   • Alkaline Phosphatase 04/12/2022 100  39 - 117 U/L Final   • Total Bilirubin 04/12/2022 0.3  0.0 - 1.2 mg/dL Final   • Globulin 04/12/2022 2.8  gm/dL Final   • A/G Ratio 04/12/2022 1.3  g/dL Final   • BUN/Creatinine Ratio 04/12/2022 19.0  7.0 - 25.0 Final   • Anion Gap 04/12/2022 11.7  5.0 - 15.0 mmol/L Final   • eGFR 04/12/2022 90.9  >60.0 mL/min/1.73 Final    National Kidney Foundation and American Society of Nephrology (ASN) Task Force recommended calculation based on the Chronic Kidney Disease Epidemiology Collaboration (CKD-EPI) equation refit without adjustment for race.   • Ferritin 04/12/2022 91.03  30.00 - 400.00 ng/mL Final   • Iron 04/12/2022 72  59 - 158 mcg/dL Final   • Iron Saturation 04/12/2022 18 (A) 20 - 50 % Final   • Transferrin 04/12/2022 267  200 - 360 mg/dL Final   • TIBC 04/12/2022 398  298 - 536 mcg/dL Final   • Vitamin B-12 04/12/2022 >2,000 (A) 211 - 946 pg/mL Final   • Folate 04/12/2022 8.23  4.78 - 24.20 ng/mL Final   • WBC 04/12/2022 6.78  3.40 - 10.80 10*3/mm3 Final   • RBC 04/12/2022 3.95 (A) 4.14 - 5.80 10*6/mm3 Final   • Hemoglobin 04/12/2022 12.2 (A) 13.0 - 17.7 g/dL Final   • Hematocrit 04/12/2022 37.7  37.5 - 51.0 % Final   • MCV 04/12/2022 95.4  79.0 - 97.0 fL Final   •  MCH 04/12/2022 30.9  26.6 - 33.0 pg Final   • MCHC 04/12/2022 32.4  31.5 - 35.7 g/dL Final   • RDW 04/12/2022 14.0  12.3 - 15.4 % Final   • RDW-SD 04/12/2022 49.1  37.0 - 54.0 fl Final   • MPV 04/12/2022 10.3  6.0 - 12.0 fL Final   • Platelets 04/12/2022 225  140 - 450 10*3/mm3 Final   • Neutrophil % 04/12/2022 66.4  42.7 - 76.0 % Final   • Lymphocyte % 04/12/2022 19.8  19.6 - 45.3 % Final   • Monocyte % 04/12/2022 7.8  5.0 - 12.0 % Final   • Eosinophil % 04/12/2022 4.6  0.3 - 6.2 % Final   • Basophil % 04/12/2022 0.7  0.0 - 1.5 % Final   • Immature Grans % 04/12/2022 0.7 (A) 0.0 - 0.5 % Final   • Neutrophils, Absolute 04/12/2022 4.50  1.70 - 7.00 10*3/mm3 Final   • Lymphocytes, Absolute 04/12/2022 1.34  0.70 - 3.10 10*3/mm3 Final   • Monocytes, Absolute 04/12/2022 0.53  0.10 - 0.90 10*3/mm3 Final   • Eosinophils, Absolute 04/12/2022 0.31  0.00 - 0.40 10*3/mm3 Final   • Basophils, Absolute 04/12/2022 0.05  0.00 - 0.20 10*3/mm3 Final   • Immature Grans, Absolute 04/12/2022 0.05  0.00 - 0.05 10*3/mm3 Final   • nRBC 04/12/2022 0.0  0.0 - 0.2 /100 WBC Final       Assessment/Plan     Visit Diagnoses:    ICD-10-CM ICD-9-CM   1. Iron deficiency anemia due to chronic blood loss  D50.0 280.0   2. Early satiety  R68.81 780.94       Plan:  The patient will undergo EGD/colonoscopy due to anemia.  He is on Plavix and Eliquis for cardiac stents and a coil 3 years ago.  He will need to go off of this medication for 5 days prior to the exams.      ESOPHAGOGASTRODUODENOSCOPY WITH BIOPSY (N/A), COLONOSCOPY FOR SCREENING (N/A)      MEDS ORDERED DURING VISIT:  New Medications Ordered This Visit   Medications   • magnesium citrate solution     Sig: Take 296 mL by mouth Take As Directed. Follow bowel prep instructions given at office     Dispense:  592 mL     Refill:  0   • bisacodyl (Dulcolax) 5 MG EC tablet     Sig: Take 4 tablets at 4pm the day prior to procedure with a full glass of water.     Dispense:  4 tablet     Refill:  0        The patient will follow up after the procedures.             This document has been electronically signed by Geno Vernon MD   April 14, 2022 14:38 EDT      Part of this note may be an electronic transcription/translation of spoken language to printed text using the Dragon Dictation System.

## 2022-04-18 ENCOUNTER — LAB (OUTPATIENT)
Dept: LAB | Facility: HOSPITAL | Age: 77
End: 2022-04-18

## 2022-04-18 DIAGNOSIS — R19.7 DIARRHEA, UNSPECIFIED TYPE: ICD-10-CM

## 2022-04-18 DIAGNOSIS — D50.0 IRON DEFICIENCY ANEMIA DUE TO CHRONIC BLOOD LOSS: ICD-10-CM

## 2022-04-18 DIAGNOSIS — R63.4 WEIGHT LOSS, ABNORMAL: Primary | ICD-10-CM

## 2022-04-18 DIAGNOSIS — R63.4 WEIGHT LOSS, ABNORMAL: ICD-10-CM

## 2022-04-18 DIAGNOSIS — R68.81 EARLY SATIETY: ICD-10-CM

## 2022-04-18 PROCEDURE — U0004 COV-19 TEST NON-CDC HGH THRU: HCPCS | Performed by: INTERNAL MEDICINE

## 2022-04-19 LAB — SARS-COV-2 RNA PNL SPEC NAA+PROBE: NOT DETECTED

## 2022-04-20 ENCOUNTER — HOSPITAL ENCOUNTER (OUTPATIENT)
Facility: HOSPITAL | Age: 77
Setting detail: HOSPITAL OUTPATIENT SURGERY
Discharge: NURSING, REHAB OR LONG-TERM CARE | End: 2022-04-20
Attending: INTERNAL MEDICINE | Admitting: INTERNAL MEDICINE

## 2022-04-20 PROCEDURE — G0463 HOSPITAL OUTPT CLINIC VISIT: HCPCS | Performed by: INTERNAL MEDICINE

## 2022-04-20 NOTE — NURSING NOTE
PT STATES THAT HE ATE FOOD ALL DAY YESTERDAY AND MAY NOT BE CLEANED OUT.  DR FANG IN TO TALK WITH PT AND MUTUALLY AGREED TO POSTPONE PROCEDURE TO ANOTHER DAY.  PT DC'D  BACK TO [facility] VIA [facility].

## 2022-04-21 ENCOUNTER — TELEPHONE (OUTPATIENT)
Dept: GASTROENTEROLOGY | Facility: CLINIC | Age: 77
End: 2022-04-21

## 2022-04-21 DIAGNOSIS — R19.7 DIARRHEA, UNSPECIFIED TYPE: ICD-10-CM

## 2022-04-21 DIAGNOSIS — R68.81 EARLY SATIETY: ICD-10-CM

## 2022-04-21 DIAGNOSIS — D50.0 IRON DEFICIENCY ANEMIA DUE TO CHRONIC BLOOD LOSS: ICD-10-CM

## 2022-04-21 DIAGNOSIS — R63.4 WEIGHT LOSS, ABNORMAL: Primary | ICD-10-CM

## 2022-04-21 NOTE — TELEPHONE ENCOUNTER
----- Message from Geno Vernon MD sent at 4/20/2022  9:24 AM EDT -----  Please reschedule Mr. Luciano for EGD/colonoscopy.  He ate all day yesterday at the nursing home.

## 2022-04-26 ENCOUNTER — TELEMEDICINE (OUTPATIENT)
Dept: PSYCHIATRY | Facility: CLINIC | Age: 77
End: 2022-04-26

## 2022-04-26 DIAGNOSIS — F33.1 MAJOR DEPRESSIVE DISORDER, RECURRENT EPISODE, MODERATE: Primary | ICD-10-CM

## 2022-04-26 DIAGNOSIS — Z79.899 MEDICATION MANAGEMENT: ICD-10-CM

## 2022-04-26 DIAGNOSIS — G47.09 OTHER INSOMNIA: ICD-10-CM

## 2022-04-26 DIAGNOSIS — F41.1 GENERALIZED ANXIETY DISORDER: ICD-10-CM

## 2022-04-26 DIAGNOSIS — R41.89 COGNITIVE DECLINE: ICD-10-CM

## 2022-04-26 PROCEDURE — 99214 OFFICE O/P EST MOD 30 MIN: CPT | Performed by: NURSE PRACTITIONER

## 2022-04-26 RX ORDER — MIRTAZAPINE 45 MG/1
45 TABLET, ORALLY DISINTEGRATING ORAL NIGHTLY
Qty: 30 TABLET | Refills: 1 | Status: SHIPPED | OUTPATIENT
Start: 2022-04-26 | End: 2022-05-29

## 2022-04-26 RX ORDER — FLUOXETINE HYDROCHLORIDE 20 MG/1
40 CAPSULE ORAL DAILY
Qty: 60 CAPSULE | Refills: 1 | Status: ON HOLD | OUTPATIENT
Start: 2022-04-26 | End: 2022-05-17

## 2022-04-26 NOTE — PROGRESS NOTES
"This provider is located at the [facility], [address] [place] [address], [id number] using a secure [employer] Video Visit through [employer]. Patient is being seen remotely via telehealth at their home address in [place], and stated they are in a secure environment for this session. The patient's condition being diagnosed/treated is appropriate for telemedicine. The provider identified herself as well as her credentials.   The patient, and/or patients guardian, consent to be seen remotely, and when consent is given they understand that the consent allows for patient identifiable information to be sent to a third party as needed.   They may refuse to be seen remotely at any time. The electronic data is encrypted and password protected, and the patient and/or guardian has been advised of the potential risks to privacy not withstanding such measures.    Subjective   Fidencio Luciano is a 76 y.o. male who presents today for follow up    Chief Complaint:  Depression    History of Present Illness: Patient presents as follow up via telehealth visit. Nurse is present in room during appointment. He reports \"feeling good\". Reports depression and anxiety has improved, states he \"every once in awhile feels sad\". He is unable to rate anxiety and depression on a scale 0-10.   He reports sleep has improved with medication, averaging 8 hours per night. Reports appetite is good, states he has gained weight recently. He denies SI/HI/AVH.    The following portions of the patient's history were reviewed and updated as appropriate: allergies, current medications, past family history, past medical history, past social history, past surgical history and problem list.      Past Medical History:  Past Medical History:   Diagnosis Date   • BPH (benign prostatic hyperplasia)    • Bradycardia     Positive tilt table testing 07/2016   • Coronary artery disease    • Diabetes mellitus (HCC)    • Diverticulosis    • Elevated cholesterol  "   • Gastric ulcer with hemorrhage    • Gastroesophageal reflux disease 2021   • History of transfusion    • Hyperlipidemia    • Hypertension    • Psoriasis        Social History:  Social History     Socioeconomic History   • Marital status:    Tobacco Use   • Smoking status: Former Smoker     Packs/day: 3.00     Types: Cigarettes     Quit date:      Years since quittin.3   • Smokeless tobacco: Former User     Quit date: 1986   Vaping Use   • Vaping Use: Never used   Substance and Sexual Activity   • Alcohol use: No   • Drug use: No   • Sexual activity: Defer       Family History:  Family History   Problem Relation Age of Onset   • Sick sinus syndrome Mother    • Heart failure Mother    • Diabetes Mother    • Liver cancer Father        Past Surgical History:  Past Surgical History:   Procedure Laterality Date   • BACK SURGERY     • CARDIAC CATHETERIZATION N/A 2016    Procedure: Left Heart Cath;  Surgeon: Giovanni Buenrostro MD;  Location:  [facility];  Service:    • CARDIAC CATHETERIZATION N/A 10/4/2016    Procedure: Left Heart Cath;  Surgeon: Bharathi Andrew MD;  [facility]:  [facility];  Service:    • CARDIAC CATHETERIZATION N/A 2017    Procedure: Left Heart Cath;  Surgeon: Giovanni Buenrostro MD;  [facility]:  [facility];  Service:    • CARDIAC CATHETERIZATION N/A 2017    Procedure: ERR;  Surgeon: Giovanni Buenrostro MD;  [facility]:  [facility];  Service:    • CARDIAC CATHETERIZATION N/A 2019    Procedure: Left Heart Cath;  Surgeon: Abraham Oviedo MD;  [facility]:  [facility];  Service: Cardiology   • CARDIAC CATHETERIZATION N/A 2019    Procedure: Coronary angiography;  Surgeon: Jay Barney IV, MD;  Location:  [facility];  Service: Cardiovascular   • CHOLECYSTECTOMY     • COLONOSCOPY  2015    Dr. Martinez- internal hemorrhoids, sigmoid diverticulosis   •  COLONOSCOPY N/A 8/17/2018    Procedure: COLONOSCOPY CPT CODE: 58744;  Surgeon: Gigi Geronimo MD;  Location:  [facility];  Service: Gastroenterology   • CORONARY ANGIOPLASTY WITH STENT PLACEMENT     • ENDOSCOPY N/A 8/17/2018    Procedure: ESOPHAGOGASTRODUODENOSCOPY WITH BIOPSY CPT CODE: 39993;  Surgeon: Gigi Geronimo MD;  Location:  [facility];  Service: Gastroenterology   • ENDOSCOPY N/A 4/20/2021    Procedure: ESOPHAGOGASTRODUODENOSCOPY;  Surgeon: Geno Vernon MD;  Location:  [facility];  Service: Gastroenterology;  Laterality: N/A;   • INTERVENTIONAL RADIOLOGY PROCEDURE N/A 12/18/2019    Procedure: Coil Embolization of septal to pulmonary artery fistuala.;  Surgeon: Jay Barney IV, MD;  Location:  [facility];  Service: Cardiovascular   • [place] RT/LT HEART CATHETERS N/A 5/9/2017    Procedure: Percutaneous Coronary Intervention;  Surgeon: Lincoln De Los Santos MD;  Location:  [facility];  Service: Cardiology   • STOMACH SURGERY      FUSION OF BLEEDING ULCER   • UPPER GASTROINTESTINAL ENDOSCOPY  11/13/2015    Dr. Martinez- mild gastritis       Problem List:  Patient Active Problem List   Diagnosis   • Essential hypertension   • Type 2 diabetes mellitus (HCC)   • Benign prostatic hyperplasia   • ASCVD (arteriosclerotic cardiovascular disease), s/p stenting of 80 % stenosis in left circumflex on 10/05/16and 60% stenosis in the LAD, clinically stable.    • Hyperlipidemia LDL goal <70   • Weakness generalized   • Coronary artery fistula   • Weight loss, unintentional   • Iron deficiency anemia   • Gastroesophageal reflux disease   • Dysphagia   • Chest pain   • History of pulmonary embolism in February 22, patient is on Eliquis.        Allergy:   No Known Allergies     Current Medications:   Current Outpatient Medications   Medication Sig Dispense Refill   • albuterol sulfate  (90 Base) MCG/ACT inhaler Inhale 2 puffs Every 4 (Four) Hours As Needed for  Wheezing. 6.7 g 5   • apixaban (ELIQUIS) 5 MG tablet tablet Take 5 mg by mouth 2 (Two) Times a Day.     • atorvastatin (LIPITOR) 80 MG tablet Take 1 tablet by mouth Every Night. 90 tablet 5   • bisacodyl (Dulcolax) 5 MG EC tablet Take 4 tablets at 4pm the day prior to procedure with a full glass of water. 4 tablet 0   • budesonide-formoterol (SYMBICORT) 80-4.5 MCG/ACT inhaler Inhale 2 puffs 2 (Two) Times a Day. 10.2 g 5   • cetirizine (zyrTEC) 10 MG tablet Take 10 mg by mouth Daily.     • cholecalciferol (VITAMIN D3) 1.25 MG (26422 UT) capsule Take 50,000 Units by mouth 1 (One) Time Per Week.     • clopidogrel (PLAVIX) 75 MG tablet Take 1 tablet by mouth Daily. 30 tablet 5   • colestipol (COLESTID) 1 g tablet Take 1 g by mouth 2 (Two) Times a Day.     • finasteride (PROSCAR) 5 MG tablet Take 1 tablet by mouth Daily for 360 days. 90 tablet 5   • FLUoxetine (PROzac) 20 MG capsule Take 2 capsules by mouth Daily. 60 capsule 1   • iron polysaccharides (NIFEREX) 150 MG capsule Take 1 capsule by mouth Daily. 30 capsule 3   • isosorbide mononitrate (IMDUR) 120 MG 24 hr tablet Take 1 tablet by mouth Every Morning. 90 tablet 2   • magnesium citrate solution Take 296 mL by mouth Take As Directed. Follow bowel prep instructions given at office 592 mL 0   • Melatonin 5 MG capsule Take 5 mg by mouth Every Night. 30 each 1   • memantine (NAMENDA XR) 28 MG capsule sustained-release 24 hr extended release capsule      • metoprolol tartrate (LOPRESSOR) 25 MG tablet Take 0.5 tablets by mouth Every 12 (Twelve) Hours.     • mirtazapine (REMERON SOL-TAB) 45 MG disintegrating tablet Place 1 tablet on the tongue Every Night. 30 tablet 1   • nitroglycerin (NITROSTAT) 0.4 MG SL tablet 1 under the tongue as needed for angina, may repeat q5mins for up three doses 25 tablet 2   • pantoprazole (PROTONIX) 40 MG EC tablet Take 40 mg by mouth Daily.     • polyethylene glycol (MIRALAX) 17 g packet Take 17 g by mouth Daily.     • ranolazine (RANEXA)  500 MG 12 hr tablet Take 1 tablet by mouth Every 12 (Twelve) Hours.     • vitamin B-12 (CYANOCOBALAMIN) 1000 MCG tablet Take 1,000 mcg by mouth Daily.     • megestrol (Megace ES) 625 MG/5ML suspension Take 5 mL by mouth Daily for 30 days. 150 mL 2     No current facility-administered medications for this visit.       Review of Symptoms:    Review of Systems   Constitutional: Positive for fatigue.   HENT: Negative.    Eyes: Negative.    Respiratory: Negative.    Cardiovascular: Negative.    Gastrointestinal: Negative.    Genitourinary: Negative.    Musculoskeletal: Positive for gait problem.   Skin: Negative.    Neurological: Positive for weakness, memory problem and confusion.   Psychiatric/Behavioral: Positive for depressed mood. Negative for suicidal ideas. The patient is nervous/anxious.          Physical Exam:   There were no vitals taken for this visit.  There is no height or weight on file to calculate BMI.    Appearance: Well nourished male, appropriately dressed, appears stated age and in no acute distress  Gait, Station, Strength: Unable to evaluate due to video visit    Mental Status Exam:   Hygiene:   good  Cooperation:  Cooperative  Eye Contact:  Good  Psychomotor Behavior:  Appropriate  Affect:  Appropriate  Mood: normal  Hopelessness: Denies  Speech:  Normal  Thought Process:  Linear  Thought Content:  Normal and Mood congruent  Suicidal:  None  Homicidal:  None  Hallucinations:  None  Delusion:  None  Memory:  Deficits  Orientation:  Person, Place, Time and Situation  Reliability:  fair  Insight:  Fair  Judgement:  Impaired  Impulse Control:  Impaired  Physical/Medical Issues:  Yes Chronic health issues, nothing acute today     PHQ-Score Total:  PHQ-9 Total Score: 1          Lab Results:   Lab on 04/18/2022   Component Date Value Ref Range Status   • COVID19 04/18/2022 Not Detected  Not Detected - Ref. Range Final   Lab on 04/12/2022   Component Date Value Ref Range Status   • Glucose 04/12/2022 240  (A) 65 - 99 mg/dL Final   • BUN 04/12/2022 16  8 - 23 mg/dL Final   • Creatinine 04/12/2022 0.84  0.76 - 1.27 mg/dL Final   • Sodium 04/12/2022 139  136 - 145 mmol/L Final   • Potassium 04/12/2022 4.3  3.5 - 5.2 mmol/L Final   • Chloride 04/12/2022 105  98 - 107 mmol/L Final   • CO2 04/12/2022 22.3  22.0 - 29.0 mmol/L Final   • Calcium 04/12/2022 8.7  8.6 - 10.5 mg/dL Final   • Total Protein 04/12/2022 6.5  6.0 - 8.5 g/dL Final   • Albumin 04/12/2022 3.66  3.50 - 5.20 g/dL Final   • ALT (SGPT) 04/12/2022 22  1 - 41 U/L Final   • AST (SGOT) 04/12/2022 17  1 - 40 U/L Final   • Alkaline Phosphatase 04/12/2022 100  39 - 117 U/L Final   • Total Bilirubin 04/12/2022 0.3  0.0 - 1.2 mg/dL Final   • Globulin 04/12/2022 2.8  gm/dL Final   • A/G Ratio 04/12/2022 1.3  g/dL Final   • BUN/Creatinine Ratio 04/12/2022 19.0  7.0 - 25.0 Final   • Anion Gap 04/12/2022 11.7  5.0 - 15.0 mmol/L Final   • eGFR 04/12/2022 90.9  >60.0 mL/min/1.73 Final    National Kidney Foundation and American Society of Nephrology (ASN) Task Force recommended calculation based on the Chronic Kidney Disease Epidemiology Collaboration (CKD-EPI) equation refit without adjustment for race.   • Ferritin 04/12/2022 91.03  30.00 - 400.00 ng/mL Final   • Iron 04/12/2022 72  59 - 158 mcg/dL Final   • Iron Saturation 04/12/2022 18 (A) 20 - 50 % Final   • Transferrin 04/12/2022 267  200 - 360 mg/dL Final   • TIBC 04/12/2022 398  298 - 536 mcg/dL Final   • Vitamin B-12 04/12/2022 >2,000 (A) 211 - 946 pg/mL Final   • Folate 04/12/2022 8.23  4.78 - 24.20 ng/mL Final   • WBC 04/12/2022 6.78  3.40 - 10.80 10*3/mm3 Final   • RBC 04/12/2022 3.95 (A) 4.14 - 5.80 10*6/mm3 Final   • Hemoglobin 04/12/2022 12.2 (A) 13.0 - 17.7 g/dL Final   • Hematocrit 04/12/2022 37.7  37.5 - 51.0 % Final   • MCV 04/12/2022 95.4  79.0 - 97.0 fL Final   • MCH 04/12/2022 30.9  26.6 - 33.0 pg Final   • MCHC 04/12/2022 32.4  31.5 - 35.7 g/dL Final   • RDW 04/12/2022 14.0  12.3 - 15.4 % Final   •  RDW-SD 04/12/2022 49.1  37.0 - 54.0 fl Final   • MPV 04/12/2022 10.3  6.0 - 12.0 fL Final   • Platelets 04/12/2022 225  140 - 450 10*3/mm3 Final   • Neutrophil % 04/12/2022 66.4  42.7 - 76.0 % Final   • Lymphocyte % 04/12/2022 19.8  19.6 - 45.3 % Final   • Monocyte % 04/12/2022 7.8  5.0 - 12.0 % Final   • Eosinophil % 04/12/2022 4.6  0.3 - 6.2 % Final   • Basophil % 04/12/2022 0.7  0.0 - 1.5 % Final   • Immature Grans % 04/12/2022 0.7 (A) 0.0 - 0.5 % Final   • Neutrophils, Absolute 04/12/2022 4.50  1.70 - 7.00 10*3/mm3 Final   • Lymphocytes, Absolute 04/12/2022 1.34  0.70 - 3.10 10*3/mm3 Final   • Monocytes, Absolute 04/12/2022 0.53  0.10 - 0.90 10*3/mm3 Final   • Eosinophils, Absolute 04/12/2022 0.31  0.00 - 0.40 10*3/mm3 Final   • Basophils, Absolute 04/12/2022 0.05  0.00 - 0.20 10*3/mm3 Final   • Immature Grans, Absolute 04/12/2022 0.05  0.00 - 0.05 10*3/mm3 Final   • nRBC 04/12/2022 0.0  0.0 - 0.2 /100 WBC Final       Assessment/Plan   Diagnoses and all orders for this visit:    1. Major depressive disorder, recurrent episode, moderate (HCC) (Primary)  -     FLUoxetine (PROzac) 20 MG capsule; Take 2 capsules by mouth Daily.  Dispense: 60 capsule; Refill: 1    2. Generalized anxiety disorder  -     mirtazapine (REMERON SOL-TAB) 45 MG disintegrating tablet; Place 1 tablet on the tongue Every Night.  Dispense: 30 tablet; Refill: 1  -     FLUoxetine (PROzac) 20 MG capsule; Take 2 capsules by mouth Daily.  Dispense: 60 capsule; Refill: 1    3. Other insomnia  -     mirtazapine (REMERON SOL-TAB) 45 MG disintegrating tablet; Place 1 tablet on the tongue Every Night.  Dispense: 30 tablet; Refill: 1  -     Melatonin 5 MG capsule; Take 5 mg by mouth Every Night.  Dispense: 30 each; Refill: 1    4. Cognitive decline    5. Medication management        -Increase fluoxetine 40 mg daily for anxiety and depression  -Continue mirtazapine 45 mg nightly for sleep  -Continue melatonin 5 mg nightly for sleep  -BETH reviewed and  appropriate. Patient counseled on use of controlled substances.   -The benefits of a healthy diet and exercise were discussed with patient, especially the positive effects they have on mental health. Patient encouraged to consider lifestyle modification regarding  diet and exercise patterns to maximize results of mental health treatment.  -Reviewed previous available documentation  -Reviewed most recent available labs     -Interactive Complexity Yes If yes, due to:  Third-Party Involvement Other: Nurse         Visit Diagnoses:    ICD-10-CM ICD-9-CM   1. Major depressive disorder, recurrent episode, moderate (HCC)  F33.1 296.32   2. Generalized anxiety disorder  F41.1 300.02   3. Other insomnia  G47.09 780.52   4. Cognitive decline  R41.89 294.9   5. Medication management  Z79.899 V58.69       TREATMENT PLAN/GOALS: Continue supportive psychotherapy efforts and medications as indicated. Treatment and medication options discussed during today's visit. Patient acknowledged and verbally consented to continue with current treatment plan and was educated on the importance of compliance with treatment and follow-up appointments.    MEDICATION ISSUES:    Discussed medication options and treatment plan of prescribed medication as well as the risks, benefits, and side effects including potential falls, possible impaired driving and metabolic adversities among others. Patient is agreeable to call the office with any worsening of symptoms or onset of side effects. Patient is agreeable to call 911 or go to the nearest ER should he/she begin having SI/HI.     MEDS ORDERED DURING VISIT:  New Medications Ordered This Visit   Medications   • mirtazapine (REMERON SOL-TAB) 45 MG disintegrating tablet     Sig: Place 1 tablet on the tongue Every Night.     Dispense:  30 tablet     Refill:  1   • Melatonin 5 MG capsule     Sig: Take 5 mg by mouth Every Night.     Dispense:  30 each     Refill:  1   • FLUoxetine (PROzac) 20 MG capsule      Sig: Take 2 capsules by mouth Daily.     Dispense:  60 capsule     Refill:  1       Return in about 8 weeks (around 6/21/2022), or if symptoms worsen or fail to improve.               This document has been electronically signed by ANDREW Servin  April 26, 2022 14:31 EDT    Part of this note may be an electronic transcription/translation of spoken language to printed text using the Dragon Dictation System.

## 2022-05-10 DIAGNOSIS — D50.0 IRON DEFICIENCY ANEMIA DUE TO CHRONIC BLOOD LOSS: Primary | ICD-10-CM

## 2022-05-10 DIAGNOSIS — R19.7 DIARRHEA, UNSPECIFIED TYPE: ICD-10-CM

## 2022-05-10 DIAGNOSIS — R68.81 EARLY SATIETY: ICD-10-CM

## 2022-05-10 DIAGNOSIS — R63.4 WEIGHT LOSS, ABNORMAL: ICD-10-CM

## 2022-05-13 ENCOUNTER — LAB (OUTPATIENT)
Dept: LAB | Facility: HOSPITAL | Age: 77
End: 2022-05-13

## 2022-05-13 DIAGNOSIS — D50.0 IRON DEFICIENCY ANEMIA DUE TO CHRONIC BLOOD LOSS: ICD-10-CM

## 2022-05-13 DIAGNOSIS — R63.4 WEIGHT LOSS, ABNORMAL: ICD-10-CM

## 2022-05-13 DIAGNOSIS — R19.7 DIARRHEA, UNSPECIFIED TYPE: ICD-10-CM

## 2022-05-13 DIAGNOSIS — R68.81 EARLY SATIETY: ICD-10-CM

## 2022-05-16 ENCOUNTER — LAB REQUISITION (OUTPATIENT)
Dept: LAB | Facility: HOSPITAL | Age: 77
End: 2022-05-16

## 2022-05-16 DIAGNOSIS — Z11.52 ENCOUNTER FOR SCREENING FOR COVID-19: ICD-10-CM

## 2022-05-16 LAB — SARS-COV-2 RNA RESP QL NAA+PROBE: NOT DETECTED

## 2022-05-16 PROCEDURE — U0003 INFECTIOUS AGENT DETECTION BY NUCLEIC ACID (DNA OR RNA); SEVERE ACUTE RESPIRATORY SYNDROME CORONAVIRUS 2 (SARS-COV-2) (CORONAVIRUS DISEASE [COVID-19]), AMPLIFIED PROBE TECHNIQUE, MAKING USE OF HIGH THROUGHPUT TECHNOLOGIES AS DESCRIBED BY CMS-2020-01-R: HCPCS | Performed by: INTERNAL MEDICINE

## 2022-05-17 ENCOUNTER — ANESTHESIA EVENT (OUTPATIENT)
Dept: PERIOP | Facility: HOSPITAL | Age: 77
End: 2022-05-17

## 2022-05-17 ENCOUNTER — ANESTHESIA (OUTPATIENT)
Dept: PERIOP | Facility: HOSPITAL | Age: 77
End: 2022-05-17

## 2022-05-17 ENCOUNTER — HOSPITAL ENCOUNTER (OUTPATIENT)
Facility: HOSPITAL | Age: 77
Setting detail: HOSPITAL OUTPATIENT SURGERY
Discharge: HOME | End: 2022-05-17
Attending: INTERNAL MEDICINE | Admitting: INTERNAL MEDICINE

## 2022-05-17 VITALS
HEIGHT: 71 IN | OXYGEN SATURATION: 96 % | WEIGHT: 192 LBS | SYSTOLIC BLOOD PRESSURE: 116 MMHG | BODY MASS INDEX: 26.88 KG/M2 | TEMPERATURE: 97.5 F | DIASTOLIC BLOOD PRESSURE: 84 MMHG | HEART RATE: 70 BPM | RESPIRATION RATE: 16 BRPM

## 2022-05-17 DIAGNOSIS — R19.7 DIARRHEA, UNSPECIFIED TYPE: ICD-10-CM

## 2022-05-17 DIAGNOSIS — D50.0 IRON DEFICIENCY ANEMIA DUE TO CHRONIC BLOOD LOSS: ICD-10-CM

## 2022-05-17 DIAGNOSIS — R68.81 EARLY SATIETY: ICD-10-CM

## 2022-05-17 DIAGNOSIS — R63.4 WEIGHT LOSS, ABNORMAL: ICD-10-CM

## 2022-05-17 LAB — GLUCOSE BLDC GLUCOMTR-MCNC: 247 MG/DL (ref 70–130)

## 2022-05-17 PROCEDURE — 82962 GLUCOSE BLOOD TEST: CPT

## 2022-05-17 PROCEDURE — 43239 EGD BIOPSY SINGLE/MULTIPLE: CPT | Performed by: INTERNAL MEDICINE

## 2022-05-17 PROCEDURE — 25010000002 PROPOFOL 10 MG/ML EMULSION: Performed by: NURSE ANESTHETIST, CERTIFIED REGISTERED

## 2022-05-17 PROCEDURE — 88305 TISSUE EXAM BY PATHOLOGIST: CPT

## 2022-05-17 PROCEDURE — 45385 COLONOSCOPY W/LESION REMOVAL: CPT | Performed by: INTERNAL MEDICINE

## 2022-05-17 PROCEDURE — S0260 H&P FOR SURGERY: HCPCS | Performed by: INTERNAL MEDICINE

## 2022-05-17 RX ORDER — OXYCODONE HYDROCHLORIDE AND ACETAMINOPHEN 5; 325 MG/1; MG/1
1 TABLET ORAL ONCE AS NEEDED
Status: DISCONTINUED | OUTPATIENT
Start: 2022-05-17 | End: 2022-05-17 | Stop reason: HOSPADM

## 2022-05-17 RX ORDER — ONDANSETRON 2 MG/ML
4 INJECTION INTRAMUSCULAR; INTRAVENOUS AS NEEDED
Status: DISCONTINUED | OUTPATIENT
Start: 2022-05-17 | End: 2022-05-17 | Stop reason: HOSPADM

## 2022-05-17 RX ORDER — SODIUM CHLORIDE, SODIUM LACTATE, POTASSIUM CHLORIDE, CALCIUM CHLORIDE 600; 310; 30; 20 MG/100ML; MG/100ML; MG/100ML; MG/100ML
100 INJECTION, SOLUTION INTRAVENOUS ONCE AS NEEDED
Status: DISCONTINUED | OUTPATIENT
Start: 2022-05-17 | End: 2022-05-17 | Stop reason: HOSPADM

## 2022-05-17 RX ORDER — SODIUM CHLORIDE 0.9 % (FLUSH) 0.9 %
10 SYRINGE (ML) INJECTION AS NEEDED
Status: DISCONTINUED | OUTPATIENT
Start: 2022-05-17 | End: 2022-05-17 | Stop reason: HOSPADM

## 2022-05-17 RX ORDER — PROPOFOL 10 MG/ML
VIAL (ML) INTRAVENOUS AS NEEDED
Status: DISCONTINUED | OUTPATIENT
Start: 2022-05-17 | End: 2022-05-17 | Stop reason: SURG

## 2022-05-17 RX ORDER — SODIUM CHLORIDE 0.9 % (FLUSH) 0.9 %
10 SYRINGE (ML) INJECTION EVERY 12 HOURS SCHEDULED
Status: DISCONTINUED | OUTPATIENT
Start: 2022-05-17 | End: 2022-05-17 | Stop reason: HOSPADM

## 2022-05-17 RX ORDER — MIDAZOLAM HYDROCHLORIDE 1 MG/ML
0.5 INJECTION INTRAMUSCULAR; INTRAVENOUS
Status: DISCONTINUED | OUTPATIENT
Start: 2022-05-17 | End: 2022-05-17 | Stop reason: HOSPADM

## 2022-05-17 RX ORDER — SODIUM CHLORIDE, SODIUM LACTATE, POTASSIUM CHLORIDE, CALCIUM CHLORIDE 600; 310; 30; 20 MG/100ML; MG/100ML; MG/100ML; MG/100ML
125 INJECTION, SOLUTION INTRAVENOUS ONCE
Status: DISCONTINUED | OUTPATIENT
Start: 2022-05-17 | End: 2022-05-17 | Stop reason: HOSPADM

## 2022-05-17 RX ORDER — FENTANYL CITRATE 50 UG/ML
50 INJECTION, SOLUTION INTRAMUSCULAR; INTRAVENOUS
Status: DISCONTINUED | OUTPATIENT
Start: 2022-05-17 | End: 2022-05-17 | Stop reason: HOSPADM

## 2022-05-17 RX ORDER — EPHEDRINE SULFATE 5 MG/ML
INJECTION INTRAVENOUS AS NEEDED
Status: DISCONTINUED | OUTPATIENT
Start: 2022-05-17 | End: 2022-05-17 | Stop reason: SURG

## 2022-05-17 RX ORDER — IPRATROPIUM BROMIDE AND ALBUTEROL SULFATE 2.5; .5 MG/3ML; MG/3ML
3 SOLUTION RESPIRATORY (INHALATION) ONCE AS NEEDED
Status: DISCONTINUED | OUTPATIENT
Start: 2022-05-17 | End: 2022-05-17 | Stop reason: HOSPADM

## 2022-05-17 RX ADMIN — PROPOFOL 140 MCG/KG/MIN: 10 INJECTION, EMULSION INTRAVENOUS at 11:48

## 2022-05-17 RX ADMIN — PROPOFOL 30 MG: 10 INJECTION, EMULSION INTRAVENOUS at 11:48

## 2022-05-17 RX ADMIN — EPHEDRINE SULFATE 10 MG: 5 INJECTION INTRAVENOUS at 11:59

## 2022-05-17 NOTE — ANESTHESIA PREPROCEDURE EVALUATION
Anesthesia Evaluation     Patient summary reviewed and Nursing notes reviewed   no history of anesthetic complications:  NPO Solid Status: > 8 hours  NPO Liquid Status: > 8 hours           Airway   Mallampati: II  TM distance: >3 FB  Neck ROM: full  No difficulty expected  Dental - normal exam     Pulmonary - negative pulmonary ROS and normal exam    breath sounds clear to auscultation  (-) asthma, not a smoker  Cardiovascular - normal exam    ECG reviewed  PT is on anticoagulation therapy  Patient on routine beta blocker  Rhythm: regular  Rate: normal    (+) hypertension, CAD, cardiac stents more than 12 months ago angina with exertion, hyperlipidemia,       Neuro/Psych  (+) dizziness/light headedness,    (-) seizures, CVA  GI/Hepatic/Renal/Endo    (+)  GERD, PUD, GI bleeding upper resolved, diabetes mellitus type 2,     Musculoskeletal (-) negative ROS    Abdominal  - normal exam    Bowel sounds: normal.   Substance History - negative use     OB/GYN negative ob/gyn ROS         Other   blood dyscrasia (anemia) anemia,                     Anesthesia Plan    ASA 3     general     intravenous induction     Anesthetic plan, all risks, benefits, and alternatives have been provided, discussed and informed consent has been obtained with: patient.  Use of blood products discussed with patient  Consented to blood products.

## 2022-05-17 NOTE — H&P
Chief complaint  GI bleeding    Subjective     Patient is a 76 y.o. male who presents today for an EGD and colonoscopy.   He is a resident of [facility] and has dementia but is able to communicate basic information.  Patient was seen in the clinic last month for GI bleeding.  He has a history of weight loss but has now gained 31 pounds due to Megace which was recently discontinued.  He continues to receive protein shakes per his wife.  Patient also struggles with diarrhea.  He was having bright red blood per rectum but states that he has not known of any recently.  Patient was diagnosed by Oncology with mild anemia.  Family history is negative for GI disease.  Medical, surgical, and social histories were reviewed and are listed below.      Review of Systems  Review of Systems - General ROS: negative for - weight gain  Psychological ROS: negative for - behavioral disorder  Ophthalmic ROS: negative for - dry eyes  ENT ROS: negative for - vertigo or vocal changes  Hematological and Lymphatic ROS: negative for - jaundice or swollen lymph nodes  Respiratory ROS: negative for - sputum changes or stridor  Cardiovascular ROS: negative for - irregular heartbeat or murmur  Gastrointestinal ROS: positive for-diarrhea; negative for - blood in stools or change in stools  Genitourinary ROS: negative for - hematuria or incontinence  Musculoskeletal ROS: negative for - gait disturbance      History  Past Medical History:   Diagnosis Date   • BPH (benign prostatic hyperplasia)    • Bradycardia     Positive tilt table testing 07/2016   • Coronary artery disease    • Diabetes mellitus (HCC)    • Diverticulosis    • Elevated cholesterol    • Gastric ulcer with hemorrhage    • Gastroesophageal reflux disease 1/26/2021   • History of transfusion    • Hyperlipidemia    • Hypertension    • Psoriasis      Past Surgical History:   Procedure Laterality Date   • BACK SURGERY     • CARDIAC CATHETERIZATION N/A 9/20/2016     Procedure: Left Heart Cath;  Surgeon: Giovanni Buenrostro MD;  [facility];  Service:    • CARDIAC CATHETERIZATION N/A 10/4/2016    Procedure: Left Heart Cath;  Surgeon: Bharathi Andrew MD;  [facility];  Service:    • CARDIAC CATHETERIZATION N/A 5/9/2017    Procedure: Left Heart Cath;  Surgeon: Giovanni Buenrostro MD;  [facility]  [facility];  Service:    • CARDIAC CATHETERIZATION N/A 5/9/2017    Procedure: ERR;  Surgeon: Giovanni Buenrostro MD;  [facility];  Service:    • CARDIAC CATHETERIZATION N/A 11/8/2019    Procedure: Left Heart Cath;  Surgeon: Abraham Oviedo MD;  [facility]:  [facility];  Service: Cardiology   • CARDIAC CATHETERIZATION N/A 12/18/2019    Procedure: Coronary angiography;  Surgeon: Jay Barney IV, MD;  Location:  [facility];  Service: Cardiovascular   • CHOLECYSTECTOMY     • COLONOSCOPY  11/13/2015    Dr. Martinez- internal hemorrhoids, sigmoid diverticulosis   • COLONOSCOPY N/A 8/17/2018    Procedure: COLONOSCOPY CPT CODE: 67978;  Surgeon: Gigi Geronimo MD;  Location:  [facility];  Service: Gastroenterology   • CORONARY ANGIOPLASTY WITH STENT PLACEMENT     • ENDOSCOPY N/A 8/17/2018    Procedure: ESOPHAGOGASTRODUODENOSCOPY WITH BIOPSY CPT CODE: 61834;  Surgeon: Gigi Geronimo MD;  Location:  [facility];  Service: Gastroenterology   • ENDOSCOPY N/A 4/20/2021    Procedure: ESOPHAGOGASTRODUODENOSCOPY;  Surgeon: Geno Vernon MD;  [facility]:  [facility];  Service: Gastroenterology;  Laterality: N/A;   • INTERVENTIONAL RADIOLOGY PROCEDURE N/A 12/18/2019    Procedure: Coil Embolization of septal to pulmonary artery fistuala.;  Surgeon: Jay Barney IV, MD;  Location:  [facility];  Service: Cardiovascular   • NH RT/LT HEART CATHETERS N/A 5/9/2017    Procedure: Percutaneous Coronary Intervention;  Surgeon: Lincoln DE LA ROSA  MD Filiberto;  Location: [facility] INVASIVE LOCATION;  Service: Cardiology   • STOMACH SURGERY      FUSION OF BLEEDING ULCER   • UPPER GASTROINTESTINAL ENDOSCOPY  2015    Dr. Martinez- mild gastritis     Family History   Problem Relation Age of Onset   • Sick sinus syndrome Mother    • Heart failure Mother    • Diabetes Mother    • Liver cancer Father      Social History     Tobacco Use   • Smoking status: Former Smoker     Packs/day: 3.00     Types: Cigarettes     Quit date:      Years since quittin.4   • Smokeless tobacco: Former User     Quit date: 1986   Vaping Use   • Vaping Use: Never used   Substance Use Topics   • Alcohol use: No   • Drug use: No     Medications Prior to Admission   Medication Sig Dispense Refill Last Dose   • albuterol sulfate  (90 Base) MCG/ACT inhaler Inhale 2 puffs Every 4 (Four) Hours As Needed for Wheezing. 6.7 g 5    • apixaban (ELIQUIS) 5 MG tablet tablet Take 5 mg by mouth 2 (Two) Times a Day.      • atorvastatin (LIPITOR) 80 MG tablet Take 1 tablet by mouth Every Night. 90 tablet 5    • bisacodyl (Dulcolax) 5 MG EC tablet Take 4 tablets at 4pm the day prior to procedure with a full glass of water. 4 tablet 0    • budesonide-formoterol (SYMBICORT) 80-4.5 MCG/ACT inhaler Inhale 2 puffs 2 (Two) Times a Day. 10.2 g 5    • cetirizine (zyrTEC) 10 MG tablet Take 10 mg by mouth Daily.      • cholecalciferol (VITAMIN D3) 1.25 MG (84564 UT) capsule Take 50,000 Units by mouth 1 (One) Time Per Week.      • clopidogrel (PLAVIX) 75 MG tablet Take 1 tablet by mouth Daily. 30 tablet 5    • colestipol (COLESTID) 1 g tablet Take 1 g by mouth 2 (Two) Times a Day.      • finasteride (PROSCAR) 5 MG tablet Take 1 tablet by mouth Daily for 360 days. 90 tablet 5    • FLUoxetine (PROzac) 20 MG capsule Take 2 capsules by mouth Daily. 60 capsule 1    • iron polysaccharides (NIFEREX) 150 MG capsule Take 1 capsule by mouth Daily. 30 capsule 3    • isosorbide mononitrate (IMDUR) 120 MG  24 hr tablet Take 1 tablet by mouth Every Morning. 90 tablet 2    • magnesium citrate solution Take 296 mL by mouth Take As Directed. Follow bowel prep instructions given at office 592 mL 0    • megestrol (Megace ES) 625 MG/5ML suspension Take 5 mL by mouth Daily for 30 days. 150 mL 2    • Melatonin 5 MG capsule Take 5 mg by mouth Every Night. 30 each 1    • memantine (NAMENDA XR) 28 MG capsule sustained-release 24 hr extended release capsule       • metoprolol tartrate (LOPRESSOR) 25 MG tablet Take 0.5 tablets by mouth Every 12 (Twelve) Hours.      • mirtazapine (REMERON SOL-TAB) 45 MG disintegrating tablet Place 1 tablet on the tongue Every Night. 30 tablet 1    • nitroglycerin (NITROSTAT) 0.4 MG SL tablet 1 under the tongue as needed for angina, may repeat q5mins for up three doses 25 tablet 2    • pantoprazole (PROTONIX) 40 MG EC tablet Take 40 mg by mouth Daily.      • polyethylene glycol (MIRALAX) 17 g packet Take 17 g by mouth Daily.      • ranolazine (RANEXA) 500 MG 12 hr tablet Take 1 tablet by mouth Every 12 (Twelve) Hours.      • vitamin B-12 (CYANOCOBALAMIN) 1000 MCG tablet Take 1,000 mcg by mouth Daily.        Allergies:  Patient has no known allergies.    Objective     Vital Signs       Physical Exam  General:  This is a WD pleasant male in no acute distress  Vital signs: Stable, afebrile  HEENT exam:  WNL. Sclerae are anicteric.  EOMI  Neck: Supple, FROM.  No JVD.  Trachea midline  Lungs:  Respiratory effort normal. Auscultation: Clear, without wheezes, rhonchi, rales  Heart:  Regular rate and rhythm, without murmur, gallop, rub.  No pedal edema  Abdomen:  Firmness of upper abdomen; No tenderness;  Bowel sounds normal  Musculoskeletal: Muscle strength/tone is normal.    Psych:  Alert, oriented x 3.  Mood and affect are appropriate  Skin:  Warm with good turgor.  Without rash or lesion  Extremities:  Examination of the extremities revealed no cyanosis, clubbing or edema.    Results Review:                      Invalid input(s): PROTCrCl cannot be calculated (Patient's most recent lab result is older than the maximum 30 days allowed.).  No results found for: AMMONIA      No results found for: BLOODCX  No results found for: URINECX  No results found for: WOUNDCX  No results found for: STOOLCX    Imaging:  Imaging Results (Last 24 Hours)     ** No results found for the last 24 hours. **                Impression:  Patient Active Problem List   Diagnosis Code   • Essential hypertension I10   • Type 2 diabetes mellitus (HCC) E11.9   • Benign prostatic hyperplasia N40.0   • ASCVD (arteriosclerotic cardiovascular disease), s/p stenting of 80 % stenosis in left circumflex on 10/05/16and 60% stenosis in the LAD, clinically stable.  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 Z86.711   • Early satiety R68.81   • Diarrhea R19.7       Assessment:  1.  Bright red blood per rectum  2.  Diarrhea  3.  Anemia    Plan:  The patient will undergo an EGD and colonoscopy today.      IGOR Zacarias  05/17/22  10:37 EDT

## 2022-05-18 LAB — REF LAB TEST METHOD: NORMAL

## 2022-05-19 NOTE — PROGRESS NOTES
At the time of your recent upper endoscopy, biopsies were taken of the stomach.  Biopsies were negative for H. pylori gastritis.  You did have retained food particles in the stomach indicating possible gastroparesis.  The polyp removed from your colon was a tubular adenoma which is considered precancerous.  Please keep your follow-up appointment.

## 2022-05-29 ENCOUNTER — HOSPITAL ENCOUNTER (INPATIENT)
Facility: HOSPITAL | Age: 77
LOS: 4 days | Discharge: NURSING, REHAB OR LONG-TERM CARE | End: 2022-06-02
Attending: EMERGENCY MEDICINE | Admitting: HOSPITALIST

## 2022-05-29 ENCOUNTER — APPOINTMENT (OUTPATIENT)
Dept: CT IMAGING | Facility: HOSPITAL | Age: 77
End: 2022-05-29

## 2022-05-29 ENCOUNTER — APPOINTMENT (OUTPATIENT)
Dept: GENERAL RADIOLOGY | Facility: HOSPITAL | Age: 77
End: 2022-05-29

## 2022-05-29 DIAGNOSIS — A41.9 SEPSIS, DUE TO UNSPECIFIED ORGANISM, UNSPECIFIED WHETHER ACUTE ORGAN DYSFUNCTION PRESENT: ICD-10-CM

## 2022-05-29 DIAGNOSIS — J18.9 PNEUMONIA OF BOTH LUNGS DUE TO INFECTIOUS ORGANISM, UNSPECIFIED PART OF LUNG: Primary | ICD-10-CM

## 2022-05-29 PROBLEM — J96.01 ACUTE RESPIRATORY FAILURE WITH HYPOXIA (HCC): Status: ACTIVE | Noted: 2022-05-29

## 2022-05-29 PROBLEM — R79.89 LOW SERUM BICARBONATE: Status: ACTIVE | Noted: 2022-05-29

## 2022-05-29 PROBLEM — R65.20 SEVERE SEPSIS: Status: ACTIVE | Noted: 2022-05-29

## 2022-05-29 LAB
A-A DO2: 46.7 MMHG (ref 0–300)
ALBUMIN SERPL-MCNC: 2.67 G/DL (ref 3.5–5.2)
ALBUMIN/GLOB SERPL: 0.6 G/DL
ALP SERPL-CCNC: 123 U/L (ref 39–117)
ALT SERPL W P-5'-P-CCNC: 29 U/L (ref 1–41)
ANION GAP SERPL CALCULATED.3IONS-SCNC: 18.4 MMOL/L (ref 5–15)
APTT PPP: 43.6 SECONDS (ref 26.5–34.5)
ARTERIAL PATENCY WRIST A: POSITIVE
AST SERPL-CCNC: 27 U/L (ref 1–40)
ATMOSPHERIC PRESS: 727 MMHG
B PARAPERT DNA SPEC QL NAA+PROBE: NOT DETECTED
B PERT DNA SPEC QL NAA+PROBE: NOT DETECTED
BACTERIA UR QL AUTO: ABNORMAL /HPF
BASE EXCESS BLDA CALC-SCNC: -3.5 MMOL/L (ref 0–2)
BDY SITE: ABNORMAL
BILIRUB SERPL-MCNC: 0.4 MG/DL (ref 0–1.2)
BILIRUB UR QL STRIP: NEGATIVE
BODY TEMPERATURE: 0 C
BUN SERPL-MCNC: 25 MG/DL (ref 8–23)
BUN/CREAT SERPL: 24.3 (ref 7–25)
C PNEUM DNA NPH QL NAA+NON-PROBE: NOT DETECTED
CALCIUM SPEC-SCNC: 8.8 MG/DL (ref 8.6–10.5)
CHLORIDE SERPL-SCNC: 102 MMOL/L (ref 98–107)
CLARITY UR: CLEAR
CO2 BLDA-SCNC: 20.6 MMOL/L (ref 22–33)
CO2 SERPL-SCNC: 16.6 MMOL/L (ref 22–29)
COHGB MFR BLD: 1.1 % (ref 0–5)
COLOR UR: ABNORMAL
CREAT SERPL-MCNC: 1.03 MG/DL (ref 0.76–1.27)
CRP SERPL-MCNC: 15.25 MG/DL (ref 0–0.5)
D-LACTATE SERPL-SCNC: 1.9 MMOL/L (ref 0.5–2)
D-LACTATE SERPL-SCNC: 3.9 MMOL/L (ref 0.5–2)
DEPRECATED RDW RBC AUTO: 50.1 FL (ref 37–54)
EGFRCR SERPLBLD CKD-EPI 2021: 75.8 ML/MIN/1.73
EOSINOPHIL # BLD MANUAL: 0.14 10*3/MM3 (ref 0–0.4)
EOSINOPHIL NFR BLD MANUAL: 1 % (ref 0.3–6.2)
ERYTHROCYTE [DISTWIDTH] IN BLOOD BY AUTOMATED COUNT: 14.4 % (ref 12.3–15.4)
ERYTHROCYTE [SEDIMENTATION RATE] IN BLOOD: 108 MM/HR (ref 0–20)
FLUAV SUBTYP SPEC NAA+PROBE: NOT DETECTED
FLUBV RNA ISLT QL NAA+PROBE: NOT DETECTED
GLOBULIN UR ELPH-MCNC: 4.5 GM/DL
GLUCOSE BLDC GLUCOMTR-MCNC: 228 MG/DL (ref 70–130)
GLUCOSE SERPL-MCNC: 346 MG/DL (ref 65–99)
GLUCOSE UR STRIP-MCNC: ABNORMAL MG/DL
HADV DNA SPEC NAA+PROBE: NOT DETECTED
HBA1C MFR BLD: 9.3 % (ref 4.8–5.6)
HCO3 BLDA-SCNC: 19.7 MMOL/L (ref 20–26)
HCOV 229E RNA SPEC QL NAA+PROBE: NOT DETECTED
HCOV HKU1 RNA SPEC QL NAA+PROBE: NOT DETECTED
HCOV NL63 RNA SPEC QL NAA+PROBE: NOT DETECTED
HCOV OC43 RNA SPEC QL NAA+PROBE: NOT DETECTED
HCT VFR BLD AUTO: 37.7 % (ref 37.5–51)
HCT VFR BLD CALC: 34.6 % (ref 38–51)
HGB BLD-MCNC: 12.2 G/DL (ref 13–17.7)
HGB BLDA-MCNC: 11.3 G/DL (ref 14–18)
HGB UR QL STRIP.AUTO: NEGATIVE
HMPV RNA NPH QL NAA+NON-PROBE: NOT DETECTED
HOLD SPECIMEN: NORMAL
HOLD SPECIMEN: NORMAL
HPIV1 RNA ISLT QL NAA+PROBE: NOT DETECTED
HPIV2 RNA SPEC QL NAA+PROBE: NOT DETECTED
HPIV3 RNA NPH QL NAA+PROBE: NOT DETECTED
HPIV4 P GENE NPH QL NAA+PROBE: NOT DETECTED
HYALINE CASTS UR QL AUTO: ABNORMAL /LPF
INHALED O2 CONCENTRATION: 21 %
INR PPP: 1.38 (ref 0.9–1.1)
KETONES UR QL STRIP: ABNORMAL
LEUKOCYTE ESTERASE UR QL STRIP.AUTO: NEGATIVE
LIPASE SERPL-CCNC: 12 U/L (ref 13–60)
LYMPHOCYTES # BLD MANUAL: 1.65 10*3/MM3 (ref 0.7–3.1)
LYMPHOCYTES NFR BLD MANUAL: 5 % (ref 5–12)
Lab: ABNORMAL
M PNEUMO IGG SER IA-ACNC: NOT DETECTED
MAGNESIUM SERPL-MCNC: 2.1 MG/DL (ref 1.6–2.4)
MCH RBC QN AUTO: 30.7 PG (ref 26.6–33)
MCHC RBC AUTO-ENTMCNC: 32.4 G/DL (ref 31.5–35.7)
MCV RBC AUTO: 95 FL (ref 79–97)
METAMYELOCYTES NFR BLD MANUAL: 3 % (ref 0–0)
METHGB BLD QL: 0 % (ref 0–3)
MODALITY: ABNORMAL
MONOCYTES # BLD: 0.69 10*3/MM3 (ref 0.1–0.9)
MYELOCYTES NFR BLD MANUAL: 4 % (ref 0–0)
NEUTROPHILS # BLD AUTO: 10.34 10*3/MM3 (ref 1.7–7)
NEUTROPHILS NFR BLD MANUAL: 72 % (ref 42.7–76)
NEUTS BAND NFR BLD MANUAL: 3 % (ref 0–5)
NITRITE UR QL STRIP: NEGATIVE
NOTE: ABNORMAL
NT-PROBNP SERPL-MCNC: 289 PG/ML (ref 0–1800)
OXYHGB MFR BLDV: 91.8 % (ref 94–99)
PCO2 BLDA: 29 MM HG (ref 35–45)
PCO2 TEMP ADJ BLD: ABNORMAL MM[HG]
PH BLDA: 7.44 PH UNITS (ref 7.35–7.45)
PH UR STRIP.AUTO: 5.5 [PH] (ref 5–8)
PH, TEMP CORRECTED: ABNORMAL
PLAT MORPH BLD: NORMAL
PLATELET # BLD AUTO: 322 10*3/MM3 (ref 140–450)
PMV BLD AUTO: 11.1 FL (ref 6–12)
PO2 BLDA: 63.4 MM HG (ref 83–108)
PO2 TEMP ADJ BLD: ABNORMAL MM[HG]
POTASSIUM SERPL-SCNC: 4.5 MMOL/L (ref 3.5–5.2)
PROCALCITONIN SERPL-MCNC: 0.18 NG/ML (ref 0–0.25)
PROT SERPL-MCNC: 7.2 G/DL (ref 6–8.5)
PROT UR QL STRIP: ABNORMAL
PROTHROMBIN TIME: 17.3 SECONDS (ref 12.1–14.7)
QT INTERVAL: 374 MS
QTC INTERVAL: 489 MS
RBC # BLD AUTO: 3.97 10*6/MM3 (ref 4.14–5.8)
RBC # UR STRIP: ABNORMAL /HPF
RBC MORPH BLD: NORMAL
REF LAB TEST METHOD: ABNORMAL
RHINOVIRUS RNA SPEC NAA+PROBE: NOT DETECTED
RSV RNA NPH QL NAA+NON-PROBE: NOT DETECTED
SAO2 % BLDCOA: 92.8 % (ref 94–99)
SARS-COV-2 RNA NPH QL NAA+NON-PROBE: NOT DETECTED
SODIUM SERPL-SCNC: 137 MMOL/L (ref 136–145)
SP GR UR STRIP: 1.03 (ref 1–1.03)
SQUAMOUS #/AREA URNS HPF: ABNORMAL /HPF
TROPONIN T SERPL-MCNC: <0.01 NG/ML (ref 0–0.03)
UROBILINOGEN UR QL STRIP: ABNORMAL
VALPROATE SERPL-MCNC: 15.2 MCG/ML (ref 50–125)
VARIANT LYMPHS NFR BLD MANUAL: 12 % (ref 19.6–45.3)
VENTILATOR MODE: ABNORMAL
WBC # UR STRIP: ABNORMAL /HPF
WBC NRBC COR # BLD: 13.78 10*3/MM3 (ref 3.4–10.8)
WHOLE BLOOD HOLD COAG: NORMAL
WHOLE BLOOD HOLD SPECIMEN: NORMAL

## 2022-05-29 PROCEDURE — 87641 MR-STAPH DNA AMP PROBE: CPT | Performed by: HOSPITALIST

## 2022-05-29 PROCEDURE — 83735 ASSAY OF MAGNESIUM: CPT | Performed by: EMERGENCY MEDICINE

## 2022-05-29 PROCEDURE — 80053 COMPREHEN METABOLIC PANEL: CPT | Performed by: EMERGENCY MEDICINE

## 2022-05-29 PROCEDURE — 82962 GLUCOSE BLOOD TEST: CPT

## 2022-05-29 PROCEDURE — 87640 STAPH A DNA AMP PROBE: CPT | Performed by: HOSPITALIST

## 2022-05-29 PROCEDURE — 74177 CT ABD & PELVIS W/CONTRAST: CPT

## 2022-05-29 PROCEDURE — 83690 ASSAY OF LIPASE: CPT | Performed by: EMERGENCY MEDICINE

## 2022-05-29 PROCEDURE — 82805 BLOOD GASES W/O2 SATURATION: CPT

## 2022-05-29 PROCEDURE — 85610 PROTHROMBIN TIME: CPT | Performed by: EMERGENCY MEDICINE

## 2022-05-29 PROCEDURE — 99284 EMERGENCY DEPT VISIT MOD MDM: CPT

## 2022-05-29 PROCEDURE — 84484 ASSAY OF TROPONIN QUANT: CPT | Performed by: EMERGENCY MEDICINE

## 2022-05-29 PROCEDURE — 83036 HEMOGLOBIN GLYCOSYLATED A1C: CPT | Performed by: HOSPITALIST

## 2022-05-29 PROCEDURE — 85652 RBC SED RATE AUTOMATED: CPT | Performed by: EMERGENCY MEDICINE

## 2022-05-29 PROCEDURE — 71275 CT ANGIOGRAPHY CHEST: CPT

## 2022-05-29 PROCEDURE — 87150 DNA/RNA AMPLIFIED PROBE: CPT | Performed by: EMERGENCY MEDICINE

## 2022-05-29 PROCEDURE — 87086 URINE CULTURE/COLONY COUNT: CPT | Performed by: EMERGENCY MEDICINE

## 2022-05-29 PROCEDURE — 80164 ASSAY DIPROPYLACETIC ACD TOT: CPT | Performed by: HOSPITALIST

## 2022-05-29 PROCEDURE — 0 IOPAMIDOL PER 1 ML: Performed by: EMERGENCY MEDICINE

## 2022-05-29 PROCEDURE — 83880 ASSAY OF NATRIURETIC PEPTIDE: CPT | Performed by: EMERGENCY MEDICINE

## 2022-05-29 PROCEDURE — 87040 BLOOD CULTURE FOR BACTERIA: CPT | Performed by: EMERGENCY MEDICINE

## 2022-05-29 PROCEDURE — 0202U NFCT DS 22 TRGT SARS-COV-2: CPT | Performed by: EMERGENCY MEDICINE

## 2022-05-29 PROCEDURE — 81001 URINALYSIS AUTO W/SCOPE: CPT | Performed by: EMERGENCY MEDICINE

## 2022-05-29 PROCEDURE — 83050 HGB METHEMOGLOBIN QUAN: CPT

## 2022-05-29 PROCEDURE — 99223 1ST HOSP IP/OBS HIGH 75: CPT | Performed by: PHYSICIAN ASSISTANT

## 2022-05-29 PROCEDURE — 83605 ASSAY OF LACTIC ACID: CPT | Performed by: EMERGENCY MEDICINE

## 2022-05-29 PROCEDURE — 71045 X-RAY EXAM CHEST 1 VIEW: CPT | Performed by: RADIOLOGY

## 2022-05-29 PROCEDURE — 93010 ELECTROCARDIOGRAM REPORT: CPT | Performed by: INTERNAL MEDICINE

## 2022-05-29 PROCEDURE — 85025 COMPLETE CBC W/AUTO DIFF WBC: CPT | Performed by: EMERGENCY MEDICINE

## 2022-05-29 PROCEDURE — 25010000002 ONDANSETRON PER 1 MG: Performed by: EMERGENCY MEDICINE

## 2022-05-29 PROCEDURE — 86140 C-REACTIVE PROTEIN: CPT | Performed by: EMERGENCY MEDICINE

## 2022-05-29 PROCEDURE — 82375 ASSAY CARBOXYHB QUANT: CPT

## 2022-05-29 PROCEDURE — 85007 BL SMEAR W/DIFF WBC COUNT: CPT | Performed by: EMERGENCY MEDICINE

## 2022-05-29 PROCEDURE — 25010000002 PIPERACILLIN SOD-TAZOBACTAM PER 1 G: Performed by: EMERGENCY MEDICINE

## 2022-05-29 PROCEDURE — 93005 ELECTROCARDIOGRAM TRACING: CPT | Performed by: EMERGENCY MEDICINE

## 2022-05-29 PROCEDURE — 71045 X-RAY EXAM CHEST 1 VIEW: CPT

## 2022-05-29 PROCEDURE — 85730 THROMBOPLASTIN TIME PARTIAL: CPT | Performed by: EMERGENCY MEDICINE

## 2022-05-29 PROCEDURE — 36600 WITHDRAWAL OF ARTERIAL BLOOD: CPT

## 2022-05-29 PROCEDURE — 84145 PROCALCITONIN (PCT): CPT | Performed by: EMERGENCY MEDICINE

## 2022-05-29 RX ORDER — SODIUM CHLORIDE 9 MG/ML
75 INJECTION, SOLUTION INTRAVENOUS CONTINUOUS
Status: DISCONTINUED | OUTPATIENT
Start: 2022-05-29 | End: 2022-06-02 | Stop reason: HOSPADM

## 2022-05-29 RX ORDER — SODIUM CHLORIDE 0.9 % (FLUSH) 0.9 %
10 SYRINGE (ML) INJECTION EVERY 12 HOURS SCHEDULED
Status: DISCONTINUED | OUTPATIENT
Start: 2022-05-29 | End: 2022-06-02 | Stop reason: HOSPADM

## 2022-05-29 RX ORDER — FINASTERIDE 5 MG/1
5 TABLET, FILM COATED ORAL NIGHTLY
COMMUNITY
End: 2023-01-27 | Stop reason: SDUPTHER

## 2022-05-29 RX ORDER — ONDANSETRON 2 MG/ML
4 INJECTION INTRAMUSCULAR; INTRAVENOUS ONCE
Status: COMPLETED | OUTPATIENT
Start: 2022-05-29 | End: 2022-05-29

## 2022-05-29 RX ORDER — INSULIN ASPART 100 [IU]/ML
0-9 INJECTION, SOLUTION INTRAVENOUS; SUBCUTANEOUS
Status: DISCONTINUED | OUTPATIENT
Start: 2022-05-30 | End: 2022-06-02 | Stop reason: HOSPADM

## 2022-05-29 RX ORDER — NICOTINE POLACRILEX 4 MG
15 LOZENGE BUCCAL
Status: DISCONTINUED | OUTPATIENT
Start: 2022-05-29 | End: 2022-06-02 | Stop reason: HOSPADM

## 2022-05-29 RX ORDER — MIRTAZAPINE 15 MG/1
15 TABLET, FILM COATED ORAL NIGHTLY
COMMUNITY

## 2022-05-29 RX ORDER — SODIUM CHLORIDE 0.9 % (FLUSH) 0.9 %
10 SYRINGE (ML) INJECTION AS NEEDED
Status: DISCONTINUED | OUTPATIENT
Start: 2022-05-29 | End: 2022-06-02 | Stop reason: HOSPADM

## 2022-05-29 RX ORDER — NITROGLYCERIN 0.4 MG/1
0.4 TABLET SUBLINGUAL
Status: DISCONTINUED | OUTPATIENT
Start: 2022-05-29 | End: 2022-06-02 | Stop reason: HOSPADM

## 2022-05-29 RX ORDER — DIPHENOXYLATE HYDROCHLORIDE AND ATROPINE SULFATE 2.5; .025 MG/1; MG/1
1 TABLET ORAL DAILY
Status: DISCONTINUED | OUTPATIENT
Start: 2022-05-30 | End: 2022-06-02 | Stop reason: HOSPADM

## 2022-05-29 RX ORDER — DIVALPROEX SODIUM 500 MG/1
500 TABLET, DELAYED RELEASE ORAL NIGHTLY
COMMUNITY

## 2022-05-29 RX ORDER — NITROGLYCERIN 0.4 MG/1
0.4 TABLET SUBLINGUAL
Status: CANCELLED | OUTPATIENT
Start: 2022-05-29

## 2022-05-29 RX ORDER — ALBUTEROL SULFATE 90 UG/1
2 AEROSOL, METERED RESPIRATORY (INHALATION) 4 TIMES DAILY PRN
COMMUNITY

## 2022-05-29 RX ORDER — DEXTROSE MONOHYDRATE 25 G/50ML
25 INJECTION, SOLUTION INTRAVENOUS
Status: DISCONTINUED | OUTPATIENT
Start: 2022-05-29 | End: 2022-06-02 | Stop reason: HOSPADM

## 2022-05-29 RX ADMIN — SODIUM CHLORIDE 1000 ML: 9 INJECTION, SOLUTION INTRAVENOUS at 17:26

## 2022-05-29 RX ADMIN — SODIUM CHLORIDE 75 ML/HR: 9 INJECTION, SOLUTION INTRAVENOUS at 22:22

## 2022-05-29 RX ADMIN — ONDANSETRON 4 MG: 2 INJECTION INTRAMUSCULAR; INTRAVENOUS at 14:30

## 2022-05-29 RX ADMIN — Medication 10 ML: at 22:25

## 2022-05-29 RX ADMIN — PIPERACILLIN AND TAZOBACTAM 3.38 G: 3; .375 INJECTION, POWDER, FOR SOLUTION INTRAVENOUS at 20:25

## 2022-05-29 RX ADMIN — IOPAMIDOL 85 ML: 755 INJECTION, SOLUTION INTRAVENOUS at 17:49

## 2022-05-29 RX ADMIN — SODIUM CHLORIDE 1000 ML: 9 INJECTION, SOLUTION INTRAVENOUS at 14:30

## 2022-05-30 ENCOUNTER — ANCILLARY PROCEDURE (OUTPATIENT)
Dept: SPEECH THERAPY | Facility: HOSPITAL | Age: 77
End: 2022-05-30

## 2022-05-30 LAB
ALBUMIN SERPL-MCNC: 2.33 G/DL (ref 3.5–5.2)
ALBUMIN/GLOB SERPL: 0.6 G/DL
ALP SERPL-CCNC: 89 U/L (ref 39–117)
ALT SERPL W P-5'-P-CCNC: 22 U/L (ref 1–41)
ANION GAP SERPL CALCULATED.3IONS-SCNC: 12.2 MMOL/L (ref 5–15)
AST SERPL-CCNC: 25 U/L (ref 1–40)
BACTERIA SPEC AEROBE CULT: NORMAL
BILIRUB SERPL-MCNC: 0.4 MG/DL (ref 0–1.2)
BUN SERPL-MCNC: 17 MG/DL (ref 8–23)
BUN/CREAT SERPL: 23.9 (ref 7–25)
CALCIUM SPEC-SCNC: 8.2 MG/DL (ref 8.6–10.5)
CHLORIDE SERPL-SCNC: 106 MMOL/L (ref 98–107)
CO2 SERPL-SCNC: 20.8 MMOL/L (ref 22–29)
CREAT SERPL-MCNC: 0.71 MG/DL (ref 0.76–1.27)
CRP SERPL-MCNC: 11.63 MG/DL (ref 0–0.5)
DEPRECATED RDW RBC AUTO: 49.9 FL (ref 37–54)
EGFRCR SERPLBLD CKD-EPI 2021: 95.7 ML/MIN/1.73
EOSINOPHIL # BLD MANUAL: 0.39 10*3/MM3 (ref 0–0.4)
EOSINOPHIL NFR BLD MANUAL: 3 % (ref 0.3–6.2)
ERYTHROCYTE [DISTWIDTH] IN BLOOD BY AUTOMATED COUNT: 14.4 % (ref 12.3–15.4)
FOLATE SERPL-MCNC: 10.6 NG/ML (ref 4.78–24.2)
GLOBULIN UR ELPH-MCNC: 3.8 GM/DL
GLUCOSE BLDC GLUCOMTR-MCNC: 185 MG/DL (ref 70–130)
GLUCOSE BLDC GLUCOMTR-MCNC: 223 MG/DL (ref 70–130)
GLUCOSE BLDC GLUCOMTR-MCNC: 233 MG/DL (ref 70–130)
GLUCOSE BLDC GLUCOMTR-MCNC: 244 MG/DL (ref 70–130)
GLUCOSE SERPL-MCNC: 221 MG/DL (ref 65–99)
HCT VFR BLD AUTO: 32.3 % (ref 37.5–51)
HGB BLD-MCNC: 10.7 G/DL (ref 13–17.7)
IRON 24H UR-MRATE: 35 MCG/DL (ref 59–158)
IRON SATN MFR SERPL: 15 % (ref 20–50)
LYMPHOCYTES # BLD MANUAL: 1.95 10*3/MM3 (ref 0.7–3.1)
LYMPHOCYTES NFR BLD MANUAL: 7 % (ref 5–12)
MCH RBC QN AUTO: 31.1 PG (ref 26.6–33)
MCHC RBC AUTO-ENTMCNC: 33.1 G/DL (ref 31.5–35.7)
MCV RBC AUTO: 93.9 FL (ref 79–97)
METAMYELOCYTES NFR BLD MANUAL: 4 % (ref 0–0)
MONOCYTES # BLD: 0.91 10*3/MM3 (ref 0.1–0.9)
MRSA DNA SPEC QL NAA+PROBE: NEGATIVE
MYELOCYTES NFR BLD MANUAL: 4 % (ref 0–0)
NEUTROPHILS # BLD AUTO: 8.7 10*3/MM3 (ref 1.7–7)
NEUTROPHILS NFR BLD MANUAL: 62 % (ref 42.7–76)
NEUTS BAND NFR BLD MANUAL: 5 % (ref 0–5)
PLAT MORPH BLD: NORMAL
PLATELET # BLD AUTO: 277 10*3/MM3 (ref 140–450)
PMV BLD AUTO: 10 FL (ref 6–12)
POTASSIUM SERPL-SCNC: 3.3 MMOL/L (ref 3.5–5.2)
PROT SERPL-MCNC: 6.1 G/DL (ref 6–8.5)
RBC # BLD AUTO: 3.44 10*6/MM3 (ref 4.14–5.8)
RBC MORPH BLD: NORMAL
S AUREUS DNA SPEC QL NAA+PROBE: NEGATIVE
SODIUM SERPL-SCNC: 139 MMOL/L (ref 136–145)
TIBC SERPL-MCNC: 234 MCG/DL (ref 298–536)
TRANSFERRIN SERPL-MCNC: 157 MG/DL (ref 200–360)
VARIANT LYMPHS NFR BLD MANUAL: 15 % (ref 19.6–45.3)
VIT B12 BLD-MCNC: 1943 PG/ML (ref 211–946)
WBC NRBC COR # BLD: 12.99 10*3/MM3 (ref 3.4–10.8)

## 2022-05-30 PROCEDURE — 86140 C-REACTIVE PROTEIN: CPT | Performed by: HOSPITALIST

## 2022-05-30 PROCEDURE — 84466 ASSAY OF TRANSFERRIN: CPT | Performed by: PHYSICIAN ASSISTANT

## 2022-05-30 PROCEDURE — 80053 COMPREHEN METABOLIC PANEL: CPT | Performed by: HOSPITALIST

## 2022-05-30 PROCEDURE — 25010000002 PIPERACILLIN SOD-TAZOBACTAM PER 1 G: Performed by: HOSPITALIST

## 2022-05-30 PROCEDURE — 99232 SBSQ HOSP IP/OBS MODERATE 35: CPT | Performed by: INTERNAL MEDICINE

## 2022-05-30 PROCEDURE — 94799 UNLISTED PULMONARY SVC/PX: CPT

## 2022-05-30 PROCEDURE — 82962 GLUCOSE BLOOD TEST: CPT

## 2022-05-30 PROCEDURE — 82746 ASSAY OF FOLIC ACID SERUM: CPT | Performed by: PHYSICIAN ASSISTANT

## 2022-05-30 PROCEDURE — 83540 ASSAY OF IRON: CPT | Performed by: PHYSICIAN ASSISTANT

## 2022-05-30 PROCEDURE — 92612 ENDOSCOPY SWALLOW (FEES) VID: CPT

## 2022-05-30 PROCEDURE — 94640 AIRWAY INHALATION TREATMENT: CPT

## 2022-05-30 PROCEDURE — 63710000001 INSULIN ASPART PER 5 UNITS: Performed by: HOSPITALIST

## 2022-05-30 PROCEDURE — 97162 PT EVAL MOD COMPLEX 30 MIN: CPT

## 2022-05-30 PROCEDURE — 82607 VITAMIN B-12: CPT | Performed by: PHYSICIAN ASSISTANT

## 2022-05-30 PROCEDURE — 85007 BL SMEAR W/DIFF WBC COUNT: CPT | Performed by: HOSPITALIST

## 2022-05-30 PROCEDURE — 92610 EVALUATE SWALLOWING FUNCTION: CPT

## 2022-05-30 PROCEDURE — 85025 COMPLETE CBC W/AUTO DIFF WBC: CPT | Performed by: HOSPITALIST

## 2022-05-30 PROCEDURE — 97166 OT EVAL MOD COMPLEX 45 MIN: CPT

## 2022-05-30 RX ORDER — BUDESONIDE AND FORMOTEROL FUMARATE DIHYDRATE 80; 4.5 UG/1; UG/1
2 AEROSOL RESPIRATORY (INHALATION) 2 TIMES DAILY
Status: DISCONTINUED | OUTPATIENT
Start: 2022-05-30 | End: 2022-06-02 | Stop reason: HOSPADM

## 2022-05-30 RX ORDER — ALBUTEROL SULFATE 2.5 MG/3ML
2.5 SOLUTION RESPIRATORY (INHALATION) 4 TIMES DAILY PRN
Status: DISCONTINUED | OUTPATIENT
Start: 2022-05-30 | End: 2022-06-02 | Stop reason: HOSPADM

## 2022-05-30 RX ORDER — PANTOPRAZOLE SODIUM 40 MG/1
40 TABLET, DELAYED RELEASE ORAL DAILY
Status: DISCONTINUED | OUTPATIENT
Start: 2022-05-30 | End: 2022-06-02 | Stop reason: HOSPADM

## 2022-05-30 RX ORDER — DIVALPROEX SODIUM 500 MG/1
500 TABLET, DELAYED RELEASE ORAL NIGHTLY
Status: DISCONTINUED | OUTPATIENT
Start: 2022-05-30 | End: 2022-06-02 | Stop reason: HOSPADM

## 2022-05-30 RX ORDER — IRON ASPGLY,PS/C/B12/FA/CA/SUC 150-25-1
1 CAPSULE ORAL
Status: DISCONTINUED | OUTPATIENT
Start: 2022-05-31 | End: 2022-06-02 | Stop reason: HOSPADM

## 2022-05-30 RX ORDER — FINASTERIDE 5 MG/1
5 TABLET, FILM COATED ORAL NIGHTLY
Status: DISCONTINUED | OUTPATIENT
Start: 2022-05-30 | End: 2022-06-02 | Stop reason: HOSPADM

## 2022-05-30 RX ORDER — RANOLAZINE 500 MG/1
500 TABLET, EXTENDED RELEASE ORAL EVERY 12 HOURS SCHEDULED
Status: DISCONTINUED | OUTPATIENT
Start: 2022-05-30 | End: 2022-06-02 | Stop reason: HOSPADM

## 2022-05-30 RX ORDER — CETIRIZINE HYDROCHLORIDE 10 MG/1
10 TABLET ORAL DAILY
Status: DISCONTINUED | OUTPATIENT
Start: 2022-05-30 | End: 2022-06-02 | Stop reason: HOSPADM

## 2022-05-30 RX ORDER — MEMANTINE HYDROCHLORIDE 10 MG/1
10 TABLET ORAL EVERY 12 HOURS SCHEDULED
Status: DISCONTINUED | OUTPATIENT
Start: 2022-05-30 | End: 2022-06-02 | Stop reason: HOSPADM

## 2022-05-30 RX ORDER — ATORVASTATIN CALCIUM 40 MG/1
80 TABLET, FILM COATED ORAL NIGHTLY
Status: DISCONTINUED | OUTPATIENT
Start: 2022-05-30 | End: 2022-06-02 | Stop reason: HOSPADM

## 2022-05-30 RX ORDER — MIRTAZAPINE 15 MG/1
15 TABLET, FILM COATED ORAL NIGHTLY
Status: DISCONTINUED | OUTPATIENT
Start: 2022-05-30 | End: 2022-06-02 | Stop reason: HOSPADM

## 2022-05-30 RX ORDER — CHOLECALCIFEROL (VITAMIN D3) 125 MCG
5 CAPSULE ORAL NIGHTLY
Status: DISCONTINUED | OUTPATIENT
Start: 2022-05-30 | End: 2022-06-02 | Stop reason: HOSPADM

## 2022-05-30 RX ORDER — CLOPIDOGREL BISULFATE 75 MG/1
75 TABLET ORAL DAILY
Status: DISCONTINUED | OUTPATIENT
Start: 2022-05-30 | End: 2022-06-02 | Stop reason: HOSPADM

## 2022-05-30 RX ORDER — ISOSORBIDE MONONITRATE 60 MG/1
120 TABLET, EXTENDED RELEASE ORAL EVERY MORNING
Status: DISCONTINUED | OUTPATIENT
Start: 2022-05-31 | End: 2022-06-02 | Stop reason: HOSPADM

## 2022-05-30 RX ADMIN — PIPERACILLIN AND TAZOBACTAM 3.38 G: 3; .375 INJECTION, POWDER, FOR SOLUTION INTRAVENOUS at 08:59

## 2022-05-30 RX ADMIN — FINASTERIDE 5 MG: 5 TABLET, FILM COATED ORAL at 20:09

## 2022-05-30 RX ADMIN — ATORVASTATIN CALCIUM 80 MG: 40 TABLET, FILM COATED ORAL at 20:09

## 2022-05-30 RX ADMIN — Medication 10 ML: at 09:06

## 2022-05-30 RX ADMIN — INSULIN ASPART 4 UNITS: 100 INJECTION, SOLUTION INTRAVENOUS; SUBCUTANEOUS at 17:33

## 2022-05-30 RX ADMIN — Medication 10 ML: at 20:11

## 2022-05-30 RX ADMIN — PIPERACILLIN AND TAZOBACTAM 3.38 G: 3; .375 INJECTION, POWDER, FOR SOLUTION INTRAVENOUS at 02:36

## 2022-05-30 RX ADMIN — PIPERACILLIN AND TAZOBACTAM 3.38 G: 3; .375 INJECTION, POWDER, FOR SOLUTION INTRAVENOUS at 17:33

## 2022-05-30 RX ADMIN — MEMANTINE HYDROCHLORIDE 10 MG: 10 TABLET, FILM COATED ORAL at 20:09

## 2022-05-30 RX ADMIN — CETIRIZINE HYDROCHLORIDE 10 MG: 10 TABLET, FILM COATED ORAL at 15:10

## 2022-05-30 RX ADMIN — BUDESONIDE AND FORMOTEROL FUMARATE DIHYDRATE 2 PUFF: 80; 4.5 AEROSOL RESPIRATORY (INHALATION) at 18:28

## 2022-05-30 RX ADMIN — DIVALPROEX SODIUM 500 MG: 500 TABLET, DELAYED RELEASE ORAL at 20:09

## 2022-05-30 RX ADMIN — RANOLAZINE 500 MG: 500 TABLET, FILM COATED, EXTENDED RELEASE ORAL at 20:08

## 2022-05-30 RX ADMIN — MEMANTINE HYDROCHLORIDE 10 MG: 10 TABLET, FILM COATED ORAL at 15:10

## 2022-05-30 RX ADMIN — MIRTAZAPINE 15 MG: 15 TABLET, FILM COATED ORAL at 20:09

## 2022-05-30 RX ADMIN — SODIUM CHLORIDE 75 ML/HR: 9 INJECTION, SOLUTION INTRAVENOUS at 11:45

## 2022-05-30 RX ADMIN — PANTOPRAZOLE SODIUM 40 MG: 40 TABLET, DELAYED RELEASE ORAL at 15:10

## 2022-05-30 RX ADMIN — RANOLAZINE 500 MG: 500 TABLET, FILM COATED, EXTENDED RELEASE ORAL at 15:10

## 2022-05-30 RX ADMIN — METOPROLOL TARTRATE 12.5 MG: 25 TABLET, FILM COATED ORAL at 15:10

## 2022-05-30 RX ADMIN — METOPROLOL TARTRATE 12.5 MG: 25 TABLET, FILM COATED ORAL at 20:09

## 2022-05-30 RX ADMIN — CLOPIDOGREL 75 MG: 75 TABLET, FILM COATED ORAL at 17:33

## 2022-05-31 LAB
ANION GAP SERPL CALCULATED.3IONS-SCNC: 9.4 MMOL/L (ref 5–15)
BUN SERPL-MCNC: 14 MG/DL (ref 8–23)
BUN/CREAT SERPL: 20.9 (ref 7–25)
CALCIUM SPEC-SCNC: 8.3 MG/DL (ref 8.6–10.5)
CHLORIDE SERPL-SCNC: 110 MMOL/L (ref 98–107)
CO2 SERPL-SCNC: 21.6 MMOL/L (ref 22–29)
CREAT SERPL-MCNC: 0.67 MG/DL (ref 0.76–1.27)
DEPRECATED RDW RBC AUTO: 50.3 FL (ref 37–54)
EGFRCR SERPLBLD CKD-EPI 2021: 97.4 ML/MIN/1.73
ERYTHROCYTE [DISTWIDTH] IN BLOOD BY AUTOMATED COUNT: 14.6 % (ref 12.3–15.4)
GLUCOSE BLDC GLUCOMTR-MCNC: 162 MG/DL (ref 70–130)
GLUCOSE BLDC GLUCOMTR-MCNC: 255 MG/DL (ref 70–130)
GLUCOSE BLDC GLUCOMTR-MCNC: 278 MG/DL (ref 70–130)
GLUCOSE BLDC GLUCOMTR-MCNC: 283 MG/DL (ref 70–130)
GLUCOSE SERPL-MCNC: 177 MG/DL (ref 65–99)
HCT VFR BLD AUTO: 34.6 % (ref 37.5–51)
HGB BLD-MCNC: 11.3 G/DL (ref 13–17.7)
MCH RBC QN AUTO: 31 PG (ref 26.6–33)
MCHC RBC AUTO-ENTMCNC: 32.7 G/DL (ref 31.5–35.7)
MCV RBC AUTO: 95.1 FL (ref 79–97)
PLATELET # BLD AUTO: 315 10*3/MM3 (ref 140–450)
PMV BLD AUTO: 9.9 FL (ref 6–12)
POTASSIUM SERPL-SCNC: 3.6 MMOL/L (ref 3.5–5.2)
RBC # BLD AUTO: 3.64 10*6/MM3 (ref 4.14–5.8)
SODIUM SERPL-SCNC: 141 MMOL/L (ref 136–145)
WBC NRBC COR # BLD: 9.92 10*3/MM3 (ref 3.4–10.8)

## 2022-05-31 PROCEDURE — 94799 UNLISTED PULMONARY SVC/PX: CPT

## 2022-05-31 PROCEDURE — 63710000001 INSULIN ASPART PER 5 UNITS: Performed by: HOSPITALIST

## 2022-05-31 PROCEDURE — 25010000002 PIPERACILLIN SOD-TAZOBACTAM PER 1 G: Performed by: HOSPITALIST

## 2022-05-31 PROCEDURE — 94761 N-INVAS EAR/PLS OXIMETRY MLT: CPT

## 2022-05-31 PROCEDURE — 80048 BASIC METABOLIC PNL TOTAL CA: CPT | Performed by: INTERNAL MEDICINE

## 2022-05-31 PROCEDURE — 92610 EVALUATE SWALLOWING FUNCTION: CPT

## 2022-05-31 PROCEDURE — 85027 COMPLETE CBC AUTOMATED: CPT | Performed by: INTERNAL MEDICINE

## 2022-05-31 PROCEDURE — 99232 SBSQ HOSP IP/OBS MODERATE 35: CPT | Performed by: INTERNAL MEDICINE

## 2022-05-31 PROCEDURE — 82962 GLUCOSE BLOOD TEST: CPT

## 2022-05-31 RX ORDER — CASTOR OIL AND BALSAM, PERU 788; 87 MG/G; MG/G
1 OINTMENT TOPICAL EVERY 12 HOURS SCHEDULED
Status: DISCONTINUED | OUTPATIENT
Start: 2022-05-31 | End: 2022-06-02 | Stop reason: HOSPADM

## 2022-05-31 RX ADMIN — Medication 1 CAPSULE: at 08:21

## 2022-05-31 RX ADMIN — ATORVASTATIN CALCIUM 80 MG: 40 TABLET, FILM COATED ORAL at 19:56

## 2022-05-31 RX ADMIN — SODIUM CHLORIDE 75 ML/HR: 9 INJECTION, SOLUTION INTRAVENOUS at 01:15

## 2022-05-31 RX ADMIN — PIPERACILLIN AND TAZOBACTAM 3.38 G: 3; .375 INJECTION, POWDER, FOR SOLUTION INTRAVENOUS at 10:56

## 2022-05-31 RX ADMIN — RANOLAZINE 500 MG: 500 TABLET, FILM COATED, EXTENDED RELEASE ORAL at 19:57

## 2022-05-31 RX ADMIN — MEMANTINE HYDROCHLORIDE 10 MG: 10 TABLET, FILM COATED ORAL at 08:21

## 2022-05-31 RX ADMIN — CETIRIZINE HYDROCHLORIDE 10 MG: 10 TABLET, FILM COATED ORAL at 08:21

## 2022-05-31 RX ADMIN — METOPROLOL TARTRATE 12.5 MG: 25 TABLET, FILM COATED ORAL at 08:21

## 2022-05-31 RX ADMIN — MEMANTINE HYDROCHLORIDE 10 MG: 10 TABLET, FILM COATED ORAL at 19:57

## 2022-05-31 RX ADMIN — ISOSORBIDE MONONITRATE 120 MG: 60 TABLET ORAL at 05:27

## 2022-05-31 RX ADMIN — PANTOPRAZOLE SODIUM 40 MG: 40 TABLET, DELAYED RELEASE ORAL at 08:21

## 2022-05-31 RX ADMIN — PIPERACILLIN AND TAZOBACTAM 3.38 G: 3; .375 INJECTION, POWDER, FOR SOLUTION INTRAVENOUS at 17:09

## 2022-05-31 RX ADMIN — CASTOR OIL AND BALSAM, PERU 1 APPLICATION: 788; 87 OINTMENT TOPICAL at 10:14

## 2022-05-31 RX ADMIN — INSULIN ASPART 2 UNITS: 100 INJECTION, SOLUTION INTRAVENOUS; SUBCUTANEOUS at 08:21

## 2022-05-31 RX ADMIN — MIRTAZAPINE 15 MG: 15 TABLET, FILM COATED ORAL at 19:56

## 2022-05-31 RX ADMIN — Medication 10 ML: at 19:57

## 2022-05-31 RX ADMIN — DIVALPROEX SODIUM 500 MG: 500 TABLET, DELAYED RELEASE ORAL at 19:57

## 2022-05-31 RX ADMIN — RANOLAZINE 500 MG: 500 TABLET, FILM COATED, EXTENDED RELEASE ORAL at 08:21

## 2022-05-31 RX ADMIN — BUDESONIDE AND FORMOTEROL FUMARATE DIHYDRATE 2 PUFF: 80; 4.5 AEROSOL RESPIRATORY (INHALATION) at 06:22

## 2022-05-31 RX ADMIN — Medication 1 TABLET: at 08:21

## 2022-05-31 RX ADMIN — INSULIN ASPART 6 UNITS: 100 INJECTION, SOLUTION INTRAVENOUS; SUBCUTANEOUS at 10:57

## 2022-05-31 RX ADMIN — Medication 5 MG: at 19:56

## 2022-05-31 RX ADMIN — CLOPIDOGREL 75 MG: 75 TABLET, FILM COATED ORAL at 08:21

## 2022-05-31 RX ADMIN — PIPERACILLIN AND TAZOBACTAM 3.38 G: 3; .375 INJECTION, POWDER, FOR SOLUTION INTRAVENOUS at 01:15

## 2022-05-31 RX ADMIN — INSULIN ASPART 6 UNITS: 100 INJECTION, SOLUTION INTRAVENOUS; SUBCUTANEOUS at 17:09

## 2022-05-31 RX ADMIN — METOPROLOL TARTRATE 12.5 MG: 25 TABLET, FILM COATED ORAL at 19:57

## 2022-05-31 RX ADMIN — FINASTERIDE 5 MG: 5 TABLET, FILM COATED ORAL at 19:57

## 2022-05-31 RX ADMIN — CASTOR OIL AND BALSAM, PERU 1 APPLICATION: 788; 87 OINTMENT TOPICAL at 19:56

## 2022-05-31 RX ADMIN — Medication 10 ML: at 08:22

## 2022-05-31 RX ADMIN — BUDESONIDE AND FORMOTEROL FUMARATE DIHYDRATE 2 PUFF: 80; 4.5 AEROSOL RESPIRATORY (INHALATION) at 18:27

## 2022-06-01 ENCOUNTER — APPOINTMENT (OUTPATIENT)
Dept: GENERAL RADIOLOGY | Facility: HOSPITAL | Age: 77
End: 2022-06-01

## 2022-06-01 LAB
ANION GAP SERPL CALCULATED.3IONS-SCNC: 10.5 MMOL/L (ref 5–15)
BACTERIA BLD CULT: NORMAL
BOTTLE TYPE: NORMAL
BUN SERPL-MCNC: 9 MG/DL (ref 8–23)
BUN/CREAT SERPL: 13.8 (ref 7–25)
CALCIUM SPEC-SCNC: 8.2 MG/DL (ref 8.6–10.5)
CHLORIDE SERPL-SCNC: 107 MMOL/L (ref 98–107)
CO2 SERPL-SCNC: 19.5 MMOL/L (ref 22–29)
CREAT SERPL-MCNC: 0.65 MG/DL (ref 0.76–1.27)
DEPRECATED RDW RBC AUTO: 49.5 FL (ref 37–54)
EGFRCR SERPLBLD CKD-EPI 2021: 98.3 ML/MIN/1.73
ERYTHROCYTE [DISTWIDTH] IN BLOOD BY AUTOMATED COUNT: 14.2 % (ref 12.3–15.4)
GLUCOSE BLDC GLUCOMTR-MCNC: 217 MG/DL (ref 70–130)
GLUCOSE BLDC GLUCOMTR-MCNC: 224 MG/DL (ref 70–130)
GLUCOSE BLDC GLUCOMTR-MCNC: 228 MG/DL (ref 70–130)
GLUCOSE BLDC GLUCOMTR-MCNC: 261 MG/DL (ref 70–130)
GLUCOSE SERPL-MCNC: 202 MG/DL (ref 65–99)
HCT VFR BLD AUTO: 31.8 % (ref 37.5–51)
HGB BLD-MCNC: 10.5 G/DL (ref 13–17.7)
MCH RBC QN AUTO: 31.1 PG (ref 26.6–33)
MCHC RBC AUTO-ENTMCNC: 33 G/DL (ref 31.5–35.7)
MCV RBC AUTO: 94.1 FL (ref 79–97)
PLATELET # BLD AUTO: 306 10*3/MM3 (ref 140–450)
PMV BLD AUTO: 9.7 FL (ref 6–12)
POTASSIUM SERPL-SCNC: 3.4 MMOL/L (ref 3.5–5.2)
RBC # BLD AUTO: 3.38 10*6/MM3 (ref 4.14–5.8)
SARS-COV-2 AG RESP QL IA.RAPID: NORMAL
SODIUM SERPL-SCNC: 137 MMOL/L (ref 136–145)
WBC NRBC COR # BLD: 11.49 10*3/MM3 (ref 3.4–10.8)

## 2022-06-01 PROCEDURE — 25010000002 PIPERACILLIN SOD-TAZOBACTAM PER 1 G: Performed by: HOSPITALIST

## 2022-06-01 PROCEDURE — 94799 UNLISTED PULMONARY SVC/PX: CPT

## 2022-06-01 PROCEDURE — 92610 EVALUATE SWALLOWING FUNCTION: CPT

## 2022-06-01 PROCEDURE — 94761 N-INVAS EAR/PLS OXIMETRY MLT: CPT

## 2022-06-01 PROCEDURE — 80048 BASIC METABOLIC PNL TOTAL CA: CPT | Performed by: INTERNAL MEDICINE

## 2022-06-01 PROCEDURE — 97110 THERAPEUTIC EXERCISES: CPT

## 2022-06-01 PROCEDURE — 71045 X-RAY EXAM CHEST 1 VIEW: CPT | Performed by: RADIOLOGY

## 2022-06-01 PROCEDURE — 85027 COMPLETE CBC AUTOMATED: CPT | Performed by: INTERNAL MEDICINE

## 2022-06-01 PROCEDURE — 99232 SBSQ HOSP IP/OBS MODERATE 35: CPT | Performed by: INTERNAL MEDICINE

## 2022-06-01 PROCEDURE — 71045 X-RAY EXAM CHEST 1 VIEW: CPT

## 2022-06-01 PROCEDURE — 82962 GLUCOSE BLOOD TEST: CPT

## 2022-06-01 PROCEDURE — 87426 SARSCOV CORONAVIRUS AG IA: CPT | Performed by: INTERNAL MEDICINE

## 2022-06-01 PROCEDURE — 63710000001 INSULIN ASPART PER 5 UNITS: Performed by: HOSPITALIST

## 2022-06-01 PROCEDURE — 97530 THERAPEUTIC ACTIVITIES: CPT

## 2022-06-01 RX ADMIN — Medication 10 ML: at 20:49

## 2022-06-01 RX ADMIN — CASTOR OIL AND BALSAM, PERU 1 APPLICATION: 788; 87 OINTMENT TOPICAL at 20:49

## 2022-06-01 RX ADMIN — MIRTAZAPINE 15 MG: 15 TABLET, FILM COATED ORAL at 20:49

## 2022-06-01 RX ADMIN — Medication 1 TABLET: at 08:01

## 2022-06-01 RX ADMIN — INSULIN ASPART 4 UNITS: 100 INJECTION, SOLUTION INTRAVENOUS; SUBCUTANEOUS at 08:02

## 2022-06-01 RX ADMIN — Medication 5 MG: at 20:49

## 2022-06-01 RX ADMIN — MEMANTINE HYDROCHLORIDE 10 MG: 10 TABLET, FILM COATED ORAL at 20:49

## 2022-06-01 RX ADMIN — ATORVASTATIN CALCIUM 80 MG: 40 TABLET, FILM COATED ORAL at 20:49

## 2022-06-01 RX ADMIN — METOPROLOL TARTRATE 12.5 MG: 25 TABLET, FILM COATED ORAL at 08:01

## 2022-06-01 RX ADMIN — RANOLAZINE 500 MG: 500 TABLET, FILM COATED, EXTENDED RELEASE ORAL at 20:49

## 2022-06-01 RX ADMIN — METOPROLOL TARTRATE 12.5 MG: 25 TABLET, FILM COATED ORAL at 20:49

## 2022-06-01 RX ADMIN — SODIUM CHLORIDE 75 ML/HR: 9 INJECTION, SOLUTION INTRAVENOUS at 05:05

## 2022-06-01 RX ADMIN — PIPERACILLIN AND TAZOBACTAM 3.38 G: 3; .375 INJECTION, POWDER, FOR SOLUTION INTRAVENOUS at 17:02

## 2022-06-01 RX ADMIN — PIPERACILLIN AND TAZOBACTAM 3.38 G: 3; .375 INJECTION, POWDER, FOR SOLUTION INTRAVENOUS at 11:09

## 2022-06-01 RX ADMIN — INSULIN ASPART 4 UNITS: 100 INJECTION, SOLUTION INTRAVENOUS; SUBCUTANEOUS at 17:03

## 2022-06-01 RX ADMIN — CETIRIZINE HYDROCHLORIDE 10 MG: 10 TABLET, FILM COATED ORAL at 08:01

## 2022-06-01 RX ADMIN — Medication 10 ML: at 08:03

## 2022-06-01 RX ADMIN — CASTOR OIL AND BALSAM, PERU 1 APPLICATION: 788; 87 OINTMENT TOPICAL at 08:02

## 2022-06-01 RX ADMIN — ISOSORBIDE MONONITRATE 120 MG: 60 TABLET ORAL at 05:05

## 2022-06-01 RX ADMIN — PIPERACILLIN AND TAZOBACTAM 3.38 G: 3; .375 INJECTION, POWDER, FOR SOLUTION INTRAVENOUS at 02:01

## 2022-06-01 RX ADMIN — MEMANTINE HYDROCHLORIDE 10 MG: 10 TABLET, FILM COATED ORAL at 08:01

## 2022-06-01 RX ADMIN — BUDESONIDE AND FORMOTEROL FUMARATE DIHYDRATE 2 PUFF: 80; 4.5 AEROSOL RESPIRATORY (INHALATION) at 06:34

## 2022-06-01 RX ADMIN — Medication 1 CAPSULE: at 08:01

## 2022-06-01 RX ADMIN — PANTOPRAZOLE SODIUM 40 MG: 40 TABLET, DELAYED RELEASE ORAL at 08:01

## 2022-06-01 RX ADMIN — FINASTERIDE 5 MG: 5 TABLET, FILM COATED ORAL at 20:49

## 2022-06-01 RX ADMIN — RANOLAZINE 500 MG: 500 TABLET, FILM COATED, EXTENDED RELEASE ORAL at 08:01

## 2022-06-01 RX ADMIN — INSULIN ASPART 4 UNITS: 100 INJECTION, SOLUTION INTRAVENOUS; SUBCUTANEOUS at 11:09

## 2022-06-01 RX ADMIN — CLOPIDOGREL 75 MG: 75 TABLET, FILM COATED ORAL at 08:01

## 2022-06-01 RX ADMIN — BUDESONIDE AND FORMOTEROL FUMARATE DIHYDRATE 2 PUFF: 80; 4.5 AEROSOL RESPIRATORY (INHALATION) at 18:38

## 2022-06-01 NOTE — THERAPY RE-EVALUATION
Acute Care - Speech Language Pathology   Swallow Re-Assessment  Hemal     Patient Name: Fidencio Luciano  : 1945  MRN: [id number]  Today's Date: 2022  Onset of Illness/Injury or Date of Surgery: 22     Referring Physician: Pravin      Admit Date: 2022    Visit Dx:     ICD-10-CM ICD-9-CM   1. Pneumonia of both lungs due to infectious organism, unspecified part of lung  J18.9 483.8   2. Sepsis, due to unspecified organism, unspecified whether acute organ dysfunction present (HCC)  A41.9 038.9     995.91     Patient Active Problem List   Diagnosis   • Essential hypertension   • Type 2 diabetes mellitus (HCC)   • Benign prostatic hyperplasia   • ASCVD (arteriosclerotic cardiovascular disease), s/p stenting of 80 % stenosis in left circumflex on 10/05/16and 60% stenosis in the LAD, clinically stable.    • Hyperlipidemia LDL goal <70   • Weakness generalized   • Coronary artery fistula   • Weight loss, unintentional   • Iron deficiency anemia   • Gastroesophageal reflux disease   • Dysphagia   • Chest pain   • History of pulmonary embolism in , patient is on Eliquis.   • Early satiety   • Diarrhea   • Pneumonia of both lungs due to infectious organism, unspecified part of lung   • Severe sepsis (HCC)   • Acute respiratory failure with hypoxia (HCC)   • Low serum bicarbonate     Past Medical History:   Diagnosis Date   • BPH (benign prostatic hyperplasia)    • Bradycardia     Positive tilt table testing 2016   • Coronary artery disease    • Diabetes mellitus (HCC)    • Diverticulosis    • Elevated cholesterol    • Gastric ulcer with hemorrhage    • Gastroesophageal reflux disease 2021   • History of transfusion    • Hyperlipidemia    • Hypertension    • Psoriasis      Past Surgical History:   Procedure Laterality Date   • BACK SURGERY     • CARDIAC CATHETERIZATION N/A 2016    Procedure: Left Heart Cath;  Surgeon: Giovanni Buenrostro MD;  Location: [facility] INVASIVE LOCATION;   Service:    • CARDIAC CATHETERIZATION N/A 10/4/2016    Procedure: Left Heart Cath;  Surgeon: Bharathi Andrew MD;  [facility];  Service:    • CARDIAC CATHETERIZATION N/A 5/9/2017    Procedure: Left Heart Cath;  Surgeon: Giovanni Buenrostro MD;  [facility];  Service:    • CARDIAC CATHETERIZATION N/A 5/9/2017    Procedure: ERR;  Surgeon: Giovanni Buenrostro MD;  [facility]  [facility];  Service:    • CARDIAC CATHETERIZATION N/A 11/8/2019    Procedure: Left Heart Cath;  Surgeon: Abraham Oviedo MD;  Location:  [facility];  Service: Cardiology   • CARDIAC CATHETERIZATION N/A 12/18/2019    Procedure: Coronary angiography;  Surgeon: Jay Barney IV, MD;  Location:  [facility];  Service: Cardiovascular   • CHOLECYSTECTOMY     • COLONOSCOPY  11/13/2015    Dr. Martinez- internal hemorrhoids, sigmoid diverticulosis   • COLONOSCOPY N/A 8/17/2018    Procedure: COLONOSCOPY CPT CODE: 82826;  Surgeon: Gigi Geronimo MD;  Location: [facility] OR;  Service: Gastroenterology   • COLONOSCOPY N/A 5/17/2022    Procedure: COLONOSCOPY;  Surgeon: Geno Vernon MD;  Location: [facility] OR;  Service: Gastroenterology;  Laterality: N/A;   • CORONARY ANGIOPLASTY WITH STENT PLACEMENT     • ENDOSCOPY N/A 8/17/2018    Procedure: ESOPHAGOGASTRODUODENOSCOPY WITH BIOPSY CPT CODE: 02277;  Surgeon: Gigi Geronimo MD;  Location:  [facility];  Service: Gastroenterology   • ENDOSCOPY N/A 4/20/2021    Procedure: ESOPHAGOGASTRODUODENOSCOPY;  Surgeon: Geno Vernon MD;  Location:  [facility];  Service: Gastroenterology;  Laterality: N/A;   • ENDOSCOPY N/A 5/17/2022    Procedure: ESOPHAGOGASTRODUODENOSCOPY WITH BIOPSY;  Surgeon: Geno Vernon MD;  Location:  [facility];  Service: Gastroenterology;  Laterality: N/A;   • INTERVENTIONAL RADIOLOGY PROCEDURE N/A 12/18/2019    Procedure: Coil Embolization of  septal to pulmonary artery fistuala.;  Surgeon: Jay Barney IV, MD;  Location:  [facility];  Service: Cardiovascular   • ME RT/LT HEART CATHETERS N/A 5/9/2017    Procedure: Percutaneous Coronary Intervention;  Surgeon: Lincoln De Los Santos MD;  [facility];  Service: Cardiology   • STOMACH SURGERY      FUSION OF BLEEDING ULCER   • UPPER GASTROINTESTINAL ENDOSCOPY  11/13/2015    Dr. Martinez- mild gastritis     Mr. Luciano is seen at bedside this am on 3S for diet safety/reassessment. His wife is present at bedside. Mr. Luciano is s/p FEES 5/30/22 w/ recommendation for least restrictive puree diet, nectar thick liquids, water protocol.     He is resting this am, however, he does awaken easily. He remains fatigued across this assessment, however, overall mildly improved endurance for po intake.     He accepts multiple po presentations of mechanical softened cracker, 4 ounces of puree, 4 ounces of nectar thick juice via tsp, cup and straw. He does not self feed.     Bolus formation and manipulation are overall mildly improved. Moderately increased oral prep time w/ solid, transit is mildly delayed. No oral residue. No overt s/s aspiration evidenced. No silent aspiration suspected.    Impression: Per this reassessment, Mr. Luciano presents w/ overall mild improvement in oral strength for bolus manipulation and formation. Recommend upgrade to least restrictive mechanical soft consistency, ground meats, continue nectar thick liquids and water protocol between meals/meds.     SLP Recommendation and Plan  1. Mechanical soft, ground meats, NECTAR thick liquids.    2. Meds whole in puree/nectars.    3. Fully a/a, upright and centered for all po intake.   4. FRIEDA precautions.  5. Oral care protocol.  6. 1:1 feeding assistance.   7. Water protocol between meals and meds.  8. Formal dysphagia tx as tolerated per poc.   SLP to f/u for continued diet safety/tx as tolerated.      D/w pt  and pt's spouse results and recommendations w/ verbal agreement.     D/w RN results and recommendations w/ verbal agreement.     Thank you for allowing me to participate in the care of your patient-  Deisi Lantigua M.A., CCC-SLP    EDUCATION  The patient has been educated in the following areas:   Dysphagia (Swallowing Impairment) Modified Diet Instruction.              Time Calculation:    Time Calculation- SLP     Row Name 06/01/22 1337             Time Calculation- SLP    SLP - Next Appointment 06/02/22  -JUDY            User Key  (r) = Recorded By, (t) = Taken By, (c) = Cosigned By    Initials Name Provider Type    Deisi Lagunas MA,CCC-SLP Speech and Language Pathologist                Therapy Charges for Today     Code Description Service Date Service Provider Modifiers Qty    [id number] HC ST EVAL ORAL PHARYNG SWALLOW 4 6/1/2022 Deisi Lantigua MA,CCC-SLP GN 1               Deisi Lantigua MA,CCC-SLP  6/1/2022

## 2022-06-01 NOTE — PLAN OF CARE
Goal Outcome Evaluation:  Plan of Care Reviewed With: patient        Progress: no change  Outcome Evaluation: Patient has not had any complaints this shift. VS stable. No distress noted. Will continue to monitor and follow plan of care.

## 2022-06-01 NOTE — CASE MANAGEMENT/SOCIAL WORK
Discharge Planning Assessment   Hemal     Patient Name: Fidencio Luciano  MRN: [id number]  Today's Date: 6/1/2022    Admit Date: 5/29/2022       Discharge Plan     Row Name 06/01/22 1215       Plan    Plan Left message for Kat at [facility] to return call regarding pt bed hold and if pt can return.  SS spoke with pt's spouse at bedside who is agreeable for pt to return to [facility] when medically stable.  SS will follow.                JOSE UgaldeW

## 2022-06-01 NOTE — PROGRESS NOTES
[facility] HOSPITALIST PROGRESS NOTE     Patient Identification:  Name:  Fidencio Luciano  Age:  76 y.o.  Sex:  male  :  1945  MRN:  [id number]  Visit Number:  [id number]  Primary Care Provider:  Camila Ortiz APRN    Length of stay:  3    Chief complaint: None    Subjective:    Patient seen and examined at bedside.  Patient was asleep when I entered the room, but was easily awakened. Patient states he is feeling well today and has no complaints.  Patient is currently on room air and maintaining oxygen saturation greater than 90%.  No new events overnight.  ----------------------------------------------------------------------------------------------------------------------  Current Hospital Meds:  atorvastatin, 80 mg, Oral, Nightly  budesonide-formoterol, 2 puff, Inhalation, BID  castor oil-balsam peru, 1 application, Topical, Q12H  cetirizine, 10 mg, Oral, Daily  clopidogrel, 75 mg, Oral, Daily  divalproex, 500 mg, Oral, Nightly  finasteride, 5 mg, Oral, Nightly  Insulin Aspart, 0-9 Units, Subcutaneous, TID AC  iron polysacch complex-B12-VitC-FA-Succ, 1 capsule, Oral, Daily With Breakfast  isosorbide mononitrate, 120 mg, Oral, QAM  melatonin, 5 mg, Oral, Nightly  memantine, 10 mg, Oral, Q12H  metoprolol tartrate, 12.5 mg, Oral, Q12H  mirtazapine, 15 mg, Oral, Nightly  multivitamin, 1 tablet, Oral, Daily  pantoprazole, 40 mg, Oral, Daily  piperacillin-tazobactam, 3.375 g, Intravenous, Q8H  ranolazine, 500 mg, Oral, Q12H  sodium chloride, 10 mL, Intravenous, Q12H      sodium chloride, 75 mL/hr, Last Rate: 75 mL/hr (22 0505)      ----------------------------------------------------------------------------------------------------------------------  Vital Signs:  Temp:  [97.6 °F (36.4 °C)-98.3 °F (36.8 °C)] 98.3 °F (36.8 °C)  Heart Rate:  [68-79] 68  Resp:  [18-19] 18  BP: (138-164)/(63-71) 160/68      22  2228 22  0500 22  0500   Weight: 82.1 kg (181 lb) 85 kg (187  lb 4.8 oz) 85 kg (187 lb 4.8 oz)     Body mass index is 26.12 kg/m².    Intake/Output Summary (Last 24 hours) at 6/1/2022 1446  Last data filed at 6/1/2022 1330  Gross per 24 hour   Intake 1400 ml   Output --   Net 1400 ml     Diet Soft Texture; Ground; Nectar / Syrup Thick  ----------------------------------------------------------------------------------------------------------------------  Physical exam:  Constitutional: Well-nourished  male in no apparent distress.     HENT:  Head:  Normocephalic and atraumatic.  Mouth:  Moist mucous membranes.    Eyes:  Conjunctivae and EOM are normal.  Pupils are equal, round, and reactive to light.  No scleral icterus.    Neck:  Neck supple. No thyromegaly.  No JVD present.    Cardiovascular:  Regular rate and rhythm with no murmurs, rubs, clicks or gallops appreciated.  Pulmonary/Chest:  Clear to auscultation bilaterally with no crackles, wheezes or rhonchi appreciated.  Abdominal:  Soft. Nontender. Nondistended  Bowel sounds are normal in all four quadrants. No organomegally appreciated.   Musculoskeletal:  No edema, no tenderness, and no deformity.  No red or swollen joints anywhere.    Neurological:  Alert, Cranial nerves II-XII intact with no focal deficits.  No facial droop.  No slurred speech.   Skin:  Warm and dry to palpation with no rashes or lesions appreciated.  Peripheral vascular:  2+ radial and pedal pulses in bilateral upper and lower extremities.  Psychiatric:  Alert and oriented x3, demonstrates appropriate judgement and insight.    Essentially no change in physical exam in comparison to 5/31/2022  ------------------------------------------------------------------------------  ----------------------------------------------------------------------------------------------------------------------  Results from last 7 days   Lab Units 05/29/22  1530   TROPONIN T ng/mL <0.010     Results from last 7 days   Lab Units 06/01/22  0224 05/31/22  0148  05/30/22  0128 05/29/22  1845 05/29/22  1530   CRP mg/dL  --   --  11.63*  --  15.25*   LACTATE mmol/L  --   --   --  1.9 3.9*   WBC 10*3/mm3 11.49* 9.92 12.99*  --  13.78*   HEMOGLOBIN g/dL 10.5* 11.3* 10.7*  --  12.2*   HEMATOCRIT % 31.8* 34.6* 32.3*  --  37.7   MCV fL 94.1 95.1 93.9  --  95.0   MCHC g/dL 33.0 32.7 33.1  --  32.4   PLATELETS 10*3/mm3 306 315 277  --  322   INR   --   --   --   --  1.38*     Results from last 7 days   Lab Units 05/29/22  1519   PH, ARTERIAL pH units 7.441   PO2 ART mm Hg 63.4*   PCO2, ARTERIAL mm Hg 29.0*   HCO3 ART mmol/L 19.7*     Results from last 7 days   Lab Units 06/01/22  0224 05/31/22  0144 05/30/22  0128 05/29/22  1530   SODIUM mmol/L 137 141 139 137   POTASSIUM mmol/L 3.4* 3.6 3.3* 4.5   MAGNESIUM mg/dL  --   --   --  2.1   CHLORIDE mmol/L 107 110* 106 102   CO2 mmol/L 19.5* 21.6* 20.8* 16.6*   BUN mg/dL 9 14 17 25*   CREATININE mg/dL 0.65* 0.67* 0.71* 1.03   CALCIUM mg/dL 8.2* 8.3* 8.2* 8.8   GLUCOSE mg/dL 202* 177* 221* 346*   ALBUMIN g/dL  --   --  2.33* 2.67*   BILIRUBIN mg/dL  --   --  0.4 0.4   ALK PHOS U/L  --   --  89 123*   AST (SGOT) U/L  --   --  25 27   ALT (SGPT) U/L  --   --  22 29   Estimated Creatinine Clearance: 118.1 mL/min (A) (by C-G formula based on SCr of 0.65 mg/dL (L)).    No results found for: AMMONIA      No results found for: BLOODCX  No results found for: URINECX  No results found for: WOUNDCX  No results found for: STOOLCX    I have personally looked at the labs and they are summarized above.  ----------------------------------------------------------------------------------------------------------------------  Imaging Results (Last 24 Hours)     Procedure Component Value Units Date/Time    XR Chest 1 View [[id number]] Collected: 06/01/22 0828     Updated: 06/01/22 0831    Narrative:      EXAM:    XR Chest, 1 View     EXAM DATE:    6/1/2022 6:41 AM     CLINICAL HISTORY:    bilateral pna; J18.9-Pneumonia, unspecified organism;  A41.9-Sepsis,  unspecified organism     TECHNIQUE:    Frontal view of the chest.     COMPARISON:    05/29/2022     FINDINGS:    LUNGS:  Slightly worsened right lung airspace disease.    PLEURAL SPACE:  Unremarkable.  No pneumothorax.    HEART:  Heart size is stable.    MEDIASTINUM:  Unremarkable.    BONES/JOINTS:  Unremarkable.    OTHER FINDINGS:  Course and.       Impression:        Slightly worsened right lung airspace disease.     This report was finalized on 6/1/2022 8:28 AM by Dr. Benigno Powers MD.           ----------------------------------------------------------------------------------------------------------------------  Assessment and Plan:    1.  Bilateral pneumonia -we will de-escalate antibiotic therapy to Augmentin 875 mg today    2.  Acute hypoxic respiratory failure -resolved    3.  Severe sepsis -present on admission, now resolved    4. Chronic iron deficiency anemia -stable, continue to monitor closely and will transfuse for hemoglobin less than 7.    5.  Essential hypertension -well-controlled, will continue current antihypertensive medication regimen and adjust as necessary    6.  Hyperlipidemia -statin    7.  Type 2 diabetes mellitus -continue Accu-Cheks before every meal and nightly with sliding scale insulin    Disposition currently awaiting placement          Thiago Costello DO  06/01/22  14:46 EDT

## 2022-06-01 NOTE — CASE MANAGEMENT/SOCIAL WORK
Discharge Planning Assessment   Hemal     Patient Name: Fidencio Luciano  MRN: [id number]  Today's Date: 6/1/2022    Admit Date: 5/29/2022       Discharge Plan     Row Name 06/01/22 1516       Plan    Plan [facility] farzana Stephens can accept pt back in am with a new negative covid swab.  SS notified Physician.  SS will follow up in am.              TAYO Ugalde

## 2022-06-01 NOTE — THERAPY TREATMENT NOTE
Patient Name: Fidencio Luciano  : 1945    MRN: [id number]                              Today's Date: 2022       Admit Date: 2022    Visit Dx:     ICD-10-CM ICD-9-CM   1. Pneumonia of both lungs due to infectious organism, unspecified part of lung  J18.9 483.8   2. Sepsis, due to unspecified organism, unspecified whether acute organ dysfunction present ([facility])  A41.9 038.9     995.91     Patient Active Problem List   Diagnosis   • Essential hypertension   • Type 2 diabetes mellitus (HCC)   • Benign prostatic hyperplasia   • ASCVD (arteriosclerotic cardiovascular disease), s/p stenting of 80 % stenosis in left circumflex on 10/05/16and 60% stenosis in the LAD, clinically stable.    • Hyperlipidemia LDL goal <70   • Weakness generalized   • Coronary artery fistula   • Weight loss, unintentional   • Iron deficiency anemia   • Gastroesophageal reflux disease   • Dysphagia   • Chest pain   • History of pulmonary embolism in , patient is on Eliquis.   • Early satiety   • Diarrhea   • Pneumonia of both lungs due to infectious organism, unspecified part of lung   • Severe sepsis (HCC)   • Acute respiratory failure with hypoxia (HCC)   • Low serum bicarbonate     Past Medical History:   Diagnosis Date   • BPH (benign prostatic hyperplasia)    • Bradycardia     Positive tilt table testing 2016   • Coronary artery disease    • Diabetes mellitus ([facility])    • Diverticulosis    • Elevated cholesterol    • Gastric ulcer with hemorrhage    • Gastroesophageal reflux disease 2021   • History of transfusion    • Hyperlipidemia    • Hypertension    • Psoriasis      Past Surgical History:   Procedure Laterality Date   • BACK SURGERY     • CARDIAC CATHETERIZATION N/A 2016    Procedure: Left Heart Cath;  Surgeon: Giovanni Buenrostro MD;  Location: [facility] INVASIVE LOCATION;  Service:    • CARDIAC CATHETERIZATION N/A 10/4/2016    Procedure: Left Heart Cath;  Surgeon: Bharathi Andrew MD;  Location: [facility]  CATH INVASIVE LOCATION;  Service:    • CARDIAC CATHETERIZATION N/A 5/9/2017    Procedure: Left Heart Cath;  Surgeon: Giovanni Buenrostro MD;  [facility] CATH INVASIVE LOCATION;  Service:    • CARDIAC CATHETERIZATION N/A 5/9/2017    Procedure: ERR;  Surgeon: Giovanni Buenrostro MD;  [facility] CATH INVASIVE LOC[facility];  Service:    • CARDIAC CATHETERIZATION N/A 11/8/2019    Procedure: Left Heart Cath;  Surgeon: Abraham Oviedo MD;  Location:  [facility] CATH INVASIVE LOCATION;  Service: Cardiology   • CARDIAC CATHETERIZATION N/A 12/18/2019    Procedure: Coronary angiography;  Surgeon: Jay Barney IV, MD;  Location:  [facility] CATH INVASIVE LOCATION;  Service: Cardiovascular   • CHOLECYSTECTOMY     • COLONOSCOPY  11/13/2015    Dr. Martinez- internal hemorrhoids, sigmoid diverticulosis   • COLONOSCOPY N/A 8/17/2018    Procedure: COLONOSCOPY CPT CODE: 75211;  Surgeon: Gigi Geronimo MD;  Location:  [facility] OR;  Service: Gastroenterology   • COLONOSCOPY N/A 5/17/2022    Procedure: COLONOSCOPY;  Surgeon: Geno Vernon MD;  Location:  [facility];  Service: Gastroenterology;  Laterality: N/A;   • CORONARY ANGIOPLASTY WITH STENT PLACEMENT     • ENDOSCOPY N/A 8/17/2018    Procedure: ESOPHAGOGASTRODUODENOSCOPY WITH BIOPSY CPT CODE: 02625;  Surgeon: Gigi Geronimo MD;  Location:  [facility];  Service: Gastroenterology   • ENDOSCOPY N/A 4/20/2021    Procedure: ESOPHAGOGASTRODUODENOSCOPY;  Surgeon: Geno Vernon MD;  [facility]:  [facility];  Service: Gastroenterology;  Laterality: N/A;   • ENDOSCOPY N/A 5/17/2022    Procedure: ESOPHAGOGASTRODUODENOSCOPY WITH BIOPSY;  Surgeon: Geno Vernon MD;  [facility]:  [facility];  Service: Gastroenterology;  Laterality: N/A;   • INTERVENTIONAL RADIOLOGY PROCEDURE N/A 12/18/2019    Procedure: Coil Embolization of septal to pulmonary artery fistuala.;  Surgeon: Jay Barney IV, MD;  Location:  [facility] CATH INVASIVE LOCATION;  Service:  Cardiovascular   • ME RT/LT HEART CATHETERS N/A 5/9/2017    Procedure: Percutaneous Coronary Intervention;  Surgeon: Lincoln De Los Santos MD;  Location: [facility] INVASIVE LOCATION;  Service: Cardiology   • STOMACH SURGERY      FUSION OF BLEEDING ULCER   • UPPER GASTROINTESTINAL ENDOSCOPY  11/13/2015    Dr. Martinez- mild gastritis      General Information     Row Name 06/01/22 1444          OT Time and Intention    Document Type therapy note (daily note)  -     Mode of Treatment individual therapy;occupational therapy  -     Row Name 06/01/22 1444          General Information    Patient Profile Reviewed yes  -     Existing Precautions/Restrictions fall;swallow precautions  -     Row Name 06/01/22 1444          Cognition    Orientation Status (Cognition) oriented x 3  -     Row Name 06/01/22 1444          Safety Issues, Functional Mobility    Impairments Affecting Function (Mobility) endurance/activity tolerance;strength  -           User Key  (r) = Recorded By, (t) = Taken By, (c) = Cosigned By    Initials Name Provider Type    Gloria Pelayo OT Occupational Therapist                 Mobility/ADL's     Row Name 06/01/22 1446          Bed Mobility    Bed Mobility bed mobility (all) activities  -     All Activities, [place] (Bed Mobility) minimum assist (75% patient effort);moderate assist (50% patient effort)  -     Bed Mobility, Safety Issues decreased use of arms for pushing/pulling;decreased use of legs for bridging/pushing;impaired trunk control for bed mobility  -     Comment, (Bed Mobility) log rolling to left and right performed to complete perineal hygiene and change soiled linens  -           User Key  (r) = Recorded By, (t) = Taken By, (c) = Cosigned By    Initials Name Provider Type    Gloria Pelayo OT Occupational Therapist               Obj/Interventions     Row Name 06/01/22 1445          Motor Skills    Motor Skills functional endurance  -     Functional Endurance poor  plus  -     Therapeutic Exercise --  BUE AROM X10 reps X2 sets through forward reach, overhead reach, and shoulder internal/external rotation (functional movement pattern); maximal tactile cues for optimal movement  -           User Key  (r) = Recorded By, (t) = Taken By, (c) = Cosigned By    Initials Name Provider Type    Gloria Pelayo OT Occupational Therapist               Goals/Plan    No documentation.                Clinical Impression     [facility] Name 06/01/22 1446          Pain Assessment    Pretreatment Pain Rating 0/10 - no pain  -     Posttreatment Pain Rating 0/10 - no pain  -[facility] Name 06/01/22 1446          Plan of Care Review    Progress no change  -     Outcome Evaluation Patient seen for BUE exercises from bed level with poor plus overall functional endurance. Patient noted to have wet linens and able to participate in bed mobility to change linens and perineal hygiene with minimal/moderate assist (moderate assist for perineal hygiene). Continue plan of care.  -[facility] Name 06/01/22 1446          Therapy Plan Review/Discharge Plan (OT)    Anticipated Discharge Disposition (OT) home with /7 care;home with home health;skilled nursing facility  -[facility] Name 06/01/22 1446          Positioning and Restraints    Pre-Treatment Position in bed  -     Post Treatment Position bed  -KP     In Bed call light within reach  -           User Key  (r) = Recorded By, (t) = Taken By, (c) = Cosigned By    Initials Name Provider Type    Gloria Pelayo OT Occupational Therapist               Outcome Measures    No documentation.                   OT Recommendation and Plan     Plan of Care Review  Progress: no change  Outcome Evaluation: Patient seen for BUE exercises from bed level with poor plus overall functional endurance. Patient noted to have wet linens and able to participate in bed mobility to change linens and perineal hygiene with minimal/moderate assist (moderate assist for  perineal hygiene). Continue plan of care.     Time Calculation:    Time Calculation- OT     Row Name 06/01/22 1448             Time Calculation- OT    OT Received On 06/01/22  -              Timed Charges    53443 - OT Therapeutic Exercise Minutes 8  -KP      48883 - OT Therapeutic Activity Minutes 15  -KP              Total Minutes    Timed Charges Total Minutes 23  -       Total Minutes 23  -KP            User Key  (r) = Recorded By, (t) = Taken By, (c) = Cosigned By    Initials Name Provider Type     Gloria Galindo OT Occupational Therapist              Therapy Charges for Today     Code Description Service Date Service Provider Modifiers Qty    44087926807 HC OT THER PROC EA 15 MIN 6/1/2022 Gloria Galindo OT GO 1    70512206792 HC OT THERAPEUTIC ACT EA 15 MIN 6/1/2022 Gloria Galindo OT GO 1               Gloria Galindo OT  6/1/2022

## 2022-06-01 NOTE — PLAN OF CARE
Goal Outcome Evaluation:      Patient resting in bed without signs, complaints or symptoms of pain or distress. Will continue plan of care.

## 2022-06-01 NOTE — DISCHARGE PLACEMENT REQUEST
"Fidencio Luciano (76 y.o. Male)             Date of Birth   1945    Social Security Number       Address   [address] [place] [address]    Home Phone   [phone]    MRN   [id number]       Catholic   Non-Anglican    Marital Status                               Admission Date   5/29/22    Admission Type   Emergency    Admitting Provider   Eduardo Luque MD    Attending Provider   Thiago Costello DO    Department, Room/Bed   [address], 3307/2S       Discharge Date       Discharge Disposition       Discharge Destination                               Attending Provider: Thiago Costello DO    Allergies: No Known Allergies    Isolation: None   Infection: None   Code Status: CPR   Advance Care Planning Activity    Ht: 180.3 cm (71\")   Wt: 85 kg (187 lb 4.8 oz)    Admission Cmt: None   Principal Problem: Pneumonia of both lungs due to infectious organism, unspecified part of lung [J18.9]                 Active Insurance as of 5/29/2022     Primary Coverage     Payor Plan Insurance Group Employer/Plan Group    [employer] [place] MEDICARE REPLACEMENT [facility] MEDICARE REPLACEMENT      Payor Plan Address Payor Plan Phone Number Payor Plan Fax Number Effective Dates    [address] [address] [phone]  1/1/2022 - None Entered    [facility] [address]       Subscriber Name Subscriber Birth Date Member ID       FIDENCIO LUCIANO 1945 [id number]                 Emergency Contacts      (Rel.) Home Phone Work Phone Mobile Phone    Mustapha(POA)Angela (Spouse) [phone] -- [phone]    Dylan García (Son) [phone] -- [phone]    AkbarSofie (Daughter) [phone] -- --    UbaldoZhane (Daughter) [phone] -- --    Aries Ortega (Son) [phone] -- --            Emergency Contact Information     Name Relation Home Work Mobile    Mustapha(POA)Angela Spouse [phone]    RickyDylan Son [phone]    " Sofie Webber Daughter [phone]      Zhane Conner Daughter [phone]      Aries Ortega Son [phone]            Insurance Information                [facility] MEDICARE REPLACEMENT/WELL[employer] MEDICARE REPLACEMENT Phone: [phone]    Subscriber: Fidencio Luciano Subscriber#: [id number]    Group#: -- Precert#: --          Treatment Team  Chat With All Active Members    Provider Relationship Specialty Contact    Thiago Costello DO  Attending Internal Medicine  [phone]    Mary Tanner, PCT  Patient Care Technician --     Oscar Rea, PCT  Patient Care Technician --     Bel Andrew, BSW    [phone]    Deisi Lantigua MA,[facility]-SLP  Speech Language Pathologist Speech Pathology     Ailyn Gan RN  Registered Nurse --     Annette Contreras, CRT  Respiratory Therapist --  [phone]    Gloria Galindo, OT  Occupational Therapist Occupational Therapy     Thor Rg, KEITH  Physical Therapist Physical Therapy           Problem List           Codes Noted - Resolved       Hospital    * (Principal) Pneumonia of both lungs due to infectious organism, unspecified part of lung ICD-10-CM: J18.9  ICD-9-CM: 483.8 5/29/2022 - Present    Severe sepsis (HCC) ICD-10-CM: A41.9, R65.20  ICD-9-CM: 038.9, 995.92 5/29/2022 - Present    Acute respiratory failure with hypoxia (HCC) ICD-10-CM: J96.01  ICD-9-CM: 518.81 5/29/2022 - Present    Low serum bicarbonate ICD-10-CM: R79.89  ICD-9-CM: 790.6 5/29/2022 - Present       Non-Hospital    Early satiety ICD-10-CM: R68.81  ICD-9-CM: 780.94 5/10/2022 - Present    Diarrhea ICD-10-CM: R19.7  ICD-9-CM: 787.91 5/10/2022 - Present    History of pulmonary embolism in February 22, patient is on Eliquis. ICD-10-CM: Z86.711  ICD-9-CM: V12.55 4/11/2022 - Present    Chest pain ICD-10-CM: R07.9  ICD-9-CM: 786.50 5/24/2021 - Present    Dysphagia ICD-10-CM: R13.10  ICD-9-CM: 787.20 4/2/2021 - Present    Gastroesophageal reflux  disease ICD-10-CM: K21.9  ICD-9-CM: 530.81 1/26/2021 - Present    Iron deficiency anemia ICD-10-CM: D50.9  ICD-9-CM: 280.9 7/17/2018 - Present    Weight loss, unintentional ICD-10-CM: R63.4  ICD-9-CM: 783.21 2/1/2018 - Present    Coronary artery fistula (Chronic) ICD-10-CM: I25.41  ICD-9-CM: 414.19 7/30/2017 - Present    Weakness generalized ICD-10-CM: R53.1  ICD-9-CM: 780.79 5/18/2017 - Present    Hyperlipidemia LDL goal <70 ICD-10-CM: E78.5  ICD-9-CM: 272.4 1/9/2017 - Present    ASCVD (arteriosclerotic cardiovascular disease), s/p stenting of 80 % stenosis in left circumflex on 10/05/16and 60% stenosis in the LAD, clinically stable.  (Chronic) ICD-10-CM: I25.10  ICD-9-CM: 429.2, 440.9 12/15/2016 - Present    Essential hypertension (Chronic) ICD-10-CM: I10  ICD-9-CM: 401.9 8/18/2016 - Present    Type 2 diabetes mellitus (HCC) ICD-10-CM: E11.9  ICD-9-CM: 250.00 8/18/2016 - Present    Benign prostatic hyperplasia (Chronic) ICD-10-CM: N40.0  ICD-9-CM: 600.00 8/18/2016 - Present             History & Physical      Stephanie Henao PA-C at 05/29/22 2056     Attestation signed by Eduardo Luque MD at 05/30/22 0021    I have seen and examined the patient independently from Stephanie Henao PA-C, and have reviewed this documentation and agree with treatment plan for aspiration pneumonia. Patient was noted to be somnolent in the ED; he was sleeping soundly when I entered his room but easy to wake up. He did remain mildly groggy during my interview. Recently started valproic acid can cause somnolence and in turn could put him at increased risk of aspiration; it also can be associated with nausea and vomiting which also could increase aspiration risk. Will hold for now and monitor mentation. Repeat glucose down form 346 to 228 with IV fluid hydration; continue to monitor, SSI on board as needed. Suspect low bicarb and high anion gap is more reflective of lactic acidosis.                       [facility]  "[facility] Medicine Services  History & Physical    Patient Identification:  Name:  Fidencio Luciano  Age:  76 y.o.  Sex:  male  :  1945  MRN:  [id number]   Visit Number:  [id number]  Primary Care Physician:  Camila Ortiz APRN    Subjective     2022   Chief complaint:   Chief Complaint   Patient presents with   • Vomiting     History of presenting illness:      Fidencio uLciano is a 76 y.o. 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  Patient's wife, Angela, and daughter, Sofie, present at bedside to assist in providing supplemental history.  Patient has been in patient health and rehabilitation since November for therapy.  They note that he does have a recent history of 2 DVTs and a pulmonary embolism that was performed in February and has been on anticoagulation with Eliquis since.  Patient went to an appointment with his neurologist, Dr. Carrasquillo, for his vascular dementia and was placed on valproic acid.  Collateral states that she was visiting the patient on Thursday while he was eating lunch and noticed that he was coughing frequently. She noted that at one point the nursing home to came to his room because the patient got \"strangled\" on a potato wedge.  The wife reports that the patient always has a \"nagging cough\" that she attributes to allergies, but since Thursday his cough has proceeded to get worse.  The patient reports that he has been coughing up " sputum that is white mixed with undigested food.  The wife also notes that the patient has been getting more weak since Thursday and has been having somewhat worsening confusion.  Today, the daughter took the patient to [employer] and at the end of service he became nauseous and threw up bright yellow emesis coming out of both his nose and mouth.  The wife and daughter states that the patient has been having a decreased appetite and does not have good fluid intake.  They state that he is on a regular diet at the nursing home.  They state that the patient has been complaining of shortness of breath and has no oxygen requirements at the nursing home.  The patient states he has been having some chest tightness, but denies any chest pain or pressure.  He denies any fevers or chills.  He denies any abdominal pain.  He states that he did have nausea when he first came in, but this has improved after he received medication in the emergency department.  He denies any further emesis.  He denies any urinary symptoms.  Patient is alert and oriented to self, place, date of birth, and president.  He is disoriented to year and thinks it is 1986. He does ambulate by wheelchair at the nursing home.    Upon arrival to the ED, vital signs were temperature 98.1, heart rate 107, respirations 20, blood pressure 162/79, SPO2 92% on room air..  CMP with glucose 346, CO2 16.6, anion gap 18.4, BUN 25, alkaline phosphatase 123, albumin 2.67, otherwise unremarkable.  CBC with WBC 13.78, RBC 3.97, hemoglobin 12.2, otherwise unremarkable.  CRP 15.25, lactate 3.9.  Lipase 12.  Magnesium 2.1.  Procalcitonin 0.18.  Valproic acid level 15.2.  Pending peripheral blood cultures x2.  Troponin T negative x1.  proBNP 289.  COVID-19 and influenza A/B swab negative.  Respiratory panel PCR negative.  Chest x-ray shows right greater than left mid and lower lung airspace disease which may represent pneumonia.  CT PE protocol shows bilateral pneumonia, more  extensive on the right which may be suggestive of aspiration pneumonitis given the distribution of infiltrates; no evidence of PEs.  CT abdomen/pelvis shows extensive airspace disease posterior right lower lobe most concerning for acute infectious pneumonia and small amount of left basilar parenchymal opacity may represent atelectasis and/or infiltrate; no acute findings within abdomen or pelvis.  Urinalysis with 3+ glucose, trace ketones, 1+ protein, 3-5 RBC, 6-12 WBC.  Pending urine culture.    Known Emergency Department medications received prior to my evaluation included IV Zofran 4 mg, IV Zosyn, 2 L fluid bolus. Emergency Department Room location at the time of my evaluation was 113.     ---------------------------------------------------------------------------------------------------------------------   Review of Systems   Constitutional: Negative for activity change and fever.   HENT: Negative for congestion, postnasal drip, rhinorrhea and sore throat.    Eyes: Negative for discharge and redness.   Respiratory: Positive for cough (productive with white sputum and components of indigested food), chest tightness and shortness of breath. Negative for apnea.    Cardiovascular: Negative for chest pain and palpitations.   Gastrointestinal: Positive for nausea (resolved) and vomiting (1 episode, no further noted). Negative for abdominal distention and abdominal pain.   Endocrine: Negative for polydipsia, polyphagia and polyuria.   Genitourinary: Negative for difficulty urinating, dysuria, frequency and urgency.   Musculoskeletal: Positive for gait problem (uses wheelchair). Negative for arthralgias.   Skin: Negative for color change and rash.   Neurological: Positive for weakness (generalized weakness). Negative for dizziness and syncope.   Psychiatric/Behavioral: Positive for confusion (baseline). Negative for agitation and behavioral problems.         ---------------------------------------------------------------------------------------------------------------------   Past Medical History:   Diagnosis Date   • BPH (benign prostatic hyperplasia)    • Bradycardia     Positive tilt table testing 07/2016   • Coronary artery disease    • Diabetes mellitus (HCC)    • Diverticulosis    • Elevated cholesterol    • Gastric ulcer with hemorrhage    • Gastroesophageal reflux disease 1/26/2021   • History of transfusion    • Hyperlipidemia    • Hypertension    • Psoriasis      Past Surgical History:   Procedure Laterality Date   • BACK SURGERY     • CARDIAC CATHETERIZATION N/A 9/20/2016    Procedure: Left Heart Cath;  Surgeon: Giovanni Buenrostro MD;  Location: [facility] CATH INVASIVE LOCATION;  Service:    • CARDIAC CATHETERIZATION N/A 10/4/2016    Procedure: Left Heart Cath;  Surgeon: Bharathi Andrew MD;  Location: [facility] CATH INVASIVE LOCATION;  Service:    • CARDIAC CATHETERIZATION N/A 5/9/2017    Procedure: Left Heart Cath;  Surgeon: Giovanni Buenrostro MD;  Location: [facility] CATH INVASIVE LOCATION;  Service:    • CARDIAC CATHETERIZATION N/A 5/9/2017    Procedure: ERR;  Surgeon: Giovanni Buenrostro MD;  Location: [facility] CATH INVASIVE LOCATION;  Service:    • CARDIAC CATHETERIZATION N/A 11/8/2019    Procedure: Left Heart Cath;  Surgeon: Abraham Oviedo MD;  [facility] CATH INVASIVE LOC[facility];  Service: Cardiology   • CARDIAC CATHETERIZATION N/A 12/18/2019    Procedure: Coronary angiography;  Surgeon: Jay Barney IV, MD;  Location:  [facility] CATH INVASIVE LOCATION;  Service: Cardiovascular   • CHOLECYSTECTOMY     • COLONOSCOPY  11/13/2015    Dr. Martinez- internal hemorrhoids, sigmoid diverticulosis   • COLONOSCOPY N/A 8/17/2018    Procedure: COLONOSCOPY CPT CODE: 12666;  Surgeon: Gigi Geronimo MD;  Location: [facility];  Service: Gastroenterology   • COLONOSCOPY N/A 5/17/2022    Procedure: COLONOSCOPY;  Surgeon: Geno Vernon MD;  Location:  [facility]  OR;  Service: Gastroenterology;  Laterality: N/A;   • CORONARY ANGIOPLASTY WITH STENT PLACEMENT     • ENDOSCOPY N/A 2018    Procedure: ESOPHAGOGASTRODUODENOSCOPY WITH BIOPSY CPT CODE: 92790;  Surgeon: Gigi Geronimo MD;  Location:  [facility];  Service: Gastroenterology   • ENDOSCOPY N/A 2021    Procedure: ESOPHAGOGASTRODUODENOSCOPY;  Surgeon: Geno Vernon MD;  [facility];  Service: Gastroenterology;  Laterality: N/A;   • ENDOSCOPY N/A 2022    Procedure: ESOPHAGOGASTRODUODENOSCOPY WITH BIOPSY;  Surgeon: Geno Vernon MD;  Location:  [facility];  Service: Gastroenterology;  Laterality: N/A;   • INTERVENTIONAL RADIOLOGY PROCEDURE N/A 2019    Procedure: Coil Embolization of septal to pulmonary artery fistuala.;  Surgeon: Jay Barney IV, MD;  Location:  [facility] CATH INVASIVE LOCATION;  Service: Cardiovascular   • UT RT/LT HEART CATHETERS N/A 2017    Procedure: Percutaneous Coronary Intervention;  Surgeon: Lincoln De Los Santos MD;  Location:  [facility] CATH INVASIVE LOCATION;  Service: Cardiology   • STOMACH SURGERY      FUSION OF BLEEDING ULCER   • UPPER GASTROINTESTINAL ENDOSCOPY  2015    Dr. Martinez- mild gastritis     Family History   Problem Relation Age of Onset   • Sick sinus syndrome Mother    • Heart failure Mother    • Diabetes Mother    • Liver cancer Father      Social History     Socioeconomic History   • Marital status:    Tobacco Use   • Smoking status: Former Smoker     Packs/day: 3.00     Types: Cigarettes     Quit date:      Years since quittin.4   • Smokeless tobacco: Former User     Quit date: 1986   Vaping Use   • Vaping Use: Never used   Substance and Sexual Activity   • Alcohol use: No   • Drug use: No   • Sexual activity: Defer     ---------------------------------------------------------------------------------------------------------------------   Allergies:  Patient has no known  allergies.  ---------------------------------------------------------------------------------------------------------------------   Home medications:    Medications below are reported home medications pulling from within the system; at this time, these medications have not been reconciled unless otherwise specified and are in the verification process for further verifcation as current home medications.  (Not in a hospital admission)      Hospital Scheduled Meds:  [START ON 5/30/2022] Insulin Aspart, 0-9 Units, Subcutaneous, TID AC           Current listed hospital scheduled medications may not yet reflect those currently placed in orders that are signed and held awaiting patient's arrival to floor.   ---------------------------------------------------------------------------------------------------------------------     Objective     Vital Signs:  Temp:  [98.1 °F (36.7 °C)] 98.1 °F (36.7 °C)  Heart Rate:  [103-107] 103  Resp:  [20] 20  BP: (131-162)/(76-84) 160/84      05/29/22  1406   Weight: 87.1 kg (192 lb)     Body mass index is 26.78 kg/m².  ---------------------------------------------------------------------------------------------------------------------       Physical Exam  Constitutional:       General: He is awake.      Appearance: Normal appearance. He is normal weight.      Interventions: Nasal cannula in place.      Comments: Resting comfortably in bed upon arrival. No s/s distress noted. Nasal cannula in place.    HENT:      Head: Normocephalic and atraumatic.      Right Ear: External ear normal.      Left Ear: External ear normal.      Nose: Nose normal.      Mouth/Throat:      Mouth: Mucous membranes are dry.      Pharynx: Oropharynx is clear.   Eyes:      Extraocular Movements: Extraocular movements intact.      Conjunctiva/sclera: Conjunctivae normal.      Pupils: Pupils are equal, round, and reactive to light.   Cardiovascular:      Rate and Rhythm: Normal rate and regular rhythm.      Pulses:  "Normal pulses.           Dorsalis pedis pulses are 2+ on the right side and 2+ on the left side.        Posterior tibial pulses are 2+ on the right side and 2+ on the left side.      Heart sounds: Normal heart sounds. No murmur heard.    No friction rub. No gallop.   Pulmonary:      Effort: Pulmonary effort is normal.      Breath sounds: Examination of the right-lower field reveals decreased breath sounds. Examination of the left-lower field reveals decreased breath sounds. Decreased breath sounds present. No wheezing, rhonchi or rales.   Abdominal:      General: Abdomen is flat. Bowel sounds are normal. There is no distension.      Palpations: Abdomen is soft.      Tenderness: There is no abdominal tenderness. There is no guarding.   Musculoskeletal:         General: No swelling. Normal range of motion.      Cervical back: Normal range of motion and neck supple.      Right lower leg: No edema.      Left lower leg: No edema.   Skin:     General: Skin is warm and dry.      Findings: No erythema.      Comments: Several small yellow colored scabs primarily on right lower extremity, but also one noted on left lower extremity as well. No active drainage or erythema noted.    Neurological:      General: No focal deficit present.      Mental Status: He is alert.      Comments: Alert and oriented to self, , place, and president. States the year is \".\"   Psychiatric:         Mood and Affect: Mood normal.         Behavior: Behavior normal. Behavior is cooperative.         Thought Content: Thought content normal.         ---------------------------------------------------------------------------------------------------------------------  EKG:            Telemetry:    Telemetry with sinus tachycardia with HR low 100's.    I have personally looked at both the EKG and the telemetry strips.  ---------------------------------------------------------------------------------------------------------------------   Results from " last 7 days   Lab Units 05/29/22  1845 05/29/22  1530   CRP mg/dL  --  15.25*   LACTATE mmol/L 1.9 3.9*   WBC 10*3/mm3  --  13.78*   HEMOGLOBIN g/dL  --  12.2*   HEMATOCRIT %  --  37.7   MCV fL  --  95.0   MCHC g/dL  --  32.4   PLATELETS 10*3/mm3  --  322   INR   --  1.38*     Results from last 7 days   Lab Units 05/29/22  1519   PH, ARTERIAL pH units 7.441   PO2 ART mm Hg 63.4*   PCO2, ARTERIAL mm Hg 29.0*   HCO3 ART mmol/L 19.7*     Results from last 7 days   Lab Units 05/29/22  1530   SODIUM mmol/L 137   POTASSIUM mmol/L 4.5   MAGNESIUM mg/dL 2.1   CHLORIDE mmol/L 102   CO2 mmol/L 16.6*   BUN mg/dL 25*   CREATININE mg/dL 1.03   CALCIUM mg/dL 8.8   GLUCOSE mg/dL 346*   ALBUMIN g/dL 2.67*   BILIRUBIN mg/dL 0.4   ALK PHOS U/L 123*   AST (SGOT) U/L 27   ALT (SGPT) U/L 29   Estimated Creatinine Clearance: 76.3 mL/min (by C-G formula based on SCr of 1.03 mg/dL).  No results found for: AMMONIA  Results from last 7 days   Lab Units 05/29/22  1530   TROPONIN T ng/mL <0.010     Results from last 7 days   Lab Units 05/29/22  1530   PROBNP pg/mL 289.0     Lab Results   Component Value Date    HGBA1C 5.90 (H) 10/28/2021     Lab Results   Component Value Date    TSH 3.450 12/21/2021    FREET4 1.14 10/27/2021     No results found for: PREGTESTUR, PREGSERUM, HCG, HCGQUANT  Pain Management Panel     Pain Management Panel Latest Ref Rng & Units 10/27/2021 7/31/2015    AMPHETAMINES SCREEN, URINE Negative Negative Negative    BARBITURATES SCREEN Negative Negative Negative    BENZODIAZEPINE SCREEN, URINE Negative Negative Negative    BUPRENORPHINEUR Negative Negative -    COCAINE SCREEN, URINE Negative Negative Negative    METHADONE SCREEN, URINE Negative Negative Negative    METHAMPHETAMINEUR Negative Negative -                       ---------------------------------------------------------------------------------------------------------------------  Imaging Results (Last 7 Days)     Procedure Component Value Units Date/Time    CT  Angiogram Chest Pulmonary Embolism [[id number]] Collected: 05/29/22 1901     Updated: 05/29/22 1903    Narrative:      CT CHEST PULMONARY EMBOLISM W CONTRAST    INDICATION:   Hypoxic    TECHNIQUE:   CT angiogram of the chest with 100 cc of Isovue-300 IV contrast. 3-D reconstructions were obtained and reviewed.   Radiation dose reduction techniques included automated exposure control or exposure modulation based on body size. Count of known CT and  cardiac nuc med studies performed in previous 12 months: 1.     COMPARISON:   February 2, 2022    FINDINGS:   Chest images at mediastinal window show good filling of the pulmonary arteries. There are no pulmonary artery filling defects. The CT angiographic images also show no evidence of emboli. No adenopathy or effusions noted.    Chest images of lung windows show extensive consolidation in the right lower lobe with additional infiltrates in the posterior segment of the right upper lobe and posteriorly in the left lower lobe.      Impression:      Bilateral pneumonia, more extensive on the right. Consider aspiration pneumonitis given the distribution of the infiltrates. No evidence of pulmonary embolism.    Signer Name: Cortez Griffin MD   Signed: 5/29/2022 7:01 PM   Workstation Name: [id number]    [facility]    CT Abdomen Pelvis With Contrast [[id number]] Collected: 05/29/22 1844     Updated: 05/29/22 1846    Narrative:      CT Abdomen Pelvis W    INDICATION:   Vomiting.    TECHNIQUE:   CT of the abdomen and pelvis with IV contrast. Coronal and sagittal reconstructions were obtained.  Radiation dose reduction techniques included automated exposure control or exposure modulation based on body size. Count of known CT and cardiac nuc med  studies performed in previous 12 months: 4.     COMPARISON:   CT abdomen and pelvis 10/28/2021    FINDINGS:  Abdomen: Extensive air space disease posterior right lower lobe most concerning for acute infectious  pneumonia. Small amount of left basilar parenchymal opacity may represent atelectasis or infiltrate. No sizable effusions. Liver and spleen unremarkable.  Gallbladder surgically absent. Multiloculated cystic lesion inferior to the tail of pancreas and medial to the left kidney. Etiology unclear but may represent either a pancreatic pseudocyst or more likely exophytic renal cyst. This appears unchanged.  Punctate nonobstructing bilateral renal stones. 1.5 cm low-attenuation left adrenal lesion appears unchanged and most likely represents adenoma.    Stomach and small bowel unremarkable. Small hiatal hernia. Colonic diverticular change without diverticulitis. The appendix not clearly identified but no convincing evidence of acute appendicitis or inflammatory change within the right lower quadrant.  Aortic and iliac atherosclerotic changes without aneurysm.    Pelvis: Bladder unremarkable. Prostate mildly enlarged. Moderately advanced multilevel degenerative disc disease throughout the lumbar spine with grade 1 to grade 2 spondylolisthesis L4 and L5 with severe spinal stenosis and foraminal stenosis at this  level. Severe foraminal stenosis also noted at L3-L4 along with severe spinal stenosis. Extensive multilevel lumbar facet arthropathy.      Impression:      Extensive air space disease posterior right lower lobe most concerning for acute infectious pneumonia. Small amount of left basilar parenchymal opacity may represent atelectasis and/or infiltrate.    No acute findings within the abdomen and pelvis. See above for multiple nonacute findings.          Signer Name: CHELO Leos MD   Signed: 5/29/2022 6:44 PM   Workstation Name: [email or web address]\""    [facility]    XR Chest 1 View [[id number]] Collected: 05/29/22 1449     Updated: 05/29/22 1451    Narrative:      EXAM:    XR Chest, 1 View     EXAM DATE:    5/29/2022 2:37 PM     CLINICAL HISTORY:    hypoxia     TECHNIQUE:    Frontal view of the  chest.     COMPARISON:    11/03/2021     FINDINGS:    Lungs:  Patchy airspace disease of the right mid and infrahilar  aspects.  Left infrahilar patchy airspace disease noted.    Pleural space:  Unremarkable.  No pneumothorax.    Heart:  Unremarkable.  No cardiomegaly.    Mediastinum:  Unremarkable.    Bones/joints:  Unremarkable.       Impression:        Right greater than left mid and lower lung airspace disease which may  represent pneumonia.     This report was finalized on 5/29/2022 2:49 PM by Dr. Rakesh Carcamo MD.           Last echocardiogram:  Results for orders placed during the hospital encounter of 05/24/21    Adult Transthoracic Echo Complete W/ Cont if Necessary Per Protocol    Interpretation Summary  · Normal left ventricular cavity size and wall thickness noted. All left ventricular wall segments contract normally  · Left ventricular ejection fraction appears to be 61 - 65%.  · Left ventricular diastolic function is consistent with (grade I) impaired relaxation.  · The aortic valve is structurally normal with no stenosis present. Mild aortic valve regurgitation is present  · The mitral valve is structurally normal with no significant stenosis present. Trace mitral valve regurgitation is present.  · . There is no evidence of pericardial effusion.    I have personally reviewed the above radiology images and read the final radiology report on 05/29/22  ---------------------------------------------------------------------------------------------------------------------  Assessment / Plan     Active Hospital Problems    Diagnosis  POA   • Pneumonia of both lungs due to infectious organism, unspecified part of lung [J18.9]  Yes       ASSESSMENT/PLAN:  - Bilateral pneumonia, R > L, concerning for aspiration versus HCAP, POA  - Severe sepsis 2/2 above with HR > 90, WBC 13.78, C-RP 15.25, lactate, and respiratory failure, POA  - Acute hypoxic respiratory failure 2/2 above, POA  -CT PE protocol notes bilateral  pneumonia more extensive on the right suggestive of aspiration pneumonitis given distribution of infiltrates.  -ABG on room air stable; noted to be dropping to 85 to 89% while asleep in the ED and placed on 2 L oxygen  -COVID-19 and influenza A/B swab negative; respiratory panel PCR negative.  -Received IV Zosyn in ED; will continue on admission.   -Received 2 L fluid bolus; continuous gentle IV fluids on admission.  -Obtain MRSA nasal swab; if results positive, I will add IV vancomycin.  - SLP consulted for evaluation of aspiration; will keep NPO for now.   -Continuous pulse oximetry; titrate to maintain SPO2 90 to 95%.  -Encourage incentive spirometer.  -Lactate normalized after fluid bolus.   -Pending peripheral blood cultures x2.  -Repeat a.m. CBC and CRP.    -Low Bicarb  -Elevated anion-gap  -Ketonuria  -Glucose 346; Bicarb 16.6; Anion-gap 18.4.    -No concerns for DKA at this time as pH is normal on ABG.   -Repeat a.m. CMP in the morning.     - Elevated alkaline phosphatase  - Hypoalbuminemia  - Likely multifactorial.   - Continue daily multivitamin.   - Nutrition consulted for malnutrition severity assessment.     -Chronic normocytic anemia  -Iron deficiency anemia  -History of bleeding ulcers with hemorrhage  -History of internal hemorrhoids  - Recent colonoscopy and EGD reviewed from 05/17/2022; findings include sessile polyps, multiple diverticula, internal hemorrhoids, hiatal hernia.  - Hemoglobin 12.2, hematocrit 37.7; appears to be at baseline.   - No obvious sources or signs of bleeding noted.   - Obtain iron, vitamin B12, and folate.   - Repeat a.m. CBC.     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   - Troponin T negative x 1.   - EKG without  acute ischemic changes  - Stress test 06/2021 consistent with low risk study.   - Echo from 05/2021 with EF 61-65%, grade I diastolic dysfunction, mild AVR, trace MVR.   - Continue Plavix and Eliquis once reconciled.   - Cardiac monitoring.     - Essential hypertension  - Hyperlipidemia  - BP stable.   - Monitor per hospital protocol.   - Review medications once reconciled.     - Type II diabetes mellitus   - Hemoglobin A1c 9.3.   - Accuchecks qAC and qHS.   - SSI ordered; titrate as needed.   - Hypoglycemia protocol ordered.     - GERD  - PPI    - Dementia  - Debility  - Review home medications  - Reorient patient as able.  - Up with assistance; fall precautions.   - PT/OT consults  ----------  -DVT prophylaxis: Continue Eliquis  -Activity: Up with assistance  -Expected length of stay:   INPATIENT status due to the need for care which can only be reasonably provided in an hospital setting such as aggressive/expedited ancillary services and/or consultation services, the necessity for IV medications, close physician monitoring and/or the possible need for procedures.  In such, I feel patient's risk for adverse outcomes and need for care warrant INPATIENT evaluation and predict the patient's care encounter to likely last beyond 2 midnights.    High risk secondary to bilateral pneumonia and concern for aspiration versus HCAP, sepsis, acute hypoxic respiratory failure     Disposition: Likely back to nursing home once reconciled.    Stephanie Henao PA-C  05/29/22  20:56 EDT    ---------------------------------------------------------------------------------------------------------------------           Electronically signed by Eduardo Luque MD at 05/30/22 0021       Vital Signs (last day)     Date/Time Temp Temp src Pulse Resp BP Patient Position SpO2    06/01/22 1048 98.3 (36.8) Axillary 68 18 160/68 Lying 95    06/01/22 0648 98 (36.7) Axillary 75 18 138/63 Lying 92    06/01/22 0634 -- -- 77 19 -- -- 94     06/01/22 0053 97.6 (36.4) Oral 76 18 148/71 Lying 90    05/31/22 1827 -- -- 77 18 -- -- 92    05/31/22 1820 98 (36.7) Oral 79 18 164/67 Lying 91    05/31/22 1540 -- -- -- -- -- -- 95    05/31/22 1428 98.1 (36.7) Axillary 72 18 135/58 Lying 97    05/31/22 1018 98.5 (36.9) Axillary 78 18 112/66 Lying 96    05/31/22 0623 -- -- -- -- -- -- 98    05/31/22 0622 -- -- 70 18 -- -- 98    05/31/22 0601 98 (36.7) Oral 69 18 152/64 Lying 96    05/31/22 0527 -- -- 65 -- 134/52 -- --    05/31/22 0000 98 (36.7) Axillary 68 18 139/52 Lying 93          Lines, Drains & Airways     Active LDAs     Name Placement date Placement time Site Days    Peripheral IV 05/29/22 1414 Left Hand 05/29/22  1414  Hand  2    Peripheral IV 05/29/22 1729 Right Antecubital 05/29/22  1729  Antecubital  2                  Current Facility-Administered Medications   Medication Dose Route Frequency Provider Last Rate Last Admin   • albuterol (PROVENTIL) nebulizer solution 0.083% 2.5 mg/3mL  2.5 mg Nebulization 4x Daily PRN Thiago Costello DO       • atorvastatin (LIPITOR) tablet 80 mg  80 mg Oral Nightly Thiago Costello DO   80 mg at 05/31/22 1956   • budesonide-formoterol (SYMBICORT) 80-4.5 MCG/ACT inhaler 2 puff  2 puff Inhalation BID Thiago Costello DO   2 puff at 06/01/22 0634   • castor oil-balsam peru (VENELEX) ointment 1 application  1 application Topical Q12H Thiago Csotello DO   1 application at 06/01/22 0802   • cetirizine (zyrTEC) tablet 10 mg  10 mg Oral Daily Thiago Costello DO   10 mg at 06/01/22 0801   • clopidogrel (PLAVIX) tablet 75 mg  75 mg Oral Daily Thiago Costello DO   75 mg at 06/01/22 0801   • dextrose (D50W) (25 g/50 mL) IV injection 25 g  25 g Intravenous Q15 Min PRN Eduardo Luque MD       • dextrose (GLUTOSE) oral gel 15 g  15 g Oral Q15 Min PRN Eduardo Luque MD       • divalproex (DEPAKOTE) DR tablet 500 mg  500 mg Oral Nightly Thiago Costello DO    500 mg at 05/31/22 1957   • finasteride (PROSCAR) tablet 5 mg  5 mg Oral Nightly Thiago Costello, DO   5 mg at 05/31/22 1957   • glucagon (human recombinant) (GLUCAGEN DIAGNOSTIC) injection 1 mg  1 mg Intramuscular Q15 Min PRN Eduardo Luque MD       • Insulin Aspart (novoLOG) injection 0-9 Units  0-9 Units Subcutaneous TID AC Eduardo Luque MD   4 Units at 06/01/22 1109   • iron polysacch complex-B12-VitC-FA-Succ (Ferrex 150 Forte Plus) capsule 1 capsule  1 capsule Oral Daily With Breakfast Thiago Costello, DO   1 capsule at 06/01/22 0801   • isosorbide mononitrate (IMDUR) 24 hr tablet 120 mg  120 mg Oral QAM Thiago Costello, DO   120 mg at 06/01/22 0505   • melatonin tablet 5 mg  5 mg Oral Nightly Thiago Costello DO   5 mg at 05/31/22 1956   • memantine (NAMENDA) tablet 10 mg  10 mg Oral Q12H Thiago Costello, DO   10 mg at 06/01/22 0801   • metoprolol tartrate (LOPRESSOR) tablet 12.5 mg  12.5 mg Oral Q12H Thiago Costello, DO   12.5 mg at 06/01/22 0801   • mirtazapine (REMERON) tablet 15 mg  15 mg Oral Nightly Thiago Costello, DO   15 mg at 05/31/22 1956   • multivitamin (THERAGRAN) tablet 1 tablet  1 tablet Oral Daily Stephanie Henao PA-C   1 tablet at 06/01/22 0801   • nitroglycerin (NITROSTAT) SL tablet 0.4 mg  0.4 mg Sublingual Q5 Min PRN Eduardo Luque MD       • pantoprazole (PROTONIX) EC tablet 40 mg  40 mg Oral Daily Thiago Costello, DO   40 mg at 06/01/22 0801   • piperacillin-tazobactam (ZOSYN) IVPB 3.375 g in 100 mL NS VTB  3.375 g Intravenous Q8H Eduardo Luque MD   3.375 g at 06/01/22 1109   • ranolazine (RANEXA) 12 hr tablet 500 mg  500 mg Oral Q12H Thiago Costello,    500 mg at 06/01/22 0801   • sodium chloride 0.9 % flush 10 mL  10 mL Intravenous PRN Eduardo Luque MD       • sodium chloride 0.9 % flush 10 mL  10 mL Intravenous PRN Eduardo Luque MD       • sodium chloride  0.9 % flush 10 mL  10 mL Intravenous Q12H Eduardo Luque MD   10 mL at 06/01/22 0803   • sodium chloride 0.9 % flush 10 mL  10 mL Intravenous PRN Eduardo Luque MD       • sodium chloride 0.9 % infusion  75 mL/hr Intravenous Continuous Eduardo Luque MD 75 mL/hr at 06/01/22 0505 75 mL/hr at 06/01/22 0505       Lab Results (last 24 hours)     Procedure Component Value Units Date/Time    POC Glucose Once [[id number]]  (Abnormal) Collected: 06/01/22 1056    Specimen: Blood Updated: 06/01/22 1103     Glucose 224 mg/dL      Comment: Meter: [id number] : [id number] Digital UnionLEIA RICARDO       POC Glucose Once [[id number]]  (Abnormal) Collected: 06/01/22 0653    Specimen: Blood Updated: 06/01/22 0659     Glucose 217 mg/dL      Comment: Meter: [id number] : [id number] Digital UnionLEIA RICARDO       Blood Culture - Blood, Arm, Right [[id number]]  (Abnormal) Collected: 05/29/22 1643    Specimen: Blood from Arm, Right Updated: 06/01/22 0419     Blood Culture Abnormal Stain     Gram Stain Aerobic Bottle Gram positive bacilli    Narrative:      Blood culture does not meet the specified criteria for PCR identification.  If pregnant, immunocompromised, or clinical concern for meningitis, call lab to run BCID for Listeria monocytogenes.    Blood Culture ID, PCR - Blood, Arm, Right [[id number]] Collected: 05/29/22 1643    Specimen: Blood from Arm, Right Updated: 06/01/22 0418     BCID, PCR Negative by BCID PCR. Culture to Follow.     BOTTLE TYPE Aerobic Bottle    Basic Metabolic Panel [[id number]]  (Abnormal) Collected: 06/01/22 0224    Specimen: Blood Updated: 06/01/22 0310     Glucose 202 mg/dL      BUN 9 mg/dL      Creatinine 0.65 mg/dL      Sodium 137 mmol/L      Potassium 3.4 mmol/L      Chloride 107 mmol/L      CO2 19.5 mmol/L      Calcium 8.2 mg/dL      BUN/Creatinine Ratio 13.8     Anion Gap 10.5 mmol/L      eGFR 98.3 mL/min/1.73      Comment: National Kidney Foundation and American Society of Nephrology (ASN) Task  Force recommended calculation based on the Chronic Kidney Disease Epidemiology Collaboration (CKD-EPI) equation refit without adjustment for race.       Narrative:      GFR Normal >60  Chronic Kidney Disease <60  Kidney Failure <15      CBC (No Diff) [[id number]]  (Abnormal) Collected: 06/01/22 0224    Specimen: Blood Updated: 06/01/22 0247     WBC 11.49 10*3/mm3      RBC 3.38 10*6/mm3      Hemoglobin 10.5 g/dL      Hematocrit 31.8 %      MCV 94.1 fL      MCH 31.1 pg      MCHC 33.0 g/dL      RDW 14.2 %      RDW-SD 49.5 fl      MPV 9.7 fL      Platelets 306 10*3/mm3     POC Glucose Once [[id number]]  (Abnormal) Collected: 05/31/22 1826    Specimen: Blood Updated: 05/31/22 1833     Glucose 278 mg/dL      Comment: Meter: [id number] : [id number] Tiffany Pereira       Blood Culture - Blood, Arm, Left [[id number]]  (Normal) Collected: 05/29/22 1643    Specimen: Blood from Arm, Left Updated: 05/31/22 1701     Blood Culture No growth at 2 days    POC Glucose Once [[id number]]  (Abnormal) Collected: 05/31/22 1613    Specimen: Blood Updated: 05/31/22 1620     Glucose 283 mg/dL      Comment: Meter: [id number] : [id number] SIMRAN ZUÑIGA           Orders (last 24 hrs)      Start     Ordered    06/01/22 1104  POC Glucose Once  PROCEDURE ONCE         06/01/22 1056    06/01/22 0700  POC Glucose Once  PROCEDURE ONCE         06/01/22 0653    06/01/22 0600  CBC (No Diff)  Morning Draw         05/31/22 1523    06/01/22 0600  Basic Metabolic Panel  Morning Draw         05/31/22 1523    06/01/22 0600  XR Chest 1 View  1 Time Imaging         05/31/22 1523    05/31/22 2358  Blood Culture ID, PCR - Blood, Arm, Right  Once         05/31/22 2357    05/31/22 1834  POC Glucose Once  PROCEDURE ONCE         05/31/22 1826    05/31/22 1621  POC Glucose Once  PROCEDURE ONCE         05/31/22 1613    05/31/22 1516  Please titrate oxygen down, with goal of completely discontinuing oxygen, while maintaining oxygen saturation greater than 90%.   Nursing Communication  Once        Comments: Please titrate oxygen down, with goal of completely discontinuing oxygen, while maintaining oxygen saturation greater than 90%.    05/31/22 1518    05/31/22 0900  castor oil-balsam peru (VENELEX) ointment 1 application  Every 12 Hours Scheduled         05/31/22 0749    05/31/22 0800  iron polysacch complex-B12-VitC-FA-Succ (Ferrex 150 Forte Plus) capsule 1 capsule  Daily With Breakfast         05/30/22 1333    05/31/22 0700  isosorbide mononitrate (IMDUR) 24 hr tablet 120 mg  Every Morning         05/30/22 1333    05/30/22 2100  atorvastatin (LIPITOR) tablet 80 mg  Nightly         05/30/22 1333    05/30/22 2100  divalproex (DEPAKOTE) DR tablet 500 mg  Nightly         05/30/22 1333    05/30/22 2100  finasteride (PROSCAR) tablet 5 mg  Nightly         05/30/22 1333    05/30/22 2100  melatonin tablet 5 mg  Nightly         05/30/22 1333    05/30/22 2100  mirtazapine (REMERON) tablet 15 mg  Nightly         05/30/22 1333    05/30/22 1900  budesonide-formoterol (SYMBICORT) 80-4.5 MCG/ACT inhaler 2 puff  2 Times Daily         05/30/22 1333    05/30/22 1430  cetirizine (zyrTEC) tablet 10 mg  Daily         05/30/22 1333    05/30/22 1430  clopidogrel (PLAVIX) tablet 75 mg  Daily         05/30/22 1333    05/30/22 1430  memantine (NAMENDA) tablet 10 mg  Every 12 Hours Scheduled         05/30/22 1333    05/30/22 1430  metoprolol tartrate (LOPRESSOR) tablet 12.5 mg  Every 12 Hours Scheduled         05/30/22 1333    05/30/22 1430  pantoprazole (PROTONIX) EC tablet 40 mg  Daily         05/30/22 1333    05/30/22 1430  ranolazine (RANEXA) 12 hr tablet 500 mg  Every 12 Hours Scheduled         05/30/22 1333    05/30/22 1331  albuterol (PROVENTIL) nebulizer solution 0.083% 2.5 mg/3mL  4 Times Daily PRN         05/30/22 1333    05/30/22 0900  multivitamin (THERAGRAN) tablet 1 tablet  Daily         05/29/22 6774    05/30/22 0730  Insulin Aspart (novoLOG) injection 0-9 Units  3 Times Daily Before  "Meals         05/29/22 2052 05/30/22 0600  Incentive Spirometry  Every 4 Hours While Awake       05/29/22 2254 05/30/22 0200  piperacillin-tazobactam (ZOSYN) IVPB 3.375 g in 100 mL NS VTB  Every 8 Hours         05/29/22 2221 05/29/22 2315  sodium chloride 0.9 % flush 10 mL  Every 12 Hours Scheduled         05/29/22 2220 05/29/22 2315  sodium chloride 0.9 % infusion  Continuous         05/29/22 2220 05/29/22 2221  Daily Weights  Daily       05/29/22 2220 05/29/22 2220  sodium chloride 0.9 % flush 10 mL  As Needed         05/29/22 2220 05/29/22 2220  nitroglycerin (NITROSTAT) SL tablet 0.4 mg  Every 5 Minutes PRN         05/29/22 2220 05/29/22 2200  POC Glucose 4x Daily AC & at Bedtime  4 Times Daily Before Meals & at Bedtime       05/29/22 2052 05/29/22 2052  dextrose (D50W) (25 g/50 mL) IV injection 25 g  Every 15 Minutes PRN         05/29/22 2052 05/29/22 2052  glucagon (human recombinant) (GLUCAGEN DIAGNOSTIC) injection 1 mg  Every 15 Minutes PRN         05/29/22 2052 05/29/22 2052  dextrose (GLUTOSE) oral gel 15 g  Every 15 Minutes PRN         05/29/22 2052 05/29/22 1556  sodium chloride 0.9 % flush 10 mL  As Needed         05/29/22 1557    05/29/22 1424  sodium chloride 0.9 % flush 10 mL  As Needed        \"And\" Linked Group Details    05/29/22 1424    Unscheduled  Oxygen Therapy- Nasal Cannula; Titrate for SPO2: 90% - 95%  Continuous PRN      Comments: If Patient Develops Unresponsiveness, Acute Dyspnea, Cyanosis or Suspected Hypoxemia Start Continuous Pulse Ox Monitoring, Apply Oxygen & Notify Provider    05/29/22 2220    Unscheduled  ECG 12 Lead  As Needed      Comments: Nurse to Release if Patient Expericences Acute Chest Pain or Dysrhythmias    05/29/22 2220    Unscheduled  Potassium  As Needed      Comments: For Ventricular Arrhythmias      05/29/22 2220    Unscheduled  Magnesium  As Needed      Comments: For Ventricular Arrhythmias      05/29/22 2220    Unscheduled  " Troponin  As Needed      Comments: For Chest Pain      22    Unscheduled  Blood Gas, Arterial -With Co-Ox Panel: Yes  As Needed      Comments: Per O2 PolicyNotify Physician      22    Unscheduled  Up With Assistance  As Needed       22    Unscheduled  Assist With Feeding Patient  As Needed       22 1236    --  finasteride (PROSCAR) 5 MG tablet  Nightly         22    --  Fe Bisgly-Fe Polysac-Vit C (NIFEREX-150 PO)  Daily         22    --  mirtazapine (REMERON) 15 MG tablet  Nightly         22    --  divalproex (DEPAKOTE) 500 MG DR tablet  Nightly         22    --  albuterol sulfate  (90 Base) MCG/ACT inhaler  4 Times Daily PRN         22    --  SCANNED - TELEMETRY           22 0000    --  SCANNED - TELEMETRY           22 0000    --  SCANNED - TELEMETRY           22 0000    --  SCANNED - TELEMETRY           22 0000    --  SCANNED - TELEMETRY           22 0000    --  SCANNED - TELEMETRY           22 0000    --  SCANNED - TELEMETRY           22 0000    --  SCANNED - TELEMETRY           22 0000    --  SCANNED - TELEMETRY           22 0000                   Physician Progress Notes (last 24 hours)      Thiago Costello DO at 22 1518              [facility]     Patient Identification:  Name:  Fidencio Luciano  Age:  76 y.o.  Sex:  male  :  1945  MRN:  [id number]  Visit Number:  [id number]  Primary Care Provider:  Camila Ortiz APRN    Length of stay:  2    Chief complaint: None    Subjective:    Patient seen and examined at bedside with patient's CNA present.  Patient had just received a bath.  Patient is awake, alert and oriented to immediate surroundings.  He states he is doing well today with no complaints.  He denies any shortness of breath, fevers, chills, sweats, rigors or any other symptoms at this  time.  ----------------------------------------------------------------------------------------------------------------------  Current Hospital Meds:  atorvastatin, 80 mg, Oral, Nightly  budesonide-formoterol, 2 puff, Inhalation, BID  castor oil-balsam peru, 1 application, Topical, Q12H  cetirizine, 10 mg, Oral, Daily  clopidogrel, 75 mg, Oral, Daily  divalproex, 500 mg, Oral, Nightly  finasteride, 5 mg, Oral, Nightly  Insulin Aspart, 0-9 Units, Subcutaneous, TID AC  iron polysacch complex-B12-VitC-FA-Succ, 1 capsule, Oral, Daily With Breakfast  isosorbide mononitrate, 120 mg, Oral, QAM  melatonin, 5 mg, Oral, Nightly  memantine, 10 mg, Oral, Q12H  metoprolol tartrate, 12.5 mg, Oral, Q12H  mirtazapine, 15 mg, Oral, Nightly  multivitamin, 1 tablet, Oral, Daily  pantoprazole, 40 mg, Oral, Daily  piperacillin-tazobactam, 3.375 g, Intravenous, Q8H  ranolazine, 500 mg, Oral, Q12H  sodium chloride, 10 mL, Intravenous, Q12H      sodium chloride, 75 mL/hr, Last Rate: 75 mL/hr (05/31/22 0115)      ----------------------------------------------------------------------------------------------------------------------  Vital Signs:  Temp:  [98 °F (36.7 °C)-98.5 °F (36.9 °C)] 98.1 °F (36.7 °C)  Heart Rate:  [65-78] 72  Resp:  [18] 18  BP: (112-152)/(52-66) 135/58      05/29/22  1406 05/29/22  2228 05/31/22  0500   Weight: 87.1 kg (192 lb) 82.1 kg (181 lb) 85 kg (187 lb 4.8 oz)     Body mass index is 26.12 kg/m².    Intake/Output Summary (Last 24 hours) at 5/31/2022 1518  Last data filed at 5/31/2022 1246  Gross per 24 hour   Intake 770 ml   Output --   Net 770 ml     Diet Pureed; Nectar / Syrup Thick  ----------------------------------------------------------------------------------------------------------------------  Physical exam:  Constitutional: Well-nourished  male in no apparent distress.     HENT:  Head:  Normocephalic and atraumatic.  Mouth:  Moist mucous membranes.    Eyes:  Conjunctivae and EOM are normal.   Pupils are equal, round, and reactive to light.  No scleral icterus.    Neck:  Neck supple. No thyromegaly.  No JVD present.    Cardiovascular:  Regular rate and rhythm with no murmurs, rubs, clicks or gallops appreciated.  Pulmonary/Chest:  Clear to auscultation bilaterally with no crackles, wheezes or rhonchi appreciated.  Abdominal:  Soft. Nontender. Nondistended  Bowel sounds are normal in all four quadrants. No organomegally appreciated.   Musculoskeletal:  No edema, no tenderness, and no deformity.  No red or swollen joints anywhere.    Neurological:  Alert, Cranial nerves II-XII intact with no focal deficits.  No facial droop.  No slurred speech.   Skin:  Warm and dry to palpation with no rashes or lesions appreciated.  Peripheral vascular:  2+ radial and pedal pulses in bilateral upper and lower extremities.  Psychiatric:  Alert and oriented x3, demonstrates appropriate judgement and insight.  ------------------------------------------------------------------------------  ----------------------------------------------------------------------------------------------------------------------  Results from last 7 days   Lab Units 05/29/22  1530   TROPONIN T ng/mL <0.010     Results from last 7 days   Lab Units 05/31/22  0144 05/30/22  0128 05/29/22  1845 05/29/22  1530   CRP mg/dL  --  11.63*  --  15.25*   LACTATE mmol/L  --   --  1.9 3.9*   WBC 10*3/mm3 9.92 12.99*  --  13.78*   HEMOGLOBIN g/dL 11.3* 10.7*  --  12.2*   HEMATOCRIT % 34.6* 32.3*  --  37.7   MCV fL 95.1 93.9  --  95.0   MCHC g/dL 32.7 33.1  --  32.4   PLATELETS 10*3/mm3 315 277  --  322   INR   --   --   --  1.38*     Results from last 7 days   Lab Units 05/29/22  1519   PH, ARTERIAL pH units 7.441   PO2 ART mm Hg 63.4*   PCO2, ARTERIAL mm Hg 29.0*   HCO3 ART mmol/L 19.7*     Results from last 7 days   Lab Units 05/31/22  0144 05/30/22  0128 05/29/22  1530   SODIUM mmol/L 141 139 137   POTASSIUM mmol/L 3.6 3.3* 4.5   MAGNESIUM mg/dL  --   --  2.1    CHLORIDE mmol/L 110* 106 102   CO2 mmol/L 21.6* 20.8* 16.6*   BUN mg/dL 14 17 25*   CREATININE mg/dL 0.67* 0.71* 1.03   CALCIUM mg/dL 8.3* 8.2* 8.8   GLUCOSE mg/dL 177* 221* 346*   ALBUMIN g/dL  --  2.33* 2.67*   BILIRUBIN mg/dL  --  0.4 0.4   ALK PHOS U/L  --  89 123*   AST (SGOT) U/L  --  25 27   ALT (SGPT) U/L  --  22 29   Estimated Creatinine Clearance: 114.5 mL/min (A) (by C-G formula based on SCr of 0.67 mg/dL (L)).    No results found for: AMMONIA      No results found for: BLOODCX  No results found for: URINECX  No results found for: WOUNDCX  No results found for: STOOLCX    I have personally looked at the labs and they are summarized above.  ----------------------------------------------------------------------------------------------------------------------  Imaging Results (Last 24 Hours)     ** No results found for the last 24 hours. **        ----------------------------------------------------------------------------------------------------------------------  Assessment and Plan:    1.  Bilateral pneumonia -likely secondary to aspiration.  Will likely de-escalate antibiotic therapy to Augmentin in the next 24 hours.    2.  Acute hypoxic respiratory failure -patient currently on 2.5 L nasal cannula, will continue supplemental oxygen to maintain O2 saturation greater than 90%.    3.  Severe sepsis -present on admission, secondary to bilateral pneumonia.  We will continue empiric antibiotic therapy for now.    4. Chronic iron deficiency anemia -stable, continue to monitor closely and will transfuse for hemoglobin less than 7.    5.  Essential hypertension -well-controlled, will continue current antihypertensive medication regimen and adjust as necessary    6.  Hyperlipidemia -statin    7.  Type 2 diabetes mellitus -continue Accu-Cheks before every meal and nightly with sliding scale insulin    Disposition will likely require several more days hospitalization          Thiago Costello,    05/31/22  15:18 EDT    Electronically signed by Thiago Costello DO at 05/31/22 1523       ADL Documentation (last day)     Date/Time Transferring Toileting Bathing Dressing Eating Communication Swallowing    05/31/22 1956 3 - assistive equipment and person 2 - assistive person 2 - assistive person 2 - assistive person 0 - independent 0 - understands/communicates without difficulty 2 - difficulty swallowing liquids/foods

## 2022-06-02 VITALS
RESPIRATION RATE: 18 BRPM | SYSTOLIC BLOOD PRESSURE: 154 MMHG | BODY MASS INDEX: 26.02 KG/M2 | HEIGHT: 71 IN | OXYGEN SATURATION: 92 % | HEART RATE: 75 BPM | WEIGHT: 185.9 LBS | TEMPERATURE: 98.2 F | DIASTOLIC BLOOD PRESSURE: 72 MMHG

## 2022-06-02 PROBLEM — J18.9 PNEUMONIA OF BOTH LUNGS DUE TO INFECTIOUS ORGANISM, UNSPECIFIED PART OF LUNG: Status: RESOLVED | Noted: 2022-05-29 | Resolved: 2022-06-02

## 2022-06-02 PROBLEM — R65.20 SEVERE SEPSIS (HCC): Status: RESOLVED | Noted: 2022-05-29 | Resolved: 2022-06-02

## 2022-06-02 PROBLEM — A41.9 SEVERE SEPSIS: Status: RESOLVED | Noted: 2022-05-29 | Resolved: 2022-06-02

## 2022-06-02 PROBLEM — J96.01 ACUTE RESPIRATORY FAILURE WITH HYPOXIA: Status: RESOLVED | Noted: 2022-05-29 | Resolved: 2022-06-02

## 2022-06-02 PROBLEM — R79.89 LOW SERUM BICARBONATE: Status: RESOLVED | Noted: 2022-05-29 | Resolved: 2022-06-02

## 2022-06-02 LAB
ANION GAP SERPL CALCULATED.3IONS-SCNC: 10.7 MMOL/L (ref 5–15)
BUN SERPL-MCNC: 6 MG/DL (ref 8–23)
BUN/CREAT SERPL: 9.2 (ref 7–25)
CALCIUM SPEC-SCNC: 8.2 MG/DL (ref 8.6–10.5)
CHLORIDE SERPL-SCNC: 106 MMOL/L (ref 98–107)
CO2 SERPL-SCNC: 20.3 MMOL/L (ref 22–29)
CREAT SERPL-MCNC: 0.65 MG/DL (ref 0.76–1.27)
DEPRECATED RDW RBC AUTO: 48.6 FL (ref 37–54)
EGFRCR SERPLBLD CKD-EPI 2021: 98.3 ML/MIN/1.73
ERYTHROCYTE [DISTWIDTH] IN BLOOD BY AUTOMATED COUNT: 14 % (ref 12.3–15.4)
GLUCOSE BLDC GLUCOMTR-MCNC: 199 MG/DL (ref 70–130)
GLUCOSE BLDC GLUCOMTR-MCNC: 293 MG/DL (ref 70–130)
GLUCOSE SERPL-MCNC: 204 MG/DL (ref 65–99)
HCT VFR BLD AUTO: 34.8 % (ref 37.5–51)
HGB BLD-MCNC: 11.2 G/DL (ref 13–17.7)
MCH RBC QN AUTO: 30.4 PG (ref 26.6–33)
MCHC RBC AUTO-ENTMCNC: 32.2 G/DL (ref 31.5–35.7)
MCV RBC AUTO: 94.6 FL (ref 79–97)
PLATELET # BLD AUTO: 277 10*3/MM3 (ref 140–450)
PMV BLD AUTO: 10.7 FL (ref 6–12)
POTASSIUM SERPL-SCNC: 3.9 MMOL/L (ref 3.5–5.2)
RBC # BLD AUTO: 3.68 10*6/MM3 (ref 4.14–5.8)
SODIUM SERPL-SCNC: 137 MMOL/L (ref 136–145)
WBC NRBC COR # BLD: 10.34 10*3/MM3 (ref 3.4–10.8)

## 2022-06-02 PROCEDURE — 94799 UNLISTED PULMONARY SVC/PX: CPT

## 2022-06-02 PROCEDURE — 99239 HOSP IP/OBS DSCHRG MGMT >30: CPT | Performed by: INTERNAL MEDICINE

## 2022-06-02 PROCEDURE — 97110 THERAPEUTIC EXERCISES: CPT

## 2022-06-02 PROCEDURE — 85027 COMPLETE CBC AUTOMATED: CPT | Performed by: INTERNAL MEDICINE

## 2022-06-02 PROCEDURE — 25010000002 PIPERACILLIN SOD-TAZOBACTAM PER 1 G: Performed by: HOSPITALIST

## 2022-06-02 PROCEDURE — 80048 BASIC METABOLIC PNL TOTAL CA: CPT | Performed by: INTERNAL MEDICINE

## 2022-06-02 PROCEDURE — 63710000001 INSULIN ASPART PER 5 UNITS: Performed by: HOSPITALIST

## 2022-06-02 PROCEDURE — 82962 GLUCOSE BLOOD TEST: CPT

## 2022-06-02 PROCEDURE — 92526 ORAL FUNCTION THERAPY: CPT

## 2022-06-02 PROCEDURE — 94761 N-INVAS EAR/PLS OXIMETRY MLT: CPT

## 2022-06-02 PROCEDURE — 97530 THERAPEUTIC ACTIVITIES: CPT

## 2022-06-02 RX ORDER — HYDRALAZINE HYDROCHLORIDE 10 MG/1
10 TABLET, FILM COATED ORAL ONCE
Status: COMPLETED | OUTPATIENT
Start: 2022-06-02 | End: 2022-06-02

## 2022-06-02 RX ORDER — AMOXICILLIN AND CLAVULANATE POTASSIUM 875; 125 MG/1; MG/1
1 TABLET, FILM COATED ORAL 2 TIMES DAILY
Qty: 6 TABLET | Refills: 0 | Status: SHIPPED | OUTPATIENT
Start: 2022-06-02 | End: 2022-06-05

## 2022-06-02 RX ADMIN — HYDRALAZINE HYDROCHLORIDE 10 MG: 10 TABLET, FILM COATED ORAL at 01:47

## 2022-06-02 RX ADMIN — CLOPIDOGREL 75 MG: 75 TABLET, FILM COATED ORAL at 08:11

## 2022-06-02 RX ADMIN — INSULIN ASPART 2 UNITS: 100 INJECTION, SOLUTION INTRAVENOUS; SUBCUTANEOUS at 08:11

## 2022-06-02 RX ADMIN — ISOSORBIDE MONONITRATE 120 MG: 60 TABLET ORAL at 06:46

## 2022-06-02 RX ADMIN — PIPERACILLIN AND TAZOBACTAM 3.38 G: 3; .375 INJECTION, POWDER, FOR SOLUTION INTRAVENOUS at 01:47

## 2022-06-02 RX ADMIN — MEMANTINE HYDROCHLORIDE 10 MG: 10 TABLET, FILM COATED ORAL at 08:11

## 2022-06-02 RX ADMIN — BUDESONIDE AND FORMOTEROL FUMARATE DIHYDRATE 2 PUFF: 80; 4.5 AEROSOL RESPIRATORY (INHALATION) at 06:32

## 2022-06-02 RX ADMIN — Medication 10 ML: at 09:17

## 2022-06-02 RX ADMIN — PIPERACILLIN AND TAZOBACTAM 3.38 G: 3; .375 INJECTION, POWDER, FOR SOLUTION INTRAVENOUS at 10:19

## 2022-06-02 RX ADMIN — INSULIN ASPART 6 UNITS: 100 INJECTION, SOLUTION INTRAVENOUS; SUBCUTANEOUS at 10:30

## 2022-06-02 RX ADMIN — CETIRIZINE HYDROCHLORIDE 10 MG: 10 TABLET, FILM COATED ORAL at 08:11

## 2022-06-02 RX ADMIN — PANTOPRAZOLE SODIUM 40 MG: 40 TABLET, DELAYED RELEASE ORAL at 08:11

## 2022-06-02 RX ADMIN — RANOLAZINE 500 MG: 500 TABLET, FILM COATED, EXTENDED RELEASE ORAL at 08:11

## 2022-06-02 RX ADMIN — CASTOR OIL AND BALSAM, PERU 1 APPLICATION: 788; 87 OINTMENT TOPICAL at 08:12

## 2022-06-02 RX ADMIN — METOPROLOL TARTRATE 12.5 MG: 25 TABLET, FILM COATED ORAL at 08:11

## 2022-06-02 RX ADMIN — DIVALPROEX SODIUM 500 MG: 500 TABLET, DELAYED RELEASE ORAL at 00:40

## 2022-06-02 RX ADMIN — Medication 1 CAPSULE: at 08:11

## 2022-06-02 RX ADMIN — Medication 1 TABLET: at 08:11

## 2022-06-02 NOTE — THERAPY DISCHARGE NOTE
Acute Care - Occupational Therapy Treatment Note/Discharge  [facility]     Patient Name: Fidencio Luciano  : 1945  MRN: [id number]  Today's Date: 2022  Onset of Illness/Injury or Date of Surgery: 22     Referring Physician: Pravin      Admit Date: 2022       ICD-10-CM ICD-9-CM   1. Pneumonia of both lungs due to infectious organism, unspecified part of lung  J18.9 483.8   2. Sepsis, due to unspecified organism, unspecified whether acute organ dysfunction present ([facility])  A41.9 038.9     995.91     Patient Active Problem List   Diagnosis   • Essential hypertension   • Type 2 diabetes mellitus (HCC)   • Benign prostatic hyperplasia   • ASCVD (arteriosclerotic cardiovascular disease), s/p stenting of 80 % stenosis in left circumflex on 10/05/16and 60% stenosis in the LAD, clinically stable.    • Hyperlipidemia LDL goal <70   • Weakness generalized   • Coronary artery fistula   • Weight loss, unintentional   • Iron deficiency anemia   • Gastroesophageal reflux disease   • Dysphagia   • Chest pain   • History of pulmonary embolism in , patient is on Eliquis.   • Early satiety   • Diarrhea     Past Medical History:   Diagnosis Date   • BPH (benign prostatic hyperplasia)    • Bradycardia     Positive tilt table testing 2016   • Coronary artery disease    • Diabetes mellitus ([facility])    • Diverticulosis    • Elevated cholesterol    • Gastric ulcer with hemorrhage    • Gastroesophageal reflux disease 2021   • History of transfusion    • Hyperlipidemia    • Hypertension    • Psoriasis      Past Surgical History:   Procedure Laterality Date   • BACK SURGERY     • CARDIAC CATHETERIZATION N/A 2016    Procedure: Left Heart Cath;  Surgeon: Giovanni Buenrostro MD;  [facility];  Service:    • CARDIAC CATHETERIZATION N/A 10/4/2016    Procedure: Left Heart Cath;  Surgeon: Bharathi Andrew MD;  [facility]  [facility];  Service:    • CARDIAC  CATHETERIZATION N/A 5/9/2017    Procedure: Left Heart Cath;  Surgeon: Giovanni Buenrostro MD;  [facility]  [facility];  Service:    • CARDIAC CATHETERIZATION N/A 5/9/2017    Procedure: ERR;  Surgeon: Giovanni Buenrostro MD;  [facility]  [facility];  Service:    • CARDIAC CATHETERIZATION N/A 11/8/2019    Procedure: Left Heart Cath;  Surgeon: Abraham Oviedo MD;  [facility]  [facility];  Service: Cardiology   • CARDIAC CATHETERIZATION N/A 12/18/2019    Procedure: Coronary angiography;  Surgeon: Jay Barney IV, MD;  Location:  [facility];  Service: Cardiovascular   • CHOLECYSTECTOMY     • COLONOSCOPY  11/13/2015    Dr. Martinez- internal hemorrhoids, sigmoid diverticulosis   • COLONOSCOPY N/A 8/17/2018    Procedure: COLONOSCOPY CPT CODE: 33942;  Surgeon: Gigi Geronimo MD;  Location:  [facility];  Service: Gastroenterology   • COLONOSCOPY N/A 5/17/2022    Procedure: COLONOSCOPY;  Surgeon: Geno Vernon MD;  Location:  [facility];  Service: Gastroenterology;  Laterality: N/A;   • CORONARY ANGIOPLASTY WITH STENT PLACEMENT     • ENDOSCOPY N/A 8/17/2018    Procedure: ESOPHAGOGASTRODUODENOSCOPY WITH BIOPSY CPT CODE: 48500;  Surgeon: Gigi Geronimo MD;  Location:  [facility];  Service: Gastroenterology   • ENDOSCOPY N/A 4/20/2021    Procedure: ESOPHAGOGASTRODUODENOSCOPY;  Surgeon: Geno Vernon MD;  [facility]:  [facility];  Service: Gastroenterology;  Laterality: N/A;   • ENDOSCOPY N/A 5/17/2022    Procedure: ESOPHAGOGASTRODUODENOSCOPY WITH BIOPSY;  Surgeon: Geno Vernon MD;  [facility]:  [facility];  Service: Gastroenterology;  Laterality: N/A;   • INTERVENTIONAL RADIOLOGY PROCEDURE N/A 12/18/2019    Procedure: Coil Embolization of septal to pulmonary artery fistuala.;  Surgeon: Jay Barney IV, MD;  Location:  [facility];  Service: Cardiovascular   • RI RT/LT HEART CATHETERS N/A  5/9/2017    Procedure: Percutaneous Coronary Intervention;  Surgeon: Lincoln De Los Santos MD;  Location: [facility] CATH INVASIVE LOCATION;  Service: Cardiology   • STOMACH SURGERY      FUSION OF BLEEDING ULCER   • UPPER GASTROINTESTINAL ENDOSCOPY  11/13/2015    Dr. Martinez- mild gastritis       OT ASSESSMENT FLOWSHEET (last 12 hours)     OT Evaluation and Treatment     Row Name 06/02/22 1525                   OT Time and Intention    Subjective Information complains of;weakness  -LA        Document Type therapy note (daily note);discharge treatment  -LA        Mode of Treatment individual therapy;occupational therapy  -LA        Patient Effort adequate  -LA                  General Information    Patient Profile Reviewed yes  -LA        General Observations of Patient Patient agreeable to treatment this date. patient verbalized he is returning to SNF later this date.  -LA                  Cognition    Affect/Mental Status (Cognition) flat/blunted affect  -LA        Orientation Status (Cognition) oriented x 3  -LA        Follows Commands (Cognition) WFL  -LA                  Bed Mobility    Bed Mobility supine-sit  -LA                  Motor Skills    Motor Skills motor control/coordination interventions  -LA        Functional Endurance poor+  -LA        Motor Control/Coordination Interventions therapeutic exercise/ROM  -LA        Therapeutic Exercise shoulder;elbow/forearm;wrist;hand  Exercises completed 10 x2 from supported sitting.  -LA                  Balance    Dynamic Sitting Balance minimal assist  -LA                  Wound 05/29/22 2342 Left medial gluteal Pressure Injury    Wound - Properties Group Placement Date: 05/29/22  -TB Placement Time: 2342  -TB Present on Hospital Admission: Y  -TB Side: Left  -TB Orientation: medial  -TB Location: gluteal  -TB Primary Wound Type: Pressure inj  -TB        Retired Wound - Properties Group Placement Date: 05/29/22  -TB Placement Time: 2342  -TB Present on Hospital Admission:  Y  -TB Side: Left  -TB Orientation: medial  -TB Location: gluteal  -TB Primary Wound Type: Pressure inj  -TB        Retired Wound - Properties Group Date first assessed: 05/29/22  -TB Time first assessed: 2342  -TB Present on Hospital Admission: Y  -TB Side: Left  -TB Location: gluteal  -TB Primary Wound Type: Pressure inj  -TB                  Wound 05/29/22 2345 Left upper gluteal    Wound - Properties Group Placement Date: 05/29/22  -TB Placement Time: 2345  -TB Present on Hospital Admission: Y  -TB Side: Left  -TB Orientation: upper  -TB Location: gluteal  -TB        Retired Wound - Properties Group Placement Date: 05/29/22  -TB Placement Time: 2345  -TB Present on Hospital Admission: Y  -TB Side: Left  -TB Orientation: upper  -TB Location: gluteal  -TB        Retired Wound - Properties Group Date first assessed: 05/29/22  -TB Time first assessed: 2345  -TB Present on Hospital Admission: Y  -TB Side: Left  -TB Location: gluteal  -TB                  Plan of Care Review    Progress improving  -LA                  Positioning and Restraints    Post Treatment Position bed  -LA        In Bed supine;call light within reach;encouraged to call for assist  -LA              User Key  (r) = Recorded By, (t) = Taken By, (c) = Cosigned By    Initials Name Effective Dates    Jo Patten RN 06/07/21 -     Adore Hernandez, OT 02/14/22 -                     OT Recommendation and Plan  Therapy Frequency (OT):  (PRN to follow for changes/progress toward goals.)  Plan of Care Review  Plan of Care Reviewed With: patient  Progress: improving  Outcome Evaluation: Patient with decreased ADL independence and safety as reported from baseline. Patient will benefit from skilled OT services.  Plan of Care Reviewed With: patient  Outcome Evaluation: Patient with decreased ADL independence and safety as reported from baseline. Patient will benefit from skilled OT services.             Time Calculation:     Timed Therapy Charges   Total Units: 2    Charges  Total Units: 2    Procedure Name Documented Minutes Units Code    HC OT THERAPEUTIC ACT EA 15 MIN 15  1    38686 (CPT®)      HC OT THER PROC EA 15 MIN 8  1    89592 (CPT®)               Documented Minutes  Total Minutes: 23    Therapy Provided Minutes    97050 - OT Therapeutic Activity Minutes 15    61581 - OT Therapeutic Exercise Minutes 8              Therapy Charges for Today     Code Description Service Date Service Provider Modifiers Qty    21277096705 HC OT THER PROC EA 15 MIN 6/2/2022 Adore Rey OT GO 1    [id number] HC OT THERAPEUTIC ACT EA 15 MIN 6/2/2022 Adore Rey OT GO 1               OT Discharge Summary  Anticipated Discharge Disposition (OT): home with 24/7 care, home with home health, skilled nursing facility (D/C disposition recommendations depending on progress toward goals.)    Adore Rey OT  6/2/2022

## 2022-06-02 NOTE — THERAPY TREATMENT NOTE
Acute Care - Speech Language Pathology   Swallow Treatment Note  [facility]     Patient Name: Fidencio Luciano  : 1945  MRN: [id number]  Today's Date: 2022  Onset of Illness/Injury or Date of Surgery: 22     Referring Physician: Pravin      Admit Date: 2022    Visit Dx:     ICD-10-CM ICD-9-CM   1. Pneumonia of both lungs due to infectious organism, unspecified part of lung  J18.9 483.8   2. Sepsis, due to unspecified organism, unspecified whether acute organ dysfunction present (HCC)  A41.9 038.9     995.91     Patient Active Problem List   Diagnosis   • Essential hypertension   • Type 2 diabetes mellitus (HCC)   • Benign prostatic hyperplasia   • ASCVD (arteriosclerotic cardiovascular disease), s/p stenting of 80 % stenosis in left circumflex on 10/05/16and 60% stenosis in the LAD, clinically stable.    • Hyperlipidemia LDL goal <70   • Weakness generalized   • Coronary artery fistula   • Weight loss, unintentional   • Iron deficiency anemia   • Gastroesophageal reflux disease   • Dysphagia   • Chest pain   • History of pulmonary embolism in , patient is on Eliquis.   • Early satiety   • Diarrhea   • Pneumonia of both lungs due to infectious organism, unspecified part of lung   • Severe sepsis (HCC)   • Acute respiratory failure with hypoxia (HCC)   • Low serum bicarbonate     Past Medical History:   Diagnosis Date   • BPH (benign prostatic hyperplasia)    • Bradycardia     Positive tilt table testing 2016   • Coronary artery disease    • Diabetes mellitus (HCC)    • Diverticulosis    • Elevated cholesterol    • Gastric ulcer with hemorrhage    • Gastroesophageal reflux disease 2021   • History of transfusion    • Hyperlipidemia    • Hypertension    • Psoriasis      Past Surgical History:   Procedure Laterality Date   • BACK SURGERY     • CARDIAC CATHETERIZATION N/A 2016    Procedure: Left Heart Cath;  Surgeon: Giovanni Buenrostro MD;  Location: [facility] INVASIVE LOCATION;   Service:    • CARDIAC CATHETERIZATION N/A 10/4/2016    Procedure: Left Heart Cath;  Surgeon: Bharathi Andrew MD;  [facility]  [facility];  Service:    • CARDIAC CATHETERIZATION N/A 5/9/2017    Procedure: Left Heart Cath;  Surgeon: Giovanni Buenrostro MD;  [facility]  [facility];  Service:    • CARDIAC CATHETERIZATION N/A 5/9/2017    Procedure: ERR;  Surgeon: Giovanni Buenrostro MD;  [facility]  [facility];  Service:    • CARDIAC CATHETERIZATION N/A 11/8/2019    Procedure: Left Heart Cath;  Surgeon: Abraham Oviedo MD;  [facility];  Service: Cardiology   • CARDIAC CATHETERIZATION N/A 12/18/2019    Procedure: Coronary angiography;  Surgeon: Jay Barney IV, MD;  Location:  [facility];  Service: Cardiovascular   • CHOLECYSTECTOMY     • COLONOSCOPY  11/13/2015    Dr. Martinez- internal hemorrhoids, sigmoid diverticulosis   • COLONOSCOPY N/A 8/17/2018    Procedure: COLONOSCOPY CPT CODE: 77100;  Surgeon: Gigi Geronimo MD;  Location: [facility] OR;  Service: Gastroenterology   • COLONOSCOPY N/A 5/17/2022    Procedure: COLONOSCOPY;  Surgeon: Geno Vernon MD;  Location: [facility] OR;  Service: Gastroenterology;  Laterality: N/A;   • CORONARY ANGIOPLASTY WITH STENT PLACEMENT     • ENDOSCOPY N/A 8/17/2018    Procedure: ESOPHAGOGASTRODUODENOSCOPY WITH BIOPSY CPT CODE: 82823;  Surgeon: Gigi Geronimo MD;  [facility];  Service: Gastroenterology   • ENDOSCOPY N/A 4/20/2021    Procedure: ESOPHAGOGASTRODUODENOSCOPY;  Surgeon: Geno Vernon MD;  [facility]:  [facility];  Service: Gastroenterology;  Laterality: N/A;   • ENDOSCOPY N/A 5/17/2022    Procedure: ESOPHAGOGASTRODUODENOSCOPY WITH BIOPSY;  Surgeon: Geno Vernon MD;  Location:  [facility];  Service: Gastroenterology;  Laterality: N/A;   • INTERVENTIONAL RADIOLOGY PROCEDURE N/A 12/18/2019    Procedure: Coil Embolization of  septal to pulmonary artery fistuala.;  Surgeon: Jay Barney IV, MD;  Location:  [facility];  Service: Cardiovascular   • [place] RT/LT HEART CATHETERS N/A 5/9/2017    Procedure: Percutaneous Coronary Intervention;  Surgeon: Lincoln De Los Santos MD;  [facility];  Service: Cardiology   • STOMACH SURGERY      FUSION OF BLEEDING ULCER   • UPPER GASTROINTESTINAL ENDOSCOPY  11/13/2015    Dr. Martinez- mild gastritis     Mr. Luciano is seen at bedside this am on 3S for dysphagia tx per poc. He is resting upon SLP entry, awakens to verbal stimuli.     He is oriented, follows directives. He is intermittently slightly confused but pleasant and interactive.     He is positioned upright and centered in bed, cooperative to participate. He continues generally weak and fatigued.     He is observed on ra w/o complications.     He accepts trials of thin water via straw w/ mildly delayed cough/throat clear elicited 2/5 opp.     He performs orom/ming exercises x3 sets x5 reps w/ min-mod model and cues. He performs repetitive CTAR exercises x3 sets x5 reps w/ mod cues. He performs laryngeal adduction/elevation exercises x3 sets w/ 5 reps w/ mod model and cues, mean phonation duration approximately 3.5 seconds. He does fatigue across this session, unable to remain fully a/a to functionally participate in further tasks.     Please continue current least restrictive modified diet. SLP to f/u for continued dysphagia tx per poc, re-evaluation pending continued improvements/progress.     SLP Recommendation and Plan  1. Mechanical soft, ground meats, NECTAR thick liquids.    2. Meds whole in puree/nectars.    3. Fully a/a, upright and centered for all po intake.   4. FRIEDA precautions.  5. Oral care protocol.  6. 1:1 feeding assistance.   7. Water protocol between meals and meds.  8. Formal dysphagia tx as tolerated per poc.   SLP to f/u for continued diet safety/tx as tolerated.      D/w pt current  status, plan and recommendations w/ verbal agreement.     Thank you for allowing me to participate in the care of your patient-  Deiis Lantigua M.A., CCC-SLP      EDUCATION  The patient has been educated in the following areas:   Dysphagia (Swallowing Impairment) Modified Diet Instruction.     Time Calculation:    Time Calculation- SLP     Row Name 06/02/22 1002             Time Calculation- SLP    SLP - Next Appointment 06/03/22  -JUDY            User Key  (r) = Recorded By, (t) = Taken By, (c) = Cosigned By    Initials Name Provider Type    Deisi Lagunas MA,CCC-SLP Speech and Language Pathologist                Therapy Charges for Today     Code Description Service Date Service Provider Modifiers Qty    [id number]  ST EVAL ORAL PHARYNG SWALLOW 4 6/1/2022 Deisi Lantigua MA,CCC-SLP GN 1    [id number] HC ST TREATMENT SWALLOW 4 6/2/2022 Deisi Lantigua MA,CCC-SLP GN 1               Deisi Lantigua MA,CCC-SLP  6/2/2022

## 2022-06-02 NOTE — DISCHARGE PLACEMENT REQUEST
"Fidencio Luciano (76 y.o. Male)             Date of Birth   1945    Social Security Number       Address   [address] [place] [address]    Home Phone   [phone]    MRN   [id number]       Jainism   Non-Methodist    Marital Status                               Admission Date   5/29/22    Admission Type   Emergency    Admitting Provider   Eduardo Luque MD    Attending Provider   Thiago Costello DO    Department, Room/Bed   [address], 3307/2S       Discharge Date       Discharge Disposition       Discharge Destination                               Attending Provider: Thiago Costello DO    Allergies: No Known Allergies    Isolation: None   Infection: COVID (rule out) (06/01/22)   Code Status: CPR   Advance Care Planning Activity    Ht: 180.3 cm (71\")   Wt: 84.3 kg (185 lb 14.4 oz)    Admission Cmt: None   Principal Problem: Pneumonia of both lungs due to infectious organism, unspecified part of lung [J18.9]                 Active Insurance as of 5/29/2022     Primary Coverage     Payor Plan Insurance Group Employer/Plan Group    [facility] MEDICARE REPLACEMENT [employer] MEDICARE REPLACEMENT      Payor Plan Address Payor Plan Phone Number Payor Plan Fax Number Effective Dates    [address] [address] [phone]  1/1/2022 - None Entered    [facility] [id number]       Subscriber Name Subscriber Birth Date Member ID       FIDENCIO LUCIANO 1945 [id number]                 Emergency Contacts      (Rel.) Home Phone Work Phone Mobile Phone    Mustapha(POA)Angela (Spouse) [phone] -- [phone]    Dylan García (Son) [phone] -- [phone]    Sofie Webber (Daughter) [phone] -- --    Js Connerly (Daughter) [phone] -- --    Aries Ortega (Son) [phone] -- --        COVID-19 RAPID AG,VERITOR,COR/GUERA/PAD/DANIELLE/MAD/GRZEGORZ/LAG/DILIA/ IN-HOUSE,DRY SWAB, 1-2 HR TAT - Swab, Nasal Cavity [[id number]] (Order [id number])  Order  Date: " 6/1/2022 Department: [address] Released By/Authorizing: Thiago Costello DO (auto-released)         Reprint Order Requisition    COVID-19 RAPID AG,VERITOR,COR/GUERA/PAD/DANIELLE/MAD/GRZEGORZ/LAG/DILIA/ IN-HOUSE,DRY SWAB, 1-2 HR TAT - Swab, Nasal Cavity (Order #[id number]) on 6/1/22            COVID-19 RAPID AG,VERITOR,COR/GUERA/PAD/DANIELLE/MAD/GRZEGORZ/LAG/DILIA/ IN-HOUSE,DRY SWAB, 1-2 HR TAT - Swab, Nasal Cavity  Order: [id number]   Status: Final result     Visible to patient: Yes (not seen)     Next appt: 06/09/2022 at 11:00 AM in Gastroenterology (Geno Vernon MD)    Specimen Information: Nasal Cavity; Swab         0 Result Notes    Component   Ref Range & Units    COVID19   Presumptive Negative - Ref. Range Presumptive Negative    Resulting Agency [facility]             Narrative  Performed by:  [facility]  Fact sheets for providers: https://www.fda.gov/media/732151/download     Fact sheets for patients: https://www.fda.gov/media/045982/download      Specimen Collected: 06/01/22 15:48 Last Resulted: 06/01/22 16:11       Order Details     View Encounter     Lab and Collection Details     Routing     Result History             Result Care Coordination      Patient Communication    Released Not seen Back to Top           Order Questions    Question Answer   Previously tested for COVID-19? Yes   Note: Question from the [place] Department of Health and Human Services based on the CARES Act.   Employed in healthcare setting? No   Note: First responders, front line clinicians, nursing home staff, environmental staff, or therapists in direct contact with patients.  Question required by [place] Department of Health and Human Services based on the CARES Act.   Symptomatic for COVID-19 as defined by CDC? No   Note: Fever or chills, Cough, Shortness of breath or difficulty breathing, Fatigue, Muscle or body aches, Headache, New loss of taste or smell, Sore throat, Congestion or runny nose, Nausea or vomiting, Diarrhea.  Question required by [place] Department of Health and Human Services based on CARES Act   Hospitalized for COVID-19? No   Note: Question from the [place] Department of Health and Human Services based on the CARES Act.   Admitted to ICU for COVID-19? No   Note: Question from the [place] Department of Health and Human Services based on the CARES Act.   Resident in a congregate (group) care setting? Yes   Note: Nursing home, residential care location for people with intellectual and developmental disabilities, psychiatric treatment facility, group home, dormitory, board and care home, homeless shelter, foster care or other setting. Question required by [place] Department of Health and Human Services based on the CARES Act.   Release to patient Immediate                Provider Comment To Patient    Released Not seen       Result Read / Acknowledged    Acknowledge result  No acknowledgement history exists for this order.         Lab Component SmartPhrase Guide    COVID-19 RAPID AG,VERITOR,COR/GUERA/PAD/DANIELLE/MAD/GRZEGORZ/LAG/DILIA/ IN-HOUSE,DRY SWAB, 1-2 HR TAT - Swab, Nasal Cavity (Order #[id number]) on 6/1/22       Other Results from 5/29/2022     POC Glucose Once  Final result 6/2/2022    CBC (No Diff)  Final result 6/2/2022    Basic Metabolic Panel  Final result 6/2/2022    POC Glucose Once  Final result 6/1/2022    POC Glucose Once  Final result 6/1/2022    POC Glucose Once  Final result 6/1/2022    POC Glucose Once  Final result 6/1/2022    CBC (No Diff)  Final result 6/1/2022    Basic Metabolic Panel  Final result 6/1/2022    POC Glucose Once  Final result 5/31/2022    POC Glucose Once  Final result 5/31/2022    POC Glucose Once  Final result 5/31/2022    POC Glucose Once  Final result 5/31/2022    CBC (No Diff)  Final result 5/31/2022    Basic Metabolic Panel  Final result 5/31/2022    POC Glucose Once  Final result 5/30/2022    POC Glucose Once  Final result 5/30/2022    POC Glucose Once  Final result 5/30/2022    POC Glucose Once  Final  result 5/30/2022    CBC Auto Differential  Final result 5/30/2022    important suggestion  Warning: Additional results from 5/29/2022 are available but are not displayed in this report.               Current Facility-Administered Medications   Medication Dose Route Frequency Provider Last Rate Last Admin   • albuterol (PROVENTIL) nebulizer solution 0.083% 2.5 mg/3mL  2.5 mg Nebulization 4x Daily PRN Thiago Costello DO       • atorvastatin (LIPITOR) tablet 80 mg  80 mg Oral Nightly Thiago Costello DO   80 mg at 06/01/22 2049   • budesonide-formoterol (SYMBICORT) 80-4.5 MCG/ACT inhaler 2 puff  2 puff Inhalation BID Thiago Costello DO   2 puff at 06/02/22 0632   • castor oil-balsam peru (VENELEX) ointment 1 application  1 application Topical Q12H Thiago Costello DO   1 application at 06/02/22 0812   • cetirizine (zyrTEC) tablet 10 mg  10 mg Oral Daily Thiago Costello DO   10 mg at 06/02/22 0811   • clopidogrel (PLAVIX) tablet 75 mg  75 mg Oral Daily Thiago Costello DO   75 mg at 06/02/22 0811   • dextrose (D50W) (25 g/50 mL) IV injection 25 g  25 g Intravenous Q15 Min PRN Eduardo Luque MD       • dextrose (GLUTOSE) oral gel 15 g  15 g Oral Q15 Min PRN Eduardo Luque MD       • divalproex (DEPAKOTE) DR tablet 500 mg  500 mg Oral Nightly Thiago Costello DO   500 mg at 06/02/22 0040   • finasteride (PROSCAR) tablet 5 mg  5 mg Oral Nightly Thiago Costello DO   5 mg at 06/01/22 2049   • glucagon (human recombinant) (GLUCAGEN DIAGNOSTIC) injection 1 mg  1 mg Intramuscular Q15 Min PRN Eduardo Luque MD       • Insulin Aspart (novoLOG) injection 0-9 Units  0-9 Units Subcutaneous TID AC Eduardo Luque MD   2 Units at 06/02/22 0811   • iron polysacch complex-B12-VitC-FA-Succ (Ferrex 150 Forte Plus) capsule 1 capsule  1 capsule Oral Daily With Breakfast Thiago Costello,    1 capsule at 06/02/22 0811   • isosorbide  mononitrate (IMDUR) 24 hr tablet 120 mg  120 mg Oral QAM Thiago Costello, DO   120 mg at 22 0646   • melatonin tablet 5 mg  5 mg Oral Nightly Thiago Costello, DO   5 mg at 22   • memantine (NAMENDA) tablet 10 mg  10 mg Oral Q12H Thiago Costello, DO   10 mg at 22 08   • metoprolol tartrate (LOPRESSOR) tablet 12.5 mg  12.5 mg Oral Q12H Thiago Costello, DO   12.5 mg at 22 08   • mirtazapine (REMERON) tablet 15 mg  15 mg Oral Nightly Thiago Costello, DO   15 mg at 22   • multivitamin (THERAGRAN) tablet 1 tablet  1 tablet Oral Daily Stephanie Henao PA-C   1 tablet at 22   • nitroglycerin (NITROSTAT) SL tablet 0.4 mg  0.4 mg Sublingual Q5 Min PRN Eduardo Luque MD       • pantoprazole (PROTONIX) EC tablet 40 mg  40 mg Oral Daily Thiago Costello, DO   40 mg at 22   • piperacillin-tazobactam (ZOSYN) IVPB 3.375 g in 100 mL NS VTB  3.375 g Intravenous Q8H Eduardo Luque MD   3.375 g at 22 0147   • ranolazine (RANEXA) 12 hr tablet 500 mg  500 mg Oral Q12H Thiago Costello, DO   500 mg at 22   • sodium chloride 0.9 % flush 10 mL  10 mL Intravenous PRN Eduardo Luque MD       • sodium chloride 0.9 % flush 10 mL  10 mL Intravenous PRN Eduardo Luque MD       • sodium chloride 0.9 % flush 10 mL  10 mL Intravenous Q12H Eduardo Luque MD   10 mL at 22   • sodium chloride 0.9 % flush 10 mL  10 mL Intravenous PRN Eduardo Luque MD       • sodium chloride 0.9 % infusion  75 mL/hr Intravenous Continuous Eduardo Luque MD 75 mL/hr at 22 0505 75 mL/hr at 22 0505        Physician Progress Notes (most recent note)      Thiago Costello DO at 22 1446              [facility]     Patient Identification:  Name:  Fidencio Luciano  Age:  76 y.o.  Sex:  male  :  1945  MRN:   [id number]  Visit Number:  [id number]  Primary Care Provider:  Camila Ortiz APRN    Length of stay:  3    Chief complaint: None    Subjective:    Patient seen and examined at bedside.  Patient was asleep when I entered the room, but was easily awakened. Patient states he is feeling well today and has no complaints.  Patient is currently on room air and maintaining oxygen saturation greater than 90%.  No new events overnight.  ----------------------------------------------------------------------------------------------------------------------  Current Hospital Meds:  atorvastatin, 80 mg, Oral, Nightly  budesonide-formoterol, 2 puff, Inhalation, BID  castor oil-balsam peru, 1 application, Topical, Q12H  cetirizine, 10 mg, Oral, Daily  clopidogrel, 75 mg, Oral, Daily  divalproex, 500 mg, Oral, Nightly  finasteride, 5 mg, Oral, Nightly  Insulin Aspart, 0-9 Units, Subcutaneous, TID AC  iron polysacch complex-B12-VitC-FA-Succ, 1 capsule, Oral, Daily With Breakfast  isosorbide mononitrate, 120 mg, Oral, QAM  melatonin, 5 mg, Oral, Nightly  memantine, 10 mg, Oral, Q12H  metoprolol tartrate, 12.5 mg, Oral, Q12H  mirtazapine, 15 mg, Oral, Nightly  multivitamin, 1 tablet, Oral, Daily  pantoprazole, 40 mg, Oral, Daily  piperacillin-tazobactam, 3.375 g, Intravenous, Q8H  ranolazine, 500 mg, Oral, Q12H  sodium chloride, 10 mL, Intravenous, Q12H      sodium chloride, 75 mL/hr, Last Rate: 75 mL/hr (06/01/22 7041)      ----------------------------------------------------------------------------------------------------------------------  Vital Signs:  Temp:  [97.6 °F (36.4 °C)-98.3 °F (36.8 °C)] 98.3 °F (36.8 °C)  Heart Rate:  [68-79] 68  Resp:  [18-19] 18  BP: (138-164)/(63-71) 160/68      05/29/22  2228 05/31/22  0500 06/01/22  0500   Weight: 82.1 kg (181 lb) 85 kg (187 lb 4.8 oz) 85 kg (187 lb 4.8 oz)     Body mass index is 26.12 kg/m².    Intake/Output Summary (Last 24 hours) at 6/1/2022 2939  Last data filed at  6/1/2022 1330  Gross per 24 hour   Intake 1400 ml   Output --   Net 1400 ml     Diet Soft Texture; Ground; Nectar / Syrup Thick  ----------------------------------------------------------------------------------------------------------------------  Physical exam:  Constitutional: Well-nourished  male in no apparent distress.     HENT:  Head:  Normocephalic and atraumatic.  Mouth:  Moist mucous membranes.    Eyes:  Conjunctivae and EOM are normal.  Pupils are equal, round, and reactive to light.  No scleral icterus.    Neck:  Neck supple. No thyromegaly.  No JVD present.    Cardiovascular:  Regular rate and rhythm with no murmurs, rubs, clicks or gallops appreciated.  Pulmonary/Chest:  Clear to auscultation bilaterally with no crackles, wheezes or rhonchi appreciated.  Abdominal:  Soft. Nontender. Nondistended  Bowel sounds are normal in all four quadrants. No organomegally appreciated.   Musculoskeletal:  No edema, no tenderness, and no deformity.  No red or swollen joints anywhere.    Neurological:  Alert, Cranial nerves II-XII intact with no focal deficits.  No facial droop.  No slurred speech.   Skin:  Warm and dry to palpation with no rashes or lesions appreciated.  Peripheral vascular:  2+ radial and pedal pulses in bilateral upper and lower extremities.  Psychiatric:  Alert and oriented x3, demonstrates appropriate judgement and insight.    Essentially no change in physical exam in comparison to 5/31/2022  ------------------------------------------------------------------------------  ----------------------------------------------------------------------------------------------------------------------  Results from last 7 days   Lab Units 05/29/22  1530   TROPONIN T ng/mL <0.010     Results from last 7 days   Lab Units 06/01/22  0224 05/31/22  0144 05/30/22  0128 05/29/22  1845 05/29/22  1530   CRP mg/dL  --   --  11.63*  --  15.25*   LACTATE mmol/L  --   --   --  1.9 3.9*   WBC 10*3/mm3 11.49* 9.92  12.99*  --  13.78*   HEMOGLOBIN g/dL 10.5* 11.3* 10.7*  --  12.2*   HEMATOCRIT % 31.8* 34.6* 32.3*  --  37.7   MCV fL 94.1 95.1 93.9  --  95.0   MCHC g/dL 33.0 32.7 33.1  --  32.4   PLATELETS 10*3/mm3 306 315 277  --  322   INR   --   --   --   --  1.38*     Results from last 7 days   Lab Units 05/29/22  1519   PH, ARTERIAL pH units 7.441   PO2 ART mm Hg 63.4*   PCO2, ARTERIAL mm Hg 29.0*   HCO3 ART mmol/L 19.7*     Results from last 7 days   Lab Units 06/01/22  0224 05/31/22  0144 05/30/22  0128 05/29/22  1530   SODIUM mmol/L 137 141 139 137   POTASSIUM mmol/L 3.4* 3.6 3.3* 4.5   MAGNESIUM mg/dL  --   --   --  2.1   CHLORIDE mmol/L 107 110* 106 102   CO2 mmol/L 19.5* 21.6* 20.8* 16.6*   BUN mg/dL 9 14 17 25*   CREATININE mg/dL 0.65* 0.67* 0.71* 1.03   CALCIUM mg/dL 8.2* 8.3* 8.2* 8.8   GLUCOSE mg/dL 202* 177* 221* 346*   ALBUMIN g/dL  --   --  2.33* 2.67*   BILIRUBIN mg/dL  --   --  0.4 0.4   ALK PHOS U/L  --   --  89 123*   AST (SGOT) U/L  --   --  25 27   ALT (SGPT) U/L  --   --  22 29   Estimated Creatinine Clearance: 118.1 mL/min (A) (by C-G formula based on SCr of 0.65 mg/dL (L)).    No results found for: AMMONIA      No results found for: BLOODCX  No results found for: URINECX  No results found for: WOUNDCX  No results found for: STOOLCX    I have personally looked at the labs and they are summarized above.  ----------------------------------------------------------------------------------------------------------------------  Imaging Results (Last 24 Hours)     Procedure Component Value Units Date/Time    XR Chest 1 View [[id number]] Collected: 06/01/22 0828     Updated: 06/01/22 0831    Narrative:      EXAM:    XR Chest, 1 View     EXAM DATE:    6/1/2022 6:41 AM     CLINICAL HISTORY:    bilateral pna; J18.9-Pneumonia, unspecified organism; A41.9-Sepsis,  unspecified organism     TECHNIQUE:    Frontal view of the chest.     COMPARISON:    05/29/2022     FINDINGS:    LUNGS:  Slightly worsened right lung  airspace disease.    PLEURAL SPACE:  Unremarkable.  No pneumothorax.    HEART:  Heart size is stable.    MEDIASTINUM:  Unremarkable.    BONES/JOINTS:  Unremarkable.    OTHER FINDINGS:  Course and.       Impression:        Slightly worsened right lung airspace disease.     This report was finalized on 2022 8:28 AM by Dr. Benigno Powers MD.           ----------------------------------------------------------------------------------------------------------------------  Assessment and Plan:    1.  Bilateral pneumonia -we will de-escalate antibiotic therapy to Augmentin 875 mg today    2.  Acute hypoxic respiratory failure -resolved    3.  Severe sepsis -present on admission, now resolved    4. Chronic iron deficiency anemia -stable, continue to monitor closely and will transfuse for hemoglobin less than 7.    5.  Essential hypertension -well-controlled, will continue current antihypertensive medication regimen and adjust as necessary    6.  Hyperlipidemia -statin    7.  Type 2 diabetes mellitus -continue Accu-Cheks before every meal and nightly with sliding scale insulin    Disposition currently awaiting placement          Thiago Costello DO  22  14:46 EDT    Electronically signed by Thiago Costello DO at 22 1455       Consult Notes (most recent note)    No notes of this type exist for this encounter.            Physical Therapy Notes (most recent note)      Thor Rg PT at 22 7880  Version 1 of 1       Goal Outcome Evaluation:  Plan of Care Reviewed With: patient        Progress: no change  Outcome Evaluation: Pt presents w/ decreased mobility from his self-reported baseline.  Pt will benefit from skilled PT while in house.    Electronically signed by Thor Rg PT at 22 1837          Occupational Therapy Notes (most recent note)      Gloria Galindo OT at 22 3287          Patient Name: Fidencio Luciano  : 1945    MRN: [id number]                               Today's Date: 6/1/2022       Admit Date: 5/29/2022    Visit Dx:     ICD-10-CM ICD-9-CM   1. Pneumonia of both lungs due to infectious organism, unspecified part of lung  J18.9 483.8   2. Sepsis, due to unspecified organism, unspecified whether acute organ dysfunction present (HCC)  A41.9 038.9     995.91     Patient Active Problem List   Diagnosis   • Essential hypertension   • Type 2 diabetes mellitus (HCC)   • Benign prostatic hyperplasia   • ASCVD (arteriosclerotic cardiovascular disease), s/p stenting of 80 % stenosis in left circumflex on 10/05/16and 60% stenosis in the LAD, clinically stable.    • Hyperlipidemia LDL goal <70   • Weakness generalized   • Coronary artery fistula   • Weight loss, unintentional   • Iron deficiency anemia   • Gastroesophageal reflux disease   • Dysphagia   • Chest pain   • History of pulmonary embolism in February 22, patient is on Eliquis.   • Early satiety   • Diarrhea   • Pneumonia of both lungs due to infectious organism, unspecified part of lung   • Severe sepsis (HCC)   • Acute respiratory failure with hypoxia (HCC)   • Low serum bicarbonate     Past Medical History:   Diagnosis Date   • BPH (benign prostatic hyperplasia)    • Bradycardia     Positive tilt table testing 07/2016   • Coronary artery disease    • Diabetes mellitus (HCC)    • Diverticulosis    • Elevated cholesterol    • Gastric ulcer with hemorrhage    • Gastroesophageal reflux disease 1/26/2021   • History of transfusion    • Hyperlipidemia    • Hypertension    • Psoriasis      Past Surgical History:   Procedure Laterality Date   • BACK SURGERY     • CARDIAC CATHETERIZATION N/A 9/20/2016    Procedure: Left Heart Cath;  Surgeon: Giovanni Buenrostro MD;  Location:  [facility];  Service:    • CARDIAC CATHETERIZATION N/A 10/4/2016    Procedure: Left Heart Cath;  Surgeon: Bharathi Andrew MD;  Location:  [facility];  Service:    • CARDIAC CATHETERIZATION N/A 5/9/2017    Procedure:  Left Heart Cath;  Surgeon: Giovanni Buenrostro MD;  Location:  [facility];  Service:    • CARDIAC CATHETERIZATION N/A 5/9/2017    Procedure: ERR;  Surgeon: Giovanni Buenrostro MD;  Location:  [facility];  Service:    • CARDIAC CATHETERIZATION N/A 11/8/2019    Procedure: Left Heart Cath;  Surgeon: Abraham Oviedo MD;  Location:  [facility];  Service: Cardiology   • CARDIAC CATHETERIZATION N/A 12/18/2019    Procedure: Coronary angiography;  Surgeon: Jay Barney IV, MD;  Location:  [facility];  Service: Cardiovascular   • CHOLECYSTECTOMY     • COLONOSCOPY  11/13/2015    Dr. Martinez- internal hemorrhoids, sigmoid diverticulosis   • COLONOSCOPY N/A 8/17/2018    Procedure: COLONOSCOPY CPT CODE: 66526;  Surgeon: Gigi Geronimo MD;  Location:  [facility] OR;  Service: Gastroenterology   • COLONOSCOPY N/A 5/17/2022    Procedure: COLONOSCOPY;  Surgeon: Geno Vernon MD;  Location:  [facility];  Service: Gastroenterology;  Laterality: N/A;   • CORONARY ANGIOPLASTY WITH STENT PLACEMENT     • ENDOSCOPY N/A 8/17/2018    Procedure: ESOPHAGOGASTRODUODENOSCOPY WITH BIOPSY CPT CODE: 60360;  Surgeon: Gigi Geronimo MD;  Location:  [facility];  Service: Gastroenterology   • ENDOSCOPY N/A 4/20/2021    Procedure: ESOPHAGOGASTRODUODENOSCOPY;  Surgeon: Geno Vernon MD;  Location:  [facility];  Service: Gastroenterology;  Laterality: N/A;   • ENDOSCOPY N/A 5/17/2022    Procedure: ESOPHAGOGASTRODUODENOSCOPY WITH BIOPSY;  Surgeon: Geno Vernon MD;  Location:  [facility];  Service: Gastroenterology;  Laterality: N/A;   • INTERVENTIONAL RADIOLOGY PROCEDURE N/A 12/18/2019    Procedure: Coil Embolization of septal to pulmonary artery fistuala.;  Surgeon: Jay Barney IV, MD;  Location:  [facility];  Service: Cardiovascular   • AL RT/LT HEART CATHETERS N/A 5/9/2017    Procedure: Percutaneous Coronary  Intervention;  Surgeon: Lincoln De Los Santos MD;  [facility];  Service: Cardiology   • STOMACH SURGERY      FUSION OF BLEEDING ULCER   • UPPER GASTROINTESTINAL ENDOSCOPY  11/13/2015    Dr. Martinez- mild gastritis      General Information     Row Name 06/01/22 1444          OT Time and Intention    Document Type therapy note (daily note)  -     Mode of Treatment individual therapy;occupational therapy  -     Row Name 06/01/22 1444          General Information    Patient Profile Reviewed yes  -     Existing Precautions/Restrictions fall;swallow precautions  -     Row Name 06/01/22 1444          Cognition    Orientation Status (Cognition) oriented x 3  -     Row Name 06/01/22 1444          Safety Issues, Functional Mobility    Impairments Affecting Function (Mobility) endurance/activity tolerance;strength  -           User Key  (r) = Recorded By, (t) = Taken By, (c) = Cosigned By    Initials Name Provider Type    Gloria Pelayo OT Occupational Therapist                 Mobility/ADL's     Row Name 06/01/22 1444          Bed Mobility    Bed Mobility bed mobility (all) activities  -     All Activities, [place] (Bed Mobility) minimum assist (75% patient effort);moderate assist (50% patient effort)  -     Bed Mobility, Safety Issues decreased use of arms for pushing/pulling;decreased use of legs for bridging/pushing;impaired trunk control for bed mobility  -     Comment, (Bed Mobility) log rolling to left and right performed to complete perineal hygiene and change soiled linens  -           User Key  (r) = Recorded By, (t) = Taken By, (c) = Cosigned By    Initials Name Provider Type    Gloria Pelayo OT Occupational Therapist               Obj/Interventions     Row Name 06/01/22 1445          Motor Skills    Motor Skills functional endurance  -KP     Functional Endurance poor plus  -     Therapeutic Exercise --  BUE AROM X10 reps X2 sets through forward reach,  overhead reach, and shoulder internal/external rotation (functional movement pattern); maximal tactile cues for optimal movement  -           User Key  (r) = Recorded By, (t) = Taken By, (c) = Cosigned By    Initials Name Provider Type    Gloria Pelayo OT Occupational Therapist               Goals/Plan    No documentation.                Clinical Impression     [facility] Name 06/01/22 1446          Pain Assessment    Pretreatment Pain Rating 0/10 - no pain  -     Posttreatment Pain Rating 0/10 - no pain  -KP     Row Name 06/01/22 1446          Plan of Care Review    Progress no change  -     Outcome Evaluation Patient seen for BUE exercises from bed level with poor plus overall functional endurance. Patient noted to have wet linens and able to participate in bed mobility to change linens and perineal hygiene with minimal/moderate assist (moderate assist for perineal hygiene). Continue plan of care.  -[facility] Name 06/01/22 1446          Therapy Plan Review/Discharge Plan (OT)    Anticipated Discharge Disposition (OT) home with /7 care;home with home health;skilled nursing facility  -[facility] Name 06/01/22 1446          Positioning and Restraints    Pre-Treatment Position in bed  -     Post Treatment Position bed  -KP     In Bed call light within reach  -           User Key  (r) = Recorded By, (t) = Taken By, (c) = Cosigned By    Initials Name Provider Type    Gloria Pelayo OT Occupational Therapist               Outcome Measures    No documentation.                   OT Recommendation and Plan     Plan of Care Review  Progress: no change  Outcome Evaluation: Patient seen for BUE exercises from bed level with poor plus overall functional endurance. Patient noted to have wet linens and able to participate in bed mobility to change linens and perineal hygiene with minimal/moderate assist (moderate assist for perineal hygiene). Continue plan of care.     Time Calculation:    Time Calculation- OT      Row Name 06/01/22 1448             Time Calculation- OT    OT Received On 06/01/22  -              Timed Charges    86991 - OT Therapeutic Exercise Minutes 8  -KP      02084 - OT Therapeutic Activity Minutes 15  -KP              Total Minutes    Timed Charges Total Minutes 23  -KP       Total Minutes 23  -KP            User Key  (r) = Recorded By, (t) = Taken By, (c) = Cosigned By    Initials Name Provider Type     Gloria Galindo OT Occupational Therapist              Therapy Charges for Today     Code Description Service Date Service Provider Modifiers Qty    28559966862 HC OT THER PROC EA 15 MIN 6/1/2022 Gloria Galindo OT GO 1    35208719168 HC OT THERAPEUTIC ACT EA 15 MIN 6/1/2022 Gloria Galindo OT GO 1               Gloria Galindo OT  6/1/2022    Electronically signed by Gloria Galindo OT at 06/01/22 1444

## 2022-06-02 NOTE — CASE MANAGEMENT/SOCIAL WORK
Discharge Planning Assessment  MILLICENT Recio     Patient Name: Fidencio Luciano  MRN: [id number]  Today's Date: 6/2/2022    Admit Date: 5/29/2022                   Discharge Plan     Row Name 06/02/22 1239       Plan    Final Discharge Disposition Code 03 - skilled nursing facility (SNF)    Final Note Pt is being discharged to [facility] on this date. Pt  is aware and agreeable to discharge. SS left POA a vociemail notifying her of discharge. SS faxed AVS summary, neg cov id test, and clinical update to fax [phone]. SS provided RN with report number for [facility] [phone]. SS to arrange EMS to transport.    13:05pm: SS contacted [facility] per Mike and scheduled transport.                 Kat Taylor

## 2022-06-02 NOTE — CASE MANAGEMENT/SOCIAL WORK
Discharge Planning Assessment  MILLICENT Recio     Patient Name: Fidencio Luciano  MRN: [id number]  Today's Date: 6/2/2022    Admit Date: 5/29/2022                   Discharge Plan     Row Name 06/02/22 0904       Plan    Plan  faxed updated negative cov-id test results to [facility] Epic basket.  spoke with [facility] per Kat who states bed remains available for Pt today if stable.  notified Dr. Costello.  to follow.              Kat Taylor

## 2022-06-02 NOTE — PLAN OF CARE
Goal Outcome Evaluation:      Patient resting comfortably in bed without signs, symptoms, or complaints of distress or pain. Patient being discharged. Will continue plan of care.

## 2022-06-02 NOTE — PLAN OF CARE
Patient resting in the bed throughout the night.  No s/s of distress noted. No complaints. Will continue to monitor and follow care plan.

## 2022-06-02 NOTE — DISCHARGE SUMMARY
[facility] HOSPITALIST MEDICINE DISCHARGE SUMMARY    Patient Identification:  Name:  Fidencio Luciano  Age:  76 y.o.  Sex:  male  :  1945  MRN:  [id number]  Visit Number:  [id number]    Date of Admission: 2022  Date of Discharge: 2022    PCP: Camila Ortiz, ANDREW    DISCHARGE DIAGNOSIS   1.  Bilateral pneumonia  2.  Acute hypoxic respiratory failure  3.  Severe sepsis  4.  Chronic iron deficiency anemia  5.  Essential hypertension  6.  Hyperlipidemia  7.  Type 2 diabetes mellitus      CONSULTS  None      PROCEDURES PERFORMED   None      HOSPITAL COURSE  Mr. Luciano is a 76 y.o. male who presented to [facility] ED on 2022 with a chief complaint of vomiting.  Patient has a past medical history markable for CAD, pulmonary embolism, essential hypertension, hyperlipidemia, type 2 diabetes mellitus, iron deficiency anemia, BPH, GERD and history of gastric ulcer with hemorrhage as well as vascular/Alzheimer's dementia.  It should be noted most history is obtained from patient's family who was at bedside during admission.  According to family, patient had developed a cough for approximately 1 week prior to admission to the hospital.  Note is made of possible difficulty with eating food and possible aspiration.  On date of presentation, patient's daughter took him to [employer] and at the end of the [employer] service he became nauseated and threw up.  Patient also complained of shortness of breath and for this reason was brought to the emergency department for further treatment and evaluation.  Initial evaluation emergency department did consist of basic laboratory work as well as physical exam and vital signs.  Initial vital signs from patient's blood pressure 162/79, respirations 20, heart rate 107 and temperature 98.1 °F with oxygen saturation 92% on room air.  Initial CMP found elevated blood sugar of 346 but otherwise was within normal limits.  CBC demonstrated mildly elevated  white blood cell count of 13.7.  COVID-19 as well as influenza a and B swab were negative.  Respiratory panel was obtained and negative.  Chest x-ray demonstrated right greater than left lower lung airspace disease possibly representing pneumonia.  CT of chest with PE protocol was obtained which demonstrated no evidence of PE but did demonstrate findings concerning for aspiration pneumonitis.  There is documentation in the H&P that patient did have oxygen desaturation down to 85 to 89% while asleep and was placed on 2 L nasal cannula.  Overall, patient was diagnosed with aspiration pneumonia and admitted for further treatment and evaluation.    Patient was started on vancomycin and Zosyn.  MRSA nasal swab was negative and vancomycin was eventually discontinued.  Patient was continued on Zosyn for the next 3 days and then transition to Augmentin 875 mg p.o. twice daily prior to discharge.  Patient's hypoxic respiratory failure did promptly resolved and patient had been on room air for 48 hours prior to discharge.  Patient states he feels back to his baseline functioning status.  It should be noted patient was evaluated by SLP and a swallow evaluation was performed.  Recommendation was made to place patient on mechanical soft diet with nectar thickened liquids with meds in puréed/nectar's.  With this in mind, it is felt patient has reached maximum medical benefit of current hospitalization and will be discharged home in stable condition today.  The beforementioned plan was thoroughly discussed with the patient and he expressed his understanding and willingness to proceed with the beforementioned plan.    VITAL SIGNS:      05/31/22  0500 06/01/22  0500 06/02/22  0500   Weight: 85 kg (187 lb 4.8 oz) 85 kg (187 lb 4.8 oz) 84.3 kg (185 lb 14.4 oz)     Body mass index is 25.93 kg/m².    PHYSICAL EXAM:  Constitutional: Well-nourished  male in no apparent distress.     HENT:  Head:  Normocephalic and atraumatic.   Mouth:  Moist mucous membranes.    Eyes:  Conjunctivae and EOM are normal.  Pupils are equal, round, and reactive to light.  No scleral icterus.    Neck:  Neck supple. No thyromegaly.  No JVD present.    Cardiovascular:  Regular rate and rhythm with no murmurs, rubs, clicks or gallops appreciated.  Pulmonary/Chest:  Clear to auscultation bilaterally with no crackles, wheezes or rhonchi appreciated.  Abdominal:  Soft. Nontender. Nondistended  Bowel sounds are normal in all four quadrants. No organomegally appreciated.   Musculoskeletal:  No edema, no tenderness, and no deformity.  No red or swollen joints anywhere.    Neurological:  Alert, Cranial nerves II-XII intact with no focal deficits.  No facial droop.  No slurred speech.   Skin:  Warm and dry to palpation with no rashes or lesions appreciated.  Peripheral vascular:  2+ radial and pedal pulses in bilateral upper and lower extremities.  Psychiatric:  Alert and oriented x3, demonstrates appropriate judgement and insight.    DISCHARGE DISPOSITION   Stable    DISCHARGE MEDICATIONS:     Discharge Medications      New Medications      Instructions Start Date   amoxicillin-clavulanate 875-125 MG per tablet  Commonly known as: Augmentin   1 tablet, Oral, 2 Times Daily         Continue These Medications      Instructions Start Date   albuterol sulfate  (90 Base) MCG/ACT inhaler  Commonly known as: PROVENTIL HFA;VENTOLIN HFA;PROAIR HFA   2 puffs, Inhalation, 4 Times Daily PRN      apixaban 5 MG tablet tablet  Commonly known as: ELIQUIS   5 mg, Oral, Every 12 Hours Scheduled      atorvastatin 80 MG tablet  Commonly known as: LIPITOR   80 mg, Oral, Nightly      budesonide-formoterol 80-4.5 MCG/ACT inhaler  Commonly known as: SYMBICORT   2 puffs, Inhalation, 2 Times Daily      cetirizine 10 MG tablet  Commonly known as: zyrTEC   10 mg, Oral, Daily      clopidogrel 75 MG tablet  Commonly known as: PLAVIX   75 mg, Oral, Daily      colestipol 1 g tablet  Commonly  known as: COLESTID   1 g, Oral, 2 Times Daily      divalproex 500 MG DR tablet  Commonly known as: DEPAKOTE   500 mg, Oral, Nightly      finasteride 5 MG tablet  Commonly known as: PROSCAR   5 mg, Oral, Nightly      isosorbide mononitrate 120 MG 24 hr tablet  Commonly known as: IMDUR   120 mg, Oral, Every Morning      Melatonin 5 MG capsule   5 mg, Oral, Nightly      memantine 28 MG capsule sustained-release 24 hr extended release capsule  Commonly known as: NAMENDA XR   28 mg, Oral, Daily      metoprolol tartrate 25 MG tablet  Commonly known as: LOPRESSOR   12.5 mg, Oral, Every 12 Hours Scheduled      mirtazapine 15 MG tablet  Commonly known as: REMERON   15 mg, Oral, Nightly      NIFEREX-150 PO   1 capsule, Oral, Daily      nitroglycerin 0.4 MG SL tablet  Commonly known as: NITROSTAT   1 under the tongue as needed for angina, may repeat q5mins for up three doses      pantoprazole 40 MG EC tablet  Commonly known as: PROTONIX   40 mg, Oral, Daily      ranolazine 500 MG 12 hr tablet  Commonly known as: RANEXA   500 mg, Oral, Every 12 Hours Scheduled               Your Scheduled Appointments    Jun 09, 2022 11:00 AM  Follow Up with Geno Vernon MD  [facility] GASTROENTEROLOGY & UROLOGY ([place]) [address]  [facility] [address]  [phone]    Please bring any related outside labs/imaging on a disc. If insurance has changed, please bring updated information.       Jul 21, 2022  1:30 PM  Follow Up with Kaya Rangel APRDENNY  [facility] PULMONARY & CRITICAL CARE MEDICINE ([place]) [address]  [facility] [address]  [phone]   Established: Please bring outside images or reports.       Aug 01, 2022  8:30 AM  LAB with KORI NURSE LAB  [facility] HEMATOLOGY & ONCOLOGY ([place]) [address]  [facility] [address]  [phone]        Aug 01, 2022  9:00 AM  FOLLOW UP with ANDREW Dawson  [facility] HEMATOLOGY [facility] ONCOLOGY ([place]) [address]  [place] [id number] [phone]        Sep 12, 2022 10:30 AM  Follow Up with Giovanni Buenrostro MD  [facility] CARDIOLOGY ([place]) [address] [place] [address] [phone]   -Bring photo ID, insurance card, and list of medications to appointment  -If testing was completed outside of [facility] then patient must bring images on a disc  -Copay will be collected at time of appointment  -Established patients should arrive 10 minutes prior to appointment       Jan 27, 2023  1:10 PM  Follow Up with Griselda Cheng-Akwa, APRN  [facility] GASTROENTEROLOGY & UROLOGY ([facility]) [address] [facility] [address] [phone]   Arrive 15 minutes prior to appointment.              Additional Instructions for the Follow-ups that You Need to Schedule     Discharge Follow-up with PCP   As directed       Currently Documented PCP:    Camila Ortiz APRN    PCP Phone Number:    [phone]     Follow Up Details: please follow up with pcp in 2-3 weeks            Contact information for follow-up providers     Camila Ortiz APRN .    Specialty: Family Medicine  Why: please follow up with pcp in 2-3 weeks  Contact information:  [address]  [place] [address]  [phone]                   Contact information for after-discharge care     Destination     [facility] .    Service: Skilled Nursing  Contact information:  [address] [facility] [address]  [phone]                             TEST  RESULTS PENDING AT DISCHARGE  Pending Labs     Order Current Status    Blood Culture - Blood, Arm, Left Preliminary result    Blood Culture - Blood, Arm, Right Preliminary result           The ASCVD Risk score ([place] ABIEL Case., et al., 2013) failed to calculate for the following reasons:    The patient has a prior MI or stroke diagnosis     Thiago Costello DO  06/02/22  11:36  EDT    Please note that this discharge summary required more than 30 minutes to complete.    Please send a copy of this dictation to the following providers:  Camila Ortiz APRN

## 2022-06-03 LAB
BACTERIA SPEC AEROBE CULT: ABNORMAL
GRAM STN SPEC: ABNORMAL
ISOLATED FROM: ABNORMAL

## 2022-06-03 NOTE — PAYOR COMM NOTE
"[facility]  NPI:[id number]    Utilization Review  Contact: Lyndsay Feliciano RN  Phone: [phone]  Fax:[phone]    DISCHARGE NOTIFICATION   [id number]      Fidencio Luciano (76 y.o. Male)             Date of Birth   1945    Social Security Number       Address   [address] [place] [address]    Home Phone   [phone]    MRN   [id number]       Restorationist   Non-Nondenominational    Marital Status                               Admission Date   5/29/22    Admission Type   Emergency    Admitting Provider   Eduardo Lquue MD    Attending Provider       Department, Room/Bed   [facility] 3 [facility], 3307/2S       Discharge Date   6/2/2022    Discharge Disposition   Skilled Nursing Facility (DC - External)    Discharge Destination                               Attending Provider: (none)   Allergies: No Known Allergies    Isolation: None   Infection: COVID (rule out) (06/01/22), COVID Screen (preop/placement) (06/02/22)   Code Status: Prior   Advance Care Planning Activity    Ht: 180.3 cm (71\")   Wt: 84.3 kg (185 lb 14.4 oz)    Admission Cmt: None   Principal Problem: Pneumonia of both lungs due to infectious organism, unspecified part of lung [J18.9]                 Active Insurance as of 5/29/2022     Primary Coverage     Payor Plan Insurance Group Employer/Plan Group    [facility] MEDICARE REPLACEMENT [employer] MEDICARE REPLACEMENT      Payor Plan Address Payor Plan Phone Number Payor Plan Fax Number Effective Dates    [address] [address] [phone]  1/1/2022 - None Entered    [facility] [address]       Subscriber Name Subscriber Birth Date Member ID       FIDENCIO LUCIANO 1945 [id number]                 Emergency Contacts      (Rel.) Home Phone Work Phone Mobile Phone    Mustapha(POA)Angela (Spouse) [phone] -- [phone]    Dylan García (Son) [phone] -- [phone]    AkbarSofie (Daughter) [phone] -- --    Zhane Conner " (Daughter) [phone] -- --    Aries Ortega (Son) [phone] -- --               Discharge Summary      Thiago Costello,  at 22 1136              [facility] HOSPITALIST MEDICINE DISCHARGE SUMMARY    Patient Identification:  Name:  Fidencio Luciano  Age:  76 y.o.  Sex:  male  :  1945  MRN:  [id number]  Visit Number:  [id number]    Date of Admission: 2022  Date of Discharge: 2022    PCP: Camila Ortiz, APRN    DISCHARGE DIAGNOSIS   1.  Bilateral pneumonia  2.  Acute hypoxic respiratory failure  3.  Severe sepsis  4.  Chronic iron deficiency anemia  5.  Essential hypertension  6.  Hyperlipidemia  7.  Type 2 diabetes mellitus      CONSULTS  None      PROCEDURES PERFORMED   None      HOSPITAL COURSE  Mr. Luciano is a 76 y.o. male who presented to [facility] ED on 2022 with a chief complaint of vomiting.  Patient has a past medical history markable for CAD, pulmonary embolism, essential hypertension, hyperlipidemia, type 2 diabetes mellitus, iron deficiency anemia, BPH, GERD and history of gastric ulcer with hemorrhage as well as vascular/Alzheimer's dementia.  It should be noted most history is obtained from patient's family who was at bedside during admission.  According to family, patient had developed a cough for approximately 1 week prior to admission to the hospital.  Note is made of possible difficulty with eating food and possible aspiration.  On date of presentation, patient's daughter took him to Yazidism and at the end of the Yazidism service he became nauseated and threw up.  Patient also complained of shortness of breath and for this reason was brought to the emergency department for further treatment and evaluation.  Initial evaluation emergency department did consist of basic laboratory work as well as physical exam and vital signs.  Initial vital signs from patient's blood pressure 162/79, respirations 20, heart rate 107 and temperature 98.1 °F  with oxygen saturation 92% on room air.  Initial CMP found elevated blood sugar of 346 but otherwise was within normal limits.  CBC demonstrated mildly elevated white blood cell count of 13.7.  COVID-19 as well as influenza a and B swab were negative.  Respiratory panel was obtained and negative.  Chest x-ray demonstrated right greater than left lower lung airspace disease possibly representing pneumonia.  CT of chest with PE protocol was obtained which demonstrated no evidence of PE but did demonstrate findings concerning for aspiration pneumonitis.  There is documentation in the H&P that patient did have oxygen desaturation down to 85 to 89% while asleep and was placed on 2 L nasal cannula.  Overall, patient was diagnosed with aspiration pneumonia and admitted for further treatment and evaluation.    Patient was started on vancomycin and Zosyn.  MRSA nasal swab was negative and vancomycin was eventually discontinued.  Patient was continued on Zosyn for the next 3 days and then transition to Augmentin 875 mg p.o. twice daily prior to discharge.  Patient's hypoxic respiratory failure did promptly resolved and patient had been on room air for 48 hours prior to discharge.  Patient states he feels back to his baseline functioning status.  It should be noted patient was evaluated by SLP and a swallow evaluation was performed.  Recommendation was made to place patient on mechanical soft diet with nectar thickened liquids with meds in puréed/nectar's.  With this in mind, it is felt patient has reached maximum medical benefit of current hospitalization and will be discharged home in stable condition today.  The beforementioned plan was thoroughly discussed with the patient and he expressed his understanding and willingness to proceed with the beforementioned plan.    VITAL SIGNS:      05/31/22  0500 06/01/22  0500 06/02/22  0500   Weight: 85 kg (187 lb 4.8 oz) 85 kg (187 lb 4.8 oz) 84.3 kg (185 lb 14.4 oz)     Body mass  index is 25.93 kg/m².    PHYSICAL EXAM:  Constitutional: Well-nourished  male in no apparent distress.     HENT:  Head:  Normocephalic and atraumatic.  Mouth:  Moist mucous membranes.    Eyes:  Conjunctivae and EOM are normal.  Pupils are equal, round, and reactive to light.  No scleral icterus.    Neck:  Neck supple. No thyromegaly.  No JVD present.    Cardiovascular:  Regular rate and rhythm with no murmurs, rubs, clicks or gallops appreciated.  Pulmonary/Chest:  Clear to auscultation bilaterally with no crackles, wheezes or rhonchi appreciated.  Abdominal:  Soft. Nontender. Nondistended  Bowel sounds are normal in all four quadrants. No organomegally appreciated.   Musculoskeletal:  No edema, no tenderness, and no deformity.  No red or swollen joints anywhere.    Neurological:  Alert, Cranial nerves II-XII intact with no focal deficits.  No facial droop.  No slurred speech.   Skin:  Warm and dry to palpation with no rashes or lesions appreciated.  Peripheral vascular:  2+ radial and pedal pulses in bilateral upper and lower extremities.  Psychiatric:  Alert and oriented x3, demonstrates appropriate judgement and insight.    DISCHARGE DISPOSITION   Stable    DISCHARGE MEDICATIONS:     Discharge Medications      New Medications      Instructions Start Date   amoxicillin-clavulanate 875-125 MG per tablet  Commonly known as: Augmentin   1 tablet, Oral, 2 Times Daily         Continue These Medications      Instructions Start Date   albuterol sulfate  (90 Base) MCG/ACT inhaler  Commonly known as: PROVENTIL HFA;VENTOLIN HFA;PROAIR HFA   2 puffs, Inhalation, 4 Times Daily PRN      apixaban 5 MG tablet tablet  Commonly known as: ELIQUIS   5 mg, Oral, Every 12 Hours Scheduled      atorvastatin 80 MG tablet  Commonly known as: LIPITOR   80 mg, Oral, Nightly      budesonide-formoterol 80-4.5 MCG/ACT inhaler  Commonly known as: SYMBICORT   2 puffs, Inhalation, 2 Times Daily      cetirizine 10 MG  tablet  Commonly known as: zyrTEC   10 mg, Oral, Daily      clopidogrel 75 MG tablet  Commonly known as: PLAVIX   75 mg, Oral, Daily      colestipol 1 g tablet  Commonly known as: COLESTID   1 g, Oral, 2 Times Daily      divalproex 500 MG DR tablet  Commonly known as: DEPAKOTE   500 mg, Oral, Nightly      finasteride 5 MG tablet  Commonly known as: PROSCAR   5 mg, Oral, Nightly      isosorbide mononitrate 120 MG 24 hr tablet  Commonly known as: IMDUR   120 mg, Oral, Every Morning      Melatonin 5 MG capsule   5 mg, Oral, Nightly      memantine 28 MG capsule sustained-release 24 hr extended release capsule  Commonly known as: NAMENDA XR   28 mg, Oral, Daily      metoprolol tartrate 25 MG tablet  Commonly known as: LOPRESSOR   12.5 mg, Oral, Every 12 Hours Scheduled      mirtazapine 15 MG tablet  Commonly known as: REMERON   15 mg, Oral, Nightly      NIFEREX-150 PO   1 capsule, Oral, Daily      nitroglycerin 0.4 MG SL tablet  Commonly known as: NITROSTAT   1 under the tongue as needed for angina, may repeat q5mins for up three doses      pantoprazole 40 MG EC tablet  Commonly known as: PROTONIX   40 mg, Oral, Daily      ranolazine 500 MG 12 hr tablet  Commonly known as: RANEXA   500 mg, Oral, Every 12 Hours Scheduled               Your Scheduled Appointments    Jun 09, 2022 11:00 AM  Follow Up with Geno Vernon MD  [facility] GASTROENTEROLOGY & UROLOGY ([place]) [address] [facility]  [facility] [address]  [phone]    Please bring any related outside labs/imaging on a disc. If insurance has changed, please bring updated information.       Jul 21, 2022  1:30 PM  Follow Up with ANDREW Amos  [facility] PULMONARY & CRITICAL CARE MEDICINE ([place]) [address] [facility] [address]  [facility] [address]  [phone]   Established: Please bring outside images or reports.       Aug 01, 2022  8:30 AM  LAB with [place] NURSE [facility]  [facility] HEMATOLOGY  & ONCOLOGY ([place]) [address] [facility] [address]  [place] [address]  [phone]        Aug 01, 2022  9:00 AM  FOLLOW UP with ANDREW Dawson  [facility] HEMATOLOGY & ONCOLOGY ([place]) [address] [facility] [address]  [place] [address]  [phone]        Sep 12, 2022 10:30 AM  Follow Up with Giovanni Buenrostro MD  [facility] CARDIOLOGY ([place]) [address]  [facility] [address]  [phone]   -Bring photo ID, insurance card, and list of medications to appointment  -If testing was completed outside of [facility] then patient must bring images on a disc  -Copay will be collected at time of appointment  -Established patients should arrive 10 minutes prior to appointment       Jan 27, 2023  1:10 PM  Follow Up with Griselda Cheng-Akwa, APRN  [facility] GASTROENTEROLOGY & UROLOGY ([place]) [address] [facility] [address] [phone]   Arrive 15 minutes prior to appointment.              Additional Instructions for the Follow-ups that You Need to Schedule     Discharge Follow-up with PCP   As directed       Currently Documented PCP:    Camila Ortiz APRN    PCP Phone Number:    [phone]     Follow Up Details: please follow up with pcp in 2-3 weeks            Contact information for follow-up providers     Camila Ortiz APRN .    Specialty: Family Medicine  Why: please follow up with pcp in 2-3 weeks  Contact information:  [address]  [place] [address]  [phone]                   Contact information for after-discharge care     Destination     [facility] .    Service: Skilled Nursing  Contact information:  [address] [facility] [address]  [phone]                             TEST  RESULTS PENDING AT DISCHARGE  Pending Labs     Order Current Status    Blood Culture - Blood, Arm, Left Preliminary result    Blood Culture - Blood, Arm, Right Preliminary result           The ASCVD Risk score (Markie BEEBE  , et al., 2013) failed to calculate for the following reasons:    The patient has a prior MI or stroke diagnosis     Thiago Costello DO  06/02/22  11:36 EDT    Please note that this discharge summary required more than 30 minutes to complete.    Please send a copy of this dictation to the following providers:  Camila Ortiz APRN    Electronically signed by Thiago Costello DO at 06/02/22 4982

## 2022-06-08 LAB — BACTERIA SPEC AEROBE CULT: NORMAL

## 2022-07-06 ENCOUNTER — TRANSCRIBE ORDERS (OUTPATIENT)
Dept: ADMINISTRATIVE | Facility: HOSPITAL | Age: 77
End: 2022-07-06

## 2022-07-06 DIAGNOSIS — R13.10 DYSPHAGIA, UNSPECIFIED TYPE: Primary | ICD-10-CM

## 2022-07-12 ENCOUNTER — HOSPITAL ENCOUNTER (OUTPATIENT)
Dept: GENERAL RADIOLOGY | Facility: HOSPITAL | Age: 77
Discharge: HOME | End: 2022-07-12
Admitting: INTERNAL MEDICINE

## 2022-07-12 DIAGNOSIS — R13.10 DYSPHAGIA, UNSPECIFIED TYPE: ICD-10-CM

## 2022-07-12 PROCEDURE — 74230 X-RAY XM SWLNG FUNCJ C+: CPT | Performed by: RADIOLOGY

## 2022-07-12 PROCEDURE — 74230 X-RAY XM SWLNG FUNCJ C+: CPT

## 2022-07-12 PROCEDURE — 92611 MOTION FLUOROSCOPY/SWALLOW: CPT

## 2022-07-12 NOTE — MBS/VFSS/FEES
Outpatient - Speech Language Pathology   Swallow Initial Evaluation [facility]   OUTPATIENT MODIFIED BARIUM SWALLOW STUDY     Patient Name: Fidencio Luciano  : 1945  MRN: [id number]  Today's Date: 2022             Admit Date: 2022    Visit Dx:     ICD-10-CM ICD-9-CM   1. Dysphagia, unspecified type  R13.10 787.20     Patient Active Problem List   Diagnosis   • Essential hypertension   • Type 2 diabetes mellitus (HCC)   • Benign prostatic hyperplasia   • ASCVD (arteriosclerotic cardiovascular disease), s/p stenting of 80 % stenosis in left circumflex on 10/05/16and 60% stenosis in the LAD, clinically stable.    • Hyperlipidemia LDL goal <70   • Weakness generalized   • Coronary artery fistula   • Weight loss, unintentional   • Iron deficiency anemia   • Gastroesophageal reflux disease   • Dysphagia   • Chest pain   • History of pulmonary embolism in , patient is on Eliquis.   • Early satiety   • Diarrhea     Past Medical History:   Diagnosis Date   • BPH (benign prostatic hyperplasia)    • Bradycardia     Positive tilt table testing 2016   • Coronary artery disease    • Diabetes mellitus (HCC)    • Diverticulosis    • Elevated cholesterol    • Gastric ulcer with hemorrhage    • Gastroesophageal reflux disease 2021   • History of transfusion    • Hyperlipidemia    • Hypertension    • Psoriasis      Past Surgical History:   Procedure Laterality Date   • BACK SURGERY     • CARDIAC CATHETERIZATION N/A 2016    Procedure: Left Heart Cath;  Surgeon: Giovanni Buenrostro MD;  Location: [facility] INVASIVE LOCATION;  Service:    • CARDIAC CATHETERIZATION N/A 10/4/2016    Procedure: Left Heart Cath;  Surgeon: Bharathi Andrew MD;  Location: [facility] CATH INVASIVE LOCATION;  Service:    • CARDIAC CATHETERIZATION N/A 2017    Procedure: Left Heart Cath;  Surgeon: Giovanni Buenrostro MD;  Location: [facility] CATH INVASIVE LOCATION;  Service:    • CARDIAC CATHETERIZATION N/A 2017    Procedure: ERR;   Surgeon: Giovanni Buenrostro MD;  [facility] CATH INVASIVE LOC[facility];  Service:    • CARDIAC CATHETERIZATION N/A 11/8/2019    Procedure: Left Heart Cath;  Surgeon: Abraham Oviedo MD;  [facility]  [facility] CATH INVASIVE LOCATION;  Service: Cardiology   • CARDIAC CATHETERIZATION N/A 12/18/2019    Procedure: Coronary angiography;  Surgeon: Jay Barney IV, MD;  Location:  [facility] CATH INVASIVE LOCATION;  Service: Cardiovascular   • CHOLECYSTECTOMY     • COLONOSCOPY  11/13/2015    Dr. Martinez- internal hemorrhoids, sigmoid diverticulosis   • COLONOSCOPY N/A 8/17/2018    Procedure: COLONOSCOPY CPT CODE: 61771;  Surgeon: Gigi Geronimo MD;  Location:  [facility] OR;  Service: Gastroenterology   • COLONOSCOPY N/A 5/17/2022    Procedure: COLONOSCOPY;  Surgeon: Geno Vernon MD;  Location:  [facility] OR;  Service: Gastroenterology;  Laterality: N/A;   • CORONARY ANGIOPLASTY WITH STENT PLACEMENT     • ENDOSCOPY N/A 8/17/2018    Procedure: ESOPHAGOGASTRODUODENOSCOPY WITH BIOPSY CPT CODE: 44102;  Surgeon: Gigi Geronimo MD;  Location:  [facility] OR;  Service: Gastroenterology   • ENDOSCOPY N/A 4/20/2021    Procedure: ESOPHAGOGASTRODUODENOSCOPY;  Surgeon: Geno Vernon MD;  Location:  [facility];  Service: Gastroenterology;  Laterality: N/A;   • ENDOSCOPY N/A 5/17/2022    Procedure: ESOPHAGOGASTRODUODENOSCOPY WITH BIOPSY;  Surgeon: Geno Vernon MD;  Location:  [facility];  Service: Gastroenterology;  Laterality: N/A;   • INTERVENTIONAL RADIOLOGY PROCEDURE N/A 12/18/2019    Procedure: Coil Embolization of septal to pulmonary artery fistuala.;  Surgeon: Jay Barney IV, MD;  Location:  [facility] CATH INVASIVE LOCATION;  Service: Cardiovascular   • MS RT/LT HEART CATHETERS N/A 5/9/2017    Procedure: Percutaneous Coronary Intervention;  Surgeon: Lincoln De Los Santos MD;  Location:  [facility] CATH INVASIVE LOCATION;  Service: Cardiology   • STOMACH SURGERY      FUSION OF BLEEDING ULCER   •  UPPER GASTROINTESTINAL ENDOSCOPY  11/13/2015    Dr. Martinez- mild gastritis     Fidencio WHITNEY Mustapha  presents to the radiology suite this am from [facility] to participate in an instrumental MBS to evalulate safety/efficacy of swallowing fnx, determine safest/least restrictive diet, candidacy for diet upgrade. He is accompanied by his wife and facility staff member whom remain the lobby during this evaluation.     Mr. Luciano is familiar to SLP for recent FEES 5/30/22 during [facility] admission w/ silent microaspiration of thin liquids, recommendation for modified po diet of puree consistency, nectar thick liquids, dysphagia tx per poc.    No recent chest xray available for review.     Pt is observed on ra w/o complications.     Risks and benefits of the procedure are explained w/ pt verbalizing understanding/agreement to participate. Proceed per protocol.     Pt is positioned upright and centered in a wheelchair to accept multiple po presentations of solid cracker, puree, honey thick, nectar thick, and thin liquids via spoon, cup and straw. He declines whole placebo pill in puree, reporting preference for meds crushed in puree. Pt is able to self feed.     All views are from the lateral plane.     Facial/oral structures are symmetrical upon observation w/o lingual deviation upon protrusion. Oral mucosa are moist, pink and clean. Secretions are clear, slightly tacky and well controlled. OROM/SABINE is mildly weak in general to imitate oral postures. Gag is not assessed. Volitional cough is adequate in intensity, clear in quality, nonproductive. Vocal quality is adequate in intensity, clear in quality w/ intelligible speech. Pt is a/a and cooperative to particpate. He is oriented to person, place, and time, follows simple directives, and participates in simple conversational exchanges.     Upon po presentations, adequate bolus anticipation w/ good labial seal for bolus clearance via spoon bowl, cup rim stability and suction  via straw. Bolus formation, manipulation, and control are mildly weak in general, slightly increased oral prep time w/ solid, rotary mastication pattern. A-p transit is slightly delayed w/o significant oral residue. Tongue base retraction and linguavelar seal are slightly weak w/ premature spillage of nectar thick and thin liquids via tsp, controlled in the bilateral pyriform sinuses. No laryngeal penetration or aspiration is evidenced before the swallow.     Pharyngeal swallow is timely w/ adequate hyolaryngeal elevation and epiglottic inversion. Transient shallow laryngeal penetration occurs during the swallow w/ nectar thick and thin liquid via cup trials, clearing upn completion of the swallow w/o residue. Pharyngeal contraction is slightly weak in general w/ mild diffuse pharyngeal residue w/ puree and honey thick liquids, trace diffuse pharyngeal residue w/ nectar thick and thin liquids. No other laryngeal penetration or aspiration evidenced during or after the swallow.     Partial esophageal sweep reveals no obvious mucosal abnormalities or retrograde flow to the upper 1/3.      Impression: Per this evaluation, Mr. Luciano presents w/ a mild oral dysphagia, wfl pharyngeal swallow w/o evidence of aspiration across this evaluation.     He is felt to most benefit from modified po diet of mechanical soft consistency, whole foods, thin liquids.     SLP Recommendation and Plan  1. Mechanical soft consistency diet, whole meats, thin liquids.   2. Meds crushed in puree.   3. Upright and centered for all po intake.   4. FRIEDA precautions.   5. Oral care protocol.     D/w pt, pt's spouse and facility staff member results and recommendations w/ verbal understanding and agreement.     Thank you for allowing me to participate in the care of your patient-  Deisi Lantigua M.A., CCC-SLP      EDUCATION  The patient has been educated in the following areas:   Dysphagia (Swallowing Impairment) Modified Diet Instruction.      Time  Calculation:     Therapy Charges for Today     Code Description Service Date Service Provider Modifiers Qty    [id number] HC ST MOTION FLUORO EVAL SWALLOW 8 7/12/2022 Deisi Lantigua MA,CCC-SLP GN 1        Deisi Lantigua MA,CCC-SLP  7/12/2022

## 2022-07-14 ENCOUNTER — OFFICE VISIT (OUTPATIENT)
Dept: GASTROENTEROLOGY | Facility: CLINIC | Age: 77
End: 2022-07-14

## 2022-07-14 VITALS
SYSTOLIC BLOOD PRESSURE: 138 MMHG | WEIGHT: 187.6 LBS | OXYGEN SATURATION: 96 % | HEIGHT: 71 IN | BODY MASS INDEX: 26.26 KG/M2 | DIASTOLIC BLOOD PRESSURE: 72 MMHG | HEART RATE: 79 BPM

## 2022-07-14 DIAGNOSIS — R13.10 DYSPHAGIA, UNSPECIFIED TYPE: ICD-10-CM

## 2022-07-14 DIAGNOSIS — R19.7 DIARRHEA, UNSPECIFIED TYPE: ICD-10-CM

## 2022-07-14 DIAGNOSIS — D50.0 IRON DEFICIENCY ANEMIA DUE TO CHRONIC BLOOD LOSS: Primary | ICD-10-CM

## 2022-07-14 PROCEDURE — 83540 ASSAY OF IRON: CPT | Performed by: INTERNAL MEDICINE

## 2022-07-14 PROCEDURE — 85025 COMPLETE CBC W/AUTO DIFF WBC: CPT | Performed by: INTERNAL MEDICINE

## 2022-07-14 PROCEDURE — 84466 ASSAY OF TRANSFERRIN: CPT | Performed by: INTERNAL MEDICINE

## 2022-07-14 PROCEDURE — 99213 OFFICE O/P EST LOW 20 MIN: CPT | Performed by: INTERNAL MEDICINE

## 2022-07-14 NOTE — PROGRESS NOTES
Subjective   Fidencio Luciano is a 76 y.o. male who presents to the office today as a follow up appointment regarding Anemia      History of Present Illness:  The patient presents for follow up diarrhea.  His wife states he is still having diarrhea after he eats.  She had observed him have an episode of fecal incontinence at the nursing home.  He has gained weight since being in the nursing home.  He is now at a normal weight.  His wife states that he eats whenever he put food in front of them.  He recently underwent a swallow study which showed normal swallowing.  He is on a regular diet now.  Overall, he is improved. He has been anemic in the past.  No BRBPR or melena.  An EGD/colonoscopy was performed in May which was unrevealing for GI blood loss.      Review of Systems:  Review of Systems   Constitutional: Positive for unexpected weight change. Negative for fever.   HENT: Negative for trouble swallowing.    Eyes: Negative.    Respiratory: Negative for chest tightness.    Cardiovascular: Negative for chest pain.   Gastrointestinal: Positive for abdominal distention and diarrhea. Negative for abdominal pain, anal bleeding, blood in stool, constipation, nausea, rectal pain and vomiting.   Endocrine: Negative.    Genitourinary: Negative for difficulty urinating.   Musculoskeletal: Negative.    Skin: Negative.    Allergic/Immunologic: Negative.    Neurological: Negative for headaches.   Hematological: Bruises/bleeds easily.   Psychiatric/Behavioral: Negative.        Past Medical History:  Past Medical History:   Diagnosis Date   • BPH (benign prostatic hyperplasia)    • Bradycardia     Positive tilt table testing 07/2016   • Coronary artery disease    • Diabetes mellitus (HCC)    • Diverticulosis    • Elevated cholesterol    • Gastric ulcer with hemorrhage    • Gastroesophageal reflux disease 1/26/2021   • History of transfusion    • Hyperlipidemia    • Hypertension    • Psoriasis        Past Surgical History:  Past  Surgical History:   Procedure Laterality Date   • BACK SURGERY     • CARDIAC CATHETERIZATION N/A 9/20/2016    Procedure: Left Heart Cath;  Surgeon: Giovanni Buenrostro MD;  Location:  [facility];  Service:    • CARDIAC CATHETERIZATION N/A 10/4/2016    Procedure: Left Heart Cath;  Surgeon: Bharathi Andrew MD;  [facility];  Service:    • CARDIAC CATHETERIZATION N/A 5/9/2017    Procedure: Left Heart Cath;  Surgeon: Giovanni Buenrostro MD;  [facility];  Service:    • CARDIAC CATHETERIZATION N/A 5/9/2017    Procedure: ERR;  Surgeon: Giovanni Buenrostro MD;  [facility];  Service:    • CARDIAC CATHETERIZATION N/A 11/8/2019    Procedure: Left Heart Cath;  Surgeon: Abraham Oviedo MD;  Location:  [facility];  Service: Cardiology   • CARDIAC CATHETERIZATION N/A 12/18/2019    Procedure: Coronary angiography;  Surgeon: Jay Barney IV, MD;  Location:  [facility];  Service: Cardiovascular   • CHOLECYSTECTOMY     • COLONOSCOPY  11/13/2015    Dr. Martinez- internal hemorrhoids, sigmoid diverticulosis   • COLONOSCOPY N/A 8/17/2018    Procedure: COLONOSCOPY CPT CODE: 70858;  Surgeon: Gigi Geronimo MD;  Location: [facility];  Service: Gastroenterology   • COLONOSCOPY N/A 5/17/2022    Procedure: COLONOSCOPY;  Surgeon: Geno Vernon MD;  Location: [facility] OR;  Service: Gastroenterology;  Laterality: N/A;   • CORONARY ANGIOPLASTY WITH STENT PLACEMENT     • ENDOSCOPY N/A 8/17/2018    Procedure: ESOPHAGOGASTRODUODENOSCOPY WITH BIOPSY CPT CODE: 01954;  Surgeon: Gigi Geronimo MD;  Location: [facility] OR;  Service: Gastroenterology   • ENDOSCOPY N/A 4/20/2021    Procedure: ESOPHAGOGASTRODUODENOSCOPY;  Surgeon: Geno Vernon MD;  Location: [facility] OR;  Service: Gastroenterology;  Laterality: N/A;   • ENDOSCOPY N/A 5/17/2022    Procedure: ESOPHAGOGASTRODUODENOSCOPY WITH  BIOPSY;  Surgeon: Geno Vernon MD;  Location:  [facility];  Service: Gastroenterology;  Laterality: N/A;   • INTERVENTIONAL RADIOLOGY PROCEDURE N/A 2019    Procedure: Coil Embolization of septal to pulmonary artery fistuala.;  Surgeon: Jay Barney IV, MD;  Location:  [facility];  Service: Cardiovascular   • TN RT/LT HEART CATHETERS N/A 2017    Procedure: Percutaneous Coronary Intervention;  Surgeon: Lincoln De Los Santos MD;  [facility];  Service: Cardiology   • STOMACH SURGERY      FUSION OF BLEEDING ULCER   • UPPER GASTROINTESTINAL ENDOSCOPY  2015    Dr. Martinez- mild gastritis       Family History:  Family History   Problem Relation Age of Onset   • Sick sinus syndrome Mother    • Heart failure Mother    • Diabetes Mother    • Liver cancer Father        Social History:  Social History     Socioeconomic History   • Marital status:    Tobacco Use   • Smoking status: Former Smoker     Packs/day: 3.00     Types: Cigarettes     Quit date:      Years since quittin.5   • Smokeless tobacco: Former User     Quit date: 1986   Vaping Use   • Vaping Use: Never used   Substance and Sexual Activity   • Alcohol use: No   • Drug use: No   • Sexual activity: Defer       Current Medication List:    Current Outpatient Medications:   •  albuterol sulfate  (90 Base) MCG/ACT inhaler, Inhale 2 puffs 4 (Four) Times a Day As Needed for Wheezing., Disp: , Rfl:   •  apixaban (ELIQUIS) 5 MG tablet tablet, Take 5 mg by mouth Every 12 (Twelve) Hours., Disp: , Rfl:   •  atorvastatin (LIPITOR) 80 MG tablet, Take 1 tablet by mouth Every Night., Disp: 90 tablet, Rfl: 5  •  budesonide-formoterol (SYMBICORT) 80-4.5 MCG/ACT inhaler, Inhale 2 puffs 2 (Two) Times a Day., Disp: 10.2 g, Rfl: 5  •  cetirizine (zyrTEC) 10 MG tablet, Take 10 mg by mouth Daily., Disp: , Rfl:   •  clopidogrel (PLAVIX) 75 MG tablet, Take 1 tablet by mouth Daily., Disp:  "30 tablet, Rfl: 5  •  colestipol (COLESTID) 1 g tablet, Take 1 g by mouth 2 (Two) Times a Day., Disp: , Rfl:   •  divalproex (DEPAKOTE) 500 MG DR tablet, Take 500 mg by mouth Every Night., Disp: , Rfl:   •  Fe Bisgly-Fe Polysac-Vit C (NIFEREX-150 PO), Take 1 capsule by mouth Daily., Disp: , Rfl:   •  finasteride (PROSCAR) 5 MG tablet, Take 5 mg by mouth Every Night., Disp: , Rfl:   •  isosorbide mononitrate (IMDUR) 120 MG 24 hr tablet, Take 1 tablet by mouth Every Morning., Disp: 90 tablet, Rfl: 2  •  Melatonin 5 MG capsule, Take 5 mg by mouth Every Night., Disp: 30 each, Rfl: 1  •  memantine (NAMENDA XR) 28 MG capsule sustained-release 24 hr extended release capsule, Take 28 mg by mouth Daily., Disp: , Rfl:   •  metoprolol tartrate (LOPRESSOR) 25 MG tablet, Take 0.5 tablets by mouth Every 12 (Twelve) Hours., Disp: , Rfl:   •  mirtazapine (REMERON) 15 MG tablet, Take 15 mg by mouth Every Night., Disp: , Rfl:   •  nitroglycerin (NITROSTAT) 0.4 MG SL tablet, 1 under the tongue as needed for angina, may repeat q5mins for up three doses, Disp: 25 tablet, Rfl: 2  •  pantoprazole (PROTONIX) 40 MG EC tablet, Take 40 mg by mouth Daily., Disp: , Rfl:   •  ranolazine (RANEXA) 500 MG 12 hr tablet, Take 1 tablet by mouth Every 12 (Twelve) Hours., Disp: , Rfl:     Allergies:   Patient has no known allergies.    Vitals:  /72   Pulse 79   Ht 180.3 cm (71\")   Wt 85.1 kg (187 lb 9.6 oz)   SpO2 96%   BMI 26.16 kg/m²     Physical Exam:  Physical Exam  Constitutional:       Appearance: He is normal weight.   HENT:      Head: Normocephalic and atraumatic.      Nose: Nose normal. No congestion or rhinorrhea.   Eyes:      General: No scleral icterus.     Extraocular Movements: Extraocular movements intact.      Conjunctiva/sclera: Conjunctivae normal.      Pupils: Pupils are equal, round, and reactive to light.   Cardiovascular:      Rate and Rhythm: Normal rate and regular rhythm.      Pulses: Normal pulses.      Heart sounds: " Normal heart sounds.   Pulmonary:      Effort: Pulmonary effort is normal.      Breath sounds: Normal breath sounds.   Abdominal:      General: Abdomen is flat. Bowel sounds are normal. There is no distension.      Palpations: Abdomen is soft. There is no shifting dullness, fluid wave, hepatomegaly, splenomegaly, mass or pulsatile mass.      Tenderness: There is no abdominal tenderness. There is no guarding or rebound.      Hernia: No hernia is present.   Musculoskeletal:         General: No swelling or tenderness.      Cervical back: Normal range of motion and neck supple.      Right lower leg: Edema present.      Left lower leg: Edema present.      Comments: Wheelchair bound   Skin:     General: Skin is warm and dry.      Coloration: Skin is not jaundiced.   Neurological:      General: No focal deficit present.      Mental Status: He is alert and oriented to person, place, and time.   Psychiatric:         Mood and Affect: Mood normal.         Behavior: Behavior normal.         Results Review:  Lab Results:   Office Visit on 07/14/2022   Component Date Value Ref Range Status   • Iron 07/14/2022 75  59 - 158 mcg/dL Final   • Iron Saturation 07/14/2022 15 (A) 20 - 50 % Final   • Transferrin 07/14/2022 327  200 - 360 mg/dL Final   • TIBC 07/14/2022 487  298 - 536 mcg/dL Final   • WBC 07/14/2022 7.47  3.40 - 10.80 10*3/mm3 Final   • RBC 07/14/2022 4.02 (A) 4.14 - 5.80 10*6/mm3 Final   • Hemoglobin 07/14/2022 12.2 (A) 13.0 - 17.7 g/dL Final   • Hematocrit 07/14/2022 38.9  37.5 - 51.0 % Final   • MCV 07/14/2022 96.8  79.0 - 97.0 fL Final   • MCH 07/14/2022 30.3  26.6 - 33.0 pg Final   • MCHC 07/14/2022 31.4 (A) 31.5 - 35.7 g/dL Final   • RDW 07/14/2022 13.1  12.3 - 15.4 % Final   • RDW-SD 07/14/2022 46.2  37.0 - 54.0 fl Final   • MPV 07/14/2022 11.7  6.0 - 12.0 fL Final   • Platelets 07/14/2022 272  140 - 450 10*3/mm3 Final   • Neutrophil % 07/14/2022 68.2  42.7 - 76.0 % Final   • Lymphocyte % 07/14/2022 18.7 (A) 19.6 -  45.3 % Final   • Monocyte % 07/14/2022 8.4  5.0 - 12.0 % Final   • Eosinophil % 07/14/2022 3.1  0.3 - 6.2 % Final   • Basophil % 07/14/2022 1.2  0.0 - 1.5 % Final   • Immature Grans % 07/14/2022 0.4  0.0 - 0.5 % Final   • Neutrophils, Absolute 07/14/2022 5.09  1.70 - 7.00 10*3/mm3 Final   • Lymphocytes, Absolute 07/14/2022 1.40  0.70 - 3.10 10*3/mm3 Final   • Monocytes, Absolute 07/14/2022 0.63  0.10 - 0.90 10*3/mm3 Final   • Eosinophils, Absolute 07/14/2022 0.23  0.00 - 0.40 10*3/mm3 Final   • Basophils, Absolute 07/14/2022 0.09  0.00 - 0.20 10*3/mm3 Final   • Immature Grans, Absolute 07/14/2022 0.03  0.00 - 0.05 10*3/mm3 Final   • nRBC 07/14/2022 0.0  0.0 - 0.2 /100 WBC Final       Assessment & Plan     Visit Diagnoses:    ICD-10-CM ICD-9-CM   1. Iron deficiency anemia due to chronic blood loss  D50.0 280.0   2. Diarrhea, unspecified type  R19.7 787.91   3. Dysphagia, unspecified type  R13.10 787.20   The patient has gained weight and no longer having swallowing issues. I will check a CBC today with iron/TIBC.     Plan:  I am going to start the patient on Citrucel 2 TBLS in 8 oz water daily to bulk his stool.  Hopefully, this will control the episodic diarrhea he has been experiencing after meals.  I am also going to check the status of his anemia today.  I will obtain a CBC, iron and TIBC.  He will follow-up in 3 months.    * Surgery not found *      MEDS ORDERED DURING VISIT:  No orders of the defined types were placed in this encounter.      Return in about 3 months (around 10/14/2022).             This document has been electronically signed by Geno Vernon MD   July 15, 2022 08:28 EDT      Part of this note may be an electronic transcription/translation of spoken language to printed text using the Dragon Dictation System.

## 2022-07-15 LAB
BASOPHILS # BLD AUTO: 0.09 10*3/MM3 (ref 0–0.2)
BASOPHILS NFR BLD AUTO: 1.2 % (ref 0–1.5)
DEPRECATED RDW RBC AUTO: 46.2 FL (ref 37–54)
EOSINOPHIL # BLD AUTO: 0.23 10*3/MM3 (ref 0–0.4)
EOSINOPHIL NFR BLD AUTO: 3.1 % (ref 0.3–6.2)
ERYTHROCYTE [DISTWIDTH] IN BLOOD BY AUTOMATED COUNT: 13.1 % (ref 12.3–15.4)
HCT VFR BLD AUTO: 38.9 % (ref 37.5–51)
HGB BLD-MCNC: 12.2 G/DL (ref 13–17.7)
IMM GRANULOCYTES # BLD AUTO: 0.03 10*3/MM3 (ref 0–0.05)
IMM GRANULOCYTES NFR BLD AUTO: 0.4 % (ref 0–0.5)
IRON 24H UR-MRATE: 75 MCG/DL (ref 59–158)
IRON SATN MFR SERPL: 15 % (ref 20–50)
LYMPHOCYTES # BLD AUTO: 1.4 10*3/MM3 (ref 0.7–3.1)
LYMPHOCYTES NFR BLD AUTO: 18.7 % (ref 19.6–45.3)
MCH RBC QN AUTO: 30.3 PG (ref 26.6–33)
MCHC RBC AUTO-ENTMCNC: 31.4 G/DL (ref 31.5–35.7)
MCV RBC AUTO: 96.8 FL (ref 79–97)
MONOCYTES # BLD AUTO: 0.63 10*3/MM3 (ref 0.1–0.9)
MONOCYTES NFR BLD AUTO: 8.4 % (ref 5–12)
NEUTROPHILS NFR BLD AUTO: 5.09 10*3/MM3 (ref 1.7–7)
NEUTROPHILS NFR BLD AUTO: 68.2 % (ref 42.7–76)
NRBC BLD AUTO-RTO: 0 /100 WBC (ref 0–0.2)
PLATELET # BLD AUTO: 272 10*3/MM3 (ref 140–450)
PMV BLD AUTO: 11.7 FL (ref 6–12)
RBC # BLD AUTO: 4.02 10*6/MM3 (ref 4.14–5.8)
TIBC SERPL-MCNC: 487 MCG/DL (ref 298–536)
TRANSFERRIN SERPL-MCNC: 327 MG/DL (ref 200–360)
WBC NRBC COR # BLD: 7.47 10*3/MM3 (ref 3.4–10.8)

## 2022-07-18 NOTE — PROGRESS NOTES
Your recent labs revealed an improvement in your iron deficiency anemia.  We will continue to follow this along.  You did undergo recent EGD and colonoscopy which were unrevealing as to the source of your anemia.

## 2022-07-21 ENCOUNTER — OFFICE VISIT (OUTPATIENT)
Dept: PULMONOLOGY | Facility: CLINIC | Age: 77
End: 2022-07-21

## 2022-07-21 VITALS
TEMPERATURE: 97.1 F | OXYGEN SATURATION: 96 % | HEIGHT: 71 IN | WEIGHT: 187 LBS | HEART RATE: 69 BPM | SYSTOLIC BLOOD PRESSURE: 138 MMHG | BODY MASS INDEX: 26.18 KG/M2 | DIASTOLIC BLOOD PRESSURE: 62 MMHG

## 2022-07-21 DIAGNOSIS — G47.34 NOCTURNAL HYPOXIA: ICD-10-CM

## 2022-07-21 DIAGNOSIS — E66.3 OVERWEIGHT: ICD-10-CM

## 2022-07-21 DIAGNOSIS — R06.09 DYSPNEA ON EXERTION: Primary | ICD-10-CM

## 2022-07-21 PROCEDURE — 99214 OFFICE O/P EST MOD 30 MIN: CPT | Performed by: NURSE PRACTITIONER

## 2022-07-21 NOTE — PROGRESS NOTES
"Chief Complaint  Shortness of Breath    Subjective        Fidencio Luciano presents to [facility]  History of Present Illness     Mr. Luciano is a 76 year old male with a medical history significant for GERD, BPH, CAD, diabetes, hyperlipidemia, and hypertension.    He presents for a routine follow up on shortness of breath.  He reports that he is doing well.  He reports that his shortness of breath is about the same.  He is currently on Symbicort BID and albuterol as needed.  He states that he was ordered oxygen to use at night but never recieved it.      Objective   Vital Signs:  /62   Pulse 69   Temp 97.1 °F (36.2 °C) (Temporal)   Ht 180.3 cm (71\")   Wt 84.8 kg (187 lb)   SpO2 96%   BMI 26.08 kg/m²   Estimated body mass index is 26.08 kg/m² as calculated from the following:    Height as of this encounter: 180.3 cm (71\").    Weight as of this encounter: 84.8 kg (187 lb).    BMI is >= 25 and <30. (Overweight) The following options were offered after discussion;: exercise counseling/recommendations and nutrition counseling/recommendations      Physical Exam     GENERAL APPEARANCE: Well developed, well nourished, alert and cooperative, and appears to be in no acute distress.    HEAD: normocephalic. Atraumatic.    EYES: PERRL, EOMI. Vision is grossly intact.    THROAT: Oral cavity and pharynx normal. No inflammation, swelling, exudate, or lesions.     NECK: Neck supple.  No thyromegaly.    CARDIAC: Normal S1 and S2. No S3, S4 or murmurs. Rhythm is regular.     RESPIRATORY:Bilateral air entry positive. Bilateral diminished breath sounds. No wheezing, crackles or rhonchi noted.    GI: Positive bowel sounds. Soft, nondistended, nontender.     MUSCULOSKELETAL: No significant deformity or joint abnormality. No edema. Peripheral pulses intact. No varicosities.    NEUROLOGICAL: Strength and sensation symmetric and intact throughout.     PSYCHIATRIC: The mental " examination revealed the patient was oriented to person, place, and time.     Result Review :  The following data was reviewed by: ANDREW Amos on 07/21/2022:  Common labs    Common Labsle 6/1/22 6/1/22 6/2/22 6/2/22 7/14/22    0224 0224 [id number] 0109    Glucose  202 (A)  204 (A)    BUN  9  6 (A)    Creatinine  0.65 (A)  0.65 (A)    Sodium  137  137    Potassium  3.4 (A)  3.9    Chloride  107  106    Calcium  8.2 (A)  8.2 (A)    WBC 11.49 (A)  10.34  7.47   Hemoglobin 10.5 (A)  11.2 (A)  12.2 (A)   Hematocrit 31.8 (A)  34.8 (A)  38.9   Platelets 306  277  272   (A) Abnormal value       Comments are available for some flowsheets but are not being displayed.                Assessment and Plan   Diagnoses and all orders for this visit:    1. Dyspnea on exertion (Primary)    2. Overweight    3. Nocturnal hypoxia  -     Overnight Sleep Oximetry Study; Future             We will continue albuterol every 4 hours as needed.  Continue Symbicort BID.    Last overnight oxygen study was completed about a year ago.  The patient has since went to the nursing home and is not using oxygen at night.  Will order another overnight oxygen study to see if he still needs oxygen during sleep.      Follow Up   Return in about 3 months (around 10/21/2022).  Patient was given instructions and counseling regarding his condition or for health maintenance advice. Please see specific information pulled into the AVS if appropriate.

## 2022-08-01 ENCOUNTER — LAB (OUTPATIENT)
Dept: ONCOLOGY | Facility: CLINIC | Age: 77
End: 2022-08-01

## 2022-08-01 ENCOUNTER — OFFICE VISIT (OUTPATIENT)
Dept: ONCOLOGY | Facility: CLINIC | Age: 77
End: 2022-08-01

## 2022-08-01 VITALS
RESPIRATION RATE: 18 BRPM | TEMPERATURE: 97.1 F | OXYGEN SATURATION: 98 % | BODY MASS INDEX: 26.12 KG/M2 | HEIGHT: 71 IN | HEART RATE: 69 BPM | WEIGHT: 186.6 LBS | SYSTOLIC BLOOD PRESSURE: 141 MMHG | DIASTOLIC BLOOD PRESSURE: 69 MMHG

## 2022-08-01 DIAGNOSIS — E53.8 B12 DEFICIENCY: ICD-10-CM

## 2022-08-01 DIAGNOSIS — D50.9 IRON DEFICIENCY ANEMIA, UNSPECIFIED IRON DEFICIENCY ANEMIA TYPE: ICD-10-CM

## 2022-08-01 DIAGNOSIS — D64.9 ANEMIA, UNSPECIFIED TYPE: Primary | ICD-10-CM

## 2022-08-01 DIAGNOSIS — D64.9 ANEMIA, UNSPECIFIED TYPE: ICD-10-CM

## 2022-08-01 DIAGNOSIS — D72.829 LEUKOCYTOSIS, UNSPECIFIED TYPE: ICD-10-CM

## 2022-08-01 LAB
ALBUMIN SERPL-MCNC: 3.86 G/DL (ref 3.5–5.2)
ALBUMIN/GLOB SERPL: 1.3 G/DL
ALP SERPL-CCNC: 132 U/L (ref 39–117)
ALT SERPL W P-5'-P-CCNC: 20 U/L (ref 1–41)
ANION GAP SERPL CALCULATED.3IONS-SCNC: 12.8 MMOL/L (ref 5–15)
AST SERPL-CCNC: 17 U/L (ref 1–40)
BASOPHILS # BLD AUTO: 0.08 10*3/MM3 (ref 0–0.2)
BASOPHILS NFR BLD AUTO: 1 % (ref 0–1.5)
BILIRUB SERPL-MCNC: 0.3 MG/DL (ref 0–1.2)
BUN SERPL-MCNC: 14 MG/DL (ref 8–23)
BUN/CREAT SERPL: 15.1 (ref 7–25)
CALCIUM SPEC-SCNC: 9.1 MG/DL (ref 8.6–10.5)
CHLORIDE SERPL-SCNC: 101 MMOL/L (ref 98–107)
CO2 SERPL-SCNC: 24.2 MMOL/L (ref 22–29)
CREAT SERPL-MCNC: 0.93 MG/DL (ref 0.76–1.27)
DEPRECATED RDW RBC AUTO: 50.4 FL (ref 37–54)
EGFRCR SERPLBLD CKD-EPI 2021: 85.6 ML/MIN/1.73
EOSINOPHIL # BLD AUTO: 0.27 10*3/MM3 (ref 0–0.4)
EOSINOPHIL NFR BLD AUTO: 3.3 % (ref 0.3–6.2)
ERYTHROCYTE [DISTWIDTH] IN BLOOD BY AUTOMATED COUNT: 13.9 % (ref 12.3–15.4)
FERRITIN SERPL-MCNC: 55.16 NG/ML (ref 30–400)
GLOBULIN UR ELPH-MCNC: 3 GM/DL
GLUCOSE SERPL-MCNC: 455 MG/DL (ref 65–99)
HCT VFR BLD AUTO: 40.6 % (ref 37.5–51)
HGB BLD-MCNC: 12.7 G/DL (ref 13–17.7)
IMM GRANULOCYTES # BLD AUTO: 0.05 10*3/MM3 (ref 0–0.05)
IMM GRANULOCYTES NFR BLD AUTO: 0.6 % (ref 0–0.5)
IRON 24H UR-MRATE: 73 MCG/DL (ref 59–158)
IRON SATN MFR SERPL: 15 % (ref 20–50)
LYMPHOCYTES # BLD AUTO: 1.71 10*3/MM3 (ref 0.7–3.1)
LYMPHOCYTES NFR BLD AUTO: 20.8 % (ref 19.6–45.3)
MCH RBC QN AUTO: 30.8 PG (ref 26.6–33)
MCHC RBC AUTO-ENTMCNC: 31.3 G/DL (ref 31.5–35.7)
MCV RBC AUTO: 98.3 FL (ref 79–97)
MONOCYTES # BLD AUTO: 0.79 10*3/MM3 (ref 0.1–0.9)
MONOCYTES NFR BLD AUTO: 9.6 % (ref 5–12)
NEUTROPHILS NFR BLD AUTO: 5.33 10*3/MM3 (ref 1.7–7)
NEUTROPHILS NFR BLD AUTO: 64.7 % (ref 42.7–76)
NRBC BLD AUTO-RTO: 0 /100 WBC (ref 0–0.2)
PLATELET # BLD AUTO: 210 10*3/MM3 (ref 140–450)
PMV BLD AUTO: 11.2 FL (ref 6–12)
POTASSIUM SERPL-SCNC: 4.1 MMOL/L (ref 3.5–5.2)
PROT SERPL-MCNC: 6.9 G/DL (ref 6–8.5)
RBC # BLD AUTO: 4.13 10*6/MM3 (ref 4.14–5.8)
SODIUM SERPL-SCNC: 138 MMOL/L (ref 136–145)
TIBC SERPL-MCNC: 490 MCG/DL (ref 298–536)
TRANSFERRIN SERPL-MCNC: 329 MG/DL (ref 200–360)
VIT B12 BLD-MCNC: 880 PG/ML (ref 211–946)
WBC NRBC COR # BLD: 8.23 10*3/MM3 (ref 3.4–10.8)

## 2022-08-01 PROCEDURE — 99214 OFFICE O/P EST MOD 30 MIN: CPT | Performed by: NURSE PRACTITIONER

## 2022-08-01 PROCEDURE — 80053 COMPREHEN METABOLIC PANEL: CPT | Performed by: NURSE PRACTITIONER

## 2022-08-01 PROCEDURE — 82607 VITAMIN B-12: CPT | Performed by: NURSE PRACTITIONER

## 2022-08-01 PROCEDURE — 84466 ASSAY OF TRANSFERRIN: CPT | Performed by: NURSE PRACTITIONER

## 2022-08-01 PROCEDURE — 85025 COMPLETE CBC W/AUTO DIFF WBC: CPT | Performed by: NURSE PRACTITIONER

## 2022-08-01 PROCEDURE — 83540 ASSAY OF IRON: CPT | Performed by: NURSE PRACTITIONER

## 2022-08-01 PROCEDURE — 82728 ASSAY OF FERRITIN: CPT | Performed by: NURSE PRACTITIONER

## 2022-08-01 RX ORDER — IRON POLYSACCHARIDE COMPLEX 150 MG
150 CAPSULE ORAL DAILY
Qty: 30 CAPSULE | Refills: 3 | Status: SHIPPED | OUTPATIENT
Start: 2022-08-01

## 2022-08-01 NOTE — PROGRESS NOTES
DATE:  8/1/2022    REASON FOR FOLLOW UP: Anemia    REFERRING PHYSICIAN:  Dr. Riley    CHIEF COMPLAINT:   Follow up of anemia     HISTORY OF PRESENT ILLNESS:   Fidencio Luciano is a very pleasant 76 y.o. male who is being seen today at the request of Gideon Charles MD   for evaluation and treatment of Anemia. Mr. Luciano reports following with his PCP routinely with repeat labs every 4-6 months. He says he has had chronic anemia which was recently worsening in June. Previous available CBCs were reviewed and patient has had normocytic anemia with Hg ranging 11-13 g/dL since July 2015. CBC from June 2018 showed drop in Hg to 10.0. Patient's WBC and platelets have been in the normal range. Patient reports intermittent dark stool and bleeding per rectum for the past few months. He reports having upper/lower endoscopy approximately 3 years ago with Dr. Martinez which was negative for active bleeding. He says his PCP has referred him to Dr. Unger and he will see him next week. Patient denies any recent blood transfusions. His main complaint today is ongoing fatigue which has been recently worsening. He also reports decreased appetite and weight loss but he is unsure how much weight he has lost.   -Patient was previously being followed for ERROL and was taking oral iron therapy, ferrous sulfate TID which he was tolerating well but was lost in follow up and last seen in March 2019 and planned to follow up 4 months later but did not return for follow up. 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 He is currently in NH for rehab and following with Dr. Riley. Recent CBCs were reviewed and since March 2021, he has had intermittent leukocytosis with fluctuation of counts but WBC has remained consistently elevated since ~October - November 2021 ranging ~11-17, predominantly neutrophils. Workup in hospital was negative for infectious process. He has had chronic  macrocytosis with occasional mild anemia. He remains on ferrous sulfate BID which he tolerates well. 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 His wife says he also struggles with frequent sinus infections, none currently. Patient really has no specific complaints today other than chronic fatigue. Denies obvious bleeding from any source. No fevers/chills or drenching NS. He has had poor appetite and now follows with GI. He is taking megace and drinking supplemental ensure with improvement/weight gain.     Interval History:  Mr. Luciano presents today for follow up with his wife and caregiver from NH. Since his last visit, he reports he has been doing well. He remains on Niferex 150 mg daily which he tolerates well. He reports dark stool related to oral iron and occasional rectal bleeding. He did follow up with GI and had repeat EGD and colonoscopy on 5/17/22 and had 3 polyps removed. The polyp removed from colon was tubular adenoma (precancerous).  Also noted internal hemorrhoids without bleeding. EGD was without evidence of bleeding. He has chronic difficulty with diarrhea which is also being management by GI and will follow up again in 3 months. He remains in NH and complains of chronic fatigue and weakness which is stable. He has no other complaints today.     PAST MEDICAL HISTORY:  Past Medical History:   Diagnosis Date   • BPH (benign prostatic hyperplasia)    • Bradycardia     Positive tilt table testing 07/2016   • Coronary artery disease    • Diabetes mellitus (HCC)    • Diverticulosis    • Elevated cholesterol    • Gastric ulcer with hemorrhage    • Gastroesophageal reflux disease 1/26/2021   • History of transfusion    • Hyperlipidemia    • Hypertension    • Psoriasis        PAST SURGICAL HISTORY:  Past Surgical History:   Procedure Laterality Date   • BACK SURGERY     • CARDIAC CATHETERIZATION N/A 9/20/2016    Procedure: Left Heart Cath;  Surgeon: Giovanni Buenrostro MD;   Location:  [facility];  Service:    • CARDIAC CATHETERIZATION N/A 10/4/2016    Procedure: Left Heart Cath;  Surgeon: Bharathi Anrdew MD;  Location:  [facility];  Service:    • CARDIAC CATHETERIZATION N/A 5/9/2017    Procedure: Left Heart Cath;  Surgeon: Giovanni Buenrostro MD;  Location:  [facility];  Service:    • CARDIAC CATHETERIZATION N/A 5/9/2017    Procedure: ERR;  Surgeon: Giovanni Buenrostro MD;  Location:  [facility];  Service:    • CARDIAC CATHETERIZATION N/A 11/8/2019    Procedure: Left Heart Cath;  Surgeon: Abraham Oviedo MD;  Location:  [facility];  Service: Cardiology   • CARDIAC CATHETERIZATION N/A 12/18/2019    Procedure: Coronary angiography;  Surgeon: Jay Barney IV, MD;  Location:  [facility];  Service: Cardiovascular   • CHOLECYSTECTOMY     • COLONOSCOPY  11/13/2015    Dr. Martinez- internal hemorrhoids, sigmoid diverticulosis   • COLONOSCOPY N/A 8/17/2018    Procedure: COLONOSCOPY CPT CODE: 30099;  Surgeon: Gigi Geronimo MD;  Location: [facility] OR;  Service: Gastroenterology   • COLONOSCOPY N/A 5/17/2022    Procedure: COLONOSCOPY;  Surgeon: Geno Veronn MD;  Location: [facility] OR;  Service: Gastroenterology;  Laterality: N/A;   • CORONARY ANGIOPLASTY WITH STENT PLACEMENT     • ENDOSCOPY N/A 8/17/2018    Procedure: ESOPHAGOGASTRODUODENOSCOPY WITH BIOPSY CPT CODE: 52632;  Surgeon: Gigi Geronimo MD;  Location:  [facility] OR;  Service: Gastroenterology   • ENDOSCOPY N/A 4/20/2021    Procedure: ESOPHAGOGASTRODUODENOSCOPY;  Surgeon: Geno Vernon MD;  Location:  [facility] OR;  Service: Gastroenterology;  Laterality: N/A;   • ENDOSCOPY N/A 5/17/2022    Procedure: ESOPHAGOGASTRODUODENOSCOPY WITH BIOPSY;  Surgeon: Geno Vernon MD;  Location:  [facility] OR;  Service: Gastroenterology;  Laterality: N/A;   • INTERVENTIONAL RADIOLOGY PROCEDURE N/A  2019    Procedure: Coil Embolization of septal to pulmonary artery fistuala.;  Surgeon: Jay Barney IV, MD;  Location:  [facility];  Service: Cardiovascular   • PA RT/LT HEART CATHETERS N/A 2017    Procedure: Percutaneous Coronary Intervention;  Surgeon: Lincoln De Los Santos MD;  Location:  [facility];  Service: Cardiology   • STOMACH SURGERY      FUSION OF BLEEDING ULCER   • UPPER GASTROINTESTINAL ENDOSCOPY  2015    Dr. Martinez- mild gastritis       FAMILY HISTORY:  Family History   Problem Relation Age of Onset   • Sick sinus syndrome Mother    • Heart failure Mother    • Diabetes Mother    • Liver cancer Father        SOCIAL HISTORY:  Social History     Socioeconomic History   • Marital status:    Tobacco Use   • Smoking status: Former Smoker     Packs/day: 3.00     Types: Cigarettes     Quit date:      Years since quittin.6   • Smokeless tobacco: Former User     Quit date: 1986   Vaping Use   • Vaping Use: Never used   Substance and Sexual Activity   • Alcohol use: No   • Drug use: No   • Sexual activity: Defer     MEDICATIONS:  The current medication list was reviewed in the EMR    Current Outpatient Medications:   •  albuterol sulfate  (90 Base) MCG/ACT inhaler, Inhale 2 puffs 4 (Four) Times a Day As Needed for Wheezing., Disp: , Rfl:   •  apixaban (ELIQUIS) 5 MG tablet tablet, Take 5 mg by mouth Every 12 (Twelve) Hours., Disp: , Rfl:   •  atorvastatin (LIPITOR) 80 MG tablet, Take 1 tablet by mouth Every Night., Disp: 90 tablet, Rfl: 5  •  budesonide-formoterol (SYMBICORT) 80-4.5 MCG/ACT inhaler, Inhale 2 puffs 2 (Two) Times a Day., Disp: 10.2 g, Rfl: 5  •  cetirizine (zyrTEC) 10 MG tablet, Take 10 mg by mouth Daily., Disp: , Rfl:   •  clopidogrel (PLAVIX) 75 MG tablet, Take 1 tablet by mouth Daily., Disp: 30 tablet, Rfl: 5  •  colestipol (COLESTID) 1 g tablet, Take 1 g by mouth 2 (Two) Times a Day., Disp: , Rfl:   •   "divalproex (DEPAKOTE) 500 MG DR tablet, Take 500 mg by mouth Every Night., Disp: , Rfl:   •  Fe Bisgly-Fe Polysac-Vit C (NIFEREX-150 PO), Take 1 capsule by mouth Daily., Disp: , Rfl:   •  finasteride (PROSCAR) 5 MG tablet, Take 5 mg by mouth Every Night., Disp: , Rfl:   •  isosorbide mononitrate (IMDUR) 120 MG 24 hr tablet, Take 1 tablet by mouth Every Morning., Disp: 90 tablet, Rfl: 2  •  Melatonin 5 MG capsule, Take 5 mg by mouth Every Night., Disp: 30 each, Rfl: 1  •  memantine (NAMENDA XR) 28 MG capsule sustained-release 24 hr extended release capsule, Take 28 mg by mouth Daily., Disp: , Rfl:   •  metoprolol tartrate (LOPRESSOR) 25 MG tablet, Take 0.5 tablets by mouth Every 12 (Twelve) Hours., Disp: , Rfl:   •  mirtazapine (REMERON) 15 MG tablet, Take 15 mg by mouth Every Night., Disp: , Rfl:   •  nitroglycerin (NITROSTAT) 0.4 MG SL tablet, 1 under the tongue as needed for angina, may repeat q5mins for up three doses, Disp: 25 tablet, Rfl: 2  •  pantoprazole (PROTONIX) 40 MG EC tablet, Take 40 mg by mouth Daily., Disp: , Rfl:   •  ranolazine (RANEXA) 500 MG 12 hr tablet, Take 1 tablet by mouth Every 12 (Twelve) Hours., Disp: , Rfl:     ALLERGIES:  No Known Allergies    REVIEW OF SYSTEMS:    A comprehensive 14 point review of systems was performed.  Significant findings as mentioned above.  All other systems reviewed and are negative.      Physical Exam   Vital Signs: /69   Pulse 69   Temp 97.1 °F (36.2 °C)   Resp 18   Ht 180.3 cm (71\")   Wt 84.6 kg (186 lb 9.6 oz)   SpO2 98%   BMI 26.03 kg/m²    General: Well developed, well nourished, alert and oriented x 3, in no acute distress. Sitting in wheelchair, appears well in good spirits.   Head: ATNC   Eyes: PERRL, No evidence of conjunctivitis.   Nose: No nasal discharge.   Mouth: Oral mucosal membranes moist. No oral ulceration or hemorrhages.   Neck: Neck supple. No thyromegaly. No JVD.   Lungs: CTAB   Heart: RRR. No murmurs, rubs, or gallops. "   Abdomen: Soft. Bowel sounds are normoactive. Nontender with palpation. No Hepatosplenomegaly can be appreciated.   Extremities: No cyanosis or edema.   Neurologic: Grossly non-focal exam.    ENDOSCOPY:          EGD/Colonoscopy 08/17/18  Findings: Sliding hiatal hernia, diverticulosis without diverticulitis  Recommendations: Screening colonoscopy in 10 years    RECENT LABS:  Lab Results   Component Value Date    WBC 8.23 08/01/2022    HGB 12.7 (L) 08/01/2022    HCT 40.6 08/01/2022    MCV 98.3 (H) 08/01/2022    RDW 13.9 08/01/2022     08/01/2022    NEUTRORELPCT 64.7 08/01/2022    LYMPHORELPCT 20.8 08/01/2022    MONORELPCT 9.6 08/01/2022    EOSRELPCT 3.3 08/01/2022    BASORELPCT 1.0 08/01/2022    NEUTROABS 5.33 08/01/2022    LYMPHSABS 1.71 08/01/2022       Lab Results   Component Value Date     08/01/2022    K 4.1 08/01/2022    CO2 24.2 08/01/2022     08/01/2022    BUN 14 08/01/2022    CREATININE 0.93 08/01/2022    EGFRIFNONA 98 02/02/2022    EGFRIFAFRI  09/19/2016      Comment:      <15 Indicative of kidney failure.    GLUCOSE 455 (C) 08/01/2022    CALCIUM 9.1 08/01/2022    ALKPHOS 132 (H) 08/01/2022    AST 17 08/01/2022    ALT 20 08/01/2022    BILITOT 0.3 08/01/2022    ALBUMIN 3.86 08/01/2022    PROTEINTOT 6.9 08/01/2022    MG 2.1 05/29/2022    PHOS 3.9 10/28/2021       Lab Results   Component Value Date/Time     03/13/2021 12:41 PM     Lab Results   Component Value Date    FERRITIN 55.16 08/01/2022    IRON 73 08/01/2022    TIBC 490 08/01/2022    LABIRON 15 (L) 08/01/2022    WBEQAXXV53 1,943 (H) 05/30/2022    FOLATE 10.60 05/30/2022    HAPTOGLOBIN 112 07/17/2018    RETICCTPCT 0.66 07/17/2018    RETIC 0.0242 07/17/2018 12/21/22  Ferritin   30.00 - 400.00 ng/mL 287.30     Iron   59 - 158 mcg/dL 109          Iron Saturation   20 - 50 % 31          Transferrin   200 - 360 mg/dL 239          TIBC   298 - 536 mcg/dL 356            Vitamin B-12   211 - 946 pg/mL 375      Folate   4.78 - 24.20  ng/mL 12.70     TSH   0.270 - 4.200 uIU/mL 3.450           PBS 07/17/18  Hypochromic normocytic anemia with eosinophilia    Labs 07/17/17  C-Reactive Protein 0.00 - 0.99 mg/dL <0.50      Sed Rate 0 - 20 mm/hr 10       - 225 U/L 163      Iron 53 - 167 mcg/dL 41     TIBC 241 - 421 mcg/dL 445     Iron Saturation 20 - 50 % 9       Ferritin 21.90 - 321.70 ng/mL 11.00       Vitamin B-12 211 - 911 pg/mL 932       Folate 5.40 - 20.00 ng/mL 18.87      ASSESSMENT & PLAN:  Fidencio Luciano is a very pleasant 76 y.o. male with    1.  Macrocytosis  2.  ERROL  3. B12 deficiency     -Patient initially reported intermittent dark stool and bleeding per rectum. Reported upper/lower endoscopy several years ago with Dr. Martinez which was negative for active bleeding.   -Obtained additional workup including repeat CBC which showed stable H/H (Hg 10.2), WBC and platelets were again in the normal range. CMP unremarkable. PBS showed hypochromic normocytic anemia with eosinophilia as above. B12 and Folate replete. CRP and ESR were normal. No evidence of hemolysis with normal Retic, LDH and Haptoglobin. Iron studies were consistent with Iron deficiency. Therefore, started patient on oral iron therapy, ferrous sulfate TID which he was  tolerating well.  -PCP referred him back to GI and underwent upper/lower endoscopy on 08/17/18 which showed no evidence of bleeding. He had improvement in Hg but remained low and iron stores were improving, therefore, continued oral iron and planned to follow up in 4 months. However, he was lost in follow up and was last seen in March 2019 and represented in December 2021 and now follows routinely.   -His iron levels normalized and therefore, discontinued ferrous sulfate given normal Hg and iron was replete. To further evaluate  macrocytosis, obtained repeat B12 and folate which were replete. TSH was normal.   -In the interim, he was started on B12 injections for B12 deficiency per PCP. Obtained repeat B12 today  which is pending.   -At his last visit, his Hg was trending down and iron stores were dropping. Therefore, resumed oral iron this time with Niferex 150 mg PO daily which he says he is tolerating well. Also recommended follow up with GI and had repeat EGD and colonoscopy on 5/17/22 (summarized above). He had 3 polyps removed. The polyp removed from colon was tubular adenoma (precancerous).  Also noted internal hemorrhoids without bleeding. EGD was without evidence of bleeding.   -Repeat CBC from today shows Hg ~12.7 which is improving and iron is replete but borderline. Therefore, recommended he continue Niferex, will refill today.   -Will plan to follow up in 3 months with repeat labs. If iron stores should drop while on oral iron, can replace with IV iron if needed.     4. Leukocytosis:  -He was previously seen as above and lost in follow up. He represented in December 2021.  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 He is currently in NH for rehab and following with Dr. Riley. Recent CBCs were reviewed and since March 2021, he has had intermittent leukocytosis with fluctuation of counts but has consistently remained elevated from ~October - November 2021 ranging ~11-17, predominantly neutrophils. Workup in hospital was negative for infectious process. 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 His wife also reports recurrent sinusitis. He has had weight loss for which he follows with GI and this is improving with megace and supplemental ensure. He otherwise has no other concerning B symptoms.  -Based on history, could be related to possible chronic underlying inflammation plus previous COVID and reported HAV also contributing.   -Obtained repeat CBC when he represented which showed normal WBC. Also sent PBS which showed mild granulocytic left shift, otherwise unremarkable with no  blasts or granulocytic dysplasia seen. CRP was normal.   -Repeat CBC from today again showing normal counts. Will continue with conservative follow up and repeat CBC in 3 months with next follow up.       ACO / ESPINOZA/Other  Quality measures  -  Fidencio Luciano did not receive 2021 flu vaccine.   -  Fidencio Luciano reports a pain score of 0.    -  Current outpatient and discharge medications have been reconciled for the patient.  Reviewed by: ANDREW Dawson      The patient, wife and caregiver were in agreement with the plan and all questions were answered to his satisfaction.     Thank you so much for allowing us to participate in the care of Fidencio Luciano . Please do not hesitate to contact us with any questions or concerns.     I spent 30 minutes with Fidencio Luciano today.  In the office today, more than 50% of this time was spent face-to-face with him  in counseling / coordination of care, reviewing his interim medical history and counseling on the current treatment plan.  All questions were answered to his satisfaction.       Electronically Signed by: ANDREW Dawson , August 1, 2022 09:32 EDT       CC:   Camila Ortiz APRN

## 2022-08-08 ENCOUNTER — TELEPHONE (OUTPATIENT)
Dept: UROLOGY | Facility: CLINIC | Age: 77
End: 2022-08-08

## 2022-08-23 ENCOUNTER — OFFICE VISIT (OUTPATIENT)
Dept: UROLOGY | Facility: CLINIC | Age: 77
End: 2022-08-23

## 2022-08-23 VITALS — WEIGHT: 186 LBS | BODY MASS INDEX: 26.04 KG/M2 | HEIGHT: 71 IN

## 2022-08-23 DIAGNOSIS — R30.0 DYSURIA: Primary | ICD-10-CM

## 2022-08-23 DIAGNOSIS — N40.0 BENIGN PROSTATIC HYPERPLASIA WITHOUT LOWER URINARY TRACT SYMPTOMS: Chronic | ICD-10-CM

## 2022-08-23 LAB
BILIRUB BLD-MCNC: ABNORMAL MG/DL
CLARITY, POC: CLEAR
COLOR UR: YELLOW
EXPIRATION DATE: ABNORMAL
GLUCOSE UR STRIP-MCNC: ABNORMAL MG/DL
KETONES UR QL: NEGATIVE
LEUKOCYTE EST, POC: NEGATIVE
Lab: ABNORMAL
NITRITE UR-MCNC: NEGATIVE MG/ML
PH UR: 5.5 [PH] (ref 5–8)
PROT UR STRIP-MCNC: NEGATIVE MG/DL
RBC # UR STRIP: NEGATIVE /UL
SP GR UR: 1.02 (ref 1–1.03)
UROBILINOGEN UR QL: NORMAL

## 2022-08-23 PROCEDURE — 81003 URINALYSIS AUTO W/O SCOPE: CPT | Performed by: UROLOGY

## 2022-08-23 PROCEDURE — 99213 OFFICE O/P EST LOW 20 MIN: CPT | Performed by: UROLOGY

## 2022-09-12 ENCOUNTER — OFFICE VISIT (OUTPATIENT)
Dept: CARDIOLOGY | Facility: CLINIC | Age: 77
End: 2022-09-12

## 2022-09-12 VITALS
WEIGHT: 182.2 LBS | HEART RATE: 71 BPM | BODY MASS INDEX: 25.51 KG/M2 | SYSTOLIC BLOOD PRESSURE: 121 MMHG | DIASTOLIC BLOOD PRESSURE: 70 MMHG | OXYGEN SATURATION: 98 % | HEIGHT: 71 IN

## 2022-09-12 DIAGNOSIS — Z86.711 HISTORY OF PULMONARY EMBOLISM: ICD-10-CM

## 2022-09-12 DIAGNOSIS — I25.41 CORONARY ARTERY FISTULA: ICD-10-CM

## 2022-09-12 DIAGNOSIS — I10 ESSENTIAL HYPERTENSION: ICD-10-CM

## 2022-09-12 DIAGNOSIS — I25.10 ASCVD (ARTERIOSCLEROTIC CARDIOVASCULAR DISEASE): Primary | ICD-10-CM

## 2022-09-12 DIAGNOSIS — I20.9 ANGINA PECTORIS: ICD-10-CM

## 2022-09-12 PROCEDURE — 99214 OFFICE O/P EST MOD 30 MIN: CPT | Performed by: INTERNAL MEDICINE

## 2022-09-12 NOTE — PROGRESS NOTES
Camila Ortiz, ANDREW  Fidencio Luciano  1945  09/12/2022    Patient Active Problem List   Diagnosis   • Essential hypertension   • Type 2 diabetes mellitus (HCC)   • Benign prostatic hyperplasia   • ASCVD (arteriosclerotic cardiovascular disease), s/p stenting of 80 % stenosis in left circumflex on 10/05/16and 60% stenosis in the LAD, clinically stable.    • Hyperlipidemia LDL goal <70   • Weakness generalized   • Coronary artery fistula   • Weight loss, unintentional   • Iron deficiency anemia   • Gastroesophageal reflux disease   • Dysphagia   • Chest pain   • History of pulmonary embolism in February 22, patient is on Eliquis.   • Early satiety   • Diarrhea       Dear Camila Ortiz, ANDREW:    Subjective     Fidencio Luciano is a 76 y.o. male with the problems as listed above, presents    Chief complaint: Follow-up of coronary artery disease.    History of Present Illness: Mr. Luciano is a pleasant 76-year-old  male with history of known coronary artery disease, status post previous stenting of left circumflex coronary artery in October 2016 and coiling of the pulmonary artery fistula to LAD in December 2019.  He is here for cardiology follow-up.  On today's visit denies any complaints of chest pains or shortness of breath.  He has some bilateral leg edema.  According to the scales, he has lost about 5 pounds since July 2022.    No Known Allergies:      Current Outpatient Medications:   •  albuterol sulfate  (90 Base) MCG/ACT inhaler, Inhale 2 puffs 4 (Four) Times a Day As Needed for Wheezing., Disp: , Rfl:   •  apixaban (ELIQUIS) 5 MG tablet tablet, Take 5 mg by mouth Every 12 (Twelve) Hours., Disp: , Rfl:   •  atorvastatin (LIPITOR) 80 MG tablet, Take 1 tablet by mouth Every Night., Disp: 90 tablet, Rfl: 5  •  budesonide-formoterol (SYMBICORT) 80-4.5 MCG/ACT inhaler, Inhale 2 puffs 2 (Two) Times a Day., Disp: 10.2 g, Rfl: 5  •  cetirizine (zyrTEC) 10 MG tablet, Take 10 mg by mouth  Daily., Disp: , Rfl:   •  clopidogrel (PLAVIX) 75 MG tablet, Take 1 tablet by mouth Daily., Disp: 30 tablet, Rfl: 5  •  colestipol (COLESTID) 1 g tablet, Take 1 g by mouth 2 (Two) Times a Day., Disp: , Rfl:   •  divalproex (DEPAKOTE) 500 MG DR tablet, Take 500 mg by mouth Every Night., Disp: , Rfl:   •  Fe Bisgly-Fe Polysac-Vit C (NIFEREX-150 PO), Take 1 capsule by mouth Daily., Disp: , Rfl:   •  finasteride (PROSCAR) 5 MG tablet, Take 5 mg by mouth Every Night., Disp: , Rfl:   •  iron polysaccharides (NIFEREX) 150 MG capsule, Take 1 capsule by mouth Daily., Disp: 30 capsule, Rfl: 3  •  isosorbide mononitrate (IMDUR) 120 MG 24 hr tablet, Take 1 tablet by mouth Every Morning., Disp: 90 tablet, Rfl: 2  •  Melatonin 5 MG capsule, Take 5 mg by mouth Every Night., Disp: 30 each, Rfl: 1  •  memantine (NAMENDA XR) 28 MG capsule sustained-release 24 hr extended release capsule, Take 28 mg by mouth Daily., Disp: , Rfl:   •  metoprolol tartrate (LOPRESSOR) 25 MG tablet, Take 0.5 tablets by mouth Every 12 (Twelve) Hours., Disp: , Rfl:   •  mirtazapine (REMERON) 15 MG tablet, Take 15 mg by mouth Every Night., Disp: , Rfl:   •  nitroglycerin (NITROSTAT) 0.4 MG SL tablet, 1 under the tongue as needed for angina, may repeat q5mins for up three doses, Disp: 25 tablet, Rfl: 2  •  pantoprazole (PROTONIX) 40 MG EC tablet, Take 40 mg by mouth Daily., Disp: , Rfl:   •  ranolazine (RANEXA) 500 MG 12 hr tablet, Take 1 tablet by mouth Every 12 (Twelve) Hours., Disp: , Rfl:       The following portions of the patient's history were reviewed and updated as appropriate: allergies, current medications, past family history, past medical history, past social history, past surgical history and problem list.    Social History     Tobacco Use   • Smoking status: Former Smoker     Packs/day: 3.00     Types: Cigarettes     Quit date: 1982     Years since quittin.7   • Smokeless tobacco: Former User     Quit date: 1986   Vaping Use   •  "Vaping Use: Never used   Substance Use Topics   • Alcohol use: No   • Drug use: No       Review of Systems   Constitutional: Negative for chills and fever.   HENT: Negative for nosebleeds and sore throat.    Respiratory: Negative for cough, hemoptysis and wheezing.    Gastrointestinal: Negative for abdominal pain, hematemesis, hematochezia, melena, nausea and vomiting.   Genitourinary: Negative for dysuria and hematuria.   Neurological: Negative for headaches.     Objective   Vitals:    09/12/22 1026   BP: 121/70   Pulse: 71   SpO2: 98%   Weight: 82.6 kg (182 lb 3.2 oz)   Height: 180.3 cm (70.98\")     Body mass index is 25.42 kg/m².    Constitutional:       Appearance: Well-developed.   Eyes:      Conjunctiva/sclera: Conjunctivae normal.   HENT:      Head: Normocephalic.   Neck:      Thyroid: No thyromegaly.      Vascular: No JVD.      Trachea: No tracheal deviation.   Pulmonary:      Effort: No respiratory distress.      Breath sounds: Normal breath sounds. No wheezing. No rales.   Cardiovascular:      PMI at left midclavicular line. Normal rate. Regular rhythm. Normal S1. Normal S2.      Murmurs: There is no murmur.      No gallop. No click. No rub.   Pulses:     Dorsalis pedis: 1+ bilaterally.     Posterior tibial: 1+ bilaterally.  Edema:     Pretibial: bilateral 1+ edema of the pretibial area.     Ankle: bilateral 1+ edema of the ankle.     Feet: bilateral 1+ edema of the feet.  Abdominal:      General: Bowel sounds are normal.      Palpations: Abdomen is soft. There is no abdominal mass.      Tenderness: There is no abdominal tenderness.   Musculoskeletal:      Cervical back: Normal range of motion and neck supple. Skin:     General: Skin is warm and dry.   Neurological:      Mental Status: Alert and oriented to person, place, and time.      Cranial Nerves: No cranial nerve deficit.         Lab Results   Component Value Date     08/01/2022    K 4.1 08/01/2022     08/01/2022    CO2 24.2 08/01/2022    " BUN 14 08/01/2022    CREATININE 0.93 08/01/2022    GLUCOSE 455 (C) 08/01/2022    CALCIUM 9.1 08/01/2022    AST 17 08/01/2022    ALT 20 08/01/2022    ALKPHOS 132 (H) 08/01/2022    LABIL2 1.4 (L) 01/08/2016     Lab Results   Component Value Date    CKTOTAL 57 10/27/2021     Lab Results   Component Value Date    WBC 8.23 08/01/2022    HGB 12.7 (L) 08/01/2022    HCT 40.6 08/01/2022     08/01/2022     Lab Results   Component Value Date    INR 1.38 (H) 05/29/2022    INR 1.18 (H) 02/02/2022    INR 1.05 10/27/2021     Lab Results   Component Value Date    MG 2.1 05/29/2022     Lab Results   Component Value Date    TSH 3.450 12/21/2021    PSA 0.6 01/28/2022    CHLPL 109 08/12/2015    TRIG 115 05/25/2021    HDL 29 (L) 05/25/2021    LDL 58 05/25/2021      Lab Results   Component Value Date    BNP 55.0 07/16/2016       Assessment & Plan :   Diagnosis Plan   1. ASCVD (arteriosclerotic cardiovascular disease), s/p stenting of 80 % stenosis in left circumflex on 10/05/16and 60% stenosis in the LAD, clinically stable.      2. Angina pectoris      3. 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     4. Essential hypertension, controlled.     5. History of pulmonary embolism in February 22, patient is on Eliquis.          Recommendations:  1. For coronary artery disease, continue with Plavix, metoprolol and ranolazine.  2. For his pulmonary embolism, continue with Eliquis.    Return in about 5 months (around 2/12/2023).    As always, Camila Ortiz APRN  I appreciate very much the opportunity to participate in the cardiovascular care of your patients. Please do not hesitate to call me with any questions with regards to Fidencio Luciano's evaluation and management.       With Best Regards,        Giovanni Buenrostro MD, FACC    Please note that portions of this note were completed with a voice recognition program.

## 2022-10-13 ENCOUNTER — OFFICE VISIT (OUTPATIENT)
Dept: GASTROENTEROLOGY | Facility: CLINIC | Age: 77
End: 2022-10-13

## 2022-10-13 VITALS
DIASTOLIC BLOOD PRESSURE: 71 MMHG | WEIGHT: 182 LBS | SYSTOLIC BLOOD PRESSURE: 137 MMHG | HEART RATE: 64 BPM | HEIGHT: 71 IN | OXYGEN SATURATION: 97 % | BODY MASS INDEX: 25.48 KG/M2

## 2022-10-13 DIAGNOSIS — D50.0 IRON DEFICIENCY ANEMIA DUE TO CHRONIC BLOOD LOSS: Primary | ICD-10-CM

## 2022-10-13 PROCEDURE — 84466 ASSAY OF TRANSFERRIN: CPT | Performed by: INTERNAL MEDICINE

## 2022-10-13 PROCEDURE — 85025 COMPLETE CBC W/AUTO DIFF WBC: CPT | Performed by: INTERNAL MEDICINE

## 2022-10-13 PROCEDURE — 83540 ASSAY OF IRON: CPT | Performed by: INTERNAL MEDICINE

## 2022-10-13 PROCEDURE — 99213 OFFICE O/P EST LOW 20 MIN: CPT | Performed by: INTERNAL MEDICINE

## 2022-10-13 NOTE — PROGRESS NOTES
Subjective   Fidencio Luciano is a 76 y.o. male who presents to the office today as a follow up appointment regarding Anemia      History of Present Illness:  The patient presents for follow up diarrhea and ERROL. His wife states he is still having diarrhea after he eats. It does not seem to be daily. He has gained weight since being at the NH.  His wife states he cleans his plate and gets plenty of food and snacks. She had observed him have an episode of fecal incontinence at the nursing home.  A recent EGD and colonoscopy did not reveal bleeding.  Recent swallow study revealed normal swallowing.  He is on a regular diet now.  His last set of labs showed improvement in ERROL.  No BRBPR or melena.  An EGD/colonoscopy was performed in May which was unrevealing for GI blood loss.         Review of Systems:  Review of Systems   Constitutional: Positive for unexpected weight change. Negative for fever.   HENT: Negative for trouble swallowing.    Eyes: Negative.    Respiratory: Negative for chest tightness.    Cardiovascular: Negative for chest pain.   Gastrointestinal: Positive for abdominal distention and diarrhea. Negative for abdominal pain, anal bleeding, blood in stool, constipation, nausea, rectal pain and vomiting.   Endocrine: Negative.    Genitourinary: Negative for difficulty urinating.   Musculoskeletal: Negative.    Skin: Negative.    Allergic/Immunologic: Negative.    Neurological: Negative for headaches.   Hematological: Bruises/bleeds easily.   Psychiatric/Behavioral: Negative.        Past Medical History:  Past Medical History:   Diagnosis Date   • BPH (benign prostatic hyperplasia)    • Bradycardia     Positive tilt table testing 07/2016   • Coronary artery disease    • Diabetes mellitus (HCC)    • Diverticulosis    • Elevated cholesterol    • Gastric ulcer with hemorrhage    • Gastroesophageal reflux disease 1/26/2021   • History of transfusion    • Hyperlipidemia    • Hypertension    • Psoriasis        Past  Surgical History:  Past Surgical History:   Procedure Laterality Date   • BACK SURGERY     • CARDIAC CATHETERIZATION N/A 9/20/2016    Procedure: Left Heart Cath;  Surgeon: Giovanni Buenrostro MD;  Location:  [facility];  Service:    • CARDIAC CATHETERIZATION N/A 10/4/2016    Procedure: Left Heart Cath;  Surgeon: Bharathi Andrew MD;  [facility];  Service:    • CARDIAC CATHETERIZATION N/A 5/9/2017    Procedure: Left Heart Cath;  Surgeon: Giovanni Buenrostro MD;  [facility];  Service:    • CARDIAC CATHETERIZATION N/A 5/9/2017    Procedure: ERR;  Surgeon: Giovanni Buenrostro MD;  [facility];  Service:    • CARDIAC CATHETERIZATION N/A 11/8/2019    Procedure: Left Heart Cath;  Surgeon: Abraham Oviedo MD;  Location:  [facility];  Service: Cardiology   • CARDIAC CATHETERIZATION N/A 12/18/2019    Procedure: Coronary angiography;  Surgeon: Jay Barney IV, MD;  Location:  [facility];  Service: Cardiovascular   • CHOLECYSTECTOMY     • COLONOSCOPY  11/13/2015    Dr. Martinez- internal hemorrhoids, sigmoid diverticulosis   • COLONOSCOPY N/A 8/17/2018    Procedure: COLONOSCOPY CPT CODE: 38912;  Surgeon: Gigi Geronimo MD;  Location: [facility];  Service: Gastroenterology   • COLONOSCOPY N/A 5/17/2022    Procedure: COLONOSCOPY;  Surgeon: Geno Vernon MD;  Location: [facility] OR;  Service: Gastroenterology;  Laterality: N/A;   • CORONARY ANGIOPLASTY WITH STENT PLACEMENT     • ENDOSCOPY N/A 8/17/2018    Procedure: ESOPHAGOGASTRODUODENOSCOPY WITH BIOPSY CPT CODE: 12476;  Surgeon: Gigi Geronimo MD;  Location: [facility] OR;  Service: Gastroenterology   • ENDOSCOPY N/A 4/20/2021    Procedure: ESOPHAGOGASTRODUODENOSCOPY;  Surgeon: Geno Vernon MD;  Location: [facility] OR;  Service: Gastroenterology;  Laterality: N/A;   • ENDOSCOPY N/A 5/17/2022    Procedure:  ESOPHAGOGASTRODUODENOSCOPY WITH BIOPSY;  Surgeon: Geno Vernon MD;  Location:  [facility];  Service: Gastroenterology;  Laterality: N/A;   • INTERVENTIONAL RADIOLOGY PROCEDURE N/A 2019    Procedure: Coil Embolization of septal to pulmonary artery fistuala.;  Surgeon: Jay Barney IV, MD;  Location:  [facility];  Service: Cardiovascular   • TN RT/LT HEART CATHETERS N/A 2017    Procedure: Percutaneous Coronary Intervention;  Surgeon: Lincoln De Los Santos MD;  [facility]:  [facility];  Service: Cardiology   • STOMACH SURGERY      FUSION OF BLEEDING ULCER   • UPPER GASTROINTESTINAL ENDOSCOPY  2015    Dr. Martinez- mild gastritis       Family History:  Family History   Problem Relation Age of Onset   • Sick sinus syndrome Mother    • Heart failure Mother    • Diabetes Mother    • Liver cancer Father        Social History:  Social History     Socioeconomic History   • Marital status:    Tobacco Use   • Smoking status: Former     Packs/day: 3.00     Types: Cigarettes     Quit date:      Years since quittin.8   • Smokeless tobacco: Former     Quit date: 1986   Vaping Use   • Vaping Use: Never used   Substance and Sexual Activity   • Alcohol use: No   • Drug use: No   • Sexual activity: Defer       Current Medication List:    Current Outpatient Medications:   •  albuterol sulfate  (90 Base) MCG/ACT inhaler, Inhale 2 puffs 4 (Four) Times a Day As Needed for Wheezing., Disp: , Rfl:   •  apixaban (ELIQUIS) 5 MG tablet tablet, Take 5 mg by mouth Every 12 (Twelve) Hours., Disp: , Rfl:   •  atorvastatin (LIPITOR) 80 MG tablet, Take 1 tablet by mouth Every Night., Disp: 90 tablet, Rfl: 5  •  budesonide-formoterol (SYMBICORT) 80-4.5 MCG/ACT inhaler, Inhale 2 puffs 2 (Two) Times a Day., Disp: 10.2 g, Rfl: 5  •  cetirizine (zyrTEC) 10 MG tablet, Take 10 mg by mouth Daily., Disp: , Rfl:   •  clopidogrel (PLAVIX) 75 MG tablet, Take 1 tablet by  "mouth Daily., Disp: 30 tablet, Rfl: 5  •  colestipol (COLESTID) 1 g tablet, Take 1 g by mouth 2 (Two) Times a Day., Disp: , Rfl:   •  divalproex (DEPAKOTE) 500 MG DR tablet, Take 500 mg by mouth Every Night., Disp: , Rfl:   •  Fe Bisgly-Fe Polysac-Vit C (NIFEREX-150 PO), Take 1 capsule by mouth Daily., Disp: , Rfl:   •  finasteride (PROSCAR) 5 MG tablet, Take 5 mg by mouth Every Night., Disp: , Rfl:   •  iron polysaccharides (NIFEREX) 150 MG capsule, Take 1 capsule by mouth Daily., Disp: 30 capsule, Rfl: 3  •  isosorbide mononitrate (IMDUR) 120 MG 24 hr tablet, Take 1 tablet by mouth Every Morning., Disp: 90 tablet, Rfl: 2  •  Melatonin 5 MG capsule, Take 5 mg by mouth Every Night., Disp: 30 each, Rfl: 1  •  memantine (NAMENDA XR) 28 MG capsule sustained-release 24 hr extended release capsule, Take 28 mg by mouth Daily., Disp: , Rfl:   •  metoprolol tartrate (LOPRESSOR) 25 MG tablet, Take 0.5 tablets by mouth Every 12 (Twelve) Hours., Disp: , Rfl:   •  mirtazapine (REMERON) 15 MG tablet, Take 15 mg by mouth Every Night., Disp: , Rfl:   •  nitroglycerin (NITROSTAT) 0.4 MG SL tablet, 1 under the tongue as needed for angina, may repeat q5mins for up three doses, Disp: 25 tablet, Rfl: 2  •  pantoprazole (PROTONIX) 40 MG EC tablet, Take 40 mg by mouth Daily., Disp: , Rfl:   •  ranolazine (RANEXA) 500 MG 12 hr tablet, Take 1 tablet by mouth Every 12 (Twelve) Hours., Disp: , Rfl:     Allergies:   Patient has no known allergies.    Vitals:  /71   Pulse 64   Ht 180.3 cm (71\")   Wt 82.6 kg (182 lb)   SpO2 97%   BMI 25.38 kg/m²     Physical Exam:  Physical Exam  Constitutional:       Appearance: He is normal weight.   HENT:      Head: Normocephalic and atraumatic.      Nose: Nose normal. No congestion or rhinorrhea.   Eyes:      General: No scleral icterus.     Extraocular Movements: Extraocular movements intact.      Conjunctiva/sclera: Conjunctivae normal.      Pupils: Pupils are equal, round, and reactive to " light.   Cardiovascular:      Rate and Rhythm: Normal rate and regular rhythm.      Pulses: Normal pulses.      Heart sounds: Normal heart sounds.   Pulmonary:      Effort: Pulmonary effort is normal.      Breath sounds: Normal breath sounds.   Abdominal:      General: Abdomen is flat. Bowel sounds are normal. There is no distension.      Palpations: Abdomen is soft. There is no shifting dullness, fluid wave, hepatomegaly, splenomegaly, mass or pulsatile mass.      Tenderness: There is no abdominal tenderness. There is no guarding or rebound.      Hernia: No hernia is present.   Musculoskeletal:         General: No swelling or tenderness.      Cervical back: Normal range of motion and neck supple.      Comments: Uses wheelchair for stability   Skin:     General: Skin is warm and dry.      Coloration: Skin is not jaundiced.   Neurological:      General: No focal deficit present.      Mental Status: He is alert and oriented to person, place, and time.   Psychiatric:         Mood and Affect: Mood normal.         Behavior: Behavior normal.         Results Review:  Lab Results:   No visits with results within 1 Month(s) from this visit.   Latest known visit with results is:   Office Visit on 08/23/2022   Component Date Value Ref Range Status   • Color 08/23/2022 Yellow  Yellow, Straw, Dark Yellow, Andra Final   • Clarity, UA 08/23/2022 Clear  Clear Final   • Specific Gravity  08/23/2022 1.020  1.005 - 1.030 Final   • pH, Urine 08/23/2022 5.5  5.0 - 8.0 Final   • Leukocytes 08/23/2022 Negative  Negative Final   • Nitrite, UA 08/23/2022 Negative  Negative Final   • Protein, POC 08/23/2022 Negative  Negative mg/dL Final   • Glucose, UA 08/23/2022 3+ (A)  Negative mg/dL Final   • Ketones, UA 08/23/2022 Negative  Negative Final   • Urobilinogen, UA 08/23/2022 Normal  Normal, 0.2 E.U./dL Final   • Bilirubin 08/23/2022 1 mg/dL (A)  Negative Final   • Blood, UA 08/23/2022 Negative  Negative Final   • Lot Number 08/23/2022  [id number]   Final   • Expiration Date 08/23/2022 122,123   Final       Assessment & Plan     Visit Diagnoses:    ICD-10-CM ICD-9-CM   1. Iron deficiency anemia due to chronic blood loss  D50.0 280.0       Plan:  Orders Placed This Encounter   Procedures   • Iron Profile   • CBC & Differential       The patient will have labs drawn today.  He is doing well.  He has no complaints.  He does not always experience diarrhea. I will continue to monitor this. He has gained weight with regular meals.  I will check his labs to make sure anemia is improved.        MEDS ORDERED DURING VISIT:  No orders of the defined types were placed in this encounter.      Return in about 3 months (around 1/13/2023).             This document has been electronically signed by Geno Vernon MD   October 13, 2022 13:37 EDT      Part of this note may be an electronic transcription/translation of spoken language to printed text using the Dragon Dictation System.

## 2022-10-14 LAB
BASOPHILS # BLD AUTO: 0.05 10*3/MM3 (ref 0–0.2)
BASOPHILS NFR BLD AUTO: 0.6 % (ref 0–1.5)
DEPRECATED RDW RBC AUTO: 44.2 FL (ref 37–54)
EOSINOPHIL # BLD AUTO: 0.3 10*3/MM3 (ref 0–0.4)
EOSINOPHIL NFR BLD AUTO: 3.8 % (ref 0.3–6.2)
ERYTHROCYTE [DISTWIDTH] IN BLOOD BY AUTOMATED COUNT: 13.1 % (ref 12.3–15.4)
HCT VFR BLD AUTO: 33.4 % (ref 37.5–51)
HGB BLD-MCNC: 10.6 G/DL (ref 13–17.7)
IMM GRANULOCYTES # BLD AUTO: 0.05 10*3/MM3 (ref 0–0.05)
IMM GRANULOCYTES NFR BLD AUTO: 0.6 % (ref 0–0.5)
IRON 24H UR-MRATE: 106 MCG/DL (ref 59–158)
IRON SATN MFR SERPL: 20 % (ref 20–50)
LYMPHOCYTES # BLD AUTO: 2.07 10*3/MM3 (ref 0.7–3.1)
LYMPHOCYTES NFR BLD AUTO: 26.2 % (ref 19.6–45.3)
MCH RBC QN AUTO: 29.5 PG (ref 26.6–33)
MCHC RBC AUTO-ENTMCNC: 31.7 G/DL (ref 31.5–35.7)
MCV RBC AUTO: 93 FL (ref 79–97)
MONOCYTES # BLD AUTO: 0.94 10*3/MM3 (ref 0.1–0.9)
MONOCYTES NFR BLD AUTO: 11.9 % (ref 5–12)
NEUTROPHILS NFR BLD AUTO: 4.49 10*3/MM3 (ref 1.7–7)
NEUTROPHILS NFR BLD AUTO: 56.9 % (ref 42.7–76)
NRBC BLD AUTO-RTO: 0 /100 WBC (ref 0–0.2)
PLATELET # BLD AUTO: 273 10*3/MM3 (ref 140–450)
PMV BLD AUTO: 11.5 FL (ref 6–12)
RBC # BLD AUTO: 3.59 10*6/MM3 (ref 4.14–5.8)
TIBC SERPL-MCNC: 524 MCG/DL (ref 298–536)
TRANSFERRIN SERPL-MCNC: 352 MG/DL (ref 200–360)
WBC NRBC COR # BLD: 7.9 10*3/MM3 (ref 3.4–10.8)

## 2022-10-29 ENCOUNTER — LAB REQUISITION (OUTPATIENT)
Dept: LAB | Facility: HOSPITAL | Age: 77
End: 2022-10-29

## 2022-10-29 DIAGNOSIS — J18.8 OTHER PNEUMONIA, UNSPECIFIED ORGANISM: ICD-10-CM

## 2022-10-29 DIAGNOSIS — I25.10 ATHEROSCLEROTIC HEART DISEASE OF NATIVE CORONARY ARTERY WITHOUT ANGINA PECTORIS: ICD-10-CM

## 2022-10-29 DIAGNOSIS — M62.81 MUSCLE WEAKNESS (GENERALIZED): ICD-10-CM

## 2022-10-29 LAB — HEMOCCULT STL QL: NEGATIVE

## 2022-10-29 PROCEDURE — 82272 OCCULT BLD FECES 1-3 TESTS: CPT | Performed by: INTERNAL MEDICINE

## 2022-11-22 ENCOUNTER — OFFICE VISIT (OUTPATIENT)
Dept: ONCOLOGY | Facility: CLINIC | Age: 77
End: 2022-11-22

## 2022-11-22 ENCOUNTER — LAB (OUTPATIENT)
Dept: ONCOLOGY | Facility: CLINIC | Age: 77
End: 2022-11-22

## 2022-11-22 VITALS
SYSTOLIC BLOOD PRESSURE: 156 MMHG | TEMPERATURE: 97.3 F | HEART RATE: 86 BPM | RESPIRATION RATE: 18 BRPM | BODY MASS INDEX: 25.2 KG/M2 | OXYGEN SATURATION: 96 % | WEIGHT: 180 LBS | HEIGHT: 71 IN | DIASTOLIC BLOOD PRESSURE: 71 MMHG

## 2022-11-22 DIAGNOSIS — D50.9 IRON DEFICIENCY ANEMIA, UNSPECIFIED IRON DEFICIENCY ANEMIA TYPE: Primary | ICD-10-CM

## 2022-11-22 DIAGNOSIS — E53.8 B12 DEFICIENCY: ICD-10-CM

## 2022-11-22 DIAGNOSIS — D50.9 IRON DEFICIENCY ANEMIA, UNSPECIFIED IRON DEFICIENCY ANEMIA TYPE: ICD-10-CM

## 2022-11-22 DIAGNOSIS — D64.9 ANEMIA, UNSPECIFIED TYPE: ICD-10-CM

## 2022-11-22 DIAGNOSIS — D72.829 LEUKOCYTOSIS, UNSPECIFIED TYPE: ICD-10-CM

## 2022-11-22 DIAGNOSIS — J32.9 RECURRENT SINUSITIS: ICD-10-CM

## 2022-11-22 LAB
ALBUMIN SERPL-MCNC: 3.64 G/DL (ref 3.5–5.2)
ALBUMIN/GLOB SERPL: 0.9 G/DL
ALP SERPL-CCNC: 127 U/L (ref 39–117)
ALT SERPL W P-5'-P-CCNC: 28 U/L (ref 1–41)
ANION GAP SERPL CALCULATED.3IONS-SCNC: 12.7 MMOL/L (ref 5–15)
AST SERPL-CCNC: 39 U/L (ref 1–40)
BASOPHILS # BLD AUTO: 0.06 10*3/MM3 (ref 0–0.2)
BASOPHILS NFR BLD AUTO: 0.5 % (ref 0–1.5)
BILIRUB SERPL-MCNC: 0.3 MG/DL (ref 0–1.2)
BUN SERPL-MCNC: 30 MG/DL (ref 8–23)
BUN/CREAT SERPL: 25.4 (ref 7–25)
CALCIUM SPEC-SCNC: 8.8 MG/DL (ref 8.6–10.5)
CHLORIDE SERPL-SCNC: 107 MMOL/L (ref 98–107)
CO2 SERPL-SCNC: 23.3 MMOL/L (ref 22–29)
CREAT SERPL-MCNC: 1.18 MG/DL (ref 0.76–1.27)
DEPRECATED RDW RBC AUTO: 54.4 FL (ref 37–54)
EGFRCR SERPLBLD CKD-EPI 2021: 64 ML/MIN/1.73
EOSINOPHIL # BLD AUTO: 0.04 10*3/MM3 (ref 0–0.4)
EOSINOPHIL NFR BLD AUTO: 0.3 % (ref 0.3–6.2)
ERYTHROCYTE [DISTWIDTH] IN BLOOD BY AUTOMATED COUNT: 15.4 % (ref 12.3–15.4)
FERRITIN SERPL-MCNC: 64.62 NG/ML (ref 30–400)
GLOBULIN UR ELPH-MCNC: 4.3 GM/DL
GLUCOSE SERPL-MCNC: 243 MG/DL (ref 65–99)
HCT VFR BLD AUTO: 38.8 % (ref 37.5–51)
HGB BLD-MCNC: 11.6 G/DL (ref 13–17.7)
IMM GRANULOCYTES # BLD AUTO: 0.07 10*3/MM3 (ref 0–0.05)
IMM GRANULOCYTES NFR BLD AUTO: 0.6 % (ref 0–0.5)
IRON 24H UR-MRATE: 27 MCG/DL (ref 59–158)
IRON SATN MFR SERPL: 6 % (ref 20–50)
LYMPHOCYTES # BLD AUTO: 1.72 10*3/MM3 (ref 0.7–3.1)
LYMPHOCYTES NFR BLD AUTO: 13.6 % (ref 19.6–45.3)
MCH RBC QN AUTO: 28.6 PG (ref 26.6–33)
MCHC RBC AUTO-ENTMCNC: 29.9 G/DL (ref 31.5–35.7)
MCV RBC AUTO: 95.8 FL (ref 79–97)
MONOCYTES # BLD AUTO: 1.79 10*3/MM3 (ref 0.1–0.9)
MONOCYTES NFR BLD AUTO: 14.2 % (ref 5–12)
NEUTROPHILS NFR BLD AUTO: 70.8 % (ref 42.7–76)
NEUTROPHILS NFR BLD AUTO: 8.97 10*3/MM3 (ref 1.7–7)
NRBC BLD AUTO-RTO: 0 /100 WBC (ref 0–0.2)
PLATELET # BLD AUTO: 346 10*3/MM3 (ref 140–450)
PMV BLD AUTO: 9.7 FL (ref 6–12)
POTASSIUM SERPL-SCNC: 4.7 MMOL/L (ref 3.5–5.2)
PROT SERPL-MCNC: 7.9 G/DL (ref 6–8.5)
RBC # BLD AUTO: 4.05 10*6/MM3 (ref 4.14–5.8)
SODIUM SERPL-SCNC: 143 MMOL/L (ref 136–145)
TIBC SERPL-MCNC: 453 MCG/DL (ref 298–536)
TRANSFERRIN SERPL-MCNC: 304 MG/DL (ref 200–360)
WBC NRBC COR # BLD: 12.65 10*3/MM3 (ref 3.4–10.8)

## 2022-11-22 PROCEDURE — 99214 OFFICE O/P EST MOD 30 MIN: CPT | Performed by: NURSE PRACTITIONER

## 2022-11-22 PROCEDURE — 85025 COMPLETE CBC W/AUTO DIFF WBC: CPT | Performed by: NURSE PRACTITIONER

## 2022-11-22 PROCEDURE — 82728 ASSAY OF FERRITIN: CPT | Performed by: NURSE PRACTITIONER

## 2022-11-22 PROCEDURE — 82607 VITAMIN B-12: CPT | Performed by: NURSE PRACTITIONER

## 2022-11-22 PROCEDURE — 84466 ASSAY OF TRANSFERRIN: CPT | Performed by: NURSE PRACTITIONER

## 2022-11-22 PROCEDURE — 80053 COMPREHEN METABOLIC PANEL: CPT | Performed by: NURSE PRACTITIONER

## 2022-11-22 PROCEDURE — 83540 ASSAY OF IRON: CPT | Performed by: NURSE PRACTITIONER

## 2022-11-22 RX ORDER — SODIUM CHLORIDE 9 MG/ML
250 INJECTION, SOLUTION INTRAVENOUS ONCE
Status: CANCELLED | OUTPATIENT
Start: 2022-12-20

## 2022-11-22 RX ORDER — SODIUM CHLORIDE 9 MG/ML
250 INJECTION, SOLUTION INTRAVENOUS ONCE
Status: CANCELLED | OUTPATIENT
Start: 2022-12-13

## 2022-11-22 RX ORDER — AMOXICILLIN AND CLAVULANATE POTASSIUM 875; 125 MG/1; MG/1
1 TABLET, FILM COATED ORAL 2 TIMES DAILY
Qty: 20 TABLET | Refills: 0 | Status: SHIPPED | OUTPATIENT
Start: 2022-11-22 | End: 2022-12-02

## 2022-11-22 NOTE — PROGRESS NOTES
DATE:  11/22/2022    REASON FOR FOLLOW UP: Anemia    REFERRING PHYSICIAN:  Dr. Riley    CHIEF COMPLAINT:   Follow up of anemia     HISTORY OF PRESENT ILLNESS:   Fidencio Luciano is a very pleasant 77 y.o. male who is being seen today at the request of Gideon Charles MD   for evaluation and treatment of Anemia. Mr. Luciano reports following with his PCP routinely with repeat labs every 4-6 months. He says he has had chronic anemia which was recently worsening in June. Previous available CBCs were reviewed and patient has had normocytic anemia with Hg ranging 11-13 g/dL since July 2015. CBC from June 2018 showed drop in Hg to 10.0. Patient's WBC and platelets have been in the normal range. Patient reports intermittent dark stool and bleeding per rectum for the past few months. He reports having upper/lower endoscopy approximately 3 years ago with Dr. Martinez which was negative for active bleeding. He says his PCP has referred him to Dr. Unger and he will see him next week. Patient denies any recent blood transfusions. His main complaint today is ongoing fatigue which has been recently worsening. He also reports decreased appetite and weight loss but he is unsure how much weight he has lost.   -Patient was previously being followed for ERROL and was taking oral iron therapy, ferrous sulfate TID which he was tolerating well but was lost in follow up and last seen in March 2019 and planned to follow up 4 months later but did not return for follow up. 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 He is currently in [facility] for rehab and following with Dr. Riley. Recent CBCs were reviewed and since March 2021, he has had intermittent leukocytosis with fluctuation of counts but WBC has remained consistently elevated since ~October - November 2021 ranging ~11-17, predominantly neutrophils. Workup in hospital was negative for infectious process. He has had chronic  macrocytosis with occasional mild anemia. He remains on ferrous sulfate BID which he tolerates well. 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 His wife says he also struggles with frequent sinus infections, none currently. Patient really has no specific complaints today other than chronic fatigue. Denies obvious bleeding from any source. No fevers/chills or drenching NS. He has had poor appetite and now follows with GI. He is taking megace and drinking supplemental ensure with improvement/weight gain.     Interval History:  Mr. Luciano presents today for follow up with his wife and caregiver from NH. Since his last visit, he has been about the same. He remains on Niferex which he is tolerating well. He continues to follow with GI. He continues to have dark stool. Denies rectal bleeding. He complains of chronic fatigue and weakness. He complains of sinus tenderness, congestion and cough today. He denies having fever. He has no other complaints today.     PAST MEDICAL HISTORY:  Past Medical History:   Diagnosis Date   • BPH (benign prostatic hyperplasia)    • Bradycardia     Positive tilt table testing 07/2016   • Coronary artery disease    • Diabetes mellitus (HCC)    • Diverticulosis    • Elevated cholesterol    • Gastric ulcer with hemorrhage    • Gastroesophageal reflux disease 1/26/2021   • History of transfusion    • Hyperlipidemia    • Hypertension    • Psoriasis        PAST SURGICAL HISTORY:  Past Surgical History:   Procedure Laterality Date   • BACK SURGERY     • CARDIAC CATHETERIZATION N/A 9/20/2016    Procedure: Left Heart Cath;  Surgeon: Giovanni Buenrostro MD;  Location: [facility] INVASIVE LOCATION;  Service:    • CARDIAC CATHETERIZATION N/A 10/4/2016    Procedure: Left Heart Cath;  Surgeon: Bharathi Andrew MD;  Location: [facility] INVASIVE LOCATION;  Service:    • CARDIAC CATHETERIZATION N/A 5/9/2017    Procedure: Left Heart Cath;  Surgeon: Giovanni Buenrostro MD;   [facility] INV[facility];  Service:    • CARDIAC CATHETERIZATION N/A 5/9/2017    Procedure: ERR;  Surgeon: Giovanni Buenrostro MD;  [facility];  Service:    • CARDIAC CATHETERIZATION N/A 11/8/2019    Procedure: Left Heart Cath;  Surgeon: Abraham Oviedo MD;  [facility];  Service: Cardiology   • CARDIAC CATHETERIZATION N/A 12/18/2019    Procedure: Coronary angiography;  Surgeon: Jay Barney IV, MD;  Location:  [facility];  Service: Cardiovascular   • CHOLECYSTECTOMY     • COLONOSCOPY  11/13/2015    Dr. Martinez- internal hemorrhoids, sigmoid diverticulosis   • COLONOSCOPY N/A 8/17/2018    Procedure: COLONOSCOPY CPT CODE: 41967;  Surgeon: Gigi Geronimo MD;  Location:  [facility];  Service: Gastroenterology   • COLONOSCOPY N/A 5/17/2022    Procedure: COLONOSCOPY;  Surgeon: Geno Vernon MD;  Location:  [facility];  Service: Gastroenterology;  Laterality: N/A;   • CORONARY ANGIOPLASTY WITH STENT PLACEMENT     • ENDOSCOPY N/A 8/17/2018    Procedure: ESOPHAGOGASTRODUODENOSCOPY WITH BIOPSY CPT CODE: 85268;  Surgeon: Gigi Geronimo MD;  Location:  [facility];  Service: Gastroenterology   • ENDOSCOPY N/A 4/20/2021    Procedure: ESOPHAGOGASTRODUODENOSCOPY;  Surgeon: Geno Vernon MD;  Location:  [facility];  Service: Gastroenterology;  Laterality: N/A;   • ENDOSCOPY N/A 5/17/2022    Procedure: ESOPHAGOGASTRODUODENOSCOPY WITH BIOPSY;  Surgeon: Geno Vernon MD;  Location:  [facility];  Service: Gastroenterology;  Laterality: N/A;   • INTERVENTIONAL RADIOLOGY PROCEDURE N/A 12/18/2019    Procedure: Coil Embolization of septal to pulmonary artery fistuala.;  Surgeon: Jay Barney IV, MD;  Location:  [facility];  Service: Cardiovascular   • KS RT/LT HEART CATHETERS N/A 5/9/2017    Procedure: Percutaneous Coronary Intervention;  Surgeon: Lincoln De Los Santos MD;   Location: [facility] INVASIVE LOCATION;  Service: Cardiology   • STOMACH SURGERY      FUSION OF BLEEDING ULCER   • UPPER GASTROINTESTINAL ENDOSCOPY  2015    Dr. Martinez- mild gastritis       FAMILY HISTORY:  Family History   Problem Relation Age of Onset   • Sick sinus syndrome Mother    • Heart failure Mother    • Diabetes Mother    • Liver cancer Father        SOCIAL HISTORY:  Social History     Socioeconomic History   • Marital status:    Tobacco Use   • Smoking status: Former     Packs/day: 3.00     Types: Cigarettes     Quit date:      Years since quittin.9   • Smokeless tobacco: Former     Quit date: 1986   Vaping Use   • Vaping Use: Never used   Substance and Sexual Activity   • Alcohol use: No   • Drug use: No   • Sexual activity: Defer     MEDICATIONS:  The current medication list was reviewed in the EMR    Current Outpatient Medications:   •  albuterol sulfate  (90 Base) MCG/ACT inhaler, Inhale 2 puffs 4 (Four) Times a Day As Needed for Wheezing., Disp: , Rfl:   •  apixaban (ELIQUIS) 5 MG tablet tablet, Take 5 mg by mouth Every 12 (Twelve) Hours., Disp: , Rfl:   •  atorvastatin (LIPITOR) 80 MG tablet, Take 1 tablet by mouth Every Night., Disp: 90 tablet, Rfl: 5  •  budesonide-formoterol (SYMBICORT) 80-4.5 MCG/ACT inhaler, Inhale 2 puffs 2 (Two) Times a Day., Disp: 10.2 g, Rfl: 5  •  cetirizine (zyrTEC) 10 MG tablet, Take 10 mg by mouth Daily., Disp: , Rfl:   •  clopidogrel (PLAVIX) 75 MG tablet, Take 1 tablet by mouth Daily., Disp: 30 tablet, Rfl: 5  •  colestipol (COLESTID) 1 g tablet, Take 1 g by mouth 2 (Two) Times a Day., Disp: , Rfl:   •  divalproex (DEPAKOTE) 500 MG DR tablet, Take 500 mg by mouth Every Night., Disp: , Rfl:   •  Fe Bisgly-Fe Polysac-Vit C (NIFEREX-150 PO), Take 1 capsule by mouth Daily., Disp: , Rfl:   •  finasteride (PROSCAR) 5 MG tablet, Take 5 mg by mouth Every Night., Disp: , Rfl:   •  iron polysaccharides (NIFEREX) 150 MG capsule, Take 1 capsule  "by mouth Daily., Disp: 30 capsule, Rfl: 3  •  isosorbide mononitrate (IMDUR) 120 MG 24 hr tablet, Take 1 tablet by mouth Every Morning., Disp: 90 tablet, Rfl: 2  •  Melatonin 5 MG capsule, Take 5 mg by mouth Every Night., Disp: 30 each, Rfl: 1  •  memantine (NAMENDA XR) 28 MG capsule sustained-release 24 hr extended release capsule, Take 28 mg by mouth Daily., Disp: , Rfl:   •  metoprolol tartrate (LOPRESSOR) 25 MG tablet, Take 0.5 tablets by mouth Every 12 (Twelve) Hours., Disp: , Rfl:   •  mirtazapine (REMERON) 15 MG tablet, Take 15 mg by mouth Every Night., Disp: , Rfl:   •  nitroglycerin (NITROSTAT) 0.4 MG SL tablet, 1 under the tongue as needed for angina, may repeat q5mins for up three doses, Disp: 25 tablet, Rfl: 2  •  pantoprazole (PROTONIX) 40 MG EC tablet, Take 40 mg by mouth Daily., Disp: , Rfl:   •  ranolazine (RANEXA) 500 MG 12 hr tablet, Take 1 tablet by mouth Every 12 (Twelve) Hours., Disp: , Rfl:     ALLERGIES:  No Known Allergies    REVIEW OF SYSTEMS:    A comprehensive 14 point review of systems was performed.  Significant findings as mentioned above.  All other systems reviewed and are negative.      Physical Exam   Vital Signs: /71   Pulse 86   Temp 97.3 °F (36.3 °C)   Resp 18   Ht 180.3 cm (71\")   Wt 81.6 kg (180 lb)   SpO2 96%   BMI 25.10 kg/m²    General: Well developed, well nourished, alert and oriented x 3, in no acute distress. Sitting in wheelchair, appears well.  Head: ATNC   Eyes: PERRL, No evidence of conjunctivitis. +sinus tenderness with mild periorbital edema bilaterally  Nose: No nasal discharge.   Mouth: Oral mucosal membranes moist. No oral ulceration or hemorrhages.   Neck: Neck supple. No thyromegaly. No JVD.   Lungs: CTAB   Heart: RRR. No murmurs, rubs, or gallops.   Abdomen: Soft. Bowel sounds are normoactive. Nontender with palpation. No Hepatosplenomegaly can be appreciated.   Extremities: No cyanosis or edema.   Neurologic: Grossly non-focal " exam.    ENDOSCOPY:          EGD/Colonoscopy 08/17/18  Findings: Sliding hiatal hernia, diverticulosis without diverticulitis  Recommendations: Screening colonoscopy in 10 years    RECENT LABS:  Lab Results   Component Value Date    WBC 12.65 (H) 11/22/2022    HGB 11.6 (L) 11/22/2022    HCT 38.8 11/22/2022    MCV 95.8 11/22/2022    RDW 15.4 11/22/2022     11/22/2022    NEUTRORELPCT 70.8 11/22/2022    LYMPHORELPCT 13.6 (L) 11/22/2022    MONORELPCT 14.2 (H) 11/22/2022    EOSRELPCT 0.3 11/22/2022    BASORELPCT 0.5 11/22/2022    NEUTROABS 8.97 (H) 11/22/2022    LYMPHSABS 1.72 11/22/2022       Lab Results   Component Value Date     11/22/2022    K 4.7 11/22/2022    CO2 23.3 11/22/2022     11/22/2022    BUN 30 (H) 11/22/2022    CREATININE 1.18 11/22/2022    EGFRIFNONA 98 02/02/2022    EGFRIFAFRI  09/19/2016      Comment:      <15 Indicative of kidney failure.    GLUCOSE 243 (H) 11/22/2022    CALCIUM 8.8 11/22/2022    ALKPHOS 127 (H) 11/22/2022    AST 39 11/22/2022    ALT 28 11/22/2022    BILITOT 0.3 11/22/2022    ALBUMIN 3.64 11/22/2022    PROTEINTOT 7.9 11/22/2022    MG 2.1 05/29/2022    PHOS 3.9 10/28/2021       Lab Results   Component Value Date/Time     03/13/2021 12:41 PM     Lab Results   Component Value Date    FERRITIN 64.62 11/22/2022    IRON 27 (L) 11/22/2022    TIBC 453 11/22/2022    LABIRON 6 (L) 11/22/2022    QHICMVXJ25 880 08/01/2022    FOLATE 10.60 05/30/2022    HAPTOGLOBIN 112 07/17/2018    RETICCTPCT 0.66 07/17/2018    RETIC 0.0242 07/17/2018 12/21/22  Ferritin   30.00 - 400.00 ng/mL 287.30     Iron   59 - 158 mcg/dL 109          Iron Saturation   20 - 50 % 31          Transferrin   200 - 360 mg/dL 239          TIBC   298 - 536 mcg/dL 356            Vitamin B-12   211 - 946 pg/mL 375      Folate   4.78 - 24.20 ng/mL 12.70     TSH   0.270 - 4.200 uIU/mL 3.450           PBS 07/17/18  Hypochromic normocytic anemia with eosinophilia    Labs 07/17/17  C-Reactive Protein 0.00 -  0.99 mg/dL <0.50      Sed Rate 0 - 20 mm/hr 10       - 225 U/L 163      Iron 53 - 167 mcg/dL 41     TIBC 241 - 421 mcg/dL 445     Iron Saturation 20 - 50 % 9       Ferritin 21.90 - 321.70 ng/mL 11.00       Vitamin B-12 211 - 911 pg/mL 932       Folate 5.40 - 20.00 ng/mL 18.87      ASSESSMENT & PLAN:  Fidencio Luciano is a very pleasant 77 y.o. male with    1.  Macrocytosis  2.  ERROL  3. B12 deficiency     -Patient initially reported intermittent dark stool and bleeding per rectum. Reported upper/lower endoscopy several years ago with Dr. Martinez which was negative for active bleeding.   -Obtained additional workup including repeat CBC which showed stable H/H (Hg 10.2), WBC and platelets were again in the normal range. CMP unremarkable. PBS showed hypochromic normocytic anemia with eosinophilia as above. B12 and Folate replete. CRP and ESR were normal. No evidence of hemolysis with normal Retic, LDH and Haptoglobin. Iron studies were consistent with Iron deficiency. Therefore, started patient on oral iron therapy, ferrous sulfate TID which he was  tolerating well.  -PCP referred him back to GI and underwent upper/lower endoscopy on 08/17/18 which showed no evidence of bleeding. He had improvement in Hg but remained low and iron stores were improving, therefore, continued oral iron and planned to follow up in 4 months. However, he was lost in follow up and was last seen in March 2019 and represented in December 2021 and now follows routinely.   -His iron levels normalized and therefore, discontinued ferrous sulfate given normal Hg and iron was replete. To further evaluate  macrocytosis, obtained repeat B12 and folate which were replete. TSH was normal.   -In the interim, he was started on B12 injections for B12 deficiency per PCP and B12 has been replete.  Obtained repeat B12 today which is pending.   -Hg has been trending down and iron stores were dropping. Therefore, resumed oral iron this time with Niferex 150 mg  PO daily which he says he is tolerating well. Also recommended follow up with GI and had repeat EGD and colonoscopy on 5/17/22 (summarized above). He had 3 polyps removed. The polyp removed from colon was tubular adenoma (precancerous).  Also noted internal hemorrhoids without bleeding. EGD was without evidence of bleeding.   -Repeat CBC from today shows Hg ~11.6 which remains low. Iron stores have also dropped and currently low despite oral replacement. Therefore, will discontinue oral iron and replace with IV iron pending insurance approval.   -Will plan to follow up ~8 weeks after replacement with repeat labs. He was advised to follow up with GI as scheduled.     4. Leukocytosis:  5. Recurrent sinusitis:   -He was previously seen as above and lost in follow up. He represented in December 2021.  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 He is currently in [facility] for rehab and following with Dr. Riley. CBCs were reviewed and since March 2021, he has had intermittent leukocytosis with fluctuation of counts but has consistently remained elevated from ~October - November 2021 ranging ~11-17, predominantly neutrophils. Workup in hospital was negative for infectious process. 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 His wife also reports recurrent sinusitis. He has had weight loss for which he follows with GI and this is improving with megace and supplemental ensure. He otherwise has no other concerning B symptoms.  -Based on history, could be related to possible chronic underlying inflammation plus previous COVID and reported HAV also contributing.   -Obtained repeat CBC when he represented which showed normal WBC. Also sent PBS which showed mild granulocytic left shift, otherwise unremarkable with no blasts or granulocytic dysplasia seen. CRP was normal.   -Repeat CBC from today shows mildly  elevated WBC. Therefore, will give Rx for Augmentin today for recurrent sinusitis.  Also recommended to take daily antihistamine. He will follow up with PCP for ongoing management.   -Will continue with conservative follow up and repeat CBC with next follow up.       ACO / ESPINOZA/Other  Quality measures  -  Fidencio Luciano did not receive 2021 flu vaccine.   -  Fidencio Luciano reports a pain score of 0.    -  Current outpatient and discharge medications have been reconciled for the patient.  Reviewed by: ANDREW Dawson      The patient, wife and caregiver were in agreement with the plan and all questions were answered to his satisfaction.     Thank you so much for allowing us to participate in the care of Fidencio Luciano . Please do not hesitate to contact us with any questions or concerns.     I spent 30 minutes with Fidencio Luciano today.  In the office today, more than 50% of this time was spent face-to-face with him  in counseling / coordination of care, reviewing his interim medical history and counseling on the current treatment plan.  All questions were answered to his satisfaction.       Electronically Signed by: ANDREW Dawson , November 22, 2022 13:36 EST       CC:   Camila Ortiz APRN

## 2022-11-23 LAB — VIT B12 BLD-MCNC: 685 PG/ML (ref 211–946)

## 2023-01-12 ENCOUNTER — OFFICE VISIT (OUTPATIENT)
Dept: GASTROENTEROLOGY | Facility: CLINIC | Age: 78
End: 2023-01-12
Payer: MEDICARE

## 2023-01-12 VITALS
DIASTOLIC BLOOD PRESSURE: 61 MMHG | WEIGHT: 208 LBS | SYSTOLIC BLOOD PRESSURE: 156 MMHG | HEIGHT: 71 IN | BODY MASS INDEX: 29.12 KG/M2 | HEART RATE: 63 BPM

## 2023-01-12 DIAGNOSIS — D50.0 IRON DEFICIENCY ANEMIA DUE TO CHRONIC BLOOD LOSS: Primary | ICD-10-CM

## 2023-01-12 PROCEDURE — 99213 OFFICE O/P EST LOW 20 MIN: CPT | Performed by: INTERNAL MEDICINE

## 2023-01-12 PROCEDURE — 36415 COLL VENOUS BLD VENIPUNCTURE: CPT | Performed by: INTERNAL MEDICINE

## 2023-01-12 PROCEDURE — 83540 ASSAY OF IRON: CPT | Performed by: INTERNAL MEDICINE

## 2023-01-12 PROCEDURE — 84466 ASSAY OF TRANSFERRIN: CPT | Performed by: INTERNAL MEDICINE

## 2023-01-12 PROCEDURE — 82746 ASSAY OF FOLIC ACID SERUM: CPT | Performed by: INTERNAL MEDICINE

## 2023-01-12 PROCEDURE — 85025 COMPLETE CBC W/AUTO DIFF WBC: CPT | Performed by: INTERNAL MEDICINE

## 2023-01-12 PROCEDURE — 82607 VITAMIN B-12: CPT | Performed by: INTERNAL MEDICINE

## 2023-01-12 NOTE — PROGRESS NOTES
"Subjective   Fidencio Luciano is a 77 y.o. male who presents to the office today as a follow up appointment regarding Anemia      History of Present Illness:  The patient presents for follow up ERROL.  He underwent EGD and colonoscopy last May which was normal.  He denies BRBPR or melena.  He has gained weight.  He does have occasional abdominal cramping but is wheelchair bound and does not move about as much as he use to .  His last hgb and hact in November last year was 9.8 and 31.3.  He has not had a pillcam to look at his small bowel.  He did not get his iron infusion because he had the flu at the time of the infusion.  It was not rescheduled. He is on both Eliquis and Plavix. He is on these blood thinners due to 2 cardiac stents and a \"coil\" in his heart.  He has also had DVT. Dr. Buenrostro is his cardiologist.      Review of Systems:  Review of Systems   Constitutional: Positive for unexpected weight change. Negative for fever.   HENT: Negative for trouble swallowing.    Eyes: Negative.    Respiratory: Negative for chest tightness.    Cardiovascular: Negative for chest pain.   Gastrointestinal: Positive for abdominal distention. Negative for abdominal pain, anal bleeding, blood in stool, constipation, diarrhea, nausea, rectal pain and vomiting.   Endocrine: Negative.    Genitourinary: Negative for difficulty urinating.   Musculoskeletal: Negative.    Skin: Negative.    Allergic/Immunologic: Negative.    Neurological: Negative for headaches.   Hematological: Does not bruise/bleed easily.   Psychiatric/Behavioral: Negative.        Past Medical History:  Past Medical History:   Diagnosis Date   • BPH (benign prostatic hyperplasia)    • Bradycardia     Positive tilt table testing 07/2016   • Coronary artery disease    • Diabetes mellitus (HCC)    • Diverticulosis    • Elevated cholesterol    • Gastric ulcer with hemorrhage    • Gastroesophageal reflux disease 1/26/2021   • History of transfusion    • Hyperlipidemia    • " Hypertension    • Psoriasis        Past Surgical History:  Past Surgical History:   Procedure Laterality Date   • BACK SURGERY     • CARDIAC CATHETERIZATION N/A 9/20/2016    Procedure: Left Heart Cath;  Surgeon: Giovanni Buenrostro MD;  Location:  [facility];  Service:    • CARDIAC CATHETERIZATION N/A 10/4/2016    Procedure: Left Heart Cath;  Surgeon: Bharathi Andrew MD;  [facility]:  [facility];  Service:    • CARDIAC CATHETERIZATION N/A 5/9/2017    Procedure: Left Heart Cath;  Surgeon: Giovanni Buenrostro MD;  [facility];  Service:    • CARDIAC CATHETERIZATION N/A 5/9/2017    Procedure: ERR;  Surgeon: Giovanni Buenrostro MD;  [facility];  Service:    • CARDIAC CATHETERIZATION N/A 11/8/2019    Procedure: Left Heart Cath;  Surgeon: Abraham Oviedo MD;  Location:  [facility];  Service: Cardiology   • CARDIAC CATHETERIZATION N/A 12/18/2019    Procedure: Coronary angiography;  Surgeon: Jay Barney IV, MD;  Location:  [facility];  Service: Cardiovascular   • CHOLECYSTECTOMY     • COLONOSCOPY  11/13/2015    Dr. Martinez- internal hemorrhoids, sigmoid diverticulosis   • COLONOSCOPY N/A 8/17/2018    Procedure: COLONOSCOPY CPT CODE: 63599;  Surgeon: Gigi Geronimo MD;  Location: [facility] OR;  Service: Gastroenterology   • COLONOSCOPY N/A 5/17/2022    Procedure: COLONOSCOPY;  Surgeon: Geno Vernon MD;  Location: [facility] OR;  Service: Gastroenterology;  Laterality: N/A;   • CORONARY ANGIOPLASTY WITH STENT PLACEMENT     • ENDOSCOPY N/A 8/17/2018    Procedure: ESOPHAGOGASTRODUODENOSCOPY WITH BIOPSY CPT CODE: 89115;  Surgeon: Gigi Geronimo MD;  Location: [facility] OR;  Service: Gastroenterology   • ENDOSCOPY N/A 4/20/2021    Procedure: ESOPHAGOGASTRODUODENOSCOPY;  Surgeon: Geno Vernon MD;  Location: [facility] OR;  Service: Gastroenterology;  Laterality: N/A;   •  ENDOSCOPY N/A 2022    Procedure: ESOPHAGOGASTRODUODENOSCOPY WITH BIOPSY;  Surgeon: Geno Vernon MD;  Location:  [facility] OR;  Service: Gastroenterology;  Laterality: N/A;   • INTERVENTIONAL RADIOLOGY PROCEDURE N/A 2019    Procedure: Coil Embolization of septal to pulmonary artery fistuala.;  Surgeon: Jay Barney IV, MD;  Location:  [facility];  Service: Cardiovascular   • MS RT/LT HEART CATHETERS N/A 2017    Procedure: Percutaneous Coronary Intervention;  Surgeon: Lincoln De Los Santos MD;  [facility]:  [facility];  Service: Cardiology   • STOMACH SURGERY      FUSION OF BLEEDING ULCER   • UPPER GASTROINTESTINAL ENDOSCOPY  2015    Dr. Martinez- mild gastritis       Family History:  Family History   Problem Relation Age of Onset   • Sick sinus syndrome Mother    • Heart failure Mother    • Diabetes Mother    • Liver cancer Father        Social History:  Social History     Socioeconomic History   • Marital status:    Tobacco Use   • Smoking status: Former     Packs/day: 3.00     Types: Cigarettes     Quit date:      Years since quittin.0   • Smokeless tobacco: Former     Quit date: 1986   Vaping Use   • Vaping Use: Never used   Substance and Sexual Activity   • Alcohol use: No   • Drug use: No   • Sexual activity: Defer       Current Medication List:    Current Outpatient Medications:   •  albuterol sulfate  (90 Base) MCG/ACT inhaler, Inhale 2 puffs 4 (Four) Times a Day As Needed for Wheezing., Disp: , Rfl:   •  apixaban (ELIQUIS) 5 MG tablet tablet, Take 5 mg by mouth Every 12 (Twelve) Hours., Disp: , Rfl:   •  atorvastatin (LIPITOR) 80 MG tablet, Take 1 tablet by mouth Every Night., Disp: 90 tablet, Rfl: 5  •  budesonide-formoterol (SYMBICORT) 80-4.5 MCG/ACT inhaler, Inhale 2 puffs 2 (Two) Times a Day., Disp: 10.2 g, Rfl: 5  •  cetirizine (zyrTEC) 10 MG tablet, Take 10 mg by mouth Daily., Disp: , Rfl:   •  clopidogrel  "(PLAVIX) 75 MG tablet, Take 1 tablet by mouth Daily., Disp: 30 tablet, Rfl: 5  •  colestipol (COLESTID) 1 g tablet, Take 1 g by mouth 2 (Two) Times a Day., Disp: , Rfl:   •  divalproex (DEPAKOTE) 500 MG DR tablet, Take 500 mg by mouth Every Night., Disp: , Rfl:   •  Fe Bisgly-Fe Polysac-Vit C (NIFEREX-150 PO), Take 1 capsule by mouth Daily., Disp: , Rfl:   •  finasteride (PROSCAR) 5 MG tablet, Take 5 mg by mouth Every Night., Disp: , Rfl:   •  iron polysaccharides (NIFEREX) 150 MG capsule, Take 1 capsule by mouth Daily., Disp: 30 capsule, Rfl: 3  •  isosorbide mononitrate (IMDUR) 120 MG 24 hr tablet, Take 1 tablet by mouth Every Morning., Disp: 90 tablet, Rfl: 2  •  Melatonin 5 MG capsule, Take 5 mg by mouth Every Night., Disp: 30 each, Rfl: 1  •  memantine (NAMENDA XR) 28 MG capsule sustained-release 24 hr extended release capsule, Take 28 mg by mouth Daily., Disp: , Rfl:   •  metoprolol tartrate (LOPRESSOR) 25 MG tablet, Take 0.5 tablets by mouth Every 12 (Twelve) Hours., Disp: , Rfl:   •  mirtazapine (REMERON) 15 MG tablet, Take 15 mg by mouth Every Night., Disp: , Rfl:   •  nitroglycerin (NITROSTAT) 0.4 MG SL tablet, 1 under the tongue as needed for angina, may repeat q5mins for up three doses, Disp: 25 tablet, Rfl: 2  •  pantoprazole (PROTONIX) 40 MG EC tablet, Take 40 mg by mouth Daily., Disp: , Rfl:   •  ranolazine (RANEXA) 500 MG 12 hr tablet, Take 1 tablet by mouth Every 12 (Twelve) Hours., Disp: , Rfl:     Allergies:   Patient has no known allergies.    Vitals:  /61   Pulse 63   Ht 180.3 cm (71\")   Wt 94.3 kg (208 lb)   BMI 29.01 kg/m²     Physical Exam:  Physical Exam  Constitutional:       Appearance: He is normal weight.   HENT:      Head: Normocephalic and atraumatic.      Nose: Nose normal. No congestion or rhinorrhea.   Eyes:      General: No scleral icterus.     Extraocular Movements: Extraocular movements intact.      Conjunctiva/sclera: Conjunctivae normal.      Pupils: Pupils are equal, " round, and reactive to light.   Cardiovascular:      Rate and Rhythm: Normal rate and regular rhythm.      Pulses: Normal pulses.      Heart sounds: Normal heart sounds.   Pulmonary:      Effort: Pulmonary effort is normal.      Breath sounds: Normal breath sounds.   Abdominal:      General: Abdomen is flat. Bowel sounds are normal. There is no distension.      Palpations: Abdomen is soft. There is no shifting dullness, fluid wave, hepatomegaly, splenomegaly, mass or pulsatile mass.      Tenderness: There is no abdominal tenderness. There is no guarding or rebound.      Hernia: No hernia is present.   Musculoskeletal:         General: No swelling or tenderness.      Cervical back: Normal range of motion and neck supple.      Right lower leg: Edema present.      Left lower leg: Edema present.      Comments: wheelchair bound   Skin:     General: Skin is warm and dry.      Coloration: Skin is not jaundiced.   Neurological:      General: No focal deficit present.      Mental Status: He is alert and oriented to person, place, and time.   Psychiatric:         Mood and Affect: Mood normal.         Behavior: Behavior normal.         Results Review:  Lab Results:   No visits with results within 1 Month(s) from this visit.   Latest known visit with results is:   Lab on 11/22/2022   Component Date Value Ref Range Status   • Glucose 11/22/2022 243 (H)  65 - 99 mg/dL Final   • BUN 11/22/2022 30 (H)  8 - 23 mg/dL Final   • Creatinine 11/22/2022 1.18  0.76 - 1.27 mg/dL Final   • Sodium 11/22/2022 143  136 - 145 mmol/L Final   • Potassium 11/22/2022 4.7  3.5 - 5.2 mmol/L Final    Slight hemolysis detected by analyzer. Results may be affected.   • Chloride 11/22/2022 107  98 - 107 mmol/L Final   • CO2 11/22/2022 23.3  22.0 - 29.0 mmol/L Final   • Calcium 11/22/2022 8.8  8.6 - 10.5 mg/dL Final   • Total Protein 11/22/2022 7.9  6.0 - 8.5 g/dL Final   • Albumin 11/22/2022 3.64  3.50 - 5.20 g/dL Final   • ALT (SGPT) 11/22/2022 28  1 -  41 U/L Final   • AST (SGOT) 11/22/2022 39  1 - 40 U/L Final   • Alkaline Phosphatase 11/22/2022 127 (H)  39 - 117 U/L Final   • Total Bilirubin 11/22/2022 0.3  0.0 - 1.2 mg/dL Final   • Globulin 11/22/2022 4.3  gm/dL Final   • A/G Ratio 11/22/2022 0.9  g/dL Final   • BUN/Creatinine Ratio 11/22/2022 25.4 (H)  7.0 - 25.0 Final   • Anion Gap 11/22/2022 12.7  5.0 - 15.0 mmol/L Final   • eGFR 11/22/2022 64.0  >60.0 mL/min/1.73 Final    National Kidney Foundation and American Society of Nephrology (ASN) Task Force recommended calculation based on the Chronic Kidney Disease Epidemiology Collaboration (CKD-EPI) equation refit without adjustment for race.   • Ferritin 11/22/2022 64.62  30.00 - 400.00 ng/mL Final   • Iron 11/22/2022 27 (L)  59 - 158 mcg/dL Final   • Iron Saturation 11/22/2022 6 (L)  20 - 50 % Final   • Transferrin 11/22/2022 304  200 - 360 mg/dL Final   • TIBC 11/22/2022 453  298 - 536 mcg/dL Final   • Vitamin B-12 11/22/2022 685  211 - 946 pg/mL Final   • WBC 11/22/2022 12.65 (H)  3.40 - 10.80 10*3/mm3 Final   • RBC 11/22/2022 4.05 (L)  4.14 - 5.80 10*6/mm3 Final   • Hemoglobin 11/22/2022 11.6 (L)  13.0 - 17.7 g/dL Final   • Hematocrit 11/22/2022 38.8  37.5 - 51.0 % Final   • MCV 11/22/2022 95.8  79.0 - 97.0 fL Final   • MCH 11/22/2022 28.6  26.6 - 33.0 pg Final   • MCHC 11/22/2022 29.9 (L)  31.5 - 35.7 g/dL Final   • RDW 11/22/2022 15.4  12.3 - 15.4 % Final   • RDW-SD 11/22/2022 54.4 (H)  37.0 - 54.0 fl Final   • MPV 11/22/2022 9.7  6.0 - 12.0 fL Final   • Platelets 11/22/2022 346  140 - 450 10*3/mm3 Final   • Neutrophil % 11/22/2022 70.8  42.7 - 76.0 % Final   • Lymphocyte % 11/22/2022 13.6 (L)  19.6 - 45.3 % Final   • Monocyte % 11/22/2022 14.2 (H)  5.0 - 12.0 % Final   • Eosinophil % 11/22/2022 0.3  0.3 - 6.2 % Final   • Basophil % 11/22/2022 0.5  0.0 - 1.5 % Final   • Immature Grans % 11/22/2022 0.6 (H)  0.0 - 0.5 % Final   • Neutrophils, Absolute 11/22/2022 8.97 (H)  1.70 - 7.00 10*3/mm3 Final   •  Lymphocytes, Absolute 11/22/2022 1.72  0.70 - 3.10 10*3/mm3 Final   • Monocytes, Absolute 11/22/2022 1.79 (H)  0.10 - 0.90 10*3/mm3 Final   • Eosinophils, Absolute 11/22/2022 0.04  0.00 - 0.40 10*3/mm3 Final   • Basophils, Absolute 11/22/2022 0.06  0.00 - 0.20 10*3/mm3 Final   • Immature Grans, Absolute 11/22/2022 0.07 (H)  0.00 - 0.05 10*3/mm3 Final   • nRBC 11/22/2022 0.0  0.0 - 0.2 /100 WBC Final       Assessment & Plan     Visit Diagnoses:    ICD-10-CM ICD-9-CM   1. Iron deficiency anemia due to chronic blood loss  D50.0 280.0       Plan:  Orders Placed This Encounter   Procedures   • Iron Profile   • Vitamin B12 & Folate   • CBC & Differential       It is likely he has obscure GI bleeding due to the 2 anticoagulants that he is on. I will proceed with capsule endoscopy to further evaluate.First he will undergo UGI with SBFT to rule out stricturing or mass in the small bowel.      MEDS ORDERED DURING VISIT:  No orders of the defined types were placed in this encounter.      No follow-ups on file.             This document has been electronically signed by Geno Vernon MD   January 12, 2023 14:21 EST      Part of this note may be an electronic transcription/translation of spoken language to printed text using the Dragon Dictation System.

## 2023-01-13 LAB
BASOPHILS # BLD AUTO: 0.05 10*3/MM3 (ref 0–0.2)
BASOPHILS NFR BLD AUTO: 0.8 % (ref 0–1.5)
DEPRECATED RDW RBC AUTO: 43.2 FL (ref 37–54)
EOSINOPHIL # BLD AUTO: 0.3 10*3/MM3 (ref 0–0.4)
EOSINOPHIL NFR BLD AUTO: 4.6 % (ref 0.3–6.2)
ERYTHROCYTE [DISTWIDTH] IN BLOOD BY AUTOMATED COUNT: 13.6 % (ref 12.3–15.4)
FOLATE SERPL-MCNC: >20 NG/ML (ref 4.78–24.2)
HCT VFR BLD AUTO: 32.6 % (ref 37.5–51)
HGB BLD-MCNC: 10.2 G/DL (ref 13–17.7)
IMM GRANULOCYTES # BLD AUTO: 0.02 10*3/MM3 (ref 0–0.05)
IMM GRANULOCYTES NFR BLD AUTO: 0.3 % (ref 0–0.5)
IRON 24H UR-MRATE: 60 MCG/DL (ref 59–158)
IRON SATN MFR SERPL: 10 % (ref 20–50)
LYMPHOCYTES # BLD AUTO: 1.67 10*3/MM3 (ref 0.7–3.1)
LYMPHOCYTES NFR BLD AUTO: 25.5 % (ref 19.6–45.3)
MCH RBC QN AUTO: 27.5 PG (ref 26.6–33)
MCHC RBC AUTO-ENTMCNC: 31.3 G/DL (ref 31.5–35.7)
MCV RBC AUTO: 87.9 FL (ref 79–97)
MONOCYTES # BLD AUTO: 0.72 10*3/MM3 (ref 0.1–0.9)
MONOCYTES NFR BLD AUTO: 11 % (ref 5–12)
NEUTROPHILS NFR BLD AUTO: 3.79 10*3/MM3 (ref 1.7–7)
NEUTROPHILS NFR BLD AUTO: 57.8 % (ref 42.7–76)
NRBC BLD AUTO-RTO: 0 /100 WBC (ref 0–0.2)
PLATELET # BLD AUTO: 306 10*3/MM3 (ref 140–450)
PMV BLD AUTO: 11.1 FL (ref 6–12)
RBC # BLD AUTO: 3.71 10*6/MM3 (ref 4.14–5.8)
TIBC SERPL-MCNC: 586 MCG/DL (ref 298–536)
TRANSFERRIN SERPL-MCNC: 393 MG/DL (ref 200–360)
VIT B12 BLD-MCNC: 547 PG/ML (ref 211–946)
WBC NRBC COR # BLD: 6.55 10*3/MM3 (ref 3.4–10.8)

## 2023-01-16 ENCOUNTER — TELEPHONE (OUTPATIENT)
Dept: CARDIOLOGY | Facility: CLINIC | Age: 78
End: 2023-01-16
Payer: MEDICAID

## 2023-01-16 NOTE — PROGRESS NOTES
Recently, you did have labs drawn while visiting the GI office.  Your iron saturation has improved from 6-10.  Your hemoglobin has dropped mildly from 11.6-10.2.  Your B12 level and folate level are both normal.  You are on both Eliquis and Plavix which is likely the source of your chronic anemia.

## 2023-01-16 NOTE — TELEPHONE ENCOUNTER
Called to confirm who was performing his teeth extractions-[facility]-called to get fax and see if this needs to go to anyone specific.  They were out to lunch-will try gain later

## 2023-01-23 ENCOUNTER — TELEPHONE (OUTPATIENT)
Dept: ONCOLOGY | Facility: HOSPITAL | Age: 78
End: 2023-01-23
Payer: MEDICAID

## 2023-01-23 NOTE — TELEPHONE ENCOUNTER
Spoke with patients spouse, Angela several time regarding Feraheme infusion. Received approval for Feraheme on 11/23, pts spouse canceled scheduled infusions due to new insurance ([employer]) not being active until January. ANDREW Crowell aware. Instructions given for pt to stay on the oral iron until IV iron started, pts spouse verbalized understanding. Pt spouse was advised to call office when pt received new insurance so that new authorization could be obtained for the Feraheme. Spoke with pts spouse again in early January, she reports that new insurance [employer] will not be in affect until 2/1/23. Advised her to bring in a copy of his card once she gets it, she verbalized understanding and agreed with plan of care. OV on 1/23 with ANDREW Crowell was canceled per pt spouse request until new insurance is obtained. Advised Angela to please return call for any questions or concerns. She verbalized understanding.

## 2023-01-25 ENCOUNTER — OFFICE VISIT (OUTPATIENT)
Dept: PULMONOLOGY | Facility: CLINIC | Age: 78
End: 2023-01-25
Payer: MEDICAID

## 2023-01-25 VITALS
HEIGHT: 71 IN | TEMPERATURE: 97.5 F | BODY MASS INDEX: 29.12 KG/M2 | WEIGHT: 208 LBS | OXYGEN SATURATION: 98 % | DIASTOLIC BLOOD PRESSURE: 62 MMHG | HEART RATE: 60 BPM | SYSTOLIC BLOOD PRESSURE: 122 MMHG

## 2023-01-25 DIAGNOSIS — R06.09 DYSPNEA ON EXERTION: ICD-10-CM

## 2023-01-25 DIAGNOSIS — E66.3 OVERWEIGHT: ICD-10-CM

## 2023-01-25 DIAGNOSIS — J44.9 COPD WITH HYPOXIA: ICD-10-CM

## 2023-01-25 DIAGNOSIS — R09.02 HYPOXIA: Primary | ICD-10-CM

## 2023-01-25 DIAGNOSIS — R09.02 COPD WITH HYPOXIA: ICD-10-CM

## 2023-01-25 PROCEDURE — 99214 OFFICE O/P EST MOD 30 MIN: CPT | Performed by: NURSE PRACTITIONER

## 2023-01-25 RX ORDER — INSULIN DETEMIR 100 [IU]/ML
INJECTION, SOLUTION SUBCUTANEOUS
COMMUNITY
Start: 2023-01-13

## 2023-01-25 RX ORDER — BUDESONIDE AND FORMOTEROL FUMARATE DIHYDRATE 160; 4.5 UG/1; UG/1
2 AEROSOL RESPIRATORY (INHALATION) 2 TIMES DAILY
Qty: 10.2 G | Refills: 11 | Status: SHIPPED | OUTPATIENT
Start: 2023-01-25

## 2023-01-25 RX ORDER — HUMAN INSULIN 100 [IU]/ML
INJECTION, SOLUTION SUBCUTANEOUS
COMMUNITY
Start: 2022-12-21

## 2023-01-25 NOTE — PROGRESS NOTES
"Chief Complaint  Dyspnea on exertion    Subjective        Fidencio Luciano presents to [facility]  History of Present Illness     Mr. Luciano is a 77 year old male with a medical history significant for GERD, BPH, CAD, diabetes, hyperlipidemia, and hypertension.    He presents today for follow up on dyspnea on exertion.  He continues to use albuterol as needed.  He is suppose to be using supplemental oxygen at night but has not been using it since he was put in the nursing home.  He has also not been using his maintenence inhaler since this time. He states that he does have shortness of breath but that it is at baseline.      Objective   Vital Signs:  /62 (BP Location: Left arm, Patient Position: Sitting)   Pulse 60   Temp 97.5 °F (36.4 °C)   Ht 180.3 cm (71\")   Wt 94.3 kg (208 lb)   SpO2 98%   BMI 29.01 kg/m²   Estimated body mass index is 29.01 kg/m² as calculated from the following:    Height as of this encounter: 180.3 cm (71\").    Weight as of this encounter: 94.3 kg (208 lb).       BMI is >= 25 and <30. (Overweight) The following options were offered after discussion;: exercise counseling/recommendations and nutrition counseling/recommendations      Physical Exam     GENERAL APPEARANCE: Well developed, well nourished, alert and cooperative, and appears to be in no acute distress.    HEAD: normocephalic. Atraumatic.    EYES: PERRL, EOMI. Vision is grossly intact.    THROAT: Oral cavity and pharynx normal. No inflammation, swelling, exudate, or lesions.     NECK: Neck supple.  No thyromegaly.    CARDIAC: Normal S1 and S2. No S3, S4 or murmurs. Rhythm is regular.     RESPIRATORY:Bilateral air entry positive. Bilateral diminished breath sounds. No wheezing, crackles or rhonchi noted.    GI: Positive bowel sounds. Soft, nondistended, nontender.     MUSCULOSKELETAL: No significant deformity or joint abnormality. No edema. Peripheral pulses intact. No " varicosities.    NEUROLOGICAL: Strength and sensation symmetric and intact throughout.     PSYCHIATRIC: The mental examination revealed the patient was oriented to person, place, and time.     Result Review :  The following data was reviewed by: ANDREW Amos on 01/25/2023:  Common labs    Common Labs 10/13/22 11/22/22 11/22/22 1/12/23     1318 1318    Glucose   243 (A)    BUN   30 (A)    Creatinine   1.18    Sodium   143    Potassium   4.7    Chloride   107    Calcium   8.8    Albumin   3.64    Total Bilirubin   0.3    Alkaline Phosphatase   127 (A)    AST (SGOT)   39    ALT (SGPT)   28    WBC 7.90 12.65 (A)  6.55   Hemoglobin 10.6 (A) 11.6 (A)  10.2 (A)   Hematocrit 33.4 (A) 38.8  32.6 (A)   Platelets 273 346  306   (A) Abnormal value       Comments are available for some flowsheets but are not being displayed.                        Assessment and Plan   Diagnoses and all orders for this visit:    1. Hypoxia (Primary)  -     Oxygen Therapy    2. Dyspnea on exertion    3. Overweight    4. COPD with hypoxia (HCC)    Other orders  -     budesonide-formoterol (SYMBICORT) 160-4.5 MCG/ACT inhaler; Inhale 2 puffs 2 (Two) Times a Day.  Dispense: 10.2 g; Refill: 11         Will start him back on Symbicort BID.  Continue albuterol every 4 hours as needed.    Will start him on supplemental oxygen at night.        Follow Up   Return in about 6 months (around 7/25/2023).  Patient was given instructions and counseling regarding his condition or for health maintenance advice. Please see specific information pulled into the AVS if appropriate.

## 2023-01-27 ENCOUNTER — OFFICE VISIT (OUTPATIENT)
Dept: UROLOGY | Facility: CLINIC | Age: 78
End: 2023-01-27
Payer: MEDICARE

## 2023-01-27 DIAGNOSIS — N40.0 BENIGN PROSTATIC HYPERPLASIA WITHOUT LOWER URINARY TRACT SYMPTOMS: Primary | ICD-10-CM

## 2023-01-27 DIAGNOSIS — R35.0 FREQUENCY OF URINATION: ICD-10-CM

## 2023-01-27 DIAGNOSIS — R35.0 BENIGN PROSTATIC HYPERPLASIA WITH URINARY FREQUENCY: ICD-10-CM

## 2023-01-27 DIAGNOSIS — N40.1 BENIGN PROSTATIC HYPERPLASIA WITH URINARY FREQUENCY: ICD-10-CM

## 2023-01-27 LAB
BILIRUB BLD-MCNC: NEGATIVE MG/DL
CLARITY, POC: CLEAR
COLOR UR: YELLOW
EXPIRATION DATE: ABNORMAL
GLUCOSE UR STRIP-MCNC: ABNORMAL MG/DL
KETONES UR QL: NEGATIVE
LEUKOCYTE EST, POC: NEGATIVE
Lab: ABNORMAL
NITRITE UR-MCNC: NEGATIVE MG/ML
PH UR: 6 [PH] (ref 5–8)
PROT UR STRIP-MCNC: ABNORMAL MG/DL
RBC # UR STRIP: NEGATIVE /UL
SP GR UR: 1.01 (ref 1–1.03)
UROBILINOGEN UR QL: NORMAL

## 2023-01-27 PROCEDURE — 51798 US URINE CAPACITY MEASURE: CPT | Performed by: NURSE PRACTITIONER

## 2023-01-27 PROCEDURE — 87086 URINE CULTURE/COLONY COUNT: CPT | Performed by: NURSE PRACTITIONER

## 2023-01-27 PROCEDURE — 99214 OFFICE O/P EST MOD 30 MIN: CPT | Performed by: NURSE PRACTITIONER

## 2023-01-27 PROCEDURE — 81003 URINALYSIS AUTO W/O SCOPE: CPT | Performed by: NURSE PRACTITIONER

## 2023-01-27 RX ORDER — FINASTERIDE 5 MG/1
5 TABLET, FILM COATED ORAL NIGHTLY
Qty: 30 TABLET | Refills: 11 | Status: SHIPPED | OUTPATIENT
Start: 2023-01-27

## 2023-01-27 NOTE — PROGRESS NOTES
Chief Complaint  Benign Prostatic Hypertrophy (Yearly fu FOR BPH WITH LUTS-URINE FREQUENCY)    Subjective          Fidencio Luciano presents to [facility] for BPH WITH LUTS  History of Present Illness    Mr. Fidencio Luciano is a very pleasant 77-year-old male who presents to clinic today for evaluation. This is a 3-month follow-up for BPH with lower urinary tract symptoms, most bothersome of which has been urine frequency and urgency. The patient lives at an assisted living facility. He has significant dementia and is incontinent of bowel and bladder. However, per his assistance, he goes through 3 pads every 2 hours. He is wheelchair dependent, alert, but pleasantly confused. The patient is also accompanied by his wife-LIZABETH,     He was seen by Dr. Tate 3 months ago back in 09/13/2022 and at that point he was desirous of lower track investigation via cystoscopy. Unsure why it was Unscheduled. When I evaluated him back in 01/2022, they had reported he had trouble urinating for quite some time and then improved on his finasteride. He was in no apparent discomfort and reported doing relatively well since starting medications.     His most recent PSA was at 0.6 with a free PSA of 23.3 percent and that was on 01/28/2022. His PSA 3 months prior to that was 2.830, it was 1.4 about 1 year ago. He denied any side effects from his finasteride and would like to continue.ON Initial evaluation in clinic today, HE IS IN NO apparent discomfort. His urine dipstick is completely negative for any infection, it is negative for gross/microscopic hematuria. He has 3+ glucosuria. His PVR is 0 ml and an IPSS score of 3.    The patient is accompanied by his wife Lizabeth. He reports that he is doing well and denies any LUTS. He denies any burning or pain with urination. He denies any pelvic pain, perineal pain, or back pain. He denies any issues with his bowel movements. His wife believes he will have  laboratory studies soon to check his iron levels.    The rest of his PMHX as noted in chart    Objective   Vital Signs:   There were no vitals taken for this visit.      ROS:   Review of Systems   Constitutional: Positive for activity change, appetite change and unexpected weight gain. Negative for diaphoresis, fatigue, fever and unexpected weight loss.   HENT: Negative for congestion, ear discharge, ear pain, nosebleeds, rhinorrhea, sinus pressure and sore throat.    Eyes: Negative for blurred vision, double vision, photophobia, pain, redness and visual disturbance.   Respiratory: Negative for apnea, cough, chest tightness, shortness of breath, wheezing and stridor.    Cardiovascular: Negative for chest pain and palpitations.   Gastrointestinal: Positive for abdominal distention. Negative for abdominal pain, constipation, diarrhea, nausea and vomiting.   Endocrine: Negative for polydipsia, polyphagia and polyuria.   Genitourinary: Positive for frequency and urinary incontinence. Negative for decreased urine volume, difficulty urinating, dysuria, flank pain, hematuria and urgency.   Musculoskeletal: Positive for back pain, gait problem, joint swelling and myalgias. Negative for arthralgias.   Skin: Positive for dry skin and skin lesions. Negative for pallor, rash and wound.   Neurological: Negative for dizziness, tremors, syncope, weakness, light-headedness, memory problem and confusion.   Hematological: Bruises/bleeds easily.   Psychiatric/Behavioral: Positive for stress. Negative for behavioral problems.        Physical Exam  Constitutional:       General: He is in acute distress.      Appearance: He is well-developed. He is obese.   HENT:      Head: Normocephalic and atraumatic.      Right Ear: External ear normal.      Left Ear: External ear normal.   Eyes:      General:         Right eye: No discharge.         Left eye: No discharge.      Conjunctiva/sclera: Conjunctivae normal.      Pupils: Pupils are equal,  round, and reactive to light.   Neck:      Thyroid: No thyromegaly.      Trachea: No tracheal deviation.   Cardiovascular:      Rate and Rhythm: Normal rate and regular rhythm.      Heart sounds: No murmur heard.    No friction rub.   Pulmonary:      Effort: Pulmonary effort is normal. No respiratory distress.      Breath sounds: Normal breath sounds. No stridor.   Abdominal:      General: Bowel sounds are normal. There is distension.      Palpations: Abdomen is soft.      Tenderness: There is abdominal tenderness. There is no guarding.   Genitourinary:     Penis: Normal and uncircumcised. No tenderness or discharge.       Testes: Normal.      Rectum: Normal. Guaiac result negative.      Comments: URINE FREQUENCY/INCONTINENCE  Musculoskeletal:         General: Tenderness present. No deformity. Normal range of motion.      Cervical back: Normal range of motion and neck supple.   Skin:     General: Skin is warm and dry.      Capillary Refill: Capillary refill takes less than 2 seconds.      Coloration: Skin is pale.   Neurological:      Mental Status: He is alert and oriented to person, place, and time.      Cranial Nerves: No cranial nerve deficit.      Motor: Weakness present.      Coordination: Coordination abnormal.      Gait: Gait abnormal.   Psychiatric:         Behavior: Behavior normal.         Thought Content: Thought content normal.         Judgment: Judgment normal.        Result Review :     UA    Urinalysis 5/29/22 5/29/22 8/23/22 1/27/23    1655 1655     Squamous Epithelial Cells, UA  0-2     Specific Gravity, UA 1.027      Ketones, UA Trace (A)  Negative Negative   Blood, UA Negative      Leukocytes, UA Negative  Negative Negative   Nitrite, UA Negative      RBC, UA  3-5 (A)     WBC, UA  6-12 (A)     Bacteria, UA  None Seen     (A) Abnormal value            Urine Culture    Urine Culture 5/29/22 1/27/23   Urine Culture 50,000 CFU/mL Mixed Roxy Isolated No growth                    Assessment and Plan     Problem List Items Addressed This Visit        Genitourinary and Reproductive     Benign prostatic hyperplasia - Primary (Chronic)    Relevant Medications    finasteride (PROSCAR) 5 MG tablet    Mirabegron ER (Myrbetriq) 25 MG tablet sustained-release 24 hour 24 hr tablet    Other Relevant Orders    PSA Total+% Free (Serial)    PSA Total+% Free (Serial)   Other Visit Diagnoses     Frequency of urination        Relevant Medications    finasteride (PROSCAR) 5 MG tablet    Mirabegron ER (Myrbetriq) 25 MG tablet sustained-release 24 hour 24 hr tablet    Other Relevant Orders    POC Urinalysis Dipstick, Automated (Completed)    Urine Culture - Urine, Urine, Random Void (Completed)    PSA Total+% Free (Serial)            ASSESSMENT                            BPH WITH LUTS-URINE FREQUENCY  MR. JUAN ANTONIO GLOVER IS A Pleasantly confused 77-year-old male with dementia, wheelchair dependent, nursing home extensive care facility returns to clinic today accompanied by his wife for 3-month follow-up.  Reports significant improvement since starting finasteride, he denies any urinary issues today.  URINE DIPSTICK IS COMPLETELY NEGATIVE,  PVR is 0 cc, IPSS score is 3.PSA IS O.3     BPH: WE Discussed the pathophysiology of BPH and obstruction. We discussed the static and dynamic effects of BPH as well as using 5 alpha reductase inhibitors versus alpha blockade.  We discussed the indications for transurethral surgery as well.  And/ or other therapeutic options available including all of the newer techniques.  He has no real symptomatology referable to his prostate other than moderate enlargement.  Rather than proceed with an expensive workup he doesn't need, at this time I am recommending he continue with an  alpha reductase inhibitor-finasteride 5 mg daily.  Patient denies any side effects from medications.  His most recent PSA is 0.6, with a free PSA of 23%     WE Discussed maximal medical therapy with finasteride and tamsulosin and how  each medication works I explained that finasteride would reduce the size of the prostate by 20-30% reduce his PSA by 50% reduce the risks of either surgery or urinary retention by 50% and may reduce the risk of prostate cancer well-differentiated by 20-30% however he may increase the risk of poorly differentiated prostate cancer by half to 1% and that I also explained how.     The natural history of prostate cancer and ongoing controversy regarding screening and potential treatment outcomes of prostate cancer has been discussed with the patient. The meaning of a false positive PSA and a false negative PSA has been discussed. He indicates understanding of the limitations of this screening test and wishes To proceed with screening PSA testing.  Also explained the variations in PSA with age group,                                         PLAN  We checked his PSA today free and total, I will call with results if any concerns.     Refilled his medication, currently managed at his nursing home facility.     Follow-up in 1 year, recheck his PSA.  He may return sooner if need be or concerns with urinary retention.     Patient/wife agreeable plan of care.     Patient reports that he is not currently experiencing any symptoms of urinary incontinence.    BMI is >= 25 and <30. (Overweight) The following options were offered after discussion;: weight loss educational material (shared in after visit summary), exercise counseling/recommendations and nutrition counseling/recommendations    RADIOLOGY (CT AND/OR KUB):    CT Abdomen and Pelvis: No results found for this or any previous visit.     CT Stone Protocol: No results found for this or any previous visit.     KUB: No results found for this or any previous visit.       LABS (3 MONTHS):    Office Visit on 01/27/2023   Component Date Value Ref Range Status   • Color 01/27/2023 Yellow  Yellow, Straw, Dark Yellow, Andra Final   • Clarity, UA 01/27/2023 Clear  Clear Final   •  Specific Gravity  01/27/2023 1.015  1.005 - 1.030 Final   • pH, Urine 01/27/2023 6.0  5.0 - 8.0 Final   • Leukocytes 01/27/2023 Negative  Negative Final   • Nitrite, UA 01/27/2023 Negative  Negative Final   • Protein, POC 01/27/2023 1+ (A)  Negative mg/dL Final   • Glucose, UA 01/27/2023 3+ (A)  Negative mg/dL Final   • Ketones, UA 01/27/2023 Negative  Negative Final   • Urobilinogen, UA 01/27/2023 Normal  Normal, 0.2 E.U./dL Final   • Bilirubin 01/27/2023 Negative  Negative Final   • Blood, UA 01/27/2023 Negative  Negative Final   • Lot Number 01/27/2023 n   Final   • Expiration Date 01/27/2023 n   Final   • Urine Culture 01/27/2023 No growth   Final   Office Visit on 01/12/2023   Component Date Value Ref Range Status   • Iron 01/12/2023 60  59 - 158 mcg/dL Final   • Iron Saturation 01/12/2023 10 (L)  20 - 50 % Final   • Transferrin 01/12/2023 393 (H)  200 - 360 mg/dL Final   • TIBC 01/12/2023 586 (H)  298 - 536 mcg/dL Final   • Folate 01/12/2023 >20.00  4.78 - 24.20 ng/mL Final   • Vitamin B-12 01/12/2023 547  211 - 946 pg/mL Final   • WBC 01/12/2023 6.55  3.40 - 10.80 10*3/mm3 Final   • RBC 01/12/2023 3.71 (L)  4.14 - 5.80 10*6/mm3 Final   • Hemoglobin 01/12/2023 10.2 (L)  13.0 - 17.7 g/dL Final   • Hematocrit 01/12/2023 32.6 (L)  37.5 - 51.0 % Final   • MCV 01/12/2023 87.9  79.0 - 97.0 fL Final   • MCH 01/12/2023 27.5  26.6 - 33.0 pg Final   • MCHC 01/12/2023 31.3 (L)  31.5 - 35.7 g/dL Final   • RDW 01/12/2023 13.6  12.3 - 15.4 % Final   • RDW-SD 01/12/2023 43.2  37.0 - 54.0 fl Final   • MPV 01/12/2023 11.1  6.0 - 12.0 fL Final   • Platelets 01/12/2023 306  140 - 450 10*3/mm3 Final   • Neutrophil % 01/12/2023 57.8  42.7 - 76.0 % Final   • Lymphocyte % 01/12/2023 25.5  19.6 - 45.3 % Final   • Monocyte % 01/12/2023 11.0  5.0 - 12.0 % Final   • Eosinophil % 01/12/2023 4.6  0.3 - 6.2 % Final   • Basophil % 01/12/2023 0.8  0.0 - 1.5 % Final   • Immature Grans % 01/12/2023 0.3  0.0 - 0.5 % Final   • Neutrophils,  Absolute 01/12/2023 3.79  1.70 - 7.00 10*3/mm3 Final   • Lymphocytes, Absolute 01/12/2023 1.67  0.70 - 3.10 10*3/mm3 Final   • Monocytes, Absolute 01/12/2023 0.72  0.10 - 0.90 10*3/mm3 Final   • Eosinophils, Absolute 01/12/2023 0.30  0.00 - 0.40 10*3/mm3 Final   • Basophils, Absolute 01/12/2023 0.05  0.00 - 0.20 10*3/mm3 Final   • Immature Grans, Absolute 01/12/2023 0.02  0.00 - 0.05 10*3/mm3 Final   • nRBC 01/12/2023 0.0  0.0 - 0.2 /100 WBC Final   Lab on 11/22/2022   Component Date Value Ref Range Status   • Glucose 11/22/2022 243 (H)  65 - 99 mg/dL Final   • BUN 11/22/2022 30 (H)  8 - 23 mg/dL Final   • Creatinine 11/22/2022 1.18  0.76 - 1.27 mg/dL Final   • Sodium 11/22/2022 143  136 - 145 mmol/L Final   • Potassium 11/22/2022 4.7  3.5 - 5.2 mmol/L Final    Slight hemolysis detected by analyzer. Results may be affected.   • Chloride 11/22/2022 107  98 - 107 mmol/L Final   • CO2 11/22/2022 23.3  22.0 - 29.0 mmol/L Final   • Calcium 11/22/2022 8.8  8.6 - 10.5 mg/dL Final   • Total Protein 11/22/2022 7.9  6.0 - 8.5 g/dL Final   • Albumin 11/22/2022 3.64  3.50 - 5.20 g/dL Final   • ALT (SGPT) 11/22/2022 28  1 - 41 U/L Final   • AST (SGOT) 11/22/2022 39  1 - 40 U/L Final   • Alkaline Phosphatase 11/22/2022 127 (H)  39 - 117 U/L Final   • Total Bilirubin 11/22/2022 0.3  0.0 - 1.2 mg/dL Final   • Globulin 11/22/2022 4.3  gm/dL Final   • A/G Ratio 11/22/2022 0.9  g/dL Final   • BUN/Creatinine Ratio 11/22/2022 25.4 (H)  7.0 - 25.0 Final   • Anion Gap 11/22/2022 12.7  5.0 - 15.0 mmol/L Final   • eGFR 11/22/2022 64.0  >60.0 mL/min/1.73 Final    National Kidney Foundation and American Society of Nephrology (ASN) Task Force recommended calculation based on the Chronic Kidney Disease Epidemiology Collaboration (CKD-EPI) equation refit without adjustment for race.   • Ferritin 11/22/2022 64.62  30.00 - 400.00 ng/mL Final   • Iron 11/22/2022 27 (L)  59 - 158 mcg/dL Final   • Iron Saturation 11/22/2022 6 (L)  20 - 50 % Final   •  Transferrin 11/22/2022 304  200 - 360 mg/dL Final   • TIBC 11/22/2022 453  298 - 536 mcg/dL Final   • Vitamin B-12 11/22/2022 685  211 - 946 pg/mL Final   • WBC 11/22/2022 12.65 (H)  3.40 - 10.80 10*3/mm3 Final   • RBC 11/22/2022 4.05 (L)  4.14 - 5.80 10*6/mm3 Final   • Hemoglobin 11/22/2022 11.6 (L)  13.0 - 17.7 g/dL Final   • Hematocrit 11/22/2022 38.8  37.5 - 51.0 % Final   • MCV 11/22/2022 95.8  79.0 - 97.0 fL Final   • MCH 11/22/2022 28.6  26.6 - 33.0 pg Final   • MCHC 11/22/2022 29.9 (L)  31.5 - 35.7 g/dL Final   • RDW 11/22/2022 15.4  12.3 - 15.4 % Final   • RDW-SD 11/22/2022 54.4 (H)  37.0 - 54.0 fl Final   • MPV 11/22/2022 9.7  6.0 - 12.0 fL Final   • Platelets 11/22/2022 346  140 - 450 10*3/mm3 Final   • Neutrophil % 11/22/2022 70.8  42.7 - 76.0 % Final   • Lymphocyte % 11/22/2022 13.6 (L)  19.6 - 45.3 % Final   • Monocyte % 11/22/2022 14.2 (H)  5.0 - 12.0 % Final   • Eosinophil % 11/22/2022 0.3  0.3 - 6.2 % Final   • Basophil % 11/22/2022 0.5  0.0 - 1.5 % Final   • Immature Grans % 11/22/2022 0.6 (H)  0.0 - 0.5 % Final   • Neutrophils, Absolute 11/22/2022 8.97 (H)  1.70 - 7.00 10*3/mm3 Final   • Lymphocytes, Absolute 11/22/2022 1.72  0.70 - 3.10 10*3/mm3 Final   • Monocytes, Absolute 11/22/2022 1.79 (H)  0.10 - 0.90 10*3/mm3 Final   • Eosinophils, Absolute 11/22/2022 0.04  0.00 - 0.40 10*3/mm3 Final   • Basophils, Absolute 11/22/2022 0.06  0.00 - 0.20 10*3/mm3 Final   • Immature Grans, Absolute 11/22/2022 0.07 (H)  0.00 - 0.05 10*3/mm3 Final   • nRBC 11/22/2022 0.0  0.0 - 0.2 /100 WBC Final   Lab Requisition on 10/29/2022   Component Date Value Ref Range Status   • Fecal Occult Blood 10/29/2022 Negative  Negative Final        IPSS Questionnaire (AUA-7):  Over the past month…    1)  How often have you had a sensation of not emptying your bladder completely after you finish urinating?  0 - Not at all   2)  How often have you had to urinate again less than two hours after you finished urinating? 1 - Less  than 1 time in 5   3)  How often have you found you stopped and started again several times when you urinated?  0 - Not at all   4) How difficult have you found it to postpone urination?  0 - Not at all   5) How often have you had a weak urinary stream?  1 - Less than 1 time in 5   6) How often have you had to push or strain to begin urination?  0 - Not at all   7) How many times did you most typically get up to urinate from the time you went to bed until the time you got up in the morning?  1 - 1 time   Total Score:  3     Smoking Cessation Counseling:  Former smoker.[email or web address] [id number]  Patient does not currently use any tobacco products.     Follow Up   Return in about 1 year (around 1/29/2024) for Next scheduled follow up, FOR BPH WITH LUTS/PSA/MED REFILLS.    Patient was given instructions and counseling regarding his condition or for health maintenance advice. Please see specific information pulled into the AVS if appropriate.          This document has been electronically signed by Griselda Cheng-Akwa, APRN   January 29, 2023 19:06 EST      Dictated Utilizing Dragon Dictation: Part of this note may be an electronic transcription/translation of spoken language to printed text using the Dragon Dictation System.  Transcribed from ambient dictation for Griselda Cheng-Akwa, APRN by Andra Goncalves.  01/27/23   14:07 EST    Patient or patient representative verbalized consent to the visit recording.  I have personally performed the services described in this document as transcribed by the above individual, and it is both accurate and complete.  Griselda Cheng-Akwa, APRN  1/29/2023  19:16 EST

## 2023-01-28 LAB — BACTERIA SPEC AEROBE CULT: NO GROWTH

## 2023-02-03 ENCOUNTER — LAB (OUTPATIENT)
Dept: ONCOLOGY | Facility: CLINIC | Age: 78
End: 2023-02-03
Payer: MEDICARE

## 2023-02-03 ENCOUNTER — OFFICE VISIT (OUTPATIENT)
Dept: ONCOLOGY | Facility: CLINIC | Age: 78
End: 2023-02-03
Payer: MEDICARE

## 2023-02-03 VITALS
DIASTOLIC BLOOD PRESSURE: 75 MMHG | HEART RATE: 79 BPM | OXYGEN SATURATION: 95 % | RESPIRATION RATE: 18 BRPM | TEMPERATURE: 97.1 F | SYSTOLIC BLOOD PRESSURE: 135 MMHG

## 2023-02-03 DIAGNOSIS — D72.829 LEUKOCYTOSIS, UNSPECIFIED TYPE: ICD-10-CM

## 2023-02-03 DIAGNOSIS — D50.9 IRON DEFICIENCY ANEMIA, UNSPECIFIED IRON DEFICIENCY ANEMIA TYPE: ICD-10-CM

## 2023-02-03 DIAGNOSIS — D50.9 IRON DEFICIENCY ANEMIA, UNSPECIFIED IRON DEFICIENCY ANEMIA TYPE: Primary | ICD-10-CM

## 2023-02-03 DIAGNOSIS — D64.9 ANEMIA, UNSPECIFIED TYPE: Primary | ICD-10-CM

## 2023-02-03 LAB
ALBUMIN SERPL-MCNC: 3.8 G/DL (ref 3.5–5.2)
ALBUMIN/GLOB SERPL: 1.1 G/DL
ALP SERPL-CCNC: 107 U/L (ref 39–117)
ALT SERPL W P-5'-P-CCNC: 19 U/L (ref 1–41)
ANION GAP SERPL CALCULATED.3IONS-SCNC: 10.2 MMOL/L (ref 5–15)
AST SERPL-CCNC: 24 U/L (ref 1–40)
BASOPHILS # BLD AUTO: 0.07 10*3/MM3 (ref 0–0.2)
BASOPHILS NFR BLD AUTO: 0.7 % (ref 0–1.5)
BILIRUB SERPL-MCNC: 0.3 MG/DL (ref 0–1.2)
BUN SERPL-MCNC: 19 MG/DL (ref 8–23)
BUN/CREAT SERPL: 20 (ref 7–25)
CALCIUM SPEC-SCNC: 8.9 MG/DL (ref 8.6–10.5)
CHLORIDE SERPL-SCNC: 105 MMOL/L (ref 98–107)
CO2 SERPL-SCNC: 25.8 MMOL/L (ref 22–29)
CREAT SERPL-MCNC: 0.95 MG/DL (ref 0.76–1.27)
DEPRECATED RDW RBC AUTO: 54 FL (ref 37–54)
EGFRCR SERPLBLD CKD-EPI 2021: 83 ML/MIN/1.73
EOSINOPHIL # BLD AUTO: 0.19 10*3/MM3 (ref 0–0.4)
EOSINOPHIL NFR BLD AUTO: 2 % (ref 0.3–6.2)
ERYTHROCYTE [DISTWIDTH] IN BLOOD BY AUTOMATED COUNT: 16.1 % (ref 12.3–15.4)
FERRITIN SERPL-MCNC: 21.84 NG/ML (ref 30–400)
GLOBULIN UR ELPH-MCNC: 3.5 GM/DL
GLUCOSE SERPL-MCNC: 248 MG/DL (ref 65–99)
HCT VFR BLD AUTO: 37.4 % (ref 37.5–51)
HGB BLD-MCNC: 11.2 G/DL (ref 13–17.7)
IMM GRANULOCYTES # BLD AUTO: 0.02 10*3/MM3 (ref 0–0.05)
IMM GRANULOCYTES NFR BLD AUTO: 0.2 % (ref 0–0.5)
IRON 24H UR-MRATE: 47 MCG/DL (ref 59–158)
IRON SATN MFR SERPL: 8 % (ref 20–50)
LYMPHOCYTES # BLD AUTO: 1.92 10*3/MM3 (ref 0.7–3.1)
LYMPHOCYTES NFR BLD AUTO: 20.4 % (ref 19.6–45.3)
MCH RBC QN AUTO: 27.5 PG (ref 26.6–33)
MCHC RBC AUTO-ENTMCNC: 29.9 G/DL (ref 31.5–35.7)
MCV RBC AUTO: 91.7 FL (ref 79–97)
MONOCYTES # BLD AUTO: 0.86 10*3/MM3 (ref 0.1–0.9)
MONOCYTES NFR BLD AUTO: 9.1 % (ref 5–12)
NEUTROPHILS NFR BLD AUTO: 6.36 10*3/MM3 (ref 1.7–7)
NEUTROPHILS NFR BLD AUTO: 67.6 % (ref 42.7–76)
NRBC BLD AUTO-RTO: 0 /100 WBC (ref 0–0.2)
PLATELET # BLD AUTO: 273 10*3/MM3 (ref 140–450)
PMV BLD AUTO: 10.4 FL (ref 6–12)
POTASSIUM SERPL-SCNC: 4.3 MMOL/L (ref 3.5–5.2)
PROT SERPL-MCNC: 7.3 G/DL (ref 6–8.5)
RBC # BLD AUTO: 4.08 10*6/MM3 (ref 4.14–5.8)
SODIUM SERPL-SCNC: 141 MMOL/L (ref 136–145)
TIBC SERPL-MCNC: 565 MCG/DL (ref 298–536)
TRANSFERRIN SERPL-MCNC: 379 MG/DL (ref 200–360)
WBC NRBC COR # BLD: 9.42 10*3/MM3 (ref 3.4–10.8)

## 2023-02-03 PROCEDURE — 84466 ASSAY OF TRANSFERRIN: CPT | Performed by: NURSE PRACTITIONER

## 2023-02-03 PROCEDURE — 85025 COMPLETE CBC W/AUTO DIFF WBC: CPT | Performed by: NURSE PRACTITIONER

## 2023-02-03 PROCEDURE — 36415 COLL VENOUS BLD VENIPUNCTURE: CPT | Performed by: INTERNAL MEDICINE

## 2023-02-03 PROCEDURE — 83540 ASSAY OF IRON: CPT | Performed by: NURSE PRACTITIONER

## 2023-02-03 PROCEDURE — 99214 OFFICE O/P EST MOD 30 MIN: CPT | Performed by: INTERNAL MEDICINE

## 2023-02-03 PROCEDURE — 80053 COMPREHEN METABOLIC PANEL: CPT | Performed by: NURSE PRACTITIONER

## 2023-02-03 PROCEDURE — 82728 ASSAY OF FERRITIN: CPT | Performed by: NURSE PRACTITIONER

## 2023-02-03 RX ORDER — SODIUM CHLORIDE 9 MG/ML
250 INJECTION, SOLUTION INTRAVENOUS ONCE
Status: CANCELLED | OUTPATIENT
Start: 2023-02-23

## 2023-02-03 RX ORDER — SODIUM CHLORIDE 9 MG/ML
250 INJECTION, SOLUTION INTRAVENOUS ONCE
Status: CANCELLED | OUTPATIENT
Start: 2023-02-20

## 2023-02-03 NOTE — PROGRESS NOTES
Venipuncture Blood Specimen Collection  Venipuncture performed in right hand by Griselda Garrido MA with good hemostasis. Patient tolerated the procedure well without complications.   02/03/23   Griselda Garrido MA

## 2023-02-03 NOTE — PROGRESS NOTES
Subjective     Date: 2/5/2023    Referring Provider  George Riley MD    Chief Complaint  Anemia     Subjective          Fidencio Luciano is a 77 y.o. male who presents today to [facility] for follow up.    HPI:   77-year-old male who was previously followed by ANDREW Morales for anemia, presents for follow-up.  Patient with chronic anemia, worsened since June 2022.  Previously reportedly was on oral ferrous sulfate 3 times daily, tolerated well.  Currently still on iron polysaccharide 150 mg daily, he reports no adverse effects with it.  Received IV ferumoxytol on 12/13/2022.    Last colonoscopy and endoscopy in May 2022.  Colonoscopy with 3 sessile polyps in the cecum, multiple small mouth diverticula in the sigmoid and descending colon, internal hemorrhoids grade 1. Endoscopy showing small hiatal hernia, diffuse moderately erythematous mucosa without bleeding in gastric body in the gastric antrum.Seen by Dr. Mesa January 12, 2023, plan on doing capsule endoscopy as well as small bowel follow-through.    Reports no symptoms today.  Has dark-colored stool, denies any melena or hematochezia.    The following portions of the patient's history were reviewed and updated as appropriate: allergies, current medications, past family history, past medical history, past social history, past surgical history and problem list.    Objective     Objective     Allergy:   No Known Allergies     Current Medications:   Current Outpatient Medications   Medication Sig Dispense Refill   • albuterol sulfate  (90 Base) MCG/ACT inhaler Inhale 2 puffs 4 (Four) Times a Day As Needed for Wheezing.     • apixaban (ELIQUIS) 5 MG tablet tablet Take 5 mg by mouth Every 12 (Twelve) Hours.     • atorvastatin (LIPITOR) 80 MG tablet Take 1 tablet by mouth Every Night. 90 tablet 5   • budesonide-formoterol (SYMBICORT) 160-4.5 MCG/ACT inhaler Inhale 2 puffs 2 (Two) Times a Day. 10.2 g 11   •  cetirizine (zyrTEC) 10 MG tablet Take 10 mg by mouth Daily.     • clopidogrel (PLAVIX) 75 MG tablet Take 1 tablet by mouth Daily. 30 tablet 5   • colestipol (COLESTID) 1 g tablet Take 1 g by mouth 2 (Two) Times a Day.     • divalproex (DEPAKOTE) 500 MG DR tablet Take 500 mg by mouth Every Night.     • finasteride (PROSCAR) 5 MG tablet Take 1 tablet by mouth Every Night. 30 tablet 11   • iron polysaccharides (NIFEREX) 150 MG capsule Take 1 capsule by mouth Daily. 30 capsule 3   • isosorbide mononitrate (IMDUR) 120 MG 24 hr tablet Take 1 tablet by mouth Every Morning. 90 tablet 2   • Levemir FlexTouch 100 UNIT/ML injection      • Melatonin 5 MG capsule Take 5 mg by mouth Every Night. 30 each 1   • memantine (NAMENDA XR) 28 MG capsule sustained-release 24 hr extended release capsule Take 28 mg by mouth Daily.     • metoprolol tartrate (LOPRESSOR) 25 MG tablet Take 0.5 tablets by mouth Every 12 (Twelve) Hours.     • Mirabegron ER (Myrbetriq) 25 MG tablet sustained-release 24 hour 24 hr tablet Take 1 tablet by mouth Daily. 30 tablet 11   • mirtazapine (REMERON) 15 MG tablet Take 15 mg by mouth Every Night.     • nitroglycerin (NITROSTAT) 0.4 MG SL tablet 1 under the tongue as needed for angina, may repeat q5mins for up three doses 25 tablet 2   • NovoLIN R 100 UNIT/ML injection      • pantoprazole (PROTONIX) 40 MG EC tablet Take 40 mg by mouth Daily.     • ranolazine (RANEXA) 500 MG 12 hr tablet Take 1 tablet by mouth Every 12 (Twelve) Hours.       No current facility-administered medications for this visit.       Past Medical History:  Past Medical History:   Diagnosis Date   • BPH (benign prostatic hyperplasia)    • Bradycardia     Positive tilt table testing 07/2016   • Coronary artery disease    • Diabetes mellitus (HCC)    • Diverticulosis    • Elevated cholesterol    • Gastric ulcer with hemorrhage    • Gastroesophageal reflux disease 1/26/2021   • History of transfusion    • Hyperlipidemia    • Hypertension    •  Psoriasis        Past Surgical History:  Past Surgical History:   Procedure Laterality Date   • BACK SURGERY     • CARDIAC CATHETERIZATION N/A 9/20/2016    Procedure: Left Heart Cath;  Surgeon: Giovanni Buenrostro MD;  Location:  [facility];  Service:    • CARDIAC CATHETERIZATION N/A 10/4/2016    Procedure: Left Heart Cath;  Surgeon: Bharathi Andrew MD;  Location:  [facility];  Service:    • CARDIAC CATHETERIZATION N/A 5/9/2017    Procedure: Left Heart Cath;  Surgeon: Giovanni Buenrostro MD;  Location:  [facility];  Service:    • CARDIAC CATHETERIZATION N/A 5/9/2017    Procedure: ERR;  Surgeon: Giovanni Buenrostro MD;  Location:  [facility];  Service:    • CARDIAC CATHETERIZATION N/A 11/8/2019    Procedure: Left Heart Cath;  Surgeon: Abraham Oviedo MD;  Location:  [facility];  Service: Cardiology   • CARDIAC CATHETERIZATION N/A 12/18/2019    Procedure: Coronary angiography;  Surgeon: Jay Barney IV, MD;  Location:  [facility];  Service: Cardiovascular   • CHOLECYSTECTOMY     • COLONOSCOPY  11/13/2015    Dr. Martinez- internal hemorrhoids, sigmoid diverticulosis   • COLONOSCOPY N/A 8/17/2018    Procedure: COLONOSCOPY CPT CODE: 40574;  Surgeon: Gigi Geronimo MD;  Location: [facility] OR;  Service: Gastroenterology   • COLONOSCOPY N/A 5/17/2022    Procedure: COLONOSCOPY;  Surgeon: Geno Vernon MD;  Location: [facility] OR;  Service: Gastroenterology;  Laterality: N/A;   • CORONARY ANGIOPLASTY WITH STENT PLACEMENT     • ENDOSCOPY N/A 8/17/2018    Procedure: ESOPHAGOGASTRODUODENOSCOPY WITH BIOPSY CPT CODE: 12253;  Surgeon: Gigi Geronimo MD;  Location: [facility] OR;  Service: Gastroenterology   • ENDOSCOPY N/A 4/20/2021    Procedure: ESOPHAGOGASTRODUODENOSCOPY;  Surgeon: Geno Vernon MD;  Location: [facility] OR;  Service: Gastroenterology;  Laterality: N/A;   • ENDOSCOPY N/A 5/17/2022     Procedure: ESOPHAGOGASTRODUODENOSCOPY WITH BIOPSY;  Surgeon: Geno Vernon MD;  Location: [facility] OR;  Service: Gastroenterology;  Laterality: N/A;   • INTERVENTIONAL RADIOLOGY PROCEDURE N/A 2019    Procedure: Coil Embolization of septal to pulmonary artery fistuala.;  Surgeon: Jay Barney IV, MD;  Location:  [facility];  Service: Cardiovascular   • VT RT/LT HEART CATHETERS N/A 2017    Procedure: Percutaneous Coronary Intervention;  Surgeon: Lincoln De Los Santos MD;  [facility]:  [facility];  Service: Cardiology   • STOMACH SURGERY      FUSION OF BLEEDING ULCER   • UPPER GASTROINTESTINAL ENDOSCOPY  2015    Dr. Martinez- mild gastritis       Social History:  Social History     Socioeconomic History   • Marital status:    Tobacco Use   • Smoking status: Former     Packs/day: 3.00     Years: 25.00     Pack years: 75.00     Types: Cigarettes     Quit date:      Years since quittin.1   • Smokeless tobacco: Former     Quit date: 1986   Vaping Use   • Vaping Use: Never used   Substance and Sexual Activity   • Alcohol use: No   • Drug use: No   • Sexual activity: Defer     Fidencio Luciano  reports that he quit smoking about 41 years ago. His smoking use included cigarettes. He has a 75.00 pack-year smoking history. He quit smokeless tobacco use about 37 years ago.      Family History:  Family History   Problem Relation Age of Onset   • Sick sinus syndrome Mother    • Heart failure Mother    • Diabetes Mother    • Liver cancer Father        Review of Systems:  Review of Systems   All other systems reviewed and are negative.      Vital Signs:   /75   Pulse 79   Temp 97.1 °F (36.2 °C)   Resp 18   SpO2 95%      Physical Exam:  Physical Exam  Vitals and nursing note reviewed.   Constitutional:       General: He is not in acute distress.     Appearance: Normal appearance. He is not ill-appearing.   HENT:      Head: Normocephalic and  atraumatic.      Nose: Nose normal.      Mouth/Throat:      Mouth: Mucous membranes are moist.      Pharynx: Oropharynx is clear.   Eyes:      Conjunctiva/sclera: Conjunctivae normal.      Pupils: Pupils are equal, round, and reactive to light.   Cardiovascular:      Rate and Rhythm: Normal rate and regular rhythm.      Heart sounds: No murmur heard.  Pulmonary:      Effort: Pulmonary effort is normal. No respiratory distress.      Breath sounds: Normal breath sounds. No wheezing.   Abdominal:      General: Abdomen is flat. Bowel sounds are normal. There is no distension.      Palpations: Abdomen is soft. There is no mass.      Tenderness: There is no abdominal tenderness. There is no guarding.   Musculoskeletal:         General: No swelling. Normal range of motion.      Cervical back: Normal range of motion.   Lymphadenopathy:      Cervical: No cervical adenopathy.   Skin:     General: Skin is warm and dry.      Coloration: Skin is not jaundiced.      Findings: No bruising or rash.   Neurological:      General: No focal deficit present.      Mental Status: He is alert and oriented to person, place, and time.      Motor: No weakness.      Coordination: Coordination normal.   Psychiatric:         Mood and Affect: Mood normal.         Behavior: Behavior normal.         Judgment: Judgment normal.         PHQ-9 Score  PHQ-9 Total Score: 0     Pain Score  Vitals:    02/03/23 1244   BP: 135/75   Pulse: 79   Resp: 18   Temp: 97.1 °F (36.2 °C)   SpO2: 95%   PainSc: 0-No pain       ECOG score: 2             Lab Review  Lab Results   Component Value Date    WBC 9.42 02/03/2023    HGB 11.2 (L) 02/03/2023    HCT 37.4 (L) 02/03/2023    MCV 91.7 02/03/2023    RDW 16.1 (H) 02/03/2023     02/03/2023    NEUTRORELPCT 67.6 02/03/2023    LYMPHORELPCT 20.4 02/03/2023    MONORELPCT 9.1 02/03/2023    EOSRELPCT 2.0 02/03/2023    BASORELPCT 0.7 02/03/2023    NEUTROABS 6.36 02/03/2023    LYMPHSABS 1.92 02/03/2023       Lab Results    Component Value Date     02/03/2023    K 4.3 02/03/2023    CO2 25.8 02/03/2023     02/03/2023    BUN 19 02/03/2023    CREATININE 0.95 02/03/2023    EGFRIFNONA 98 02/02/2022    EGFRIFAFRI  09/19/2016      Comment:      <15 Indicative of kidney failure.    GLUCOSE 248 (H) 02/03/2023    CALCIUM 8.9 02/03/2023    ALKPHOS 107 02/03/2023    AST 24 02/03/2023    ALT 19 02/03/2023    BILITOT 0.3 02/03/2023    ALBUMIN 3.8 02/03/2023    PROTEINTOT 7.3 02/03/2023    MG 2.1 05/29/2022    PHOS 3.9 10/28/2021         Assessment / Plan         Assessment and Plan   77-year-old male who presents for follow-up regarding iron deficiency anemia.  Most recent colonoscopy was performed May 2022, negative for bleed.    #Anemia, iron deficiency  -Negative GI work-up May 2022.  Plan for capsule endoscopy and small bowel follow-through by Dr. Mesa  -CBC today with hemoglobin 11.2, hematocrit 37.4, platelet 273.  -Iron studies with iron low at 47, iron saturation 8%, total iron binding capacity increased at 565, ferritin low at 21 (decreased from 64 on 11/22/2022).  -Previously received IV ferumoxytol 12/2022.  Currently on iron polysaccharide 150 mg capsule daily  -Given worsening iron storage, will order for IV ferumoxytol infusion.  Patient is agreeable to the plan    Discussed possible differential diagnoses, testing, treatment, recommended non-pharmacological interventions, risks, warning signs to monitor for that would indicate need for follow-up in clinic or ER. If no improvement with these regimens or you have new or worsening symptoms follow-up. Patient verbalizes understanding and agreement with plan of care. Denies further needs or concerns.     Patient was given instructions and counseling regarding her condition and for health maintenance advised.    I spent 30 minutes with Fidencio Luciano today.  In the office today, more than 50% of this time was spent face-to-face with him  in counseling / coordination of care,  reviewing his interim medical history and counseling on the current treatment plan.  All questions were answered to his satisfaction.      Meds ordered during this visit  No orders of the defined types were placed in this encounter.      Visit Diagnoses    ICD-10-CM ICD-9-CM   1. Anemia, unspecified type  D64.9 285.9   2. Iron deficiency anemia, unspecified iron deficiency anemia type  D50.9 280.9       Follow Up   4 weeks with repeat iron studies, ferritin, and CBC      This document has been electronically signed by Sultana Keen MD   February 5, 2023 19:07 EST    Dictated Utilizing Dragon Dictation: Part of this note may be an electronic transcription/translation of spoken language to printed text using the Dragon Dictation System.

## 2023-02-06 ENCOUNTER — HOSPITAL ENCOUNTER (OUTPATIENT)
Dept: GENERAL RADIOLOGY | Facility: HOSPITAL | Age: 78
Discharge: HOME | End: 2023-02-06
Admitting: INTERNAL MEDICINE
Payer: MEDICARE

## 2023-02-06 DIAGNOSIS — K90.9 INTESTINAL MALABSORPTION, UNSPECIFIED TYPE: Primary | ICD-10-CM

## 2023-02-06 DIAGNOSIS — D50.0 IRON DEFICIENCY ANEMIA DUE TO CHRONIC BLOOD LOSS: ICD-10-CM

## 2023-02-06 PROCEDURE — 74246 X-RAY XM UPR GI TRC 2CNTRST: CPT | Performed by: RADIOLOGY

## 2023-02-06 PROCEDURE — 74246 X-RAY XM UPR GI TRC 2CNTRST: CPT

## 2023-02-06 PROCEDURE — 74248 X-RAY SM INT F-THRU STD: CPT | Performed by: RADIOLOGY

## 2023-02-06 PROCEDURE — 74248 X-RAY SM INT F-THRU STD: CPT

## 2023-02-07 ENCOUNTER — TELEPHONE (OUTPATIENT)
Dept: GASTROENTEROLOGY | Facility: CLINIC | Age: 78
End: 2023-02-07
Payer: MEDICARE

## 2023-02-07 DIAGNOSIS — D50.0 IRON DEFICIENCY ANEMIA DUE TO CHRONIC BLOOD LOSS: Primary | ICD-10-CM

## 2023-02-07 DIAGNOSIS — D50.9 IRON DEFICIENCY ANEMIA, UNSPECIFIED IRON DEFICIENCY ANEMIA TYPE: ICD-10-CM

## 2023-02-07 NOTE — PROGRESS NOTES
Your recent upper GI with small bowel follow-through was normal except for irregular contractions of the esophagus.  We are going to get you scheduled for a capsule endoscopy due to chronic anemia.

## 2023-02-07 NOTE — TELEPHONE ENCOUNTER
I called and spoke with nurse at [facility] and scheduled Fidencio for 3-9-23 at 9:00. I went over instructions with her as well as faxed them to [phone]

## 2023-02-07 NOTE — TELEPHONE ENCOUNTER
----- Message from Geno Vernon MD sent at 2/7/2023  1:33 PM EST -----  We will need to go ahead and get Mr. Luciano scheduled for capsule endoscopy.  His upper GI small bowel follow-through was normal.  We are doing the procedure for iron deficiency anemia.

## 2023-02-13 ENCOUNTER — OFFICE VISIT (OUTPATIENT)
Dept: CARDIOLOGY | Facility: CLINIC | Age: 78
End: 2023-02-13
Payer: MEDICARE

## 2023-02-13 VITALS
SYSTOLIC BLOOD PRESSURE: 154 MMHG | DIASTOLIC BLOOD PRESSURE: 65 MMHG | OXYGEN SATURATION: 98 % | HEART RATE: 59 BPM | HEIGHT: 71 IN | RESPIRATION RATE: 16 BRPM | BODY MASS INDEX: 29.01 KG/M2

## 2023-02-13 DIAGNOSIS — I25.10 ASCVD (ARTERIOSCLEROTIC CARDIOVASCULAR DISEASE): Primary | ICD-10-CM

## 2023-02-13 DIAGNOSIS — I10 ESSENTIAL HYPERTENSION: ICD-10-CM

## 2023-02-13 DIAGNOSIS — E78.5 DYSLIPIDEMIA: ICD-10-CM

## 2023-02-13 DIAGNOSIS — I25.41 CORONARY ARTERY FISTULA: ICD-10-CM

## 2023-02-13 DIAGNOSIS — R60.0 BILATERAL LEG EDEMA: ICD-10-CM

## 2023-02-13 PROCEDURE — 99214 OFFICE O/P EST MOD 30 MIN: CPT | Performed by: INTERNAL MEDICINE

## 2023-02-13 PROCEDURE — 93000 ELECTROCARDIOGRAM COMPLETE: CPT | Performed by: INTERNAL MEDICINE

## 2023-02-13 RX ORDER — HYDROCODONE BITARTRATE AND ACETAMINOPHEN 7.5; 325 MG/1; MG/1
1 TABLET ORAL EVERY 6 HOURS PRN
COMMUNITY

## 2023-02-13 RX ORDER — BUMETANIDE 1 MG/1
1 TABLET ORAL DAILY
Qty: 30 TABLET | Refills: 3 | Status: SHIPPED | OUTPATIENT
Start: 2023-02-13

## 2023-02-13 RX ORDER — ACETAMINOPHEN 500 MG
500 TABLET ORAL EVERY 6 HOURS PRN
COMMUNITY

## 2023-02-13 NOTE — PROGRESS NOTES
Bernardo Pritchett [id number]  Fidencio Luciano  1945  02/13/2023    Patient Active Problem List   Diagnosis   • Essential hypertension   • Type 2 diabetes mellitus (HCC)   • Benign prostatic hyperplasia   • ASCVD (arteriosclerotic cardiovascular disease), s/p stenting of 80 % stenosis in left circumflex on 10/05/16and 60% stenosis in the LAD, clinically stable.    • Hyperlipidemia LDL goal <70   • Weakness generalized   • Coronary artery fistula   • Weight loss, unintentional   • Iron deficiency anemia   • Gastroesophageal reflux disease   • Dysphagia   • Chest pain   • History of pulmonary embolism in February 22, patient is on Eliquis.   • Early satiety   • Diarrhea       Dear Bernardo Pritchett MD:    Subjective     Fidencio Luciano is a 77 y.o. male with the problems as listed above, presents    Chief complaint: Follow-up of coronary artery disease.    History of Present Illness: Mr. 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  On today's visit denies any complaints of chest pains or shortness of breath.  His main complaint is swelling in his lower extremities.  They could not weigh him as he is wheelchair dependent but apparently has gained a lot of weight recently according to his family.  Patient lives in a nursing home.    No Known Allergies:      Current Outpatient Medications:   •  acetaminophen (TYLENOL) 500 MG tablet, Take 500 mg by mouth Every 6 (Six) Hours As Needed for Mild Pain., Disp: , Rfl:   •  albuterol sulfate  (90 Base) MCG/ACT inhaler, Inhale 2 puffs 4 (Four) Times a Day As Needed for Wheezing., Disp: , Rfl:   •  apixaban (ELIQUIS) 5 MG tablet tablet, Take 5 mg by mouth Every 12 (Twelve) Hours., Disp: , Rfl:   •  atorvastatin (LIPITOR) 80 MG  tablet, Take 1 tablet by mouth Every Night., Disp: 90 tablet, Rfl: 5  •  budesonide-formoterol (SYMBICORT) 160-4.5 MCG/ACT inhaler, Inhale 2 puffs 2 (Two) Times a Day., Disp: 10.2 g, Rfl: 11  •  clopidogrel (PLAVIX) 75 MG tablet, Take 1 tablet by mouth Daily., Disp: 30 tablet, Rfl: 5  •  colestipol (COLESTID) 1 g tablet, Take 1 g by mouth 2 (Two) Times a Day., Disp: , Rfl:   •  divalproex (DEPAKOTE) 500 MG DR tablet, Take 500 mg by mouth Every Night., Disp: , Rfl:   •  finasteride (PROSCAR) 5 MG tablet, Take 1 tablet by mouth Every Night., Disp: 30 tablet, Rfl: 11  •  HYDROcodone-acetaminophen (NORCO) 7.5-325 MG per tablet, Take 1 tablet by mouth Every 6 (Six) Hours As Needed for Moderate Pain., Disp: , Rfl:   •  iron polysaccharides (NIFEREX) 150 MG capsule, Take 1 capsule by mouth Daily., Disp: 30 capsule, Rfl: 3  •  isosorbide mononitrate (IMDUR) 120 MG 24 hr tablet, Take 1 tablet by mouth Every Morning., Disp: 90 tablet, Rfl: 2  •  Levemir FlexTouch 100 UNIT/ML injection, , Disp: , Rfl:   •  Melatonin 5 MG capsule, Take 5 mg by mouth Every Night., Disp: 30 each, Rfl: 1  •  memantine (NAMENDA XR) 28 MG capsule sustained-release 24 hr extended release capsule, Take 28 mg by mouth Daily., Disp: , Rfl:   •  metoprolol tartrate (LOPRESSOR) 25 MG tablet, Take 0.5 tablets by mouth Every 12 (Twelve) Hours., Disp: , Rfl:   •  Mirabegron ER (Myrbetriq) 25 MG tablet sustained-release 24 hour 24 hr tablet, Take 1 tablet by mouth Daily., Disp: 30 tablet, Rfl: 11  •  mirtazapine (REMERON) 15 MG tablet, Take 15 mg by mouth Every Night., Disp: , Rfl:   •  nitroglycerin (NITROSTAT) 0.4 MG SL tablet, 1 under the tongue as needed for angina, may repeat q5mins for up three doses, Disp: 25 tablet, Rfl: 2  •  NovoLIN R 100 UNIT/ML injection, , Disp: , Rfl:   •  pantoprazole (PROTONIX) 40 MG EC tablet, Take 40 mg by mouth Daily., Disp: , Rfl:   •  ranolazine (RANEXA) 500 MG 12 hr tablet, Take 1 tablet by mouth Every 12 (Twelve)  "Hours., Disp: , Rfl:   •  bumetanide (BUMEX) 1 MG tablet, Take 1 tablet by mouth Daily., Disp: 30 tablet, Rfl: 3  •  cetirizine (zyrTEC) 10 MG tablet, Take 10 mg by mouth Daily., Disp: , Rfl:       The following portions of the patient's history were reviewed and updated as appropriate: allergies, current medications, past family history, past medical history, past social history, past surgical history and problem list.    Social History     Tobacco Use   • Smoking status: Former     Packs/day: 3.00     Years: 25.00     Pack years: 75.00     Types: Cigarettes     Quit date:      Years since quittin.1   • Smokeless tobacco: Former     Quit date: 1986   Vaping Use   • Vaping Use: Never used   Substance Use Topics   • Alcohol use: No   • Drug use: No       Review of Systems   Constitutional: Negative for chills and fever.   HENT: Negative for nosebleeds and sore throat.    Cardiovascular: Positive for leg swelling.   Respiratory: Negative for cough, hemoptysis and wheezing.    Gastrointestinal: Negative for abdominal pain, hematemesis, hematochezia, melena, nausea and vomiting.   Genitourinary: Negative for dysuria and hematuria.   Neurological: Negative for headaches.     Objective   Vitals:    23 1038   BP: 154/65   Pulse: 59   Resp: 16   SpO2: 98%   Height: 180.3 cm (71\")     Body mass index is 29.01 kg/m².    Constitutional:       Appearance: Well-developed.   Eyes:      Conjunctiva/sclera: Conjunctivae normal.   HENT:      Head: Normocephalic.   Neck:      Thyroid: No thyromegaly.      Vascular: No JVD.      Trachea: No tracheal deviation.   Pulmonary:      Effort: No respiratory distress.      Breath sounds: Normal breath sounds. No wheezing. No rales.   Cardiovascular:      PMI at left midclavicular line. Normal rate. Regular rhythm. Normal S1. Normal S2.      Murmurs: There is no murmur.      No gallop. No click. No rub.   Pulses:     Intact distal pulses.   Edema:     Pretibial: bilateral " 2+ edema of the pretibial area.     Ankle: bilateral 2+ edema of the ankle.     Feet: bilateral 2+ edema of the feet.  Abdominal:      General: Bowel sounds are normal.      Palpations: Abdomen is soft. There is no abdominal mass.      Tenderness: There is no abdominal tenderness.   Musculoskeletal:      Cervical back: Normal range of motion and neck supple. Skin:     General: Skin is warm and dry.   Neurological:      Mental Status: Alert and oriented to person, place, and time.      Cranial Nerves: No cranial nerve deficit.       Lab Results   Component Value Date     02/03/2023    K 4.3 02/03/2023     02/03/2023    CO2 25.8 02/03/2023    BUN 19 02/03/2023    CREATININE 0.95 02/03/2023    GLUCOSE 248 (H) 02/03/2023    CALCIUM 8.9 02/03/2023    AST 24 02/03/2023    ALT 19 02/03/2023    ALKPHOS 107 02/03/2023    LABIL2 1.4 (L) 01/08/2016     Lab Results   Component Value Date    CKTOTAL 90 11/25/2022     Lab Results   Component Value Date    WBC 9.42 02/03/2023    HGB 11.2 (L) 02/03/2023    HCT 37.4 (L) 02/03/2023     02/03/2023     Lab Results   Component Value Date    INR 1.38 (H) 05/29/2022    INR 1.18 (H) 02/02/2022    INR 1.05 10/27/2021     Lab Results   Component Value Date    MG 2.1 05/29/2022     Lab Results   Component Value Date    TSH 3.450 12/21/2021    PSA 0.6 01/28/2022    CHLPL 109 08/12/2015    TRIG 115 05/25/2021    HDL 29 (L) 05/25/2021    LDL 58 05/25/2021      Lab Results   Component Value Date    BNP 55.0 07/16/2016         ECG 12 Lead    Date/Time: 2/13/2023 10:41 AM  Performed by: Giovanni Buenrostro MD  Authorized by: Giovanni Buenrostro MD   Comparison: compared with previous ECG from 5/29/2022  Similar to previous ECG  Rhythm: sinus bradycardia  BPM: 57  Conduction: conduction normal  ST Segments: ST segments normal                Assessment & Plan :   Diagnosis Plan   1. ASCVD (arteriosclerotic cardiovascular disease), s/p stenting of 80 % stenosis in left circumflex on  10/05/16and 60% stenosis in the LAD, clinically stable.   ECG 12 Lead      2. 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        3. Essential hypertension, controlled.        4. Bilateral leg edema  Comprehensive Metabolic Panel      5. Dyslipidemia  Lipid Panel          Recommendations:  1. We will add Bumex 1 mg daily for his leg edema and see how he does.  2. Get CMP in a week  3. Check fasting lipid panel.    Return in about 3 months (around 5/13/2023).    As always, Bernardo Pritchett MD  I appreciate very much the opportunity to participate in the cardiovascular care of your patients. Please do not hesitate to call me with any questions with regards to Fidencio Luciano evaluation and management.       With Best Regards,        Giovanni Buenrostro MD, [facility]    Please note that portions of this note were completed with a voice recognition program.

## 2023-02-20 ENCOUNTER — INFUSION (OUTPATIENT)
Dept: ONCOLOGY | Facility: HOSPITAL | Age: 78
End: 2023-02-20
Payer: MEDICARE

## 2023-02-20 VITALS
RESPIRATION RATE: 18 BRPM | OXYGEN SATURATION: 97 % | SYSTOLIC BLOOD PRESSURE: 136 MMHG | DIASTOLIC BLOOD PRESSURE: 59 MMHG | HEART RATE: 89 BPM | TEMPERATURE: 97.9 F

## 2023-02-20 DIAGNOSIS — D50.9 IRON DEFICIENCY ANEMIA, UNSPECIFIED IRON DEFICIENCY ANEMIA TYPE: Primary | ICD-10-CM

## 2023-02-20 PROCEDURE — 96374 THER/PROPH/DIAG INJ IV PUSH: CPT

## 2023-02-20 PROCEDURE — 25010000002 FERUMOXYTOL 510 MG/17ML SOLUTION: Performed by: INTERNAL MEDICINE

## 2023-02-20 PROCEDURE — 96365 THER/PROPH/DIAG IV INF INIT: CPT

## 2023-02-20 RX ORDER — SODIUM CHLORIDE 9 MG/ML
250 INJECTION, SOLUTION INTRAVENOUS ONCE
Status: COMPLETED | OUTPATIENT
Start: 2023-02-20 | End: 2023-02-20

## 2023-02-20 RX ADMIN — FERUMOXYTOL 510 MG: 510 INJECTION INTRAVENOUS at 15:03

## 2023-02-20 RX ADMIN — SODIUM CHLORIDE 250 ML: 9 INJECTION, SOLUTION INTRAVENOUS at 15:02

## 2023-02-23 ENCOUNTER — INFUSION (OUTPATIENT)
Dept: ONCOLOGY | Facility: HOSPITAL | Age: 78
End: 2023-02-23
Payer: MEDICARE

## 2023-02-23 VITALS
SYSTOLIC BLOOD PRESSURE: 117 MMHG | TEMPERATURE: 97.3 F | DIASTOLIC BLOOD PRESSURE: 60 MMHG | RESPIRATION RATE: 18 BRPM | BODY MASS INDEX: 29.29 KG/M2 | OXYGEN SATURATION: 99 % | HEART RATE: 66 BPM | WEIGHT: 210 LBS

## 2023-02-23 DIAGNOSIS — D50.9 IRON DEFICIENCY ANEMIA, UNSPECIFIED IRON DEFICIENCY ANEMIA TYPE: Primary | ICD-10-CM

## 2023-02-23 PROCEDURE — 25010000002 FERUMOXYTOL 510 MG/17ML SOLUTION: Performed by: INTERNAL MEDICINE

## 2023-02-23 PROCEDURE — 96374 THER/PROPH/DIAG INJ IV PUSH: CPT

## 2023-02-23 PROCEDURE — 96365 THER/PROPH/DIAG IV INF INIT: CPT

## 2023-02-23 RX ORDER — SODIUM CHLORIDE 9 MG/ML
250 INJECTION, SOLUTION INTRAVENOUS ONCE
Status: COMPLETED | OUTPATIENT
Start: 2023-02-23 | End: 2023-02-23

## 2023-02-23 RX ADMIN — FERUMOXYTOL 510 MG: 510 INJECTION INTRAVENOUS at 14:44

## 2023-02-23 RX ADMIN — SODIUM CHLORIDE 250 ML: 9 INJECTION, SOLUTION INTRAVENOUS at 14:44

## 2023-03-09 ENCOUNTER — HOSPITAL ENCOUNTER (OUTPATIENT)
Dept: PREOP | Facility: HOSPITAL | Age: 78
Setting detail: HOSPITAL OUTPATIENT SURGERY
Discharge: HOME | End: 2023-03-09
Admitting: INTERNAL MEDICINE
Payer: MEDICARE

## 2023-03-09 ENCOUNTER — DOCUMENTATION (OUTPATIENT)
Dept: GASTROENTEROLOGY | Facility: CLINIC | Age: 78
End: 2023-03-09
Payer: MEDICARE

## 2023-03-09 DIAGNOSIS — D50.0 IRON DEFICIENCY ANEMIA DUE TO CHRONIC BLOOD LOSS: Primary | ICD-10-CM

## 2023-03-09 PROCEDURE — 91110 GI TRC IMG INTRAL ESOPH-ILE: CPT

## 2023-03-09 NOTE — PROGRESS NOTES
Patient went today for Pillcam and had not been given prep from Nursing home. I called and spoke with Leonarda and re-faxed and went over instructions with her as well as faxed instructions to her. A new order was placed in system. [facility] notified

## 2023-03-30 ENCOUNTER — HOSPITAL ENCOUNTER (OUTPATIENT)
Dept: PREOP | Facility: HOSPITAL | Age: 78
Setting detail: HOSPITAL OUTPATIENT SURGERY
Discharge: HOME | End: 2023-03-30
Payer: MEDICARE

## 2023-03-31 NOTE — PROGRESS NOTES
Subjective     Date: 4/5/2023    Referring Provider  George Riley MD    Chief Complaint  Anemia     Subjective    Fidencio Luciano is a 77 y.o. male who presents today to [facility] for follow up.    HPI:   77-year-old male who was previously followed by ANDREW Morales for anemia, presents for follow-up.  Patient with chronic anemia, worsened since June 2022.  Previously reportedly was on oral ferrous sulfate 3 times daily, tolerated well.  Currently still on iron polysaccharide 150 mg daily, he reports no adverse effects with it.  Received IV ferumoxytol on 12/13/2022.    Last colonoscopy and endoscopy in May 2022.  Colonoscopy with 3 sessile polyps in the cecum, multiple small mouth diverticula in the sigmoid and descending colon, internal hemorrhoids grade 1. Endoscopy showing small hiatal hernia, diffuse moderately erythematous mucosa without bleeding in gastric body in the gastric antrum.Seen by Dr. Mesa January 12, 2023, plan on doing capsule endoscopy as well as small bowel follow-through.    Reports no symptoms today.  Has dark-colored stool, denies any melena or hematochezia.    Treatment History:      Interval History 04/05/2023   Small bowel follow through was normal except for irregular contractions of esophagus. Pill cam rescheduled as prep was not given at nursing home.     He presents today with his wife. Reports overall doing well after IV iron infusion. Denies any GI bleed.    The following portions of the patient's history were reviewed and updated as appropriate: allergies, current medications, past family history, past medical history, past social history, past surgical history and problem list.    Objective     Allergy:   No Known Allergies     Current Medications:   Current Outpatient Medications   Medication Sig Dispense Refill   • acetaminophen (TYLENOL) 500 MG tablet Take 1 tablet by mouth Every 6 (Six) Hours As Needed for Mild Pain.      • albuterol sulfate  (90 Base) MCG/ACT inhaler Inhale 2 puffs 4 (Four) Times a Day As Needed for Wheezing.     • apixaban (ELIQUIS) 5 MG tablet tablet Take 1 tablet by mouth Every 12 (Twelve) Hours.     • atorvastatin (LIPITOR) 80 MG tablet Take 1 tablet by mouth Every Night. 90 tablet 5   • budesonide-formoterol (SYMBICORT) 160-4.5 MCG/ACT inhaler Inhale 2 puffs 2 (Two) Times a Day. 10.2 g 11   • bumetanide (BUMEX) 1 MG tablet Take 1 tablet by mouth Daily. 30 tablet 3   • cetirizine (zyrTEC) 10 MG tablet Take 1 tablet by mouth Daily.     • clopidogrel (PLAVIX) 75 MG tablet Take 1 tablet by mouth Daily. 30 tablet 5   • colestipol (COLESTID) 1 g tablet Take 1 tablet by mouth 2 (Two) Times a Day.     • divalproex (DEPAKOTE) 500 MG DR tablet Take 1 tablet by mouth Every Night.     • finasteride (PROSCAR) 5 MG tablet Take 1 tablet by mouth Every Night. 30 tablet 11   • HYDROcodone-acetaminophen (NORCO) 7.5-325 MG per tablet Take 1 tablet by mouth Every 6 (Six) Hours As Needed for Moderate Pain.     • iron polysaccharides (NIFEREX) 150 MG capsule Take 1 capsule by mouth Daily. 30 capsule 3   • isosorbide mononitrate (IMDUR) 120 MG 24 hr tablet Take 1 tablet by mouth Every Morning. 90 tablet 2   • Levemir FlexTouch 100 UNIT/ML injection      • Melatonin 5 MG capsule Take 5 mg by mouth Every Night. 30 each 1   • memantine (NAMENDA XR) 28 MG capsule sustained-release 24 hr extended release capsule Take 1 capsule by mouth Daily.     • metoprolol tartrate (LOPRESSOR) 25 MG tablet Take 0.5 tablets by mouth Every 12 (Twelve) Hours.     • Mirabegron ER (Myrbetriq) 25 MG tablet sustained-release 24 hour 24 hr tablet Take 1 tablet by mouth Daily. 30 tablet 11   • mirtazapine (REMERON) 15 MG tablet Take 1 tablet by mouth Every Night.     • nitroglycerin (NITROSTAT) 0.4 MG SL tablet 1 under the tongue as needed for angina, may repeat q5mins for up three doses 25 tablet 2   • NovoLIN R 100 UNIT/ML injection      •  pantoprazole (PROTONIX) 40 MG EC tablet Take 1 tablet by mouth Daily.     • ranolazine (RANEXA) 500 MG 12 hr tablet Take 1 tablet by mouth Every 12 (Twelve) Hours.       No current facility-administered medications for this visit.       Past Medical History:  Past Medical History:   Diagnosis Date   • BPH (benign prostatic hyperplasia)    • Bradycardia     Positive tilt table testing 07/2016   • Coronary artery disease    • Diabetes mellitus    • Diverticulosis    • Elevated cholesterol    • Gastric ulcer with hemorrhage    • Gastroesophageal reflux disease 1/26/2021   • History of transfusion    • Hyperlipidemia    • Hypertension    • Psoriasis        Past Surgical History:  Past Surgical History:   Procedure Laterality Date   • BACK SURGERY     • CARDIAC CATHETERIZATION N/A 9/20/2016    Procedure: Left Heart Cath;  Surgeon: Giovanni Buenrostro MD;  [facility]:  [facility];  Service:    • CARDIAC CATHETERIZATION N/A 10/4/2016    Procedure: Left Heart Cath;  Surgeon: Bharathi Andrew MD;  [facility]:  [facility];  Service:    • CARDIAC CATHETERIZATION N/A 5/9/2017    Procedure: Left Heart Cath;  Surgeon: Giovanni Buenrostro MD;  Location:  [facility];  Service:    • CARDIAC CATHETERIZATION N/A 5/9/2017    Procedure: ERR;  Surgeon: Giovanni Buenrostro MD;  [facility]:  [facility];  Service:    • CARDIAC CATHETERIZATION N/A 11/8/2019    Procedure: Left Heart Cath;  Surgeon: Abraham Oviedo MD;  Location:  [facility];  Service: Cardiology   • CARDIAC CATHETERIZATION N/A 12/18/2019    Procedure: Coronary angiography;  Surgeon: Jay Barney IV, MD;  Location:  [facility];  Service: Cardiovascular   • CHOLECYSTECTOMY     • COLONOSCOPY  11/13/2015    Dr. Martinez- internal hemorrhoids, sigmoid diverticulosis   • COLONOSCOPY N/A 8/17/2018    Procedure: COLONOSCOPY CPT CODE: 24809;  Surgeon: Gigi Geronimo MD;   Location:  [facility];  Service: Gastroenterology   • COLONOSCOPY N/A 2022    Procedure: COLONOSCOPY;  Surgeon: Geno Vernon MD;  Location:  [facility];  Service: Gastroenterology;  Laterality: N/A;   • CORONARY ANGIOPLASTY WITH STENT PLACEMENT     • ENDOSCOPY N/A 2018    Procedure: ESOPHAGOGASTRODUODENOSCOPY WITH BIOPSY CPT CODE: 45591;  Surgeon: Gigi Geronimo MD;  Location:  [facility];  Service: Gastroenterology   • ENDOSCOPY N/A 2021    Procedure: ESOPHAGOGASTRODUODENOSCOPY;  Surgeon: Geno Vernon MD;  Location:  [facility];  Service: Gastroenterology;  Laterality: N/A;   • ENDOSCOPY N/A 2022    Procedure: ESOPHAGOGASTRODUODENOSCOPY WITH BIOPSY;  Surgeon: Geno Vernon MD;  Location:  [facility];  Service: Gastroenterology;  Laterality: N/A;   • INTERVENTIONAL RADIOLOGY PROCEDURE N/A 2019    Procedure: Coil Embolization of septal to pulmonary artery fistuala.;  Surgeon: Jay Barney IV, MD;  Location:  [facility];  Service: Cardiovascular   • ME RT/LT HEART CATHETERS N/A 2017    Procedure: Percutaneous Coronary Intervention;  Surgeon: Lincoln De Los Santos MD;  [facility]:  [facility];  Service: Cardiology   • STOMACH SURGERY      FUSION OF BLEEDING ULCER   • UPPER GASTROINTESTINAL ENDOSCOPY  2015    Dr. Martinez- mild gastritis       Social History:  Social History     Socioeconomic History   • Marital status:    Tobacco Use   • Smoking status: Former     Packs/day: 3.00     Years: 25.00     Pack years: 75.00     Types: Cigarettes     Quit date:      Years since quittin.2   • Smokeless tobacco: Former     Quit date: 1986   Vaping Use   • Vaping Use: Never used   Substance and Sexual Activity   • Alcohol use: No   • Drug use: No   • Sexual activity: Defer     Fidencio WHITNEY Mustapha  reports that he quit smoking about 41 years ago. His smoking use included cigarettes. He has a 75.00  "pack-year smoking history. He quit smokeless tobacco use about 37 years ago.      Family History:  Family History   Problem Relation Age of Onset   • Sick sinus syndrome Mother    • Heart failure Mother    • Diabetes Mother    • Liver cancer Father        Review of Systems:  Review of Systems   All other systems reviewed and are negative.      Vital Signs:   /57   Pulse 58   Temp 97.1 °F (36.2 °C) (Temporal)   Resp 18   Ht 180.3 cm (71\")   Wt 91.2 kg (201 lb)   SpO2 97%   BMI 28.03 kg/m²      Physical Exam:  Physical Exam  Vitals and nursing note reviewed.   Constitutional:       General: He is not in acute distress.     Appearance: Normal appearance. He is not ill-appearing.   HENT:      Head: Normocephalic and atraumatic.      Nose: Nose normal.      Mouth/Throat:      Mouth: Mucous membranes are moist.      Pharynx: Oropharynx is clear.   Eyes:      Conjunctiva/sclera: Conjunctivae normal.      Pupils: Pupils are equal, round, and reactive to light.   Cardiovascular:      Rate and Rhythm: Normal rate and regular rhythm.      Heart sounds: No murmur heard.  Pulmonary:      Effort: Pulmonary effort is normal. No respiratory distress.      Breath sounds: Normal breath sounds. No wheezing.   Abdominal:      General: Abdomen is flat. Bowel sounds are normal. There is no distension.      Palpations: Abdomen is soft. There is no mass.      Tenderness: There is no abdominal tenderness. There is no guarding.   Musculoskeletal:         General: No swelling. Normal range of motion.      Cervical back: Normal range of motion.   Lymphadenopathy:      Cervical: No cervical adenopathy.   Skin:     General: Skin is warm and dry.      Coloration: Skin is not jaundiced.      Findings: No bruising or rash.   Neurological:      General: No focal deficit present.      Mental Status: He is alert and oriented to person, place, and time.      Motor: No weakness.      Coordination: Coordination normal.   Psychiatric:         " "Mood and Affect: Mood normal.         Behavior: Behavior normal.         Judgment: Judgment normal.         PHQ-9 Score  PHQ-9 Total Score: 0     Pain Score  Vitals:    04/05/23 1038   BP: 124/57   Pulse: 58   Resp: 18   Temp: 97.1 °F (36.2 °C)   TempSrc: Temporal   SpO2: 97%   Weight: 91.2 kg (201 lb)   Height: 180.3 cm (71\")   PainSc: 0-No pain           LAB REVIEW  CBC W/ DIFF Value Date    WBC 9.42 02/03/2023    HGB 11.2 (L) 02/03/2023    HCT 37.4 (L) 02/03/2023    MCV 91.7 02/03/2023    RDW 16.1 (H) 02/03/2023     02/03/2023    NEUTRORELPCT 67.6 02/03/2023    LYMPHORELPCT 20.4 02/03/2023    MONORELPCT 9.1 02/03/2023    EOSRELPCT 2.0 02/03/2023    BASORELPCT 0.7 02/03/2023    NEUTROABS 6.36 02/03/2023    LYMPHSABS 1.92 02/03/2023     CMP Value Date     02/03/2023    K 4.3 02/03/2023    CO2 25.8 02/03/2023     02/03/2023    BUN 19 02/03/2023    CREATININE 0.95 02/03/2023    EGFRIFNONA 98 02/02/2022    GLUCOSE 248 (H) 02/03/2023    CALCIUM 8.9 02/03/2023    ALKPHOS 107 02/03/2023    AST 24 02/03/2023    ALT 19 02/03/2023    BILITOT 0.3 02/03/2023    ALBUMIN 3.8 02/03/2023    PROTEINTOT 7.3 02/03/2023    MG 2.1 05/29/2022    PHOS 3.9 10/28/2021     ANEMIA LABS Value Date    FERRITIN 21.84 (L) 02/03/2023    IRON 47 (L) 02/03/2023    TIBC 565 (H) 02/03/2023    LABIRON 8 (L) 02/03/2023    SDREDQSK88 547 01/12/2023    FOLATE >20.00 01/12/2023     PATHOLOGY  5/17/22 12/21/21 4/20/21          Assessment / Plan      Assessment and Plan   77-year-old male who presents for follow-up regarding iron deficiency anemia.  Most recent colonoscopy was performed May 2022, negative for bleed.    #Anemia, iron deficiency  -Negative GI work-up May 2022. Small bowel follow through negative. Pill endoscopy rescheduled twice due to prep not being available at nursing home  - Received IV ferumoxytol 2/20/23 with improvement in symptoms  -CBC today with hemoglobin 12.8, ferritin 309  -Previously received IV " ferumoxytol 12/2022.  Currently on iron polysaccharide 150 mg capsule daily  -Given improvement in H/H and ferritin; will continue to monitor with CBC and iron studies    Discussed possible differential diagnoses, testing, treatment, recommended non-pharmacological interventions, risks, warning signs to monitor for that would indicate need for follow-up in clinic or ER. If no improvement with these regimens or you have new or worsening symptoms follow-up. Patient verbalizes understanding and agreement with plan of care. Denies further needs or concerns.     Patient was given instructions and counseling regarding her condition and for health maintenance advised.    I spent 30 minutes with Fidencio Luciano today.  In the office today, more than 50% of this time was spent face-to-face with him  in counseling / coordination of care, reviewing his interim medical history and counseling on the current treatment plan.  All questions were answered to his satisfaction.      Meds ordered during this visit  No orders of the defined types were placed in this encounter.      Visit Diagnoses    ICD-10-CM ICD-9-CM   1. Iron deficiency anemia, unspecified iron deficiency anemia type  D50.9 280.9       Follow Up   3 months with repeat iron studies, ferritin, and CBC      This document has been electronically signed by Sultana Keen MD    April 5, 2023 21:21 EDT    Dictated Utilizing Dragon Dictation: Part of this note may be an electronic transcription/translation of spoken language to printed text using the Dragon Dictation System.

## 2023-04-05 ENCOUNTER — OFFICE VISIT (OUTPATIENT)
Dept: ONCOLOGY | Facility: CLINIC | Age: 78
End: 2023-04-05
Payer: MEDICARE

## 2023-04-05 ENCOUNTER — LAB (OUTPATIENT)
Dept: ONCOLOGY | Facility: CLINIC | Age: 78
End: 2023-04-05
Payer: MEDICARE

## 2023-04-05 VITALS
DIASTOLIC BLOOD PRESSURE: 57 MMHG | WEIGHT: 201 LBS | SYSTOLIC BLOOD PRESSURE: 124 MMHG | BODY MASS INDEX: 28.14 KG/M2 | HEIGHT: 71 IN | RESPIRATION RATE: 18 BRPM | OXYGEN SATURATION: 97 % | TEMPERATURE: 97.1 F | HEART RATE: 58 BPM

## 2023-04-05 DIAGNOSIS — D72.829 LEUKOCYTOSIS, UNSPECIFIED TYPE: ICD-10-CM

## 2023-04-05 DIAGNOSIS — D50.9 IRON DEFICIENCY ANEMIA, UNSPECIFIED IRON DEFICIENCY ANEMIA TYPE: Primary | ICD-10-CM

## 2023-04-05 DIAGNOSIS — D50.9 IRON DEFICIENCY ANEMIA, UNSPECIFIED IRON DEFICIENCY ANEMIA TYPE: ICD-10-CM

## 2023-04-05 LAB
BASOPHILS # BLD AUTO: 0.08 10*3/MM3 (ref 0–0.2)
BASOPHILS NFR BLD AUTO: 0.9 % (ref 0–1.5)
DEPRECATED RDW RBC AUTO: 67.4 FL (ref 37–54)
EOSINOPHIL # BLD AUTO: 0.23 10*3/MM3 (ref 0–0.4)
EOSINOPHIL NFR BLD AUTO: 2.7 % (ref 0.3–6.2)
ERYTHROCYTE [DISTWIDTH] IN BLOOD BY AUTOMATED COUNT: 19 % (ref 12.3–15.4)
FERRITIN SERPL-MCNC: 309.4 NG/ML (ref 30–400)
HCT VFR BLD AUTO: 41.9 % (ref 37.5–51)
HGB BLD-MCNC: 12.8 G/DL (ref 13–17.7)
IMM GRANULOCYTES # BLD AUTO: 0.08 10*3/MM3 (ref 0–0.05)
IMM GRANULOCYTES NFR BLD AUTO: 0.9 % (ref 0–0.5)
IRON 24H UR-MRATE: 109 MCG/DL (ref 59–158)
IRON SATN MFR SERPL: 28 % (ref 20–50)
LYMPHOCYTES # BLD AUTO: 1.73 10*3/MM3 (ref 0.7–3.1)
LYMPHOCYTES NFR BLD AUTO: 20.5 % (ref 19.6–45.3)
MCH RBC QN AUTO: 29.5 PG (ref 26.6–33)
MCHC RBC AUTO-ENTMCNC: 30.5 G/DL (ref 31.5–35.7)
MCV RBC AUTO: 96.5 FL (ref 79–97)
MONOCYTES # BLD AUTO: 0.72 10*3/MM3 (ref 0.1–0.9)
MONOCYTES NFR BLD AUTO: 8.5 % (ref 5–12)
NEUTROPHILS NFR BLD AUTO: 5.61 10*3/MM3 (ref 1.7–7)
NEUTROPHILS NFR BLD AUTO: 66.5 % (ref 42.7–76)
NRBC BLD AUTO-RTO: 0 /100 WBC (ref 0–0.2)
PLATELET # BLD AUTO: 219 10*3/MM3 (ref 140–450)
PMV BLD AUTO: 10.8 FL (ref 6–12)
RBC # BLD AUTO: 4.34 10*6/MM3 (ref 4.14–5.8)
TIBC SERPL-MCNC: 389 MCG/DL (ref 298–536)
TRANSFERRIN SERPL-MCNC: 261 MG/DL (ref 200–360)
WBC NRBC COR # BLD: 8.45 10*3/MM3 (ref 3.4–10.8)

## 2023-04-05 PROCEDURE — 1126F AMNT PAIN NOTED NONE PRSNT: CPT | Performed by: INTERNAL MEDICINE

## 2023-04-05 PROCEDURE — 99213 OFFICE O/P EST LOW 20 MIN: CPT | Performed by: INTERNAL MEDICINE

## 2023-04-05 PROCEDURE — 3074F SYST BP LT 130 MM HG: CPT | Performed by: INTERNAL MEDICINE

## 2023-04-05 PROCEDURE — 85025 COMPLETE CBC W/AUTO DIFF WBC: CPT | Performed by: INTERNAL MEDICINE

## 2023-04-05 PROCEDURE — 83540 ASSAY OF IRON: CPT | Performed by: INTERNAL MEDICINE

## 2023-04-05 PROCEDURE — 84466 ASSAY OF TRANSFERRIN: CPT | Performed by: INTERNAL MEDICINE

## 2023-04-05 PROCEDURE — 3078F DIAST BP <80 MM HG: CPT | Performed by: INTERNAL MEDICINE

## 2023-04-05 PROCEDURE — 82728 ASSAY OF FERRITIN: CPT | Performed by: INTERNAL MEDICINE

## 2023-04-05 NOTE — PROGRESS NOTES
Venipuncture Blood Specimen Collection  Venipuncture performed in left hand by Chirag Kerns MA with good hemostasis. Patient tolerated the procedure well without complications.   04/05/23   Chirag Kerns MA

## 2023-04-06 DIAGNOSIS — D72.829 LEUKOCYTOSIS, UNSPECIFIED TYPE: ICD-10-CM

## 2023-04-06 DIAGNOSIS — D64.9 ANEMIA, UNSPECIFIED TYPE: ICD-10-CM

## 2023-04-06 DIAGNOSIS — D50.9 IRON DEFICIENCY ANEMIA, UNSPECIFIED IRON DEFICIENCY ANEMIA TYPE: Primary | ICD-10-CM

## 2023-04-13 ENCOUNTER — HOSPITAL ENCOUNTER (OUTPATIENT)
Dept: PREOP | Facility: HOSPITAL | Age: 78
Setting detail: HOSPITAL OUTPATIENT SURGERY
Discharge: HOME | End: 2023-04-13
Admitting: INTERNAL MEDICINE
Payer: MEDICARE

## 2023-04-13 VITALS
HEART RATE: 55 BPM | WEIGHT: 201 LBS | BODY MASS INDEX: 28.14 KG/M2 | HEIGHT: 71 IN | TEMPERATURE: 97.8 F | SYSTOLIC BLOOD PRESSURE: 129 MMHG | RESPIRATION RATE: 18 BRPM | DIASTOLIC BLOOD PRESSURE: 59 MMHG | OXYGEN SATURATION: 99 %

## 2023-04-13 PROCEDURE — C1889 IMPLANT/INSERT DEVICE, NOC: HCPCS

## 2023-04-13 PROCEDURE — 91110 GI TRC IMG INTRAL ESOPH-ILE: CPT

## 2023-04-13 NOTE — NURSING NOTE
Patient swallowed pill cam without difficulty. Vital signs stable.  Education given to patient and spouse on pill cam, when to eat and drink, and when to return monitor;  they verbalize understanding.  Patient discharged with family to car.

## 2023-04-20 ENCOUNTER — TELEPHONE (OUTPATIENT)
Dept: GASTROENTEROLOGY | Facility: CLINIC | Age: 78
End: 2023-04-20
Payer: MEDICARE

## 2023-04-20 NOTE — TELEPHONE ENCOUNTER
----- Message from Geno Vernon MD sent at 4/20/2023 11:00 AM EDT -----  Please fax a copy of Mr. Fidencio MARTINEZ Truong capsule endoscopy to the nursing home that he resides in.  I am not sure of the nursing home but it is likely in his records.  Thank you

## 2023-04-20 NOTE — TELEPHONE ENCOUNTER
Capsule Endoscopy results faxed to the [facility] @[phone]. Per Dr. Mesa's request.

## 2023-05-19 ENCOUNTER — TELEPHONE (OUTPATIENT)
Dept: GASTROENTEROLOGY | Facility: CLINIC | Age: 78
End: 2023-05-19

## 2023-06-15 ENCOUNTER — OFFICE VISIT (OUTPATIENT)
Dept: CARDIOLOGY | Facility: CLINIC | Age: 78
End: 2023-06-15
Payer: MEDICARE

## 2023-06-15 VITALS
RESPIRATION RATE: 16 BRPM | DIASTOLIC BLOOD PRESSURE: 55 MMHG | BODY MASS INDEX: 27.83 KG/M2 | HEART RATE: 56 BPM | SYSTOLIC BLOOD PRESSURE: 145 MMHG | HEIGHT: 71 IN | OXYGEN SATURATION: 96 % | WEIGHT: 198.8 LBS

## 2023-06-15 DIAGNOSIS — I10 ESSENTIAL HYPERTENSION: ICD-10-CM

## 2023-06-15 DIAGNOSIS — Z86.711 HISTORY OF PULMONARY EMBOLISM: ICD-10-CM

## 2023-06-15 DIAGNOSIS — I25.10 ASCVD (ARTERIOSCLEROTIC CARDIOVASCULAR DISEASE): Primary | Chronic | ICD-10-CM

## 2023-06-15 DIAGNOSIS — I25.41 CORONARY ARTERY FISTULA: ICD-10-CM

## 2023-06-15 PROCEDURE — 3078F DIAST BP <80 MM HG: CPT | Performed by: INTERNAL MEDICINE

## 2023-06-15 PROCEDURE — 3077F SYST BP >= 140 MM HG: CPT | Performed by: INTERNAL MEDICINE

## 2023-06-15 PROCEDURE — 99214 OFFICE O/P EST MOD 30 MIN: CPT | Performed by: INTERNAL MEDICINE

## 2023-06-15 RX ORDER — DONEPEZIL HYDROCHLORIDE 5 MG/1
5 TABLET, FILM COATED ORAL NIGHTLY
COMMUNITY

## 2023-06-15 RX ORDER — LOSARTAN POTASSIUM 25 MG/1
25 TABLET ORAL DAILY
Qty: 30 TABLET | Refills: 3 | Status: SHIPPED | OUTPATIENT
Start: 2023-06-15

## 2023-06-15 RX ORDER — CARBIDOPA AND LEVODOPA 25; 100 MG/1; MG/1
1 TABLET, EXTENDED RELEASE ORAL 3 TIMES DAILY
COMMUNITY

## 2023-06-15 NOTE — PROGRESS NOTES
Bernardo Pritchett MD  Fidencio Urbanbs  1945  06/15/2023    Patient Active Problem List   Diagnosis    Essential hypertension    Type 2 diabetes mellitus    Benign prostatic hyperplasia    ASCVD (arteriosclerotic cardiovascular disease), s/p stenting of 80 % stenosis in left circumflex on 10/05/16and 60% stenosis in the LAD, clinically stable.     Hyperlipidemia LDL goal <70    Weakness generalized    Coronary artery fistula    Weight loss, unintentional    Iron deficiency anemia    Gastroesophageal reflux disease    Dysphagia    Chest pain    History of pulmonary embolism in February 22, patient is on Eliquis.    Early satiety    Diarrhea       Dear Bernardo Pritchett MD:    Subjective     Fidencio Luciano is a 77 y.o. male with the problems as listed above, presents    Chief complaint: Follow-up of coronary artery disease.    History of Present Illness: Mr. 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  On today's visit he denies any complaints of chest pains or shortness of breath.  He has lost 3 pounds since 4/13/2023.    No Known Allergies:    Current Outpatient Medications:     acetaminophen (TYLENOL) 500 MG tablet, Take 1 tablet by mouth Every 6 (Six) Hours As Needed for Mild Pain., Disp: , Rfl:     albuterol sulfate  (90 Base) MCG/ACT inhaler, Inhale 2 puffs 4 (Four) Times a Day As Needed for Wheezing., Disp: , Rfl:     apixaban (ELIQUIS) 5 MG tablet tablet, Take 1 tablet by mouth Every 12 (Twelve) Hours., Disp: , Rfl:     atorvastatin (LIPITOR) 80 MG tablet, Take 1 tablet by mouth Every Night., Disp: 90 tablet, Rfl: 5    budesonide-formoterol (SYMBICORT) 160-4.5 MCG/ACT inhaler, Inhale 2 puffs 2 (Two) Times a Day., Disp: 10.2 g, Rfl: 11    bumetanide (BUMEX) 1 MG tablet, Take 1  tablet by mouth Daily., Disp: 30 tablet, Rfl: 3    clopidogrel (PLAVIX) 75 MG tablet, Take 1 tablet by mouth Daily., Disp: 30 tablet, Rfl: 5    colestipol (COLESTID) 1 g tablet, Take 1 tablet by mouth 2 (Two) Times a Day., Disp: , Rfl:     divalproex (DEPAKOTE) 500 MG DR tablet, Take 1 tablet by mouth Every Night., Disp: , Rfl:     finasteride (PROSCAR) 5 MG tablet, Take 1 tablet by mouth Every Night., Disp: 30 tablet, Rfl: 11    iron polysaccharides (NIFEREX) 150 MG capsule, Take 1 capsule by mouth Daily., Disp: 30 capsule, Rfl: 3    isosorbide mononitrate (IMDUR) 120 MG 24 hr tablet, Take 1 tablet by mouth Every Morning., Disp: 90 tablet, Rfl: 2    Levemir FlexTouch 100 UNIT/ML injection, , Disp: , Rfl:     Melatonin 5 MG capsule, Take 5 mg by mouth Every Night., Disp: 30 each, Rfl: 1    memantine (NAMENDA XR) 28 MG capsule sustained-release 24 hr extended release capsule, Take 1 capsule by mouth Daily., Disp: , Rfl:     metoprolol tartrate (LOPRESSOR) 25 MG tablet, Take 0.5 tablets by mouth Every 12 (Twelve) Hours., Disp: , Rfl:     Mirabegron ER (Myrbetriq) 25 MG tablet sustained-release 24 hour 24 hr tablet, Take 1 tablet by mouth Daily., Disp: 30 tablet, Rfl: 11    mirtazapine (REMERON) 15 MG tablet, Take 1 tablet by mouth Every Night., Disp: , Rfl:     nitroglycerin (NITROSTAT) 0.4 MG SL tablet, 1 under the tongue as needed for angina, may repeat q5mins for up three doses, Disp: 25 tablet, Rfl: 2    NovoLIN R 100 UNIT/ML injection, , Disp: , Rfl:     pantoprazole (PROTONIX) 40 MG EC tablet, Take 1 tablet by mouth Daily., Disp: , Rfl:     ranolazine (RANEXA) 500 MG 12 hr tablet, Take 1 tablet by mouth Every 12 (Twelve) Hours., Disp: , Rfl:     carbidopa-levodopa ER (SINEMET CR)  MG per tablet, Take 1 tablet by mouth 3 (Three) Times a Day., Disp: , Rfl:     cetirizine (zyrTEC) 10 MG tablet, Take 1 tablet by mouth Daily., Disp: , Rfl:     donepezil (ARICEPT) 5 MG tablet, Take 1 tablet by mouth Every  "Night., Disp: , Rfl:     HYDROcodone-acetaminophen (NORCO) 7.5-325 MG per tablet, Take 1 tablet by mouth Every 6 (Six) Hours As Needed for Moderate Pain., Disp: , Rfl:     losartan (Cozaar) 25 MG tablet, Take 1 tablet by mouth Daily., Disp: 30 tablet, Rfl: 3    The following portions of the patient's history were reviewed and updated as appropriate: allergies, current medications, past family history, past medical history, past social history, past surgical history and problem list.    Social History     Tobacco Use    Smoking status: Former     Packs/day: 3.00     Years: 25.00     Pack years: 75.00     Types: Cigarettes     Quit date:      Years since quittin.4    Smokeless tobacco: Former     Quit date: 1986   Vaping Use    Vaping Use: Never used   Substance Use Topics    Alcohol use: No    Drug use: No     Review of Systems   Constitutional: Negative for chills and fever.   HENT:  Negative for nosebleeds and sore throat.    Cardiovascular:  Positive for leg swelling. Negative for chest pain and palpitations.   Respiratory:  Negative for cough, hemoptysis, shortness of breath and wheezing.    Gastrointestinal:  Negative for abdominal pain, hematemesis, hematochezia, melena, nausea and vomiting.   Genitourinary:  Negative for dysuria and hematuria.   Neurological:  Negative for headaches.   Objective   Vitals:    06/15/23 1202   BP: 145/55   Pulse: 56   Resp: 16   SpO2: 96%   Weight: 90.2 kg (198 lb 12.8 oz)   Height: 180.3 cm (71\")     Body mass index is 27.73 kg/m².    Vitals reviewed.   Constitutional:       Appearance: Well-developed.   Eyes:      Conjunctiva/sclera: Conjunctivae normal.   HENT:      Head: Normocephalic.   Neck:      Thyroid: No thyromegaly.      Vascular: No JVD.      Trachea: No tracheal deviation.   Pulmonary:      Effort: No respiratory distress.      Breath sounds: Normal breath sounds. No wheezing. No rales.   Cardiovascular:      PMI at left midclavicular line. Normal rate. " Regular rhythm. Normal S1. Normal S2.       Murmurs: There is no murmur.      No gallop.  No click. No rub.   Pulses:     Intact distal pulses.   Edema:     Peripheral edema absent.   Abdominal:      General: Bowel sounds are normal.      Palpations: Abdomen is soft. There is no abdominal mass.      Tenderness: There is no abdominal tenderness.   Musculoskeletal:      Cervical back: Normal range of motion and neck supple. Skin:     General: Skin is warm and dry.   Neurological:      Mental Status: Alert and oriented to person, place, and time.      Cranial Nerves: No cranial nerve deficit.       Lab Results   Component Value Date     02/03/2023    K 4.3 02/03/2023     02/03/2023    CO2 25.8 02/03/2023    BUN 19 02/03/2023    CREATININE 0.95 02/03/2023    GLUCOSE 248 (H) 02/03/2023    CALCIUM 8.9 02/03/2023    AST 24 02/03/2023    ALT 19 02/03/2023    ALKPHOS 107 02/03/2023    LABIL2 1.4 (L) 01/08/2016     Lab Results   Component Value Date    CKTOTAL 90 11/25/2022     Lab Results   Component Value Date    WBC 8.45 04/05/2023    HGB 12.8 (L) 04/05/2023    HCT 41.9 04/05/2023     04/05/2023       Lab Results   Component Value Date    MG 2.1 05/29/2022     Lab Results   Component Value Date    TSH 3.450 12/21/2021    PSA 0.6 01/28/2022    CHLPL 109 08/12/2015    TRIG 115 05/25/2021    HDL 29 (L) 05/25/2021    LDL 58 05/25/2021      Lab Results   Component Value Date    BNP 55.0 07/16/2016       Assessment & Plan    Diagnosis Plan   1. ASCVD (arteriosclerotic cardiovascular disease), s/p stenting of 80 % stenosis in left circumflex on 10/05/16and 60% stenosis in the LAD, clinically stable.         2. 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        3. Essential hypertension fair control.  Basic Metabolic Panel      4. History of pulmonary embolism, on Eliquis.          Recommendations  Continue with clopidogrel and Eliquis.  Continue with  atorvastatin, Imdur and metoprolol at current doses.  We will add an ARB such as losartan 25 mg daily for nephro protection from diabetes mellitus and mildly elevated systolic blood pressure.  BMP in a week    Return in about 6 months (around 12/15/2023).    As always, Bernardo Pritchett MD  I appreciate very much the opportunity to participate in the cardiovascular care of your patients. Please do not hesitate to call me with any questions with regards to Fidencio Luciano's evaluation and management.       With Best Regards,        Giovanni Buenrostro MD, FACC    Please note that portions of this note were completed with a voice recognition program.

## 2023-06-15 NOTE — LETTER
Kelly 15, 2023     Bernardo Pritchett MD  [address] [facility] [address]    Patient: Fidencio Luciano   YOB: 1945   Date of Visit: 6/15/2023       Dear Dr. Shreyas MD:    Thank you for referring Fidencio Luciano to me for evaluation. Below are the relevant portions of my assessment and plan of care.    If you have questions, please do not hesitate to call me. I look forward to following Fidencio along with you.         Sincerely,        Giovanni Buenrostro MD        CC: No Recipients    Giovanni Buenrostro MD  06/15/23 2201  Sign when Signing Visit  Bernardo Pritchett MD  Fidencio Luciano  1945  06/15/2023    Patient Active Problem List   Diagnosis   • Essential hypertension   • Type 2 diabetes mellitus   • Benign prostatic hyperplasia   • ASCVD (arteriosclerotic cardiovascular disease), s/p stenting of 80 % stenosis in left circumflex on 10/05/16and 60% stenosis in the LAD, clinically stable.    • Hyperlipidemia LDL goal <70   • Weakness generalized   • Coronary artery fistula   • Weight loss, unintentional   • Iron deficiency anemia   • Gastroesophageal reflux disease   • Dysphagia   • Chest pain   • History of pulmonary embolism in February 22, patient is on Eliquis.   • Early satiety   • Diarrhea       Dear Bernardo Pritchett MD:    Subjective    Fidencio Luciano is a 77 y.o. male with the problems as listed above, presents    Chief complaint: Follow-up of coronary artery disease.    History of Present Illness: Mr. 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  On today's visit he denies any complaints of chest pains or shortness of breath.  He has lost 3 pounds since 4/13/2023.    No Known Allergies:    Current Outpatient Medications:   •  acetaminophen (TYLENOL) 500 MG tablet, Take 1  tablet by mouth Every 6 (Six) Hours As Needed for Mild Pain., Disp: , Rfl:   •  albuterol sulfate  (90 Base) MCG/ACT inhaler, Inhale 2 puffs 4 (Four) Times a Day As Needed for Wheezing., Disp: , Rfl:   •  apixaban (ELIQUIS) 5 MG tablet tablet, Take 1 tablet by mouth Every 12 (Twelve) Hours., Disp: , Rfl:   •  atorvastatin (LIPITOR) 80 MG tablet, Take 1 tablet by mouth Every Night., Disp: 90 tablet, Rfl: 5  •  budesonide-formoterol (SYMBICORT) 160-4.5 MCG/ACT inhaler, Inhale 2 puffs 2 (Two) Times a Day., Disp: 10.2 g, Rfl: 11  •  bumetanide (BUMEX) 1 MG tablet, Take 1 tablet by mouth Daily., Disp: 30 tablet, Rfl: 3  •  clopidogrel (PLAVIX) 75 MG tablet, Take 1 tablet by mouth Daily., Disp: 30 tablet, Rfl: 5  •  colestipol (COLESTID) 1 g tablet, Take 1 tablet by mouth 2 (Two) Times a Day., Disp: , Rfl:   •  divalproex (DEPAKOTE) 500 MG DR tablet, Take 1 tablet by mouth Every Night., Disp: , Rfl:   •  finasteride (PROSCAR) 5 MG tablet, Take 1 tablet by mouth Every Night., Disp: 30 tablet, Rfl: 11  •  iron polysaccharides (NIFEREX) 150 MG capsule, Take 1 capsule by mouth Daily., Disp: 30 capsule, Rfl: 3  •  isosorbide mononitrate (IMDUR) 120 MG 24 hr tablet, Take 1 tablet by mouth Every Morning., Disp: 90 tablet, Rfl: 2  •  Levemir FlexTouch 100 UNIT/ML injection, , Disp: , Rfl:   •  Melatonin 5 MG capsule, Take 5 mg by mouth Every Night., Disp: 30 each, Rfl: 1  •  memantine (NAMENDA XR) 28 MG capsule sustained-release 24 hr extended release capsule, Take 1 capsule by mouth Daily., Disp: , Rfl:   •  metoprolol tartrate (LOPRESSOR) 25 MG tablet, Take 0.5 tablets by mouth Every 12 (Twelve) Hours., Disp: , Rfl:   •  Mirabegron ER (Myrbetriq) 25 MG tablet sustained-release 24 hour 24 hr tablet, Take 1 tablet by mouth Daily., Disp: 30 tablet, Rfl: 11  •  mirtazapine (REMERON) 15 MG tablet, Take 1 tablet by mouth Every Night., Disp: , Rfl:   •  nitroglycerin (NITROSTAT) 0.4 MG SL tablet, 1 under the tongue as needed  for angina, may repeat q5mins for up three doses, Disp: 25 tablet, Rfl: 2  •  NovoLIN R 100 UNIT/ML injection, , Disp: , Rfl:   •  pantoprazole (PROTONIX) 40 MG EC tablet, Take 1 tablet by mouth Daily., Disp: , Rfl:   •  ranolazine (RANEXA) 500 MG 12 hr tablet, Take 1 tablet by mouth Every 12 (Twelve) Hours., Disp: , Rfl:   •  carbidopa-levodopa ER (SINEMET CR)  MG per tablet, Take 1 tablet by mouth 3 (Three) Times a Day., Disp: , Rfl:   •  cetirizine (zyrTEC) 10 MG tablet, Take 1 tablet by mouth Daily., Disp: , Rfl:   •  donepezil (ARICEPT) 5 MG tablet, Take 1 tablet by mouth Every Night., Disp: , Rfl:   •  HYDROcodone-acetaminophen (NORCO) 7.5-325 MG per tablet, Take 1 tablet by mouth Every 6 (Six) Hours As Needed for Moderate Pain., Disp: , Rfl:   •  losartan (Cozaar) 25 MG tablet, Take 1 tablet by mouth Daily., Disp: 30 tablet, Rfl: 3    The following portions of the patient's history were reviewed and updated as appropriate: allergies, current medications, past family history, past medical history, past social history, past surgical history and problem list.    Social History     Tobacco Use   • Smoking status: Former     Packs/day: 3.00     Years: 25.00     Pack years: 75.00     Types: Cigarettes     Quit date:      Years since quittin.4   • Smokeless tobacco: Former     Quit date: 1986   Vaping Use   • Vaping Use: Never used   Substance Use Topics   • Alcohol use: No   • Drug use: No     Review of Systems   Constitutional: Negative for chills and fever.   HENT:  Negative for nosebleeds and sore throat.    Cardiovascular:  Positive for leg swelling. Negative for chest pain and palpitations.   Respiratory:  Negative for cough, hemoptysis, shortness of breath and wheezing.    Gastrointestinal:  Negative for abdominal pain, hematemesis, hematochezia, melena, nausea and vomiting.   Genitourinary:  Negative for dysuria and hematuria.   Neurological:  Negative for headaches.   Objective  Vitals:  "   06/15/23 1202   BP: 145/55   Pulse: 56   Resp: 16   SpO2: 96%   Weight: 90.2 kg (198 lb 12.8 oz)   Height: 180.3 cm (71\")     Body mass index is 27.73 kg/m².    Vitals reviewed.   Constitutional:       Appearance: Well-developed.   Eyes:      Conjunctiva/sclera: Conjunctivae normal.   HENT:      Head: Normocephalic.   Neck:      Thyroid: No thyromegaly.      Vascular: No JVD.      Trachea: No tracheal deviation.   Pulmonary:      Effort: No respiratory distress.      Breath sounds: Normal breath sounds. No wheezing. No rales.   Cardiovascular:      PMI at left midclavicular line. Normal rate. Regular rhythm. Normal S1. Normal S2.       Murmurs: There is no murmur.      No gallop.  No click. No rub.   Pulses:     Intact distal pulses.   Edema:     Peripheral edema absent.   Abdominal:      General: Bowel sounds are normal.      Palpations: Abdomen is soft. There is no abdominal mass.      Tenderness: There is no abdominal tenderness.   Musculoskeletal:      Cervical back: Normal range of motion and neck supple. Skin:     General: Skin is warm and dry.   Neurological:      Mental Status: Alert and oriented to person, place, and time.      Cranial Nerves: No cranial nerve deficit.       Lab Results   Component Value Date     02/03/2023    K 4.3 02/03/2023     02/03/2023    CO2 25.8 02/03/2023    BUN 19 02/03/2023    CREATININE 0.95 02/03/2023    GLUCOSE 248 (H) 02/03/2023    CALCIUM 8.9 02/03/2023    AST 24 02/03/2023    ALT 19 02/03/2023    ALKPHOS 107 02/03/2023    LABIL2 1.4 (L) 01/08/2016     Lab Results   Component Value Date    CKTOTAL 90 11/25/2022     Lab Results   Component Value Date    WBC 8.45 04/05/2023    HGB 12.8 (L) 04/05/2023    HCT 41.9 04/05/2023     04/05/2023       Lab Results   Component Value Date    MG 2.1 05/29/2022     Lab Results   Component Value Date    TSH 3.450 12/21/2021    PSA 0.6 01/28/2022    CHLPL 109 08/12/2015    TRIG 115 05/25/2021    HDL 29 (L) 05/25/2021    " LDL 58 05/25/2021      Lab Results   Component Value Date    BNP 55.0 07/16/2016       Assessment & Plan   Diagnosis Plan   1. ASCVD (arteriosclerotic cardiovascular disease), s/p stenting of 80 % stenosis in left circumflex on 10/05/16and 60% stenosis in the LAD, clinically stable.         2. 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        3. Essential hypertension fair control.  Basic Metabolic Panel      4. History of pulmonary embolism, on Eliquis.          Recommendations  Continue with clopidogrel and Eliquis.  Continue with atorvastatin, Imdur and metoprolol at current doses.  We will add an ARB such as losartan 25 mg daily for nephro protection from diabetes mellitus and mildly elevated systolic blood pressure.  BMP in a week    Return in about 6 months (around 12/15/2023).    As always, Bernardo Pritchett MD  I appreciate very much the opportunity to participate in the cardiovascular care of your patients. Please do not hesitate to call me with any questions with regards to Fidencio Luciano's evaluation and management.       With Best Regards,        Giovanni Buenrostro MD, FACC    Please note that portions of this note were completed with a voice recognition program.

## 2023-07-26 ENCOUNTER — OFFICE VISIT (OUTPATIENT)
Dept: PULMONOLOGY | Facility: CLINIC | Age: 78
End: 2023-07-26
Payer: MEDICARE

## 2023-07-26 VITALS
SYSTOLIC BLOOD PRESSURE: 110 MMHG | WEIGHT: 200 LBS | TEMPERATURE: 98 F | HEIGHT: 71 IN | BODY MASS INDEX: 28 KG/M2 | RESPIRATION RATE: 18 BRPM | HEART RATE: 57 BPM | OXYGEN SATURATION: 99 % | DIASTOLIC BLOOD PRESSURE: 50 MMHG

## 2023-07-26 DIAGNOSIS — R09.02 HYPOXIA: Primary | ICD-10-CM

## 2023-07-26 DIAGNOSIS — E66.3 OVERWEIGHT: ICD-10-CM

## 2023-07-26 DIAGNOSIS — R06.09 DYSPNEA ON EXERTION: ICD-10-CM

## 2023-07-26 NOTE — PROGRESS NOTES
"Chief Complaint  hypoxia    Subjective        Fidencio Luciano presents to [facility]  History of Present Illness    Mr. Luciano is a 77 year old male with a medical history significant for GERD, BPH, CAD, diabetes, hyperlipidemia, and hypertension.    He presents today for follow up on hypoxia.  He states that he is doing well today.  He reports that his breathing is at baseline.  He reports that he is using his supplemental oxygen at night.  He is currently taking Symbicort BID and albuterol PRN.        Objective   Vital Signs:  /50   Pulse 57   Temp 98 °F (36.7 °C) (Temporal)   Resp 18   Ht 180.3 cm (70.98\")   Wt 90.7 kg (200 lb)   SpO2 99%   BMI 27.91 kg/m²   Estimated body mass index is 27.91 kg/m² as calculated from the following:    Height as of this encounter: 180.3 cm (70.98\").    Weight as of this encounter: 90.7 kg (200 lb).               Physical Exam     GENERAL APPEARANCE: Well developed, well nourished, alert and cooperative, and appears to be in no acute distress.    HEAD: normocephalic. Atraumatic.    EYES: PERRL, EOMI. Vision is grossly intact.    THROAT: Oral cavity and pharynx normal. No inflammation, swelling, exudate, or lesions.     NECK: Neck supple.  No thyromegaly.    CARDIAC: Normal S1 and S2. No S3, S4 or murmurs. Rhythm is regular.     RESPIRATORY:Bilateral air entry positive. Bilateral diminished breath sounds. No wheezing, crackles or rhonchi noted.    GI: Positive bowel sounds. Soft, nondistended, nontender.     MUSCULOSKELETAL: No significant deformity or joint abnormality. No edema. Peripheral pulses intact. No varicosities.    NEUROLOGICAL: Strength and sensation symmetric and intact throughout.     PSYCHIATRIC: The mental examination revealed the patient was oriented to person, place, and time.     Result Review :  The following data was reviewed by: ANDREW Amos on 07/26/2023:  Common labs          " 4/5/2023    10:33 7/6/2023    10:32 7/14/2023    10:30   Common Labs   WBC 8.45  8.31  8.91    Hemoglobin 12.8  13.8  13.6    Hematocrit 41.9  44.8  39.3    Platelets 219  188  181                   Assessment and Plan   Diagnoses and all orders for this visit:    1. Hypoxia (Primary)    2. Dyspnea on exertion    3. Overweight        Continue Symbicort BID.  Continue albuterol as needed.    Compliant with use of supplemental oxygen at night.      Follow Up   Return in about 6 months (around 1/26/2024).  Patient was given instructions and counseling regarding his condition or for health maintenance advice. Please see specific information pulled into the AVS if appropriate.

## 2023-10-16 ENCOUNTER — LAB (OUTPATIENT)
Dept: ONCOLOGY | Facility: CLINIC | Age: 78
End: 2023-10-16
Payer: MEDICARE

## 2023-10-16 ENCOUNTER — OFFICE VISIT (OUTPATIENT)
Dept: ONCOLOGY | Facility: CLINIC | Age: 78
End: 2023-10-16
Payer: MEDICARE

## 2023-10-16 VITALS
OXYGEN SATURATION: 99 % | HEIGHT: 71 IN | RESPIRATION RATE: 18 BRPM | WEIGHT: 198.2 LBS | BODY MASS INDEX: 27.75 KG/M2 | SYSTOLIC BLOOD PRESSURE: 160 MMHG | HEART RATE: 54 BPM | DIASTOLIC BLOOD PRESSURE: 59 MMHG | TEMPERATURE: 97.1 F

## 2023-10-16 DIAGNOSIS — D50.9 IRON DEFICIENCY ANEMIA, UNSPECIFIED IRON DEFICIENCY ANEMIA TYPE: Primary | ICD-10-CM

## 2023-10-16 DIAGNOSIS — E53.8 B12 DEFICIENCY: ICD-10-CM

## 2023-10-16 DIAGNOSIS — D72.829 LEUKOCYTOSIS, UNSPECIFIED TYPE: ICD-10-CM

## 2023-10-16 DIAGNOSIS — D50.9 IRON DEFICIENCY ANEMIA, UNSPECIFIED IRON DEFICIENCY ANEMIA TYPE: ICD-10-CM

## 2023-10-16 DIAGNOSIS — D64.9 ANEMIA, UNSPECIFIED TYPE: ICD-10-CM

## 2023-10-16 DIAGNOSIS — K90.9 INTESTINAL MALABSORPTION, UNSPECIFIED TYPE: ICD-10-CM

## 2023-10-16 LAB
BASOPHILS # BLD AUTO: 0.06 10*3/MM3 (ref 0–0.2)
BASOPHILS NFR BLD AUTO: 0.7 % (ref 0–1.5)
DEPRECATED RDW RBC AUTO: 52.6 FL (ref 37–54)
EOSINOPHIL # BLD AUTO: 0.4 10*3/MM3 (ref 0–0.4)
EOSINOPHIL NFR BLD AUTO: 4.4 % (ref 0.3–6.2)
ERYTHROCYTE [DISTWIDTH] IN BLOOD BY AUTOMATED COUNT: 13.7 % (ref 12.3–15.4)
FERRITIN SERPL-MCNC: 154.2 NG/ML (ref 30–400)
HCT VFR BLD AUTO: 40.7 % (ref 37.5–51)
HGB BLD-MCNC: 12.8 G/DL (ref 13–17.7)
IMM GRANULOCYTES # BLD AUTO: 0.05 10*3/MM3 (ref 0–0.05)
IMM GRANULOCYTES NFR BLD AUTO: 0.5 % (ref 0–0.5)
IRON 24H UR-MRATE: 84 MCG/DL (ref 59–158)
IRON SATN MFR SERPL: 21 % (ref 20–50)
LYMPHOCYTES # BLD AUTO: 1.85 10*3/MM3 (ref 0.7–3.1)
LYMPHOCYTES NFR BLD AUTO: 20.1 % (ref 19.6–45.3)
MCH RBC QN AUTO: 32.7 PG (ref 26.6–33)
MCHC RBC AUTO-ENTMCNC: 31.4 G/DL (ref 31.5–35.7)
MCV RBC AUTO: 103.8 FL (ref 79–97)
MONOCYTES # BLD AUTO: 0.81 10*3/MM3 (ref 0.1–0.9)
MONOCYTES NFR BLD AUTO: 8.8 % (ref 5–12)
NEUTROPHILS NFR BLD AUTO: 6.02 10*3/MM3 (ref 1.7–7)
NEUTROPHILS NFR BLD AUTO: 65.5 % (ref 42.7–76)
NRBC BLD AUTO-RTO: 0 /100 WBC (ref 0–0.2)
PLATELET # BLD AUTO: 187 10*3/MM3 (ref 140–450)
PMV BLD AUTO: 10.8 FL (ref 6–12)
RBC # BLD AUTO: 3.92 10*6/MM3 (ref 4.14–5.8)
RETICS # AUTO: 0.09 10*6/MM3 (ref 0.02–0.13)
RETICS/RBC NFR AUTO: 2.26 % (ref 0.7–1.9)
TIBC SERPL-MCNC: 396 MCG/DL (ref 298–536)
TRANSFERRIN SERPL-MCNC: 266 MG/DL (ref 200–360)
WBC NRBC COR # BLD: 9.19 10*3/MM3 (ref 3.4–10.8)

## 2023-10-16 PROCEDURE — 83540 ASSAY OF IRON: CPT | Performed by: INTERNAL MEDICINE

## 2023-10-16 PROCEDURE — 82728 ASSAY OF FERRITIN: CPT | Performed by: INTERNAL MEDICINE

## 2023-10-16 PROCEDURE — 83921 ORGANIC ACID SINGLE QUANT: CPT | Performed by: INTERNAL MEDICINE

## 2023-10-16 PROCEDURE — 82607 VITAMIN B-12: CPT | Performed by: INTERNAL MEDICINE

## 2023-10-16 PROCEDURE — 85025 COMPLETE CBC W/AUTO DIFF WBC: CPT | Performed by: INTERNAL MEDICINE

## 2023-10-16 PROCEDURE — 85045 AUTOMATED RETICULOCYTE COUNT: CPT | Performed by: INTERNAL MEDICINE

## 2023-10-16 PROCEDURE — 84466 ASSAY OF TRANSFERRIN: CPT | Performed by: INTERNAL MEDICINE

## 2023-10-16 PROCEDURE — 82746 ASSAY OF FOLIC ACID SERUM: CPT | Performed by: INTERNAL MEDICINE

## 2023-10-16 NOTE — PROGRESS NOTES
Venipuncture Blood Specimen Collection  Venipuncture performed in left hand by Genet Newberry MA with good hemostasis. Patient tolerated the procedure well without complications.   10/16/23   Genet Newberry MA

## 2023-10-16 NOTE — PROGRESS NOTES
Subjective     Date: 10/16/2023    Referring Provider  George Riley MD    Chief Complaint  Anemia     Subjective    Fidencio Luciano is a 77 y.o. male who presents today to [facility] for follow up.    HPI:   77-year-old male who was previously followed by ANDREW Morales for anemia, presents for follow-up.  Patient with chronic anemia, worsened since June 2022.  Previously reportedly was on oral ferrous sulfate 3 times daily, tolerated well.  Currently still on iron polysaccharide 150 mg daily, he reports no adverse effects with it.  Received IV ferumoxytol on 12/13/2022.    Last colonoscopy and endoscopy in May 2022.  Colonoscopy with 3 sessile polyps in the cecum, multiple small mouth diverticula in the sigmoid and descending colon, internal hemorrhoids grade 1. Endoscopy showing small hiatal hernia, diffuse moderately erythematous mucosa without bleeding in gastric body in the gastric antrum.Seen by Dr. Mesa January 12, 2023, plan on doing capsule endoscopy as well as small bowel follow-through.    Reports no symptoms today.  Has dark-colored stool, denies any melena or hematochezia.    Treatment History:      Interval History 04/05/2023   Small bowel follow through was normal except for irregular contractions of esophagus. Pill cam rescheduled as prep was not given at nursing home.     He presents today with his wife. Reports overall doing well after IV iron infusion. Denies any GI bleed.    Interval History 07/06/2023   Mr. Truong presents today with his wife.  He had PillCam completed May 13 which showed gastric mucosa with erythema but no signs of bleeding.  He also had several nonbleeding angiectasia in proximal to mid small bowel.    He reports no further GI bleed.  Overall feeling well.  We reviewed his CBC today which shows normal hemoglobin and hematocrit.    Interval History 10/05/2023   Mr. Luciano presents for follow up, accompanied by his wife and  attendant from nursing home. He denies any new symptoms or any evidence of GI bleed. He is on Niferex 150 mg tablet daily at nursing home, denies any GI discomfort associated with it.  CBC reviewed today which shows Hgb 12.8, Hct 40.7. Normal WBC and platelet counts.         The following portions of the patient's history were reviewed and updated as appropriate: allergies, current medications, past family history, past medical history, past social history, past surgical history and problem list.    Objective     Allergy:   No Known Allergies     Current Medications:   Current Outpatient Medications   Medication Sig Dispense Refill    acetaminophen (TYLENOL) 500 MG tablet Take 1 tablet by mouth Every 6 (Six) Hours As Needed for Mild Pain.      albuterol sulfate  (90 Base) MCG/ACT inhaler Inhale 2 puffs 4 (Four) Times a Day As Needed for Wheezing.      apixaban (ELIQUIS) 5 MG tablet tablet Take 1 tablet by mouth Every 12 (Twelve) Hours.      atorvastatin (LIPITOR) 80 MG tablet Take 1 tablet by mouth Every Night. 90 tablet 5    budesonide-formoterol (SYMBICORT) 160-4.5 MCG/ACT inhaler Inhale 2 puffs 2 (Two) Times a Day. 10.2 g 11    bumetanide (BUMEX) 1 MG tablet Take 1 tablet by mouth Daily. 30 tablet 3    carbidopa-levodopa ER (SINEMET CR)  MG per tablet Take 1 tablet by mouth 3 (Three) Times a Day.      clopidogrel (PLAVIX) 75 MG tablet Take 1 tablet by mouth Daily. 30 tablet 5    colestipol (COLESTID) 1 g tablet Take 1 tablet by mouth 2 (Two) Times a Day.      divalproex (DEPAKOTE) 500 MG DR tablet Take 1 tablet by mouth Every Night.      donepezil (ARICEPT) 5 MG tablet Take 1 tablet by mouth Every Night.      finasteride (PROSCAR) 5 MG tablet Take 1 tablet by mouth Every Night. 30 tablet 11    HYDROcodone-acetaminophen (NORCO) 7.5-325 MG per tablet Take 1 tablet by mouth Every 6 (Six) Hours As Needed for Moderate Pain.      iron polysaccharides (NIFEREX) 150 MG capsule Take 1 capsule by mouth Daily.  30 capsule 3    isosorbide mononitrate (IMDUR) 120 MG 24 hr tablet Take 1 tablet by mouth Every Morning. 90 tablet 2    Levemir FlexTouch 100 UNIT/ML injection       losartan (Cozaar) 25 MG tablet Take 1 tablet by mouth Daily. 30 tablet 3    Melatonin 5 MG capsule Take 5 mg by mouth Every Night. 30 each 1    memantine (NAMENDA XR) 28 MG capsule sustained-release 24 hr extended release capsule Take 1 capsule by mouth Daily.      metoprolol tartrate (LOPRESSOR) 25 MG tablet Take 0.5 tablets by mouth Every 12 (Twelve) Hours.      Mirabegron ER (Myrbetriq) 25 MG tablet sustained-release 24 hour 24 hr tablet Take 1 tablet by mouth Daily. 30 tablet 11    mirtazapine (REMERON) 15 MG tablet Take 1 tablet by mouth Every Night.      nitroglycerin (NITROSTAT) 0.4 MG SL tablet 1 under the tongue as needed for angina, may repeat q5mins for up three doses 25 tablet 2    NovoLIN R 100 UNIT/ML injection       pantoprazole (PROTONIX) 40 MG EC tablet Take 1 tablet by mouth Daily.      ranolazine (RANEXA) 500 MG 12 hr tablet Take 1 tablet by mouth Every 12 (Twelve) Hours.      cetirizine (zyrTEC) 10 MG tablet Take 1 tablet by mouth Daily. (Patient not taking: Reported on 10/16/2023)       No current facility-administered medications for this visit.       Past Medical History:  Past Medical History:   Diagnosis Date    BPH (benign prostatic hyperplasia)     Bradycardia     Positive tilt table testing 07/2016    Coronary artery disease     Diabetes mellitus     Diverticulosis     Elevated cholesterol     Gastric ulcer with hemorrhage     Gastroesophageal reflux disease 1/26/2021    History of transfusion     Hyperlipidemia     Hypertension     Psoriasis        Past Surgical History:  Past Surgical History:   Procedure Laterality Date    BACK SURGERY      CARDIAC CATHETERIZATION N/A 9/20/2016    Procedure: Left Heart Cath;  Surgeon: Giovanni Buenrostro MD;  Location: [facility] CATH INVASIVE LOCATION;  Service:     CARDIAC CATHETERIZATION N/A  10/4/2016    Procedure: Left Heart Cath;  Surgeon: Bharathi Andrew MD;  [facility]ATH INVASIVE LOC[facility];  Service:     CARDIAC CATHETERIZATION N/A 5/9/2017    Procedure: Left Heart Cath;  Surgeon: Giovanni Buenrostro MD;  Location: [facility] CATH INVASIVE LOCATION;  Service:     CARDIAC CATHETERIZATION N/A 5/9/2017    Procedure: ERR;  Surgeon: Giovanni Buenrostro MD;  Location:  [facility] CATH INV[facility];  Service:     CARDIAC CATHETERIZATION N/A 11/8/2019    Procedure: Left Heart Cath;  Surgeon: Abraham Oviedo MD;  Location:  [facility] CATH INV[facility];  Service: Cardiology    CARDIAC CATHETERIZATION N/A 12/18/2019    Procedure: Coronary angiography;  Surgeon: Jay Barney IV, MD;  Location:  [facility] CATH INVASIVE LOCATION;  Service: Cardiovascular    CHOLECYSTECTOMY      COLONOSCOPY  11/13/2015    Dr. Martinez- internal hemorrhoids, sigmoid diverticulosis    COLONOSCOPY N/A 8/17/2018    Procedure: COLONOSCOPY CPT CODE: 07681;  Surgeon: Gigi Geronimo MD;  Location:  [facility] OR;  Service: Gastroenterology    COLONOSCOPY N/A 5/17/2022    Procedure: COLONOSCOPY;  Surgeon: Geno Vernon MD;  Location:  [facility];  Service: Gastroenterology;  Laterality: N/A;    CORONARY ANGIOPLASTY WITH STENT PLACEMENT      ENDOSCOPY N/A 8/17/2018    Procedure: ESOPHAGOGASTRODUODENOSCOPY WITH BIOPSY CPT CODE: 88716;  Surgeon: Gigi Geronimo MD;  Location:  [facility];  Service: Gastroenterology    ENDOSCOPY N/A 4/20/2021    Procedure: ESOPHAGOGASTRODUODENOSCOPY;  Surgeon: Geno Vernon MD;  Location:  [facility];  Service: Gastroenterology;  Laterality: N/A;    ENDOSCOPY N/A 5/17/2022    Procedure: ESOPHAGOGASTRODUODENOSCOPY WITH BIOPSY;  Surgeon: Geno Vernon MD;  Location:  [facility];  Service: Gastroenterology;  Laterality: N/A;    INTERVENTIONAL RADIOLOGY PROCEDURE N/A 12/18/2019    Procedure: Coil Embolization of septal to pulmonary artery fistuala.;  Surgeon: Ector  "Jay MELO IV, MD;  Location:  [facility] CATH INVASIVE LOCATION;  Service: Cardiovascular    IA RT/LT HEART CATHETERS N/A 2017    Procedure: Percutaneous Coronary Intervention;  Surgeon: Lincoln De Los Santos MD;  [facility];  Service: Cardiology    STOMACH SURGERY      FUSION OF BLEEDING ULCER    UPPER GASTROINTESTINAL ENDOSCOPY  2015    Dr. Martinez- mild gastritis       Social History:  Social History     Socioeconomic History    Marital status:    Tobacco Use    Smoking status: Former     Packs/day: 3.00     Years: 25.00     Additional pack years: 0.00     Total pack years: 75.00     Types: Cigarettes     Quit date:      Years since quittin.8    Smokeless tobacco: Former     Quit date: 1986   Vaping Use    Vaping Use: Never used   Substance and Sexual Activity    Alcohol use: No    Drug use: No    Sexual activity: Defer     Fidencio Luciano  reports that he quit smoking about 41 years ago. His smoking use included cigarettes. He has a 75.00 pack-year smoking history. He quit smokeless tobacco use about 37 years ago.      Family History:  Family History   Problem Relation Age of Onset    Sick sinus syndrome Mother     Heart failure Mother     Diabetes Mother     Liver cancer Father        Review of Systems:  Review of Systems   All other systems reviewed and are negative.      Vital Signs:   /59   Pulse 54   Temp 97.1 °F (36.2 °C) (Temporal)   Resp 18   Ht 180.3 cm (71\")   Wt 89.9 kg (198 lb 3.2 oz) Comment: Per nursing home. Unable to weigh  SpO2 99%   BMI 27.64 kg/m²      Physical Exam:  Physical Exam  Vitals and nursing note reviewed.   Constitutional:       General: He is not in acute distress.     Appearance: Normal appearance. He is not ill-appearing.      Comments: +in a wheelchair  +hard of hearing   HENT:      Head: Normocephalic and atraumatic.      Nose: Nose normal.      Mouth/Throat:      Mouth: Mucous membranes are moist.      Pharynx: " "Oropharynx is clear.   Eyes:      Conjunctiva/sclera: Conjunctivae normal.      Pupils: Pupils are equal, round, and reactive to light.   Cardiovascular:      Rate and Rhythm: Normal rate and regular rhythm.      Heart sounds: No murmur heard.  Pulmonary:      Effort: Pulmonary effort is normal. No respiratory distress.      Breath sounds: Normal breath sounds. No wheezing.   Abdominal:      General: Abdomen is flat. Bowel sounds are normal. There is no distension.      Palpations: Abdomen is soft. There is no mass.      Tenderness: There is no abdominal tenderness. There is no guarding.   Musculoskeletal:         General: No swelling. Normal range of motion.      Cervical back: Normal range of motion.   Lymphadenopathy:      Cervical: No cervical adenopathy.   Skin:     General: Skin is warm and dry.      Coloration: Skin is not jaundiced.      Findings: No bruising or rash.      Comments: +excoriations    Neurological:      General: No focal deficit present.      Mental Status: He is alert and oriented to person, place, and time.      Motor: No weakness.      Coordination: Coordination normal.   Psychiatric:         Mood and Affect: Mood normal.         Behavior: Behavior normal.         Judgment: Judgment normal.         PHQ-9 Score  PHQ-9 Total Score:       Pain Score  Vitals:    10/16/23 1222   BP: 160/59   Pulse: 54   Resp: 18   Temp: 97.1 °F (36.2 °C)   TempSrc: Temporal   SpO2: 99%   Weight: 89.9 kg (198 lb 3.2 oz)  Comment: Per nursing home. Unable to weigh   Height: 180.3 cm (71\")   PainSc: 0-No pain             LAB REVIEW  CBC          7/6/2023    10:32 7/14/2023    10:30 10/16/2023    12:39   CBC   WBC 8.31  8.91  9.19    RBC 4.29  4.13  3.92    Hemoglobin 13.8  13.6  12.8    Hematocrit 44.8  39.3  40.7    .4  95.2  103.8    MCH 32.2  32.9  32.7    MCHC 30.8  34.6  31.4    RDW 13.2  12.9  13.7    Platelets 188  181  187         PATHOLOGY  5/17/22 12/21/21 4/20/21          Assessment / " Plan      Assessment and Plan   77-year-old male who presents for follow-up regarding iron deficiency anemia.  Most recent colonoscopy was performed May 2022, negative for bleed.    Anemia, iron deficiency  -Patient with chronic anemia, worsened since June 2022.  Previously reportedly was on oral ferrous sulfate 3 times daily, tolerated well.  Currently still on iron polysaccharide 150 mg daily, he reports no adverse effects with it.  Received IV ferumoxytol on 12/13/2022. Received IV Ferumoxytol 2/20/2023.   -Negative GI work-up May 2022. Small bowel follow through negative. Pill endoscopy 4/2023 noted to have non bleeding angiectasias proximal to mid small bowel.   -Denies any further GIB  -CBC today with Hgb 12.8, Hct 40  -Given improvement in H/H and ferritin; will continue to monitor with CBC and iron studies    2. Macrocytosis   - Check reticulocyte count, B12, folate, MMA, and peripheral smear    Discussed possible differential diagnoses, testing, treatment, recommended non-pharmacological interventions, risks, warning signs to monitor for that would indicate need for follow-up in clinic or ER. If no improvement with these regimens or you have new or worsening symptoms follow-up. Patient verbalizes understanding and agreement with plan of care. Denies further needs or concerns.     Patient was given instructions and counseling regarding her condition and for health maintenance advised.    I spent 30 minutes with Fidencio Luciano today.  In the office today, more than 50% of this time was spent face-to-face with him  in counseling / coordination of care, reviewing his interim medical history and counseling on the current treatment plan.  All questions were answered to his satisfaction.      Meds ordered during this visit  No orders of the defined types were placed in this encounter.      Visit Diagnoses    ICD-10-CM ICD-9-CM   1. Iron deficiency anemia, unspecified iron deficiency anemia type  D50.9 280.9   2.  Intestinal malabsorption, unspecified type  K90.9 579.9         Follow Up   3 months with repeat iron studies, ferritin, CBC, B12, folate, STR, MMA, Retic count, and peripheral smear      This document has been electronically signed by Sultana Keen MD    October 16, 2023 12:48 EDT    Dictated Utilizing Dragon Dictation: Part of this note may be an electronic transcription/translation of spoken language to printed text using the Dragon Dictation System.

## 2023-10-17 LAB
FOLATE SERPL-MCNC: 19.8 NG/ML (ref 4.78–24.2)
REF LAB TEST METHOD: NORMAL
VIT B12 BLD-MCNC: 475 PG/ML (ref 211–946)

## 2023-10-19 LAB — METHYLMALONATE SERPL-SCNC: 168 NMOL/L (ref 0–378)

## 2023-11-16 ENCOUNTER — OFFICE VISIT (OUTPATIENT)
Dept: NEUROLOGY | Facility: CLINIC | Age: 78
End: 2023-11-16
Payer: MEDICARE

## 2023-11-16 VITALS
DIASTOLIC BLOOD PRESSURE: 50 MMHG | WEIGHT: 198 LBS | BODY MASS INDEX: 27.62 KG/M2 | OXYGEN SATURATION: 96 % | SYSTOLIC BLOOD PRESSURE: 120 MMHG | HEART RATE: 58 BPM

## 2023-11-16 DIAGNOSIS — G20.A1 MODERATE DEMENTIA DUE TO PARKINSON'S DISEASE, WITHOUT BEHAVIORAL DISTURBANCE, PSYCHOTIC DISTURBANCE, MOOD DISTURBANCE, OR ANXIETY: ICD-10-CM

## 2023-11-16 DIAGNOSIS — G20.A1 PARKINSON'S DISEASE WITHOUT DYSKINESIA OR FLUCTUATING MANIFESTATIONS: Primary | ICD-10-CM

## 2023-11-16 DIAGNOSIS — F02.B0 MODERATE DEMENTIA DUE TO PARKINSON'S DISEASE, WITHOUT BEHAVIORAL DISTURBANCE, PSYCHOTIC DISTURBANCE, MOOD DISTURBANCE, OR ANXIETY: ICD-10-CM

## 2023-11-16 RX ORDER — CARBIDOPA AND LEVODOPA 25; 100 MG/1; MG/1
2 TABLET, EXTENDED RELEASE ORAL 3 TIMES DAILY
Qty: 180 TABLET | Refills: 6 | Status: SHIPPED | OUTPATIENT
Start: 2023-11-16 | End: 2023-12-16

## 2023-11-16 NOTE — PROGRESS NOTES
Subjective:    CC: Fidencio Luciano is seen today in consultation at the request of Bernardo Pritchett MD for Impaired memory       HPI:  Patient is a 78-year-old male with past medical history of hypertension, hyperlipidemia, atrial fibrillation, Parkinson's disease, and type 2 diabetes mellitus, referred to the clinic for memory loss and Alzheimer's dementia. He is accompanied by his wife and a caregiver.    He has been informed that he has a diagnosis of Alzheimer dementia and Parkinson disease by Dr. Neida MD, for which he was prescribed medication. Dr. Carrasquillo has requested formal testing. Patient has been experiencing vivid dreams that he mistakes for reality for at least 2 years. Patient's wife reports an episode greater than 2 years ago when patient was trying to leave his home for [place] at 3:00 AM, his wife informed him that it was not Sunday, he agreed to go back to bed, and he had a fall. Patient does not have shaking or tremors. He denies any changes to his speech. He lives in a [facility]. He was in rehabilitation for 6 months, but he was not progressing. He is currently able to ambulate slowly with a shuffling gait. He ambulates with the assistance of a walker. His gait is mildly unsteady. He has been taking carbidopa-levodopa for Parkinson's since 04/2023 and he is tolerating this well.     Prior to jonny COVID-19 in 03/2021, patient was still working and his wife describes an episode where he left for work and he forgot where he was going. He reports that his memory is good, and his caregiver reports that it varies from day to day. He has been taking memantine since 07/2022 for memory and Aricept was added in 05/2023. Patient reports to his wife that he does not sleep at night and he lies awake. Patient's caregiver reports that he goes to sleep from 9:00 PM to 10:00 PM, he is easy to rouse and his sleep is not restful. He uses his oxygen at night.    The following portions of the  patient's history were reviewed today and updated as of 11/16/2023  : allergies, social history, and problem list.  This document will be scanned to patient's chart.      Current Outpatient Medications:     acetaminophen (TYLENOL) 500 MG tablet, Take 1 tablet by mouth Every 6 (Six) Hours As Needed for Mild Pain., Disp: , Rfl:     albuterol sulfate  (90 Base) MCG/ACT inhaler, Inhale 2 puffs 4 (Four) Times a Day As Needed for Wheezing., Disp: , Rfl:     apixaban (ELIQUIS) 5 MG tablet tablet, Take 1 tablet by mouth Every 12 (Twelve) Hours., Disp: , Rfl:     atorvastatin (LIPITOR) 80 MG tablet, Take 1 tablet by mouth Every Night., Disp: 90 tablet, Rfl: 5    budesonide-formoterol (SYMBICORT) 160-4.5 MCG/ACT inhaler, Inhale 2 puffs 2 (Two) Times a Day., Disp: 10.2 g, Rfl: 11    bumetanide (BUMEX) 1 MG tablet, Take 1 tablet by mouth Daily., Disp: 30 tablet, Rfl: 3    carbidopa-levodopa ER (SINEMET CR)  MG per tablet, Take 2 tablets by mouth 3 (Three) Times a Day for 30 days., Disp: 180 tablet, Rfl: 6    clopidogrel (PLAVIX) 75 MG tablet, Take 1 tablet by mouth Daily., Disp: 30 tablet, Rfl: 5    colestipol (COLESTID) 1 g tablet, Take 1 tablet by mouth 2 (Two) Times a Day., Disp: , Rfl:     divalproex (DEPAKOTE) 500 MG DR tablet, Take 1 tablet by mouth Every Night., Disp: , Rfl:     donepezil (ARICEPT) 5 MG tablet, Take 1 tablet by mouth Every Night., Disp: , Rfl:     finasteride (PROSCAR) 5 MG tablet, Take 1 tablet by mouth Every Night., Disp: 30 tablet, Rfl: 11    folic acid-pyridoxine-cyanocobalamin (FOLBIC) 2.5-25-2 MG tablet tablet, Take  by mouth Daily., Disp: , Rfl:     insulin regular (humuLIN R,novoLIN R) 100 UNIT/ML injection, Inject  under the skin into the appropriate area as directed 4 (Four) Times a Day. 5 units plus s/s QID, Disp: , Rfl:     iron polysaccharides (NIFEREX) 150 MG capsule, Take 1 capsule by mouth Daily., Disp: 30 capsule, Rfl: 3    isosorbide mononitrate (IMDUR) 120 MG 24 hr tablet,  Take 1 tablet by mouth Every Morning., Disp: 90 tablet, Rfl: 2    Levemir FlexTouch 100 UNIT/ML injection, Inject  under the skin into the appropriate area as directed Daily. 30 u qam and 8am and 23 u qpm at 8pm, Disp: , Rfl:     losartan (Cozaar) 25 MG tablet, Take 1 tablet by mouth Daily., Disp: 30 tablet, Rfl: 3    Melatonin 5 MG capsule, Take 5 mg by mouth Every Night., Disp: 30 each, Rfl: 1    memantine (NAMENDA XR) 28 MG capsule sustained-release 24 hr extended release capsule, Take 1 capsule by mouth Daily., Disp: , Rfl:     methylcellulose oral powder, Take 2 application  by mouth Daily. 2tbsp, Disp: , Rfl:     metoprolol tartrate (LOPRESSOR) 25 MG tablet, Take 0.5 tablets by mouth Every 12 (Twelve) Hours., Disp: , Rfl:     Mirabegron ER (Myrbetriq) 25 MG tablet sustained-release 24 hour 24 hr tablet, Take 1 tablet by mouth Daily., Disp: 30 tablet, Rfl: 11    mirtazapine (REMERON) 15 MG tablet, Take 1 tablet by mouth Every Night., Disp: , Rfl:     nitroglycerin (NITROSTAT) 0.4 MG SL tablet, 1 under the tongue as needed for angina, may repeat q5mins for up three doses, Disp: 25 tablet, Rfl: 2    pantoprazole (PROTONIX) 40 MG EC tablet, Take 1 tablet by mouth Daily., Disp: , Rfl:     ranolazine (RANEXA) 500 MG 12 hr tablet, Take 1 tablet by mouth Every 12 (Twelve) Hours., Disp: , Rfl:    Past Medical History:   Diagnosis Date    BPH (benign prostatic hyperplasia)     Bradycardia     Positive tilt table testing 07/2016    Coronary artery disease     Diabetes mellitus     Diverticulosis     Elevated cholesterol     Gastric ulcer with hemorrhage     Gastroesophageal reflux disease 1/26/2021    History of transfusion     Hyperlipidemia     Hypertension     Psoriasis       Past Surgical History:   Procedure Laterality Date    BACK SURGERY      CARDIAC CATHETERIZATION N/A 9/20/2016    Procedure: Left Heart Cath;  Surgeon: Giovanni Buenrostro MD;  Location: [facility] CATH INVASIVE LOCATION;  Service:     CARDIAC  CATHETERIZATION N/A 10/4/2016    Procedure: Left Heart Cath;  Surgeon: Bharathi Andrew MD;  [facility] CATH INVASIVE LOCATION;  Service:     CARDIAC CATHETERIZATION N/A 5/9/2017    Procedure: Left Heart Cath;  Surgeon: Giovanni Buenrostro MD;  Location: [facility] CATH INVASIVE LOCATION;  Service:     CARDIAC CATHETERIZATION N/A 5/9/2017    Procedure: ERR;  Surgeon: Giovanni Buenrostro MD;  Location:  [facility] CATH INVASIVE LOCATION;  Service:     CARDIAC CATHETERIZATION N/A 11/8/2019    Procedure: Left Heart Cath;  Surgeon: Abraham Oviedo MD;  Location: [facility] CATH INVASIVE LOCATION;  Service: Cardiology    CARDIAC CATHETERIZATION N/A 12/18/2019    Procedure: Coronary angiography;  Surgeon: Jay Barney IV, MD;  Location:  [facility] CATH INVASIVE LOCATION;  Service: Cardiovascular    CHOLECYSTECTOMY      COLONOSCOPY  11/13/2015    Dr. Martinez- internal hemorrhoids, sigmoid diverticulosis    COLONOSCOPY N/A 8/17/2018    Procedure: COLONOSCOPY CPT CODE: 21120;  Surgeon: Gigi Geronimo MD;  Location:  [facility] OR;  Service: Gastroenterology    COLONOSCOPY N/A 5/17/2022    Procedure: COLONOSCOPY;  Surgeon: Geno Vernon MD;  Location:  [facility];  Service: Gastroenterology;  Laterality: N/A;    CORONARY ANGIOPLASTY WITH STENT PLACEMENT      ENDOSCOPY N/A 8/17/2018    Procedure: ESOPHAGOGASTRODUODENOSCOPY WITH BIOPSY CPT CODE: 94320;  Surgeon: Gigi Geronimo MD;  Location:  [facility] OR;  Service: Gastroenterology    ENDOSCOPY N/A 4/20/2021    Procedure: ESOPHAGOGASTRODUODENOSCOPY;  Surgeon: Geno Vernon MD;  Location:  [facility];  Service: Gastroenterology;  Laterality: N/A;    ENDOSCOPY N/A 5/17/2022    Procedure: ESOPHAGOGASTRODUODENOSCOPY WITH BIOPSY;  Surgeon: Geno Vernon MD;  Location:  [facility];  Service: Gastroenterology;  Laterality: N/A;    INTERVENTIONAL RADIOLOGY PROCEDURE N/A 12/18/2019    Procedure: Coil Embolization of septal to pulmonary artery fistuala.;   Surgeon: Jay Barney IV, MD;  Location:  [facility] CATH INVASIVE LOCATION;  Service: Cardiovascular    CT RT/LT HEART CATHETERS N/A 5/9/2017    Procedure: Percutaneous Coronary Intervention;  Surgeon: Lincoln De Los Santos MD;  Location:  [facility] CATH INVASIVE [facility];  Service: Cardiology    STOMACH SURGERY      FUSION OF BLEEDING ULCER    UPPER GASTROINTESTINAL ENDOSCOPY  11/13/2015    Dr. Martinez- mild gastritis      Family History   Problem Relation Age of Onset    Sick sinus syndrome Mother     Heart failure Mother     Diabetes Mother     Liver cancer Father       Review of Systems    All other systems reviewed and are negative     Objective:    /50   Pulse 58   Wt 89.8 kg (198 lb) Comment: per previous reading 10/16/23. unable to obtain  SpO2 96%   BMI 27.62 kg/m²     Neurology Exam:  General appearance: Masked face.     Mental status: Alert, awake and oriented to time place and person.    Fund of knowledge:  Normal.     Language and Speech: Hypophonic speech    Naming , Repetition and Comprehension:  Can name objects, repeat a sentence and follow commands. Speech is clear and fluent with good repetition, comprehension, and naming.    Cranial Nerves:   CN II: Visual fields are full. Intact. Fundi - Normal, No papilledema, Pupils - J LUIS  CN III, IV and VI: Extraocular movements are intact. Normal saccades.   CN V: Facial sensation is intact.   CN VII: Muscles of facial expression reveal no asymmetry. Intact.   CN VIII: Hearing is intact. Whispered voice intact.   CN IX and X: Palate elevates symmetrically. Intact  CN XI: Shoulder shrug is intact.   CN XII: Tongue is midline without evidence of atrophy or fasciculation.     Motor:  Right UE muscle strength 5/5. Mild increased tone.     Left UE muscle strength 5/5. Mild increased tone.      Right LE muscle strength 5/5. Mild increased tone.     Left LE muscle strength 5/5. Mild increased tone.      Sensory: Normal light touch, vibration and pinprick  sensation bilaterally.    DTRs: 2+ bilaterally in upper and lower extremities.    Babinski: Negative bilaterally.    Co-ordination: Normal finger-to-nose, heel to shin B/L.    Rhomberg: Negative.    Gait: He is currently wheelchair bound.    Cardiovascular: Regular rate and rhythm without murmur, gallop or rub.    Ophthalmoscopic exam: Normal fundi, no papilledema.    Assessment and Plan:  1. Parkinson's disease without dyskinesia or fluctuating manifestations  2. Moderate dementia due to Parkinson's disease, without behavioral disturbance, psychotic disturbance, mood disturbance, or anxiety  Patient with approximately 2 to 3-year history of problems with memory. Based on my examination, he has Parkinson's associated dementia. He was noted to have significant masked face with hypophonic speech and his gait has deteriorated in the last 1 year or so. He scored 19/30 on MMSE. He is on Namenda XR 28 mg daily and Aricept 5 mg daily, which will be continued. I will increase Sinemet dose from 1 tablet 3 times daily to 2 tablets 3 times daily for better therapeutic effect and see how he does. He will continue with Remeron 15 mg daily as well. Otherwise, I will see him in clinic in 6 months for follow-up.    Return in about 6 months (around 5/16/2024).     Russell Cameron MD       Note to patient: The 21st Century Cures Act makes medical notes like these available to patients in the interest of transparency. However, be advised this is a medical document. It is intended as peer to peer communication. It is written in medical language and may contain abbreviations or verbiage that are unfamiliar. It may appear blunt or direct. Medical documents are intended to carry relevant information, facts as evident, and the clinical opinion of the physician.     Transcribed from ambient dictation for Russell Cameron MD by José Miguel Etienne.  11/16/23   15:21 EST    I have personally performed the services described in this document as  transcribed by the above individual, and it is both accurate and complete.

## 2024-01-08 ENCOUNTER — OFFICE VISIT (OUTPATIENT)
Dept: CARDIOLOGY | Facility: CLINIC | Age: 79
End: 2024-01-08
Payer: MEDICARE

## 2024-01-08 VITALS
RESPIRATION RATE: 16 BRPM | BODY MASS INDEX: 27.72 KG/M2 | SYSTOLIC BLOOD PRESSURE: 123 MMHG | HEIGHT: 71 IN | HEART RATE: 60 BPM | DIASTOLIC BLOOD PRESSURE: 56 MMHG | OXYGEN SATURATION: 96 % | WEIGHT: 198 LBS

## 2024-01-08 DIAGNOSIS — E78.5 DYSLIPIDEMIA: ICD-10-CM

## 2024-01-08 DIAGNOSIS — I25.10 ASCVD (ARTERIOSCLEROTIC CARDIOVASCULAR DISEASE): Primary | ICD-10-CM

## 2024-01-08 DIAGNOSIS — I10 ESSENTIAL HYPERTENSION: ICD-10-CM

## 2024-01-08 DIAGNOSIS — I25.41 CORONARY ARTERY FISTULA: ICD-10-CM

## 2024-01-08 PROCEDURE — 93000 ELECTROCARDIOGRAM COMPLETE: CPT | Performed by: INTERNAL MEDICINE

## 2024-01-08 PROCEDURE — 99213 OFFICE O/P EST LOW 20 MIN: CPT | Performed by: INTERNAL MEDICINE

## 2024-01-08 PROCEDURE — 3074F SYST BP LT 130 MM HG: CPT | Performed by: INTERNAL MEDICINE

## 2024-01-08 PROCEDURE — 3078F DIAST BP <80 MM HG: CPT | Performed by: INTERNAL MEDICINE

## 2024-01-08 NOTE — LETTER
January 9, 2024       No Recipients    Patient: Fidencio Luciano   YOB: 1945   Date of Visit: 1/8/2024     Dear Bernardo Pritchett MD:       Thank you for referring Fidencio Luciano to me for evaluation. Below are the relevant portions of my assessment and plan of care.    If you have questions, please do not hesitate to call me. I look forward to following Fidencio along with you.         Sincerely,        Giovanni Buenrostro MD        CC:   No Recipients    Giovanni Buenrostro MD  01/09/24 2120  Sign when Signing Visit  Bernardo Pritchett MD  Fidencio Luciano  1945  01/08/2024    Patient Active Problem List   Diagnosis   • Essential hypertension   • Type 2 diabetes mellitus   • Benign prostatic hyperplasia   • ASCVD (arteriosclerotic cardiovascular disease), s/p stenting of 80 % stenosis in left circumflex on 10/05/16and 60% stenosis in the LAD, clinically stable.    • Hyperlipidemia LDL goal <70   • Weakness generalized   • Coronary artery fistula   • Weight loss, unintentional   • Iron deficiency anemia   • Gastroesophageal reflux disease   • Dysphagia   • Chest pain   • History of pulmonary embolism in February 22, patient is on Eliquis.   • Early satiety   • Diarrhea       Dear Bernardo Pritchett MD:    Subjective    Fidencio Luciano is a 78 y.o. male with the problems as listed above, presents    Chief Complaint   Patient presents with   • Coronary Artery Disease     6 mos follow   • Med Management     List to be faxed from nursing home       History of Present Illness: Ms. 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 Barney in [place] at [facility], is here for regular cardiology follow-up.  On today's visit he denies any complaints of chest pains or shortness of breath and overall seems to be feeling okay.  His wife indicates  that has not been getting out of the wheelchair much.    No Known Allergies:    Current Outpatient Medications:   •  acetaminophen (TYLENOL) 500 MG tablet, Take 1 tablet by mouth Every 6 (Six) Hours As Needed for Mild Pain., Disp: , Rfl:   •  albuterol sulfate  (90 Base) MCG/ACT inhaler, Inhale 2 puffs 4 (Four) Times a Day As Needed for Wheezing., Disp: , Rfl:   •  apixaban (ELIQUIS) 5 MG tablet tablet, Take 1 tablet by mouth Every 12 (Twelve) Hours., Disp: , Rfl:   •  atorvastatin (LIPITOR) 80 MG tablet, Take 1 tablet by mouth Every Night., Disp: 90 tablet, Rfl: 5  •  budesonide-formoterol (SYMBICORT) 160-4.5 MCG/ACT inhaler, Inhale 2 puffs 2 (Two) Times a Day., Disp: 10.2 g, Rfl: 11  •  bumetanide (BUMEX) 1 MG tablet, Take 1 tablet by mouth Daily., Disp: 30 tablet, Rfl: 3  •  carbidopa-levodopa ER (SINEMET CR)  MG per tablet, Take 2 tablets by mouth 3 (Three) Times a Day for 30 days., Disp: 180 tablet, Rfl: 6  •  Carboxymethylcellulose Sodium (ARTIFICIAL TEARS OP), Apply 0.4 % to eye(s) as directed by provider 4 (Four) Times a Day., Disp: , Rfl:   •  clopidogrel (PLAVIX) 75 MG tablet, Take 1 tablet by mouth Daily., Disp: 30 tablet, Rfl: 5  •  colestipol (COLESTID) 1 g tablet, Take 1 tablet by mouth 2 (Two) Times a Day., Disp: , Rfl:   •  divalproex (DEPAKOTE) 500 MG DR tablet, Take 1 tablet by mouth Every Night., Disp: , Rfl:   •  donepezil (ARICEPT) 5 MG tablet, Take 1 tablet by mouth Every Night., Disp: , Rfl:   •  finasteride (PROSCAR) 5 MG tablet, Take 1 tablet by mouth Every Night., Disp: 30 tablet, Rfl: 11  •  folic acid-pyridoxine-cyanocobalamin (FOLBIC) 2.5-25-2 MG tablet tablet, Take  by mouth Daily., Disp: , Rfl:   •  insulin regular (humuLIN R,novoLIN R) 100 UNIT/ML injection, Inject  under the skin into the appropriate area as directed 4 (Four) Times a Day. 5 units plus s/s QID, Disp: , Rfl:   •  iron polysaccharides (NIFEREX) 150 MG capsule, Take 1 capsule by mouth Daily., Disp: 30  capsule, Rfl: 3  •  isosorbide mononitrate (IMDUR) 120 MG 24 hr tablet, Take 1 tablet by mouth Every Morning., Disp: 90 tablet, Rfl: 2  •  Levemir FlexTouch 100 UNIT/ML injection, Inject  under the skin into the appropriate area as directed Daily. 30 u qam and 8am and 23 u qpm at 8pm, Disp: , Rfl:   •  losartan (Cozaar) 25 MG tablet, Take 1 tablet by mouth Daily., Disp: 30 tablet, Rfl: 3  •  Melatonin 5 MG capsule, Take 5 mg by mouth Every Night., Disp: 30 each, Rfl: 1  •  memantine (NAMENDA XR) 28 MG capsule sustained-release 24 hr extended release capsule, Take 1 capsule by mouth Daily., Disp: , Rfl:   •  methylcellulose oral powder, Take 2 application  by mouth Daily. 2tbsp, Disp: , Rfl:   •  metoprolol tartrate (LOPRESSOR) 25 MG tablet, Take 0.5 tablets by mouth Every 12 (Twelve) Hours., Disp: , Rfl:   •  Mirabegron ER (Myrbetriq) 25 MG tablet sustained-release 24 hour 24 hr tablet, Take 1 tablet by mouth Daily., Disp: 30 tablet, Rfl: 11  •  mirtazapine (REMERON) 15 MG tablet, Take 1 tablet by mouth Every Night., Disp: , Rfl:   •  nitroglycerin (NITROSTAT) 0.4 MG SL tablet, 1 under the tongue as needed for angina, may repeat q5mins for up three doses, Disp: 25 tablet, Rfl: 2  •  ranolazine (RANEXA) 500 MG 12 hr tablet, Take 1 tablet by mouth Every 12 (Twelve) Hours., Disp: , Rfl:   •  pantoprazole (PROTONIX) 40 MG EC tablet, Take 1 tablet by mouth Daily. (Patient not taking: Reported on 2024), Disp: , Rfl:     The following portions of the patient's history were reviewed and updated as appropriate: allergies, current medications, past family history, past medical history, past social history, past surgical history and problem list.    Social History     Tobacco Use   • Smoking status: Former     Packs/day: 3.00     Years: 25.00     Additional pack years: 0.00     Total pack years: 75.00     Types: Cigarettes     Quit date:      Years since quittin.0     Passive exposure: Past   • Smokeless tobacco:  "Former     Quit date: 1/16/1986   Vaping Use   • Vaping Use: Never used   Substance Use Topics   • Alcohol use: No   • Drug use: No     Review of Systems   Constitutional: Negative for chills and fever.   HENT:  Negative for nosebleeds and sore throat.    Respiratory:  Negative for cough, hemoptysis and wheezing.    Gastrointestinal:  Negative for abdominal pain, hematemesis, hematochezia, melena, nausea and vomiting.   Genitourinary:  Negative for dysuria and hematuria.   Neurological:  Negative for headaches.     Objective  Vitals:    01/08/24 0915   BP: 123/56   Pulse: 60   Resp: 16   SpO2: 96%   Weight: 89.8 kg (198 lb)   Height: 180.3 cm (71\")     Body mass index is 27.62 kg/m².    Vitals reviewed.   Constitutional:       Appearance: Well-developed.   Eyes:      Conjunctiva/sclera: Conjunctivae normal.   HENT:      Head: Normocephalic.   Neck:      Thyroid: No thyromegaly.      Vascular: No JVD.      Trachea: No tracheal deviation.   Pulmonary:      Effort: No respiratory distress.      Breath sounds: Normal breath sounds. No wheezing. No rales.   Cardiovascular:      PMI at left midclavicular line. Normal rate. Regular rhythm. Normal S1. Normal S2.       Murmurs: There is no murmur.      No gallop.  No click. No rub.   Pulses:     Intact distal pulses.   Edema:     Pretibial: bilateral 1+ edema of the pretibial area.     Ankle: bilateral 1+ edema of the ankle.     Feet: bilateral 1+ edema of the feet.  Abdominal:      General: Bowel sounds are normal.      Palpations: Abdomen is soft. There is no abdominal mass.      Tenderness: There is no abdominal tenderness.   Musculoskeletal:      Cervical back: Normal range of motion and neck supple. Skin:     General: Skin is warm and dry.   Neurological:      Mental Status: Alert and oriented to person, place, and time.      Cranial Nerves: No cranial nerve deficit.         Lab Results   Component Value Date     02/03/2023    K 4.3 02/03/2023     02/03/2023 "    CO2 25.8 02/03/2023    BUN 19 02/03/2023    CREATININE 0.95 02/03/2023    GLUCOSE 248 (H) 02/03/2023    CALCIUM 8.9 02/03/2023    AST 24 02/03/2023    ALT 19 02/03/2023    ALKPHOS 107 02/03/2023    LABIL2 1.4 (L) 01/08/2016     Lab Results   Component Value Date    CKTOTAL 90 11/25/2022     Lab Results   Component Value Date    WBC 9.19 10/16/2023    HGB 12.8 (L) 10/16/2023    HCT 40.7 10/16/2023     10/16/2023       Lab Results   Component Value Date    MG 2.1 05/29/2022     Lab Results   Component Value Date    TSH 3.450 12/21/2021    PSA 0.6 01/28/2022          ECG 12 Lead    Date/Time: 1/8/2024 9:17 AM  Performed by: Giovanni Buenrostro MD    Authorized by: Giovanni Buenrostro MD  Comparison: compared with previous ECG from 2/13/2023  Similar to previous ECG  Rhythm: sinus bradycardia  BPM: 58  Conduction: conduction normal  ST Segments: ST segments normal  Comments: Possible old inferior wall myocardial infarction.            Assessment & Plan   Diagnosis Plan   1. ASCVD (arteriosclerotic cardiovascular disease), s/p stenting of 80 % stenosis in left circumflex on 10/05/16and 60% stenosis in the LAD, clinically stable.         2. 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        3. Essential hypertension, seems controlled.        4. Dyslipidemia  Comprehensive Metabolic Panel    Lipid Panel        Recommendations  For his coronary artery disease, continue with clopidogrel, ranolazine and atorvastatin at current doses.  Check fasting lipid panel and CMP.    Return in about 6 months (around 7/8/2024).    As always, Bernardo Pritchett MD  I appreciate very much the opportunity to participate in the cardiovascular care of your patients. Please do not hesitate to call me with any questions with regards to Fidencio Luciano's evaluation and management.       With Best Regards,        Giovanni Buenrostro MD, FACC    Please note that portions of this note were  completed with a voice recognition program.

## 2024-01-08 NOTE — PROGRESS NOTES
Bernardo Pritchett MD  Fidencio Urbanbs  1945  01/08/2024    Patient Active Problem List   Diagnosis    Essential hypertension    Type 2 diabetes mellitus    Benign prostatic hyperplasia    ASCVD (arteriosclerotic cardiovascular disease), s/p stenting of 80 % stenosis in left circumflex on 10/05/16and 60% stenosis in the LAD, clinically stable.     Hyperlipidemia LDL goal <70    Weakness generalized    Coronary artery fistula    Weight loss, unintentional    Iron deficiency anemia    Gastroesophageal reflux disease    Dysphagia    Chest pain    History of pulmonary embolism in February 22, patient is on Eliquis.    Early satiety    Diarrhea       Dear Bernardo Pritchett MD:    Subjective     Fidencio Luciano is a 78 y.o. male with the problems as listed above, presents    Chief Complaint   Patient presents with    Coronary Artery Disease     6 mos follow    Med Management     List to be faxed from nursing home       History of Present Illness: Ms. 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 Barney in [place] at [facility], is here for regular cardiology follow-up.  On today's visit he denies any complaints of chest pains or shortness of breath and overall seems to be feeling okay.  His wife indicates that has not been getting out of the wheelchair much.    No Known Allergies:    Current Outpatient Medications:     acetaminophen (TYLENOL) 500 MG tablet, Take 1 tablet by mouth Every 6 (Six) Hours As Needed for Mild Pain., Disp: , Rfl:     albuterol sulfate  (90 Base) MCG/ACT inhaler, Inhale 2 puffs 4 (Four) Times a Day As Needed for Wheezing., Disp: , Rfl:     apixaban (ELIQUIS) 5 MG tablet tablet, Take 1 tablet by mouth Every 12 (Twelve) Hours., Disp: , Rfl:     atorvastatin (LIPITOR) 80 MG tablet, Take 1 tablet by mouth  Every Night., Disp: 90 tablet, Rfl: 5    budesonide-formoterol (SYMBICORT) 160-4.5 MCG/ACT inhaler, Inhale 2 puffs 2 (Two) Times a Day., Disp: 10.2 g, Rfl: 11    bumetanide (BUMEX) 1 MG tablet, Take 1 tablet by mouth Daily., Disp: 30 tablet, Rfl: 3    carbidopa-levodopa ER (SINEMET CR)  MG per tablet, Take 2 tablets by mouth 3 (Three) Times a Day for 30 days., Disp: 180 tablet, Rfl: 6    Carboxymethylcellulose Sodium (ARTIFICIAL TEARS OP), Apply 0.4 % to eye(s) as directed by provider 4 (Four) Times a Day., Disp: , Rfl:     clopidogrel (PLAVIX) 75 MG tablet, Take 1 tablet by mouth Daily., Disp: 30 tablet, Rfl: 5    colestipol (COLESTID) 1 g tablet, Take 1 tablet by mouth 2 (Two) Times a Day., Disp: , Rfl:     divalproex (DEPAKOTE) 500 MG DR tablet, Take 1 tablet by mouth Every Night., Disp: , Rfl:     donepezil (ARICEPT) 5 MG tablet, Take 1 tablet by mouth Every Night., Disp: , Rfl:     finasteride (PROSCAR) 5 MG tablet, Take 1 tablet by mouth Every Night., Disp: 30 tablet, Rfl: 11    folic acid-pyridoxine-cyanocobalamin (FOLBIC) 2.5-25-2 MG tablet tablet, Take  by mouth Daily., Disp: , Rfl:     insulin regular (humuLIN R,novoLIN R) 100 UNIT/ML injection, Inject  under the skin into the appropriate area as directed 4 (Four) Times a Day. 5 units plus s/s QID, Disp: , Rfl:     iron polysaccharides (NIFEREX) 150 MG capsule, Take 1 capsule by mouth Daily., Disp: 30 capsule, Rfl: 3    isosorbide mononitrate (IMDUR) 120 MG 24 hr tablet, Take 1 tablet by mouth Every Morning., Disp: 90 tablet, Rfl: 2    Levemir FlexTouch 100 UNIT/ML injection, Inject  under the skin into the appropriate area as directed Daily. 30 u qam and 8am and 23 u qpm at 8pm, Disp: , Rfl:     losartan (Cozaar) 25 MG tablet, Take 1 tablet by mouth Daily., Disp: 30 tablet, Rfl: 3    Melatonin 5 MG capsule, Take 5 mg by mouth Every Night., Disp: 30 each, Rfl: 1    memantine (NAMENDA XR) 28 MG capsule sustained-release 24 hr extended release capsule,  Take 1 capsule by mouth Daily., Disp: , Rfl:     methylcellulose oral powder, Take 2 application  by mouth Daily. 2tbsp, Disp: , Rfl:     metoprolol tartrate (LOPRESSOR) 25 MG tablet, Take 0.5 tablets by mouth Every 12 (Twelve) Hours., Disp: , Rfl:     Mirabegron ER (Myrbetriq) 25 MG tablet sustained-release 24 hour 24 hr tablet, Take 1 tablet by mouth Daily., Disp: 30 tablet, Rfl: 11    mirtazapine (REMERON) 15 MG tablet, Take 1 tablet by mouth Every Night., Disp: , Rfl:     nitroglycerin (NITROSTAT) 0.4 MG SL tablet, 1 under the tongue as needed for angina, may repeat q5mins for up three doses, Disp: 25 tablet, Rfl: 2    ranolazine (RANEXA) 500 MG 12 hr tablet, Take 1 tablet by mouth Every 12 (Twelve) Hours., Disp: , Rfl:     pantoprazole (PROTONIX) 40 MG EC tablet, Take 1 tablet by mouth Daily. (Patient not taking: Reported on 2024), Disp: , Rfl:     The following portions of the patient's history were reviewed and updated as appropriate: allergies, current medications, past family history, past medical history, past social history, past surgical history and problem list.    Social History     Tobacco Use    Smoking status: Former     Packs/day: 3.00     Years: 25.00     Additional pack years: 0.00     Total pack years: 75.00     Types: Cigarettes     Quit date:      Years since quittin.0     Passive exposure: Past    Smokeless tobacco: Former     Quit date: 1986   Vaping Use    Vaping Use: Never used   Substance Use Topics    Alcohol use: No    Drug use: No     Review of Systems   Constitutional: Negative for chills and fever.   HENT:  Negative for nosebleeds and sore throat.    Respiratory:  Negative for cough, hemoptysis and wheezing.    Gastrointestinal:  Negative for abdominal pain, hematemesis, hematochezia, melena, nausea and vomiting.   Genitourinary:  Negative for dysuria and hematuria.   Neurological:  Negative for headaches.     Objective   Vitals:    24 0915   BP: 123/56  "  Pulse: 60   Resp: 16   SpO2: 96%   Weight: 89.8 kg (198 lb)   Height: 180.3 cm (71\")     Body mass index is 27.62 kg/m².    Vitals reviewed.   Constitutional:       Appearance: Well-developed.   Eyes:      Conjunctiva/sclera: Conjunctivae normal.   HENT:      Head: Normocephalic.   Neck:      Thyroid: No thyromegaly.      Vascular: No JVD.      Trachea: No tracheal deviation.   Pulmonary:      Effort: No respiratory distress.      Breath sounds: Normal breath sounds. No wheezing. No rales.   Cardiovascular:      PMI at left midclavicular line. Normal rate. Regular rhythm. Normal S1. Normal S2.       Murmurs: There is no murmur.      No gallop.  No click. No rub.   Pulses:     Intact distal pulses.   Edema:     Pretibial: bilateral 1+ edema of the pretibial area.     Ankle: bilateral 1+ edema of the ankle.     Feet: bilateral 1+ edema of the feet.  Abdominal:      General: Bowel sounds are normal.      Palpations: Abdomen is soft. There is no abdominal mass.      Tenderness: There is no abdominal tenderness.   Musculoskeletal:      Cervical back: Normal range of motion and neck supple. Skin:     General: Skin is warm and dry.   Neurological:      Mental Status: Alert and oriented to person, place, and time.      Cranial Nerves: No cranial nerve deficit.         Lab Results   Component Value Date     02/03/2023    K 4.3 02/03/2023     02/03/2023    CO2 25.8 02/03/2023    BUN 19 02/03/2023    CREATININE 0.95 02/03/2023    GLUCOSE 248 (H) 02/03/2023    CALCIUM 8.9 02/03/2023    AST 24 02/03/2023    ALT 19 02/03/2023    ALKPHOS 107 02/03/2023    LABIL2 1.4 (L) 01/08/2016     Lab Results   Component Value Date    CKTOTAL 90 11/25/2022     Lab Results   Component Value Date    WBC 9.19 10/16/2023    HGB 12.8 (L) 10/16/2023    HCT 40.7 10/16/2023     10/16/2023       Lab Results   Component Value Date    MG 2.1 05/29/2022     Lab Results   Component Value Date    TSH 3.450 12/21/2021    PSA 0.6 " 01/28/2022          ECG 12 Lead    Date/Time: 1/8/2024 9:17 AM  Performed by: Giovanni Buenrostro MD    Authorized by: Giovanni Buenrostro MD  Comparison: compared with previous ECG from 2/13/2023  Similar to previous ECG  Rhythm: sinus bradycardia  BPM: 58  Conduction: conduction normal  ST Segments: ST segments normal  Comments: Possible old inferior wall myocardial infarction.            Assessment & Plan    Diagnosis Plan   1. ASCVD (arteriosclerotic cardiovascular disease), s/p stenting of 80 % stenosis in left circumflex on 10/05/16and 60% stenosis in the LAD, clinically stable.         2. 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        3. Essential hypertension, seems controlled.        4. Dyslipidemia  Comprehensive Metabolic Panel    Lipid Panel        Recommendations  For his coronary artery disease, continue with clopidogrel, ranolazine and atorvastatin at current doses.  Check fasting lipid panel and CMP.    Return in about 6 months (around 7/8/2024).    As always, Bernardo Pritchett MD  I appreciate very much the opportunity to participate in the cardiovascular care of your patients. Please do not hesitate to call me with any questions with regards to Fidencio Luciano's evaluation and management.       With Best Regards,        Giovanni Buenrostro MD, [facility]    Please note that portions of this note were completed with a voice recognition program.

## 2024-01-11 ENCOUNTER — LAB REQUISITION (OUTPATIENT)
Dept: LAB | Facility: HOSPITAL | Age: 79
End: 2024-01-11
Payer: MEDICARE

## 2024-01-11 DIAGNOSIS — E11.9 TYPE 2 DIABETES MELLITUS WITHOUT COMPLICATIONS: ICD-10-CM

## 2024-01-11 LAB
ALBUMIN SERPL-MCNC: 4.4 G/DL (ref 3.5–5.2)
ALBUMIN/GLOB SERPL: 1.5 G/DL
ALP SERPL-CCNC: 128 U/L (ref 39–117)
ALT SERPL W P-5'-P-CCNC: 29 U/L (ref 1–41)
ANION GAP SERPL CALCULATED.3IONS-SCNC: 12.4 MMOL/L (ref 5–15)
AST SERPL-CCNC: 27 U/L (ref 1–40)
BILIRUB SERPL-MCNC: 0.2 MG/DL (ref 0–1.2)
BUN SERPL-MCNC: 19 MG/DL (ref 8–23)
BUN/CREAT SERPL: 17.3 (ref 7–25)
CALCIUM SPEC-SCNC: 9.4 MG/DL (ref 8.6–10.5)
CHLORIDE SERPL-SCNC: 105 MMOL/L (ref 98–107)
CO2 SERPL-SCNC: 23.6 MMOL/L (ref 22–29)
CREAT SERPL-MCNC: 1.1 MG/DL (ref 0.76–1.27)
EGFRCR SERPLBLD CKD-EPI 2021: 69.1 ML/MIN/1.73
GLOBULIN UR ELPH-MCNC: 3 GM/DL
GLUCOSE SERPL-MCNC: 260 MG/DL (ref 65–99)
POTASSIUM SERPL-SCNC: 4.6 MMOL/L (ref 3.5–5.2)
PROT SERPL-MCNC: 7.4 G/DL (ref 6–8.5)
SODIUM SERPL-SCNC: 141 MMOL/L (ref 136–145)

## 2024-01-11 PROCEDURE — 80053 COMPREHEN METABOLIC PANEL: CPT | Performed by: INTERNAL MEDICINE

## 2024-01-24 ENCOUNTER — OFFICE VISIT (OUTPATIENT)
Dept: PULMONOLOGY | Facility: CLINIC | Age: 79
End: 2024-01-24
Payer: MEDICARE

## 2024-01-24 VITALS
DIASTOLIC BLOOD PRESSURE: 62 MMHG | BODY MASS INDEX: 27.72 KG/M2 | WEIGHT: 198 LBS | OXYGEN SATURATION: 94 % | HEIGHT: 71 IN | TEMPERATURE: 97.5 F | HEART RATE: 76 BPM | SYSTOLIC BLOOD PRESSURE: 100 MMHG

## 2024-01-24 DIAGNOSIS — G47.34 NOCTURNAL HYPOXIA: ICD-10-CM

## 2024-01-24 DIAGNOSIS — J44.9 COPD WITH HYPOXIA: Primary | ICD-10-CM

## 2024-01-24 DIAGNOSIS — E66.3 OVERWEIGHT: ICD-10-CM

## 2024-01-24 RX ORDER — IPRATROPIUM BROMIDE AND ALBUTEROL SULFATE 2.5; .5 MG/3ML; MG/3ML
3 SOLUTION RESPIRATORY (INHALATION) 2 TIMES DAILY
Qty: 360 ML | Refills: 3 | Status: SHIPPED | OUTPATIENT
Start: 2024-01-24

## 2024-01-24 RX ORDER — DILTIAZEM HYDROCHLORIDE 60 MG/1
TABLET, FILM COATED ORAL
COMMUNITY
Start: 2023-12-29

## 2024-01-24 RX ORDER — INSULIN DETEMIR 100 [IU]/ML
INJECTION, SOLUTION SUBCUTANEOUS
COMMUNITY
Start: 2024-01-14

## 2024-01-24 RX ORDER — DIVALPROEX SODIUM 250 MG/1
TABLET, DELAYED RELEASE ORAL
COMMUNITY
Start: 2024-01-23

## 2024-01-24 RX ORDER — PEN NEEDLE, DIABETIC, SAFETY 30 GX3/16"
NEEDLE, DISPOSABLE MISCELLANEOUS
COMMUNITY
Start: 2023-11-13

## 2024-01-24 NOTE — PROGRESS NOTES
"Chief Complaint  Hypoxia    Subjective        Fidencio Luciano presents to [facility]INE  History of Present Illness    Mr. Luciano is a 78 year old male with a medical history significant for BPH, CAD, diabetes, hyperlipidemia, and hypertension.    He presents today for follow up on hypoxia.  He states that he has been doing well.  He does report some increased episodes of shortness of breath and congestion.  He is currently taking Symbicort BID and using albuterol inhaler as needed.  He is compliant with supplemental oxygen at night.    Objective   Vital Signs:  /62   Pulse 76   Temp 97.5 °F (36.4 °C)   Ht 180.3 cm (70.98\")   Wt 89.8 kg (198 lb)   SpO2 94%   BMI 27.63 kg/m²   Estimated body mass index is 27.63 kg/m² as calculated from the following:    Height as of this encounter: 180.3 cm (70.98\").    Weight as of this encounter: 89.8 kg (198 lb).               Physical Exam     GENERAL APPEARANCE: Well developed, well nourished, alert and cooperative, and appears to be in no acute distress.    HEAD: normocephalic. Atraumatic.    EYES: PERRL, EOMI. Vision is grossly intact.    THROAT: Oral cavity and pharynx normal. No inflammation, swelling, exudate, or lesions.     NECK: Neck supple.  No thyromegaly.    CARDIAC: Normal S1 and S2. No S3, S4 or murmurs. Rhythm is regular.     RESPIRATORY:Bilateral air entry positive. Bilateral diminished breath sounds. No wheezing, crackles or rhonchi noted.    GI: Positive bowel sounds. Soft, nondistended, nontender.     MUSCULOSKELETAL: No significant deformity or joint abnormality. No edema. Peripheral pulses intact. No varicosities.    NEUROLOGICAL: Strength and sensation symmetric and intact throughout.     PSYCHIATRIC: The mental examination revealed the patient was oriented to person, place, and time.     Result Review :    The following data was reviewed by: ANDREW Amos on 01/24/2024:  Common labs  "         7/14/2023    10:30 10/16/2023    12:39 1/11/2024    12:14   Common Labs   Glucose   260    BUN   19    Creatinine   1.10    Sodium   141    Potassium   4.6    Chloride   105    Calcium   9.4    Albumin   4.4    Total Bilirubin   0.2    Alkaline Phosphatase   128    AST (SGOT)   27    ALT (SGPT)   29    WBC 8.91  9.19     Hemoglobin 13.6  12.8     Hematocrit 39.3  40.7     Platelets 181  187                    Assessment and Plan     Diagnoses and all orders for this visit:    1. COPD with hypoxia (Primary)    2. Nocturnal hypoxia    3. Overweight    Other orders  -     ipratropium-albuterol (DUO-NEB) 0.5-2.5 mg/3 ml nebulizer; Take 3 mL by nebulization 2 (Two) Times a Day.  Dispense: 360 mL; Refill: 3        Continue Symbicort BID.  Continue albuterol inhaler as needed.  Will start him on duonebs twice a day.    Continue supplemental oxygen at night.          Follow Up     Return in about 3 months (around 4/24/2024).  Patient was given instructions and counseling regarding his condition or for health maintenance advice. Please see specific information pulled into the AVS if appropriate.

## 2024-01-29 ENCOUNTER — OFFICE VISIT (OUTPATIENT)
Dept: UROLOGY | Facility: CLINIC | Age: 79
End: 2024-01-29
Payer: MEDICARE

## 2024-01-29 VITALS
DIASTOLIC BLOOD PRESSURE: 65 MMHG | HEIGHT: 71 IN | HEART RATE: 68 BPM | BODY MASS INDEX: 27.63 KG/M2 | SYSTOLIC BLOOD PRESSURE: 118 MMHG

## 2024-01-29 DIAGNOSIS — N39.41 BENIGN PROSTATIC HYPERPLASIA (BPH) WITH URINARY URGE INCONTINENCE: ICD-10-CM

## 2024-01-29 DIAGNOSIS — N40.1 BENIGN PROSTATIC HYPERPLASIA (BPH) WITH URINARY URGE INCONTINENCE: ICD-10-CM

## 2024-01-29 DIAGNOSIS — R35.0 FREQUENCY OF URINATION: ICD-10-CM

## 2024-01-29 DIAGNOSIS — N40.1 BPH WITH OBSTRUCTION/LOWER URINARY TRACT SYMPTOMS: Primary | ICD-10-CM

## 2024-01-29 DIAGNOSIS — R35.0 BENIGN PROSTATIC HYPERPLASIA WITH URINARY FREQUENCY: ICD-10-CM

## 2024-01-29 DIAGNOSIS — N13.8 BPH WITH OBSTRUCTION/LOWER URINARY TRACT SYMPTOMS: Primary | ICD-10-CM

## 2024-01-29 DIAGNOSIS — N32.81 DETRUSOR INSTABILITY OF BLADDER: ICD-10-CM

## 2024-01-29 DIAGNOSIS — N40.0 BENIGN PROSTATIC HYPERPLASIA WITHOUT LOWER URINARY TRACT SYMPTOMS: ICD-10-CM

## 2024-01-29 DIAGNOSIS — N40.1 BENIGN PROSTATIC HYPERPLASIA WITH URINARY FREQUENCY: ICD-10-CM

## 2024-01-29 PROCEDURE — 99214 OFFICE O/P EST MOD 30 MIN: CPT | Performed by: NURSE PRACTITIONER

## 2024-01-29 PROCEDURE — 1159F MED LIST DOCD IN RCRD: CPT | Performed by: NURSE PRACTITIONER

## 2024-01-29 PROCEDURE — 51798 US URINE CAPACITY MEASURE: CPT | Performed by: NURSE PRACTITIONER

## 2024-01-29 PROCEDURE — 3074F SYST BP LT 130 MM HG: CPT | Performed by: NURSE PRACTITIONER

## 2024-01-29 PROCEDURE — 3078F DIAST BP <80 MM HG: CPT | Performed by: NURSE PRACTITIONER

## 2024-01-29 PROCEDURE — 1160F RVW MEDS BY RX/DR IN RCRD: CPT | Performed by: NURSE PRACTITIONER

## 2024-01-29 RX ORDER — FINASTERIDE 5 MG/1
5 TABLET, FILM COATED ORAL NIGHTLY
Qty: 30 TABLET | Refills: 11 | Status: SHIPPED | OUTPATIENT
Start: 2024-01-29

## 2024-01-29 NOTE — PROGRESS NOTES
Chief Complaint  BPH WITH LUTS/PSA/MED REFILLS (1 year follow up)    Subjective          Fidencio Luciano presents to [facility] for BPH WITH LUTS/OAB/DI  History of Present Illness    Mr. Fidencio Luciano is a very pleasant 78-year-old male who presents to clinic today for evaluation, accompanied by his wife LIZABETH Luciano AND caregiver from nursing facility. This is a yearly follow-up for BPH with lower urinary tract symptoms, most bothersome of which has been urine frequency, urgency, and bouts of urinary incontinence. He is also follow-up for detrusor instability.     Patient lives in an assisted living facility. He has significant dementia and is incontinent of bowel and bladder most times. He is also wheelchair dependent secondary to debility. He is alert but pleasantly confused. When last evaluated in clinic on 01/27/2023, he was in no apparent discomfort and reported doing significantly well.     His most recent PSA documented on files was 0.6 with a free PSA of 23.3% and that was completed on 01/28/2023. His PSA 1 year prior to that was 2.83 in 2021. He has been on maximum therapy with finasteride for his prostate and also takes some Myrbetriq for his detrusor instability/overactive bladder. He did deny any side effects from his medications.     On clinic evaluation today, he is unable to give us any urine for analysis. States he voided prior to appointment time.  His PVR is 0 mL, his IPSS scores 3. Patient denies any bothersome urinary symptoms of dysuria or burning with urination. He has not had any episodes of hematuria since last evaluated.  He denies pelvic pressure or suprapubic discomfort, denies flank pain, he does not have any CVA tenderness.  He denies any perineal pain.    Overall, patient states he is doing well. His wife states he has had some urinary incontinence intermittently, overall bladder is well-controlled. He denies any dysuria, hematuria, or nocturia.  He denies any foul odor to his urine. He denies any recent bladder infections or UTIs. He denies any issues with his medications. His wife states he does not drink water. He drinks soda and milk for breakfast, lunch, and dinner. His wife states he was a big water drinker before he got sick and went in the nursing home.      Active Ambulatory Problems     Diagnosis Date Noted    Essential hypertension 08/18/2016    Type 2 diabetes mellitus 08/18/2016    Benign prostatic hyperplasia (BPH) with urinary urge incontinence 08/18/2016    ASCVD (arteriosclerotic cardiovascular disease), s/p stenting of 80 % stenosis in left circumflex on 10/05/16and 60% stenosis in the LAD, clinically stable.  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 04/11/2022    Early satiety 05/10/2022    Diarrhea 05/10/2022     Resolved Ambulatory Problems     Diagnosis Date Noted    Bradycardia 07/16/2016    Near syncope 07/19/2016    Symptomatic bradycardia 07/30/2016    Dizziness 08/18/2016    Palpitations 08/18/2016    Precordial pain 09/08/2016    Angina, class III 09/15/2016    Abnormal stress test 09/15/2016    Abnormal findings on cardiac catheterization 09/30/2016    Chest pain 10/05/2016    Unstable angina 12/15/2016    Normal cardiac stress test 12/2016 01/09/2017    Chronic fatigue 05/18/2017    Iron deficiency 08/09/2018    Normocytic anemia 08/09/2018    Weight loss, abnormal 08/09/2018    History of gastric ulcer 08/09/2018    Intractable vomiting with nausea 08/09/2018    Coronary artery disease involving native coronary artery of native heart with angina pectoris 11/07/2019    Bloating 01/26/2021    Early satiety  "01/26/2021    Esophageal dysphagia 01/26/2021    Sense of impending doom 05/25/2021    Pneumonia of both lungs due to infectious organism, unspecified part of lung 05/29/2022    Severe sepsis 05/29/2022    Acute respiratory failure with hypoxia 05/29/2022    Low serum bicarbonate 05/29/2022     Past Medical History:   Diagnosis Date    BPH (benign prostatic hyperplasia)     Coronary artery disease     Diabetes mellitus     Diverticulosis     Elevated cholesterol     Gastric ulcer with hemorrhage     History of transfusion     Hyperlipidemia     Hypertension     Psoriasis       Objective   Vital Signs:   /65   Pulse 68   Ht 180.3 cm (70.98\")   BMI 27.63 kg/m²       ROS:   Review of Systems   Constitutional:  Positive for activity change, appetite change and unexpected weight gain. Negative for chills, diaphoresis, fatigue, fever and unexpected weight loss.   HENT:  Negative for congestion, ear discharge, ear pain, nosebleeds, rhinorrhea, sinus pressure and sore throat.    Eyes:  Negative for blurred vision, double vision, photophobia, pain, redness and visual disturbance.   Respiratory:  Negative for apnea, cough, chest tightness, shortness of breath, wheezing and stridor.    Cardiovascular:  Negative for chest pain and palpitations.   Gastrointestinal:  Positive for abdominal distention and constipation. Negative for abdominal pain, diarrhea, nausea and vomiting.   Endocrine: Negative for polydipsia, polyphagia and polyuria.   Genitourinary:  Positive for urinary incontinence. Negative for decreased urine volume, difficulty urinating, discharge, dysuria, flank pain, frequency, genital sores, hematuria, penile pain, penile swelling, scrotal swelling, testicular pain and urgency.   Musculoskeletal:  Positive for back pain, gait problem, joint swelling and myalgias. Negative for arthralgias.   Skin:  Positive for dry skin and skin lesions. Negative for pallor, rash and wound.   Neurological:  Negative for " dizziness, tremors, syncope, weakness, light-headedness, memory problem and confusion.   Hematological:  Bruises/bleeds easily.   Psychiatric/Behavioral:  Positive for sleep disturbance and stress. Negative for agitation, behavioral problems and decreased concentration.         Physical Exam  Constitutional:       General: He is in acute distress.      Appearance: He is well-developed. He is ill-appearing.   HENT:      Head: Normocephalic and atraumatic.      Right Ear: External ear normal.      Left Ear: External ear normal.   Eyes:      General:         Right eye: No discharge.         Left eye: No discharge.      Conjunctiva/sclera: Conjunctivae normal.      Pupils: Pupils are equal, round, and reactive to light.   Neck:      Thyroid: No thyromegaly.      Trachea: No tracheal deviation.   Cardiovascular:      Rate and Rhythm: Normal rate and regular rhythm.      Heart sounds: No murmur heard.     No friction rub.   Pulmonary:      Effort: Pulmonary effort is normal. No respiratory distress.      Breath sounds: Normal breath sounds. No stridor.   Abdominal:      General: Bowel sounds are normal. There is distension.      Palpations: Abdomen is soft.      Tenderness: There is abdominal tenderness. There is no guarding.   Genitourinary:     Penis: Normal and uncircumcised. No tenderness or discharge.       Testes: Normal.      Rectum: Normal. Guaiac result negative.      Comments: BPH with LUTS-urine frequency, incontinence at times  Musculoskeletal:         General: Tenderness present. No deformity. Normal range of motion.      Cervical back: Normal range of motion and neck supple.   Skin:     General: Skin is warm and dry.      Capillary Refill: Capillary refill takes less than 2 seconds.      Coloration: Skin is not pale.   Neurological:      Mental Status: He is alert and oriented to person, place, and time.      Cranial Nerves: No cranial nerve deficit.      Motor: Weakness present.      Coordination:  Coordination normal.      Gait: Gait abnormal.      Comments: Pleasantly confused/dementia   Psychiatric:         Behavior: Behavior normal.         Thought Content: Thought content normal.         Judgment: Judgment normal.        Result Review :                Assessment and Plan    Problem List Items Addressed This Visit          Genitourinary and Reproductive     Benign prostatic hyperplasia (BPH) with urinary urge incontinence - Primary    Relevant Medications    finasteride (PROSCAR) 5 MG tablet    Mirabegron ER (Myrbetriq) 25 MG tablet sustained-release 24 hour 24 hr tablet     Other Visit Diagnoses       Benign prostatic hyperplasia without lower urinary tract symptoms        Frequency of urination        Relevant Medications    finasteride (PROSCAR) 5 MG tablet    Mirabegron ER (Myrbetriq) 25 MG tablet sustained-release 24 hour 24 hr tablet    Benign prostatic hyperplasia with urinary frequency        Relevant Medications    finasteride (PROSCAR) 5 MG tablet    Mirabegron ER (Myrbetriq) 25 MG tablet sustained-release 24 hour 24 hr tablet    Detrusor instability of bladder        Relevant Medications    finasteride (PROSCAR) 5 MG tablet    Mirabegron ER (Myrbetriq) 25 MG tablet sustained-release 24 hour 24 hr tablet                                              ASSESSMENT  BPH WITH LUTS-URINE FREQUENCY/OAB/DETRUSOR INSTABILITY  MR. JUAN ANTONIO GLOVER IS A Pleasantly confused 77-year-old male with dementia, wheelchair dependent, patient who lives in nursing home/ extensive care facility, who returns to clinic today accompanied by his wife Angela GLOVER.  This is his yearly follow-up for BPH with lower urinary tract symptoms, overactive bladder/detrusor instability complicated by bouts of urine frequency, urgency and occasional incontinence.      He is in no apparent discomfort upon exam today and reports significant improvement in his symptoms since starting finasteride 5 mg nightly, and Myrbetriq 50 mg daily. HE  denies any urinary issues today.  He is unable to give us any urine for analysis, states he voided prior to appointment time.  His last URINE DIPSTICK WAS COMPLETELY NEGATIVE,  PVR is 0 cc, IPSS score is 3.last PSA IS O.6     BPH: AGAIN, WE Discussed the pathophysiology of BPH and obstruction. We discussed the static and dynamic effects of BPH as well as using 5 alpha reductase inhibitors versus alpha blockade.  We discussed the indications for transurethral surgery as well.  And/ or other therapeutic options available including all of the newer techniques.  He has no real symptomatology referable to his prostate other than moderate enlargement.  Rather than proceed with an expensive workup he doesn't need, at this time I am recommending he continue with an  alpha reductase inhibitor-finasteride 5 mg daily.  Patient denies any side effects from medications.  His most recent PSA is 0.6, with a free PSA of 23%     WE Discussed maximal medical therapy with finasteride and tamsulosin and how each medication works I explained that finasteride would reduce the size of the prostate by 20-30% reduce his PSA by 50% reduce the risks of either surgery or urinary retention by 50% and may reduce the risk of prostate cancer well-differentiated by 20-30% however he may increase the risk of poorly differentiated prostate cancer by half to 1% and that I also explained how.     The natural history of prostate cancer and ongoing controversy regarding screening and potential treatment outcomes of prostate cancer has been discussed with the patient. The meaning of a false positive PSA and a false negative PSA has been discussed. He indicates understanding of the limitations of this screening test and wishes To proceed with screening PSA testing.  Also explained the variations in PSA with age group,    Again, we discussed OAB/Detrusor instability which is an  irritative bladder symptomatology most likely related to factors such as intake  of bladder irritants, postinfectious irritation, prolapse, with a very large differential diagnosis.  The mainstay of treatment has been tight cholinergics which basically caused the bladder to have decreased contractility.  We have discussed the side effects of these treatments including dry mouth, double vision, and increasing constipation.  She reports doing well  on oxybutynin for symptomatic relief.    Anticholinergic medication:  I talked about the various therapeutic options including anticholinergics, beta 3 agonists, and alpha blockade if there is a component of obstruction. I discussed the side effects of anti-cholinergic including dry mouth, double vision. HOWEVER, we are recommending the use of an anticholinergic. AGAIN, We discussed the class of drugs.  We discussed the older drug such as oxybutynin with his increased spectrum of side effects such as dry mouth, dry eyes constipation versus the newer medications with lower side effects but more difficult to obtain via insurance and much more expensive finally the newest class of drugs which is Myrbetriq which has a very favorable side effect profile but unfortunately is prohibitively expensive and oftentimes requires precertification of which may be unsuccessful in many other cases                                         PLAN  Will recommend urine recheck in nursing facility to rule out infections.  Patient unable to void in clinic post hydration.     Refilled his medication, Myrbetriq 50 mg and finasteride 5 mg, currently managed at his nursing home facility.     Follow-up in 1 year, recheck his PSA.      He may return sooner if need be or concerns with urinary retention.     Patient/wife agreeable plan of care.    Patient reports that he is not currently experiencing any symptoms of urinary incontinence.      BMI is >= 25 and <30. (Overweight) The following options were offered after discussion;: weight loss educational material (shared in after visit  summary), exercise counseling/recommendations, and nutrition counseling/recommendations      RADIOLOGY (CT AND/OR KUB):    CT Abdomen and Pelvis: No results found for this or any previous visit.     CT Stone Protocol: No results found for this or any previous visit.     KUB: No results found for this or any previous visit.       LABS (3 MONTHS):    Lab Requisition on 01/11/2024   Component Date Value Ref Range Status    Glucose 01/11/2024 260 (H)  65 - 99 mg/dL Final    BUN 01/11/2024 19  8 - 23 mg/dL Final    Creatinine 01/11/2024 1.10  0.76 - 1.27 mg/dL Final    Sodium 01/11/2024 141  136 - 145 mmol/L Final    Potassium 01/11/2024 4.6  3.5 - 5.2 mmol/L Final    Slight hemolysis detected by analyzer. Result may be falsely elevated.    Chloride 01/11/2024 105  98 - 107 mmol/L Final    CO2 01/11/2024 23.6  22.0 - 29.0 mmol/L Final    Calcium 01/11/2024 9.4  8.6 - 10.5 mg/dL Final    Total Protein 01/11/2024 7.4  6.0 - 8.5 g/dL Final    Albumin 01/11/2024 4.4  3.5 - 5.2 g/dL Final    ALT (SGPT) 01/11/2024 29  1 - 41 U/L Final    AST (SGOT) 01/11/2024 27  1 - 40 U/L Final    Alkaline Phosphatase 01/11/2024 128 (H)  39 - 117 U/L Final    Total Bilirubin 01/11/2024 0.2  0.0 - 1.2 mg/dL Final    Globulin 01/11/2024 3.0  gm/dL Final    A/G Ratio 01/11/2024 1.5  g/dL Final    BUN/Creatinine Ratio 01/11/2024 17.3  7.0 - 25.0 Final    Anion Gap 01/11/2024 12.4  5.0 - 15.0 mmol/L Final    eGFR 01/11/2024 69.1  >60.0 mL/min/1.73 Final      Assessment:  1. BPH with lower urinary tract symptoms.  2. Detrusor instability.    Plan:  - Recommend a PSA yearly with his blood work that will be completed at the nursing home.  - He will continue his therapy with Myrbetriq and finasteride.  - I may return to clinic if any other concerns.    IPSS Questionnaire (AUA-7):  Over the past month…    1)  How often have you had a sensation of not emptying your bladder completely after you finish urinating?  0 - Not at all   2)  How often have you  had to urinate again less than two hours after you finished urinating? 2 - Less than half the time   3)  How often have you found you stopped and started again several times when you urinated?  0 - Not at all   4) How difficult have you found it to postpone urination?  0 - Not at all   5) How often have you had a weak urinary stream?  0 - Not at all   6) How often have you had to push or strain to begin urination?  0 - Not at all   7) How many times did you most typically get up to urinate from the time you went to bed until the time you got up in the morning?  1 - 1 time   Total Score:  3     Smoking Cessation Counseling:  Former smoker.  Patient does not currently use any tobacco products.     Follow Up   Return in about 1 year (around 1/29/2025) for Next scheduled follow up, FOR  BPH W LUTS/PSA/-PROSTATE ROLANDO/MED REFILLS/LABS.    Patient was given instructions and counseling regarding his condition or for health maintenance advice. Please see specific information pulled into the AVS if appropriate.          This document has been electronically signed by Griselda Cheng-Akwa, APRN   January 29, 2024 13:13 EST      Dictated Utilizing Dragon Dictation: Part of this note may be an electronic transcription/translation of spoken language to printed text using the Dragon Dictation System.     Transcribed from ambient dictation for Griselda Cheng-Akwa, APRN by Emely Salas.  01/29/24   12:17 EST    Patient or patient representative verbalized consent to the visit recording.  I have personally performed the services described in this document as transcribed by the above individual, and it is both accurate and complete.

## 2024-01-31 ENCOUNTER — LAB (OUTPATIENT)
Dept: ONCOLOGY | Facility: CLINIC | Age: 79
End: 2024-01-31
Payer: MEDICARE

## 2024-01-31 ENCOUNTER — OFFICE VISIT (OUTPATIENT)
Dept: ONCOLOGY | Facility: CLINIC | Age: 79
End: 2024-01-31
Payer: MEDICARE

## 2024-01-31 VITALS
RESPIRATION RATE: 18 BRPM | SYSTOLIC BLOOD PRESSURE: 118 MMHG | WEIGHT: 199 LBS | OXYGEN SATURATION: 97 % | TEMPERATURE: 97.1 F | DIASTOLIC BLOOD PRESSURE: 56 MMHG | BODY MASS INDEX: 27.86 KG/M2 | HEART RATE: 68 BPM | HEIGHT: 71 IN

## 2024-01-31 DIAGNOSIS — E53.8 B12 DEFICIENCY: ICD-10-CM

## 2024-01-31 DIAGNOSIS — D64.9 ANEMIA, UNSPECIFIED TYPE: ICD-10-CM

## 2024-01-31 DIAGNOSIS — D72.829 LEUKOCYTOSIS, UNSPECIFIED TYPE: ICD-10-CM

## 2024-01-31 DIAGNOSIS — D50.9 IRON DEFICIENCY ANEMIA, UNSPECIFIED IRON DEFICIENCY ANEMIA TYPE: Primary | ICD-10-CM

## 2024-01-31 DIAGNOSIS — D50.9 IRON DEFICIENCY ANEMIA, UNSPECIFIED IRON DEFICIENCY ANEMIA TYPE: ICD-10-CM

## 2024-01-31 LAB
BASOPHILS # BLD AUTO: 0.06 10*3/MM3 (ref 0–0.2)
BASOPHILS NFR BLD AUTO: 0.6 % (ref 0–1.5)
DEPRECATED RDW RBC AUTO: 50.5 FL (ref 37–54)
EOSINOPHIL # BLD AUTO: 0.34 10*3/MM3 (ref 0–0.4)
EOSINOPHIL NFR BLD AUTO: 3.3 % (ref 0.3–6.2)
ERYTHROCYTE [DISTWIDTH] IN BLOOD BY AUTOMATED COUNT: 14 % (ref 12.3–15.4)
FERRITIN SERPL-MCNC: 217.9 NG/ML (ref 30–400)
FOLATE SERPL-MCNC: >20 NG/ML (ref 4.78–24.2)
HCT VFR BLD AUTO: 37.1 % (ref 37.5–51)
HGB BLD-MCNC: 12 G/DL (ref 13–17.7)
IMM GRANULOCYTES # BLD AUTO: 0.08 10*3/MM3 (ref 0–0.05)
IMM GRANULOCYTES NFR BLD AUTO: 0.8 % (ref 0–0.5)
IRON 24H UR-MRATE: 72 MCG/DL (ref 59–158)
IRON SATN MFR SERPL: 20 % (ref 20–50)
LYMPHOCYTES # BLD AUTO: 1.48 10*3/MM3 (ref 0.7–3.1)
LYMPHOCYTES NFR BLD AUTO: 14.2 % (ref 19.6–45.3)
MCH RBC QN AUTO: 31.7 PG (ref 26.6–33)
MCHC RBC AUTO-ENTMCNC: 32.3 G/DL (ref 31.5–35.7)
MCV RBC AUTO: 98.1 FL (ref 79–97)
MONOCYTES # BLD AUTO: 0.93 10*3/MM3 (ref 0.1–0.9)
MONOCYTES NFR BLD AUTO: 8.9 % (ref 5–12)
NEUTROPHILS NFR BLD AUTO: 7.55 10*3/MM3 (ref 1.7–7)
NEUTROPHILS NFR BLD AUTO: 72.2 % (ref 42.7–76)
NRBC BLD AUTO-RTO: 0 /100 WBC (ref 0–0.2)
PLATELET # BLD AUTO: 270 10*3/MM3 (ref 140–450)
PMV BLD AUTO: 10.3 FL (ref 6–12)
RBC # BLD AUTO: 3.78 10*6/MM3 (ref 4.14–5.8)
RETICS # AUTO: 0.06 10*6/MM3 (ref 0.02–0.13)
RETICS/RBC NFR AUTO: 1.56 % (ref 0.7–1.9)
TIBC SERPL-MCNC: 355 MCG/DL (ref 298–536)
TRANSFERRIN SERPL-MCNC: 238 MG/DL (ref 200–360)
VIT B12 BLD-MCNC: 747 PG/ML (ref 211–946)
WBC NRBC COR # BLD AUTO: 10.44 10*3/MM3 (ref 3.4–10.8)

## 2024-01-31 PROCEDURE — 84466 ASSAY OF TRANSFERRIN: CPT | Performed by: INTERNAL MEDICINE

## 2024-01-31 PROCEDURE — 82746 ASSAY OF FOLIC ACID SERUM: CPT | Performed by: INTERNAL MEDICINE

## 2024-01-31 PROCEDURE — 82607 VITAMIN B-12: CPT | Performed by: INTERNAL MEDICINE

## 2024-01-31 PROCEDURE — 82728 ASSAY OF FERRITIN: CPT | Performed by: INTERNAL MEDICINE

## 2024-01-31 PROCEDURE — 83540 ASSAY OF IRON: CPT | Performed by: INTERNAL MEDICINE

## 2024-01-31 PROCEDURE — 83921 ORGANIC ACID SINGLE QUANT: CPT | Performed by: INTERNAL MEDICINE

## 2024-01-31 PROCEDURE — 85025 COMPLETE CBC W/AUTO DIFF WBC: CPT | Performed by: INTERNAL MEDICINE

## 2024-01-31 PROCEDURE — 84238 ASSAY NONENDOCRINE RECEPTOR: CPT | Performed by: INTERNAL MEDICINE

## 2024-01-31 PROCEDURE — 85045 AUTOMATED RETICULOCYTE COUNT: CPT | Performed by: INTERNAL MEDICINE

## 2024-01-31 NOTE — PROGRESS NOTES
Venipuncture Blood Specimen Collection  Venipuncture performed in right hand by Olivia Hernandez MA with good hemostasis. Patient tolerated the procedure well without complications.   01/31/24   Olivia Hernandez MA

## 2024-01-31 NOTE — PROGRESS NOTES
Subjective     Date: 1/31/2024    Referring Provider  George Riley MD    Chief Complaint  Anemia     Subjective    Fidencio Luciano is a 78 y.o. male who presents today to [facility] for follow up.    HPI:   78 y.o. male who was previously followed by ANDREW Morales for anemia, presents for follow-up.  Patient with chronic anemia, worsened since June 2022.  Previously reportedly was on oral ferrous sulfate 3 times daily, tolerated well.  Currently still on iron polysaccharide 150 mg daily, he reports no adverse effects with it.  Received IV ferumoxytol on 12/13/2022.    Last colonoscopy and endoscopy in May 2022.  Colonoscopy with 3 sessile polyps in the cecum, multiple small mouth diverticula in the sigmoid and descending colon, internal hemorrhoids grade 1. Endoscopy showing small hiatal hernia, diffuse moderately erythematous mucosa without bleeding in gastric body in the gastric antrum.Seen by Dr. Mesa January 12, 2023, plan on doing capsule endoscopy as well as small bowel follow-through.    Reports no symptoms today.  Has dark-colored stool, denies any melena or hematochezia.    Treatment History:      Interval History 04/05/2023   Small bowel follow through was normal except for irregular contractions of esophagus. Pill cam rescheduled as prep was not given at nursing home.     He presents today with his wife. Reports overall doing well after IV iron infusion. Denies any GI bleed.    Interval History 07/06/2023   Mr. Truong presents today with his wife.  He had PillCam completed May 13 which showed gastric mucosa with erythema but no signs of bleeding.  He also had several nonbleeding angiectasia in proximal to mid small bowel.    He reports no further GI bleed.  Overall feeling well.  We reviewed his CBC today which shows normal hemoglobin and hematocrit.    Interval History 10/05/2023   Mr. Luciano presents for follow up, accompanied by his wife and  attendant from nursing home. He denies any new symptoms or any evidence of GI bleed. He is on Niferex 150 mg tablet daily at nursing home, denies any GI discomfort associated with it.  CBC reviewed today which shows Hgb 12.8, Hct 40.7. Normal WBC and platelet counts.     Interval History 01/31/2024   Mr. Luciano presents for follow up today, with his wife and attendant from nursing home. He last received IV iron on 2/2023. He reports some blood on toilet paper when wiping. Continues to take daily iron/Niferex at nursing home.   Hgb today 12.0, Hct 37.     Objective     Allergy:   No Known Allergies     Current Medications:   Current Outpatient Medications   Medication Sig Dispense Refill    acetaminophen (TYLENOL) 500 MG tablet Take 1 tablet by mouth Every 6 (Six) Hours As Needed for Mild Pain.      albuterol sulfate  (90 Base) MCG/ACT inhaler Inhale 2 puffs 4 (Four) Times a Day As Needed for Wheezing.      apixaban (ELIQUIS) 5 MG tablet tablet Take 1 tablet by mouth Every 12 (Twelve) Hours.      atorvastatin (LIPITOR) 80 MG tablet Take 1 tablet by mouth Every Night. 90 tablet 5    BD AutoShield Duo 30G X 5 MM misc       bumetanide (BUMEX) 1 MG tablet Take 1 tablet by mouth Daily. 30 tablet 3    carbidopa-levodopa (SINEMET)  MG per tablet       Carboxymethylcellulose Sodium (ARTIFICIAL TEARS OP) Apply 0.4 % to eye(s) as directed by provider 4 (Four) Times a Day.      clopidogrel (PLAVIX) 75 MG tablet Take 1 tablet by mouth Daily. 30 tablet 5    colestipol (COLESTID) 1 g tablet Take 1 tablet by mouth 2 (Two) Times a Day.      divalproex (DEPAKOTE) 250 MG DR tablet       donepezil (ARICEPT) 5 MG tablet Take 1 tablet by mouth Every Night.      finasteride (PROSCAR) 5 MG tablet Take 1 tablet by mouth Every Night. 30 tablet 11    folic acid-pyridoxine-cyanocobalamin (FOLBIC) 2.5-25-2 MG tablet tablet Take  by mouth Daily.      insulin regular (humuLIN R,novoLIN R) 100 UNIT/ML injection Inject  under the skin  into the appropriate area as directed 4 (Four) Times a Day. 5 units plus s/s QID      ipratropium-albuterol (DUO-NEB) 0.5-2.5 mg/3 ml nebulizer Take 3 mL by nebulization 2 (Two) Times a Day. 360 mL 3    iron polysaccharides (NIFEREX) 150 MG capsule Take 1 capsule by mouth Daily. 30 capsule 3    isosorbide mononitrate (IMDUR) 120 MG 24 hr tablet Take 1 tablet by mouth Every Morning. 90 tablet 2    Levemir 100 UNIT/ML injection       losartan (Cozaar) 25 MG tablet Take 1 tablet by mouth Daily. 30 tablet 3    Melatonin 5 MG capsule Take 5 mg by mouth Every Night. 30 each 1    memantine (NAMENDA XR) 28 MG capsule sustained-release 24 hr extended release capsule Take 1 capsule by mouth Daily.      methylcellulose oral powder Take 2 Applications by mouth Daily. 2tbsp      metoprolol tartrate (LOPRESSOR) 25 MG tablet Take 0.5 tablets by mouth Every 12 (Twelve) Hours.      Mirabegron ER (Myrbetriq) 25 MG tablet sustained-release 24 hour 24 hr tablet Take 1 tablet by mouth Daily. 30 tablet 11    mirtazapine (REMERON) 15 MG tablet Take 1 tablet by mouth Every Night.      nitroglycerin (NITROSTAT) 0.4 MG SL tablet 1 under the tongue as needed for angina, may repeat q5mins for up three doses 25 tablet 2    pantoprazole (PROTONIX) 40 MG EC tablet Take 1 tablet by mouth Daily.      ranolazine (RANEXA) 500 MG 12 hr tablet Take 1 tablet by mouth Every 12 (Twelve) Hours.      Symbicort 80-4.5 MCG/ACT inhaler        No current facility-administered medications for this visit.       Past Medical History:  Past Medical History:   Diagnosis Date    BPH (benign prostatic hyperplasia)     Bradycardia     Positive tilt table testing 07/2016    Coronary artery disease     Diabetes mellitus     Diverticulosis     Elevated cholesterol     Gastric ulcer with hemorrhage     Gastroesophageal reflux disease 1/26/2021    History of transfusion     Hyperlipidemia     Hypertension     Psoriasis        Past Surgical History:  Past Surgical History:    Procedure Laterality Date    BACK SURGERY      CARDIAC CATHETERIZATION N/A 9/20/2016    Procedure: Left Heart Cath;  Surgeon: Giovanni Buenrostro MD;  [facility];  Service:     CARDIAC CATHETERIZATION N/A 10/4/2016    Procedure: Left Heart Cath;  Surgeon: Bharathi Andrew MD;  [facility];  Service:     CARDIAC CATHETERIZATION N/A 5/9/2017    Procedure: Left Heart Cath;  Surgeon: Giovanni Buenrostro MD;  [facility]:  [facility];  Service:     CARDIAC CATHETERIZATION N/A 5/9/2017    Procedure: ERR;  Surgeon: Giovanni Buenrostro MD;  Location: [facility];  Service:     CARDIAC CATHETERIZATION N/A 11/8/2019    Procedure: Left Heart Cath;  Surgeon: Abraham Oviedo MD;  [facility]:  [facility];  Service: Cardiology    CARDIAC CATHETERIZATION N/A 12/18/2019    Procedure: Coronary angiography;  Surgeon: Jay Barney IV, MD;  Location:  [facility];  Service: Cardiovascular    CHOLECYSTECTOMY      COLONOSCOPY  11/13/2015    Dr. Martinez- internal hemorrhoids, sigmoid diverticulosis    COLONOSCOPY N/A 8/17/2018    Procedure: COLONOSCOPY CPT CODE: 32475;  Surgeon: iGgi Geronimo MD;  Location:  [facility];  Service: Gastroenterology    COLONOSCOPY N/A 5/17/2022    Procedure: COLONOSCOPY;  Surgeon: Geno Vernon MD;  Location:  [facility];  Service: Gastroenterology;  Laterality: N/A;    CORONARY ANGIOPLASTY WITH STENT PLACEMENT      ENDOSCOPY N/A 8/17/2018    Procedure: ESOPHAGOGASTRODUODENOSCOPY WITH BIOPSY CPT CODE: 63513;  Surgeon: Gigi Geronimo MD;  Location:  [facility];  Service: Gastroenterology    ENDOSCOPY N/A 4/20/2021    Procedure: ESOPHAGOGASTRODUODENOSCOPY;  Surgeon: Geno Vernon MD;  Location:  [facility];  Service: Gastroenterology;  Laterality: N/A;    ENDOSCOPY N/A 5/17/2022    Procedure: ESOPHAGOGASTRODUODENOSCOPY WITH BIOPSY;  Surgeon:  "Geno Vernon MD;  Location:  [facility];  Service: Gastroenterology;  Laterality: N/A;    INTERVENTIONAL RADIOLOGY PROCEDURE N/A 2019    Procedure: Coil Embolization of septal to pulmonary artery fistuala.;  Surgeon: Jay Barney IV, MD;  Location:  [facility];  Service: Cardiovascular    IN RT/LT HEART CATHETERS N/A 2017    Procedure: Percutaneous Coronary Intervention;  Surgeon: Lincoln De Los Santos MD;  [facility]:  [facility];  Service: Cardiology    STOMACH SURGERY      FUSION OF BLEEDING ULCER    UPPER GASTROINTESTINAL ENDOSCOPY  2015    Dr. Martinez- mild gastritis       Social History:  Social History     Socioeconomic History    Marital status:    Tobacco Use    Smoking status: Former     Packs/day: 3.00     Years: 25.00     Additional pack years: 0.00     Total pack years: 75.00     Types: Cigarettes     Quit date:      Years since quittin.1     Passive exposure: Past    Smokeless tobacco: Former     Quit date: 1986   Vaping Use    Vaping Use: Never used   Substance and Sexual Activity    Alcohol use: No    Drug use: No    Sexual activity: Defer     Fidencio WHITNEY Mustapha  reports that he quit smoking about 42 years ago. His smoking use included cigarettes. He has a 75.00 pack-year smoking history. He has been exposed to tobacco smoke. He quit smokeless tobacco use about 38 years ago.      Family History:  Family History   Problem Relation Age of Onset    Sick sinus syndrome Mother     Heart failure Mother     Diabetes Mother     Liver cancer Father        Review of Systems:  Review of Systems   All other systems reviewed and are negative.      Vital Signs:   /56   Pulse 68   Temp 97.1 °F (36.2 °C) (Temporal)   Resp 18   Ht 180.3 cm (70.98\")   Wt 90.3 kg (199 lb)   SpO2 97%   BMI 27.77 kg/m²      Physical Exam:  Physical Exam  Vitals and nursing note reviewed.   Constitutional:       General: He is not in acute " "distress.     Appearance: Normal appearance. He is not ill-appearing.      Comments: +in a wheelchair  +hard of hearing   HENT:      Head: Normocephalic and atraumatic.      Nose: Nose normal.      Mouth/Throat:      Mouth: Mucous membranes are moist.      Pharynx: Oropharynx is clear.   Eyes:      Conjunctiva/sclera: Conjunctivae normal.      Pupils: Pupils are equal, round, and reactive to light.   Cardiovascular:      Rate and Rhythm: Normal rate and regular rhythm.      Heart sounds: No murmur heard.  Pulmonary:      Effort: Pulmonary effort is normal. No respiratory distress.      Breath sounds: Normal breath sounds. No wheezing.   Abdominal:      General: Abdomen is flat. Bowel sounds are normal. There is no distension.      Palpations: Abdomen is soft. There is no mass.      Tenderness: There is no abdominal tenderness. There is no guarding.   Musculoskeletal:         General: No swelling. Normal range of motion.      Cervical back: Normal range of motion.   Lymphadenopathy:      Cervical: No cervical adenopathy.   Skin:     General: Skin is warm and dry.      Coloration: Skin is not jaundiced.      Findings: No bruising or rash.      Comments: +excoriations    Neurological:      General: No focal deficit present.      Mental Status: He is alert and oriented to person, place, and time.      Motor: No weakness.      Coordination: Coordination normal.   Psychiatric:         Mood and Affect: Mood normal.         Behavior: Behavior normal.         Judgment: Judgment normal.         PHQ-9 Score  PHQ-9 Total Score: 0     Pain Score  Vitals:    01/31/24 1017   BP: 118/56   Pulse: 68   Resp: 18   Temp: 97.1 °F (36.2 °C)   TempSrc: Temporal   SpO2: 97%   Weight: 90.3 kg (199 lb)   Height: 180.3 cm (70.98\")   PainSc: 0-No pain               LAB REVIEW  CBC          7/14/2023    10:30 10/16/2023    12:39 1/31/2024    10:13   CBC   WBC 8.91  9.19  10.44    RBC 4.13  3.92  3.78    Hemoglobin 13.6  12.8  12.0    Hematocrit " 39.3  40.7  37.1    MCV 95.2  103.8  98.1    MCH 32.9  32.7  31.7    MCHC 34.6  31.4  32.3    RDW 12.9  13.7  14.0    Platelets 181  187  270         PATHOLOGY  5/17/22 12/21/21 4/20/21          Assessment / Plan      Assessment and Plan   77-year-old male who presents for follow-up regarding iron deficiency anemia.  Most recent colonoscopy was performed May 2022, negative for bleed.    Anemia, iron deficiency  -Patient with chronic anemia, worsened since June 2022.  Previously reportedly was on oral ferrous sulfate 3 times daily, tolerated well.  Currently still on iron polysaccharide 150 mg daily, he reports no adverse effects with it.  Received IV ferumoxytol on 12/13/2022. Received IV Ferumoxytol 2/20/2023.   -Negative GI work-up May 2022. Small bowel follow through negative. Pill endoscopy 4/2023 noted to have non bleeding angiectasias proximal to mid small bowel.   -CBC today with Hgb 12.0, Hct 40  -Given improvement in H/H and ferritin; will continue to monitor with CBC and iron studies    2. Macrocytosis   - Pending reticulocyte count, B12, folate, MMA, and peripheral smear    Discussed possible differential diagnoses, testing, treatment, recommended non-pharmacological interventions, risks, warning signs to monitor for that would indicate need for follow-up in clinic or ER. If no improvement with these regimens or you have new or worsening symptoms follow-up. Patient verbalizes understanding and agreement with plan of care. Denies further needs or concerns.     Patient was given instructions and counseling regarding her condition and for health maintenance advised.    I spent 30 minutes with Fidencio Luciano today.  In the office today, more than 50% of this time was spent face-to-face with him  in counseling / coordination of care, reviewing his interim medical history and counseling on the current treatment plan.  All questions were answered to his satisfaction.      Meds ordered during this  visit  No orders of the defined types were placed in this encounter.      Visit Diagnoses    ICD-10-CM ICD-9-CM   1. Iron deficiency anemia, unspecified iron deficiency anemia type  D50.9 280.9           Follow Up   3 months with repeat iron studies, ferritin, CBC      This document has been electronically signed by Sultana Keen MD    January 31, 2024 10:36 EST    Dictated Utilizing Dragon Dictation: Part of this note may be an electronic transcription/translation of spoken language to printed text using the Dragon Dictation System.

## 2024-02-01 LAB — REF LAB TEST METHOD: NORMAL

## 2024-02-05 LAB — STFR SERPL-SCNC: 19.1 NMOL/L (ref 12.2–27.3)

## 2024-02-09 LAB — METHYLMALONATE SERPL-SCNC: 188 NMOL/L (ref 0–378)

## 2024-02-20 ENCOUNTER — TELEPHONE (OUTPATIENT)
Dept: PULMONOLOGY | Facility: CLINIC | Age: 79
End: 2024-02-20
Payer: MEDICARE

## 2024-02-20 NOTE — TELEPHONE ENCOUNTER
Pt's nurse called to see if it was ok to D/C his Duo nebs and inhaler. I spoke with Kaya, she advised that if he is no longer using his Duonebs, we can discontinue but she wants to continue Albuterol Inhaler as needed and Symbicort BID. Nurse expressed understanding.

## 2024-04-24 ENCOUNTER — OFFICE VISIT (OUTPATIENT)
Dept: GASTROENTEROLOGY | Facility: CLINIC | Age: 79
End: 2024-04-24
Payer: MEDICARE

## 2024-04-24 VITALS
BODY MASS INDEX: 179.16 KG/M2 | SYSTOLIC BLOOD PRESSURE: 154 MMHG | HEART RATE: 60 BPM | DIASTOLIC BLOOD PRESSURE: 61 MMHG | HEIGHT: 60 IN

## 2024-04-24 DIAGNOSIS — D50.9 IRON DEFICIENCY ANEMIA, UNSPECIFIED IRON DEFICIENCY ANEMIA TYPE: Primary | ICD-10-CM

## 2024-04-24 PROCEDURE — 1159F MED LIST DOCD IN RCRD: CPT | Performed by: NURSE PRACTITIONER

## 2024-04-24 PROCEDURE — 1160F RVW MEDS BY RX/DR IN RCRD: CPT | Performed by: NURSE PRACTITIONER

## 2024-04-24 PROCEDURE — 3077F SYST BP >= 140 MM HG: CPT | Performed by: NURSE PRACTITIONER

## 2024-04-24 PROCEDURE — 3078F DIAST BP <80 MM HG: CPT | Performed by: NURSE PRACTITIONER

## 2024-04-24 PROCEDURE — 99214 OFFICE O/P EST MOD 30 MIN: CPT | Performed by: NURSE PRACTITIONER

## 2024-04-24 NOTE — PROGRESS NOTES
DATE:  4/24/2024    DIAGNOSIS: ERROL    CHIEF COMPLAINT:  Follow-up of ERROL    Interval History:  Mr. Luciano presents today for follow up, accompanied by his wife and caregiver.  Since his last visit, clinically he has continued to do well.  He is following with hematology for management of ERROL and has upcoming follow-up next week with lab testing.  Most recent CBC from 1/31/2024 revealed hemoglobin of 12 which remained stable.  Iron was replete.  He currently remains on Niferex 150 mg p.o. daily which he is tolerating well.  He also remains on Eliquis and Plavix per cardiology.  He is following with Dr. Buenrostro.  He also has history of DVT.  He currently denies obvious bleeding from any source.  He does have intermittent dark stool which is likely related to oral iron therapy.  He denies having melena or bright red bleeding per rectum.  He has no specific complaints today.    PAST MEDICAL HISTORY:  Past Medical History:   Diagnosis Date    BPH (benign prostatic hyperplasia)     Bradycardia     Positive tilt table testing 07/2016    Coronary artery disease     Diabetes mellitus     Diverticulosis     Elevated cholesterol     Gastric ulcer with hemorrhage     Gastroesophageal reflux disease 1/26/2021    History of transfusion     Hyperlipidemia     Hypertension     Psoriasis        PAST SURGICAL HISTORY:  Past Surgical History:   Procedure Laterality Date    BACK SURGERY      CARDIAC CATHETERIZATION N/A 9/20/2016    Procedure: Left Heart Cath;  Surgeon: Giovanni Buenrostro MD;  Location: [facility] INVASIVE LOCATION;  Service:     CARDIAC CATHETERIZATION N/A 10/4/2016    Procedure: Left Heart Cath;  Surgeon: Bharathi Andrew MD;  Location: [facility] CATH INVASIVE LOCATION;  Service:     CARDIAC CATHETERIZATION N/A 5/9/2017    Procedure: Left Heart Cath;  Surgeon: Giovanni Buenrostro MD;  Location: [facility] CATH INVASIVE LOCATION;  Service:     CARDIAC CATHETERIZATION N/A 5/9/2017    Procedure: ERR;  Surgeon: Giovanni Buenrostro MD;   [facility] CATH INVASIVE LOCATION;  Service:     CARDIAC CATHETERIZATION N/A 11/8/2019    Procedure: Left Heart Cath;  Surgeon: Abraham Oviedo MD;  [facility] CATH INVASIVE LOCATION;  Service: Cardiology    CARDIAC CATHETERIZATION N/A 12/18/2019    Procedure: Coronary angiography;  Surgeon: Jay Barney IV, MD;  Location:  [facility] CATH INVASIVE LOCATION;  Service: Cardiovascular    CHOLECYSTECTOMY      COLONOSCOPY  11/13/2015    Dr. Martinez- internal hemorrhoids, sigmoid diverticulosis    COLONOSCOPY N/A 8/17/2018    Procedure: COLONOSCOPY CPT CODE: 75481;  Surgeon: Gigi Geronimo MD;  Location:  [facility] OR;  Service: Gastroenterology    COLONOSCOPY N/A 5/17/2022    Procedure: COLONOSCOPY;  Surgeon: Geno Vernon MD;  Location:  [facility];  Service: Gastroenterology;  Laterality: N/A;    CORONARY ANGIOPLASTY WITH STENT PLACEMENT      ENDOSCOPY N/A 8/17/2018    Procedure: ESOPHAGOGASTRODUODENOSCOPY WITH BIOPSY CPT CODE: 69704;  Surgeon: Gigi Geronimo MD;  Location:  [facility];  Service: Gastroenterology    ENDOSCOPY N/A 4/20/2021    Procedure: ESOPHAGOGASTRODUODENOSCOPY;  Surgeon: Geno Vernon MD;  Location:  [facility];  Service: Gastroenterology;  Laterality: N/A;    ENDOSCOPY N/A 5/17/2022    Procedure: ESOPHAGOGASTRODUODENOSCOPY WITH BIOPSY;  Surgeon: Geno Vernon MD;  Location:  [facility];  Service: Gastroenterology;  Laterality: N/A;    INTERVENTIONAL RADIOLOGY PROCEDURE N/A 12/18/2019    Procedure: Coil Embolization of septal to pulmonary artery fistuala.;  Surgeon: Jay Barney IV, MD;  Location:  [facility] CATH INVASIVE LOCATION;  Service: Cardiovascular    AL RT/LT HEART CATHETERS N/A 5/9/2017    Procedure: Percutaneous Coronary Intervention;  Surgeon: Lincoln De Los Santos MD;  Location:  [facility]ATH INVASIVE LOCATION;  Service: Cardiology    STOMACH SURGERY      FUSION OF BLEEDING ULCER    UPPER GASTROINTESTINAL ENDOSCOPY  11/13/2015     Dr. Martinez- mild gastritis       SOCIAL HISTORY:  Social History     Socioeconomic History    Marital status:    Tobacco Use    Smoking status: Former     Current packs/day: 0.00     Average packs/day: 3.0 packs/day for 25.0 years (75.0 ttl pk-yrs)     Types: Cigarettes     Start date:      Quit date:      Years since quittin.3     Passive exposure: Past    Smokeless tobacco: Former     Quit date: 1986   Vaping Use    Vaping status: Never Used   Substance and Sexual Activity    Alcohol use: No    Drug use: No    Sexual activity: Defer       FAMILY HISTORY:  Family History   Problem Relation Age of Onset    Sick sinus syndrome Mother     Heart failure Mother     Diabetes Mother     Liver cancer Father      MEDICATIONS:  The current medication list was reviewed in the EMR    Current Outpatient Medications:     acetaminophen (TYLENOL) 500 MG tablet, Take 1 tablet by mouth Every 6 (Six) Hours As Needed for Mild Pain., Disp: , Rfl:     albuterol sulfate  (90 Base) MCG/ACT inhaler, Inhale 2 puffs 4 (Four) Times a Day As Needed for Wheezing., Disp: , Rfl:     apixaban (ELIQUIS) 5 MG tablet tablet, Take 1 tablet by mouth Every 12 (Twelve) Hours., Disp: , Rfl:     atorvastatin (LIPITOR) 80 MG tablet, Take 1 tablet by mouth Every Night., Disp: 90 tablet, Rfl: 5    BD AutoShield Duo 30G X 5 MM misc, , Disp: , Rfl:     bumetanide (BUMEX) 1 MG tablet, Take 1 tablet by mouth Daily., Disp: 30 tablet, Rfl: 3    carbidopa-levodopa (SINEMET)  MG per tablet, , Disp: , Rfl:     Carboxymethylcellulose Sodium (ARTIFICIAL TEARS OP), Apply 0.4 % to eye(s) as directed by provider 4 (Four) Times a Day., Disp: , Rfl:     clopidogrel (PLAVIX) 75 MG tablet, Take 1 tablet by mouth Daily., Disp: 30 tablet, Rfl: 5    colestipol (COLESTID) 1 g tablet, Take 1 tablet by mouth 2 (Two) Times a Day., Disp: , Rfl:     divalproex (DEPAKOTE) 250 MG DR tablet, , Disp: , Rfl:     donepezil (ARICEPT) 5 MG tablet, Take 1  tablet by mouth Every Night., Disp: , Rfl:     finasteride (PROSCAR) 5 MG tablet, Take 1 tablet by mouth Every Night., Disp: 30 tablet, Rfl: 11    folic acid-pyridoxine-cyanocobalamin (FOLBIC) 2.5-25-2 MG tablet tablet, Take  by mouth Daily., Disp: , Rfl:     insulin regular (humuLIN R,novoLIN R) 100 UNIT/ML injection, Inject  under the skin into the appropriate area as directed 4 (Four) Times a Day. 5 units plus s/s QID, Disp: , Rfl:     ipratropium-albuterol (DUO-NEB) 0.5-2.5 mg/3 ml nebulizer, Take 3 mL by nebulization 2 (Two) Times a Day., Disp: 360 mL, Rfl: 3    iron polysaccharides (NIFEREX) 150 MG capsule, Take 1 capsule by mouth Daily., Disp: 30 capsule, Rfl: 3    isosorbide mononitrate (IMDUR) 120 MG 24 hr tablet, Take 1 tablet by mouth Every Morning., Disp: 90 tablet, Rfl: 2    Levemir 100 UNIT/ML injection, , Disp: , Rfl:     losartan (Cozaar) 25 MG tablet, Take 1 tablet by mouth Daily., Disp: 30 tablet, Rfl: 3    Melatonin 5 MG capsule, Take 5 mg by mouth Every Night., Disp: 30 each, Rfl: 1    memantine (NAMENDA XR) 28 MG capsule sustained-release 24 hr extended release capsule, Take 1 capsule by mouth Daily., Disp: , Rfl:     methylcellulose oral powder, Take 2 Applications by mouth Daily. 2tbsp, Disp: , Rfl:     metoprolol tartrate (LOPRESSOR) 25 MG tablet, Take 0.5 tablets by mouth Every 12 (Twelve) Hours., Disp: , Rfl:     Mirabegron ER (Myrbetriq) 25 MG tablet sustained-release 24 hour 24 hr tablet, Take 1 tablet by mouth Daily., Disp: 30 tablet, Rfl: 11    mirtazapine (REMERON) 15 MG tablet, Take 1 tablet by mouth Every Night., Disp: , Rfl:     nitroglycerin (NITROSTAT) 0.4 MG SL tablet, 1 under the tongue as needed for angina, may repeat q5mins for up three doses, Disp: 25 tablet, Rfl: 2    pantoprazole (PROTONIX) 40 MG EC tablet, Take 1 tablet by mouth Daily., Disp: , Rfl:     ranolazine (RANEXA) 500 MG 12 hr tablet, Take 1 tablet by mouth Every 12 (Twelve) Hours., Disp: , Rfl:     Symbicort  80-4.5 MCG/ACT inhaler, , Disp: , Rfl:     ALLERGIES:  No Known Allergies      REVIEW OF SYSTEMS:    A comprehensive 14 point review of systems was performed.  Significant findings as mentioned above.  All other systems reviewed and are negative.      Physical Exam   Vital Signs:   Vitals:    04/24/24 1520   BP: 154/61   Pulse: 60    General: Well developed, well nourished, alert and oriented x 3, in no acute distress.  Sitting in wheelchair.  Head: ATNC   Eyes: PERRL, No evidence of conjunctivitis.   Nose: No nasal discharge.   Mouth: Oral mucosal membranes moist. No oral ulceration or hemorrhages.   Neck: Neck supple. No thyromegaly. No JVD.   Lungs: CTAB  Heart: RRR. No murmurs, rubs, or gallops.   Abdomen: Soft. Bowel sounds are normoactive. Nontender with palpation.   Extremities: No cyanosis or edema.   Neurologic: MS as above. Grossly non focal exam.       ENDOSCOPY:        RECENT LABS:  Lab Results   Component Value Date    WBC 10.44 01/31/2024    HGB 12.0 (L) 01/31/2024    HCT 37.1 (L) 01/31/2024    MCV 98.1 (H) 01/31/2024    RDW 14.0 01/31/2024     01/31/2024    NEUTRORELPCT 72.2 01/31/2024    LYMPHORELPCT 14.2 (L) 01/31/2024    MONORELPCT 8.9 01/31/2024    EOSRELPCT 3.3 01/31/2024    BASORELPCT 0.6 01/31/2024    NEUTROABS 7.55 (H) 01/31/2024    LYMPHSABS 1.48 01/31/2024       Lab Results   Component Value Date     01/11/2024    K 4.6 01/11/2024    CO2 23.6 01/11/2024     01/11/2024    BUN 19 01/11/2024    CREATININE 1.10 01/11/2024    EGFRIFNONA 98 02/02/2022    EGFRIFAFRI  09/19/2016      Comment:      <15 Indicative of kidney failure.    GLUCOSE 260 (H) 01/11/2024    CALCIUM 9.4 01/11/2024    ALKPHOS 128 (H) 01/11/2024    AST 27 01/11/2024    ALT 29 01/11/2024    BILITOT 0.2 01/11/2024    ALBUMIN 4.4 01/11/2024    PROTEINTOT 7.4 01/11/2024    MG 2.1 05/29/2022    PHOS 3.9 10/28/2021     ASSESSMENT & PLAN:  Fidencio Luciano is a very pleasant 78 y.o. male with    1.  ERROL:  -He underwent  EGD and colonoscopy in May 2022 which showed no evidence of bleeding.   -He underwent pill cam endoscopy on 4/13/23 which showed several non bleeding angioectasias. He is on Plavix and Eliquis per cardiology, Dr. Buenrostro.  He also has history of DVT.  At present, he denies obvious bleeding from any source.  -He is following with hematology for management of ERROL and has upcoming follow-up next week with lab testing.  Most recent CBC from 1/31/2024 revealed hemoglobin of 12 which remained stable.  Iron was replete.  He currently remains on Niferex 150 mg p.o. daily which he is tolerating well.   -Patient was advised to follow-up with hematology as previously planned.  He was again advised to monitor for signs and symptoms of bleeding. Would weigh risks/benefits of anticoagulation and discuss with cardiology.  From GI standpoint, no further follow-up needed at this time.  However, we are happy to follow-up in the future if needed.    The patient and his wife were in agreement with the above plan.  All questions were answered to their satisfaction.      Electronically Signed by: ANDREW Dawson ,  April 24, 2024 15:39 EDT       CC:   Bernardo Pritchett MD

## 2024-04-25 ENCOUNTER — TELEPHONE (OUTPATIENT)
Dept: ONCOLOGY | Facility: CLINIC | Age: 79
End: 2024-04-25
Payer: MEDICARE

## 2024-05-01 NOTE — PROGRESS NOTES
Subjective     Date: 5/2/2024    Referring Provider  George Riley MD    Chief Complaint  Anemia     Subjective    Fidencio Luciano is a 78 y.o. male who presents today to [facility] for follow up.    HPI:   78 y.o. male who was previously followed by ANDREW Morales for anemia, presents for follow-up.  Patient with chronic anemia, worsened since June 2022.  Previously reportedly was on oral ferrous sulfate 3 times daily, tolerated well.  Currently still on iron polysaccharide 150 mg daily, he reports no adverse effects with it.  Received IV ferumoxytol on 12/13/2022.    Last colonoscopy and endoscopy in May 2022.  Colonoscopy with 3 sessile polyps in the cecum, multiple small mouth diverticula in the sigmoid and descending colon, internal hemorrhoids grade 1. Endoscopy showing small hiatal hernia, diffuse moderately erythematous mucosa without bleeding in gastric body in the gastric antrum.Seen by Dr. Mesa January 12, 2023, plan on doing capsule endoscopy as well as small bowel follow-through.    Reports no symptoms today.  Has dark-colored stool, denies any melena or hematochezia.    Treatment History:      Interval History 04/05/2023   Small bowel follow through was normal except for irregular contractions of esophagus. Pill cam rescheduled as prep was not given at nursing home.     He presents today with his wife. Reports overall doing well after IV iron infusion. Denies any GI bleed.    Interval History 07/06/2023   Mr. Truong presents today with his wife.  He had PillCam completed May 13 which showed gastric mucosa with erythema but no signs of bleeding.  He also had several nonbleeding angiectasia in proximal to mid small bowel.    He reports no further GI bleed.  Overall feeling well.  We reviewed his CBC today which shows normal hemoglobin and hematocrit.    Interval History 10/05/2023   Mr. Luciano presents for follow up, accompanied by his wife and  attendant from nursing home. He denies any new symptoms or any evidence of GI bleed. He is on Niferex 150 mg tablet daily at nursing home, denies any GI discomfort associated with it.  CBC reviewed today which shows Hgb 12.8, Hct 40.7. Normal WBC and platelet counts.     Interval History 01/31/2024   Mr. Luciano presents for follow up today, with his wife and attendant from nursing home. He last received IV iron on 2/2023. He reports some blood on toilet paper when wiping. Continues to take daily iron/Niferex at nursing home.   Hgb today 12.0, Hct 37.     Interval History 05/02/2024   Mr. Luciano presents for follow up, accompanied by his wife and nursing home aide. He reports no changes since our last visit. Denies any GIB. Still currently on oral Niferex at nursing home. Continues to take Eliquis (patient or wife are unsure the reason for this), per chart review patient had a PE 2/2022.   CBC today with normal WBC, Hgb 14, Hct 44, , Plt 201.       Objective     Allergy:   No Known Allergies     Current Medications:   Current Outpatient Medications   Medication Sig Dispense Refill    acetaminophen (TYLENOL) 500 MG tablet Take 1 tablet by mouth Every 6 (Six) Hours As Needed for Mild Pain.      albuterol sulfate  (90 Base) MCG/ACT inhaler Inhale 2 puffs 4 (Four) Times a Day As Needed for Wheezing.      apixaban (ELIQUIS) 5 MG tablet tablet Take 1 tablet by mouth Every 12 (Twelve) Hours.      atorvastatin (LIPITOR) 80 MG tablet Take 1 tablet by mouth Every Night. 90 tablet 5    BD AutoShield Duo 30G X 5 MM misc       bumetanide (BUMEX) 1 MG tablet Take 1 tablet by mouth Daily. 30 tablet 3    carbidopa-levodopa (SINEMET)  MG per tablet       Carboxymethylcellulose Sodium (ARTIFICIAL TEARS OP) Apply 0.4 % to eye(s) as directed by provider 4 (Four) Times a Day.      clopidogrel (PLAVIX) 75 MG tablet Take 1 tablet by mouth Daily. 30 tablet 5    colestipol (COLESTID) 1 g tablet Take 1 tablet by mouth 2  (Two) Times a Day.      divalproex (DEPAKOTE) 250 MG DR tablet       donepezil (ARICEPT) 5 MG tablet Take 1 tablet by mouth Every Night.      finasteride (PROSCAR) 5 MG tablet Take 1 tablet by mouth Every Night. 30 tablet 11    folic acid-pyridoxine-cyanocobalamin (FOLBIC) 2.5-25-2 MG tablet tablet Take  by mouth Daily.      insulin regular (humuLIN R,novoLIN R) 100 UNIT/ML injection Inject  under the skin into the appropriate area as directed 4 (Four) Times a Day. 5 units plus s/s QID      ipratropium-albuterol (DUO-NEB) 0.5-2.5 mg/3 ml nebulizer Take 3 mL by nebulization 2 (Two) Times a Day. 360 mL 3    iron polysaccharides (NIFEREX) 150 MG capsule Take 1 capsule by mouth Daily. 30 capsule 3    isosorbide mononitrate (IMDUR) 120 MG 24 hr tablet Take 1 tablet by mouth Every Morning. 90 tablet 2    Levemir 100 UNIT/ML injection       losartan (Cozaar) 25 MG tablet Take 1 tablet by mouth Daily. 30 tablet 3    Melatonin 5 MG capsule Take 5 mg by mouth Every Night. 30 each 1    memantine (NAMENDA XR) 28 MG capsule sustained-release 24 hr extended release capsule Take 1 capsule by mouth Daily.      methylcellulose oral powder Take 2 Applications by mouth Daily. 2tbsp      metoprolol tartrate (LOPRESSOR) 25 MG tablet Take 0.5 tablets by mouth Every 12 (Twelve) Hours.      Mirabegron ER (Myrbetriq) 25 MG tablet sustained-release 24 hour 24 hr tablet Take 1 tablet by mouth Daily. 30 tablet 11    mirtazapine (REMERON) 15 MG tablet Take 1 tablet by mouth Every Night.      nitroglycerin (NITROSTAT) 0.4 MG SL tablet 1 under the tongue as needed for angina, may repeat q5mins for up three doses 25 tablet 2    pantoprazole (PROTONIX) 40 MG EC tablet Take 1 tablet by mouth Daily.      ranolazine (RANEXA) 500 MG 12 hr tablet Take 1 tablet by mouth Every 12 (Twelve) Hours.      Symbicort 80-4.5 MCG/ACT inhaler        No current facility-administered medications for this visit.       Past Medical History:  Past Medical History:    Diagnosis Date    BPH (benign prostatic hyperplasia)     Bradycardia     Positive tilt table testing 07/2016    Coronary artery disease     Diabetes mellitus     Diverticulosis     Elevated cholesterol     Gastric ulcer with hemorrhage     Gastroesophageal reflux disease 1/26/2021    History of transfusion     Hyperlipidemia     Hypertension     Psoriasis        Past Surgical History:  Past Surgical History:   Procedure Laterality Date    BACK SURGERY      CARDIAC CATHETERIZATION N/A 9/20/2016    Procedure: Left Heart Cath;  Surgeon: Giovanni Buenrostro MD;  Location:  [facility];  Service:     CARDIAC CATHETERIZATION N/A 10/4/2016    Procedure: Left Heart Cath;  Surgeon: Bharathi Andrew MD;  [facility];  Service:     CARDIAC CATHETERIZATION N/A 5/9/2017    Procedure: Left Heart Cath;  Surgeon: Giovanni Buenrostro MD;  [facility]  [facility];  Service:     CARDIAC CATHETERIZATION N/A 5/9/2017    Procedure: ERR;  Surgeon: Giovanni Buenrostro MD;  [facility]  [facility];  Service:     CARDIAC CATHETERIZATION N/A 11/8/2019    Procedure: Left Heart Cath;  Surgeon: Abraham Oviedo MD;  [facility]  [facility];  Service: Cardiology    CARDIAC CATHETERIZATION N/A 12/18/2019    Procedure: Coronary angiography;  Surgeon: Jay Barney IV, MD;  Location:  [facility];  Service: Cardiovascular    CHOLECYSTECTOMY      COLONOSCOPY  11/13/2015    Dr. Martinez- internal hemorrhoids, sigmoid diverticulosis    COLONOSCOPY N/A 8/17/2018    Procedure: COLONOSCOPY CPT CODE: 25048;  Surgeon: Gigi Geronimo MD;  Location: [facility] OR;  Service: Gastroenterology    COLONOSCOPY N/A 5/17/2022    Procedure: COLONOSCOPY;  Surgeon: Geno Vernon MD;  Location: [facility] OR;  Service: Gastroenterology;  Laterality: N/A;    CORONARY ANGIOPLASTY WITH STENT PLACEMENT      ENDOSCOPY N/A 8/17/2018    Procedure:  ESOPHAGOGASTRODUODENOSCOPY WITH BIOPSY CPT CODE: 68363;  Surgeon: Gigi Geronimo MD;  Location:  [facility] OR;  Service: Gastroenterology    ENDOSCOPY N/A 2021    Procedure: ESOPHAGOGASTRODUODENOSCOPY;  Surgeon: Geno Vernon MD;  Location:  [facility];  Service: Gastroenterology;  Laterality: N/A;    ENDOSCOPY N/A 2022    Procedure: ESOPHAGOGASTRODUODENOSCOPY WITH BIOPSY;  Surgeon: Geno Vernon MD;  Location:  [facility] OR;  Service: Gastroenterology;  Laterality: N/A;    INTERVENTIONAL RADIOLOGY PROCEDURE N/A 2019    Procedure: Coil 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 Martinez- mild gastritis       Social History:  Social History     Socioeconomic History    Marital status:    Tobacco Use    Smoking status: Former     Current packs/day: 0.00     Average packs/day: 3.0 packs/day for 25.0 years (75.0 ttl pk-yrs)     Types: Cigarettes     Start date:      Quit date: 1982     Years since quittin.3     Passive exposure: Past    Smokeless tobacco: Former     Quit date: 1986   Vaping Use    Vaping status: Never Used   Substance and Sexual Activity    Alcohol use: No    Drug use: No    Sexual activity: Defer     Fidencio WHITNEY Mustapha  reports that he quit smoking about 42 years ago. His smoking use included cigarettes. He started smoking about 67 years ago. He has a 75 pack-year smoking history. He has been exposed to tobacco smoke. He quit smokeless tobacco use about 38 years ago.      Family History:  Family History   Problem Relation Age of Onset    Sick sinus  syndrome Mother     Heart failure Mother     Diabetes Mother     Liver cancer Father        Review of Systems:  Review of Systems   All other systems reviewed and are negative.      Vital Signs:   /56   Pulse 57   Temp 97.5 °F (36.4 °C) (Temporal)   Resp 18   SpO2 99%      Physical Exam:  Physical Exam  Vitals and nursing note reviewed.   Constitutional:       General: He is not in acute distress.     Appearance: Normal appearance. He is not ill-appearing.      Comments: +in a wheelchair  +hard of hearing   HENT:      Head: Normocephalic and atraumatic.      Nose: Nose normal.      Mouth/Throat:      Mouth: Mucous membranes are moist.      Pharynx: Oropharynx is clear.   Eyes:      Conjunctiva/sclera: Conjunctivae normal.      Pupils: Pupils are equal, round, and reactive to light.   Cardiovascular:      Rate and Rhythm: Normal rate and regular rhythm.      Heart sounds: No murmur heard.  Pulmonary:      Effort: Pulmonary effort is normal. No respiratory distress.      Breath sounds: Normal breath sounds. No wheezing.   Abdominal:      General: Abdomen is flat. Bowel sounds are normal. There is no distension.      Palpations: Abdomen is soft. There is no mass.      Tenderness: There is no abdominal tenderness. There is no guarding.   Musculoskeletal:         General: No swelling. Normal range of motion.      Cervical back: Normal range of motion.   Lymphadenopathy:      Cervical: No cervical adenopathy.   Skin:     General: Skin is warm and dry.      Coloration: Skin is not jaundiced.      Findings: No bruising or rash.      Comments: +excoriations    Neurological:      General: No focal deficit present.      Mental Status: He is alert and oriented to person, place, and time.      Motor: No weakness.      Coordination: Coordination normal.   Psychiatric:         Mood and Affect: Mood normal.         Behavior: Behavior normal.         Judgment: Judgment normal.         PHQ-9 Score  PHQ-9 Total Score:        Pain Score  Vitals:    05/02/24 1429   BP: 138/56   Pulse: 57   Resp: 18   Temp: 97.5 °F (36.4 °C)   TempSrc: Temporal   SpO2: 99%   PainSc: 0-No pain                 LAB REVIEW  CBC          10/16/2023    12:39 1/31/2024    10:13 5/2/2024    14:41   CBC   WBC 9.19  10.44  9.48    RBC 3.92  3.78  4.35    Hemoglobin 12.8  12.0  14.0    Hematocrit 40.7  37.1  44.2    .8  98.1  101.6    MCH 32.7  31.7  32.2    MCHC 31.4  32.3  31.7    RDW 13.7  14.0  13.7    Platelets 187  270  201         PATHOLOGY  5/17/22 12/21/21 4/20/21          Assessment / Plan      Assessment and Plan   77-year-old male who presents for follow-up regarding iron deficiency anemia.  Most recent colonoscopy was performed May 2022, negative for bleed.    Anemia, iron deficiency  -Patient with chronic anemia, worsened since June 2022.  Previously reportedly was on oral ferrous sulfate 3 times daily, tolerated well.  Currently still on iron polysaccharide 150 mg daily, he reports no adverse effects with it.  Received IV ferumoxytol on 12/13/2022. Received IV Ferumoxytol 2/20/2023.   -Negative GI work-up May 2022. Small bowel follow through negative. Pill endoscopy 4/2023 noted to have non bleeding angiectasias proximal to mid small bowel.   -CBC today with Hgb 14, Hct 44  -Given improvement in H/H and ferritin; will continue to monitor with CBC and iron studies    2. Macrocytosis   - Normal B12 and folate levels 1/2024; continue to monitor     Discussed possible differential diagnoses, testing, treatment, recommended non-pharmacological interventions, risks, warning signs to monitor for that would indicate need for follow-up in clinic or ER. If no improvement with these regimens or you have new or worsening symptoms follow-up. Patient verbalizes understanding and agreement with plan of care. Denies further needs or concerns.     Patient was given instructions and counseling regarding her condition and for health maintenance  advised.    I spent 30 minutes with Fidencio Luciano today.  In the office today, more than 50% of this time was spent face-to-face with him  in counseling / coordination of care, reviewing his interim medical history and counseling on the current treatment plan.  All questions were answered to his satisfaction.      Meds ordered during this visit  No orders of the defined types were placed in this encounter.      Visit Diagnoses    ICD-10-CM ICD-9-CM   1. Iron deficiency anemia, unspecified iron deficiency anemia type  D50.9 280.9   2. Anemia, unspecified type  D64.9 285.9             Follow Up   4 months with repeat iron studies, ferritin, CBC, folate, B12      This document has been electronically signed by Sultana Keen MD    May 2, 2024 15:23 EDT    Dictated Utilizing Dragon Dictation: Part of this note may be an electronic transcription/translation of spoken language to printed text using the Dragon Dictation System.

## 2024-05-02 ENCOUNTER — LAB (OUTPATIENT)
Dept: ONCOLOGY | Facility: CLINIC | Age: 79
End: 2024-05-02
Payer: MEDICARE

## 2024-05-02 ENCOUNTER — OFFICE VISIT (OUTPATIENT)
Dept: ONCOLOGY | Facility: CLINIC | Age: 79
End: 2024-05-02
Payer: MEDICARE

## 2024-05-02 VITALS
TEMPERATURE: 97.5 F | HEART RATE: 57 BPM | DIASTOLIC BLOOD PRESSURE: 56 MMHG | SYSTOLIC BLOOD PRESSURE: 138 MMHG | RESPIRATION RATE: 18 BRPM | OXYGEN SATURATION: 99 %

## 2024-05-02 DIAGNOSIS — K90.9 INTESTINAL MALABSORPTION, UNSPECIFIED TYPE: ICD-10-CM

## 2024-05-02 DIAGNOSIS — D72.829 LEUKOCYTOSIS, UNSPECIFIED TYPE: ICD-10-CM

## 2024-05-02 DIAGNOSIS — D50.9 IRON DEFICIENCY ANEMIA, UNSPECIFIED IRON DEFICIENCY ANEMIA TYPE: Primary | ICD-10-CM

## 2024-05-02 DIAGNOSIS — D64.9 ANEMIA, UNSPECIFIED TYPE: ICD-10-CM

## 2024-05-02 DIAGNOSIS — E53.8 B12 DEFICIENCY: ICD-10-CM

## 2024-05-02 DIAGNOSIS — D50.9 IRON DEFICIENCY ANEMIA, UNSPECIFIED IRON DEFICIENCY ANEMIA TYPE: ICD-10-CM

## 2024-05-02 LAB
BASOPHILS # BLD AUTO: 0.08 10*3/MM3 (ref 0–0.2)
BASOPHILS NFR BLD AUTO: 0.8 % (ref 0–1.5)
DEPRECATED RDW RBC AUTO: 51.5 FL (ref 37–54)
EOSINOPHIL # BLD AUTO: 0.53 10*3/MM3 (ref 0–0.4)
EOSINOPHIL NFR BLD AUTO: 5.6 % (ref 0.3–6.2)
ERYTHROCYTE [DISTWIDTH] IN BLOOD BY AUTOMATED COUNT: 13.7 % (ref 12.3–15.4)
FERRITIN SERPL-MCNC: 128.9 NG/ML (ref 30–400)
HCT VFR BLD AUTO: 44.2 % (ref 37.5–51)
HGB BLD-MCNC: 14 G/DL (ref 13–17.7)
IMM GRANULOCYTES # BLD AUTO: 0.04 10*3/MM3 (ref 0–0.05)
IMM GRANULOCYTES NFR BLD AUTO: 0.4 % (ref 0–0.5)
IRON 24H UR-MRATE: 109 MCG/DL (ref 59–158)
IRON SATN MFR SERPL: 26 % (ref 20–50)
LYMPHOCYTES # BLD AUTO: 2.16 10*3/MM3 (ref 0.7–3.1)
LYMPHOCYTES NFR BLD AUTO: 22.8 % (ref 19.6–45.3)
MCH RBC QN AUTO: 32.2 PG (ref 26.6–33)
MCHC RBC AUTO-ENTMCNC: 31.7 G/DL (ref 31.5–35.7)
MCV RBC AUTO: 101.6 FL (ref 79–97)
MONOCYTES # BLD AUTO: 0.75 10*3/MM3 (ref 0.1–0.9)
MONOCYTES NFR BLD AUTO: 7.9 % (ref 5–12)
NEUTROPHILS NFR BLD AUTO: 5.92 10*3/MM3 (ref 1.7–7)
NEUTROPHILS NFR BLD AUTO: 62.5 % (ref 42.7–76)
NRBC BLD AUTO-RTO: 0 /100 WBC (ref 0–0.2)
PLATELET # BLD AUTO: 201 10*3/MM3 (ref 140–450)
PMV BLD AUTO: 10.5 FL (ref 6–12)
RBC # BLD AUTO: 4.35 10*6/MM3 (ref 4.14–5.8)
TIBC SERPL-MCNC: 425 MCG/DL (ref 298–536)
TRANSFERRIN SERPL-MCNC: 285 MG/DL (ref 200–360)
WBC NRBC COR # BLD AUTO: 9.48 10*3/MM3 (ref 3.4–10.8)

## 2024-05-02 PROCEDURE — 85025 COMPLETE CBC W/AUTO DIFF WBC: CPT | Performed by: INTERNAL MEDICINE

## 2024-05-02 PROCEDURE — 83540 ASSAY OF IRON: CPT | Performed by: INTERNAL MEDICINE

## 2024-05-02 PROCEDURE — 82728 ASSAY OF FERRITIN: CPT | Performed by: INTERNAL MEDICINE

## 2024-05-02 PROCEDURE — 84466 ASSAY OF TRANSFERRIN: CPT | Performed by: INTERNAL MEDICINE

## 2024-05-02 NOTE — PROGRESS NOTES
Venipuncture Blood Specimen Collection  Venipuncture performed in left hand by Genet Newberry MA with good hemostasis. Patient tolerated the procedure well without complications.   05/02/24   Genet Newberry MA

## 2024-09-03 ENCOUNTER — OFFICE VISIT (OUTPATIENT)
Age: 79
End: 2024-09-03
Payer: MEDICARE

## 2024-09-03 VITALS
HEIGHT: 60 IN | BODY MASS INDEX: 36.32 KG/M2 | WEIGHT: 185 LBS | HEART RATE: 63 BPM | SYSTOLIC BLOOD PRESSURE: 136 MMHG | OXYGEN SATURATION: 94 % | DIASTOLIC BLOOD PRESSURE: 60 MMHG

## 2024-09-03 DIAGNOSIS — I25.10 ASCVD (ARTERIOSCLEROTIC CARDIOVASCULAR DISEASE): Primary | ICD-10-CM

## 2024-09-03 DIAGNOSIS — I25.41 CORONARY ARTERY FISTULA: ICD-10-CM

## 2024-09-03 DIAGNOSIS — I10 ESSENTIAL HYPERTENSION: ICD-10-CM

## 2024-09-03 DIAGNOSIS — E78.5 DYSLIPIDEMIA: ICD-10-CM

## 2024-09-03 DIAGNOSIS — Z86.711 HISTORY OF PULMONARY EMBOLUS (PE): ICD-10-CM

## 2024-09-03 PROCEDURE — 99213 OFFICE O/P EST LOW 20 MIN: CPT | Performed by: INTERNAL MEDICINE

## 2024-09-03 PROCEDURE — 3075F SYST BP GE 130 - 139MM HG: CPT | Performed by: INTERNAL MEDICINE

## 2024-09-03 PROCEDURE — 3078F DIAST BP <80 MM HG: CPT | Performed by: INTERNAL MEDICINE

## 2024-09-03 PROCEDURE — 93000 ELECTROCARDIOGRAM COMPLETE: CPT | Performed by: INTERNAL MEDICINE

## 2024-09-03 RX ORDER — ISOSORBIDE DINITRATE 30 MG/1
60 TABLET ORAL 2 TIMES DAILY
COMMUNITY

## 2024-09-03 RX ORDER — PSYLLIUM HUSK 3 G/5.4 G
POWDER (GRAM) ORAL
COMMUNITY

## 2024-09-03 NOTE — PROGRESS NOTES
Bernardo Pritchett MD  Fidencio Luciano  1945  09/03/2024    Patient Active Problem List   Diagnosis    Essential hypertension    Type 2 diabetes mellitus    Benign prostatic hyperplasia (BPH) with urinary urge incontinence    ASCVD (arteriosclerotic cardiovascular disease), s/p stenting of 80 % stenosis in left circumflex on 10/05/16and 60% stenosis in the LAD, clinically stable.     Hyperlipidemia LDL goal <70    Weakness generalized    Coronary artery fistula    Weight loss, unintentional    Iron deficiency anemia    Gastroesophageal reflux disease    Dysphagia    Chest pain    History of pulmonary embolism in February 22, patient is on Eliquis.    Early satiety    Diarrhea       Dear Bernardo Pritchett MD:    Subjective     Fidencio Luciano is a 78 y.o. male with the problems as listed above, presents    Chief complaint: Follow-up of coronary artery disease.    History of Present Illness: Mr. 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  He is here for today as a regular cardiology follow-up.  On today's visit he denies any complaints of chest pains or shortness of breath or palpitations.    No Known Allergies:    Current Outpatient Medications:     acetaminophen (TYLENOL) 500 MG tablet, Take 1 tablet by mouth Every 6 (Six) Hours As Needed for Mild Pain., Disp: , Rfl:     albuterol sulfate  (90 Base) MCG/ACT inhaler, Inhale 2 puffs 4 (Four) Times a Day As Needed for Wheezing., Disp: , Rfl:     apixaban (ELIQUIS) 5 MG tablet tablet, Take 1 tablet by mouth Every 12 (Twelve) Hours., Disp: , Rfl:     atorvastatin (LIPITOR) 80 MG tablet, Take 1 tablet by mouth Every Night., Disp: 90 tablet, Rfl: 5    BD AutoShield Duo 30G X 5 MM misc, , Disp: , Rfl:     bumetanide (BUMEX) 1 MG tablet, Take 1  tablet by mouth Daily., Disp: 30 tablet, Rfl: 3    carbidopa-levodopa (SINEMET)  MG per tablet, , Disp: , Rfl:     Carboxymethylcellulose Sodium (ARTIFICIAL TEARS OP), Apply 0.4 % to eye(s) as directed by provider 4 (Four) Times a Day., Disp: , Rfl:     clopidogrel (PLAVIX) 75 MG tablet, Take 1 tablet by mouth Daily., Disp: 30 tablet, Rfl: 5    colestipol (COLESTID) 1 g tablet, Take 1 tablet by mouth 2 (Two) Times a Day., Disp: , Rfl:     divalproex (DEPAKOTE) 250 MG DR tablet, , Disp: , Rfl:     donepezil (ARICEPT) 5 MG tablet, Take 1 tablet by mouth Every Night., Disp: , Rfl:     finasteride (PROSCAR) 5 MG tablet, Take 1 tablet by mouth Every Night., Disp: 30 tablet, Rfl: 11    folic acid-pyridoxine-cyanocobalamin (FOLBIC) 2.5-25-2 MG tablet tablet, Take  by mouth Daily., Disp: , Rfl:     insulin regular (humuLIN R,novoLIN R) 100 UNIT/ML injection, Inject  under the skin into the appropriate area as directed 4 (Four) Times a Day. 5 units plus s/s QID, Disp: , Rfl:     ipratropium-albuterol (DUO-NEB) 0.5-2.5 mg/3 ml nebulizer, Take 3 mL by nebulization 2 (Two) Times a Day., Disp: 360 mL, Rfl: 3    iron polysaccharides (NIFEREX) 150 MG capsule, Take 1 capsule by mouth Daily., Disp: 30 capsule, Rfl: 3    isosorbide dinitrate (ISORDIL) 30 MG tablet, Take 2 tablets by mouth 2 (Two) Times a Day., Disp: , Rfl:     Levemir 100 UNIT/ML injection, , Disp: , Rfl:     losartan (Cozaar) 25 MG tablet, Take 1 tablet by mouth Daily., Disp: 30 tablet, Rfl: 3    Melatonin 5 MG capsule, Take 5 mg by mouth Every Night., Disp: 30 each, Rfl: 1    memantine (NAMENDA XR) 28 MG capsule sustained-release 24 hr extended release capsule, Take 1 capsule by mouth Daily., Disp: , Rfl:     methylcellulose oral powder, Take 2 Applications by mouth Daily. 2tbsp, Disp: , Rfl:     metoprolol tartrate (LOPRESSOR) 25 MG tablet, Take 0.5 tablets by mouth Every 12 (Twelve) Hours., Disp: , Rfl:     Mirabegron ER (Myrbetriq) 25 MG tablet  "sustained-release 24 hour 24 hr tablet, Take 1 tablet by mouth Daily., Disp: 30 tablet, Rfl: 11    mirtazapine (REMERON) 15 MG tablet, Take 1 tablet by mouth Every Night., Disp: , Rfl:     nitroglycerin (NITROSTAT) 0.4 MG SL tablet, 1 under the tongue as needed for angina, may repeat q5mins for up three doses, Disp: 25 tablet, Rfl: 2    pantoprazole (PROTONIX) 40 MG EC tablet, Take 1 tablet by mouth Daily., Disp: , Rfl:     Psyllium (Reguloid) 25 % powder, Take  by mouth., Disp: , Rfl:     ranolazine (RANEXA) 500 MG 12 hr tablet, Take 1 tablet by mouth Every 12 (Twelve) Hours., Disp: , Rfl:     Symbicort 80-4.5 MCG/ACT inhaler, , Disp: , Rfl:     The following portions of the patient's history were reviewed and updated as appropriate: allergies, current medications, past family history, past medical history, past social history, past surgical history and problem list.    Social History     Tobacco Use    Smoking status: Former     Current packs/day: 0.00     Average packs/day: 3.0 packs/day for 25.0 years (75.0 ttl pk-yrs)     Types: Cigarettes     Start date:      Quit date:      Years since quittin.7     Passive exposure: Past    Smokeless tobacco: Former     Quit date: 1986   Vaping Use    Vaping status: Never Used   Substance Use Topics    Alcohol use: No    Drug use: No     Review of Systems   Constitutional: Negative for chills and fever.   HENT:  Negative for nosebleeds and sore throat.    Respiratory:  Negative for cough, hemoptysis and wheezing.    Gastrointestinal:  Negative for abdominal pain, hematemesis, hematochezia, melena, nausea and vomiting.   Genitourinary:  Negative for dysuria and hematuria.   Neurological:  Negative for headaches.     Objective   Vitals:    24 1438   BP: 136/60   BP Location: Left arm   Patient Position: Sitting   Cuff Size: Adult   Pulse: 63   SpO2: 94%   Weight: 83.9 kg (185 lb)   Height: 71 cm (27.95\")     Body mass index is 166.46 kg/m².    Vitals " reviewed.   Constitutional:       Appearance: Well-developed.   Eyes:      Conjunctiva/sclera: Conjunctivae normal.   HENT:      Head: Normocephalic.   Neck:      Thyroid: No thyromegaly.      Vascular: No JVD.      Trachea: No tracheal deviation.   Pulmonary:      Effort: No respiratory distress.      Breath sounds: Normal breath sounds. No wheezing. No rales.   Cardiovascular:      PMI at left midclavicular line. Normal rate. Regular rhythm. Normal S1. Normal S2.       Murmurs: There is no murmur.      No gallop.  No click. No rub.   Pulses:     Intact distal pulses.   Edema:     Peripheral edema absent.   Abdominal:      General: Bowel sounds are normal.      Palpations: Abdomen is soft. There is no abdominal mass.      Tenderness: There is no abdominal tenderness.   Musculoskeletal:      Cervical back: Normal range of motion and neck supple. Skin:     General: Skin is warm and dry.   Neurological:      Mental Status: Alert and oriented to person, place, and time.      Cranial Nerves: No cranial nerve deficit.         Lab Results   Component Value Date     01/11/2024    K 4.6 01/11/2024     01/11/2024    CO2 23.6 01/11/2024    BUN 19 01/11/2024    CREATININE 1.10 01/11/2024    GLUCOSE 260 (H) 01/11/2024    CALCIUM 9.4 01/11/2024    AST 27 01/11/2024    ALT 29 01/11/2024    ALKPHOS 128 (H) 01/11/2024    LABIL2 1.4 (L) 01/08/2016     Lab Results   Component Value Date    CKTOTAL 90 11/25/2022     Lab Results   Component Value Date    WBC 9.48 05/02/2024    HGB 14.0 05/02/2024    HCT 44.2 05/02/2024     05/02/2024     Lab Results   Component Value Date    INR 1.38 (H) 05/29/2022    INR 1.18 (H) 02/02/2022    INR 1.05 10/27/2021     Lab Results   Component Value Date    MG 2.1 05/29/2022     Lab Results   Component Value Date    TSH 3.450 12/21/2021    PSA 0.6 01/28/2022    CHLPL 109 08/12/2015    TRIG 115 05/25/2021    HDL 29 (L) 05/25/2021    LDL 58 05/25/2021      Lab Results   Component Value  Date    BNP 55.0 07/16/2016       ECG 12 Lead    Date/Time: 9/3/2024 2:25 PM  Performed by: Giovanni Buenrostro MD    Authorized by: Giovanni Buenrostro MD  Comparison: compared with previous ECG from 1/8/2024  Similar to previous ECG  Rhythm: sinus rhythm  Conduction: conduction normal  ST Segments: ST segments normal  T Waves: T waves normal  Comments: Questionable old inferior wall myocardial infarction.              Assessment & Plan    Diagnosis Plan   1. ASCVD (arteriosclerotic cardiovascular disease), s/p stenting of 80 % stenosis in left circumflex on 10/05/16and 60% stenosis in the LAD, clinically stable.         2. 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        3. Essential hypertension, seems controlled.        4. Dyslipidemia  Lipid Panel    Comprehensive Metabolic Panel      5. History of pulmonary embolus (PE), has been on Eliquis with no bleeding issues          Recommendations  For his coronary artery disease, continue with clopidogrel and metoprolol as well as atorvastatin  For his hypertension, continue with metoprolol and losartan.  Will obtain lipid panel and CMP.  I encouraged him to try to get more active.    Return in about 4 months (around 1/3/2025) for or sooner if needed.    As always, Bernardo Pritchett MD  I appreciate very much the opportunity to participate in the cardiovascular care of your patients. Please do not hesitate to call me with any questions with regards to Fidencio Luciano's evaluation and management.       With Best Regards,        Giovanni Buenrostro MD, [facility]    Please note that portions of this note were completed with a voice recognition program.

## 2024-09-03 NOTE — LETTER
September 3, 2024     Bernardo Pritchett MD  [address] [address]    Patient: Fidencio Luciano   YOB: 1945   Date of Visit: 9/3/2024     Dear Bernardo Pritchett MD:       Thank you for referring Fidencio Luciano to me for evaluation. Below are the relevant portions of my assessment and plan of care.    If you have questions, please do not hesitate to call me. I look forward to following Fidencio along with you.         Sincerely,        Giovanni Buenrostro MD        CC: No Recipients    Giovanni Buenrostro MD  09/03/24 1821  Sign when Signing Visit  Bernardo Pritchett MD  Fidencio Luciano  1945  09/03/2024    Patient Active Problem List   Diagnosis   • Essential hypertension   • Type 2 diabetes mellitus   • Benign prostatic hyperplasia (BPH) with urinary urge incontinence   • ASCVD (arteriosclerotic cardiovascular disease), s/p stenting of 80 % stenosis in left circumflex on 10/05/16and 60% stenosis in the LAD, clinically stable.    • Hyperlipidemia LDL goal <70   • Weakness generalized   • Coronary artery fistula   • Weight loss, unintentional   • Iron deficiency anemia   • Gastroesophageal reflux disease   • Dysphagia   • Chest pain   • History of pulmonary embolism in February 22, patient is on Eliquis.   • Early satiety   • Diarrhea       Dear Bernardo Pritchett MD:    Subjective    Fidencio Luciano is a 78 y.o. male with the problems as listed above, presents    Chief complaint: Follow-up of coronary artery disease.    History of Present Illness: Mr. 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  He is here for today as a regular cardiology follow-up.  On today's visit he denies any complaints of chest pains or shortness of breath or palpitations.    No Known  Allergies:    Current Outpatient Medications:   •  acetaminophen (TYLENOL) 500 MG tablet, Take 1 tablet by mouth Every 6 (Six) Hours As Needed for Mild Pain., Disp: , Rfl:   •  albuterol sulfate  (90 Base) MCG/ACT inhaler, Inhale 2 puffs 4 (Four) Times a Day As Needed for Wheezing., Disp: , Rfl:   •  apixaban (ELIQUIS) 5 MG tablet tablet, Take 1 tablet by mouth Every 12 (Twelve) Hours., Disp: , Rfl:   •  atorvastatin (LIPITOR) 80 MG tablet, Take 1 tablet by mouth Every Night., Disp: 90 tablet, Rfl: 5  •  BD AutoShield Duo 30G X 5 MM misc, , Disp: , Rfl:   •  bumetanide (BUMEX) 1 MG tablet, Take 1 tablet by mouth Daily., Disp: 30 tablet, Rfl: 3  •  carbidopa-levodopa (SINEMET)  MG per tablet, , Disp: , Rfl:   •  Carboxymethylcellulose Sodium (ARTIFICIAL TEARS OP), Apply 0.4 % to eye(s) as directed by provider 4 (Four) Times a Day., Disp: , Rfl:   •  clopidogrel (PLAVIX) 75 MG tablet, Take 1 tablet by mouth Daily., Disp: 30 tablet, Rfl: 5  •  colestipol (COLESTID) 1 g tablet, Take 1 tablet by mouth 2 (Two) Times a Day., Disp: , Rfl:   •  divalproex (DEPAKOTE) 250 MG DR tablet, , Disp: , Rfl:   •  donepezil (ARICEPT) 5 MG tablet, Take 1 tablet by mouth Every Night., Disp: , Rfl:   •  finasteride (PROSCAR) 5 MG tablet, Take 1 tablet by mouth Every Night., Disp: 30 tablet, Rfl: 11  •  folic acid-pyridoxine-cyanocobalamin (FOLBIC) 2.5-25-2 MG tablet tablet, Take  by mouth Daily., Disp: , Rfl:   •  insulin regular (humuLIN R,novoLIN R) 100 UNIT/ML injection, Inject  under the skin into the appropriate area as directed 4 (Four) Times a Day. 5 units plus s/s QID, Disp: , Rfl:   •  ipratropium-albuterol (DUO-NEB) 0.5-2.5 mg/3 ml nebulizer, Take 3 mL by nebulization 2 (Two) Times a Day., Disp: 360 mL, Rfl: 3  •  iron polysaccharides (NIFEREX) 150 MG capsule, Take 1 capsule by mouth Daily., Disp: 30 capsule, Rfl: 3  •  isosorbide dinitrate (ISORDIL) 30 MG tablet, Take 2 tablets by mouth 2 (Two) Times a Day., Disp:  , Rfl:   •  Levemir 100 UNIT/ML injection, , Disp: , Rfl:   •  losartan (Cozaar) 25 MG tablet, Take 1 tablet by mouth Daily., Disp: 30 tablet, Rfl: 3  •  Melatonin 5 MG capsule, Take 5 mg by mouth Every Night., Disp: 30 each, Rfl: 1  •  memantine (NAMENDA XR) 28 MG capsule sustained-release 24 hr extended release capsule, Take 1 capsule by mouth Daily., Disp: , Rfl:   •  methylcellulose oral powder, Take 2 Applications by mouth Daily. 2tbsp, Disp: , Rfl:   •  metoprolol tartrate (LOPRESSOR) 25 MG tablet, Take 0.5 tablets by mouth Every 12 (Twelve) Hours., Disp: , Rfl:   •  Mirabegron ER (Myrbetriq) 25 MG tablet sustained-release 24 hour 24 hr tablet, Take 1 tablet by mouth Daily., Disp: 30 tablet, Rfl: 11  •  mirtazapine (REMERON) 15 MG tablet, Take 1 tablet by mouth Every Night., Disp: , Rfl:   •  nitroglycerin (NITROSTAT) 0.4 MG SL tablet, 1 under the tongue as needed for angina, may repeat q5mins for up three doses, Disp: 25 tablet, Rfl: 2  •  pantoprazole (PROTONIX) 40 MG EC tablet, Take 1 tablet by mouth Daily., Disp: , Rfl:   •  Psyllium (Reguloid) 25 % powder, Take  by mouth., Disp: , Rfl:   •  ranolazine (RANEXA) 500 MG 12 hr tablet, Take 1 tablet by mouth Every 12 (Twelve) Hours., Disp: , Rfl:   •  Symbicort 80-4.5 MCG/ACT inhaler, , Disp: , Rfl:     The following portions of the patient's history were reviewed and updated as appropriate: allergies, current medications, past family history, past medical history, past social history, past surgical history and problem list.    Social History     Tobacco Use   • Smoking status: Former     Current packs/day: 0.00     Average packs/day: 3.0 packs/day for 25.0 years (75.0 ttl pk-yrs)     Types: Cigarettes     Start date:      Quit date:      Years since quittin.7     Passive exposure: Past   • Smokeless tobacco: Former     Quit date: 1986   Vaping Use   • Vaping status: Never Used   Substance Use Topics   • Alcohol use: No   • Drug use: No  "    Review of Systems   Constitutional: Negative for chills and fever.   HENT:  Negative for nosebleeds and sore throat.    Respiratory:  Negative for cough, hemoptysis and wheezing.    Gastrointestinal:  Negative for abdominal pain, hematemesis, hematochezia, melena, nausea and vomiting.   Genitourinary:  Negative for dysuria and hematuria.   Neurological:  Negative for headaches.     Objective  Vitals:    09/03/24 1438   BP: 136/60   BP Location: Left arm   Patient Position: Sitting   Cuff Size: Adult   Pulse: 63   SpO2: 94%   Weight: 83.9 kg (185 lb)   Height: 71 cm (27.95\")     Body mass index is 166.46 kg/m².    Vitals reviewed.   Constitutional:       Appearance: Well-developed.   Eyes:      Conjunctiva/sclera: Conjunctivae normal.   HENT:      Head: Normocephalic.   Neck:      Thyroid: No thyromegaly.      Vascular: No JVD.      Trachea: No tracheal deviation.   Pulmonary:      Effort: No respiratory distress.      Breath sounds: Normal breath sounds. No wheezing. No rales.   Cardiovascular:      PMI at left midclavicular line. Normal rate. Regular rhythm. Normal S1. Normal S2.       Murmurs: There is no murmur.      No gallop.  No click. No rub.   Pulses:     Intact distal pulses.   Edema:     Peripheral edema absent.   Abdominal:      General: Bowel sounds are normal.      Palpations: Abdomen is soft. There is no abdominal mass.      Tenderness: There is no abdominal tenderness.   Musculoskeletal:      Cervical back: Normal range of motion and neck supple. Skin:     General: Skin is warm and dry.   Neurological:      Mental Status: Alert and oriented to person, place, and time.      Cranial Nerves: No cranial nerve deficit.         Lab Results   Component Value Date     01/11/2024    K 4.6 01/11/2024     01/11/2024    CO2 23.6 01/11/2024    BUN 19 01/11/2024    CREATININE 1.10 01/11/2024    GLUCOSE 260 (H) 01/11/2024    CALCIUM 9.4 01/11/2024    AST 27 01/11/2024    ALT 29 01/11/2024    ALKPHOS " 128 (H) 01/11/2024    LABIL2 1.4 (L) 01/08/2016     Lab Results   Component Value Date    CKTOTAL 90 11/25/2022     Lab Results   Component Value Date    WBC 9.48 05/02/2024    HGB 14.0 05/02/2024    HCT 44.2 05/02/2024     05/02/2024     Lab Results   Component Value Date    INR 1.38 (H) 05/29/2022    INR 1.18 (H) 02/02/2022    INR 1.05 10/27/2021     Lab Results   Component Value Date    MG 2.1 05/29/2022     Lab Results   Component Value Date    TSH 3.450 12/21/2021    PSA 0.6 01/28/2022    CHLPL 109 08/12/2015    TRIG 115 05/25/2021    HDL 29 (L) 05/25/2021    LDL 58 05/25/2021      Lab Results   Component Value Date    BNP 55.0 07/16/2016       ECG 12 Lead    Date/Time: 9/3/2024 2:25 PM  Performed by: Giovanni Buenrostro MD    Authorized by: Giovanni Buenrostro MD  Comparison: compared with previous ECG from 1/8/2024  Similar to previous ECG  Rhythm: sinus rhythm  Conduction: conduction normal  ST Segments: ST segments normal  T Waves: T waves normal  Comments: Questionable old inferior wall myocardial infarction.              Assessment & Plan   Diagnosis Plan   1. ASCVD (arteriosclerotic cardiovascular disease), s/p stenting of 80 % stenosis in left circumflex on 10/05/16and 60% stenosis in the LAD, clinically stable.         2. 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        3. Essential hypertension, seems controlled.        4. Dyslipidemia  Lipid Panel    Comprehensive Metabolic Panel      5. History of pulmonary embolus (PE), has been on Eliquis with no bleeding issues          Recommendations  For his coronary artery disease, continue with clopidogrel and metoprolol as well as atorvastatin  For his hypertension, continue with metoprolol and losartan.  Will obtain lipid panel and CMP.  I encouraged him to try to get more active.    Return in about 4 months (around 1/3/2025) for or sooner if needed.    As always, Bernardo Pritchett MD  I  appreciate very much the opportunity to participate in the cardiovascular care of your patients. Please do not hesitate to call me with any questions with regards to Fidencio Luciano's evaluation and management.       With Best Regards,        Giovanni Buenrostro MD, FACC    Please note that portions of this note were completed with a voice recognition program.

## 2024-09-11 ENCOUNTER — TELEPHONE (OUTPATIENT)
Dept: ONCOLOGY | Facility: CLINIC | Age: 79
End: 2024-09-11
Payer: MEDICARE

## 2024-09-11 NOTE — TELEPHONE ENCOUNTER
Caller: Angela Luciano (POA)    Relationship: Emergency Contact    Best call back number: [phone]    What is the best time to reach you: ANYTIME    Who are you requesting to speak with (clinical staff, provider,  specific staff member):     What was the call regarding: PLEASE CANCEL 9/16 APPTS - DOES NOT NEED TO R/S AT THIS TIME.

## 2024-09-20 ENCOUNTER — TRANSCRIBE ORDERS (OUTPATIENT)
Dept: ADMINISTRATIVE | Facility: HOSPITAL | Age: 79
End: 2024-09-20
Payer: MEDICARE

## 2024-09-20 DIAGNOSIS — G20.A1 PARKINSON DISEASE TYPE 9: Primary | ICD-10-CM

## 2024-09-27 ENCOUNTER — HOSPITAL ENCOUNTER (OUTPATIENT)
Dept: MRI IMAGING | Facility: HOSPITAL | Age: 79
Discharge: HOME | End: 2024-09-27
Payer: MEDICARE

## 2024-09-27 DIAGNOSIS — G20.A1 PARKINSON DISEASE TYPE 9: ICD-10-CM

## 2024-09-27 PROCEDURE — 70551 MRI BRAIN STEM W/O DYE: CPT

## 2025-02-14 ENCOUNTER — OFFICE VISIT (OUTPATIENT)
Dept: UROLOGY | Facility: CLINIC | Age: 80
End: 2025-02-14
Payer: MEDICARE

## 2025-02-14 VITALS — HEART RATE: 51 BPM | SYSTOLIC BLOOD PRESSURE: 108 MMHG | DIASTOLIC BLOOD PRESSURE: 58 MMHG

## 2025-02-14 DIAGNOSIS — N40.1 BENIGN PROSTATIC HYPERPLASIA (BPH) WITH URINARY URGE INCONTINENCE: Primary | ICD-10-CM

## 2025-02-14 DIAGNOSIS — N39.41 BENIGN PROSTATIC HYPERPLASIA (BPH) WITH URINARY URGE INCONTINENCE: Primary | ICD-10-CM

## 2025-02-14 NOTE — PROGRESS NOTES
Chief Complaint:    Chief Complaint   Patient presents with    Benign Prostatic Hypertrophy       Vital Signs:   /58 (BP Location: Right arm, Patient Position: Sitting)   Pulse 51   There is no height or weight on file to calculate BMI.      HPI:  Fidencio Luciano is a 79 y.o. male who presents today for follow up    History of Present Illness  Mr. Luciano returns to the clinic today for follow-up for BPH with urinary urge incontinence.  He has been seen routinely in office by Griselda Cheng-Akwa APRN.  He is a current nursing home resident [facility] in [facility].  He is taking Myrbetriq 25 mg once daily to help with his urge incontinence as well as frequency.  He does use briefs.  He is currently wheelchair-bound.  He had a PSA in 2023 that came back great at 0.6.  Prior to this in 2021 his PSA was 2.83.  He is currently taking finasteride daily.  He does not take any alpha blockade.  He denies any changes in lower urinary tract symptoms since last office visit.  I did attempt to take a PSA however was unsuccessful.  Will have this completed by his facility upon return      Past Medical History:  Past Medical History:   Diagnosis Date    BPH (benign prostatic hyperplasia)     Bradycardia     Positive tilt table testing 07/2016    Coronary artery disease     Diabetes mellitus     Diverticulosis     Elevated cholesterol     Gastric ulcer with hemorrhage     Gastroesophageal reflux disease 1/26/2021    History of transfusion     Hyperlipidemia     Hypertension     Psoriasis        Current Meds:  Current Outpatient Medications   Medication Sig Dispense Refill    acetaminophen (TYLENOL) 500 MG tablet Take 1 tablet by mouth Every 6 (Six) Hours As Needed for Mild Pain.      albuterol sulfate  (90 Base) MCG/ACT inhaler Inhale 2 puffs 4 (Four) Times a Day As Needed for Wheezing.      apixaban (ELIQUIS) 5 MG tablet tablet Take 1 tablet by mouth Every 12 (Twelve) Hours.      atorvastatin (LIPITOR) 80  MG tablet Take 1 tablet by mouth Every Night. 90 tablet 5    BD AutoShield Duo 30G X 5 MM misc       bumetanide (BUMEX) 1 MG tablet Take 1 tablet by mouth Daily. 30 tablet 3    carbidopa-levodopa (SINEMET)  MG per tablet       Carboxymethylcellulose Sodium (ARTIFICIAL TEARS OP) Apply 0.4 % to eye(s) as directed by provider 4 (Four) Times a Day.      clopidogrel (PLAVIX) 75 MG tablet Take 1 tablet by mouth Daily. 30 tablet 5    colestipol (COLESTID) 1 g tablet Take 1 tablet by mouth 2 (Two) Times a Day.      divalproex (DEPAKOTE) 250 MG DR tablet       donepezil (ARICEPT) 5 MG tablet Take 1 tablet by mouth Every Night.      finasteride (PROSCAR) 5 MG tablet Take 1 tablet by mouth Every Night. 30 tablet 11    folic acid-pyridoxine-cyanocobalamin (FOLBIC) 2.5-25-2 MG tablet tablet Take  by mouth Daily.      insulin regular (humuLIN R,novoLIN R) 100 UNIT/ML injection Inject  under the skin into the appropriate area as directed 4 (Four) Times a Day. 5 units plus s/s QID      ipratropium-albuterol (DUO-NEB) 0.5-2.5 mg/3 ml nebulizer Take 3 mL by nebulization 2 (Two) Times a Day. 360 mL 3    iron polysaccharides (NIFEREX) 150 MG capsule Take 1 capsule by mouth Daily. 30 capsule 3    isosorbide dinitrate (ISORDIL) 30 MG tablet Take 2 tablets by mouth 2 (Two) Times a Day.      Levemir 100 UNIT/ML injection       losartan (Cozaar) 25 MG tablet Take 1 tablet by mouth Daily. 30 tablet 3    Melatonin 5 MG capsule Take 5 mg by mouth Every Night. 30 each 1    memantine (NAMENDA XR) 28 MG capsule sustained-release 24 hr extended release capsule Take 1 capsule by mouth Daily.      methylcellulose oral powder Take 2 Applications by mouth Daily. 2tbsp      metoprolol tartrate (LOPRESSOR) 25 MG tablet Take 0.5 tablets by mouth Every 12 (Twelve) Hours.      Mirabegron ER (Myrbetriq) 25 MG tablet sustained-release 24 hour 24 hr tablet Take 1 tablet by mouth Daily. 30 tablet 11    mirtazapine (REMERON) 15 MG tablet Take 1 tablet by  mouth Every Night.      nitroglycerin (NITROSTAT) 0.4 MG SL tablet 1 under the tongue as needed for angina, may repeat q5mins for up three doses 25 tablet 2    pantoprazole (PROTONIX) 40 MG EC tablet Take 1 tablet by mouth Daily.      Psyllium (Reguloid) 25 % powder Take  by mouth.      ranolazine (RANEXA) 500 MG 12 hr tablet Take 1 tablet by mouth Every 12 (Twelve) Hours.      Symbicort 80-4.5 MCG/ACT inhaler        No current facility-administered medications for this visit.        Allergies:   No Known Allergies     Past Surgical History:  Past Surgical History:   Procedure Laterality Date    BACK SURGERY      CARDIAC CATHETERIZATION N/A 9/20/2016    Procedure: Left Heart Cath;  Surgeon: Giovanni Buenrostro MD;  [facility];  Service:     CARDIAC CATHETERIZATION N/A 10/4/2016    Procedure: Left Heart Cath;  Surgeon: Bharathi Andrew MD;  [facility];  Service:     CARDIAC CATHETERIZATION N/A 5/9/2017    Procedure: Left Heart Cath;  Surgeon: Giovanni Buenrostro MD;  [facility];  Service:     CARDIAC CATHETERIZATION N/A 5/9/2017    Procedure: ERR;  Surgeon: Giovanni Buenrostro MD;  [facility];  Service:     CARDIAC CATHETERIZATION N/A 11/8/2019    Procedure: Left Heart Cath;  Surgeon: Abraham Oviedo MD;  [facility];  Service: Cardiology    CARDIAC CATHETERIZATION N/A 12/18/2019    Procedure: Coronary angiography;  Surgeon: Jay Barney IV, MD;  Location:  [facility];  Service: Cardiovascular    CHOLECYSTECTOMY      COLONOSCOPY  11/13/2015    Dr. Martinez- internal hemorrhoids, sigmoid diverticulosis    COLONOSCOPY N/A 8/17/2018    Procedure: COLONOSCOPY CPT CODE: 98691;  Surgeon: Gigi Geronimo MD;  Location: [facility] OR;  Service: Gastroenterology    COLONOSCOPY N/A 5/17/2022    Procedure: COLONOSCOPY;  Surgeon: Geno Vernon MD;   Location:  [facility] OR;  Service: Gastroenterology;  Laterality: N/A;    CORONARY ANGIOPLASTY WITH STENT PLACEMENT      ENDOSCOPY N/A 2018    Procedure: ESOPHAGOGASTRODUODENOSCOPY WITH BIOPSY CPT CODE: 24503;  Surgeon: Gigi Geronimo MD;  Location:  [facility];  Service: Gastroenterology    ENDOSCOPY N/A 2021    Procedure: ESOPHAGOGASTRODUODENOSCOPY;  Surgeon: Geno Vernon MD;  Location:  [facility];  Service: Gastroenterology;  Laterality: N/A;    ENDOSCOPY N/A 2022    Procedure: ESOPHAGOGASTRODUODENOSCOPY WITH BIOPSY;  Surgeon: Geno Vernon MD;  Location:  [facility];  Service: Gastroenterology;  Laterality: N/A;    INTERVENTIONAL RADIOLOGY PROCEDURE N/A 2019    Procedure: Coil Embolization of septal to pulmonary artery fistuala.;  Surgeon: Jay Barney IV, MD;  Location:  [facility];  Service: Cardiovascular    UT RT/LT HEART CATHETERS N/A 2017    Procedure: Percutaneous Coronary Intervention;  Surgeon: Lincoln De Los Santos MD;  [facility]:  [facility];  Service: Cardiology    STOMACH SURGERY      FUSION OF BLEEDING ULCER    UPPER GASTROINTESTINAL ENDOSCOPY  2015    Dr. Martinez- mild gastritis       Social History:  Social History     Socioeconomic History    Marital status:    Tobacco Use    Smoking status: Former     Current packs/day: 0.00     Average packs/day: 3.0 packs/day for 25.0 years (75.0 ttl pk-yrs)     Types: Cigarettes     Start date:      Quit date:      Years since quittin.1     Passive exposure: Past    Smokeless tobacco: Former     Quit date: 1986   Vaping Use    Vaping status: Never Used   Substance and Sexual Activity    Alcohol use: No    Drug use: No    Sexual activity: Defer       Family History:  Family History   Problem Relation Age of Onset    Sick sinus syndrome Mother     Heart failure Mother     Diabetes Mother     Liver cancer Father        Review of  Systems:  Review of Systems   Constitutional:  Negative for chills, fatigue and fever.   Respiratory:  Negative for cough, shortness of breath and wheezing.    Cardiovascular:  Negative for leg swelling.   Gastrointestinal:  Negative for abdominal pain, nausea and vomiting.   Genitourinary:  Negative for difficulty urinating, dysuria, frequency, penile pain, penile swelling, scrotal swelling, testicular pain and urgency.   Musculoskeletal:  Positive for joint swelling. Negative for back pain.   Neurological:  Negative for dizziness and headaches.   Psychiatric/Behavioral:  Negative for confusion.        Physical Exam:  Physical Exam  Constitutional:       General: He is not in acute distress.     Appearance: Normal appearance.   HENT:      Head: Normocephalic and atraumatic.      Nose: Nose normal.      Mouth/Throat:      Mouth: Mucous membranes are moist.   Eyes:      Conjunctiva/sclera: Conjunctivae normal.   Cardiovascular:      Rate and Rhythm: Normal rate and regular rhythm.      Pulses: Normal pulses.      Heart sounds: Normal heart sounds.   Pulmonary:      Effort: Pulmonary effort is normal.      Breath sounds: Normal breath sounds.   Abdominal:      General: Bowel sounds are normal.      Palpations: Abdomen is soft.   Musculoskeletal:      Cervical back: Normal range of motion.      Comments: Wheelchair-bound   Skin:     General: Skin is warm.   Neurological:      Mental Status: He is alert and oriented to person, place, and time. Mental status is at baseline.      Motor: Weakness present.   Psychiatric:         Mood and Affect: Mood normal.         Behavior: Behavior normal.         Thought Content: Thought content normal.         Judgment: Judgment normal.         IPSS Questionnaire (AUA-7):  IPSS Questionnaire (AUA-7):                  IPSS Questionnaire (AUA-7):  Over the past month…    1)  Incomplete Emptying  How often have you had a sensation of not emptying your bladder?  0 - Not at all   2)   Frequency  How often have you had to urinate less than every two hours? 0 - Not at all   3)  Intermittency  How often have you found you stopped and started again several times when you urinated?  0 - Not at all   4) Urgency  How often have you found it difficult to postpone urination?  5 - Almost always   5) Weak Stream  How often have you had a weak urinary stream?  0 - Not at all   6) Straining  How often have you had to push or strain to begin urination?  0 - Not at all   7) Nocturia  How many times did you typically get up at night to urinate?  0 - None   Total Score:  5   The International Prostate Symptom Score (IPSS) is used to screen, diagnose, track symptoms of benign prostatic hyperplasia (BPH).    0-7 pts (Mild Symptoms)  / 8-19 pts (Moderate) / 20-35 (Severe)    Quality of life due to urinary symptoms:  If you were to spend the rest of your life with your urinary condition the way it is now, how would you feel about that? 1-Pleased   Urine Leakage (Incontinence) 0-No Leakage       Recent Image (CT and/or KUB):   CT Abdomen and Pelvis: No results found for this or any previous visit.     CT Stone Protocol: No results found for this or any previous visit.     KUB: No results found for this or any previous visit.       Labs:  Brief Urine Lab Results       None          No visits with results within 3 Month(s) from this visit.   Latest known visit with results is:   Lab on 05/02/2024   Component Date Value Ref Range Status    Iron 05/02/2024 109  59 - 158 mcg/dL Final    Iron Saturation (TSAT) 05/02/2024 26  20 - 50 % Final    Transferrin 05/02/2024 285  200 - 360 mg/dL Final    TIBC 05/02/2024 425  298 - 536 mcg/dL Final    Ferritin 05/02/2024 128.90  30.00 - 400.00 ng/mL Final    WBC 05/02/2024 9.48  3.40 - 10.80 10*3/mm3 Final    RBC 05/02/2024 4.35  4.14 - 5.80 10*6/mm3 Final    Hemoglobin 05/02/2024 14.0  13.0 - 17.7 g/dL Final    Hematocrit 05/02/2024 44.2  37.5 - 51.0 % Final    MCV 05/02/2024 101.6  (H)  79.0 - 97.0 fL Final    MCH 05/02/2024 32.2  26.6 - 33.0 pg Final    MCHC 05/02/2024 31.7  31.5 - 35.7 g/dL Final    RDW 05/02/2024 13.7  12.3 - 15.4 % Final    RDW-SD 05/02/2024 51.5  37.0 - 54.0 fl Final    MPV 05/02/2024 10.5  6.0 - 12.0 fL Final    Platelets 05/02/2024 201  140 - 450 10*3/mm3 Final    Neutrophil % 05/02/2024 62.5  42.7 - 76.0 % Final    Lymphocyte % 05/02/2024 22.8  19.6 - 45.3 % Final    Monocyte % 05/02/2024 7.9  5.0 - 12.0 % Final    Eosinophil % 05/02/2024 5.6  0.3 - 6.2 % Final    Basophil % 05/02/2024 0.8  0.0 - 1.5 % Final    Immature Grans % 05/02/2024 0.4  0.0 - 0.5 % Final    Neutrophils, Absolute 05/02/2024 5.92  1.70 - 7.00 10*3/mm3 Final    Lymphocytes, Absolute 05/02/2024 2.16  0.70 - 3.10 10*3/mm3 Final    Monocytes, Absolute 05/02/2024 0.75  0.10 - 0.90 10*3/mm3 Final    Eosinophils, Absolute 05/02/2024 0.53 (H)  0.00 - 0.40 10*3/mm3 Final    Basophils, Absolute 05/02/2024 0.08  0.00 - 0.20 10*3/mm3 Final    Immature Grans, Absolute 05/02/2024 0.04  0.00 - 0.05 10*3/mm3 Final    nRBC 05/02/2024 0.0  0.0 - 0.2 /100 WBC Final        Procedure: None  Procedures     I have reviewed and agree with the above PMH, PSH, FMH, social history, medications, allergies, and labs.     Assessment/Plan:   Problem List Items Addressed This Visit       Benign prostatic hyperplasia (BPH) with urinary urge incontinence - Primary    Relevant Orders    PSA DIAGNOSTIC       Health Maintenance:   Health Maintenance Due   Topic Date Due    DIABETIC FOOT EXAM  Never done    URINE MICROALBUMIN-CREATININE RATIO (uACR)  Never done    ZOSTER VACCINE (2 of 3) 02/15/2013    HEPATITIS C SCREENING  Never done    ANNUAL WELLNESS VISIT  Never done    TDAP/TD VACCINES (3 - Td or Tdap) 09/14/2019    DIABETIC EYE EXAM  02/16/2020    RSV Vaccine - Adults (1 - 1-dose 75+ series) Never done    LIPID PANEL  05/25/2022    HEMOGLOBIN A1C  11/29/2022    INFLUENZA VACCINE  07/01/2024    COVID-19 Vaccine (3 - 2024-25  season) 09/01/2024    BMI FOLLOWUP  01/29/2025        Smoking Counseling: Former smoker.  Former user of smokeless tobacco.  Counseling given.    Urine Incontinence: Patient reports that he is experiencing urge incontinence.  He does use briefs daily.    Patient was given instructions and counseling regarding his condition or for health maintenance advice. Please see specific information pulled into the AVS if appropriate.    Patient Education:   BPH with urinary urgency and incontinence-discussed with the patient the pathophysiology of this condition in detail.  I did recommend a repeat PSA in office today however we were unable to obtain blood work.  Will give an order to be placed by his local facility and have them fax to us as soon as possible.  Will continue with finasteride 5 mg once daily.  Will also continue with Myrbetriq 25 mg once daily for urinary incontinence.  Otherwise we will see him back in 1 year    Visit Diagnoses:    ICD-10-CM ICD-9-CM   1. Benign prostatic hyperplasia (BPH) with urinary urge incontinence  N40.1 600.01    N39.41 788.31     A total of 20 minutes were spent coordinating this patient’s care in clinic today; 12 minutes of which were face-to-face with the patient, reviewing medical history and counseling on the current treatment and followup plan.  All questions were answered to patient's satisfaction.    Meds Ordered During Visit:  No orders of the defined types were placed in this encounter.      Follow Up Appointment: 1 year  No follow-ups on file.      This document has been electronically signed by Oz Jennings PA-C   February 14, 2025 12:44 EST    Part of this note may be an electronic transcription/translation of spoken language to printed text using the Dragon Dictation System.

## 2025-03-26 ENCOUNTER — OFFICE VISIT (OUTPATIENT)
Dept: CARDIOLOGY | Facility: CLINIC | Age: 80
End: 2025-03-26
Payer: MEDICARE

## 2025-03-26 VITALS
SYSTOLIC BLOOD PRESSURE: 151 MMHG | HEIGHT: 71 IN | OXYGEN SATURATION: 97 % | HEART RATE: 54 BPM | DIASTOLIC BLOOD PRESSURE: 53 MMHG | RESPIRATION RATE: 16 BRPM | BODY MASS INDEX: 25.8 KG/M2

## 2025-03-26 DIAGNOSIS — Z86.711 HISTORY OF PULMONARY EMBOLUS (PE): ICD-10-CM

## 2025-03-26 DIAGNOSIS — I10 ESSENTIAL HYPERTENSION: ICD-10-CM

## 2025-03-26 DIAGNOSIS — R00.1 BRADYCARDIA, SINUS: ICD-10-CM

## 2025-03-26 DIAGNOSIS — I25.10 ASCVD (ARTERIOSCLEROTIC CARDIOVASCULAR DISEASE): Primary | ICD-10-CM

## 2025-03-26 DIAGNOSIS — I25.41 CORONARY ARTERY FISTULA: ICD-10-CM

## 2025-03-26 DIAGNOSIS — E78.5 DYSLIPIDEMIA: ICD-10-CM

## 2025-03-26 PROCEDURE — 99214 OFFICE O/P EST MOD 30 MIN: CPT | Performed by: INTERNAL MEDICINE

## 2025-03-26 PROCEDURE — 3077F SYST BP >= 140 MM HG: CPT | Performed by: INTERNAL MEDICINE

## 2025-03-26 PROCEDURE — 3078F DIAST BP <80 MM HG: CPT | Performed by: INTERNAL MEDICINE

## 2025-03-26 PROCEDURE — 93000 ELECTROCARDIOGRAM COMPLETE: CPT | Performed by: INTERNAL MEDICINE

## 2025-03-26 RX ORDER — INSULIN GLARGINE 100 [IU]/ML
23 INJECTION, SOLUTION SUBCUTANEOUS DAILY
COMMUNITY

## 2025-03-26 RX ORDER — LOSARTAN POTASSIUM 50 MG/1
50 TABLET ORAL DAILY
Qty: 30 TABLET | Refills: 5 | Status: SHIPPED | OUTPATIENT
Start: 2025-03-26

## 2025-03-26 RX ORDER — LORATADINE 10 MG/1
CAPSULE, LIQUID FILLED ORAL
COMMUNITY

## 2025-03-26 RX ORDER — QUETIAPINE FUMARATE 25 MG/1
25 TABLET, FILM COATED ORAL DAILY
COMMUNITY

## 2025-03-26 RX ORDER — DIPHENOXYLATE HYDROCHLORIDE AND ATROPINE SULFATE 2.5; .025 MG/1; MG/1
TABLET ORAL DAILY
COMMUNITY

## 2025-03-26 RX ORDER — DUPILUMAB 300 MG/2ML
300 INJECTION, SOLUTION SUBCUTANEOUS
COMMUNITY

## 2025-03-26 NOTE — LETTER
March 26, 2025     Bernardo Pritchett MD  [address] [facility] [address]    Patient: Fidencio Luciano   YOB: 1945   Date of Visit: 3/26/2025     Dear Bernardo Pritchett MD:       Thank you for referring Fidencio Luciano to me for evaluation. Below are the relevant portions of my assessment and plan of care.    If you have questions, please do not hesitate to call me. I look forward to following Fidencio along with you.         Sincerely,        Giovanni Buenrosrto MD        CC: No Recipients    Giovanni Buenrostro MD  03/26/25 1152  Sign when Signing Visit  Bernardo Pritchett MD  Fidencio Luciano  1945  03/26/2025    Patient Active Problem List   Diagnosis   • Essential hypertension   • Type 2 diabetes mellitus   • Benign prostatic hyperplasia (BPH) with urinary urge incontinence   • ASCVD (arteriosclerotic cardiovascular disease), s/p stenting of 80 % stenosis in left circumflex on 10/05/16and 60% stenosis in the LAD, clinically stable.    • Hyperlipidemia LDL goal <70   • Weakness generalized   • Coronary artery fistula   • Weight loss, unintentional   • Iron deficiency anemia   • Gastroesophageal reflux disease   • Dysphagia   • Chest pain   • History of pulmonary embolism in February 22, patient is on Eliquis.   • Early satiety   • Diarrhea       Dear Bernardo Pritchett MD:    Subjective    Fidencio Luciano is a 79 y.o. male with the problems as listed above, presents    Chief complaint: Follow-up of coronary artery disease.    History of Present Illness: Mr. 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 Barney.  He is here today as a regular cardiology follow-up.  He is accompanied by his wife and the nursing home attendant.  On further questioning he denies any complaints of chest pain or discomfort  or significant shortness of breath.  No complaints of dizziness or syncope.  His EKG in the office today reveals sinus bradycardia at 51 bpm with first-degree AV block but otherwise within normal limits.  Denies any major bleeding problems such as blood in the stools or black stools.    No Known Allergies:    Current Outpatient Medications:   •  acetaminophen (TYLENOL) 500 MG tablet, Take 1 tablet by mouth Every 6 (Six) Hours As Needed for Mild Pain., Disp: , Rfl:   •  albuterol sulfate  (90 Base) MCG/ACT inhaler, Inhale 2 puffs 4 (Four) Times a Day As Needed for Wheezing., Disp: , Rfl:   •  apixaban (ELIQUIS) 5 MG tablet tablet, Take 1 tablet by mouth Every 12 (Twelve) Hours., Disp: , Rfl:   •  atorvastatin (LIPITOR) 80 MG tablet, Take 1 tablet by mouth Every Night., Disp: 90 tablet, Rfl: 5  •  BD AutoShield Duo 30G X 5 MM misc, , Disp: , Rfl:   •  bumetanide (BUMEX) 1 MG tablet, Take 1 tablet by mouth Daily., Disp: 30 tablet, Rfl: 3  •  Carboxymethylcellulose Sodium (ARTIFICIAL TEARS OP), Apply 0.4 % to eye(s) as directed by provider 4 (Four) Times a Day., Disp: , Rfl:   •  clopidogrel (PLAVIX) 75 MG tablet, Take 1 tablet by mouth Daily., Disp: 30 tablet, Rfl: 5  •  colestipol (COLESTID) 1 g tablet, Take 1 tablet by mouth 2 (Two) Times a Day., Disp: , Rfl:   •  divalproex (DEPAKOTE) 250 MG DR tablet, , Disp: , Rfl:   •  donepezil (ARICEPT) 5 MG tablet, Take 1 tablet by mouth Every Night., Disp: , Rfl:   •  Dupixent 300 MG/2ML solution auto-injector injection, Inject 2 mL under the skin into the appropriate area as directed., Disp: , Rfl:   •  finasteride (PROSCAR) 5 MG tablet, Take 1 tablet by mouth Every Night., Disp: 30 tablet, Rfl: 11  •  folic acid-pyridoxine-cyanocobalamin (FOLBIC) 2.5-25-2 MG tablet tablet, Take  by mouth Daily., Disp: , Rfl:   •  insulin glargine (Lantus) 100 UNIT/ML injection, Inject 23 Units under the skin into the appropriate area as directed Daily., Disp: , Rfl:   •  insulin  regular (humuLIN R,novoLIN R) 100 UNIT/ML injection, Inject  under the skin into the appropriate area as directed 4 (Four) Times a Day. 5 units plus s/s QID, Disp: , Rfl:   •  ipratropium-albuterol (DUO-NEB) 0.5-2.5 mg/3 ml nebulizer, Take 3 mL by nebulization 2 (Two) Times a Day., Disp: 360 mL, Rfl: 3  •  iron polysaccharides (NIFEREX) 150 MG capsule, Take 1 capsule by mouth Daily., Disp: 30 capsule, Rfl: 3  •  isosorbide dinitrate (ISORDIL) 30 MG tablet, Take 2 tablets by mouth 2 (Two) Times a Day., Disp: , Rfl:   •  Loratadine (Claritin) 10 MG capsule, Take  by mouth., Disp: , Rfl:   •  losartan (Cozaar) 50 MG tablet, Take 1 tablet by mouth Daily., Disp: 30 tablet, Rfl: 5  •  Melatonin 5 MG capsule, Take 5 mg by mouth Every Night., Disp: 30 each, Rfl: 1  •  memantine (NAMENDA XR) 28 MG capsule sustained-release 24 hr extended release capsule, Take 1 capsule by mouth Daily., Disp: , Rfl:   •  methylcellulose oral powder, Take 2 Applications by mouth Daily. 2tbsp, Disp: , Rfl:   •  Mirabegron ER (Myrbetriq) 25 MG tablet sustained-release 24 hour 24 hr tablet, Take 1 tablet by mouth Daily., Disp: 30 tablet, Rfl: 11  •  mirtazapine (REMERON) 15 MG tablet, Take 1 tablet by mouth Every Night., Disp: , Rfl:   •  multivitamin (MULTI-VITAMIN DAILY PO), Take  by mouth Daily., Disp: , Rfl:   •  nitroglycerin (NITROSTAT) 0.4 MG SL tablet, 1 under the tongue as needed for angina, may repeat q5mins for up three doses, Disp: 25 tablet, Rfl: 2  •  pantoprazole (PROTONIX) 40 MG EC tablet, Take 1 tablet by mouth Daily., Disp: , Rfl:   •  Psyllium (Reguloid) 25 % powder, Take  by mouth., Disp: , Rfl:   •  QUEtiapine (SEROquel) 25 MG tablet, Take 1 tablet by mouth Daily., Disp: , Rfl:   •  ranolazine (RANEXA) 500 MG 12 hr tablet, Take 1 tablet by mouth Every 12 (Twelve) Hours., Disp: , Rfl:   •  tiZANidine (ZANAFLEX) 4 MG tablet, Take 1 tablet by mouth At Night As Needed for Muscle Spasms., Disp: , Rfl:   •  carbidopa-levodopa  "(SINEMET)  MG per tablet, , Disp: , Rfl:   •  Symbicort 80-4.5 MCG/ACT inhaler, , Disp: , Rfl:     The following portions of the patient's history were reviewed and updated as appropriate: allergies, current medications, past family history, past medical history, past social history, past surgical history and problem list.    Social History     Tobacco Use   • Smoking status: Former     Current packs/day: 0.00     Average packs/day: 3.0 packs/day for 25.0 years (75.0 ttl pk-yrs)     Types: Cigarettes     Start date:      Quit date:      Years since quittin.2     Passive exposure: Past   • Smokeless tobacco: Former     Quit date: 1986   Vaping Use   • Vaping status: Never Used   Substance Use Topics   • Alcohol use: No   • Drug use: No     Review of Systems   Constitutional: Negative for chills and fever.   HENT:  Negative for nosebleeds and sore throat.    Respiratory:  Negative for cough, hemoptysis and wheezing.    Gastrointestinal:  Negative for abdominal pain, hematemesis, hematochezia, melena, nausea and vomiting.   Genitourinary:  Negative for dysuria and hematuria.   Neurological:  Negative for headaches.     Objective  Vitals:    25 0937   BP: 151/53   Pulse: 54   Resp: 16   SpO2: 97%   Height: 180.3 cm (71\")     Body mass index is 25.8 kg/m².    Vitals reviewed.   Constitutional:       Appearance: Well-developed.   Eyes:      Conjunctiva/sclera: Conjunctivae normal.   HENT:      Head: Normocephalic.   Neck:      Thyroid: No thyromegaly.      Vascular: No JVD.      Trachea: No tracheal deviation.   Pulmonary:      Effort: No respiratory distress.      Breath sounds: Normal breath sounds. No wheezing. No rales.   Cardiovascular:      PMI at left midclavicular line. Normal rate. Regular rhythm. Normal S1. Normal S2.       Murmurs: There is no murmur.      No gallop.  No click. No rub.   Pulses:     Intact distal pulses.   Edema:     Peripheral edema absent.   Abdominal:      " General: Bowel sounds are normal.      Palpations: Abdomen is soft. There is no abdominal mass.      Tenderness: There is no abdominal tenderness.   Musculoskeletal:      Cervical back: Normal range of motion and neck supple. Skin:     General: Skin is warm and dry.   Neurological:      Mental Status: Alert and oriented to person, place, and time.      Cranial Nerves: No cranial nerve deficit.         Lab Results   Component Value Date     01/11/2024    K 4.6 01/11/2024     01/11/2024    CO2 23.6 01/11/2024    BUN 19 01/11/2024    CREATININE 1.10 01/11/2024    GLUCOSE 260 (H) 01/11/2024    CALCIUM 9.4 01/11/2024    AST 27 01/11/2024    ALT 29 01/11/2024    ALKPHOS 128 (H) 01/11/2024     Lab Results   Component Value Date    CKTOTAL 90 11/25/2022     Lab Results   Component Value Date    WBC 9.48 05/02/2024    HGB 14.0 05/02/2024    HCT 44.2 05/02/2024     05/02/2024     Lab Results   Component Value Date    INR 1.38 (H) 05/29/2022    INR 1.18 (H) 02/02/2022    INR 1.05 10/27/2021     Lab Results   Component Value Date    MG 2.1 05/29/2022     Lab Results   Component Value Date    TSH 3.450 12/21/2021    PSA 0.6 01/28/2022    CHLPL 109 08/12/2015    TRIG 115 05/25/2021    HDL 29 (L) 05/25/2021    LDL 58 05/25/2021      Lab Results   Component Value Date    BNP 55.0 07/16/2016       ECG 12 Lead    Date/Time: 3/26/2025 9:59 AM  Performed by: Giovanni Buenrostro MD    Authorized by: Giovanni Buenrostro MD  Comparison: compared with previous ECG from 9/3/2024  Similar to previous ECG  Comparison to previous ECG: For the heart rate which is slower now.  Rhythm: sinus bradycardia  BPM: 51  Conduction: conduction normal  ST Segments: ST segments normal  T Waves: T waves normal          Assessment & Plan   Diagnosis Plan   1. ASCVD (arteriosclerotic cardiovascular disease), s/p stenting of 80 % stenosis in left circumflex on 10/05/16and 60% stenosis in the LAD, clinically stable.         2. Coronary artery  fistula to his LAD ,s/p coiling in December 2019, clinically asymptomatic and stable.        3. Dyslipidemia        4. History of pulmonary embolus (PE), has been on Eliquis with no bleeding issues        5. Essential hypertension, seems controlled.        6. Bradycardia, sinus, asymptomatic          Recommendations  Since his heart rate is running in the low 50s, will discontinue the metoprolol that he is taking at a very low dose anyway.  Will increase the dose of losartan to 50 mg daily.  Check BMP in a week.  Keep a check on his blood pressure at the nursing home twice a day and follow-up with Dr. Pritchett on rounds.  Continue with clopidogrel for his CAD and Eliquis for history of PE.    Return in about 6 months (around 9/26/2025).    As always, Bernardo Pritchett MD  I appreciate very much the opportunity to participate in the cardiovascular care of your patients. Please do not hesitate to call me with any questions with regards to Fidencio CHELO Urbanbs's evaluation and management.       With Best Regards,        Giovanni Buenrostro MD, [facility]    Please note that portions of this note were completed with a voice recognition program.

## 2025-03-26 NOTE — PROGRESS NOTES
Bernardo Pritchett MD  Fidencio Luciano  1945  03/26/2025    Patient Active Problem List   Diagnosis    Essential hypertension    Type 2 diabetes mellitus    Benign prostatic hyperplasia (BPH) with urinary urge incontinence    ASCVD (arteriosclerotic cardiovascular disease), s/p stenting of 80 % stenosis in left circumflex on 10/05/16and 60% stenosis in the LAD, clinically stable.     Hyperlipidemia LDL goal <70    Weakness generalized    Coronary artery fistula    Weight loss, unintentional    Iron deficiency anemia    Gastroesophageal reflux disease    Dysphagia    Chest pain    History of pulmonary embolism in February 22, patient is on Eliquis.    Early satiety    Diarrhea       Dear Bernardo Pritchett MD:    Subjective     Fidencio Luciano is a 79 y.o. male with the problems as listed above, presents    Chief complaint: Follow-up of coronary artery disease.    History of Present Illness: Mr. 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 Barney.  He is here today as a regular cardiology follow-up.  He is accompanied by his wife and the nursing home attendant.  On further questioning he denies any complaints of chest pain or discomfort or significant shortness of breath.  No complaints of dizziness or syncope.  His EKG in the office today reveals sinus bradycardia at 51 bpm with first-degree AV block but otherwise within normal limits.  Denies any major bleeding problems such as blood in the stools or black stools.    No Known Allergies:    Current Outpatient Medications:     acetaminophen (TYLENOL) 500 MG tablet, Take 1 tablet by mouth Every 6 (Six) Hours As Needed for Mild Pain., Disp: , Rfl:     albuterol sulfate  (90 Base) MCG/ACT inhaler, Inhale 2 puffs 4 (Four) Times a Day As Needed for  Wheezing., Disp: , Rfl:     apixaban (ELIQUIS) 5 MG tablet tablet, Take 1 tablet by mouth Every 12 (Twelve) Hours., Disp: , Rfl:     atorvastatin (LIPITOR) 80 MG tablet, Take 1 tablet by mouth Every Night., Disp: 90 tablet, Rfl: 5    BD AutoShield Duo 30G X 5 MM misc, , Disp: , Rfl:     bumetanide (BUMEX) 1 MG tablet, Take 1 tablet by mouth Daily., Disp: 30 tablet, Rfl: 3    Carboxymethylcellulose Sodium (ARTIFICIAL TEARS OP), Apply 0.4 % to eye(s) as directed by provider 4 (Four) Times a Day., Disp: , Rfl:     clopidogrel (PLAVIX) 75 MG tablet, Take 1 tablet by mouth Daily., Disp: 30 tablet, Rfl: 5    colestipol (COLESTID) 1 g tablet, Take 1 tablet by mouth 2 (Two) Times a Day., Disp: , Rfl:     divalproex (DEPAKOTE) 250 MG DR tablet, , Disp: , Rfl:     donepezil (ARICEPT) 5 MG tablet, Take 1 tablet by mouth Every Night., Disp: , Rfl:     Dupixent 300 MG/2ML solution auto-injector injection, Inject 2 mL under the skin into the appropriate area as directed., Disp: , Rfl:     finasteride (PROSCAR) 5 MG tablet, Take 1 tablet by mouth Every Night., Disp: 30 tablet, Rfl: 11    folic acid-pyridoxine-cyanocobalamin (FOLBIC) 2.5-25-2 MG tablet tablet, Take  by mouth Daily., Disp: , Rfl:     insulin glargine (Lantus) 100 UNIT/ML injection, Inject 23 Units under the skin into the appropriate area as directed Daily., Disp: , Rfl:     insulin regular (humuLIN R,novoLIN R) 100 UNIT/ML injection, Inject  under the skin into the appropriate area as directed 4 (Four) Times a Day. 5 units plus s/s QID, Disp: , Rfl:     ipratropium-albuterol (DUO-NEB) 0.5-2.5 mg/3 ml nebulizer, Take 3 mL by nebulization 2 (Two) Times a Day., Disp: 360 mL, Rfl: 3    iron polysaccharides (NIFEREX) 150 MG capsule, Take 1 capsule by mouth Daily., Disp: 30 capsule, Rfl: 3    isosorbide dinitrate (ISORDIL) 30 MG tablet, Take 2 tablets by mouth 2 (Two) Times a Day., Disp: , Rfl:     Loratadine (Claritin) 10 MG capsule, Take  by mouth., Disp: , Rfl:      losartan (Cozaar) 50 MG tablet, Take 1 tablet by mouth Daily., Disp: 30 tablet, Rfl: 5    Melatonin 5 MG capsule, Take 5 mg by mouth Every Night., Disp: 30 each, Rfl: 1    memantine (NAMENDA XR) 28 MG capsule sustained-release 24 hr extended release capsule, Take 1 capsule by mouth Daily., Disp: , Rfl:     methylcellulose oral powder, Take 2 Applications by mouth Daily. 2tbsp, Disp: , Rfl:     Mirabegron ER (Myrbetriq) 25 MG tablet sustained-release 24 hour 24 hr tablet, Take 1 tablet by mouth Daily., Disp: 30 tablet, Rfl: 11    mirtazapine (REMERON) 15 MG tablet, Take 1 tablet by mouth Every Night., Disp: , Rfl:     multivitamin (MULTI-VITAMIN DAILY PO), Take  by mouth Daily., Disp: , Rfl:     nitroglycerin (NITROSTAT) 0.4 MG SL tablet, 1 under the tongue as needed for angina, may repeat q5mins for up three doses, Disp: 25 tablet, Rfl: 2    pantoprazole (PROTONIX) 40 MG EC tablet, Take 1 tablet by mouth Daily., Disp: , Rfl:     Psyllium (Reguloid) 25 % powder, Take  by mouth., Disp: , Rfl:     QUEtiapine (SEROquel) 25 MG tablet, Take 1 tablet by mouth Daily., Disp: , Rfl:     ranolazine (RANEXA) 500 MG 12 hr tablet, Take 1 tablet by mouth Every 12 (Twelve) Hours., Disp: , Rfl:     tiZANidine (ZANAFLEX) 4 MG tablet, Take 1 tablet by mouth At Night As Needed for Muscle Spasms., Disp: , Rfl:     carbidopa-levodopa (SINEMET)  MG per tablet, , Disp: , Rfl:     Symbicort 80-4.5 MCG/ACT inhaler, , Disp: , Rfl:     The following portions of the patient's history were reviewed and updated as appropriate: allergies, current medications, past family history, past medical history, past social history, past surgical history and problem list.    Social History     Tobacco Use    Smoking status: Former     Current packs/day: 0.00     Average packs/day: 3.0 packs/day for 25.0 years (75.0 ttl pk-yrs)     Types: Cigarettes     Start date:      Quit date:      Years since quittin.2     Passive exposure: Past     "Smokeless tobacco: Former     Quit date: 1/16/1986   Vaping Use    Vaping status: Never Used   Substance Use Topics    Alcohol use: No    Drug use: No     Review of Systems   Constitutional: Negative for chills and fever.   HENT:  Negative for nosebleeds and sore throat.    Respiratory:  Negative for cough, hemoptysis and wheezing.    Gastrointestinal:  Negative for abdominal pain, hematemesis, hematochezia, melena, nausea and vomiting.   Genitourinary:  Negative for dysuria and hematuria.   Neurological:  Negative for headaches.     Objective   Vitals:    03/26/25 0937   BP: 151/53   Pulse: 54   Resp: 16   SpO2: 97%   Height: 180.3 cm (71\")     Body mass index is 25.8 kg/m².    Vitals reviewed.   Constitutional:       Appearance: Well-developed.   Eyes:      Conjunctiva/sclera: Conjunctivae normal.   HENT:      Head: Normocephalic.   Neck:      Thyroid: No thyromegaly.      Vascular: No JVD.      Trachea: No tracheal deviation.   Pulmonary:      Effort: No respiratory distress.      Breath sounds: Normal breath sounds. No wheezing. No rales.   Cardiovascular:      PMI at left midclavicular line. Normal rate. Regular rhythm. Normal S1. Normal S2.       Murmurs: There is no murmur.      No gallop.  No click. No rub.   Pulses:     Intact distal pulses.   Edema:     Peripheral edema absent.   Abdominal:      General: Bowel sounds are normal.      Palpations: Abdomen is soft. There is no abdominal mass.      Tenderness: There is no abdominal tenderness.   Musculoskeletal:      Cervical back: Normal range of motion and neck supple. Skin:     General: Skin is warm and dry.   Neurological:      Mental Status: Alert and oriented to person, place, and time.      Cranial Nerves: No cranial nerve deficit.         Lab Results   Component Value Date     01/11/2024    K 4.6 01/11/2024     01/11/2024    CO2 23.6 01/11/2024    BUN 19 01/11/2024    CREATININE 1.10 01/11/2024    GLUCOSE 260 (H) 01/11/2024    CALCIUM 9.4 " 01/11/2024    AST 27 01/11/2024    ALT 29 01/11/2024    ALKPHOS 128 (H) 01/11/2024     Lab Results   Component Value Date    CKTOTAL 90 11/25/2022     Lab Results   Component Value Date    WBC 9.48 05/02/2024    HGB 14.0 05/02/2024    HCT 44.2 05/02/2024     05/02/2024     Lab Results   Component Value Date    INR 1.38 (H) 05/29/2022    INR 1.18 (H) 02/02/2022    INR 1.05 10/27/2021     Lab Results   Component Value Date    MG 2.1 05/29/2022     Lab Results   Component Value Date    TSH 3.450 12/21/2021    PSA 0.6 01/28/2022    CHLPL 109 08/12/2015    TRIG 115 05/25/2021    HDL 29 (L) 05/25/2021    LDL 58 05/25/2021      Lab Results   Component Value Date    BNP 55.0 07/16/2016       ECG 12 Lead    Date/Time: 3/26/2025 9:59 AM  Performed by: Giovanni Buenrostro MD    Authorized by: Giovanni Buenrostro MD  Comparison: compared with previous ECG from 9/3/2024  Similar to previous ECG  Comparison to previous ECG: For the heart rate which is slower now.  Rhythm: sinus bradycardia  BPM: 51  Conduction: conduction normal  ST Segments: ST segments normal  T Waves: T waves normal          Assessment & Plan    Diagnosis Plan   1. ASCVD (arteriosclerotic cardiovascular disease), s/p stenting of 80 % stenosis in left circumflex on 10/05/16and 60% stenosis in the LAD, clinically stable.         2. 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        3. Dyslipidemia        4. History of pulmonary embolus (PE), has been on Eliquis with no bleeding issues        5. Essential hypertension, seems controlled.        6. Bradycardia, sinus, asymptomatic          Recommendations  Since his heart rate is running in the low 50s, will discontinue the metoprolol that he is taking at a very low dose anyway.  Will increase the dose of losartan to 50 mg daily.  Check BMP in a week.  Keep a check on his blood pressure at the nursing home twice a day and follow-up with Dr. Pritchett on  rounds.  Continue with clopidogrel for his CAD and Eliquis for history of PE.    Return in about 6 months (around 9/26/2025).    As always, Bernardo Pritchett MD  I appreciate very much the opportunity to participate in the cardiovascular care of your patients. Please do not hesitate to call me with any questions with regards to Fidencio Luciano's evaluation and management.       With Best Regards,        Giovanni Buenrostro MD, [facility]    Please note that portions of this note were completed with a voice recognition program.

## 2025-06-29 ENCOUNTER — APPOINTMENT (OUTPATIENT)
Dept: GENERAL RADIOLOGY | Facility: HOSPITAL | Age: 80
End: 2025-06-29
Payer: MEDICARE

## 2025-06-29 ENCOUNTER — APPOINTMENT (OUTPATIENT)
Dept: CT IMAGING | Facility: HOSPITAL | Age: 80
End: 2025-06-29
Payer: MEDICARE

## 2025-06-29 ENCOUNTER — HOSPITAL ENCOUNTER (EMERGENCY)
Facility: HOSPITAL | Age: 80
Discharge: NURSING, REHAB OR LONG-TERM CARE | End: 2025-06-30
Admitting: EMERGENCY MEDICINE
Payer: MEDICARE

## 2025-06-29 VITALS
DIASTOLIC BLOOD PRESSURE: 64 MMHG | HEIGHT: 71 IN | BODY MASS INDEX: 28 KG/M2 | RESPIRATION RATE: 16 BRPM | OXYGEN SATURATION: 96 % | SYSTOLIC BLOOD PRESSURE: 128 MMHG | WEIGHT: 200 LBS | HEART RATE: 54 BPM | TEMPERATURE: 98 F

## 2025-06-29 DIAGNOSIS — R55 NEAR SYNCOPE: Primary | ICD-10-CM

## 2025-06-29 LAB
ALBUMIN SERPL-MCNC: 3.6 G/DL (ref 3.5–5.2)
ALBUMIN/GLOB SERPL: 1.4 G/DL
ALP SERPL-CCNC: 111 U/L (ref 39–117)
ALT SERPL W P-5'-P-CCNC: 7 U/L (ref 1–41)
ANION GAP SERPL CALCULATED.3IONS-SCNC: 11.3 MMOL/L (ref 5–15)
AST SERPL-CCNC: 21 U/L (ref 1–40)
BASOPHILS # BLD AUTO: 0.04 10*3/MM3 (ref 0–0.2)
BASOPHILS NFR BLD AUTO: 0.5 % (ref 0–1.5)
BILIRUB SERPL-MCNC: 0.3 MG/DL (ref 0–1.2)
BUN SERPL-MCNC: 14.3 MG/DL (ref 8–23)
BUN/CREAT SERPL: 14.3 (ref 7–25)
CALCIUM SPEC-SCNC: 8.2 MG/DL (ref 8.6–10.5)
CHLORIDE SERPL-SCNC: 106 MMOL/L (ref 98–107)
CO2 SERPL-SCNC: 22.7 MMOL/L (ref 22–29)
CREAT SERPL-MCNC: 1 MG/DL (ref 0.76–1.27)
DEPRECATED RDW RBC AUTO: 47.8 FL (ref 37–54)
EGFRCR SERPLBLD CKD-EPI 2021: 77 ML/MIN/1.73
EOSINOPHIL # BLD AUTO: 0.3 10*3/MM3 (ref 0–0.4)
EOSINOPHIL NFR BLD AUTO: 4 % (ref 0.3–6.2)
ERYTHROCYTE [DISTWIDTH] IN BLOOD BY AUTOMATED COUNT: 13.4 % (ref 12.3–15.4)
GEN 5 1HR TROPONIN T REFLEX: 16 NG/L
GLOBULIN UR ELPH-MCNC: 2.5 GM/DL
GLUCOSE SERPL-MCNC: 179 MG/DL (ref 65–99)
HCT VFR BLD AUTO: 32.2 % (ref 37.5–51)
HGB BLD-MCNC: 10.7 G/DL (ref 13–17.7)
HOLD SPECIMEN: NORMAL
HOLD SPECIMEN: NORMAL
IMM GRANULOCYTES # BLD AUTO: 0.03 10*3/MM3 (ref 0–0.05)
IMM GRANULOCYTES NFR BLD AUTO: 0.4 % (ref 0–0.5)
LYMPHOCYTES # BLD AUTO: 1.5 10*3/MM3 (ref 0.7–3.1)
LYMPHOCYTES NFR BLD AUTO: 19.8 % (ref 19.6–45.3)
MCH RBC QN AUTO: 32.8 PG (ref 26.6–33)
MCHC RBC AUTO-ENTMCNC: 33.2 G/DL (ref 31.5–35.7)
MCV RBC AUTO: 98.8 FL (ref 79–97)
MONOCYTES # BLD AUTO: 0.64 10*3/MM3 (ref 0.1–0.9)
MONOCYTES NFR BLD AUTO: 8.5 % (ref 5–12)
NEUTROPHILS NFR BLD AUTO: 5.06 10*3/MM3 (ref 1.7–7)
NEUTROPHILS NFR BLD AUTO: 66.8 % (ref 42.7–76)
NRBC BLD AUTO-RTO: 0 /100 WBC (ref 0–0.2)
PLATELET # BLD AUTO: 182 10*3/MM3 (ref 140–450)
PMV BLD AUTO: 10.5 FL (ref 6–12)
POTASSIUM SERPL-SCNC: 3.6 MMOL/L (ref 3.5–5.2)
PROT SERPL-MCNC: 6.1 G/DL (ref 6–8.5)
RBC # BLD AUTO: 3.26 10*6/MM3 (ref 4.14–5.8)
SODIUM SERPL-SCNC: 140 MMOL/L (ref 136–145)
TROPONIN T NUMERIC DELTA: -4 NG/L
TROPONIN T SERPL HS-MCNC: 20 NG/L
VALPROATE SERPL-MCNC: <2.8 MCG/ML (ref 50–125)
WBC NRBC COR # BLD AUTO: 7.57 10*3/MM3 (ref 3.4–10.8)
WHOLE BLOOD HOLD COAG: NORMAL
WHOLE BLOOD HOLD SPECIMEN: NORMAL

## 2025-06-29 PROCEDURE — 36415 COLL VENOUS BLD VENIPUNCTURE: CPT

## 2025-06-29 PROCEDURE — 70450 CT HEAD/BRAIN W/O DYE: CPT | Performed by: RADIOLOGY

## 2025-06-29 PROCEDURE — 85025 COMPLETE CBC W/AUTO DIFF WBC: CPT

## 2025-06-29 PROCEDURE — 80164 ASSAY DIPROPYLACETIC ACD TOT: CPT | Performed by: PHYSICIAN ASSISTANT

## 2025-06-29 PROCEDURE — 93005 ELECTROCARDIOGRAM TRACING: CPT

## 2025-06-29 PROCEDURE — 71045 X-RAY EXAM CHEST 1 VIEW: CPT

## 2025-06-29 PROCEDURE — 84484 ASSAY OF TROPONIN QUANT: CPT

## 2025-06-29 PROCEDURE — 99284 EMERGENCY DEPT VISIT MOD MDM: CPT

## 2025-06-29 PROCEDURE — 71045 X-RAY EXAM CHEST 1 VIEW: CPT | Performed by: RADIOLOGY

## 2025-06-29 PROCEDURE — 80053 COMPREHEN METABOLIC PANEL: CPT

## 2025-06-29 PROCEDURE — 70450 CT HEAD/BRAIN W/O DYE: CPT

## 2025-06-29 RX ORDER — SODIUM CHLORIDE 0.9 % (FLUSH) 0.9 %
10 SYRINGE (ML) INJECTION AS NEEDED
Status: DISCONTINUED | OUTPATIENT
Start: 2025-06-29 | End: 2025-06-30 | Stop reason: HOSPADM

## 2025-06-30 NOTE — ED PROVIDER NOTES
"Subjective   History of Present Illness  79-year-old male with past medical history of hypertension, hyperlipidemia, anemia, peptic ulcer disease, hypercholesterolemia, diverticulosis, BPH, coronary artery disease, bradycardia, and diverticulosis presents to the emergency room from the [facility] after a near syncopal episode in the dining room this day.  Patient was brought in by [facility] who states that the nursing home called saying the patient had a \"syncopal-like episode\".  Patient's spouse is at bedside and states that she was with patient earlier this day and he was completely fine.  She states that she is really confused as to why patient was sent as states she was told that he had some sort of confrontation in the dining room and is unsure of what that exactly meant.  She states that patient is acting normal and does not have any concerns at this time.  Patient denies any head injuries or falls.  He denies headache, visual changes, chest pain, back pain, abdominal pain, or dysuria.  Denies any other complaints or concerns at this time.    History provided by:  Patient, EMS personnel, nursing home and spouse  History limited by:  Age   used: No        Review of Systems   Constitutional: Negative.  Negative for fever.   HENT: Negative.     Respiratory: Negative.     Cardiovascular: Negative.  Negative for chest pain.   Gastrointestinal: Negative.  Negative for abdominal pain.   Endocrine: Negative.    Genitourinary: Negative.  Negative for dysuria.   Skin: Negative.    Neurological:  Positive for syncope (near).   Psychiatric/Behavioral: Negative.     All other systems reviewed and are negative.      Past Medical History:   Diagnosis Date    BPH (benign prostatic hyperplasia)     Bradycardia     Positive tilt table testing 07/2016    Coronary artery disease     Diabetes mellitus     Diverticulosis     Elevated cholesterol     Gastric ulcer with hemorrhage     " Gastroesophageal reflux disease 1/26/2021    History of transfusion     Hyperlipidemia     Hypertension     Psoriasis        No Known Allergies    Past Surgical History:   Procedure Laterality Date    BACK SURGERY      CARDIAC CATHETERIZATION N/A 9/20/2016    Procedure: Left Heart Cath;  Surgeon: Giovanni Buenrostro MD;  Location:  [facility];  Service:     CARDIAC CATHETERIZATION N/A 10/4/2016    Procedure: Left Heart Cath;  Surgeon: Bharathi Andrew MD;  [facility]:  [facility];  Service:     CARDIAC CATHETERIZATION N/A 5/9/2017    Procedure: Left Heart Cath;  Surgeon: Giovanni Buenrostro MD;  [facility]:  [facility];  Service:     CARDIAC CATHETERIZATION N/A 5/9/2017    Procedure: ERR;  Surgeon: Giovanni Buenrostro MD;  [facility]:  [facility];  Service:     CARDIAC CATHETERIZATION N/A 11/8/2019    Procedure: Left Heart Cath;  Surgeon: Abraham Oviedo MD;  Location:  [facility];  Service: Cardiology    CARDIAC CATHETERIZATION N/A 12/18/2019    Procedure: Coronary angiography;  Surgeon: Jay Barney IV, MD;  Location:  [facility];  Service: Cardiovascular    CHOLECYSTECTOMY      COLONOSCOPY  11/13/2015    Dr. Martinez- internal hemorrhoids, sigmoid diverticulosis    COLONOSCOPY N/A 8/17/2018    Procedure: COLONOSCOPY CPT CODE: 10325;  Surgeon: Gigi Geronimo MD;  Location: [facility] OR;  Service: Gastroenterology    COLONOSCOPY N/A 5/17/2022    Procedure: COLONOSCOPY;  Surgeon: Geno Vernon MD;  Location: [facility] OR;  Service: Gastroenterology;  Laterality: N/A;    CORONARY ANGIOPLASTY WITH STENT PLACEMENT      ENDOSCOPY N/A 8/17/2018    Procedure: ESOPHAGOGASTRODUODENOSCOPY WITH BIOPSY CPT CODE: 93620;  Surgeon: Gigi Geronimo MD;  Location: [facility] OR;  Service: Gastroenterology    ENDOSCOPY N/A 4/20/2021    Procedure: ESOPHAGOGASTRODUODENOSCOPY;  Surgeon: Geno Vernon MD;   Location:  [facility] OR;  Service: Gastroenterology;  Laterality: N/A;    ENDOSCOPY N/A 2022    Procedure: ESOPHAGOGASTRODUODENOSCOPY WITH BIOPSY;  Surgeon: Geno Vernon MD;  Location:  [facility] OR;  Service: Gastroenterology;  Laterality: N/A;    INTERVENTIONAL RADIOLOGY PROCEDURE N/A 2019    Procedure: Coil Embolization of septal to pulmonary artery fistuala.;  Surgeon: Jay Bareny IV, MD;  Location:  [facility];  Service: Cardiovascular    TN RT/LT HEART CATHETERS N/A 2017    Procedure: Percutaneous Coronary Intervention;  Surgeon: Lincoln De Los Santos MD;  Location:  [facility];  Service: Cardiology    STOMACH SURGERY      FUSION OF BLEEDING ULCER    UPPER GASTROINTESTINAL ENDOSCOPY  2015    Dr. Martinez- mild gastritis       Family History   Problem Relation Age of Onset    Sick sinus syndrome Mother     Heart failure Mother     Diabetes Mother     Liver cancer Father        Social History     Socioeconomic History    Marital status:    Tobacco Use    Smoking status: Former     Current packs/day: 0.00     Average packs/day: 3.0 packs/day for 25.0 years (75.0 ttl pk-yrs)     Types: Cigarettes     Start date:      Quit date:      Years since quittin.5     Passive exposure: Past    Smokeless tobacco: Former     Quit date: 1986   Vaping Use    Vaping status: Never Used   Substance and Sexual Activity    Alcohol use: No    Drug use: No    Sexual activity: Defer           Objective   Physical Exam  Vitals and nursing note reviewed.   Constitutional:       General: He is not in acute distress.     Appearance: He is well-developed. He is not diaphoretic.   HENT:      Head: Normocephalic and atraumatic.      Right Ear: External ear normal.      Left Ear: External ear normal.      Nose: Nose normal.   Eyes:      Conjunctiva/sclera: Conjunctivae normal.      Pupils: Pupils are equal, round, and reactive to light.   Neck:       Vascular: No JVD.      Trachea: No tracheal deviation.   Cardiovascular:      Rate and Rhythm: Normal rate and regular rhythm.      Heart sounds: Normal heart sounds. No murmur heard.  Pulmonary:      Effort: Pulmonary effort is normal. No respiratory distress.      Breath sounds: Normal breath sounds. No wheezing.   Abdominal:      General: Bowel sounds are normal.      Palpations: Abdomen is soft.      Tenderness: There is no abdominal tenderness.   Musculoskeletal:         General: No deformity. Normal range of motion.      Cervical back: Normal range of motion and neck supple.   Skin:     General: Skin is warm and dry.      Coloration: Skin is not pale.      Findings: No erythema or rash.   Neurological:      Mental Status: He is alert and oriented to person, place, and time.      Cranial Nerves: No cranial nerve deficit.   Psychiatric:         Behavior: Behavior normal.         Thought Content: Thought content normal.         Procedures           ED Course  ED Course as of 06/30/25 0200   Sun Jun 29, 2025 2335 XR Chest AP [TK]   2335 CT Head Without Contrast [TK]      ED Course User Index  [TK] Julio Shahid, ELSY                                             Results for orders placed or performed during the hospital encounter of 06/29/25   ECG 12 Lead ED Triage Standing Order; Chest Pain    Collection Time: 06/29/25  7:41 PM   Result Value Ref Range    QT Interval 528 ms    QTC Interval 462 ms   Comprehensive Metabolic Panel    Collection Time: 06/29/25  7:50 PM    Specimen: Blood   Result Value Ref Range    Glucose 179 (H) 65 - 99 mg/dL    BUN 14.3 8.0 - 23.0 mg/dL    Creatinine 1.00 0.76 - 1.27 mg/dL    Sodium 140 136 - 145 mmol/L    Potassium 3.6 3.5 - 5.2 mmol/L    Chloride 106 98 - 107 mmol/L    CO2 22.7 22.0 - 29.0 mmol/L    Calcium 8.2 (L) 8.6 - 10.5 mg/dL    Total Protein 6.1 6.0 - 8.5 g/dL    Albumin 3.6 3.5 - 5.2 g/dL    ALT (SGPT) 7 1 - 41 U/L    AST (SGOT) 21 1 - 40 U/L    Alkaline  Phosphatase 111 39 - 117 U/L    Total Bilirubin 0.3 0.0 - 1.2 mg/dL    Globulin 2.5 gm/dL    A/G Ratio 1.4 g/dL    BUN/Creatinine Ratio 14.3 7.0 - 25.0    Anion Gap 11.3 5.0 - 15.0 mmol/L    eGFR 77.0 >60.0 mL/min/1.73   High Sensitivity Troponin T    Collection Time: 06/29/25  7:50 PM    Specimen: Blood   Result Value Ref Range    HS Troponin T 20 <22 ng/L   CBC Auto Differential    Collection Time: 06/29/25  7:50 PM    Specimen: Blood   Result Value Ref Range    WBC 7.57 3.40 - 10.80 10*3/mm3    RBC 3.26 (L) 4.14 - 5.80 10*6/mm3    Hemoglobin 10.7 (L) 13.0 - 17.7 g/dL    Hematocrit 32.2 (L) 37.5 - 51.0 %    MCV 98.8 (H) 79.0 - 97.0 fL    MCH 32.8 26.6 - 33.0 pg    MCHC 33.2 31.5 - 35.7 g/dL    RDW 13.4 12.3 - 15.4 %    RDW-SD 47.8 37.0 - 54.0 fl    MPV 10.5 6.0 - 12.0 fL    Platelets 182 140 - 450 10*3/mm3    Neutrophil % 66.8 42.7 - 76.0 %    Lymphocyte % 19.8 19.6 - 45.3 %    Monocyte % 8.5 5.0 - 12.0 %    Eosinophil % 4.0 0.3 - 6.2 %    Basophil % 0.5 0.0 - 1.5 %    Immature Grans % 0.4 0.0 - 0.5 %    Neutrophils, Absolute 5.06 1.70 - 7.00 10*3/mm3    Lymphocytes, Absolute 1.50 0.70 - 3.10 10*3/mm3    Monocytes, Absolute 0.64 0.10 - 0.90 10*3/mm3    Eosinophils, Absolute 0.30 0.00 - 0.40 10*3/mm3    Basophils, Absolute 0.04 0.00 - 0.20 10*3/mm3    Immature Grans, Absolute 0.03 0.00 - 0.05 10*3/mm3    nRBC 0.0 0.0 - 0.2 /100 WBC   Valproic Acid Level, Total    Collection Time: 06/29/25  7:50 PM    Specimen: Blood   Result Value Ref Range    Valproic Acid <2.8 (L) 50.0 - 125.0 mcg/mL   Green Top (Gel)    Collection Time: 06/29/25  7:50 PM   Result Value Ref Range    Extra Tube Hold for add-ons.    Lavender Top    Collection Time: 06/29/25  7:50 PM   Result Value Ref Range    Extra Tube hold for add-on    Gold Top - SST    Collection Time: 06/29/25  7:50 PM   Result Value Ref Range    Extra Tube Hold for add-ons.    Light Blue Top    Collection Time: 06/29/25  7:50 PM   Result Value Ref Range    Extra Tube Hold  for add-ons.    High Sensitivity Troponin T 1Hr    Collection Time: 06/29/25  9:14 PM    Specimen: Arm, Right; Blood   Result Value Ref Range    HS Troponin T 16 <22 ng/L    Troponin T Numeric Delta -4 Abnormal if >/=3 ng/L       CT Head Without Contrast   Final Result       1.  No CT demonstrable acute intracranial abnormality.       If there is further concern for intracranial pathology or acute stroke,   MRI of the brain may be performed for complete assessment.       This report was finalized on 6/29/2025 11:02 PM by YADIRA AGUIRRE MD.          XR Chest AP   Final Result   1. No acute disease.       This report was finalized on 6/29/2025 11:16 PM by Ramy Mazariegos MD.                        Medical Decision Making  - Uncomplicated ED course.  -Patient remained hemodynamically stable throughout entire duration of ED course.  -Patient labs unremarkable.  -Chest x-ray negative for any acute cardiopulmonary disease.  -CT head negative for any acute pathology.  -Patient to be discharged back to the nursing home in stable condition.    Problems Addressed:  Near syncope: complicated acute illness or injury    Amount and/or Complexity of Data Reviewed  Labs: ordered. Decision-making details documented in ED Course.  Radiology: ordered. Decision-making details documented in ED Course.  ECG/medicine tests: ordered.    Risk  Prescription drug management.        Final diagnoses:   Near syncope       ED Disposition  ED Disposition       ED Disposition   Discharge    Condition   Stable    Comment   --               Bernardo Pritchett MD  [address] [facility] [address]  [phone]    In 2 days           Medication List      No changes were made to your prescriptions during this visit.            Julio Shahid PA-C  06/30/25 0206

## 2025-07-02 LAB
QT INTERVAL: 528 MS
QTC INTERVAL: 462 MS

## 2025-07-07 ENCOUNTER — OFFICE VISIT (OUTPATIENT)
Dept: CARDIOLOGY | Facility: CLINIC | Age: 80
End: 2025-07-07
Payer: MEDICARE

## 2025-07-07 VITALS
HEIGHT: 71 IN | DIASTOLIC BLOOD PRESSURE: 55 MMHG | RESPIRATION RATE: 16 BRPM | HEART RATE: 70 BPM | SYSTOLIC BLOOD PRESSURE: 127 MMHG | BODY MASS INDEX: 27.89 KG/M2 | OXYGEN SATURATION: 95 %

## 2025-07-07 DIAGNOSIS — I25.10 ASCVD (ARTERIOSCLEROTIC CARDIOVASCULAR DISEASE): Primary | ICD-10-CM

## 2025-07-07 DIAGNOSIS — R55 NEAR SYNCOPE: ICD-10-CM

## 2025-07-07 DIAGNOSIS — I25.41 CORONARY ARTERY FISTULA: ICD-10-CM

## 2025-07-07 DIAGNOSIS — I10 ESSENTIAL HYPERTENSION: ICD-10-CM

## 2025-07-07 DIAGNOSIS — Z86.711 HISTORY OF PULMONARY EMBOLUS (PE): ICD-10-CM

## 2025-07-07 DIAGNOSIS — R00.1 BRADYCARDIA, SINUS: ICD-10-CM

## 2025-07-07 PROCEDURE — 3074F SYST BP LT 130 MM HG: CPT | Performed by: INTERNAL MEDICINE

## 2025-07-07 PROCEDURE — 99214 OFFICE O/P EST MOD 30 MIN: CPT | Performed by: INTERNAL MEDICINE

## 2025-07-07 PROCEDURE — 3078F DIAST BP <80 MM HG: CPT | Performed by: INTERNAL MEDICINE

## 2025-07-07 RX ORDER — ROPINIROLE 1 MG/1
1 TABLET, FILM COATED ORAL NIGHTLY
COMMUNITY

## 2025-07-07 NOTE — PROGRESS NOTES
Bernardo Pritchett MD  Fidencio Luciano  1945  07/07/2025    Patient Active Problem List   Diagnosis    Essential hypertension    Type 2 diabetes mellitus    Benign prostatic hyperplasia (BPH) with urinary urge incontinence    ASCVD (arteriosclerotic cardiovascular disease), s/p stenting of 80 % stenosis in left circumflex on 10/05/16and 60% stenosis in the LAD, clinically stable.     Hyperlipidemia LDL goal <70    Weakness generalized    Coronary artery fistula    Weight loss, unintentional    Iron deficiency anemia    Gastroesophageal reflux disease    Dysphagia    Chest pain    History of pulmonary embolism in February 22, patient is on Eliquis.    Early satiety    Diarrhea       Dear Bernardo Pritchett MD:    Subjective     Fidencio Luciano is a 79 y.o. male with the problems as listed above, presents    Chief complaint: Follow-up of coronary artery disease and recent episode of near syncope.    History of Present Illness: Mr. Fidencio Luciano is a pleasant 79-year-old  male with history of known CAD, status post previous stenting of 80% stenosis in the left circumflex on 10/5/2016 and 60% stenosis in the LAD and previous history of coiling of the coronary artery fistula from the LAD to the pulmonary artery in December 2019, is here as a follow-up after recent ER visit on 6/29/2025 for an episode of near syncope.  Patient's workup in the ED apparently was unremarkable with negative high sensitive troponin x 2.  His EKG revealed sinus bradycardia at 46 bpm.  He incidentally has chronic sinus bradycardia and has been mostly asymptomatic with this.  He is currently not on any negative chronotropic medications.  He has not had any more episodes of dizziness or near syncope since then.  Patient apparently is wheelchair-bound and not able to walk.              No Known Allergies:    Current Outpatient Medications:     acetaminophen (TYLENOL) 500 MG tablet, Take 1 tablet by mouth Every 6 (Six) Hours As Needed for Mild  Pain., Disp: , Rfl:     albuterol sulfate  (90 Base) MCG/ACT inhaler, Inhale 2 puffs 4 (Four) Times a Day As Needed for Wheezing., Disp: , Rfl:     apixaban (ELIQUIS) 5 MG tablet tablet, Take 1 tablet by mouth Every 12 (Twelve) Hours., Disp: , Rfl:     ascorbic acid (VITAMIN C) 1000 MG tablet, Take 2 tablets by mouth Daily., Disp: , Rfl:     atorvastatin (LIPITOR) 80 MG tablet, Take 1 tablet by mouth Every Night., Disp: 90 tablet, Rfl: 5    BD AutoShield Duo 30G X 5 MM misc, , Disp: , Rfl:     bumetanide (BUMEX) 1 MG tablet, Take 1 tablet by mouth Daily., Disp: 30 tablet, Rfl: 3    Carboxymethylcellulose Sodium (ARTIFICIAL TEARS OP), Apply 0.4 % to eye(s) as directed by provider 4 (Four) Times a Day., Disp: , Rfl:     clopidogrel (PLAVIX) 75 MG tablet, Take 1 tablet by mouth Daily., Disp: 30 tablet, Rfl: 5    colestipol (COLESTID) 1 g tablet, Take 1 tablet by mouth 2 (Two) Times a Day., Disp: , Rfl:     divalproex (DEPAKOTE) 250 MG DR tablet, , Disp: , Rfl:     donepezil (ARICEPT) 5 MG tablet, Take 1 tablet by mouth Every Night., Disp: , Rfl:     Dupixent 300 MG/2ML solution auto-injector injection, Inject 2 mL under the skin into the appropriate area as directed., Disp: , Rfl:     finasteride (PROSCAR) 5 MG tablet, Take 1 tablet by mouth Every Night., Disp: 30 tablet, Rfl: 11    insulin glargine (Lantus) 100 UNIT/ML injection, Inject 23 Units under the skin into the appropriate area as directed Daily., Disp: , Rfl:     insulin regular (humuLIN R,novoLIN R) 100 UNIT/ML injection, Inject  under the skin into the appropriate area as directed 4 (Four) Times a Day. 5 units plus s/s QID, Disp: , Rfl:     ipratropium-albuterol (DUO-NEB) 0.5-2.5 mg/3 ml nebulizer, Take 3 mL by nebulization 2 (Two) Times a Day., Disp: 360 mL, Rfl: 3    iron polysaccharides (NIFEREX) 150 MG capsule, Take 1 capsule by mouth Daily., Disp: 30 capsule, Rfl: 3    isosorbide dinitrate (ISORDIL) 30 MG tablet, Take 2 tablets by mouth 2 (Two)  Times a Day., Disp: , Rfl:     Loratadine (Claritin) 10 MG capsule, Take  by mouth., Disp: , Rfl:     losartan (Cozaar) 50 MG tablet, Take 1 tablet by mouth Daily., Disp: 30 tablet, Rfl: 5    Melatonin 5 MG capsule, Take 5 mg by mouth Every Night., Disp: 30 each, Rfl: 1    memantine (NAMENDA XR) 28 MG capsule sustained-release 24 hr extended release capsule, Take 1 capsule by mouth Daily., Disp: , Rfl:     Mirabegron ER (Myrbetriq) 25 MG tablet sustained-release 24 hour 24 hr tablet, Take 1 tablet by mouth Daily., Disp: 30 tablet, Rfl: 11    mirtazapine (REMERON) 15 MG tablet, Take 1 tablet by mouth Every Night., Disp: , Rfl:     multivitamin (MULTI-VITAMIN DAILY PO), Take  by mouth Daily., Disp: , Rfl:     nitroglycerin (NITROSTAT) 0.4 MG SL tablet, 1 under the tongue as needed for angina, may repeat q5mins for up three doses, Disp: 25 tablet, Rfl: 2    pantoprazole (PROTONIX) 40 MG EC tablet, Take 1 tablet by mouth Daily., Disp: , Rfl:     QUEtiapine (SEROquel) 25 MG tablet, Take 1 tablet by mouth Daily., Disp: , Rfl:     ranolazine (RANEXA) 500 MG 12 hr tablet, Take 1 tablet by mouth Every 12 (Twelve) Hours., Disp: , Rfl:     rOPINIRole (REQUIP) 1 MG tablet, Take 1 tablet by mouth Every Night. Take 1 hour before bedtime., Disp: , Rfl:     Symbicort 80-4.5 MCG/ACT inhaler, , Disp: , Rfl:     tiZANidine (ZANAFLEX) 4 MG tablet, Take 1 tablet by mouth At Night As Needed for Muscle Spasms., Disp: , Rfl:     carbidopa-levodopa (SINEMET)  MG per tablet, , Disp: , Rfl:     folic acid-pyridoxine-cyanocobalamin (FOLBIC) 2.5-25-2 MG tablet tablet, Take  by mouth Daily., Disp: , Rfl:     methylcellulose oral powder, Take 2 Applications by mouth Daily. 2tbsp, Disp: , Rfl:     Psyllium (Reguloid) 25 % powder, Take  by mouth., Disp: , Rfl:     The following portions of the patient's history were reviewed and updated as appropriate: allergies, current medications, past family history, past medical history, past social  "history, past surgical history and problem list.    Social History     Tobacco Use    Smoking status: Former     Current packs/day: 0.00     Average packs/day: 3.0 packs/day for 25.0 years (75.0 ttl pk-yrs)     Types: Cigarettes     Start date:      Quit date:      Years since quittin.5     Passive exposure: Past    Smokeless tobacco: Former     Quit date: 1986   Vaping Use    Vaping status: Never Used   Substance Use Topics    Alcohol use: No    Drug use: No     Review of Systems   Constitutional: Negative for chills and fever.   HENT:  Negative for nosebleeds and sore throat.    Respiratory:  Negative for cough, hemoptysis and wheezing.    Gastrointestinal:  Negative for abdominal pain, hematemesis, hematochezia, melena, nausea and vomiting.   Genitourinary:  Negative for dysuria and hematuria.   Neurological:  Negative for headaches.     Objective   Vitals:    25 1041   BP: 127/55   Pulse: 70   Resp: 16   SpO2: 95%   Height: 180.3 cm (71\")     Body mass index is 27.89 kg/m².    Vitals reviewed.   Constitutional:       Appearance: Well-developed.      Comments: Pleasant elderly  male who is wheelchair-bound, alert, oriented to person and place and is in no acute distress at this time.   Eyes:      Conjunctiva/sclera: Conjunctivae normal.   HENT:      Head: Normocephalic.   Neck:      Thyroid: No thyromegaly.      Vascular: No JVD.      Trachea: No tracheal deviation.   Pulmonary:      Effort: No respiratory distress.      Breath sounds: Normal breath sounds. No wheezing. No rales.   Cardiovascular:      PMI at left midclavicular line. Normal rate. Regular rhythm. Normal S1. Normal S2.       Murmurs: There is no murmur.      No gallop.  No click. No rub.   Pulses:     Intact distal pulses.   Edema:     Peripheral edema absent.   Abdominal:      General: Bowel sounds are normal.      Palpations: Abdomen is soft. There is no abdominal mass.      Tenderness: There is no abdominal " tenderness.   Musculoskeletal:      Cervical back: Normal range of motion and neck supple. Skin:     General: Skin is warm and dry.   Neurological:      Mental Status: Alert and oriented to person, place, and time.      Cranial Nerves: No cranial nerve deficit.       Lab Results   Component Value Date     06/29/2025    K 3.6 06/29/2025     06/29/2025    CO2 22.7 06/29/2025    BUN 14.3 06/29/2025    CREATININE 1.00 06/29/2025    GLUCOSE 179 (H) 06/29/2025    CALCIUM 8.2 (L) 06/29/2025    AST 21 06/29/2025    ALT 7 06/29/2025    ALKPHOS 111 06/29/2025     Lab Results   Component Value Date    CKTOTAL 90 11/25/2022     Lab Results   Component Value Date    WBC 7.57 06/29/2025    HGB 10.7 (L) 06/29/2025    HCT 32.2 (L) 06/29/2025     06/29/2025     Lab Results   Component Value Date    INR 1.38 (H) 05/29/2022    INR 1.18 (H) 02/02/2022    INR 1.05 10/27/2021     Lab Results   Component Value Date    MG 2.1 05/29/2022     Lab Results   Component Value Date    TSH 3.450 12/21/2021    PSA 0.6 01/28/2022    CHLPL 109 08/12/2015    TRIG 115 05/25/2021    HDL 29 (L) 05/25/2021    LDL 58 05/25/2021      Lab Results   Component Value Date    BNP 55.0 07/16/2016     Procedures      Assessment & Plan    Diagnosis Plan   1. ASCVD (arteriosclerotic cardiovascular disease), s/p stenting of 80 % stenosis in left circumflex on 10/05/16and 60% stenosis in the LAD, clinically stable.         2. 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        3. Near syncope  Holter Monitor - 72 Hour Up To 15 Days      4. History of pulmonary embolus (PE), has been on Eliquis with no bleeding issues        5. Essential hypertension, seems controlled.        6. Bradycardia, sinus.  TSH+Free T4        Recommendations  Will evaluate further with a Holter monitor.  Will obtain thyroid profile.    Return in about 4 weeks (around 8/4/2025) for or sooner if needed.    As always,  Bernardo Pritchett MD  I appreciate very much the opportunity to participate in the cardiovascular care of your patients. Please do not hesitate to call me with any questions with regards to Fidencio Luciano's evaluation and management.       With Best Regards,        Giovanni Buenrostro MD, [facility]    Please note that portions of this note were completed with a voice recognition program.

## 2025-07-07 NOTE — LETTER
July 8, 2025     Bernardo Pritchett MD  [address] [facility] [address]    Patient: Fidencio Luciano   YOB: 1945   Date of Visit: 7/7/2025     Dear Bernardo Pritchett MD:       Thank you for referring Fidencio Luciano to me for evaluation. Below are the relevant portions of my assessment and plan of care.    If you have questions, please do not hesitate to call me. I look forward to following Fidencio along with you.         Sincerely,        Giovanni Buenrostro MD        CC: No Recipients    Giovanni Buenrostro MD  07/08/25 1306  Sign when Signing Visit  Bernardo Pritchett MD  Fidencio Luciano  1945  07/07/2025    Patient Active Problem List   Diagnosis   • Essential hypertension   • Type 2 diabetes mellitus   • Benign prostatic hyperplasia (BPH) with urinary urge incontinence   • ASCVD (arteriosclerotic cardiovascular disease), s/p stenting of 80 % stenosis in left circumflex on 10/05/16and 60% stenosis in the LAD, clinically stable.    • Hyperlipidemia LDL goal <70   • Weakness generalized   • Coronary artery fistula   • Weight loss, unintentional   • Iron deficiency anemia   • Gastroesophageal reflux disease   • Dysphagia   • Chest pain   • History of pulmonary embolism in February 22, patient is on Eliquis.   • Early satiety   • Diarrhea       Dear Bernardo Pritchett MD:    Subjective     Fidencio Luciano is a 79 y.o. male with the problems as listed above, presents    Chief complaint: Follow-up of coronary artery disease and recent episode of near syncope.    History of Present Illness: Mr. Fidencio Luciano is a pleasant 79-year-old  male with history of known CAD, status post previous stenting of 80% stenosis in the left circumflex on 10/5/2016 and 60% stenosis in the LAD and previous history of coiling of the coronary artery fistula from the LAD to the pulmonary artery in December 2019, is here as a follow-up after recent ER visit on 6/29/2025 for an episode of near syncope.  Patient's workup in the ED  apparently was unremarkable with negative high sensitive troponin x 2.  His EKG revealed sinus bradycardia at 46 bpm.  He incidentally has chronic sinus bradycardia and has been mostly asymptomatic with this.  He is currently not on any negative chronotropic medications.  He has not had any more episodes of dizziness or near syncope since then.  Patient apparently is wheelchair-bound and not able to walk.              No Known Allergies:    Current Outpatient Medications:   •  acetaminophen (TYLENOL) 500 MG tablet, Take 1 tablet by mouth Every 6 (Six) Hours As Needed for Mild Pain., Disp: , Rfl:   •  albuterol sulfate  (90 Base) MCG/ACT inhaler, Inhale 2 puffs 4 (Four) Times a Day As Needed for Wheezing., Disp: , Rfl:   •  apixaban (ELIQUIS) 5 MG tablet tablet, Take 1 tablet by mouth Every 12 (Twelve) Hours., Disp: , Rfl:   •  ascorbic acid (VITAMIN C) 1000 MG tablet, Take 2 tablets by mouth Daily., Disp: , Rfl:   •  atorvastatin (LIPITOR) 80 MG tablet, Take 1 tablet by mouth Every Night., Disp: 90 tablet, Rfl: 5  •  BD AutoShield Duo 30G X 5 MM misc, , Disp: , Rfl:   •  bumetanide (BUMEX) 1 MG tablet, Take 1 tablet by mouth Daily., Disp: 30 tablet, Rfl: 3  •  Carboxymethylcellulose Sodium (ARTIFICIAL TEARS OP), Apply 0.4 % to eye(s) as directed by provider 4 (Four) Times a Day., Disp: , Rfl:   •  clopidogrel (PLAVIX) 75 MG tablet, Take 1 tablet by mouth Daily., Disp: 30 tablet, Rfl: 5  •  colestipol (COLESTID) 1 g tablet, Take 1 tablet by mouth 2 (Two) Times a Day., Disp: , Rfl:   •  divalproex (DEPAKOTE) 250 MG DR tablet, , Disp: , Rfl:   •  donepezil (ARICEPT) 5 MG tablet, Take 1 tablet by mouth Every Night., Disp: , Rfl:   •  Dupixent 300 MG/2ML solution auto-injector injection, Inject 2 mL under the skin into the appropriate area as directed., Disp: , Rfl:   •  finasteride (PROSCAR) 5 MG tablet, Take 1 tablet by mouth Every Night., Disp: 30 tablet, Rfl: 11  •  insulin glargine (Lantus) 100 UNIT/ML  injection, Inject 23 Units under the skin into the appropriate area as directed Daily., Disp: , Rfl:   •  insulin regular (humuLIN R,novoLIN R) 100 UNIT/ML injection, Inject  under the skin into the appropriate area as directed 4 (Four) Times a Day. 5 units plus s/s QID, Disp: , Rfl:   •  ipratropium-albuterol (DUO-NEB) 0.5-2.5 mg/3 ml nebulizer, Take 3 mL by nebulization 2 (Two) Times a Day., Disp: 360 mL, Rfl: 3  •  iron polysaccharides (NIFEREX) 150 MG capsule, Take 1 capsule by mouth Daily., Disp: 30 capsule, Rfl: 3  •  isosorbide dinitrate (ISORDIL) 30 MG tablet, Take 2 tablets by mouth 2 (Two) Times a Day., Disp: , Rfl:   •  Loratadine (Claritin) 10 MG capsule, Take  by mouth., Disp: , Rfl:   •  losartan (Cozaar) 50 MG tablet, Take 1 tablet by mouth Daily., Disp: 30 tablet, Rfl: 5  •  Melatonin 5 MG capsule, Take 5 mg by mouth Every Night., Disp: 30 each, Rfl: 1  •  memantine (NAMENDA XR) 28 MG capsule sustained-release 24 hr extended release capsule, Take 1 capsule by mouth Daily., Disp: , Rfl:   •  Mirabegron ER (Myrbetriq) 25 MG tablet sustained-release 24 hour 24 hr tablet, Take 1 tablet by mouth Daily., Disp: 30 tablet, Rfl: 11  •  mirtazapine (REMERON) 15 MG tablet, Take 1 tablet by mouth Every Night., Disp: , Rfl:   •  multivitamin (MULTI-VITAMIN DAILY PO), Take  by mouth Daily., Disp: , Rfl:   •  nitroglycerin (NITROSTAT) 0.4 MG SL tablet, 1 under the tongue as needed for angina, may repeat q5mins for up three doses, Disp: 25 tablet, Rfl: 2  •  pantoprazole (PROTONIX) 40 MG EC tablet, Take 1 tablet by mouth Daily., Disp: , Rfl:   •  QUEtiapine (SEROquel) 25 MG tablet, Take 1 tablet by mouth Daily., Disp: , Rfl:   •  ranolazine (RANEXA) 500 MG 12 hr tablet, Take 1 tablet by mouth Every 12 (Twelve) Hours., Disp: , Rfl:   •  rOPINIRole (REQUIP) 1 MG tablet, Take 1 tablet by mouth Every Night. Take 1 hour before bedtime., Disp: , Rfl:   •  Symbicort 80-4.5 MCG/ACT inhaler, , Disp: , Rfl:   •  tiZANidine  "(ZANAFLEX) 4 MG tablet, Take 1 tablet by mouth At Night As Needed for Muscle Spasms., Disp: , Rfl:   •  carbidopa-levodopa (SINEMET)  MG per tablet, , Disp: , Rfl:   •  folic acid-pyridoxine-cyanocobalamin (FOLBIC) 2.5-25-2 MG tablet tablet, Take  by mouth Daily., Disp: , Rfl:   •  methylcellulose oral powder, Take 2 Applications by mouth Daily. 2tbsp, Disp: , Rfl:   •  Psyllium (Reguloid) 25 % powder, Take  by mouth., Disp: , Rfl:     The following portions of the patient's history were reviewed and updated as appropriate: allergies, current medications, past family history, past medical history, past social history, past surgical history and problem list.    Social History     Tobacco Use   • Smoking status: Former     Current packs/day: 0.00     Average packs/day: 3.0 packs/day for 25.0 years (75.0 ttl pk-yrs)     Types: Cigarettes     Start date:      Quit date:      Years since quittin.5     Passive exposure: Past   • Smokeless tobacco: Former     Quit date: 1986   Vaping Use   • Vaping status: Never Used   Substance Use Topics   • Alcohol use: No   • Drug use: No     Review of Systems   Constitutional: Negative for chills and fever.   HENT:  Negative for nosebleeds and sore throat.    Respiratory:  Negative for cough, hemoptysis and wheezing.    Gastrointestinal:  Negative for abdominal pain, hematemesis, hematochezia, melena, nausea and vomiting.   Genitourinary:  Negative for dysuria and hematuria.   Neurological:  Negative for headaches.     Objective   Vitals:    25 1041   BP: 127/55   Pulse: 70   Resp: 16   SpO2: 95%   Height: 180.3 cm (71\")     Body mass index is 27.89 kg/m².    Vitals reviewed.   Constitutional:       Appearance: Well-developed.      Comments: Pleasant elderly  male who is wheelchair-bound, alert, oriented to person and place and is in no acute distress at this time.   Eyes:      Conjunctiva/sclera: Conjunctivae normal.   HENT:      Head: " Normocephalic.   Neck:      Thyroid: No thyromegaly.      Vascular: No JVD.      Trachea: No tracheal deviation.   Pulmonary:      Effort: No respiratory distress.      Breath sounds: Normal breath sounds. No wheezing. No rales.   Cardiovascular:      PMI at left midclavicular line. Normal rate. Regular rhythm. Normal S1. Normal S2.       Murmurs: There is no murmur.      No gallop.  No click. No rub.   Pulses:     Intact distal pulses.   Edema:     Peripheral edema absent.   Abdominal:      General: Bowel sounds are normal.      Palpations: Abdomen is soft. There is no abdominal mass.      Tenderness: There is no abdominal tenderness.   Musculoskeletal:      Cervical back: Normal range of motion and neck supple. Skin:     General: Skin is warm and dry.   Neurological:      Mental Status: Alert and oriented to person, place, and time.      Cranial Nerves: No cranial nerve deficit.       Lab Results   Component Value Date     06/29/2025    K 3.6 06/29/2025     06/29/2025    CO2 22.7 06/29/2025    BUN 14.3 06/29/2025    CREATININE 1.00 06/29/2025    GLUCOSE 179 (H) 06/29/2025    CALCIUM 8.2 (L) 06/29/2025    AST 21 06/29/2025    ALT 7 06/29/2025    ALKPHOS 111 06/29/2025     Lab Results   Component Value Date    CKTOTAL 90 11/25/2022     Lab Results   Component Value Date    WBC 7.57 06/29/2025    HGB 10.7 (L) 06/29/2025    HCT 32.2 (L) 06/29/2025     06/29/2025     Lab Results   Component Value Date    INR 1.38 (H) 05/29/2022    INR 1.18 (H) 02/02/2022    INR 1.05 10/27/2021     Lab Results   Component Value Date    MG 2.1 05/29/2022     Lab Results   Component Value Date    TSH 3.450 12/21/2021    PSA 0.6 01/28/2022    CHLPL 109 08/12/2015    TRIG 115 05/25/2021    HDL 29 (L) 05/25/2021    LDL 58 05/25/2021      Lab Results   Component Value Date    BNP 55.0 07/16/2016     Procedures      Assessment & Plan    Diagnosis Plan   1. ASCVD (arteriosclerotic cardiovascular disease), s/p stenting of 80 %  stenosis in left circumflex on 10/05/16and 60% stenosis in the LAD, clinically stable.         2. 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        3. Near syncope  Holter Monitor - 72 Hour Up To 15 Days      4. History of pulmonary embolus (PE), has been on Eliquis with no bleeding issues        5. Essential hypertension, seems controlled.        6. Bradycardia, sinus.  TSH+Free T4        Recommendations  Will evaluate further with a Holter monitor.  Will obtain thyroid profile.    Return in about 4 weeks (around 8/4/2025) for or sooner if needed.    As always, Bernardo Pritchett MD  I appreciate very much the opportunity to participate in the cardiovascular care of your patients. Please do not hesitate to call me with any questions with regards to Fidencio Luciano's evaluation and management.       With Best Regards,        Giovanni Buenrostro MD, FACC    Please note that portions of this note were completed with a voice recognition program.

## 2025-08-18 ENCOUNTER — OFFICE VISIT (OUTPATIENT)
Dept: CARDIOLOGY | Facility: CLINIC | Age: 80
End: 2025-08-18
Payer: MEDICARE

## 2025-08-18 VITALS
HEIGHT: 71 IN | RESPIRATION RATE: 16 BRPM | SYSTOLIC BLOOD PRESSURE: 113 MMHG | OXYGEN SATURATION: 98 % | BODY MASS INDEX: 27.89 KG/M2 | HEART RATE: 59 BPM | DIASTOLIC BLOOD PRESSURE: 68 MMHG

## 2025-08-18 DIAGNOSIS — Z86.711 HISTORY OF PULMONARY EMBOLISM: ICD-10-CM

## 2025-08-18 DIAGNOSIS — I25.41 CORONARY ARTERY FISTULA: ICD-10-CM

## 2025-08-18 DIAGNOSIS — I47.29 NONSUSTAINED VENTRICULAR TACHYCARDIA: Primary | ICD-10-CM

## 2025-08-18 DIAGNOSIS — I25.10 ASCVD (ARTERIOSCLEROTIC CARDIOVASCULAR DISEASE): Chronic | ICD-10-CM

## 2025-08-18 DIAGNOSIS — I44.1 SECOND DEGREE AV BLOCK, MOBITZ TYPE II: ICD-10-CM

## 2025-08-18 PROCEDURE — 99214 OFFICE O/P EST MOD 30 MIN: CPT | Performed by: INTERNAL MEDICINE

## 2025-08-18 PROCEDURE — 3074F SYST BP LT 130 MM HG: CPT | Performed by: INTERNAL MEDICINE

## 2025-08-18 PROCEDURE — 3078F DIAST BP <80 MM HG: CPT | Performed by: INTERNAL MEDICINE

## (undated) DEVICE — 2 TIP PRE-SHAPED 45 MICROCATHETER: Brand: EXCELSIOR SL-10

## (undated) DEVICE — HI-TORQUE BALANCE MIDDLEWEIGHT GUIDE WIRE W/HYDROCOAT .014 STRAIGHT TIP 3.0 CM X 190 CM: Brand: HI-TORQUE BALANCE MIDDLEWEIGHT

## (undated) DEVICE — ADULT DISPOSABLE SINGLE-PATIENT USE PULSE OXIMETER SENSOR: Brand: NONIN

## (undated) DEVICE — ENDOGATOR TUBING FOR ENDOGATOR EGP-100 IRRIGATION PUMP,OLYMPUS OFP PUMP, OLYMPUS AFU-100 PUMP AND ERBE EIP2 PUMP: Brand: ENDOGATOR

## (undated) DEVICE — PINNACLE INTRODUCER SHEATH: Brand: PINNACLE

## (undated) DEVICE — DRSNG SURESITE WNDW 4X4.5

## (undated) DEVICE — Device

## (undated) DEVICE — Device: Brand: ENDOGATOR

## (undated) DEVICE — ST INF PRI SMRTSTE 20DRP 2VLV 24ML 117

## (undated) DEVICE — MODEL BT2000 P/N 700287-012KIT CONTENTS: MANIFOLD WITH SALINE AND CONTRAST PORTS, SALINE TUBING WITH SPIKE AND HAND SYRINGE, TRANSDUCER: Brand: BT2000 AUTOMATED MANIFOLD KIT

## (undated) DEVICE — GOWN,REINF,POLY,ECL,PP SLV,XL: Brand: MEDLINE

## (undated) DEVICE — PK CATH CARD 10

## (undated) DEVICE — SUCTION CANISTER, 1500CC, RIGID: Brand: DEROYAL

## (undated) DEVICE — SINGLE PORT MANIFOLD: Brand: NEPTUNE 2

## (undated) DEVICE — ST EXT IV SMARTSITE 2VLV SP M LL 5ML IV1

## (undated) DEVICE — SYR LUERLOK 30CC

## (undated) DEVICE — CANNULA,OXY,ADULT,SUPER SOFT,W/14'TUB,UC: Brand: MEDLINE INDUSTRIES, INC.

## (undated) DEVICE — Device: Brand: DEFENDO AIR/WATER/SUCTION AND BIOPSY VALVE

## (undated) DEVICE — SHEATH INTRO SUPERSHEATH JWIRE .035 6F 11CM

## (undated) DEVICE — MODEL AT P65, P/N 701554-001KIT CONTENTS: HAND CONTROLLER, 3-WAY HIGH-PRESSURE STOPCOCK WITH ROTATING END AND PREMIUM HIGH-PRESSURE TUBING: Brand: ANGIOTOUCH® KIT

## (undated) DEVICE — PK CATH CARD 70

## (undated) DEVICE — TUBING, SUCTION, 1/4" X 20', STRAIGHT: Brand: MEDLINE INDUSTRIES, INC.

## (undated) DEVICE — GW INQWIRE FC PTFE J/3MM .035 180

## (undated) DEVICE — CONN Y IRR DISP 1P/U

## (undated) DEVICE — 6F .070 JR 4 100CM: Brand: CORDIS

## (undated) DEVICE — THE BITE BLOCK MAXI, LATEX FREE STRAP IS USED TO PROTECT THE ENDOSCOPE INSERTION TUBE FROM BEING BITTEN BY THE PATIENT.

## (undated) DEVICE — CATH F6INF TL JR 4 100CM: Brand: INFINITI

## (undated) DEVICE — DEV INFL MONARCH 25W

## (undated) DEVICE — NEURO GUIDEWIRE WITH HYDROPHILIC COATING: Brand: SYNCHRO 14

## (undated) DEVICE — ELECTRD DEFIB M/FUNC STATPADZ PK/2

## (undated) DEVICE — ADULT, RADIOTRANSPARENT ELEMENT, COMPATIBLE W/ ZOLL: Brand: DEFIBRILLATION ELECTRODES

## (undated) DEVICE — MYNXGRIP 6F/7F: Brand: MYNXGRIP

## (undated) DEVICE — GUIDE CATHETER: Brand: MACH1™

## (undated) DEVICE — CATH F5 INF PIG145 110CM 6SH: Brand: INFINITI

## (undated) DEVICE — Device: Brand: MEDEX

## (undated) DEVICE — ENDOGATOR AUXILIARY WATER JET CONNECTOR: Brand: ENDOGATOR

## (undated) DEVICE — FRCP BX RADJAW4 NDL 2.8 240CM LG OG BX40

## (undated) DEVICE — DETACHMENT SYSTEM: Brand: INZONE

## (undated) DEVICE — CATH F6INF TL JL 4 100CM: Brand: INFINITI

## (undated) DEVICE — CATH F5 INF JL 4 100CM: Brand: INFINITI

## (undated) DEVICE — GW MOVE CORE .035 145CM

## (undated) DEVICE — ANGIO-SEAL VIP VASCULAR CLOSURE DEVICE: Brand: ANGIO-SEAL

## (undated) DEVICE — STPCK 3/WY HP M/RA W/OFF/HNDL 1050PSI STRL

## (undated) DEVICE — COPILOT BLEEDBACK CONTROL VALVE: Brand: COPILOT

## (undated) DEVICE — GLIDESHEATH BASIC HYDROPHILIC COATED INTRODUCER SHEATH: Brand: GLIDESHEATH

## (undated) DEVICE — SHEATH INTRO SUPERSHEATH JWIRE .035 5F 11CM

## (undated) DEVICE — LN INJ CONTRST FLXCIL HP F/M LL 1200PSI10

## (undated) DEVICE — TREK CORONARY DILATATION CATHETER 2.50 MM X 15 MM / RAPID-EXCHANGE: Brand: TREK

## (undated) DEVICE — CATH F5 INF 3DRC 100CM: Brand: INFINITI